# Patient Record
Sex: FEMALE | Race: WHITE | NOT HISPANIC OR LATINO | Employment: OTHER | URBAN - METROPOLITAN AREA
[De-identification: names, ages, dates, MRNs, and addresses within clinical notes are randomized per-mention and may not be internally consistent; named-entity substitution may affect disease eponyms.]

---

## 2017-01-09 ENCOUNTER — ASSISTED LIVING VISIT (OUTPATIENT)
Dept: GERIATRICS | Facility: CLINIC | Age: 82
End: 2017-01-09
Payer: COMMERCIAL

## 2017-01-09 DIAGNOSIS — F41.9 ANXIETY: ICD-10-CM

## 2017-01-09 DIAGNOSIS — I10 ESSENTIAL HYPERTENSION: ICD-10-CM

## 2017-01-09 DIAGNOSIS — I50.32 CHRONIC DIASTOLIC CONGESTIVE HEART FAILURE (H): Primary | ICD-10-CM

## 2017-01-09 PROCEDURE — 99207 ZZC CDG-CORRECTLY CODED, REVIEWED AND AGREE: CPT | Performed by: NURSE PRACTITIONER

## 2017-01-09 NOTE — PROGRESS NOTES
[place] [facility]  Chief Complaint   Patient presents with     Annual Comprehensive Exam Assisted Living       HPI:    Jade Caba is a 83 year old  (8/26/1933), who is being seen today for an annual comprehensive visit at [facility]. Today's concerns are:  Chronic diastolic congestive heart failure (H)  Stable. Cont. On lasix. Wt. Stable. Resp.status stable. No LE edema    Essential hypertension  Stable BPs/HRs. Cont. On norvasc, coreg, cozaar      Anxiety  Gen. Stable. Does reports feeling some worry right before bed, but no sleep dist.  Cont. On celexa, remeron,       ALLERGIES: No known allergies  PROBLEM LIST:  Patient Active Problem List   Diagnosis     Cardiovascular disease     Disorders of lipoid metabolism     Renal artery stenosis (H)     HTN (hypertension)     CAD (coronary artery disease)     Carotid artery stenosis     Seizures (H)     Mild major depression (H)     Hypercholesterolemia     CKD (chronic kidney disease) stage 3, GFR 30-59 ml/min     Hyperlipidemia LDL goal <100     Hypercholesteremia     Seizure disorder (H)     Osteoarthritis of knee     Chondrocalcinosis     Subarachnoid hemorrhage (H)     Fracture of C1 vertebra, closed (H)     Sacral fracture, closed (H)     Atrial flutter (H)     Alcohol abuse     Constipation     Anxiety state     ARF (acute renal failure) (H)     FTT (failure to thrive) in adult     Fall     Cognitive impairment     CHF exacerbation (H)     Health Care Home     Advance care planning     Chronic diastolic congestive heart failure (H)     Coronary artery disease, angina presence unspecified, unspecified vessel or lesion type, unspecified whether native or transplanted heart     Anxiety     Urgency incontinence     PAST MEDICAL HISTORY:  has a past medical history of Hypertension; Arthritis; Subarachnoid hemorrhage (H) (2014); C1 cervical fracture (H); Anxiety; Seizure disorder (H); Depression; Renal artery stenosis (H);  Hypercholesterolemia; CKD (chronic kidney disease); Carotid artery stenosis; Atrial flutter (H); H/O alcohol abuse; and CAD (coronary artery disease).  PAST SURGICAL HISTORY:  has past surgical history that includes carotid endarterectomy (Left); Stenting of renal artery; Coronary stents x 3; Cholecystectomy; appendectomy; joint replacement (Bilateral); Cataract surgery (Right); and Hysterectomy.  FAMILY HISTORY: family history includes C.A.D. in her brother, brother, mother, and sister; CANCER in her sister; HEART DISEASE in her son.  SOCIAL HISTORY:  reports that she has never smoked. She has never used smokeless tobacco. She reports that she does not drink alcohol or use illicit drugs.  IMMUNIZATIONS:  Most Recent Immunizations   Administered Date(s) Administered     Influenza (High Dose) 3 valent vaccine 10/03/2016     Influenza (IIV3) 10/22/2012     Pneumococcal (PCV 13) 05/26/2016     Pneumococcal 23 valent 08/14/2013     TDAP (BOOSTRIX AGES 10-64) 08/14/2013     Above immunizations pulled from [facility]. MIIC and facility records also reconciled. Outstanding information sent to  to update [facility] 1/10/17.  Future immunizations are not needed at this point as all recommended immunizations are up to date.   MEDICATIONS:  Current Outpatient Prescriptions   Medication Sig Dispense Refill     alendronate (FOSAMAX) 70 MG tablet Take 70 mg by mouth every 7 days On Wednesdays       furosemide (LASIX) 20 MG tablet Take 20 mg by mouth daily       cholecalciferol (VITAMIN D3) 5000 UNITS CAPS capsule Take 5,000 Units by mouth daily       acetaminophen (TYLENOL) 325 MG tablet Take 650 mg by mouth 2 times daily as needed for mild pain       Atorvastatin Calcium (LIPITOR PO) Take 20 mg by mouth daily       carvedilol (COREG) 12.5 MG tablet Take 1 tablet (12.5 mg) by mouth 2 times daily (with meals) 60 tablet 1     amLODIPine (NORVASC) 10 MG tablet Take 1 tablet (10 mg) by mouth daily 30 tablet 1      MIRTAZAPINE PO Take 30 mg by mouth At Bedtime       citalopram (CELEXA) 10 MG tablet Take 30 mg by mouth daily       losartan (COZAAR) 100 MG tablet Take 100 mg by mouth daily       multivitamin, therapeutic with minerals (THERA-VIT-M) TABS Take 1 tablet by mouth daily       meloxicam (MOBIC) 15 MG tablet Take 15 mg by mouth daily       polyethylene glycol (MIRALAX/GLYCOLAX) powder Take 17 g by mouth 2 times daily       calcium carbonate (OS-FRANCISCO) 500 MG TABS Take 2 tablets by mouth 2 times daily (with meals)       oxybutynin (DITROPAN) 5 MG tablet Take 5 mg by mouth 2 times daily       senna-docusate (SENNA S) 8.6-50 MG per tablet Take 1 tablet by mouth 2 times daily Please hold if having loose stools and contact nurse.       ASPIRIN EC PO Take 325 mg by mouth daily       metoclopramide (REGLAN) 5 MG tablet Take 5 mg by mouth 3 times daily (with meals)       bisacodyl (DULCOLAX) 10 MG suppository Place 10 mg rectally daily as needed       levETIRAcetam (KEPPRA) 500 MG tablet Take 500 mg by mouth 2 times daily       folic acid (FOLVITE) 1 MG tablet Take 1 mg by mouth daily       omeprazole (PRILOSEC) 40 MG capsule Take 1 capsule (40 mg) by mouth daily Take 30-60 minutes before a meal. 30 capsule 9     Medications reviewed:  Medications reconciled to facility chart and changes were made to reflect current medications as identified as above med list. Below are the changes that were made:   Medications stopped since last EPIC medication reconciliation:   There are no discontinued medications.    Medications started since last [facility] medication reconciliation:  No orders of the defined types were placed in this encounter.         Case Management:  I have reviewed the Assisted Living care plan, current immunizations and preventive care/cancer screening..Future cancer screening is not clinically indicated secondary to age/goals of care Patient's desire to return to the community is not present. Current Level of Care is  appropriate.    Advance Directive Discussion:    I reviewed the current advanced directives as reflected in EPIC and the facility chart. I contacted the first party dtr and left a message regarding the plan of Care. I reviewed the POLST, resigned, dated and sent to [place] [facility] 1/10/17.  I did review the advance directives with the resident.     Team Discussion:  I communicated with the appropriate disciplines involved with the Plan of Care:   Nursing    Patient Goal:  Patient's goal is pain control and comfort and tx of reversible conditions.    Depression screen done: PHQ-9 Given screen score and clinical assessment patient is stable without any signs of depression and no futher interventions warrented at this time.    Information reviewed:  Medications, vital signs, orders, and nursing notes.    ROS:  No chest pain, shortness of breath, fevers, chills, headache, nausea, vomiting, dysuria or bowel abnormalities.  Appetite is  normal.  No pain except occ aches.    Exam:  /57 mmHg  Pulse 62  Resp 18  Wt 172 lb (78.019 kg)  SpO2 95%    GENERAL APPEARANCE:  Alert, in no distress, cooperative  ENT:  Mouth and posterior oropharynx normal, moist mucous membranes, [place], no rhinitis  EYES:  EOM, conjunctivae, lids, pupils and irises normal, PERRL, no drainage  NECK:  No adenopathy,masses or thyromegaly, no carotid bruit, FROM  RESP:  respiratory effort and palpation of chest normal, lungs clear to auscultation , no respiratory distress  CV:  Palpation and auscultation of heart done , regular rate and rhythm, no murmur, rub, or gallop, no edema  ABDOMEN:  normal bowel sounds, soft, nontender, no hepatosplenomegaly or other masses, no guarding or rebound, no bruits  LYMPHATICS:  No adenopathy in neck , No adenopathy in axillae  M/S:   Gait and station normal  muscle strength 5/5 all 4 ext., normal tone  NEURO:   Cranial nerves 2-12 are normal tested and grossly at patient's baseline, speech clear, no  tremor  PSYCH:  insight and judgement impaired, memory impaired , affect and mood normal     Lab/Diagnostic data:    CBC RESULTS:   Lab Test 09/19/16   WBC  5.7   RBC  4.11   HGB  13.1   HCT  40.2   MCV  98   MCH  31.9   MCHC  32.6   RDW  12.7   PLT  165       Last Basic Metabolic Panel:  Lab Test 09/19/16   NA  143   POTASSIUM  4.7   CHLORIDE  104   FRANCISCO  9.2   CO2   --    BUN  21   CR  0.98   GLC  86       Liver Function Studies -   Lab Test 09/19/16   PROTTOTAL   --    ALBUMIN   --    BILITOTAL   --    ALKPHOS   --    AST  19   ALT  17           ASSESSMENT/PLAN  Chronic diastolic congestive heart failure (H)  Currently controlled  1. Cont. Lasix  2. Monitor for LE edema  3. Monitor for resp. Distress  4. Cont qd wt.s  5. Bmp next week    Essential hypertension  Currently controlled  1. Cont. Cozaar, norasc, coreg  2. Follow BPs/HRs  3. Cont as for a-fib  4. Cbc next week    Anxiety  Gen. Controlled. Some hs s/s  1. Cont. Celexa, remeron  2. Monitor for reports of increased anxiety  3. Monitor for insomnia        The health plan new enrollment has happened. I have reviewed the  MDS and facility care plan. The level of care is appropriate.     Electronically signed by:  ZAIN Marcos CNP

## 2017-01-10 VITALS
HEART RATE: 62 BPM | BODY MASS INDEX: 31.45 KG/M2 | WEIGHT: 172 LBS | DIASTOLIC BLOOD PRESSURE: 57 MMHG | SYSTOLIC BLOOD PRESSURE: 110 MMHG | RESPIRATION RATE: 18 BRPM | OXYGEN SATURATION: 95 %

## 2017-01-11 ASSESSMENT — PATIENT HEALTH QUESTIONNAIRE - PHQ9: SUM OF ALL RESPONSES TO PHQ QUESTIONS 1-9: 1

## 2017-01-17 ENCOUNTER — TRANSFERRED RECORDS (OUTPATIENT)
Dept: HEALTH INFORMATION MANAGEMENT | Facility: CLINIC | Age: 82
End: 2017-01-17

## 2017-01-17 LAB
ANION GAP SERPL CALCULATED.3IONS-SCNC: 6 MMOL/L (ref 5–18)
BUN SERPL-MCNC: 22 MG/DL (ref 8–28)
CALCIUM SERPL-MCNC: 8.8 MG/DL (ref 8.5–10.5)
CHLORIDE SERPLBLD-SCNC: 106 MMOL/L (ref 98–107)
CO2 SERPL-SCNC: 27 MMOL/L (ref 22–31)
CREAT SERPL-MCNC: 1.09 MG/DL (ref 0.5–1.1)
DIFFERENTIAL: ABNORMAL
ERYTHROCYTE [DISTWIDTH] IN BLOOD BY AUTOMATED COUNT: 11.9 % (ref 11–14.6)
GFR SERPL CREATININE-BSD FRML MDRD: 48 ML/MIN/1.73M2
GLUCOSE SERPL-MCNC: 156 MG/DL (ref 70–125)
HCT VFR BLD AUTO: 40.2 % (ref 35–47)
HEMOGLOBIN: 12.5 G/DL (ref 12–16)
MCH RBC QN AUTO: 30.8 PG (ref 27–34)
MCHC RBC AUTO-ENTMCNC: 31.1 G/DL (ref 32–36)
MCV RBC AUTO: 99 FL (ref 80–100)
PLATELET # BLD AUTO: 211 THOU/UL (ref 140–440)
PMV BLD: 11.8 FL (ref 8.5–12.5)
POTASSIUM SERPL-SCNC: 4.5 MMOL/L (ref 3.5–5)
RBC # BLD AUTO: 4.06 MILL/UL (ref 3.8–5.4)
SODIUM SERPL-SCNC: 139 MMOL/L (ref 136–144)
WBC # BLD AUTO: 7.5 THOU/UL (ref 4–11)

## 2017-02-24 ENCOUNTER — CARE COORDINATION (OUTPATIENT)
Dept: GERIATRIC MEDICINE | Facility: CLINIC | Age: 82
End: 2017-02-24

## 2017-02-24 NOTE — PROGRESS NOTES
Rec'd information that client changed insurance from [employer] to are [employer] eff 1/1/17.  Welcome letter had been mailed to client on 1/11/17, MMIS completed on 1/11/17  CM spoke with Silvana ZHAO at [facility] on 2/22/17 who is aware of the chagne. CM to complete new auth  CM will complete early visit/assessment with client, due to difficulty making telephone contact with her.  Holly Guajardo RN, BC  Supervisor [facility]   [phone] (Fax)

## 2017-03-09 ENCOUNTER — CARE COORDINATION (OUTPATIENT)
Dept: GERIATRIC MEDICINE | Facility: CLINIC | Age: 82
End: 2017-03-09

## 2017-03-09 NOTE — PROGRESS NOTES
Spoke with Jade, home visit changed to 3/23/17 @ 1 PM  Holly Guajardo RN, BC  Supervisor [facility]   [phone] (Fax)

## 2017-03-09 NOTE — PROGRESS NOTES
Rec'd vm from Betsey at [facility] requesting a return call re authorization.  Call placed to client to schedule annual HRA -client with change in health plan to [facility]. Home visit tentatively arranged for 3/16/17 @ 10 AM.  Spoke with Betsey, who inquired on an authorization. Explained that the Waiver Approval form has been completed, inquired if they billed [employer] and rec'd a denial. Betsey will f/u and contact . Explained that if a denial was rec'd,  [facility] will f/u with [facility].   Holly Guajardo RN, BC  Supervisor [facility]   [phone] (Fax)

## 2017-03-16 ENCOUNTER — ASSISTED LIVING VISIT (OUTPATIENT)
Dept: GERIATRICS | Facility: CLINIC | Age: 82
End: 2017-03-16
Payer: COMMERCIAL

## 2017-03-16 VITALS
OXYGEN SATURATION: 95 % | HEART RATE: 73 BPM | DIASTOLIC BLOOD PRESSURE: 64 MMHG | SYSTOLIC BLOOD PRESSURE: 118 MMHG | RESPIRATION RATE: 16 BRPM

## 2017-03-16 DIAGNOSIS — F41.9 ANXIETY: ICD-10-CM

## 2017-03-16 DIAGNOSIS — H57.89 IRRITATION OF LEFT EYE: Primary | ICD-10-CM

## 2017-03-16 DIAGNOSIS — I10 ESSENTIAL HYPERTENSION: ICD-10-CM

## 2017-03-16 NOTE — PROGRESS NOTES
[place] [facility]    Chief Complaint   Patient presents with     Eye Problem       HPI:    Jade Caba is a 83 year old  (8/26/1933), who is being seen today for an episodic care visit at [facility]. Today's concern is: L eye irritation, HTN, anxiety.  Reports occ eye pain/irritation OS for past few weeks. No changes in vision. No h/o mac. Deg. Last eye exam over 1 yr ago. Reports occ drainage. For HTN cont on norvasc, coreg, cozaar. BPs/HRs stable.  For anxiety cont on celexa, remeron. Staff report mood has been stable. No reports of increased anxiety.      ALLERGIES: No known allergies  Past Medical, Surgical, Family and Social History reviewed and updated in [facility].    Current Outpatient Prescriptions   Medication Sig Dispense Refill     alendronate (FOSAMAX) 70 MG tablet Take 70 mg by mouth every 7 days On Wednesdays       furosemide (LASIX) 20 MG tablet Take 20 mg by mouth daily       cholecalciferol (VITAMIN D3) 5000 UNITS CAPS capsule Take 5,000 Units by mouth daily       acetaminophen (TYLENOL) 325 MG tablet Take 650 mg by mouth 2 times daily as needed for mild pain       Atorvastatin Calcium (LIPITOR PO) Take 20 mg by mouth daily       carvedilol (COREG) 12.5 MG tablet Take 1 tablet (12.5 mg) by mouth 2 times daily (with meals) 60 tablet 1     amLODIPine (NORVASC) 10 MG tablet Take 1 tablet (10 mg) by mouth daily 30 tablet 1     MIRTAZAPINE PO Take 30 mg by mouth At Bedtime       citalopram (CELEXA) 10 MG tablet Take 30 mg by mouth daily       losartan (COZAAR) 100 MG tablet Take 100 mg by mouth daily       multivitamin, therapeutic with minerals (THERA-VIT-M) TABS Take 1 tablet by mouth daily       meloxicam (MOBIC) 15 MG tablet Take 15 mg by mouth daily       polyethylene glycol (MIRALAX/GLYCOLAX) powder Take 17 g by mouth 2 times daily       calcium carbonate (OS-FRANCISCO) 500 MG TABS Take 2 tablets by mouth 2 times daily (with meals)       oxybutynin (DITROPAN) 5 MG tablet  Take 5 mg by mouth 2 times daily       senna-docusate (SENNA S) 8.6-50 MG per tablet Take 1 tablet by mouth 2 times daily Please hold if having loose stools and contact nurse.       ASPIRIN EC PO Take 325 mg by mouth daily       metoclopramide (REGLAN) 5 MG tablet Take 5 mg by mouth 3 times daily (with meals)       bisacodyl (DULCOLAX) 10 MG suppository Place 10 mg rectally daily as needed       levETIRAcetam (KEPPRA) 500 MG tablet Take 500 mg by mouth 2 times daily       folic acid (FOLVITE) 1 MG tablet Take 1 mg by mouth daily       omeprazole (PRILOSEC) 40 MG capsule Take 1 capsule (40 mg) by mouth daily Take 30-60 minutes before a meal. 30 capsule 9     Medications reviewed:  Medications reconciled to facility chart and changes were made to reflect current medications as identified as above med list. Below are the changes that were made:   Medications stopped since last EPIC medication reconciliation:   There are no discontinued medications.    Medications started since last [facility] medication reconciliation:  No orders of the defined types were placed in this encounter.        REVIEW OF SYSTEMS:  No chest pain, shortness of breath, fevers, chills, headache, nausea, vomiting, dysuria or bowel abnormalities.  Appetite is  normal.  No pain except occ OS.    Physical Exam:  /64  Pulse 73  Resp 16  SpO2 95%  GENERAL APPEARANCE:  Alert, in no distress, cooperative  ENT:  Mouth and posterior oropharynx normal, moist mucous membranes, [place], no sinus tenderness or rhinitis  EYES:  EOM, conjunctivae, lids, pupils and irises normal, PERRL, no drainage  NECK:  No adenopathy,masses or thyromegaly, no carotid bruit, FROM  RESP:  respiratory effort and palpation of chest normal, lungs clear to auscultation , no respiratory distress  CV:  Palpation and auscultation of heart done , regular rate and rhythm, no murmur, rub, or gallop, no edema  ABDOMEN:  normal bowel sounds, soft, nontender, no hepatosplenomegaly or other  masses, no guarding or rebound, no bruits  LYMPHATICS:  No adenopathy in neck , No adenopathy in axillae  M/S:   Gait and station normal  muscle strength 5/5 all 4 ext, normal tone  NEURO:   Cranial nerves 2-12 are normal tested and grossly at patient's baseline, speech clear, no tremor  PSYCH:  insight and judgement impaired, memory impaired , affect and mood normal    Recent Labs:      17 CBC RESULTS:    WBC: 7.5  RBC: 4.06  HGB: 12.5  HCT: 40.2  MCV: 99  MCH: 30.8  MCHC: 31.1  RDW: 11.9  PLT: 211      CBC RESULTS:   Recent Labs   Lab Test 09/19/16  02/27/15   0841   WBC  5.7  6.0   RBC  4.11  4.13   HGB  13.1  12.8   HCT  40.2  40.3   MCV  98  98   MCH  31.9  31.0   MCHC  32.6  31.8   RDW  12.7  13.5   PLT  165  195     17 BASIC METABOLIC PANEL:    NA: 139  POTASSIUM: 4.5  CHLORIDE: 106  FRANCISCO: 8.8  CO2: 27  BUN: 22  CR: 1.09  G    Last Basic Metabolic Panel:  Recent Labs   Lab Test 09/19/16  02/27/15   0841  02/26/15   0635   NA  143  139  140   POTASSIUM  4.7  4.0  3.9   CHLORIDE  104  104  105   FRANCISCO  9.2  8.8  8.9   CO2   --   30  32   BUN  21  19  18   CR  0.98  0.81  0.78   GLC  86  100*  87       Liver Function Studies -   Recent Labs   Lab Test 16   1030  14   1422   PROTTOTAL   --   6.6*  7.8   ALBUMIN   --   3.2*  4.2   BILITOTAL   --   0.3  1.1   ALKPHOS   --   68  86   AST  19  20  27   ALT  17  17  24       Assessment/Plan:  Irritation of left eye  Past few weeks,no drainage no conjunctivitis   1. Start artificial tears tid  2. Monitor for further pain  3. Reassess over next week for increased pain  4. Resident to make eye exam for next week    Essential hypertension  Currently controlled  1. Cont. Norvasc, coreg, cozaar.  2. Follow BPs/HRs  3. Bmp in next 1-3 mos  4. Monitor for c/o dizziness    Anxiety  Gen. Controlled. No recent increased s/s  1. Cont. celexa  2. Cont. remeron  3. Monitor for increased anxiety, s/s depression          Electronically signed  by  ZAIN Marcos CNP

## 2017-03-23 ENCOUNTER — CARE COORDINATION (OUTPATIENT)
Dept: GERIATRIC MEDICINE | Facility: CLINIC | Age: 82
End: 2017-03-23

## 2017-03-23 NOTE — PROGRESS NOTES
Spoke with client to reschedule home visit/assessment 3/30/17 1-3.   Secure e-mail sent to Betsey ZHAO at [facility] requesting copies of AL POC and med list.  Holly Guajardo RN, BC  Supervisor [facility]   [phone] (Fax)

## 2017-03-31 ENCOUNTER — CARE COORDINATION (OUTPATIENT)
Dept: GERIATRIC MEDICINE | Facility: CLINIC | Age: 82
End: 2017-03-31

## 2017-03-31 DIAGNOSIS — Z71.89 ADVANCE CARE PLANNING: Chronic | ICD-10-CM

## 2017-03-31 DIAGNOSIS — Z76.89 HEALTH CARE HOME: ICD-10-CM

## 2017-03-31 NOTE — PROGRESS NOTES
Home visit/[place] Risk Assessment/EW screening completed on: 3/30/2017  Met with client in her apt at the [facility]. Client reports that she is doing well, expressed no concerns. Shared that her insurance changed to [employer], client states that her daughter manages all of insurance needs. Client did show CM her new [employer] card, explained that her previous health plan card is no longer active. Client reports that she enjoys living at the AL. Client is followed by the on site [facility] team. Client reports no falls this past year. Recent eye exam at [facility] she had to pay privately for. CM will f/u with [facility] to inquire if they are an in network provider. Client cont to report panic attacks near bedtime, stating that walking the halls helps her. Client does use a walker at night.   Client had no questions or concerns for CM.   Member resides: Assisted living [facility] apt with private bath, microwave, refrigerator, coffee pot and small sink. The apt is clean and free of all clutter   Member currently receiving the following services: AL services.   See EMR for a list of client's diagnoses and medications.   Medication management: Medications reviewed:Yes. Medication management: Care giver administers medication. Medication understanding:Patient has understanding of regimen and is adherent Yes and Caregiver has understanding of regimen and is able to complete med set up/administration Yes. Client reports meds are administered 4x/day and eye gtts are also administered.   Member Mood/behavior-PHQ9 score: 1. Client reports that is satisfied with her life, denies feeling depressed, reports that she does feel anxious at time. PCP is aware and has addressed mood in recent visit notes.    Plan of Care: Continue AL POC, per client she receives assistance with bathing 2x/wk, med admin, laundry and hmkg, 2 meals per day, daily weight and VS. Client states that she enjoys the social activities of Bingo,  special celebrations and social hours. Client states that she also likes to spend time in her room watching TV and playing word search.   CM attempted to meet with Annie ZHAO to review POC, sent e-mail previously. Annie is out of the office.   Follow-Up Plan: Member informed of future contact, plan to f/u with member with a 6 month telephone assessment.  Contact information shared with member and family, encouraged member to call with any questions or concerns prior to this.  See [facility] for further detailed information  Holly Guajardo RN, BC  Supervisor [facility]   [phone] (Fax)

## 2017-04-05 ENCOUNTER — CARE COORDINATION (OUTPATIENT)
Dept: GERIATRIC MEDICINE | Facility: CLINIC | Age: 82
End: 2017-04-05

## 2017-04-05 NOTE — PROGRESS NOTES
Secure e-mail to Betsey ZHAO at [facility] requesting copy of medication list and AL POC. Second request.   Holly Guajardo RN, BC  Supervisor [facility]   [phone] (Fax)

## 2017-04-10 ENCOUNTER — DOCUMENTATION ONLY (OUTPATIENT)
Dept: OTHER | Facility: CLINIC | Age: 82
End: 2017-04-10

## 2017-04-10 ENCOUNTER — CARE COORDINATION (OUTPATIENT)
Dept: GERIATRIC MEDICINE | Facility: CLINIC | Age: 82
End: 2017-04-10

## 2017-04-10 DIAGNOSIS — Z71.89 ADVANCE CARE PLANNING: Chronic | ICD-10-CM

## 2017-04-10 NOTE — PROGRESS NOTES
Call placed to Betsey ZHAO at [facility] to review LTCC and AL POC. Request med list and POC to be faxed to   CM. Betsey reports that client is not very involved, is mainly by herself. Betsey has no concerns. Client with no recent falls, indep in ADL's with the exception of bathing. CM provided information that client had shared with CM regarding services provided to client. Betsey agreed.   Reviewed previous 's AL tool.  Explained that client stated that she is not receiving daily housekeeping assistance, Betsey agreed and will f/u with staff to confirm hmkg and laundry. CM to remove 1 meal per day as client is making her own noon meal. CM to remove weekly assistance with dressing and grooming as client is indep.  Reviewed cognitive and behavioral plan, Betsey stated that they are not providing assistance or any intervention but will review with staff and f/u with CM. Explained that the tool will likely be a reduction from previous authorization, Betsey in agreement.  Holly Guajardo RN, BC  Supervisor [facility]   [phone] (Fax)

## 2017-04-13 NOTE — PROGRESS NOTES
CM has not rec'd client's medication list or AL POC. Call placed to Betsey ZHAO -out of office until 4/17/17  Left  with nursing office at [facility] requesting copy of med list and POC be faxed to   . Request a call back with questions or concerns.  Holly Guajardo RN, BC  Supervisor [facility]   [phone] (Fax)

## 2017-04-21 ENCOUNTER — CARE COORDINATION (OUTPATIENT)
Dept: GERIATRIC MEDICINE | Facility: CLINIC | Age: 82
End: 2017-04-21

## 2017-04-24 NOTE — PROGRESS NOTES
4/21/17 Rec'd secure e-mail from [facility] with client's med list and POC   Secure e-mail sent to [facility] to f/u on homemaking/laundry and behavior (previous CL tool had time for orientation, behavior management, dressing/grooming assistance)  Per [facility]   Housekeeping hours are the same. 60 minutes a week. No behaviors noted for her.  Holly Guajardo RN, BC  Supervisor [facility]   [phone] (Fax)

## 2017-04-26 ENCOUNTER — CARE COORDINATION (OUTPATIENT)
Dept: GERIATRIC MEDICINE | Facility: CLINIC | Age: 82
End: 2017-04-26

## 2017-04-26 DIAGNOSIS — Z76.89 HEALTH CARE HOME: ICD-10-CM

## 2017-04-26 NOTE — PROGRESS NOTES
Mailed copy of care plan to client.  As requested/needed:  emailed CPS to Carla for [facility], updated services in access as needed and submitted appropriate referrals/auths. Chart was returned to CM.     Mailed copy of CL tool to client and emailed copy to Carla HENNESSY for auth.    Scarlett Vyas  Case Management Specialist  [facility]  [phone]

## 2017-04-26 NOTE — PROGRESS NOTES
MMIS completed. CL tool completed, note reduction in services: previous tool had 60 min per day of housekeeping, [facility] 60 min per week. CM added daily weights, removed dressing and grooming as client is indep.    Secure e-mail sent to [facility] with CL tool attached, shared info above. Request a call back as needed.  UCP completed.  Holly Guajardo RN, BC  Supervisor [facility]   [phone] (Fax)

## 2017-04-26 NOTE — PROGRESS NOTES
Received a request to submit a DTR for the reduction of assisted living services. Documentation completed and faxed to the health plan.  aware.    Eedn Barbour RN  Utilization   [facility]  [phone]

## 2017-05-15 NOTE — PROGRESS NOTES
5/3/2017 Information rec'd from [facility] secure web site: Jade Caba ([birth year]) - [facility] - 05/12/17-08/31/18 - EWCL reduced to $62.02 daily  Holly Guajardo RN, BC  Supervisor [facility]   [phone] (Fax)

## 2017-08-17 ENCOUNTER — ASSISTED LIVING VISIT (OUTPATIENT)
Dept: GERIATRICS | Facility: CLINIC | Age: 82
End: 2017-08-17
Payer: COMMERCIAL

## 2017-08-17 VITALS
OXYGEN SATURATION: 95 % | HEART RATE: 73 BPM | RESPIRATION RATE: 16 BRPM | SYSTOLIC BLOOD PRESSURE: 118 MMHG | DIASTOLIC BLOOD PRESSURE: 64 MMHG

## 2017-08-17 DIAGNOSIS — K21.9 GASTROESOPHAGEAL REFLUX DISEASE WITHOUT ESOPHAGITIS: ICD-10-CM

## 2017-08-17 DIAGNOSIS — R41.89 COGNITIVE IMPAIRMENT: ICD-10-CM

## 2017-08-17 DIAGNOSIS — F41.1 ANXIETY STATE: ICD-10-CM

## 2017-08-17 DIAGNOSIS — G40.909 SEIZURE DISORDER (H): ICD-10-CM

## 2017-08-17 DIAGNOSIS — I50.32 CHRONIC DIASTOLIC CONGESTIVE HEART FAILURE (H): ICD-10-CM

## 2017-08-17 DIAGNOSIS — I25.10 CORONARY ARTERY DISEASE INVOLVING NATIVE CORONARY ARTERY OF NATIVE HEART WITHOUT ANGINA PECTORIS: ICD-10-CM

## 2017-08-17 DIAGNOSIS — N39.41 URGENCY INCONTINENCE: ICD-10-CM

## 2017-08-17 DIAGNOSIS — I10 ESSENTIAL HYPERTENSION: Primary | ICD-10-CM

## 2017-08-17 PROCEDURE — 99207 ZZC CDG-CORRECTLY CODED, REVIEWED AND AGREE: CPT | Performed by: INTERNAL MEDICINE

## 2017-08-21 ENCOUNTER — TRANSFERRED RECORDS (OUTPATIENT)
Dept: HEALTH INFORMATION MANAGEMENT | Facility: CLINIC | Age: 82
End: 2017-08-21

## 2017-08-21 LAB
ANION GAP SERPL CALCULATED.3IONS-SCNC: 10 MMOL/L (ref 5–18)
BUN SERPL-MCNC: 23 MG/DL (ref 8–28)
CALCIUM SERPL-MCNC: 9.3 MG/DL (ref 8.5–10.5)
CHLORIDE SERPLBLD-SCNC: 104 MMOL/L (ref 98–107)
CO2 SERPL-SCNC: 28 MMOL/L (ref 22–31)
CREAT SERPL-MCNC: 1.1 MG/DL (ref 0.6–1.1)
DIFFERENTIAL: NORMAL
ERYTHROCYTE [DISTWIDTH] IN BLOOD BY AUTOMATED COUNT: 11.7 % (ref 11–14.5)
GFR SERPL CREATININE-BSD FRML MDRD: 47 ML/MIN/1.73M2
GLUCOSE SERPL-MCNC: 120 MG/DL (ref 70–125)
HCT VFR BLD AUTO: 43.9 % (ref 35–47)
HEMOGLOBIN: 14.2 G/DL (ref 12–16)
MCH RBC QN AUTO: 31.3 PG (ref 27–34)
MCHC RBC AUTO-ENTMCNC: 32.3 G/DL (ref 32–36)
MCV RBC AUTO: 97 FL (ref 80–100)
PLATELET # BLD AUTO: 218 THOU/UL (ref 140–440)
POTASSIUM SERPL-SCNC: 4.2 MMOL/L (ref 3.5–5)
RBC # BLD AUTO: 4.53 MILL/UL (ref 3.8–5.4)
SODIUM SERPL-SCNC: 142 MMOL/L (ref 136–145)
WBC # BLD AUTO: 8.7 THOU/UL (ref 4–11)

## 2017-08-24 NOTE — PROGRESS NOTES
"Jade Caba is a 83 year old female seen August 17, 2017 at [facility] where she has resided for 3 years (admit 2014), seen to follow up seizure disorder, HTN and anxiety/depression.   Patient is seen in her apartment, which she keeps very neat.   States she is feeling well, denies any current pain.   Ambulatory without device except she uses her walker when she is up to the BR at night.     Reports she continues to have brief seizures (just seconds long) as often as once a week.   Doesn't feel bothered by these or describe a post-ictal phase.   Currently on Keppra.    She also states \"I still get anxiety quite a bit\"   Takes several medications but notes \"you could go up on those.\"   Anxiety does not seem to affect her functional status.     Has a h/o alcohol abuse.    She is also tx'd for HTN, hyperlipidemia and CAD.   She was admitted to AL in 2014 after hospitalization and TCU stay following a fall at home, sustaining SAH, C1 chip fracture, sacral fracture.     Slow recovery.    She was readmitted that year for weight loss and failure to thrive, CPT 4.3  Oxycodone d/c'd and she improved after that point.   No recent falls reported.       Past Medical History:   Diagnosis Date     Anxiety      Arthritis      Atrial flutter (H)      C1 cervical fracture (H)      CAD (coronary artery disease)      Carotid artery stenosis      CKD (chronic kidney disease)      Depression      H/O alcohol abuse      Hypercholesterolemia      Hypertension      Renal artery stenosis (H)      Seizure disorder (H)      Subarachnoid hemorrhage (H) 2014     Past Surgical History:   Procedure Laterality Date     APPENDECTOMY       CAROTID ENDARTERECTOMY Left      Cataract surgery Right     will have laser eye surgery     CHOLECYSTECTOMY       Coronary stents x 3       HYSTERECTOMY      at age of 40-not cancerus or precancerous      JOINT REPLACEMENT Bilateral      Stenting of renal artery       SH:  , has 10 children.      ROS:  " Reports occ reflux symptoms.   Weight is stable.   Also feels she needs oxybutynin for urge incontinence.     Occ constipation.      EXAM:  NAD, pleasant  /64  Pulse 73  Resp 16  SpO2 95%   Neck supple without adenopathy  Lungs clear bilaterally with fair air movement  Heart RRR s1s2, no murmur or gallop  Abd soft, NT, no distention, +BS  Ext without edema.   Ambulatory without device.    Neuro: no tremor or stiffness  Psych: affect okay, content anxios    No recent labs    IMP/PLAN:  (I10) Essential hypertension   Comment: good control, has h/o CKD  Plan: continue amlodipine, carvedilol, losartan  Check BMP     No reports of pain, and meloxicam may be causing higher bps and worsening renal function; will change that to prn only    (I25.10) Coronary artery disease involving native coronary artery of native heart without angina pectoris  Comment: h/o afib, has declined warfarin secondary to frequent falls.     Plan: continue beta blocker, statin, daily ASA for secondary prevention    (G40.909) Seizure disorder (H)  Comment: hard to tell how well controlled these are, but no witnessed events recently  Plan: continue Keppra    (F41.1) Anxiety state  Comment: patient already on max treatment  Plan: continue same doses of mirtazapine and citalopram; might consider dividing mirtazapine to 15 mg bid for better day time coverage if anxiety symptoms persist.       (R41.89) Cognitive impairment  Comment: by prior testing  Plan: appropriate for AL with med admin and services.       (I50.32) Chronic diastolic congestive heart failure (H)  Comment: stable volume status  Plan: continue current dose of furosemide, check BMP    (N39.41) Urgency incontinence  Comment: patient feels she needs oxybutynin  Plan: will continue for now.      (K21.9) Gastroesophageal reflux disease without esophagitis  Comment: also on metoclopramide for ?reflux sx  Plan: continue omeprazole, consider GDR metoclopramide.        Osteoporosis  Comment : h/o falls  Plan: continue calcium, vit D, alendronate     Kerry Machado MD

## 2017-08-25 ENCOUNTER — CARE COORDINATION (OUTPATIENT)
Dept: GERIATRIC MEDICINE | Facility: CLINIC | Age: 82
End: 2017-08-25

## 2017-08-25 NOTE — PROGRESS NOTES
6 month telephone assessment. EPIC notes reviewed. Secure e-mail sent to Betsey ZHAO at [facility] to obtain update.  Attempted to reach client, not able to leave voice message.   Holly Guajardo RN, BC  Supervisor [facility]   [phone] (Fax)

## 2017-10-03 ENCOUNTER — ASSISTED LIVING VISIT (OUTPATIENT)
Dept: GERIATRICS | Facility: CLINIC | Age: 82
End: 2017-10-03

## 2017-10-03 ENCOUNTER — ASSISTED LIVING VISIT (OUTPATIENT)
Dept: GERIATRICS | Facility: CLINIC | Age: 82
End: 2017-10-03
Payer: COMMERCIAL

## 2017-10-03 VITALS
OXYGEN SATURATION: 95 % | SYSTOLIC BLOOD PRESSURE: 131 MMHG | DIASTOLIC BLOOD PRESSURE: 65 MMHG | RESPIRATION RATE: 16 BRPM | HEART RATE: 83 BPM

## 2017-10-03 DIAGNOSIS — F41.9 ANXIETY: ICD-10-CM

## 2017-10-03 DIAGNOSIS — M15.0 PRIMARY OSTEOARTHRITIS INVOLVING MULTIPLE JOINTS: Primary | ICD-10-CM

## 2017-10-03 DIAGNOSIS — K21.9 GASTROESOPHAGEAL REFLUX DISEASE, ESOPHAGITIS PRESENCE NOT SPECIFIED: ICD-10-CM

## 2017-10-03 NOTE — PROGRESS NOTES
"Ortho Nursing home visit    Jade Caba is a 84 year old female who resides at [facility]    Patient is seen today for Left knee pain; has had injections, requesting same today for OA pain assc: with W/B;  Discussed prior to visit with NP\"      Past Medical History:   Diagnosis Date     Anxiety      Arthritis      Atrial flutter (H)      C1 cervical fracture (H)      CAD (coronary artery disease)      Carotid artery stenosis      CKD (chronic kidney disease)      Depression      H/O alcohol abuse      Hypercholesterolemia      Hypertension      Renal artery stenosis (H)      Seizure disorder (H)      Subarachnoid hemorrhage (H) 2014      Past Surgical History:   Procedure Laterality Date     APPENDECTOMY       CAROTID ENDARTERECTOMY Left      Cataract surgery Right     will have laser eye surgery     CHOLECYSTECTOMY       Coronary stents x 3       HYSTERECTOMY      at age of 40-not cancerus or precancerous      JOINT REPLACEMENT Bilateral      Stenting of renal artery          Allergies   Allergen Reactions     No Known Allergies       There were no vitals taken for this visit.     Exam: Seated; some noted pain with palpation to medial joint line, no crepitus, noted; no swelling, pain with valgus stress; no nockin    ASSESSMENT / PLAN:  After R&B discussed today, injection done left knee with 1 cc depo medrol, 4 cc 1% lidocaine in to knee joint; tolerated well, no complaints;    [id number]          [facility]  [phone]    "

## 2017-10-03 NOTE — PROGRESS NOTES
[facility]    Chief Complaint   Patient presents with     Knee Pain     Anxiety       HPI:    Jade Caba is a 84 year old  (8/26/1933), who is being seen today for an episodic care visit at [facility].  HPI information obtained from: facility chart records, facility staff, patient report, [facility] chart review and family/first contact dtr report.Today's concern is:  Primary osteoarthritis involving multiple joints  Ongoing gen. Pain neck, knees. Cont. On prn meloxicam, tylenol. Reports increased L knee pain. Reports L knee inj in past. Spoke with dtr who said L knee inj. Several years ago. Is agreeable to have [facility] inj L knee on site.    Anxiety  Ongoing. Cont. On celexa, remeron.    Gastroesophageal reflux disease, esophagitis presence not specified  Ongoing s/s. Cont. On reglan, prilosec, tums.  Does occ. Take ibuprofen for pain.  hgb stable.      ALLERGIES: No known allergies  Past Medical, Surgical, Family and Social History reviewed and updated in [facility].    Current Outpatient Prescriptions   Medication Sig Dispense Refill     Mirtazapine (REMERON PO) Take 15 mg by mouth 2 times daily       hypromellose (ARTIFICIAL TEARS) 0.5 % SOLN ophthalmic solution Place 3 drops into both eyes 3 times daily       alendronate (FOSAMAX) 70 MG tablet Take 70 mg by mouth every 7 days On Wednesdays       furosemide (LASIX) 20 MG tablet Take 20 mg by mouth daily       cholecalciferol (VITAMIN D3) 5000 UNITS CAPS capsule Take 5,000 Units by mouth daily       acetaminophen (TYLENOL) 325 MG tablet Take 650 mg by mouth 2 times daily as needed for mild pain       Atorvastatin Calcium (LIPITOR PO) Take 20 mg by mouth daily       carvedilol (COREG) 12.5 MG tablet Take 1 tablet (12.5 mg) by mouth 2 times daily (with meals) 60 tablet 1     amLODIPine (NORVASC) 10 MG tablet Take 1 tablet (10 mg) by mouth daily 30 tablet 1     citalopram (CELEXA) 10 MG tablet Take 30 mg by mouth daily        losartan (COZAAR) 100 MG tablet Take 100 mg by mouth daily       multivitamin, therapeutic with minerals (THERA-VIT-M) TABS Take 1 tablet by mouth daily       meloxicam (MOBIC) 15 MG tablet Take 15 mg by mouth daily       polyethylene glycol (MIRALAX/GLYCOLAX) powder Take 17 g by mouth 2 times daily       calcium carbonate (OS-FRANCISCO) 500 MG TABS Take 2 tablets by mouth 2 times daily (with meals)       oxybutynin (DITROPAN) 5 MG tablet Take 5 mg by mouth 2 times daily       senna-docusate (SENNA S) 8.6-50 MG per tablet Take 1 tablet by mouth 2 times daily Please hold if having loose stools and contact nurse.       ASPIRIN EC PO Take 325 mg by mouth daily       metoclopramide (REGLAN) 5 MG tablet Take 5 mg by mouth 3 times daily (with meals)       bisacodyl (DULCOLAX) 10 MG suppository Place 10 mg rectally daily as needed       levETIRAcetam (KEPPRA) 500 MG tablet Take 500 mg by mouth 2 times daily       folic acid (FOLVITE) 1 MG tablet Take 1 mg by mouth daily       omeprazole (PRILOSEC) 40 MG capsule Take 1 capsule (40 mg) by mouth daily Take 30-60 minutes before a meal. 30 capsule 9     Medications reviewed:  Medications reconciled to facility chart and changes were made to reflect current medications as identified as above med list. Below are the changes that were made:   Medications stopped since last EPIC medication reconciliation:   There are no discontinued medications.    Medications started since last [facility] medication reconciliation:  No orders of the defined types were placed in this encounter.        REVIEW OF SYSTEMS:  No chest pain, shortness of breath, fevers, chills, headache, nausea, vomiting, dysuria or bowel abnormalities.  Appetite is  normal.  No pain except gen. pain, knee pain L>R.    Physical Exam:  /65  Pulse 83  Resp 16  SpO2 95%  GENERAL APPEARANCE:  Alert, in no distress, cooperative  ENT:  Mouth and posterior oropharynx normal, moist mucous membranes, [place]  EYES:  EOM,  conjunctivae, lids, pupils and irises normal, PERRL, no drainage  NECK:  No adenopathy,masses or thyromegaly, no carotid bruit, FROM  RESP:  respiratory effort and palpation of chest normal, lungs clear to auscultation , no respiratory distress  CV:  Palpation and auscultation of heart done , regular rate and rhythm, no murmur, rub, or gallop, peripheral edema trace+ in LEs  ABDOMEN:  normal bowel sounds, soft, nontender, no hepatosplenomegaly or other masses, no guarding or rebound, no bruits  M/S:   Gait and station normal  muscle strength 5/5 all 4 ext., normal tone  NEURO:   Cranial nerves 2-12 are normal tested and grossly at patient's baseline, speech clear, no tremor  PSYCH:  insight and judgement impaired, memory impaired , affect and mood normal    Recent Labs:      CBC RESULTS:   Recent Labs   Lab Test 08/21/17 01/17/17   WBC  8.7  7.5   RBC  4.53  4.06   HGB  14.2  12.5   HCT  43.9  40.2   MCV  97  99   MCH  31.3  30.8   MCHC  32.3  31.1*   RDW  11.7  11.9   PLT  218  211       Last Basic Metabolic Panel:  Recent Labs   Lab Test 08/21/17 01/17/17   NA  142  139   POTASSIUM  4.2  4.5   CHLORIDE  104  106   FRANCISCO  9.3  8.8   CO2  28  27   BUN  23  22   CR  1.10  1.09   GLC  120  156*       Assessment/Plan:  Primary osteoarthritis involving multiple joints  Ongoing pain, increased L knee pain.  1. Cont. Prn meloxicam  2. Cont. Prn tylenol  3. J Steve to inj L knee-dtr agrees with plan  4. Monitor for changes in gait    Anxiety  Ongoing s/s per resident. Staff reports mood gen. Stable  1. Cont. celexa  2. Split remeron dose to 15 mg bid  3. Reassess over next few weeks    Gastroesophageal reflux disease, esophagitis presence not specified  Ongoing s/s.  1. Cont. reglan  2. Cont. prilosec  3. Instructed to take NSAIDS with food        Electronically signed by  ZAIN Marcos CNP

## 2017-10-10 ENCOUNTER — ASSISTED LIVING VISIT (OUTPATIENT)
Dept: GERIATRICS | Facility: CLINIC | Age: 82
End: 2017-10-10
Payer: COMMERCIAL

## 2017-10-10 DIAGNOSIS — I50.32 CHRONIC DIASTOLIC CONGESTIVE HEART FAILURE (H): ICD-10-CM

## 2017-10-10 DIAGNOSIS — F41.9 ANXIETY: ICD-10-CM

## 2017-10-10 DIAGNOSIS — M15.0 PRIMARY OSTEOARTHRITIS INVOLVING MULTIPLE JOINTS: Primary | ICD-10-CM

## 2017-10-10 NOTE — PROGRESS NOTES
[place] [facility]    Chief Complaint   Patient presents with     Knee Pain     Anxiety       HPI:    Jade Caba is a 84 year old  (8/26/1933), who is being seen today for an episodic care visit at [facility].  HPI information obtained from: facility chart records, facility staff, patient report and [facility] chart review.Today's concern is:  Knee pain, anxiety, CHF. Has chronic knee pain. Cont.on prn tylenol, prn meloxicam.  Has not recently use prn meloxicam. Had knee inj last week per [facility]. Reports inj. Not effective. Knee pain with minimal improvement. Reports pain does not effect amb.-is tolerable. Also has occ. Hip, low back pain. Reports anxiety sig.improved since remeron dose split to 15 mg bid. No reports of resp. Distress, increased LE edema. Activity level baseline. Cont. On lasix 20 mg qd for CHF.     ALLERGIES: No known allergies  Past Medical, Surgical, Family and Social History reviewed and updated in [facility].    Current Outpatient Prescriptions   Medication Sig Dispense Refill     Mirtazapine (REMERON PO) Take 15 mg by mouth 2 times daily       hypromellose (ARTIFICIAL TEARS) 0.5 % SOLN ophthalmic solution Place 3 drops into both eyes 3 times daily       alendronate (FOSAMAX) 70 MG tablet Take 70 mg by mouth every 7 days On Wednesdays       furosemide (LASIX) 20 MG tablet Take 20 mg by mouth daily       cholecalciferol (VITAMIN D3) 5000 UNITS CAPS capsule Take 5,000 Units by mouth daily       acetaminophen (TYLENOL) 325 MG tablet Take 650 mg by mouth 2 times daily as needed for mild pain       Atorvastatin Calcium (LIPITOR PO) Take 20 mg by mouth daily       carvedilol (COREG) 12.5 MG tablet Take 1 tablet (12.5 mg) by mouth 2 times daily (with meals) 60 tablet 1     amLODIPine (NORVASC) 10 MG tablet Take 1 tablet (10 mg) by mouth daily 30 tablet 1     citalopram (CELEXA) 10 MG tablet Take 30 mg by mouth daily       losartan (COZAAR) 100 MG tablet Take  100 mg by mouth daily       multivitamin, therapeutic with minerals (THERA-VIT-M) TABS Take 1 tablet by mouth daily       meloxicam (MOBIC) 15 MG tablet Take 15 mg by mouth daily       polyethylene glycol (MIRALAX/GLYCOLAX) powder Take 17 g by mouth 2 times daily       calcium carbonate (OS-FRANCISCO) 500 MG TABS Take 2 tablets by mouth 2 times daily (with meals)       oxybutynin (DITROPAN) 5 MG tablet Take 5 mg by mouth 2 times daily       senna-docusate (SENNA S) 8.6-50 MG per tablet Take 1 tablet by mouth 2 times daily Please hold if having loose stools and contact nurse.       ASPIRIN EC PO Take 325 mg by mouth daily       metoclopramide (REGLAN) 5 MG tablet Take 5 mg by mouth 3 times daily (with meals)       bisacodyl (DULCOLAX) 10 MG suppository Place 10 mg rectally daily as needed       levETIRAcetam (KEPPRA) 500 MG tablet Take 500 mg by mouth 2 times daily       folic acid (FOLVITE) 1 MG tablet Take 1 mg by mouth daily       omeprazole (PRILOSEC) 40 MG capsule Take 1 capsule (40 mg) by mouth daily Take 30-60 minutes before a meal. 30 capsule 9     Medications reviewed:  Medications reconciled to facility chart and changes were made to reflect current medications as identified as above med list. Below are the changes that were made:   Medications stopped since last EPIC medication reconciliation:   There are no discontinued medications.    Medications started since last [facility] medication reconciliation:  No orders of the defined types were placed in this encounter.        REVIEW OF SYSTEMS:  No chest pain, shortness of breath, fevers, chills, headache, nausea, vomiting, dysuria or bowel abnormalities.  Appetite is  normal.  No pain except occ knees,lopw back,hips.    Physical Exam:  /76  Pulse 64  Resp 18  SpO2 92%  GENERAL APPEARANCE:  Alert, in no distress, appears healthy, cooperative  ENT:  Mouth and posterior oropharynx normal, moist mucous membranes, [place]  EYES:  EOM, conjunctivae, lids, pupils and  irises normal, PERRL  NECK:  No adenopathy,masses or thyromegaly, no carotid bruit  RESP:  respiratory effort and palpation of chest normal, lungs clear to auscultation , no respiratory distress  CV:  Palpation and auscultation of heart done , regular rate and rhythm, no murmur, rub, or gallop, peripheral edema trace+ in LEs  ABDOMEN:  normal bowel sounds, soft, nontender, no hepatosplenomegaly or other masses, no guarding or rebound, no bruits  M/S:   Gait and station normal  muscle strength 5/5 all 4 ext.,normal tone  NEURO:   Cranial nerves 2-12 are normal tested and grossly at patient's baseline, speech clear, no tremor  PSYCH:  insight and judgement impaired, memory impaired , affect and mood normal    Recent Labs:      CBC RESULTS:   Recent Labs   Lab Test 08/21/17 01/17/17   WBC  8.7  7.5   RBC  4.53  4.06   HGB  14.2  12.5   HCT  43.9  40.2   MCV  97  99   MCH  31.3  30.8   MCHC  32.3  31.1*   RDW  11.7  11.9   PLT  218  211       Last Basic Metabolic Panel:  Recent Labs   Lab Test 08/21/17 01/17/17   NA  142  139   POTASSIUM  4.2  4.5   CHLORIDE  104  106   FRANCISCO  9.3  8.8   CO2  28  27   BUN  23  22   CR  1.10  1.09   GLC  120  156*       Assessment/Plan:  Primary osteoarthritis involving multiple joints  occ ongoing knee pain. No change in activity level. inj not effective  1. Cont. Prn tylenol,meloxicam  2. Monitor for further increased pain-may try topical pain med  3. Monitor for changes in gait, LE weakness    Anxiety  Improved  1.cont. Bid remeron  2.cont. celexa  3.monitor for cahnges in mood, behaviors  4.for further increased insomnia,may taper off celexa, try alt.med    Chronic diastolic congestive heart failure (H)  Currently controlled  1.cont. Lasix  2. Follow BPs, HRs  3.monitor fo increase LE edema  4.monitor for resp.distress  5. Bmp in next 1-3 mos      Electronically signed by  ZAIN Marcos CNP

## 2017-10-11 VITALS
SYSTOLIC BLOOD PRESSURE: 131 MMHG | DIASTOLIC BLOOD PRESSURE: 76 MMHG | HEART RATE: 64 BPM | RESPIRATION RATE: 18 BRPM | OXYGEN SATURATION: 92 %

## 2017-10-30 ENCOUNTER — CARE COORDINATION (OUTPATIENT)
Dept: GERIATRIC MEDICINE | Facility: CLINIC | Age: 82
End: 2017-10-30

## 2017-10-30 NOTE — PROGRESS NOTES
"[facility] Six-Month Telephone Assessment    6 month telephone assessment completed on 10/30/2017  EPIC notes reviewed, call placed to client. Client states that she is doing fine. Continues to participate in some of the AL activities, enjoys working on word find and playing Bingo.  Client acknowledged that she had a recent cortisone injection (knee), states that it was not helpful, \"I put up with it.\"     ER visits: No  Hospitalizations: No  TCU stays: No  Significant health status changes: see above. Reviewed Med list, client states that the AL staff assist with pain medications as needed  Falls/Injuries: No  ADL/IADL changes: No  Changes in services: No    Caregiver Assessment follow up:  N/a     Goals: See POC in chart for goal progress documentation.    Refer to care plan     Will see client in 6 months for an annual health risk assessment.   Encouraged client to call CM with any questions or concerns in the meantime.   Holly Guajardo RN, BC  Supervisor [facility]   [phone] (Fax)          "

## 2017-12-12 ENCOUNTER — ASSISTED LIVING VISIT (OUTPATIENT)
Dept: GERIATRICS | Facility: CLINIC | Age: 82
End: 2017-12-12
Payer: COMMERCIAL

## 2017-12-12 VITALS
HEART RATE: 61 BPM | SYSTOLIC BLOOD PRESSURE: 105 MMHG | DIASTOLIC BLOOD PRESSURE: 51 MMHG | OXYGEN SATURATION: 93 % | RESPIRATION RATE: 18 BRPM

## 2017-12-12 DIAGNOSIS — K21.9 GASTROESOPHAGEAL REFLUX DISEASE, ESOPHAGITIS PRESENCE NOT SPECIFIED: ICD-10-CM

## 2017-12-12 DIAGNOSIS — M17.0 PRIMARY OSTEOARTHRITIS OF BOTH KNEES: Primary | ICD-10-CM

## 2017-12-12 DIAGNOSIS — I50.32 CHRONIC DIASTOLIC CONGESTIVE HEART FAILURE (H): ICD-10-CM

## 2017-12-12 NOTE — PROGRESS NOTES
[facility]    Chief Complaint   Patient presents with     Knee Pain       HPI:    Jade Caba is a 84 year old  (8/26/1933), who is being seen today for an episodic care visit at [facility].  HPI information obtained from: facility chart records, facility staff, patient report, [facility] chart review and family/first contact dtr report.Today's concern is: knee pain, CHF, GERD.  Has OA with chronic knee pain.  Had inj of L knee 10/3/17 which was not effective. Reports pain worse in am L>R, typically improved during the day.  Has prn tylenol. Reports trying ibuprofen, however this increased s/s GERD. Cont. On prilosec. For CHF cont. On lasix. No increased LE edema, resp.status stable. No recent decrease in act. Level.       ALLERGIES: No known allergies  Past Medical, Surgical, Family and Social History reviewed and updated in [facility].    Current Outpatient Prescriptions   Medication Sig Dispense Refill     Acetaminophen (TYLENOL PO) Take 1,000 mg by mouth 2 times daily       Mirtazapine (REMERON PO) Take 15 mg by mouth 2 times daily       hypromellose (ARTIFICIAL TEARS) 0.5 % SOLN ophthalmic solution Place 3 drops into both eyes 3 times daily       alendronate (FOSAMAX) 70 MG tablet Take 70 mg by mouth every 7 days On Wednesdays       furosemide (LASIX) 20 MG tablet Take 20 mg by mouth daily       cholecalciferol (VITAMIN D3) 5000 UNITS CAPS capsule Take 5,000 Units by mouth daily       acetaminophen (TYLENOL) 325 MG tablet Take 650 mg by mouth 2 times daily as needed for mild pain       Atorvastatin Calcium (LIPITOR PO) Take 20 mg by mouth daily       carvedilol (COREG) 12.5 MG tablet Take 1 tablet (12.5 mg) by mouth 2 times daily (with meals) 60 tablet 1     amLODIPine (NORVASC) 10 MG tablet Take 1 tablet (10 mg) by mouth daily 30 tablet 1     citalopram (CELEXA) 10 MG tablet Take 30 mg by mouth daily       losartan (COZAAR) 100 MG tablet Take 100 mg by mouth daily        multivitamin, therapeutic with minerals (THERA-VIT-M) TABS Take 1 tablet by mouth daily       meloxicam (MOBIC) 15 MG tablet Take 15 mg by mouth daily       polyethylene glycol (MIRALAX/GLYCOLAX) powder Take 17 g by mouth 2 times daily       calcium carbonate (OS-FRANCISCO) 500 MG TABS Take 2 tablets by mouth 2 times daily (with meals)       oxybutynin (DITROPAN) 5 MG tablet Take 5 mg by mouth 2 times daily       senna-docusate (SENNA S) 8.6-50 MG per tablet Take 1 tablet by mouth 2 times daily Please hold if having loose stools and contact nurse.       ASPIRIN EC PO Take 325 mg by mouth daily       metoclopramide (REGLAN) 5 MG tablet Take 5 mg by mouth 3 times daily (with meals)       bisacodyl (DULCOLAX) 10 MG suppository Place 10 mg rectally daily as needed       levETIRAcetam (KEPPRA) 500 MG tablet Take 500 mg by mouth 2 times daily       folic acid (FOLVITE) 1 MG tablet Take 1 mg by mouth daily       omeprazole (PRILOSEC) 40 MG capsule Take 1 capsule (40 mg) by mouth daily Take 30-60 minutes before a meal. 30 capsule 9     Medications reviewed:  Medications reconciled to facility chart and changes were made to reflect current medications as identified as above med list. Below are the changes that were made:   Medications stopped since last EPIC medication reconciliation:   There are no discontinued medications.    Medications started since last [facility] medication reconciliation:  No orders of the defined types were placed in this encounter.        REVIEW OF SYSTEMS:  No chest pain, shortness of breath, fevers, chills, headache, nausea, vomiting, dysuria or bowel abnormalities.  Appetite is  normal.  No pain except knees, L>R.    Physical Exam:  /51  Pulse 61  Resp 18  SpO2 93%  GENERAL APPEARANCE:  Alert, in no distress, cooperative  ENT:  Mouth and posterior oropharynx normal, moist mucous membranes, [place]  EYES:  EOM, conjunctivae, lids, pupils and irises normal, PERRL  NECK:  No adenopathy,masses or  thyromegaly, no carotid bruit, FROM  RESP:  respiratory effort and palpation of chest normal, lungs clear to auscultation , no respiratory distress  CV:  Palpation and auscultation of heart done , regular rate and rhythm, no murmur, rub, or gallop, no edema  ABDOMEN:  normal bowel sounds, soft, nontender, no hepatosplenomegaly or other masses, no guarding or rebound  M/S:   Gait and station normal  sl. diff. standing from seated pos. muscle strength 5/5 all 4 ext.  NEURO:   Cranial nerves 2-12 are normal tested and grossly at patient's baseline, alert, speech clear  PSYCH:  insight and judgement impaired, memory impaired , affect and mood normal    Recent Labs:      CBC RESULTS:   Recent Labs   Lab Test 08/21/17 01/17/17   WBC  8.7  7.5   RBC  4.53  4.06   HGB  14.2  12.5   HCT  43.9  40.2   MCV  97  99   MCH  31.3  30.8   MCHC  32.3  31.1*   RDW  11.7  11.9   PLT  218  211       Last Basic Metabolic Panel:  Recent Labs   Lab Test 08/21/17 01/17/17   NA  142  139   POTASSIUM  4.2  4.5   CHLORIDE  104  106   FRANCISCO  9.3  8.8   CO2  28  27   BUN  23  22   CR  1.10  1.09   GLC  120  156*       Assessment/Plan:  Primary osteoarthritis of both knees  Ongoing knee pain L>R. Recent inj ineffective  1. Start bid tylenol, sched.  Cont. Prn tylenol  2. Cont. Prn meloxicam  3. Reassess in 2 weeks, then may consider repeat inj-dtr states she would like to try tylenol before sched. F/u knee inj  4. Monitor for changes in gait, LE weakness    Chronic diastolic congestive heart failure (H)  Currently controlled  1. Cont. Lasix  2. Follow wt.s  3. Monitor for increased le edema, resp. Distress  4. Bmp tomorrow    Gastroesophageal reflux disease, esophagitis presence not specified  Occ. S/s. Worse with NSAID use  1. Cont. prilosec  2. Avoid ibuprofen. Cont. meloxicam on prn basis  3. Monitor for further increased s/s        Electronically signed by  ZAIN Marcos CNP

## 2018-01-23 ENCOUNTER — ASSISTED LIVING VISIT (OUTPATIENT)
Dept: GERIATRICS | Facility: CLINIC | Age: 83
End: 2018-01-23
Payer: COMMERCIAL

## 2018-01-23 DIAGNOSIS — M17.0 PRIMARY OSTEOARTHRITIS OF BOTH KNEES: Primary | ICD-10-CM

## 2018-01-23 NOTE — PROGRESS NOTES
"Ortho Nursing home visit    Jade Caba is a 84 year old female who resides at [facility]    Patient is seen today for knee pain, hs of OA, and has had injections in the past, todays visit is to discuss injections;      Past Medical History:   Diagnosis Date     Anxiety      Arthritis      Atrial flutter (H)      C1 cervical fracture (H)      CAD (coronary artery disease)      Carotid artery stenosis      CKD (chronic kidney disease)      Depression      H/O alcohol abuse      Hypercholesterolemia      Hypertension      Renal artery stenosis (H)      Seizure disorder (H)      Subarachnoid hemorrhage (H) 2014      Past Surgical History:   Procedure Laterality Date     APPENDECTOMY       CAROTID ENDARTERECTOMY Left      Cataract surgery Right     will have laser eye surgery     CHOLECYSTECTOMY       Coronary stents x 3       HYSTERECTOMY      at age of 40-not cancerus or precancerous      JOINT REPLACEMENT Bilateral      Stenting of renal artery          Allergies   Allergen Reactions     No Known Allergies       There were no vitals taken for this visit.     Exam: Today, denies pain in Right knee, stated; \"I only want the left knee injected today, on exam, medial pain to the left knee joint, no effusion, no swelling, lig intact, some noted crepitus,       X-rays show : varus deformity, OA tricompartmental;    ASSESSMENT / PLAN:  (M17.0) Primary osteoarthritis of both knees  (primary encounter diagnosis)  Comment: After R&B discussed  Plan: Injection to left knee, after prep, with 1 cc depo medrol, 4 cc 1% lidocaine, into left knee, tolerated well, no complaints;    F/U prn 3-4 months    [id number]            [facility]  [phone]    "

## 2018-01-25 ENCOUNTER — ASSISTED LIVING VISIT (OUTPATIENT)
Dept: GERIATRICS | Facility: CLINIC | Age: 83
End: 2018-01-25
Payer: COMMERCIAL

## 2018-01-25 ENCOUNTER — RECORDS - HEALTHEAST (OUTPATIENT)
Dept: LAB | Facility: CLINIC | Age: 83
End: 2018-01-25

## 2018-01-25 VITALS
HEART RATE: 67 BPM | RESPIRATION RATE: 18 BRPM | DIASTOLIC BLOOD PRESSURE: 77 MMHG | BODY MASS INDEX: 32.56 KG/M2 | SYSTOLIC BLOOD PRESSURE: 135 MMHG | WEIGHT: 178 LBS | OXYGEN SATURATION: 94 %

## 2018-01-25 DIAGNOSIS — M17.0 PRIMARY OSTEOARTHRITIS OF BOTH KNEES: ICD-10-CM

## 2018-01-25 DIAGNOSIS — I10 ESSENTIAL HYPERTENSION: Primary | ICD-10-CM

## 2018-01-25 DIAGNOSIS — F32.0 MILD MAJOR DEPRESSION (H): ICD-10-CM

## 2018-01-25 NOTE — PROGRESS NOTES
[facility]  Chief Complaint   Patient presents with     Annual Comprehensive Exam Assisted Living       HPI:    Jade Caba is a 84 year old  (8/26/1933), who is being seen today for an annual comprehensive visit at [facility].  HPI information obtained from: facility chart records, facility staff, patient report and [facility] chart review.  Today's concerns are:  Essential hypertension  BPs stable. Cont. On norvasc, coreg    Mild major depression (H)  Mood stable cont. On celexa, remeron.  Has occ.increased anxiety.    Primary osteoarthritis of both knees  Improved. L knee inj last week-effective. Also taking tylenol, prn meloxicam        Depression screen done: PHQ-9 Given screen score and clinical assessment patient is stable on current plan of care/interventions of celexa, remeron and on going assessment will continue.    PHQ9 score: 0    Based on JNC-8 goals,  patients age of 84 year old, no presence of diabetes or CKD, and goals of care goal BP is  <140/90 mm Hg. patient is stable with current plan of care and routine assessment..      ALLERGIES: No known allergies  PROBLEM LIST:  Patient Active Problem List   Diagnosis     Renal artery stenosis (H)     HTN (hypertension)     CAD (coronary artery disease)     Carotid artery stenosis     Mild major depression (H)     Hypercholesterolemia     CKD (chronic kidney disease) stage 3, GFR 30-59 ml/min     Hyperlipidemia LDL goal <100     Hypercholesteremia     Seizure disorder (H)     Osteoarthritis of knee     Chondrocalcinosis     Subarachnoid hemorrhage (H)     Fracture of C1 vertebra, closed (H)     Sacral fracture, closed (H)     Atrial flutter (H)     Alcohol abuse     Constipation     Anxiety state     ARF (acute renal failure) (H)     FTT (failure to thrive) in adult     Fall     Cognitive impairment     CHF exacerbation (H)     Health Care Home     Advance care planning     Chronic diastolic congestive heart  failure (H)     Coronary artery disease, angina presence unspecified, unspecified vessel or lesion type, unspecified whether native or transplanted heart     Anxiety     Urgency incontinence     PAST MEDICAL HISTORY:  has a past medical history of Anxiety; Arthritis; Atrial flutter (H); C1 cervical fracture (H); CAD (coronary artery disease); Carotid artery stenosis; CKD (chronic kidney disease); Depression; H/O alcohol abuse; Hypercholesterolemia; Hypertension; Renal artery stenosis (H); Seizure disorder (H); and Subarachnoid hemorrhage (H) (2014).  PAST SURGICAL HISTORY:  has a past surgical history that includes carotid endarterectomy (Left); Stenting of renal artery; Coronary stents x 3; Cholecystectomy; appendectomy; joint replacement (Bilateral); Cataract surgery (Right); and Hysterectomy.  FAMILY HISTORY: family history includes C.A.D. in her brother, brother, mother, and sister; CANCER in her sister; HEART DISEASE in her son.  SOCIAL HISTORY:  reports that she has never smoked. She has never used smokeless tobacco. She reports that she does not drink alcohol or use illicit drugs.  IMMUNIZATIONS:  Most Recent Immunizations   Administered Date(s) Administered     Influenza (High Dose) 3 valent vaccine 10/03/2016     Influenza (IIV3) PF 10/22/2012     Pneumo Conj 13-V (2010&after) 05/26/2016     Pneumococcal 23 valent 08/14/2013     TDAP Vaccine (Boostrix) 08/14/2013     Above immunizations pulled from [facility]. [facility] and facility records also reconciled. Outstanding information sent to  to update [facility].  Future immunizations are not needed at this point as all recommended immunizations are up to date.   MEDICATIONS:  Current Outpatient Prescriptions   Medication Sig Dispense Refill     Acetaminophen (TYLENOL PO) Take 1,000 mg by mouth 2 times daily       Mirtazapine (REMERON PO) Take 15 mg by mouth 2 times daily       hypromellose (ARTIFICIAL TEARS) 0.5 % SOLN ophthalmic solution  Place 3 drops into both eyes 3 times daily       alendronate (FOSAMAX) 70 MG tablet Take 70 mg by mouth every 7 days On Wednesdays       furosemide (LASIX) 20 MG tablet Take 20 mg by mouth daily       cholecalciferol (VITAMIN D3) 5000 UNITS CAPS capsule Take 5,000 Units by mouth daily       acetaminophen (TYLENOL) 325 MG tablet Take 650 mg by mouth 2 times daily as needed for mild pain       Atorvastatin Calcium (LIPITOR PO) Take 20 mg by mouth daily       carvedilol (COREG) 12.5 MG tablet Take 1 tablet (12.5 mg) by mouth 2 times daily (with meals) 60 tablet 1     amLODIPine (NORVASC) 10 MG tablet Take 1 tablet (10 mg) by mouth daily 30 tablet 1     citalopram (CELEXA) 10 MG tablet Take 30 mg by mouth daily       losartan (COZAAR) 100 MG tablet Take 100 mg by mouth daily       multivitamin, therapeutic with minerals (THERA-VIT-M) TABS Take 1 tablet by mouth daily       meloxicam (MOBIC) 15 MG tablet Take 15 mg by mouth daily       polyethylene glycol (MIRALAX/GLYCOLAX) powder Take 17 g by mouth 2 times daily       calcium carbonate (OS-FARNCISCO) 500 MG TABS Take 2 tablets by mouth 2 times daily (with meals)       oxybutynin (DITROPAN) 5 MG tablet Take 5 mg by mouth 2 times daily       senna-docusate (SENNA S) 8.6-50 MG per tablet Take 1 tablet by mouth 2 times daily Please hold if having loose stools and contact nurse.       ASPIRIN EC PO Take 325 mg by mouth daily       metoclopramide (REGLAN) 5 MG tablet Take 5 mg by mouth 3 times daily (with meals)       bisacodyl (DULCOLAX) 10 MG suppository Place 10 mg rectally daily as needed       levETIRAcetam (KEPPRA) 500 MG tablet Take 500 mg by mouth 2 times daily       folic acid (FOLVITE) 1 MG tablet Take 1 mg by mouth daily       omeprazole (PRILOSEC) 40 MG capsule Take 1 capsule (40 mg) by mouth daily Take 30-60 minutes before a meal. 30 capsule 9     Medications reviewed:  Medications reconciled to facility chart and changes were made to reflect current medications as  identified as above med list. Below are the changes that were made:   Medications stopped since last EPIC medication reconciliation:   There are no discontinued medications.    Medications started since last [facility] medication reconciliation:  No orders of the defined types were placed in this encounter.        Case Management:  I have reviewed the Assisted Living care plan, current immunizations and preventive care/cancer screening..Future cancer screening is not clinically indicated secondary to age/goals of care Patient's desire to return to the community is not present. Current Level of Care is appropriate.    Advance Directive Discussion:    I reviewed the current advanced directives as reflected in EPIC, the POLST and the facility chart, and verified the congruency of orders 1/25/18. I contacted the first party dtr and left a message regarding the plan of Care.  I did review the advance directives with the resident.     Team Discussion:  I communicated with the appropriate disciplines involved with the Plan of Care:   Nursing      Patient Goal:  Patient's goal is pain control and comfort and tx of reversible illness.    Information reviewed:  Medications, vital signs, orders, and nursing notes.    ROS:  No chest pain, shortness of breath, fevers, chills, headache, nausea, vomiting, dysuria or bowel abnormalities.  Appetite is  normal.  No pain except occ knees.    Exam:  /77  Pulse 67  Resp 18  Wt 178 lb (80.7 kg)  SpO2 94%  BMI 32.56 kg/m2    GENERAL APPEARANCE:  Alert, in no distress, cooperative  ENT:  Mouth and posterior oropharynx normal, moist mucous membranes, normal hearing acuity  EYES:  EOM, conjunctivae, lids, pupils and irises normal, PERRL  NECK:  No adenopathy,masses or thyromegaly, no carotid bruit, FROM  RESP:  respiratory effort and palpation of chest normal, lungs clear to auscultation , no respiratory distress  CV:  Palpation and auscultation of heart done , regular rate and rhythm,  no murmur, rub, or gallop, no edema  ABDOMEN:  normal bowel sounds, soft, nontender, no hepatosplenomegaly or other masses, no guarding or rebound  M/S:   Gait and station normal  muscle strength 5/5 all 4 ext, normal tone  NEURO:   Cranial nerves 2-12 are normal tested and grossly at patient's baseline, speech clear,alert  PSYCH:  insight and judgement impaired, memory impaired , affect and mood normal     Lab/Diagnostic data:      CBC RESULTS:   Recent Labs   Lab Test 08/21/17 01/17/17   WBC  8.7  7.5   RBC  4.53  4.06   HGB  14.2  12.5   HCT  43.9  40.2   MCV  97  99   MCH  31.3  30.8   MCHC  32.3  31.1*   RDW  11.7  11.9   PLT  218  211       Last Basic Metabolic Panel:  Recent Labs   Lab Test 08/21/17 01/17/17   NA  142  139   POTASSIUM  4.2  4.5   CHLORIDE  104  106   FRANCISCO  9.3  8.8   CO2  28  27   BUN  23  22   CR  1.10  1.09   GLC  120  156*       ASSESSMENT/PLAN  Essential hypertension  Currently controlled  1. Cont. Norvasc, coreg  2. Follow BPs, HRs  3. Bmp tomorrow    Mild major depression (H)  Gen. Controlled. occ anxiety  1. Cont. Celexa, remeron  2. Monitor for change in mood, behavior  3. Monitor for further increased anxiety    Primary osteoarthritis of both knees  Pain improved. L knee inj earlier this week  1. Cont. Tylenol  2. Cont.prn meloxicam  3. Monitor for c/o increased pain, changes in gait  4. F/u inj with [facility] prn        Electronically signed by:  ZAIN Marcos CNP

## 2018-01-26 ENCOUNTER — TRANSFERRED RECORDS (OUTPATIENT)
Dept: HEALTH INFORMATION MANAGEMENT | Facility: CLINIC | Age: 83
End: 2018-01-26

## 2018-01-26 LAB
ANION GAP SERPL CALCULATED.3IONS-SCNC: 9 MMOL/L (ref 5–18)
ANION GAP SERPL CALCULATED.3IONS-SCNC: 9 MMOL/L (ref 5–18)
BUN SERPL-MCNC: 16 MG/DL (ref 8–28)
BUN SERPL-MCNC: 16 MG/DL (ref 8–28)
CALCIUM SERPL-MCNC: 9.3 MG/DL (ref 8.5–10.5)
CALCIUM SERPL-MCNC: 9.3 MG/DL (ref 8.5–10.5)
CHLORIDE BLD-SCNC: 102 MMOL/L (ref 98–107)
CHLORIDE SERPLBLD-SCNC: 102 MMOL/L (ref 98–107)
CO2 SERPL-SCNC: 29 MMOL/L (ref 22–31)
CO2 SERPL-SCNC: 29 MMOL/L (ref 22–31)
CREAT SERPL-MCNC: 0.9 MG/DL (ref 0.6–1.1)
CREAT SERPL-MCNC: 0.9 MG/DL (ref 0.6–1.1)
GFR SERPL CREATININE-BSD FRML MDRD: 60 ML/MIN/1.73M2
GFR SERPL CREATININE-BSD FRML MDRD: 60 ML/MIN/1.73M2
GLUCOSE BLD-MCNC: 135 MG/DL (ref 70–125)
GLUCOSE SERPL-MCNC: 135 MG/DL (ref 70–125)
POTASSIUM BLD-SCNC: 4.2 MMOL/L (ref 3.5–5)
POTASSIUM SERPL-SCNC: 4.2 MMOL/L (ref 3.5–5)
SODIUM SERPL-SCNC: 140 MMOL/L (ref 136–145)
SODIUM SERPL-SCNC: 140 MMOL/L (ref 136–145)

## 2018-01-26 ASSESSMENT — PATIENT HEALTH QUESTIONNAIRE - PHQ9: SUM OF ALL RESPONSES TO PHQ QUESTIONS 1-9: 0

## 2018-01-31 ENCOUNTER — HOSPITAL ENCOUNTER (INPATIENT)
Facility: CLINIC | Age: 83
LOS: 2 days | Discharge: HOME | DRG: 247 | End: 2018-02-02
Attending: EMERGENCY MEDICINE | Admitting: INTERNAL MEDICINE
Payer: COMMERCIAL

## 2018-01-31 ENCOUNTER — APPOINTMENT (OUTPATIENT)
Dept: GENERAL RADIOLOGY | Facility: CLINIC | Age: 83
DRG: 247 | End: 2018-01-31
Attending: EMERGENCY MEDICINE
Payer: COMMERCIAL

## 2018-01-31 DIAGNOSIS — I21.4 NSTEMI (NON-ST ELEVATED MYOCARDIAL INFARCTION) (H): ICD-10-CM

## 2018-01-31 DIAGNOSIS — I24.9 ACS (ACUTE CORONARY SYNDROME) (H): Primary | ICD-10-CM

## 2018-01-31 LAB
ALBUMIN SERPL-MCNC: 3.1 G/DL (ref 3.4–5)
ALP SERPL-CCNC: 55 U/L (ref 40–150)
ALT SERPL W P-5'-P-CCNC: 21 U/L (ref 0–50)
ANION GAP SERPL CALCULATED.3IONS-SCNC: 9 MMOL/L (ref 3–14)
AST SERPL W P-5'-P-CCNC: 34 U/L (ref 0–45)
BASOPHILS # BLD AUTO: 0.1 10E9/L (ref 0–0.2)
BASOPHILS NFR BLD AUTO: 0.8 %
BILIRUB SERPL-MCNC: 0.8 MG/DL (ref 0.2–1.3)
BUN SERPL-MCNC: 18 MG/DL (ref 7–30)
CALCIUM SERPL-MCNC: 8.4 MG/DL (ref 8.5–10.1)
CHLORIDE SERPL-SCNC: 103 MMOL/L (ref 94–109)
CO2 SERPL-SCNC: 27 MMOL/L (ref 20–32)
CREAT SERPL-MCNC: 0.9 MG/DL (ref 0.52–1.04)
DIFFERENTIAL METHOD BLD: NORMAL
EOSINOPHIL # BLD AUTO: 0.2 10E9/L (ref 0–0.7)
EOSINOPHIL NFR BLD AUTO: 1.6 %
ERYTHROCYTE [DISTWIDTH] IN BLOOD BY AUTOMATED COUNT: 12.3 % (ref 10–15)
ERYTHROCYTE [DISTWIDTH] IN BLOOD BY AUTOMATED COUNT: 12.5 % (ref 10–15)
GFR SERPL CREATININE-BSD FRML MDRD: 59 ML/MIN/1.7M2
GLUCOSE SERPL-MCNC: 136 MG/DL (ref 70–99)
HCT VFR BLD AUTO: 41.2 % (ref 35–47)
HCT VFR BLD AUTO: 43.1 % (ref 35–47)
HGB BLD-MCNC: 13.3 G/DL (ref 11.7–15.7)
HGB BLD-MCNC: 14 G/DL (ref 11.7–15.7)
IMM GRANULOCYTES # BLD: 0.1 10E9/L (ref 0–0.4)
IMM GRANULOCYTES NFR BLD: 0.7 %
INTERPRETATION ECG - MUSE: NORMAL
INTERPRETATION ECG - MUSE: NORMAL
LIPASE SERPL-CCNC: 98 U/L (ref 73–393)
LMWH PPP CHRO-ACNC: 0.26 IU/ML
LYMPHOCYTES # BLD AUTO: 1.7 10E9/L (ref 0.8–5.3)
LYMPHOCYTES NFR BLD AUTO: 16.5 %
MCH RBC QN AUTO: 30.9 PG (ref 26.5–33)
MCH RBC QN AUTO: 31 PG (ref 26.5–33)
MCHC RBC AUTO-ENTMCNC: 32.3 G/DL (ref 31.5–36.5)
MCHC RBC AUTO-ENTMCNC: 32.5 G/DL (ref 31.5–36.5)
MCV RBC AUTO: 95 FL (ref 78–100)
MCV RBC AUTO: 96 FL (ref 78–100)
MONOCYTES # BLD AUTO: 0.8 10E9/L (ref 0–1.3)
MONOCYTES NFR BLD AUTO: 7.2 %
NEUTROPHILS # BLD AUTO: 7.7 10E9/L (ref 1.6–8.3)
NEUTROPHILS NFR BLD AUTO: 73.2 %
NRBC # BLD AUTO: 0 10*3/UL
NRBC BLD AUTO-RTO: 0 /100
PLATELET # BLD AUTO: 200 10E9/L (ref 150–450)
PLATELET # BLD AUTO: 207 10E9/L (ref 150–450)
POTASSIUM SERPL-SCNC: 4 MMOL/L (ref 3.4–5.3)
PROT SERPL-MCNC: 6.9 G/DL (ref 6.8–8.8)
RBC # BLD AUTO: 4.29 10E12/L (ref 3.8–5.2)
RBC # BLD AUTO: 4.53 10E12/L (ref 3.8–5.2)
SODIUM SERPL-SCNC: 139 MMOL/L (ref 133–144)
TROPONIN I SERPL-MCNC: 2.66 UG/L (ref 0–0.04)
TROPONIN I SERPL-MCNC: 3.95 UG/L (ref 0–0.04)
TROPONIN I SERPL-MCNC: 4.12 UG/L (ref 0–0.04)
WBC # BLD AUTO: 10.2 10E9/L (ref 4–11)
WBC # BLD AUTO: 10.6 10E9/L (ref 4–11)

## 2018-01-31 PROCEDURE — 80053 COMPREHEN METABOLIC PANEL: CPT | Performed by: EMERGENCY MEDICINE

## 2018-01-31 PROCEDURE — 99223 1ST HOSP IP/OBS HIGH 75: CPT | Performed by: INTERNAL MEDICINE

## 2018-01-31 PROCEDURE — 25000128 H RX IP 250 OP 636

## 2018-01-31 PROCEDURE — 25000132 ZZH RX MED GY IP 250 OP 250 PS 637: Performed by: PHYSICIAN ASSISTANT

## 2018-01-31 PROCEDURE — 99223 1ST HOSP IP/OBS HIGH 75: CPT | Mod: AI | Performed by: INTERNAL MEDICINE

## 2018-01-31 PROCEDURE — 93005 ELECTROCARDIOGRAM TRACING: CPT | Mod: 76

## 2018-01-31 PROCEDURE — 12000007 ZZH R&B INTERMEDIATE

## 2018-01-31 PROCEDURE — 85520 HEPARIN ASSAY: CPT | Performed by: INTERNAL MEDICINE

## 2018-01-31 PROCEDURE — 84484 ASSAY OF TROPONIN QUANT: CPT | Performed by: EMERGENCY MEDICINE

## 2018-01-31 PROCEDURE — 96374 THER/PROPH/DIAG INJ IV PUSH: CPT

## 2018-01-31 PROCEDURE — 93005 ELECTROCARDIOGRAM TRACING: CPT

## 2018-01-31 PROCEDURE — 99285 EMERGENCY DEPT VISIT HI MDM: CPT | Mod: 25

## 2018-01-31 PROCEDURE — 36415 COLL VENOUS BLD VENIPUNCTURE: CPT | Performed by: INTERNAL MEDICINE

## 2018-01-31 PROCEDURE — 85027 COMPLETE CBC AUTOMATED: CPT | Performed by: PHYSICIAN ASSISTANT

## 2018-01-31 PROCEDURE — 71046 X-RAY EXAM CHEST 2 VIEWS: CPT

## 2018-01-31 PROCEDURE — 36415 COLL VENOUS BLD VENIPUNCTURE: CPT | Performed by: PHYSICIAN ASSISTANT

## 2018-01-31 PROCEDURE — 85025 COMPLETE CBC W/AUTO DIFF WBC: CPT | Performed by: EMERGENCY MEDICINE

## 2018-01-31 PROCEDURE — 84484 ASSAY OF TROPONIN QUANT: CPT | Performed by: PHYSICIAN ASSISTANT

## 2018-01-31 PROCEDURE — 83690 ASSAY OF LIPASE: CPT | Performed by: EMERGENCY MEDICINE

## 2018-01-31 RX ORDER — MIRTAZAPINE 15 MG/1
15 TABLET, FILM COATED ORAL 2 TIMES DAILY
Status: DISCONTINUED | OUTPATIENT
Start: 2018-01-31 | End: 2018-02-02 | Stop reason: HOSPADM

## 2018-01-31 RX ORDER — ACETAMINOPHEN 650 MG/1
650 SUPPOSITORY RECTAL EVERY 4 HOURS PRN
Status: DISCONTINUED | OUTPATIENT
Start: 2018-01-31 | End: 2018-02-02 | Stop reason: HOSPADM

## 2018-01-31 RX ORDER — AMOXICILLIN 250 MG
1 CAPSULE ORAL 2 TIMES DAILY PRN
Status: DISCONTINUED | OUTPATIENT
Start: 2018-01-31 | End: 2018-02-02 | Stop reason: HOSPADM

## 2018-01-31 RX ORDER — ASPIRIN 81 MG/1
324 TABLET, CHEWABLE ORAL ONCE
Status: COMPLETED | OUTPATIENT
Start: 2018-01-31 | End: 2018-01-31

## 2018-01-31 RX ORDER — AMOXICILLIN 250 MG
2 CAPSULE ORAL 2 TIMES DAILY PRN
Status: DISCONTINUED | OUTPATIENT
Start: 2018-01-31 | End: 2018-02-02 | Stop reason: HOSPADM

## 2018-01-31 RX ORDER — LOSARTAN POTASSIUM 100 MG/1
100 TABLET ORAL DAILY
Status: DISCONTINUED | OUTPATIENT
Start: 2018-02-01 | End: 2018-02-02 | Stop reason: HOSPADM

## 2018-01-31 RX ORDER — ALUMINA, MAGNESIA, AND SIMETHICONE 2400; 2400; 240 MG/30ML; MG/30ML; MG/30ML
30 SUSPENSION ORAL EVERY 4 HOURS PRN
Status: DISCONTINUED | OUTPATIENT
Start: 2018-01-31 | End: 2018-02-02 | Stop reason: HOSPADM

## 2018-01-31 RX ORDER — MORPHINE SULFATE 2 MG/ML
2-4 INJECTION, SOLUTION INTRAMUSCULAR; INTRAVENOUS
Status: DISCONTINUED | OUTPATIENT
Start: 2018-01-31 | End: 2018-02-02 | Stop reason: HOSPADM

## 2018-01-31 RX ORDER — ONDANSETRON 2 MG/ML
4 INJECTION INTRAMUSCULAR; INTRAVENOUS EVERY 6 HOURS PRN
Status: DISCONTINUED | OUTPATIENT
Start: 2018-01-31 | End: 2018-02-02 | Stop reason: HOSPADM

## 2018-01-31 RX ORDER — LEVETIRACETAM 500 MG/1
500 TABLET ORAL 2 TIMES DAILY
Status: DISCONTINUED | OUTPATIENT
Start: 2018-01-31 | End: 2018-02-02 | Stop reason: HOSPADM

## 2018-01-31 RX ORDER — PROCHLORPERAZINE MALEATE 5 MG
5 TABLET ORAL EVERY 6 HOURS PRN
Status: DISCONTINUED | OUTPATIENT
Start: 2018-01-31 | End: 2018-02-02 | Stop reason: HOSPADM

## 2018-01-31 RX ORDER — AMLODIPINE BESYLATE 10 MG/1
10 TABLET ORAL DAILY
Status: DISCONTINUED | OUTPATIENT
Start: 2018-02-01 | End: 2018-02-02 | Stop reason: HOSPADM

## 2018-01-31 RX ORDER — POLYETHYLENE GLYCOL 3350 17 G/17G
17 POWDER, FOR SOLUTION ORAL DAILY PRN
Status: DISCONTINUED | OUTPATIENT
Start: 2018-01-31 | End: 2018-02-02 | Stop reason: HOSPADM

## 2018-01-31 RX ORDER — PROCHLORPERAZINE 25 MG
12.5 SUPPOSITORY, RECTAL RECTAL EVERY 12 HOURS PRN
Status: DISCONTINUED | OUTPATIENT
Start: 2018-01-31 | End: 2018-02-02 | Stop reason: HOSPADM

## 2018-01-31 RX ORDER — ACETAMINOPHEN 325 MG/1
650 TABLET ORAL EVERY 4 HOURS PRN
Status: DISCONTINUED | OUTPATIENT
Start: 2018-01-31 | End: 2018-02-01

## 2018-01-31 RX ORDER — OXYBUTYNIN CHLORIDE 5 MG/1
5 TABLET ORAL 2 TIMES DAILY
Status: DISCONTINUED | OUTPATIENT
Start: 2018-01-31 | End: 2018-02-02 | Stop reason: HOSPADM

## 2018-01-31 RX ORDER — CARVEDILOL 12.5 MG/1
12.5 TABLET ORAL 2 TIMES DAILY WITH MEALS
Status: DISCONTINUED | OUTPATIENT
Start: 2018-01-31 | End: 2018-02-02 | Stop reason: HOSPADM

## 2018-01-31 RX ORDER — NALOXONE HYDROCHLORIDE 0.4 MG/ML
.1-.4 INJECTION, SOLUTION INTRAMUSCULAR; INTRAVENOUS; SUBCUTANEOUS
Status: DISCONTINUED | OUTPATIENT
Start: 2018-01-31 | End: 2018-02-01

## 2018-01-31 RX ORDER — ONDANSETRON 4 MG/1
4 TABLET, ORALLY DISINTEGRATING ORAL EVERY 6 HOURS PRN
Status: DISCONTINUED | OUTPATIENT
Start: 2018-01-31 | End: 2018-02-02 | Stop reason: HOSPADM

## 2018-01-31 RX ORDER — POLYETHYLENE GLYCOL 3350 17 G/17G
17 POWDER, FOR SOLUTION ORAL 2 TIMES DAILY
Status: DISCONTINUED | OUTPATIENT
Start: 2018-01-31 | End: 2018-02-02 | Stop reason: HOSPADM

## 2018-01-31 RX ORDER — LIDOCAINE 40 MG/G
CREAM TOPICAL
Status: DISCONTINUED | OUTPATIENT
Start: 2018-01-31 | End: 2018-02-02 | Stop reason: HOSPADM

## 2018-01-31 RX ORDER — FUROSEMIDE 20 MG
20 TABLET ORAL DAILY
Status: DISCONTINUED | OUTPATIENT
Start: 2018-02-01 | End: 2018-02-02 | Stop reason: HOSPADM

## 2018-01-31 RX ORDER — ASPIRIN 325 MG
325 TABLET ORAL DAILY
Status: DISCONTINUED | OUTPATIENT
Start: 2018-02-01 | End: 2018-01-31

## 2018-01-31 RX ORDER — ATORVASTATIN CALCIUM 20 MG/1
20 TABLET, FILM COATED ORAL AT BEDTIME
Status: DISCONTINUED | OUTPATIENT
Start: 2018-01-31 | End: 2018-02-01

## 2018-01-31 RX ADMIN — Medication 3700 UNITS: at 12:43

## 2018-01-31 RX ADMIN — CARVEDILOL 12.5 MG: 12.5 TABLET, FILM COATED ORAL at 17:55

## 2018-01-31 RX ADMIN — LEVETIRACETAM 500 MG: 500 TABLET, FILM COATED ORAL at 20:04

## 2018-01-31 RX ADMIN — OXYBUTYNIN CHLORIDE 5 MG: 5 TABLET ORAL at 20:04

## 2018-01-31 RX ADMIN — ATORVASTATIN CALCIUM 20 MG: 20 TABLET, FILM COATED ORAL at 20:04

## 2018-01-31 RX ADMIN — ASPIRIN 81 MG 324 MG: 81 TABLET ORAL at 15:36

## 2018-01-31 RX ADMIN — MIRTAZAPINE 15 MG: 15 TABLET, FILM COATED ORAL at 20:04

## 2018-01-31 RX ADMIN — DEXTRAN 70, AND HYPROMELLOSE 2910 3 DROP: 1; 3 SOLUTION/ DROPS OPHTHALMIC at 18:53

## 2018-01-31 RX ADMIN — HEPARIN SODIUM 750 UNITS/HR: 10000 INJECTION, SOLUTION INTRAVENOUS at 12:42

## 2018-01-31 ASSESSMENT — ENCOUNTER SYMPTOMS
NAUSEA: 0
ABDOMINAL PAIN: 0
RHINORRHEA: 0
COUGH: 0
FEVER: 0
SHORTNESS OF BREATH: 0

## 2018-01-31 ASSESSMENT — VISUAL ACUITY
OU: NORMAL ACUITY
OU: NORMAL ACUITY

## 2018-01-31 ASSESSMENT — ACTIVITIES OF DAILY LIVING (ADL)
ADLS_ACUITY_SCORE: 14
ADLS_ACUITY_SCORE: 14

## 2018-01-31 NOTE — PHARMACY-ADMISSION MEDICATION HISTORY
Admission medication history interview status for this patient is complete. See [facility] admission navigator for allergy information, prior to admission medications and immunization status.     Medication history interview source(s):  Nursing Home MAR  Medication history resources- [facility]  Primary pharmacy:[id number] [phone]    Changes made to PTA medication list:  Added: none  Deleted: none  Changed: meloxicam to PRN    Actions taken by pharmacist (provider contacted, etc):None     Additional medication history information:None    Medication reconciliation/reorder completed by provider prior to medication history? No    Do you take OTC medications (eg tylenol, ibuprofen, fish oil, eye/ear drops, etc)? Y    For patients on insulin therapy: N      Prior to Admission medications    Medication Sig Last Dose Taking? Auth Provider   Acetaminophen (TYLENOL PO) Take 1,000 mg by mouth 2 times daily 1/31/2018 at am Yes Reported, Patient   Mirtazapine (REMERON PO) Take 15 mg by mouth 2 times daily 1/31/2018 at am Yes Reported, Patient   hypromellose (ARTIFICIAL TEARS) 0.5 % SOLN ophthalmic solution Place 3 drops into both eyes 3 times daily 1/31/2018 at am Yes Reported, Patient   alendronate (FOSAMAX) 70 MG tablet Take 70 mg by mouth every 7 days On Wednesdays 1/31/2018 at am Yes Reported, Patient   furosemide (LASIX) 20 MG tablet Take 20 mg by mouth daily 1/31/2018 at am Yes Reported, Patient   cholecalciferol (VITAMIN D3) 5000 UNITS CAPS capsule Take 5,000 Units by mouth daily 1/31/2018 at am Yes Reported, Patient   Atorvastatin Calcium (LIPITOR PO) Take 20 mg by mouth daily 1/30/2018 at HS Yes Reported, Patient   carvedilol (COREG) 12.5 MG tablet Take 1 tablet (12.5 mg) by mouth 2 times daily (with meals) 1/31/2018 at am Yes Ken Chin, DO   amLODIPine (NORVASC) 10 MG tablet Take 1 tablet (10 mg) by mouth daily 1/31/2018 at am Yes Ken Chin, DO   citalopram (CELEXA) 10 MG  tablet Take 30 mg by mouth daily 1/31/2018 at am Yes Unknown, Entered By History   losartan (COZAAR) 100 MG tablet Take 100 mg by mouth daily 1/31/2018 at am Yes Unknown, Entered By History   multivitamin, therapeutic with minerals (THERA-VIT-M) TABS Take 1 tablet by mouth daily 1/31/2018 at am Yes Unknown, Entered By History   polyethylene glycol (MIRALAX/GLYCOLAX) powder Take 17 g by mouth 2 times daily 1/31/2018 at am Yes Unknown, Entered By History   calcium carbonate (OS-FRANCISCO) 500 MG TABS Take 2 tablets by mouth 2 times daily (with meals) 1/31/2018 at am Yes Unknown, Entered By History   oxybutynin (DITROPAN) 5 MG tablet Take 5 mg by mouth 2 times daily 1/31/2018 at am Yes Unknown, Entered By History   senna-docusate (SENNA S) 8.6-50 MG per tablet Take 1 tablet by mouth 2 times daily Please hold if having loose stools and contact nurse. 1/31/2018 at am Yes Unknown, Entered By History   ASPIRIN EC PO Take 325 mg by mouth daily 1/31/2018 at am Yes Unknown, Entered By History   metoclopramide (REGLAN) 5 MG tablet Take 5 mg by mouth 3 times daily (with meals) 1/31/2018 at am Yes Luz Ayers APRN CNP   levETIRAcetam (KEPPRA) 500 MG tablet Take 500 mg by mouth 2 times daily 1/31/2018 at am Yes Reported, Patient   folic acid (FOLVITE) 1 MG tablet Take 1 mg by mouth daily 1/31/2018 at am Yes Reported, Patient   omeprazole (PRILOSEC) 40 MG capsule Take 1 capsule (40 mg) by mouth daily Take 30-60 minutes before a meal. 1/31/2018 at am Yes Suzi Bailey MD   acetaminophen (TYLENOL) 325 MG tablet Take 650 mg by mouth 2 times daily as needed for mild pain More than a month at Unknown time  Reported, Patient   meloxicam (MOBIC) 15 MG tablet Take 15 mg by mouth daily as needed  More than a month at Unknown time  Unknown, Entered By History   bisacodyl (DULCOLAX) 10 MG suppository Place 10 mg rectally daily as needed More than a month at Unknown time  Luz Ayers APRN CNP

## 2018-01-31 NOTE — H&P
[facility]    Hospitalist History and Physical    Name: Jade Caba    MRN: [id number]  YOB: 1933    Age: 84 year old  Date of Admission:  1/31/2018  Date of Service (when I saw the patient): 01/31/18    Assessment & Plan   Jade Caba is a 84 year old female with a PMH significant for coronary artery disease, remote history of subarachnoid hemorrhage as a result of mechanical fall, long-standing severe hypertension, hyperlipidemia, peripheral vascular disease, seizure disorder, GERD who presents today with chest pain. Workup in the ED reveals evidence of NSTEMI with unremarkable EKG (NSR, no ischemic findings, no changes from previous EKG), positive troponin at 2.658, and negative chest x-ray.  The patient was started on an IV heparin drip in the emergency department and admitted for further management and cardiology consultation.    1. NSTEMI in setting of known CAD w/ associated HTN and HLD: Patient has history of 2 previous myocardial infarction, status post stent placement ×7.  Underwent stent implantation in the circumflex, dominant right coronary, and LAD at [facility].  Coronary CT angiogram 5/2012 at [facility] showed all coronary stents were patent with no new lesions.    -Continue heparin drip started in the emergency department (note #2)  -Continue PTA aspirin 325 mg daily  -Continue PTA losartan 100 mg daily  -Continue PTA carvedilol 12.5 mg twice daily  -Continue PTA atorvastatin 20 mg daily  -Telemetry monitoring  -Serial troponin trend  -TTE  -Cardiology consultation  -N.p.o. at midnight for possible cardiac catheterization depending on cardiology input    2. History of subarachnoid hemorrhage: Patient sustained a fall 3/2014.  At that time she developed a subarachnoid hemorrhage, had a C1 chip fracture and sacral fracture.  The patient recovered from that well, had an unremarkable follow-up CT of the head, and was placed in assisted  living afterward. She currently resides at [facility].   -Given that her subarachnoid hemorrhage was in the context of the fall and not spontaneous in nature with good following recovery, will proceed with IV heparin drip pulse monitoring.  -Serial neuro checks    3. History of severe and long-standing hypertension: History of bilateral renal artery stenting as well.Blood pressure is currently stable and systolic range of 130s-150s.  She was hospitalized several years ago for acute pulmonary edema and noted to have elevated pulmonary pressures on echocardiogram that [facility] [place]; she improved with diuretic therapy and cardiology was consulted at that time and felt that she likely had diastolic dysfunction causing elevated pulmonary pressures rather than cor pulmonale.  It was recommended that she remain on diuretic therapy long-term to affect adequate blood pressure control and prevent future recurrent edema.  Last echo in our records showed a preserved ejection fraction of 65-70% with grade 2 diastolic dysfunction noted, no wall motion abnormalities.  -Continue PTA Lasix 20 mg daily, losartan 100 mg daily, carvedilol 12.5 mg twice daily, and amlodipine 5 mg daily with parameters.    4. History of carotid artery disease: Has a history of TIA.  Status post right carotid endarterectomy secondary to 100% occlusion.  Also found to have left carotid artery stenosis on carotid MRI and ultrasound up to 80% and underwent prophylactic left carotid endarterectomy 10/2006.      5. History of seizure disorder: No recent seizure activity. Continue levetiracetam 500 mg twice daily.    6. History of anxiety and depression: Continue prior to admission mirtazapine 15 mg twice daily and citalopram 30 mg daily.    7. GERD: Continue omeprazole 40 mg daily.    DVT Prophylaxis: On heparin  Code Status: DNR / DNI  Disposition: Anticipate > 2 evening hospital stay    Primary Care Physician   [facility]    Chief Complaint    Chest pain    History is obtained from the patient. Daughter is at bedside.    History of Present Illness   Jade Caba is a 84 year old female with a PMH significant for coronary artery disease, remote history of subarachnoid hemorrhage as a result of mechanical fall, long-standing severe hypertension, hyperlipidemia, peripheral vascular disease, seizure disorder, GERD who presents today with chest pain.     Patient reports that at approximately 7:30 AM today while she was sitting up and not exerting herself in any way she experienced acute onset of left-central anterior chest discomfort described as a light irritation or pressure over her chest.  She rated it a 4 out of 10.  It lasted for about 2 hours and resolved after she took ibuprofen at home.  She did not experience any associated shortness of breath, diaphoresis, nausea, vomiting, lightheadedness with this.  She denies palpitations, orthopnea, paroxysmal nocturnal dyspnea, or new lower extremity edema.  She reports she had a previous episode that was essentially exactly the same 5 days ago but did not do anything about it.  She is compliant with all of her medications as she resides at [facility] where her medications are dispensed for her.  She is a former half a pack day smoker of 15 years, but quit in 2004.  She does have a significant history of coronary artery disease with previous stenting to the circumflex, dominant right coronary, and LAD at [facility], with a total of 7 stents placed.    In the emergency department, vital signs stable with temperature 98.6, pulse 65, blood pressure 141/68, respiratory rate 16, SPO2 93% on room air.  The patient's pain was completely resolved by the time she arrived in the emergency department.  She received 1 sublingual nitroglycerin in route and had taken a full aspirin that day.  EKG showed normal sinus rhythm with no ischemic findings.  Troponin was positive at 2.658.  CMP and CBC  with differential were within normal limits.  Chest x-ray demonstrated some left lower lung atelectasis but otherwise negative for acute pathology.  The ER provider started the patient on an IV heparin drip.  The ER provider also contacted cardiology to make them aware of the patient being admitted.    Past Medical History    Past Medical History:   Diagnosis Date     Anxiety      Arthritis      Atrial flutter (H)      C1 cervical fracture (H)      CAD (coronary artery disease)      Carotid artery stenosis      CKD (chronic kidney disease)      Depression      H/O alcohol abuse      Hypercholesterolemia      Hypertension      Renal artery stenosis (H)      Seizure disorder (H)      Subarachnoid hemorrhage (H) 2014     Past Surgical History   Past Surgical History:   Procedure Laterality Date     APPENDECTOMY       CAROTID ENDARTERECTOMY Left      Cataract surgery Right     will have laser eye surgery     CHOLECYSTECTOMY       Coronary stents x 3       HYSTERECTOMY      at age of 40-not cancerus or precancerous      JOINT REPLACEMENT Bilateral      Stenting of renal artery       Prior to Admission Medications   Prior to Admission Medications   Prescriptions Last Dose Informant Patient Reported? Taking?   ASPIRIN EC PO 1/31/2018 at am  Yes Yes   Sig: Take 325 mg by mouth daily   Acetaminophen (TYLENOL PO) 1/31/2018 at am  Yes Yes   Sig: Take 1,000 mg by mouth 2 times daily   Atorvastatin Calcium (LIPITOR PO) 1/30/2018 at HS  Yes Yes   Sig: Take 20 mg by mouth daily   Mirtazapine (REMERON PO) 1/31/2018 at am  Yes Yes   Sig: Take 15 mg by mouth 2 times daily   acetaminophen (TYLENOL) 325 MG tablet More than a month at Unknown time  Yes No   Sig: Take 650 mg by mouth 2 times daily as needed for mild pain   alendronate (FOSAMAX) 70 MG tablet 1/31/2018 at am  Yes Yes   Sig: Take 70 mg by mouth every 7 days On Wednesdays   amLODIPine (NORVASC) 10 MG tablet 1/31/2018 at am  No Yes   Sig: Take 1 tablet (10 mg) by mouth daily    bisacodyl (DULCOLAX) 10 MG suppository More than a month at Unknown time  Yes No   Sig: Place 10 mg rectally daily as needed   calcium carbonate (OS-FRANCISCO) 500 MG TABS 1/31/2018 at am  Yes Yes   Sig: Take 2 tablets by mouth 2 times daily (with meals)   carvedilol (COREG) 12.5 MG tablet 1/31/2018 at am  No Yes   Sig: Take 1 tablet (12.5 mg) by mouth 2 times daily (with meals)   cholecalciferol (VITAMIN D3) 5000 UNITS CAPS capsule 1/31/2018 at am  Yes Yes   Sig: Take 5,000 Units by mouth daily   citalopram (CELEXA) 10 MG tablet 1/31/2018 at am  Yes Yes   Sig: Take 30 mg by mouth daily   folic acid (FOLVITE) 1 MG tablet 1/31/2018 at am  Yes Yes   Sig: Take 1 mg by mouth daily   furosemide (LASIX) 20 MG tablet 1/31/2018 at am  Yes Yes   Sig: Take 20 mg by mouth daily   hypromellose (ARTIFICIAL TEARS) 0.5 % SOLN ophthalmic solution 1/31/2018 at am  Yes Yes   Sig: Place 3 drops into both eyes 3 times daily   levETIRAcetam (KEPPRA) 500 MG tablet 1/31/2018 at am  Yes Yes   Sig: Take 500 mg by mouth 2 times daily   losartan (COZAAR) 100 MG tablet 1/31/2018 at am  Yes Yes   Sig: Take 100 mg by mouth daily   meloxicam (MOBIC) 15 MG tablet More than a month at Unknown time halfway Yes No   Sig: Take 15 mg by mouth daily as needed    metoclopramide (REGLAN) 5 MG tablet 1/31/2018 at am  Yes Yes   Sig: Take 5 mg by mouth 3 times daily (with meals)   multivitamin, therapeutic with minerals (THERA-VIT-M) TABS 1/31/2018 at am  Yes Yes   Sig: Take 1 tablet by mouth daily   omeprazole (PRILOSEC) 40 MG capsule 1/31/2018 at am  No Yes   Sig: Take 1 capsule (40 mg) by mouth daily Take 30-60 minutes before a meal.   oxybutynin (DITROPAN) 5 MG tablet 1/31/2018 at am  Yes Yes   Sig: Take 5 mg by mouth 2 times daily   polyethylene glycol (MIRALAX/GLYCOLAX) powder 1/31/2018 at am  Yes Yes   Sig: Take 17 g by mouth 2 times daily   senna-docusate (SENNA S) 8.6-50 MG per tablet 1/31/2018 at am  Yes Yes   Sig: Take 1 tablet by mouth 2 times  daily Please hold if having loose stools and contact nurse.      Facility-Administered Medications: None     Allergies   Allergies   Allergen Reactions     No Known Allergies        Social History   Social History   Substance Use Topics     Smoking status: Never Smoker     Smokeless tobacco: Never Used     Alcohol use No     Social History     Social History Narrative       Family History   Family history reviewed with patient and is noncontributory.    Review of Systems   A Comprehensive greater than 10 system review of systems was carried out.  Pertinent positives and negatives are noted above.  Otherwise negative for contributory information.    Physical Exam   Temp: 97.9  F (36.6  C) Temp src: Oral BP: 147/70 (right arm) Pulse: 65 Heart Rate: 71 Resp: 20 SpO2: (!) 89 % O2 Device: None (Room air)    Vital Signs with Ranges  Temp:  [97.9  F (36.6  C)-98.6  F (37  C)] 97.9  F (36.6  C)  Pulse:  [65] 65  Heart Rate:  [60-71] 71  Resp:  [16-20] 20  BP: (133-150)/(61-70) 147/70  SpO2:  [89 %-96 %] 89 %  180 lbs 1.6 oz    GEN:  Alert, oriented x 3, appears comfortable, no overt distress  HEENT:  Normocephalic/atraumatic, no scleral icterus, no nasal discharge, mouth moist.  CV:  Regular rate and rhythm, no murmur or JVD.  S1 + S2 noted, no S3 or S4.  LUNGS:  Clear to auscultation bilaterally without rales/rhonchi/wheezing/retractions.  Symmetric chest rise on inhalation noted.  ABD:  Active bowel sounds, soft, non-tender/non-distended.  No rebound/guarding/rigidity.  EXT:  No edema.  No cyanosis.  No acute joint synovitis noted.  SKIN:  Dry to touch, no exanthems noted in the visualized areas.  NEURO:  Symmetric muscle strength, sensation to touch grossly intact.  Coordination symmetric on general exam.  No new focal deficits appreciated.    Data   Data reviewed today:  I personally reviewed: EKG showed normal sinus rhythm with no ischemic findings.  Troponin was positive at 2.658.  CMP and CBC with differential were  within normal limits.  Chest x-ray demonstrated some left lower lung atelectasis but otherwise negative for acute pathology.      Results for orders placed or performed during the hospital encounter of 01/31/18 (from the past 48 hour(s))   EKG 12 lead   Result Value Ref Range    Interpretation ECG Click View Image link to view waveform and result    CBC with platelets differential   Result Value Ref Range    WBC 10.6 4.0 - 11.0 10e9/L    RBC Count 4.29 3.8 - 5.2 10e12/L    Hemoglobin 13.3 11.7 - 15.7 g/dL    Hematocrit 41.2 35.0 - 47.0 %    MCV 96 78 - 100 fl    MCH 31.0 26.5 - 33.0 pg    MCHC 32.3 31.5 - 36.5 g/dL    RDW 12.5 10.0 - 15.0 %    Platelet Count 200 150 - 450 10e9/L    Diff Method Automated Method     % Neutrophils 73.2 %    % Lymphocytes 16.5 %    % Monocytes 7.2 %    % Eosinophils 1.6 %    % Basophils 0.8 %    % Immature Granulocytes 0.7 %    Nucleated RBCs 0 0 /100    Absolute Neutrophil 7.7 1.6 - 8.3 10e9/L    Absolute Lymphocytes 1.7 0.8 - 5.3 10e9/L    Absolute Monocytes 0.8 0.0 - 1.3 10e9/L    Absolute Eosinophils 0.2 0.0 - 0.7 10e9/L    Absolute Basophils 0.1 0.0 - 0.2 10e9/L    Abs Immature Granulocytes 0.1 0 - 0.4 10e9/L    Absolute Nucleated RBC 0.0    Comprehensive metabolic panel   Result Value Ref Range    Sodium 139 133 - 144 mmol/L    Potassium 4.0 3.4 - 5.3 mmol/L    Chloride 103 94 - 109 mmol/L    Carbon Dioxide 27 20 - 32 mmol/L    Anion Gap 9 3 - 14 mmol/L    Glucose 136 (H) 70 - 99 mg/dL    Urea Nitrogen 18 7 - 30 mg/dL    Creatinine 0.90 0.52 - 1.04 mg/dL    GFR Estimate 59 (L) >60 mL/min/1.7m2    GFR Estimate If Black 72 >60 mL/min/1.7m2    Calcium 8.4 (L) 8.5 - 10.1 mg/dL    Bilirubin Total 0.8 0.2 - 1.3 mg/dL    Albumin 3.1 (L) 3.4 - 5.0 g/dL    Protein Total 6.9 6.8 - 8.8 g/dL    Alkaline Phosphatase 55 40 - 150 U/L    ALT 21 0 - 50 U/L    AST 34 0 - 45 U/L   Lipase   Result Value Ref Range    Lipase 98 73 - 393 U/L   Troponin I   Result Value Ref Range    Troponin I ES 2.658  (HH) 0.000 - 0.045 ug/L   XR Chest 2 Views    Narrative    XR CHEST 2 VW 1/31/2018 11:25 AM    COMPARISON: 2/25/2015    HISTORY: Chest pain.      Impression    IMPRESSION: Mild left basilar atelectasis. Lungs otherwise clear. No  pleural effusion or pneumothorax.    JEFERSON CLARK MD   EKG 12-lead, tracing only   Result Value Ref Range    Interpretation ECG Click View Image link to view waveform and result        Peg SHAW I discussed the patient with Dr. Thomas who is in agreement with the above plan.

## 2018-01-31 NOTE — ED NOTES
[facility]  ED Nurse Handoff Report    Jade Caba is a 84 year old female   ED Chief complaint: Chest Pain  . ED Diagnosis:   Final diagnoses:   NSTEMI (non-ST elevated myocardial infarction) (H)     Allergies:   Allergies   Allergen Reactions     No Known Allergies        Code Status: DNR / DNI  Activity level - Baseline/Home:  Independent. Activity Level - Current:   Stand with Assist. Lift room needed: No. Bariatric: No   Needed: No   Isolation: No. Infection: Not Applicable.     Vital Signs:   Vitals:    01/31/18 1130 01/31/18 1145 01/31/18 1200 01/31/18 1215   BP: 137/66  136/65    Pulse:       Resp:       Temp:       SpO2: 91% 94% 95% 90%       Cardiac Rhythm:  ,      Pain level:    Patient confused: Yes. Patient Falls Risk: No.   Elimination Status: Has voided   Patient Report - Initial Complaint: chest pain. Focused Assessment: Patient had episode of left sided chest pain this morning around 0700 while eating breakfast. Patient reports a similar episode yesterday which subsided in about an hour with rest. Patient was given ASA and nitro x1 per EMS, was pain free upon arrival and has not had return of symptoms.    Tests Performed: Labs, chest xray. Abnormal Results: trop 2.6.   Treatments provided: meds per MAR  Family Comments: daughter at bedside.   OBS brochure/video discussed/provided to patient:  No  ED Medications:   Medications   heparin infusion 25,000 units in 0.45% NaCl 250 mL (750 Units/hr Intravenous New Bag 1/31/18 1242)   heparin Loading Dose bolus dose from infusion pump 3,700 Units (3,700 Units Intravenous Given 1/31/18 1243)     Drips infusing:  No  For the majority of the shift, the patient's behavior Green. Interventions performed were none needed.     Severe Sepsis OR Septic Shock Diagnosis Present: No      ED Nurse Name/Phone Number: Zehra DAVIES,   1:07 PM    RECEIVING UNIT ED HANDOFF REVIEW    Above ED Nurse Handoff Report was reviewed: Yes  Reviewed by:  Yvonne Gould on January 31, 2018 at 1:38 PM

## 2018-01-31 NOTE — ED PROVIDER NOTES
History     Chief Complaint:  Chest pain    HPI   Jade Caba is a 84 year old female who presents with chest pain. The patient reports that she was resting, eating breakfast this morning when she began having some central chest pain. This pain was described as a dull pain and it was noted that it did migrate to the left side of her chest.The patient took ibuprofen at home and called EMS. She was given 325 mg aspirin and one dose of nitro en route. Here in the emergency department the patient denies currently having any pain. She does note that she had some similar pain four days ago that lasted for several hours but did resolve eventually with ibuprofen and time. She believes that she has had some increased ankle swelling in the past week. The patient reports that she does have a history of MI and cardiac stent placement. She has not had any stress tests in the past few years. Of note the patient did receive Tamiflu earlier this month prophylactically. She denies having experienced any nausea, abdominal pain, cough, shortness of breath, congestion, fatigue, or any other cold symptoms recently.    Cardiac/PE/DVT Risk Factors:  History of hypertension - Yes  History of hyperlipidemia - Yes  History of diabetes - No  History of smoking - No  Personal history of PE/DVT - No  Family history of PE/DVT - No  Family history of heart complications - Yes  Recent travel - No  Recent surgery - No  Other immobilizations - No  Cancer - No     Allergies:  No Known Drug Allergies     Medications:    Remeron  Alendronate  Lasix  Atorvastatin  Amlodipine   Carvedilol  Citalopram  Losartan  Senna-docusate  Keprra  Omeprazole    Past Medical History:    Anxiety  Arthritis  Atrial flutter  C1 cervical fracture  CAD  Carotid artery stenosis  CKD  Depression  Alcohol abuse  Hypercholesterolemia  Hypertension  Renal artery stenosis  Seizure disorder  Subarachnoid hemorrhage     Past Surgical History:    Appendectomy  Carotid  "endarterectomy  Cataract surgery  Coronary stents x 3  Hysterectomy  Joint replacement  Stenting of renal artery    Family History:    CAD  Heart disease  Cancer    Social History:  The patient was accompanied to the ED by her daughter.  Smoking Status: No  Smokeless Tobacco: No  Alcohol Use: No   Marital Status:   [5]    Review of Systems   Constitutional: Negative for fever.   HENT: Negative for congestion and rhinorrhea.    Respiratory: Negative for cough and shortness of breath.    Cardiovascular: Positive for chest pain and leg swelling.   Gastrointestinal: Negative for abdominal pain and nausea.   All other systems reviewed and are negative.    Physical Exam   Vitals:  Patient Vitals for the past 24 hrs:   BP Temp Temp src Pulse Heart Rate Resp SpO2 Height Weight   01/31/18 1424 155/71 - - - - - 94 % - -   01/31/18 1423 147/70 97.9  F (36.6  C) Oral - 71 20 (!) 89 % 1.575 m (5' 2\") 81.7 kg (180 lb 1.6 oz)   01/31/18 1334 - - - - 64 - 91 % - -   01/31/18 1330 150/69 - - - 66 - - - -   01/31/18 1316 - - - - - - 91 % - -   01/31/18 1315 - - - - 62 - 90 % - -   01/31/18 1300 149/69 - - - 64 - - - -   01/31/18 1254 - - - - 62 - 92 % - -   01/31/18 1249 - - - - - - 93 % - -   01/31/18 1245 - - - - 65 - - - -   01/31/18 1230 135/65 - - - 60 - 93 % - -   01/31/18 1228 - - - - 63 - 92 % - -   01/31/18 1215 - - - - 65 - 90 % - -   01/31/18 1200 136/65 - - - 60 - 95 % - -   01/31/18 1158 - - - - 62 - 93 % - -   01/31/18 1145 - - - - 63 - 94 % - -   01/31/18 1144 - - - - 60 - 96 % - -   01/31/18 1130 137/66 - - - 62 - 91 % - -   01/31/18 1100 133/61 - - - 60 - 91 % - -   01/31/18 1045 - - - - 61 - 91 % - -   01/31/18 1030 - - - - 65 - 92 % - -   01/31/18 1015 146/68 - - - 64 - 93 % - -   01/31/18 1007 141/68 98.6  F (37  C) - 65 - 16 93 % - -      Physical Exam  Constitutional:  Well developed, Well nourished, Comfort bale appearing   HENT:  Bilateral external ears normal, Mucous membranes moist, Nose normal. Neck- " Normal range of motion, Supple  Respiratory:  Normal breath sounds, No respiratory distress, No wheezing,  Cardiovascular:  Normal heart rate, Normal rhythm, No murmurs,    GI:  Bowel sounds normal, Soft, No tenderness,   Musculoskeletal:  Intact distal pulses, No edema, grossly unremarkable range of motion. No calf tenderness  Integument:  Warm, Dry   Neurologic:  Alert, attentive and appropriately oriented  Psychiatric:  Mood and affect normal.      Emergency Department Course     ECG:  ECG taken at 1021, ECG read at 1024  Normal sinus rhythm. Normal ECG  Rate 64 bpm. GA interval 162. QRS duration 98. QT/QTc 414/427. P-R-T axes 33, 81, 14.     ECG taken at 1212, ECG read at 1213  Normal sinus rhythm. Normal ECG  Rate 64 bpm. GA interval 172. QRS duration 90. QT/QTc 422/435. P-R-T axes 43, 82, 11.     Imaging:  Radiology findings were communicated with the patient who voiced understanding of the findings.  XR chest 2 views:  IMPRESSION: Mild left basilar atelectasis. Lungs otherwise clear. No  pleural effusion or pneumothorax.  Reading per radiology.     Laboratory:  Laboratory findings were communicated with the patient who voiced understanding of the findings.  CBC: AWNL (WBC 10.6, HGB 13.3, )  CBC: AWNL (WBC 10.2, HGB 14.0, )   CMP: Glucose: 136(H), GFR: 59(L), Calcium: 8.4(L0, Albumin: 3.1(L), o/w WNL (Creatinine 0.90)  Lipase: 98  Troponin (Collected 1050): 2.658(HH)     Interventions:  1242 Heparin 750 units/ hr IV infusion  1243 Heparin loading dose 3700 units IV    Emergency Department Course:  Nursing notes and vitals reviewed.  I performed an exam of the patient as documented above.     1205 I rechecked with the patient and discussed the risks and benefits of Heparin administration    1215 I spoke to Dr. Sorensen of Cardiology regarding the patient.  Agrees with plan of admission with heparin drip.    1224 I spoke with Peg SAUL, for Dr. Thomas regarding the patient    I discussed the  treatment plan with the patient. They expressed understanding of this plan and consented to admission. Dr. Thomas will admit the patient to a monitored bed for further evaluation and treatment.     I personally reviewed the laboratory results with the Patient and answered all related questions prior to admission.  Impression & Plan      Medical Decision Making:  Jade Caba is a 84 year old female who presents with chest pain.  Their history and risk factor analysis are significant as noted above.  The workup in the Emergency Department (see above for cardiac enzymes and EKG)  is consistent with a NSTEMI.    After discussing with patient the risks and benefits of heparinization and given lack of contraindication to this therapy, heparin loading dose and drip were administered. Aspirin had been given en route.      There is no clinical, laboratory, or radiographic evidence of pulmonary embolism, AAA, aortic dissection, pneumonia or pneumothorax.     The patient agrees to plan and will discuss with cardiologist further plan including catheterization.     Plan:  Admit to telemetry under the care of Dr. Thomas    Diagnosis:    ICD-10-CM    1. NSTEMI (non-ST elevated myocardial infarction) (H) I21.4 CBC with platelets      Disposition:   Admitted     CMS Diagnoses: None     Scribe Disclosure:  Jeffrey EDMOND, am serving as a scribe at 10:33 AM on 1/31/2018 to document services personally performed by Yaa Malhotra MD, based on my observations and the provider's statements to me.   [facility] EMERGENCY DEPARTMENT       Yaa Malhotra MD  01/31/18 [id number]

## 2018-01-31 NOTE — CONSULTS
[facility]    Cardiology Consultation     Date of Admission:  1/31/2018    Assessment & Plan     The patient is a pleasant 84-year-old female with a history of multivessel coronary artery disease status post multivessel PCI at [facility] the records of which I don't have available for my review who is admitted with a non-ST segment elevation MI.  I have recommended a cardiac catheterization and informed consent was obtained after explaining the risks and benefits of the cardiac catheterization with the patient along with alternatives.  The patient is in agreement with proceeding to cardiac catheterization.  In the interim the patient has been started on IV unfractionated heparin, and should remain on her beta blocker and statin unchanged.  Further recommendations will be based on the results of her cardiac catheterization.    Dean Sorensen MD    Primary Care Physician   [facility]    Reason for Consult   Reason for consult: I was asked by internal medicine to evaluate this patient for elevated troponin.    History of Present Illness   Jade Caba is a 84 year old female who presents to the emergency room with chest pain.  The patient has a history of coronary artery disease and previously was followed at [facility] or she has undergone previous multivessel PCI.  Her most recent cardiac assessment was a cardiac CT and gram in 2016 showing patent stents in all three coronary vessels.  She has not seen a cardiologist in years.  She states that the chest pain that she had today was similar to her prior anginal symptoms and lasted for approximate 15-20 minutes.  Should a similar episode last Saturday lasting for three hours but did not come in to the emergency room.  Her admission ECG did not show evidence for ST segment elevation MI.  Her initial troponins elevated at 2.658 consistent with a non-ST segment elevation MI.  She does carry a prior history  of a subarachnoid hemorrhage following a mechanical fall.  This was not related to a spontaneous bleed and she has been on aspirin since without any further bleeding difficulties.    Patient Active Problem List   Diagnosis     Renal artery stenosis (H)     HTN (hypertension)     CAD (coronary artery disease)     Carotid artery stenosis     Mild major depression (H)     Hypercholesterolemia     CKD (chronic kidney disease) stage 3, GFR 30-59 ml/min     Hyperlipidemia LDL goal <100     Hypercholesteremia     Seizure disorder (H)     Osteoarthritis of knee     Chondrocalcinosis     Subarachnoid hemorrhage (H)     Fracture of C1 vertebra, closed (H)     Sacral fracture, closed (H)     Atrial flutter (H)     Alcohol abuse     Constipation     Anxiety state     ARF (acute renal failure) (H)     FTT (failure to thrive) in adult     Fall     Cognitive impairment     CHF exacerbation (H)     Health Care Home     Advance care planning     Chronic diastolic congestive heart failure (H)     Coronary artery disease, angina presence unspecified, unspecified vessel or lesion type, unspecified whether native or transplanted heart     Anxiety     Urgency incontinence     ACS (acute coronary syndrome) (H)       Past Medical History   I have reviewed this patient's medical history and updated it with pertinent information if needed.   Past Medical History:   Diagnosis Date     Anxiety      Arthritis      Atrial flutter (H)      C1 cervical fracture (H)      CAD (coronary artery disease)      Carotid artery stenosis      CKD (chronic kidney disease)      Depression      H/O alcohol abuse      Hypercholesterolemia      Hypertension      Renal artery stenosis (H)      Seizure disorder (H)      Subarachnoid hemorrhage (H) 2014       Past Surgical History   I have reviewed this patient's surgical history and updated it with pertinent information if needed.  Past Surgical History:   Procedure Laterality Date     APPENDECTOMY       CAROTID  ENDARTERECTOMY Left      Cataract surgery Right     will have laser eye surgery     CHOLECYSTECTOMY       Coronary stents x 3       HYSTERECTOMY      at age of 40-not cancerus or precancerous      JOINT REPLACEMENT Bilateral      Stenting of renal artery         Prior to Admission Medications   Prior to Admission Medications   Prescriptions Last Dose Informant Patient Reported? Taking?   ASPIRIN EC PO 1/31/2018 at am  Yes Yes   Sig: Take 325 mg by mouth daily   Acetaminophen (TYLENOL PO) 1/31/2018 at am  Yes Yes   Sig: Take 1,000 mg by mouth 2 times daily   Atorvastatin Calcium (LIPITOR PO) 1/30/2018 at HS  Yes Yes   Sig: Take 20 mg by mouth daily   Mirtazapine (REMERON PO) 1/31/2018 at am  Yes Yes   Sig: Take 15 mg by mouth 2 times daily   acetaminophen (TYLENOL) 325 MG tablet More than a month at Unknown time  Yes No   Sig: Take 650 mg by mouth 2 times daily as needed for mild pain   alendronate (FOSAMAX) 70 MG tablet 1/31/2018 at am  Yes Yes   Sig: Take 70 mg by mouth every 7 days On Wednesdays   amLODIPine (NORVASC) 10 MG tablet 1/31/2018 at am  No Yes   Sig: Take 1 tablet (10 mg) by mouth daily   bisacodyl (DULCOLAX) 10 MG suppository More than a month at Unknown time  Yes No   Sig: Place 10 mg rectally daily as needed   calcium carbonate (OS-FRANCISCO) 500 MG TABS 1/31/2018 at am  Yes Yes   Sig: Take 2 tablets by mouth 2 times daily (with meals)   carvedilol (COREG) 12.5 MG tablet 1/31/2018 at am  No Yes   Sig: Take 1 tablet (12.5 mg) by mouth 2 times daily (with meals)   cholecalciferol (VITAMIN D3) 5000 UNITS CAPS capsule 1/31/2018 at am  Yes Yes   Sig: Take 5,000 Units by mouth daily   citalopram (CELEXA) 10 MG tablet 1/31/2018 at am  Yes Yes   Sig: Take 30 mg by mouth daily   folic acid (FOLVITE) 1 MG tablet 1/31/2018 at am  Yes Yes   Sig: Take 1 mg by mouth daily   furosemide (LASIX) 20 MG tablet 1/31/2018 at am  Yes Yes   Sig: Take 20 mg by mouth daily   hypromellose (ARTIFICIAL TEARS) 0.5 % SOLN ophthalmic  solution 1/31/2018 at am  Yes Yes   Sig: Place 3 drops into both eyes 3 times daily   levETIRAcetam (KEPPRA) 500 MG tablet 1/31/2018 at am  Yes Yes   Sig: Take 500 mg by mouth 2 times daily   losartan (COZAAR) 100 MG tablet 1/31/2018 at am  Yes Yes   Sig: Take 100 mg by mouth daily   meloxicam (MOBIC) 15 MG tablet More than a month at Unknown time long term Yes No   Sig: Take 15 mg by mouth daily as needed    metoclopramide (REGLAN) 5 MG tablet 1/31/2018 at am  Yes Yes   Sig: Take 5 mg by mouth 3 times daily (with meals)   multivitamin, therapeutic with minerals (THERA-VIT-M) TABS 1/31/2018 at am  Yes Yes   Sig: Take 1 tablet by mouth daily   omeprazole (PRILOSEC) 40 MG capsule 1/31/2018 at am  No Yes   Sig: Take 1 capsule (40 mg) by mouth daily Take 30-60 minutes before a meal.   oxybutynin (DITROPAN) 5 MG tablet 1/31/2018 at am  Yes Yes   Sig: Take 5 mg by mouth 2 times daily   polyethylene glycol (MIRALAX/GLYCOLAX) powder 1/31/2018 at am  Yes Yes   Sig: Take 17 g by mouth 2 times daily   senna-docusate (SENNA S) 8.6-50 MG per tablet 1/31/2018 at am  Yes Yes   Sig: Take 1 tablet by mouth 2 times daily Please hold if having loose stools and contact nurse.      Facility-Administered Medications: None     Current Facility-Administered Medications   Medication Dose Route Frequency     sodium chloride (PF)  3 mL Intracatheter Q8H     [START ON 2/1/2018] aspirin  325 mg Oral Daily     Current Facility-Administered Medications   Medication Last Rate     HEParin 750 Units/hr (01/31/18 1242)     - MEDICATION INSTRUCTIONS -       - MEDICATION INSTRUCTIONS -       Continuing ACE inhibitor/ARB/ARNI from home medication list OR ACE inhibitor/ARB order already placed during this visit       - MEDICATION INSTRUCTIONS -       - MEDICATION INSTRUCTIONS -       Allergies   Allergies   Allergen Reactions     No Known Allergies        Social History    reports that she has never smoked. She has never used smokeless tobacco. She  "reports that she does not drink alcohol or use illicit drugs.    Family History   Family History   Problem Relation Age of Onset     C.A.D. Mother      C.A.D. Brother      C.A.ETELVINA. Sister      MICHAEL. Brother      CANCER Sister      Breast cancer     HEART DISEASE Son        Review of Systems   The comprehensive 10 point Review of Systems is negative other than noted in the HPI or here.     Physical Exam   Vital Signs with Ranges  Temp:  [97.9  F (36.6  C)-98.6  F (37  C)] 97.9  F (36.6  C)  Pulse:  [65] 65  Heart Rate:  [60-71] 71  Resp:  [16-20] 20  BP: (133-155)/(61-71) 155/71  SpO2:  [89 %-96 %] 94 %  Wt Readings from Last 4 Encounters:   01/31/18 81.7 kg (180 lb 1.6 oz)   01/25/18 80.7 kg (178 lb)   01/09/17 78 kg (172 lb)   10/27/16 77.8 kg (171 lb 9.6 oz)            Vitals: /71  Pulse 65  Temp 97.9  F (36.6  C) (Oral)  Resp 20  Ht 1.575 m (5' 2\")  Wt 81.7 kg (180 lb 1.6 oz)  SpO2 94%  BMI 32.94 kg/m2    No acute distress  No JVD  No cervical lymphadenopathy  Heart is regular without murmur  Lungs are clear  Abdomen is obese but nontender  Extremities no significant edema  She is alert and oriented ×3  Her affect is normal  Musculoskeletal exam did not reveal any gross abnormalities of her major joints  Dermatologic exam did not really rashes or ecchymoses on exposed areas of skin    Recent Labs  Lab 01/31/18  1050   TROPI 2.658*         Recent Labs  Lab 01/31/18  1451 01/31/18  1050 01/26/18   WBC 10.2 10.6  --    HGB 14.0 13.3  --    MCV 95 96  --     200  --    NA  --  139 140   POTASSIUM  --  4.0 4.2   CHLORIDE  --  103 102   CO2  --  27 29   BUN  --  18 16   CR  --  0.90 0.90   GFRESTIMATED  --  59* 60*   GFRESTBLACK  --  72 >60   ANIONGAP  --  9 9   FRANCISCO  --  8.4* 9.3   GLC  --  136* 135*   ALBUMIN  --  3.1*  --    PROTTOTAL  --  6.9  --    BILITOTAL  --  0.8  --    ALKPHOS  --  55  --    ALT  --  21  --    AST  --  34  --    LIPASE  --  98  --    TROPI  --  2.658*  --      Recent Labs "   Lab Test 09/19/16 08/14/13   1017  07/03/13   0807   CHOL  204*  228*  226*   HDL  40*  62  71   LDL  137*  145*  138*   TRIG  134  102  89   CHOLHDLRATIO   --   3.7  3.2       Recent Labs  Lab 01/31/18  1451 01/31/18  1050   WBC 10.2 10.6   HGB 14.0 13.3   HCT 43.1 41.2   MCV 95 96    200     No results for input(s): PH, PHV, PO2, PO2V, SAT, PCO2, PCO2V, HCO3, HCO3V in the last 168 hours.  No results for input(s): NTBNPI, NTBNP in the last 168 hours.  No results for input(s): DD in the last 168 hours.  No results for input(s): SED, CRP in the last 168 hours.    Recent Labs  Lab 01/31/18  1451 01/31/18  1050    200     No results for input(s): TSH in the last 168 hours.  No results for input(s): COLOR, APPEARANCE, URINEGLC, URINEBILI, URINEKETONE, SG, UBLD, URINEPH, PROTEIN, UROBILINOGEN, NITRITE, LEUKEST, RBCU, WBCU in the last 168 hours.    Imaging:  Recent Results (from the past 48 hour(s))   XR Chest 2 Views    Narrative    XR CHEST 2 VW 1/31/2018 11:25 AM    COMPARISON: 2/25/2015    HISTORY: Chest pain.      Impression    IMPRESSION: Mild left basilar atelectasis. Lungs otherwise clear. No  pleural effusion or pneumothorax.    JEFERSON CLARK MD       Echo:  No results found for this or any previous visit (from the past 4320 hour(s)).

## 2018-01-31 NOTE — ED NOTES
Bed: ED18  Expected date: 1/31/18  Expected time: 9:52 AM  Means of arrival: Ambulance  Comments:  [id number]  83yo c/p

## 2018-01-31 NOTE — IP AVS SNAPSHOT
[place] [id number] [facility]    [address]    [facility] [address]    Phone:  [phone]    Fax:  [phone]                                       After Visit Summary   1/31/2018    Jade Caba    MRN: [id number]           After Visit Summary Signature Page     I have received my discharge instructions, and my questions have been answered. I have discussed any challenges I see with this plan with the nurse or doctor.    ..........................................................................................................................................  Patient/Patient Representative Signature      ..........................................................................................................................................  Patient Representative Print Name and Relationship to Patient    ..................................................               ................................................  Date                                            Time    ..........................................................................................................................................  Reviewed by Signature/Title    ...................................................              ..............................................  Date                                                            Time

## 2018-01-31 NOTE — PLAN OF CARE
Problem: Pain, Acute (Adult)  Goal: Acceptable Pain Control/Comfort Level  Patient will demonstrate the desired outcomes by discharge/transition of care.  Outcome: Improving  Pt alert and oriented, but forgetful at times, up with SBA one staff tolerated well.  Bed alarm on for safety to prevent falls.  Pt receiving heparin drip at 750 units per hour via pump.  No signs of bleeding observed.  Denies chest pains.  Telemetry SR. Troponin 2.658.  Blood pressure slightly elevated.  Pt to have an angiogram tomorrow per Cardiology order.  Lung sounds clear, but diminished bilaterally, infrequent nonproductive cough present.  Chest xray showed left bibasilar atelectasis.   Trace bilateral lower extremity edema present  Tolerating cardiac diet po.  Pt to discharge to [facility] when condition stable.

## 2018-01-31 NOTE — ED NOTES
Arrives with left sided chest pain resolved after 325 mg ASA and I nitro per EMS pt reports pain started 0700 while eating breakfast, HX CHF, Afib, MI. A/ox 3. ABC's intact. No pain on arrival.

## 2018-01-31 NOTE — IP AVS SNAPSHOT
MRN:[id number]                      After Visit Summary   1/31/2018    Jade Caba    MRN: [id number]           Thank you!     Thank you for choosing [facility] for your care. Our goal is always to provide you with excellent care. Hearing back from our patients is one way we can continue to improve our services. Please take a few minutes to complete the written survey that you may receive in the mail after you visit. If you would like to speak to someone directly about your visit please contact Patient Relations at [phone]. Thank you!          Patient Information     Date Of Birth          8/26/1933        Designated Caregiver       Most Recent Value    Caregiver    Will someone help with your care after discharge? yes    Name of designated caregiver Washington Villa    Phone number of caregiver [phone]    Caregiver address [place], [place]      About your hospital stay     You were admitted on:  January 31, 2018 You last received care in the:  [place] [address] [facility]    You were discharged on:  February 2, 2018        Reason for your hospital stay       Heart attack                  Who to Call     For medical emergencies, please call 911.  For non-urgent questions about your medical care, please call your primary care provider or clinic, None          Attending Provider     Provider Specialty    Yaa Malhotra MD Emergency Medicine    Martha, Purnima Cartwright MD Internal Medicine       Primary Care Provider Fax #    [facility] [phone]      After Care Instructions     Diet       Follow this diet upon discharge: Orders Placed This Encounter      Room Service      Low Saturated Fat Na <2400 mg                  Follow-up Appointments     Follow-up and recommended labs and tests        Follow up with primary care provider, [facility], within 7 days for hospital follow- up.    Follow up with cardiology clinic in 1-2 weeks          "         Your next 10 appointments already scheduled     Feb 15, 2018  3:30 PM CST   Return Visit with Dionna Parnell PA-C   [facility] ([facility])    [facility] [id number]  [facility] [address] [phone]              Additional Services     CARDIAC REHAB REFERRAL       Patient may choose their preference of the site for Cardiac Rehab.                  Pending Results     No orders found from 2018 to 2018.            Statement of Approval     Ordered          18 9533  I have reviewed and agree with all the recommendations and orders detailed in this document.  EFFECTIVE NOW     Approved and electronically signed by:  Purnima Thomas MD             Admission Information     Date & Time Department Dept. Phone    2018 [place] [address] [facility] [phone]      Your Vitals Were     Blood Pressure Pulse Temperature Respirations Height Weight    130/49 (BP Location: Right arm) 71 98.4  F (36.9  C) (Oral) 18 1.575 m (5' 2\") 79.4 kg (175 lb 1.6 oz)    Pulse Oximetry BMI (Body Mass Index)                95% 32.03 kg/m2          MyChart Information     [employer] lets you send messages to your doctor, view your test results, renew your prescriptions, schedule appointments and more. To sign up, go to [email or web address] . Click on \"Log in\" on the left side of the screen, which will take you to the Welcome page. Then click on \"Sign up Now\" on the right side of the page.     You will be asked to enter the access code listed below, as well as some personal information. Please follow the directions to create your username and password.     Your access code is: [email or web address]  Expires: 5/3/2018  5:12 PM     Your access code will  in 90 days. If you need help or a new code, please call your [facility] or [phone].        Care EveryWhere ID     This is your Care EveryWhere ID. This could be used by other " organizations to access your [facility] medical records  [id number]        Equal Access to Services     ANN MARIE ORONA : Hadii teresa Moses, gretel douglas, ankita mathias. So [facility] [phone].    ATENCIÓN: Si habla español, tiene a rasmussen disposición servicios gratuitos de asistencia lingüística. Llame al [phone].    We comply with applicable federal civil rights laws and [place] laws. We do not discriminate on the basis of race, color, national origin, age, disability, sex, sexual orientation, or gender identity.               Review of your medicines      START taking        Dose / Directions    clopidogrel 75 MG tablet   Commonly known as:  PLAVIX   Used for:  ACS (acute coronary syndrome) (H)        Dose:  75 mg   Start taking on:  2/3/2018   Take 1 tablet (75 mg) by mouth daily   Quantity:  30 tablet   Refills:  11         CONTINUE these medicines which may have CHANGED, or have new prescriptions. If we are uncertain of the size of tablets/capsules you have at home, strength may be listed as something that might have changed.        Dose / Directions    atorvastatin 40 MG tablet   Commonly known as:  LIPITOR   This may have changed:    - medication strength  - how much to take   Used for:  ACS (acute coronary syndrome) (H)        Dose:  40 mg   Take 1 tablet (40 mg) by mouth daily   Quantity:  30 tablet   Refills:  0         CONTINUE these medicines which have NOT CHANGED        Dose / Directions    * acetaminophen 325 MG tablet   Commonly known as:  TYLENOL        Dose:  650 mg   Take 650 mg by mouth 2 times daily as needed for mild pain   Refills:  0       * TYLENOL PO        Dose:  1000 mg   Take 1,000 mg by mouth 2 times daily   Refills:  0       alendronate 70 MG tablet   Commonly known as:  FOSAMAX        Dose:  70 mg   Take 70 mg by mouth every 7 days On Wednesdays   Refills:  0       amLODIPine 10 MG tablet   Commonly known as:   NORVASC   Used for:  HTN (hypertension)        Dose:  10 mg   Take 1 tablet (10 mg) by mouth daily   Quantity:  30 tablet   Refills:  1       ASPIRIN EC PO        Dose:  325 mg   Take 325 mg by mouth daily   Refills:  0       bisacodyl 10 MG Suppository   Commonly known as:  DULCOLAX        Dose:  10 mg   Place 10 mg rectally daily as needed   Refills:  0       calcium carbonate 500 MG Tabs   Commonly known as:  OS-FRANCISCO        Dose:  2 tablet   Take 2 tablets by mouth 2 times daily (with meals)   Refills:  0       carvedilol 12.5 MG tablet   Commonly known as:  COREG   Used for:  CHF (congestive heart failure) (H)        Dose:  12.5 mg   Take 1 tablet (12.5 mg) by mouth 2 times daily (with meals)   Quantity:  60 tablet   Refills:  1       celeXA 10 MG tablet   Generic drug:  citalopram        Dose:  30 mg   Take 30 mg by mouth daily   Refills:  0       cholecalciferol 5000 UNITS Caps capsule   Commonly known as:  vitamin D3        Dose:  5000 Units   Take 5,000 Units by mouth daily   Refills:  0       folic acid 1 MG tablet   Commonly known as:  FOLVITE        Dose:  1 mg   Take 1 mg by mouth daily   Refills:  0       furosemide 20 MG tablet   Commonly known as:  LASIX        Dose:  20 mg   Take 20 mg by mouth daily   Refills:  0       hypromellose 0.5 % Soln ophthalmic solution   Commonly known as:  ARTIFICIAL TEARS        Dose:  3 drop   Place 3 drops into both eyes 3 times daily   Refills:  0       KEPPRA 500 MG tablet   Generic drug:  levETIRAcetam        Dose:  500 mg   Take 500 mg by mouth 2 times daily   Refills:  0       losartan 100 MG tablet   Commonly known as:  COZAAR        Dose:  100 mg   Take 100 mg by mouth daily   Refills:  0       meloxicam 15 MG tablet   Commonly known as:  MOBIC        Dose:  15 mg   Take 15 mg by mouth daily as needed   Refills:  0       metoclopramide 5 MG tablet   Commonly known as:  REGLAN        Dose:  5 mg   Take 5 mg by mouth 3 times daily (with meals)   Refills:  0        multivitamin, therapeutic with minerals Tabs tablet        Dose:  1 tablet   Take 1 tablet by mouth daily   Refills:  0       omeprazole 40 MG capsule   Commonly known as:  priLOSEC   Used for:  Abdominal pain, other specified site        Dose:  40 mg   Take 1 capsule (40 mg) by mouth daily Take 30-60 minutes before a meal.   Quantity:  30 capsule   Refills:  9       oxybutynin 5 MG tablet   Commonly known as:  DITROPAN        Dose:  5 mg   Take 5 mg by mouth 2 times daily   Refills:  0       polyethylene glycol powder   Commonly known as:  MIRALAX/GLYCOLAX        Dose:  17 g   Take 17 g by mouth 2 times daily   Refills:  0       REMERON PO        Dose:  15 mg   Take 15 mg by mouth 2 times daily   Refills:  0       SENNA S 8.6-50 MG per tablet   Generic drug:  senna-docusate        Dose:  1 tablet   Take 1 tablet by mouth 2 times daily Please hold if having loose stools and contact nurse.   Refills:  0       * Notice:  This list has 2 medication(s) that are the same as other medications prescribed for you. Read the directions carefully, and ask your doctor or other care provider to review them with you.         Where to get your medicines      These medications were sent to [facility] [address] [address] [address]     Phone:  [phone]     atorvastatin 40 MG tablet    clopidogrel 75 MG tablet                Protect others around you: Learn how to safely use, store and throw away your medicines at www.disposemymeds.org.             Medication List: This is a list of all your medications and when to take them. Check marks below indicate your daily home schedule. Keep this list as a reference.      Medications           Morning Afternoon Evening Bedtime As Needed    * acetaminophen 325 MG tablet   Commonly known as:  TYLENOL   Take 650 mg by mouth 2 times daily as needed for mild pain   Last time this was given:  650 mg on 2/1/2018  5:33 PM                                    * TYLENOL PO   Take 1,000 mg by mouth 2 times daily   Last time this was given:  650 mg on 2/1/2018  5:33 PM                                   alendronate 70 MG tablet   Commonly known as:  FOSAMAX   Take 70 mg by mouth every 7 days On Wednesdays            On Wednesdays                         amLODIPine 10 MG tablet   Commonly known as:  NORVASC   Take 1 tablet (10 mg) by mouth daily   Last time this was given:  10 mg on 2/2/2018  8:17 AM                                   ASPIRIN EC PO   Take 325 mg by mouth daily   Last time this was given:  81 mg on 2/2/2018  8:17 AM                                   atorvastatin 40 MG tablet   Commonly known as:  LIPITOR   Take 1 tablet (40 mg) by mouth daily   Last time this was given:  40 mg on 2/1/2018 10:03 PM                                   bisacodyl 10 MG Suppository   Commonly known as:  DULCOLAX   Place 10 mg rectally daily as needed                                   calcium carbonate 500 MG Tabs   Commonly known as:  OS-FRANCISCO   Take 2 tablets by mouth 2 times daily (with meals)                                      carvedilol 12.5 MG tablet   Commonly known as:  COREG   Take 1 tablet (12.5 mg) by mouth 2 times daily (with meals)   Last time this was given:  12.5 mg on 2/2/2018  5:43 PM                                      celeXA 10 MG tablet   Take 30 mg by mouth daily   Last time this was given:  30 mg on 2/2/2018  8:16 AM   Generic drug:  citalopram                                   cholecalciferol 5000 UNITS Caps capsule   Commonly known as:  vitamin D3   Take 5,000 Units by mouth daily                                clopidogrel 75 MG tablet   Commonly known as:  PLAVIX   Take 1 tablet (75 mg) by mouth daily   Start taking on:  2/3/2018   Last time this was given:  75 mg on 2/2/2018  8:17 AM                                   folic acid 1 MG tablet   Commonly known as:  FOLVITE   Take 1 mg by mouth daily                                    furosemide 20 MG tablet   Commonly known as:  LASIX   Take 20 mg by mouth daily   Last time this was given:  20 mg on 2/2/2018  8:16 AM                                   hypromellose 0.5 % Soln ophthalmic solution   Commonly known as:  ARTIFICIAL TEARS   Place 3 drops into both eyes 3 times daily                                         KEPPRA 500 MG tablet   Take 500 mg by mouth 2 times daily   Last time this was given:  500 mg on 2/2/2018  8:17 AM   Generic drug:  levETIRAcetam                                      losartan 100 MG tablet   Commonly known as:  COZAAR   Take 100 mg by mouth daily   Last time this was given:  100 mg on 2/2/2018  8:17 AM                                   meloxicam 15 MG tablet   Commonly known as:  MOBIC   Take 15 mg by mouth daily as needed                                   metoclopramide 5 MG tablet   Commonly known as:  REGLAN   Take 5 mg by mouth 3 times daily (with meals)                                         multivitamin, therapeutic with minerals Tabs tablet   Take 1 tablet by mouth daily                                   omeprazole 40 MG capsule   Commonly known as:  priLOSEC   Take 1 capsule (40 mg) by mouth daily Take 30-60 minutes before a meal.   Last time this was given:  40 mg on 2/2/2018  8:16 AM                                   oxybutynin 5 MG tablet   Commonly known as:  DITROPAN   Take 5 mg by mouth 2 times daily   Last time this was given:  5 mg on 2/2/2018  8:17 AM                                   polyethylene glycol powder   Commonly known as:  MIRALAX/GLYCOLAX   Take 17 g by mouth 2 times daily                                   REMERON PO   Take 15 mg by mouth 2 times daily   Last time this was given:  15 mg on 2/2/2018  8:17 AM                                      SENNA S 8.6-50 MG per tablet   Take 1 tablet by mouth 2 times daily Please hold if having loose stools and contact nurse.   Generic drug:  senna-docusate                                   *  Notice:  This list has 2 medication(s) that are the same as other medications prescribed for you. Read the directions carefully, and ask your doctor or other care provider to review them with you.              More Information        Clopidogrel Bisulfate Oral tablet  What is this medicine?  CLOPIDOGREL (kloh PID oh grel) helps to prevent blood clots. This medicine is used to prevent heart attack, stroke, or other vascular events in people who are at high risk.  This medicine may be used for other purposes; ask your health care provider or pharmacist if you have questions.  What should I tell my health care provider before I take this medicine?  They need to know if you have any of the following conditions:    bleeding disorder    bleeding in the brain    planned surgery    stomach or intestinal ulcers    stroke or transient ischemic attack    an unusual or allergic reaction to clopidogrel, other medicines, foods, dyes, or preservatives    pregnant or trying to get pregnant    breast-feeding  How should I use this medicine?  Take this medicine by mouth with a drink of water. Follow the directions on the prescription label. You may take this medicine with or without food. Take your medicine at regular intervals. Do not take your medicine more often than directed.  Talk to your pediatrician regarding the use of this medicine in children. Special care may be needed.  Overdosage: If you think you have taken too much of this medicine contact a poison control center or emergency room at once.  NOTE: This medicine is only for you. Do not share this medicine with others.  What if I miss a dose?  If you miss a dose, take it as soon as you can. If it is almost time for your next dose, take only that dose. Do not take double or extra doses.  What may interact with this medicine?    aspirin    blood thinners like cilostazol, enoxaparin, ticlopidine, and warfarin    certain medicines for depression like citalopram, fluoxetine,  and fluvoxamine    certain medicines for fungal infections like ketoconazole, fluconazole, and voriconazole    certain medicines for HIV infection like delavirdine, efavirenz, and etravirine    certain medicines for seizures like felbamate, oxcarbazepine, and phenytoin    chloramphenicol    fluvastatin    isoniazid, INH    medicines for inflammation like ibuprofen and naproxen    modafinil    nicardipine    over-the counter supplements like echinacea, feverfew, fish oil, garlic, joanie, ginkgo, green tea, horse chestnut    quinine    stomach acid blockers like cimetidine, omeprazole, and esomeprazole    tamoxifen    tolbutamide    topiramate    torsemide  This list may not describe all possible interactions. Give your health care provider a list of all the medicines, herbs, non-prescription drugs, or dietary supplements you use. Also tell them if you smoke, drink alcohol, or use illegal drugs. Some items may interact with your medicine.  What should I watch for while using this medicine?  Visit your doctor or health care professional for regular check ups. Do not stop taking your medicine unless your doctor tells you to.  Notify your doctor or health care professional and seek emergency treatment if you develop breathing problems; changes in vision; chest pain; severe, sudden headache; pain, swelling, warmth in the leg; trouble speaking; sudden numbness or weakness of the face, arm or leg. These can be signs that your condition has gotten worse.  If you are going to have surgery or dental work, tell your doctor or health care professional that you are taking this medicine.  Certain genetic factors may reduce the effect of this medicine. Your doctor may use genetic tests to determine treatment.  What side effects may I notice from receiving this medicine?  Side effects that you should report to your doctor or health care professional as soon as possible:    allergic reactions like skin rash, itching or hives,  swelling of the face, lips, or tongue    breathing problems    changes in vision    fever    signs and symptoms of bleeding such as bloody or black, tarry stools; red or dark-brown urine; spitting up blood or brown material that looks like coffee grounds; red spots on the skin; unusual bruising or bleeding from the eye, gums, or nose    sudden weakness    unusual bleeding or bruising  Side effects that usually do not require medical attention (report to your doctor or health care professional if they continue or are bothersome):    constipation or diarrhea    headache    pain in back or joints    stomach upset  This list may not describe all possible side effects. Call your doctor for medical advice about side effects. You may report side effects to FDA at 1-376-GRL-7046.  Where should I keep my medicine?  Keep out of the reach of children.  Store at room temperature of 59 to 86 degrees F (15 to 30 degrees C). Throw away any unused medicine after the expiration date.  NOTE:This sheet is a summary. It may not cover all possible information. If you have questions about this medicine, talk to your doctor, pharmacist, or health care provider. Copyright  2016 Gold Standard                Discharge Instructions for Cardiac Catheterization  Cardiac catheterization is a procedure to look for blocked areas in the blood vessels that send blood to the heart. A thin, flexible tube (catheter) is put in a blood vessel in your groin or arm. The healthcare provider injects contrast fluid into your blood, which then flows to your heart. X-rays pictures are taken of your heart. Your provider will review the results with you. Be sure to ask any questions you have before you leave. This sheet will help you take care of yourself at home.  Home care    Only do light and easy activities for the next 2 to 3 days. Ask for help with chores and errands while you recover. Have someone drive you to your appointments.    Don't lift anything  heavy for a while. Your healthcare team will tell you when it's safe to lift again.    Ask your healthcare team when you can expect to return to work. Unless your job involves lifting, you may be able to return to your normal activities within a couple of days.    Take your medicines as directed. Don't skip doses.    Drink 6 to 8 glasses of water a day. This is to help flush the contrast dye out of your body. Call your healthcare team if your urine has any change in color.    Take your temperature each day for 7 days. If you feel cold and clammy or start sweating, take your temperature right away and call your healthcare team.    Check your incisions every day for signs of infection. These include redness, swelling, and drainage. It is normal to have a small bruise or bump where the catheter was inserted. A bruise that is getting larger is not normal and should be reported to your healthcare team. If you see blood forming in the incision, call your healthcare team. Go to the emergency department if you have uncontrolled bleeding from the artery site. This is especially true if you take medicines that make it difficult for your blood to clot. Examples are aspirin, clopidogrel, and warfarin.    Eat a healthy diet. Make sure it is low in fat, salt, and cholesterol. Ask your healthcare team for diet information.    Stop smoking. Enroll in a stop-smoking program or ask your healthcare team for help. Stop-smoking programs can be life saving.    Exercise as your healthcare team tells you to. Your healthcare team may recommend you start a cardiac rehabilitation program. Cardiac rehab is an exercise program in which trained healthcare staff watch your progress and stress on your heart while you exercise. Ask your team how to enroll.    Don't swim or take baths until your healthcare team says it s OK. You can shower the day after the procedure. Keep the site clean and dry. This keeps the incision from getting wet and infected  until the skin and artery can heal.  Follow-up care    Make a follow-up appointment as advised by our staff. It's common to have a follow-up appointment 2 to 4 weeks after an angioplasty or coronary stent procedure.    Make a yearly appointment, too. This is to make sure you are still doing well and not having any new symptoms.    Don't wait for a follow-up appointment if your medicines aren't working or you are having heart-related symptoms.  When to seek medical care  Call your healthcare provider right away if you have any of the following:    Chest pain    Constant or increasing pain or numbness in your leg    Fever of 100.4 F (38.0 C) or higher, or as directed by your healthcare provider    Symptoms of infection. These include redness, swelling, drainage, or warmth at the incision site.    Shortness of breath    A leg that feels cold or appears blue    Bleeding, bruising, or a lot of swelling where the catheter was inserted    Blood in your urine    Black or tarry stools    Any unusual bleeding   Date Last Reviewed: 10/1/2016    3744-5201 [employer]. [address] [place] [address], [place] [address]. All rights reserved. This information is not intended as a substitute for professional medical care. Always follow your healthcare professional's instructions.

## 2018-02-01 ENCOUNTER — CARE COORDINATION (OUTPATIENT)
Dept: GERIATRIC MEDICINE | Facility: CLINIC | Age: 83
End: 2018-02-01

## 2018-02-01 ENCOUNTER — APPOINTMENT (OUTPATIENT)
Dept: CARDIOLOGY | Facility: CLINIC | Age: 83
DRG: 247 | End: 2018-02-01
Attending: INTERNAL MEDICINE
Payer: COMMERCIAL

## 2018-02-01 ENCOUNTER — APPOINTMENT (OUTPATIENT)
Dept: CARDIOLOGY | Facility: CLINIC | Age: 83
DRG: 247 | End: 2018-02-01
Attending: PHYSICIAN ASSISTANT
Payer: COMMERCIAL

## 2018-02-01 DIAGNOSIS — Z76.89 HEALTH CARE HOME: ICD-10-CM

## 2018-02-01 LAB
KCT BLD-ACNC: 254 SEC (ref 105–167)
KCT BLD-ACNC: 278 SEC (ref 105–167)
KCT BLD-ACNC: 399 SEC (ref 105–167)
LMWH PPP CHRO-ACNC: 0.33 IU/ML

## 2018-02-01 PROCEDURE — 25000125 ZZHC RX 250: Performed by: INTERNAL MEDICINE

## 2018-02-01 PROCEDURE — 25000132 ZZH RX MED GY IP 250 OP 250 PS 637: Performed by: PHYSICIAN ASSISTANT

## 2018-02-01 PROCEDURE — 93005 ELECTROCARDIOGRAM TRACING: CPT

## 2018-02-01 PROCEDURE — C1725 CATH, TRANSLUMIN NON-LASER: HCPCS

## 2018-02-01 PROCEDURE — B2111ZZ FLUOROSCOPY OF MULTIPLE CORONARY ARTERIES USING LOW OSMOLAR CONTRAST: ICD-10-PCS | Performed by: INTERNAL MEDICINE

## 2018-02-01 PROCEDURE — 25500064 ZZH RX 255 OP 636: Performed by: INTERNAL MEDICINE

## 2018-02-01 PROCEDURE — 99153 MOD SED SAME PHYS/QHP EA: CPT

## 2018-02-01 PROCEDURE — C1769 GUIDE WIRE: HCPCS

## 2018-02-01 PROCEDURE — 4A023N7 MEASUREMENT OF CARDIAC SAMPLING AND PRESSURE, LEFT HEART, PERCUTANEOUS APPROACH: ICD-10-PCS | Performed by: INTERNAL MEDICINE

## 2018-02-01 PROCEDURE — 25000128 H RX IP 250 OP 636: Performed by: INTERNAL MEDICINE

## 2018-02-01 PROCEDURE — 99152 MOD SED SAME PHYS/QHP 5/>YRS: CPT

## 2018-02-01 PROCEDURE — 27210946 ZZH KIT HC TOTES DISP CR8

## 2018-02-01 PROCEDURE — 93010 ELECTROCARDIOGRAM REPORT: CPT | Mod: 77 | Performed by: INTERNAL MEDICINE

## 2018-02-01 PROCEDURE — 25000132 ZZH RX MED GY IP 250 OP 250 PS 637

## 2018-02-01 PROCEDURE — 40000275 ZZH STATISTIC RCP TIME EA 10 MIN

## 2018-02-01 PROCEDURE — 12000007 ZZH R&B INTERMEDIATE

## 2018-02-01 PROCEDURE — 85520 HEPARIN ASSAY: CPT | Performed by: PHYSICIAN ASSISTANT

## 2018-02-01 PROCEDURE — 25000132 ZZH RX MED GY IP 250 OP 250 PS 637: Performed by: INTERNAL MEDICINE

## 2018-02-01 PROCEDURE — 93306 TTE W/DOPPLER COMPLETE: CPT

## 2018-02-01 PROCEDURE — C1894 INTRO/SHEATH, NON-LASER: HCPCS

## 2018-02-01 PROCEDURE — 99233 SBSQ HOSP IP/OBS HIGH 50: CPT | Performed by: INTERNAL MEDICINE

## 2018-02-01 PROCEDURE — 93306 TTE W/DOPPLER COMPLETE: CPT | Mod: 26 | Performed by: INTERNAL MEDICINE

## 2018-02-01 PROCEDURE — 85347 COAGULATION TIME ACTIVATED: CPT

## 2018-02-01 PROCEDURE — 36415 COLL VENOUS BLD VENIPUNCTURE: CPT | Performed by: PHYSICIAN ASSISTANT

## 2018-02-01 PROCEDURE — C1874 STENT, COATED/COV W/DEL SYS: HCPCS

## 2018-02-01 PROCEDURE — 25000128 H RX IP 250 OP 636

## 2018-02-01 PROCEDURE — C9600 PERC DRUG-EL COR STENT SING: HCPCS

## 2018-02-01 PROCEDURE — 93458 L HRT ARTERY/VENTRICLE ANGIO: CPT | Mod: 26 | Performed by: INTERNAL MEDICINE

## 2018-02-01 PROCEDURE — C1887 CATHETER, GUIDING: HCPCS

## 2018-02-01 PROCEDURE — 92928 PRQ TCAT PLMT NTRAC ST 1 LES: CPT | Mod: RC | Performed by: INTERNAL MEDICINE

## 2018-02-01 PROCEDURE — 27210856 ZZH ACCESS HEART CATH CR2

## 2018-02-01 PROCEDURE — 99233 SBSQ HOSP IP/OBS HIGH 50: CPT | Mod: 25 | Performed by: INTERNAL MEDICINE

## 2018-02-01 PROCEDURE — 27210759 ZZH DEVICE INFLATION CR6

## 2018-02-01 PROCEDURE — 27210742 ZZH CATH CR1

## 2018-02-01 PROCEDURE — 93458 L HRT ARTERY/VENTRICLE ANGIO: CPT

## 2018-02-01 PROCEDURE — 99152 MOD SED SAME PHYS/QHP 5/>YRS: CPT | Performed by: INTERNAL MEDICINE

## 2018-02-01 PROCEDURE — 27210827 ZZH KIT ACIST INJECTOR CR6

## 2018-02-01 PROCEDURE — 027136Z DILATION OF CORONARY ARTERY, TWO ARTERIES WITH THREE DRUG-ELUTING INTRALUMINAL DEVICES, PERCUTANEOUS APPROACH: ICD-10-PCS | Performed by: INTERNAL MEDICINE

## 2018-02-01 RX ORDER — CLOPIDOGREL BISULFATE 75 MG/1
75 TABLET ORAL DAILY
Status: DISCONTINUED | OUTPATIENT
Start: 2018-02-02 | End: 2018-02-02 | Stop reason: HOSPADM

## 2018-02-01 RX ORDER — ASPIRIN 81 MG/1
81-324 TABLET, CHEWABLE ORAL
Status: DISCONTINUED | OUTPATIENT
Start: 2018-02-01 | End: 2018-02-01 | Stop reason: HOSPADM

## 2018-02-01 RX ORDER — NIFEDIPINE 10 MG/1
10 CAPSULE ORAL
Status: COMPLETED | OUTPATIENT
Start: 2018-02-01 | End: 2018-02-01

## 2018-02-01 RX ORDER — FENTANYL CITRATE 50 UG/ML
INJECTION, SOLUTION INTRAMUSCULAR; INTRAVENOUS
Status: COMPLETED
Start: 2018-02-01 | End: 2018-02-01

## 2018-02-01 RX ORDER — POTASSIUM CHLORIDE 7.45 MG/ML
10 INJECTION INTRAVENOUS
Status: DISCONTINUED | OUTPATIENT
Start: 2018-02-01 | End: 2018-02-01 | Stop reason: HOSPADM

## 2018-02-01 RX ORDER — SODIUM NITROPRUSSIDE 25 MG/ML
100-200 INJECTION INTRAVENOUS
Status: DISCONTINUED | OUTPATIENT
Start: 2018-02-01 | End: 2018-02-01 | Stop reason: HOSPADM

## 2018-02-01 RX ORDER — PRASUGREL 10 MG/1
10-60 TABLET, FILM COATED ORAL
Status: DISCONTINUED | OUTPATIENT
Start: 2018-02-01 | End: 2018-02-01 | Stop reason: HOSPADM

## 2018-02-01 RX ORDER — METHYLPREDNISOLONE SODIUM SUCCINATE 125 MG/2ML
125 INJECTION, POWDER, LYOPHILIZED, FOR SOLUTION INTRAMUSCULAR; INTRAVENOUS
Status: DISCONTINUED | OUTPATIENT
Start: 2018-02-01 | End: 2018-02-01 | Stop reason: HOSPADM

## 2018-02-01 RX ORDER — FENTANYL CITRATE 50 UG/ML
25-50 INJECTION, SOLUTION INTRAMUSCULAR; INTRAVENOUS
Status: ACTIVE | OUTPATIENT
Start: 2018-02-01 | End: 2018-02-02

## 2018-02-01 RX ORDER — NALOXONE HYDROCHLORIDE 0.4 MG/ML
.1-.4 INJECTION, SOLUTION INTRAMUSCULAR; INTRAVENOUS; SUBCUTANEOUS
Status: DISCONTINUED | OUTPATIENT
Start: 2018-02-01 | End: 2018-02-02 | Stop reason: HOSPADM

## 2018-02-01 RX ORDER — ATROPINE SULFATE 0.1 MG/ML
INJECTION INTRAVENOUS
Status: DISCONTINUED
Start: 2018-02-01 | End: 2018-02-01 | Stop reason: WASHOUT

## 2018-02-01 RX ORDER — CLOPIDOGREL 300 MG/1
300-600 TABLET, FILM COATED ORAL
Status: COMPLETED | OUTPATIENT
Start: 2018-02-01 | End: 2018-02-01

## 2018-02-01 RX ORDER — DIPHENHYDRAMINE HYDROCHLORIDE 50 MG/ML
25-50 INJECTION INTRAMUSCULAR; INTRAVENOUS
Status: DISCONTINUED | OUTPATIENT
Start: 2018-02-01 | End: 2018-02-01 | Stop reason: HOSPADM

## 2018-02-01 RX ORDER — CLOPIDOGREL 300 MG/1
TABLET, FILM COATED ORAL
Status: DISCONTINUED
Start: 2018-02-01 | End: 2018-02-01 | Stop reason: HOSPADM

## 2018-02-01 RX ORDER — CLOPIDOGREL BISULFATE 75 MG/1
75 TABLET ORAL
Status: DISCONTINUED | OUTPATIENT
Start: 2018-02-01 | End: 2018-02-01 | Stop reason: HOSPADM

## 2018-02-01 RX ORDER — NITROGLYCERIN 5 MG/ML
100-200 VIAL (ML) INTRAVENOUS
Status: DISCONTINUED | OUTPATIENT
Start: 2018-02-01 | End: 2018-02-01 | Stop reason: HOSPADM

## 2018-02-01 RX ORDER — FENTANYL CITRATE 50 UG/ML
25-50 INJECTION, SOLUTION INTRAMUSCULAR; INTRAVENOUS
Status: DISCONTINUED | OUTPATIENT
Start: 2018-02-01 | End: 2018-02-01 | Stop reason: HOSPADM

## 2018-02-01 RX ORDER — POTASSIUM CHLORIDE 1500 MG/1
20 TABLET, EXTENDED RELEASE ORAL
Status: DISCONTINUED | OUTPATIENT
Start: 2018-02-01 | End: 2018-02-01 | Stop reason: HOSPADM

## 2018-02-01 RX ORDER — ASPIRIN 325 MG
325 TABLET ORAL
Status: DISCONTINUED | OUTPATIENT
Start: 2018-02-01 | End: 2018-02-01 | Stop reason: HOSPADM

## 2018-02-01 RX ORDER — ATORVASTATIN CALCIUM 40 MG/1
40 TABLET, FILM COATED ORAL AT BEDTIME
Status: DISCONTINUED | OUTPATIENT
Start: 2018-02-01 | End: 2018-02-02 | Stop reason: HOSPADM

## 2018-02-01 RX ORDER — ADENOSINE 3 MG/ML
12-12000 INJECTION, SOLUTION INTRAVENOUS
Status: DISCONTINUED | OUTPATIENT
Start: 2018-02-01 | End: 2018-02-01 | Stop reason: HOSPADM

## 2018-02-01 RX ORDER — LORAZEPAM 2 MG/ML
.5-2 INJECTION INTRAMUSCULAR EVERY 4 HOURS PRN
Status: DISCONTINUED | OUTPATIENT
Start: 2018-02-01 | End: 2018-02-01 | Stop reason: HOSPADM

## 2018-02-01 RX ORDER — NITROGLYCERIN 5 MG/ML
100-500 VIAL (ML) INTRAVENOUS
Status: COMPLETED | OUTPATIENT
Start: 2018-02-01 | End: 2018-02-01

## 2018-02-01 RX ORDER — HYDROCODONE BITARTRATE AND ACETAMINOPHEN 5; 325 MG/1; MG/1
1-2 TABLET ORAL EVERY 4 HOURS PRN
Status: DISCONTINUED | OUTPATIENT
Start: 2018-02-01 | End: 2018-02-02 | Stop reason: HOSPADM

## 2018-02-01 RX ORDER — NALOXONE HYDROCHLORIDE 0.4 MG/ML
0.4 INJECTION, SOLUTION INTRAMUSCULAR; INTRAVENOUS; SUBCUTANEOUS EVERY 5 MIN PRN
Status: DISCONTINUED | OUTPATIENT
Start: 2018-02-01 | End: 2018-02-01 | Stop reason: HOSPADM

## 2018-02-01 RX ORDER — FLUMAZENIL 0.1 MG/ML
0.2 INJECTION, SOLUTION INTRAVENOUS
Status: ACTIVE | OUTPATIENT
Start: 2018-02-01 | End: 2018-02-02

## 2018-02-01 RX ORDER — ONDANSETRON 2 MG/ML
4 INJECTION INTRAMUSCULAR; INTRAVENOUS EVERY 4 HOURS PRN
Status: DISCONTINUED | OUTPATIENT
Start: 2018-02-01 | End: 2018-02-01 | Stop reason: HOSPADM

## 2018-02-01 RX ORDER — LIDOCAINE HYDROCHLORIDE 10 MG/ML
30 INJECTION, SOLUTION EPIDURAL; INFILTRATION; INTRACAUDAL; PERINEURAL
Status: DISCONTINUED | OUTPATIENT
Start: 2018-02-01 | End: 2018-02-01 | Stop reason: HOSPADM

## 2018-02-01 RX ORDER — LIDOCAINE HYDROCHLORIDE 10 MG/ML
1-10 INJECTION, SOLUTION EPIDURAL; INFILTRATION; INTRACAUDAL; PERINEURAL
Status: COMPLETED | OUTPATIENT
Start: 2018-02-01 | End: 2018-02-01

## 2018-02-01 RX ORDER — NITROGLYCERIN 5 MG/ML
VIAL (ML) INTRAVENOUS
Status: DISCONTINUED
Start: 2018-02-01 | End: 2018-02-01 | Stop reason: HOSPADM

## 2018-02-01 RX ORDER — MORPHINE SULFATE 2 MG/ML
1-2 INJECTION, SOLUTION INTRAMUSCULAR; INTRAVENOUS EVERY 5 MIN PRN
Status: DISCONTINUED | OUTPATIENT
Start: 2018-02-01 | End: 2018-02-01 | Stop reason: HOSPADM

## 2018-02-01 RX ORDER — BUPIVACAINE HYDROCHLORIDE 2.5 MG/ML
1-10 INJECTION, SOLUTION EPIDURAL; INFILTRATION; INTRACAUDAL
Status: DISCONTINUED | OUTPATIENT
Start: 2018-02-01 | End: 2018-02-01 | Stop reason: HOSPADM

## 2018-02-01 RX ORDER — FUROSEMIDE 10 MG/ML
20-100 INJECTION INTRAMUSCULAR; INTRAVENOUS
Status: DISCONTINUED | OUTPATIENT
Start: 2018-02-01 | End: 2018-02-01 | Stop reason: HOSPADM

## 2018-02-01 RX ORDER — ATROPINE SULFATE 0.1 MG/ML
.5-1 INJECTION INTRAVENOUS
Status: DISCONTINUED | OUTPATIENT
Start: 2018-02-01 | End: 2018-02-01 | Stop reason: HOSPADM

## 2018-02-01 RX ORDER — NITROGLYCERIN 0.4 MG/1
0.4 TABLET SUBLINGUAL EVERY 5 MIN PRN
Status: DISCONTINUED | OUTPATIENT
Start: 2018-02-01 | End: 2018-02-02 | Stop reason: HOSPADM

## 2018-02-01 RX ORDER — ASPIRIN 81 MG/1
81 TABLET ORAL DAILY
Status: DISCONTINUED | OUTPATIENT
Start: 2018-02-02 | End: 2018-02-02 | Stop reason: HOSPADM

## 2018-02-01 RX ORDER — POTASSIUM CHLORIDE 29.8 MG/ML
20 INJECTION INTRAVENOUS
Status: DISCONTINUED | OUTPATIENT
Start: 2018-02-01 | End: 2018-02-01 | Stop reason: HOSPADM

## 2018-02-01 RX ORDER — FLUMAZENIL 0.1 MG/ML
0.2 INJECTION, SOLUTION INTRAVENOUS
Status: DISCONTINUED | OUTPATIENT
Start: 2018-02-01 | End: 2018-02-01 | Stop reason: HOSPADM

## 2018-02-01 RX ORDER — NALOXONE HYDROCHLORIDE 0.4 MG/ML
.2-.4 INJECTION, SOLUTION INTRAMUSCULAR; INTRAVENOUS; SUBCUTANEOUS
Status: ACTIVE | OUTPATIENT
Start: 2018-02-01 | End: 2018-02-02

## 2018-02-01 RX ORDER — NIFEDIPINE 10 MG/1
CAPSULE ORAL
Status: COMPLETED
Start: 2018-02-01 | End: 2018-02-01

## 2018-02-01 RX ORDER — DEXTROSE MONOHYDRATE 25 G/50ML
12.5-5 INJECTION, SOLUTION INTRAVENOUS EVERY 30 MIN PRN
Status: DISCONTINUED | OUTPATIENT
Start: 2018-02-01 | End: 2018-02-01 | Stop reason: HOSPADM

## 2018-02-01 RX ORDER — DOBUTAMINE HYDROCHLORIDE 200 MG/100ML
2-20 INJECTION INTRAVENOUS CONTINUOUS PRN
Status: DISCONTINUED | OUTPATIENT
Start: 2018-02-01 | End: 2018-02-01 | Stop reason: HOSPADM

## 2018-02-01 RX ORDER — LORAZEPAM 0.5 MG/1
0.5 TABLET ORAL
Status: DISCONTINUED | OUTPATIENT
Start: 2018-02-01 | End: 2018-02-01 | Stop reason: HOSPADM

## 2018-02-01 RX ORDER — LORAZEPAM 2 MG/ML
0.5 INJECTION INTRAMUSCULAR
Status: DISCONTINUED | OUTPATIENT
Start: 2018-02-01 | End: 2018-02-01 | Stop reason: HOSPADM

## 2018-02-01 RX ORDER — LIDOCAINE 40 MG/G
CREAM TOPICAL
Status: DISCONTINUED | OUTPATIENT
Start: 2018-02-01 | End: 2018-02-01

## 2018-02-01 RX ORDER — NICARDIPINE HYDROCHLORIDE 2.5 MG/ML
100 INJECTION INTRAVENOUS
Status: DISCONTINUED | OUTPATIENT
Start: 2018-02-01 | End: 2018-02-01 | Stop reason: HOSPADM

## 2018-02-01 RX ORDER — ENALAPRILAT 1.25 MG/ML
1.25-2.5 INJECTION INTRAVENOUS
Status: DISCONTINUED | OUTPATIENT
Start: 2018-02-01 | End: 2018-02-01 | Stop reason: HOSPADM

## 2018-02-01 RX ORDER — HYDRALAZINE HYDROCHLORIDE 20 MG/ML
10-20 INJECTION INTRAMUSCULAR; INTRAVENOUS
Status: DISCONTINUED | OUTPATIENT
Start: 2018-02-01 | End: 2018-02-01 | Stop reason: HOSPADM

## 2018-02-01 RX ORDER — IOPAMIDOL 755 MG/ML
100 INJECTION, SOLUTION INTRAVASCULAR ONCE
Status: COMPLETED | OUTPATIENT
Start: 2018-02-01 | End: 2018-02-01

## 2018-02-01 RX ORDER — PROTAMINE SULFATE 10 MG/ML
25-100 INJECTION, SOLUTION INTRAVENOUS EVERY 5 MIN PRN
Status: DISCONTINUED | OUTPATIENT
Start: 2018-02-01 | End: 2018-02-01 | Stop reason: HOSPADM

## 2018-02-01 RX ORDER — PROTAMINE SULFATE 10 MG/ML
1-5 INJECTION, SOLUTION INTRAVENOUS
Status: DISCONTINUED | OUTPATIENT
Start: 2018-02-01 | End: 2018-02-01 | Stop reason: HOSPADM

## 2018-02-01 RX ORDER — VERAPAMIL HYDROCHLORIDE 2.5 MG/ML
INJECTION, SOLUTION INTRAVENOUS
Status: DISCONTINUED
Start: 2018-02-01 | End: 2018-02-01 | Stop reason: HOSPADM

## 2018-02-01 RX ORDER — LIDOCAINE 40 MG/G
CREAM TOPICAL
Status: DISCONTINUED | OUTPATIENT
Start: 2018-02-01 | End: 2018-02-02

## 2018-02-01 RX ORDER — NITROGLYCERIN 0.4 MG/1
0.4 TABLET SUBLINGUAL EVERY 5 MIN PRN
Status: DISCONTINUED | OUTPATIENT
Start: 2018-02-01 | End: 2018-02-01 | Stop reason: HOSPADM

## 2018-02-01 RX ORDER — ACETAMINOPHEN 325 MG/1
325-650 TABLET ORAL EVERY 4 HOURS PRN
Status: DISCONTINUED | OUTPATIENT
Start: 2018-02-01 | End: 2018-02-02 | Stop reason: HOSPADM

## 2018-02-01 RX ORDER — HEPARIN SODIUM 1000 [USP'U]/ML
INJECTION, SOLUTION INTRAVENOUS; SUBCUTANEOUS
Status: DISCONTINUED
Start: 2018-02-01 | End: 2018-02-01 | Stop reason: HOSPADM

## 2018-02-01 RX ORDER — DOPAMINE HYDROCHLORIDE 160 MG/100ML
2-20 INJECTION, SOLUTION INTRAVENOUS CONTINUOUS PRN
Status: DISCONTINUED | OUTPATIENT
Start: 2018-02-01 | End: 2018-02-01 | Stop reason: HOSPADM

## 2018-02-01 RX ORDER — NITROGLYCERIN 20 MG/100ML
.07-2 INJECTION INTRAVENOUS CONTINUOUS PRN
Status: DISCONTINUED | OUTPATIENT
Start: 2018-02-01 | End: 2018-02-01 | Stop reason: HOSPADM

## 2018-02-01 RX ORDER — ATROPINE SULFATE 0.1 MG/ML
0.5 INJECTION INTRAVENOUS EVERY 5 MIN PRN
Status: ACTIVE | OUTPATIENT
Start: 2018-02-01 | End: 2018-02-02

## 2018-02-01 RX ORDER — METOPROLOL TARTRATE 1 MG/ML
5 INJECTION, SOLUTION INTRAVENOUS EVERY 5 MIN PRN
Status: DISCONTINUED | OUTPATIENT
Start: 2018-02-01 | End: 2018-02-01 | Stop reason: HOSPADM

## 2018-02-01 RX ORDER — HEPARIN SODIUM 1000 [USP'U]/ML
1000-10000 INJECTION, SOLUTION INTRAVENOUS; SUBCUTANEOUS EVERY 5 MIN PRN
Status: DISCONTINUED | OUTPATIENT
Start: 2018-02-01 | End: 2018-02-01 | Stop reason: HOSPADM

## 2018-02-01 RX ORDER — EPINEPHRINE 1 MG/ML
0.3 INJECTION, SOLUTION, CONCENTRATE INTRAVENOUS
Status: DISCONTINUED | OUTPATIENT
Start: 2018-02-01 | End: 2018-02-01 | Stop reason: HOSPADM

## 2018-02-01 RX ORDER — VERAPAMIL HYDROCHLORIDE 2.5 MG/ML
1-2.5 INJECTION, SOLUTION INTRAVENOUS
Status: COMPLETED | OUTPATIENT
Start: 2018-02-01 | End: 2018-02-01

## 2018-02-01 RX ORDER — SODIUM CHLORIDE 9 MG/ML
INJECTION, SOLUTION INTRAVENOUS CONTINUOUS
Status: DISCONTINUED | OUTPATIENT
Start: 2018-02-01 | End: 2018-02-01 | Stop reason: HOSPADM

## 2018-02-01 RX ORDER — PHENYLEPHRINE HCL IN 0.9% NACL 1 MG/10 ML
20-100 SYRINGE (ML) INTRAVENOUS
Status: DISCONTINUED | OUTPATIENT
Start: 2018-02-01 | End: 2018-02-01 | Stop reason: HOSPADM

## 2018-02-01 RX ORDER — PROMETHAZINE HYDROCHLORIDE 25 MG/ML
6.25-25 INJECTION, SOLUTION INTRAMUSCULAR; INTRAVENOUS EVERY 4 HOURS PRN
Status: DISCONTINUED | OUTPATIENT
Start: 2018-02-01 | End: 2018-02-01 | Stop reason: HOSPADM

## 2018-02-01 RX ORDER — LIDOCAINE HYDROCHLORIDE 10 MG/ML
INJECTION, SOLUTION INFILTRATION; PERINEURAL
Status: DISCONTINUED
Start: 2018-02-01 | End: 2018-02-01 | Stop reason: HOSPADM

## 2018-02-01 RX ORDER — SODIUM CHLORIDE 9 MG/ML
INJECTION, SOLUTION INTRAVENOUS CONTINUOUS
Status: ACTIVE | OUTPATIENT
Start: 2018-02-01 | End: 2018-02-01

## 2018-02-01 RX ADMIN — SODIUM CHLORIDE: 9 INJECTION, SOLUTION INTRAVENOUS at 08:41

## 2018-02-01 RX ADMIN — CLOPIDOGREL BISULFATE 600 MG: 300 TABLET, FILM COATED ORAL at 14:18

## 2018-02-01 RX ADMIN — ASPIRIN 325 MG: 325 TABLET, DELAYED RELEASE ORAL at 08:22

## 2018-02-01 RX ADMIN — DEXTRAN 70, AND HYPROMELLOSE 2910 3 DROP: 1; 3 SOLUTION/ DROPS OPHTHALMIC at 08:24

## 2018-02-01 RX ADMIN — FENTANYL CITRATE 25 MCG: 50 INJECTION INTRAMUSCULAR; INTRAVENOUS at 14:45

## 2018-02-01 RX ADMIN — CARVEDILOL 12.5 MG: 12.5 TABLET, FILM COATED ORAL at 08:22

## 2018-02-01 RX ADMIN — ATORVASTATIN CALCIUM 40 MG: 40 TABLET, FILM COATED ORAL at 22:03

## 2018-02-01 RX ADMIN — OXYBUTYNIN CHLORIDE 5 MG: 5 TABLET ORAL at 21:11

## 2018-02-01 RX ADMIN — AMLODIPINE BESYLATE 10 MG: 10 TABLET ORAL at 08:22

## 2018-02-01 RX ADMIN — OXYBUTYNIN CHLORIDE 5 MG: 5 TABLET ORAL at 08:22

## 2018-02-01 RX ADMIN — SULFUR HEXAFLUORIDE 2 ML: KIT at 09:13

## 2018-02-01 RX ADMIN — MIRTAZAPINE 15 MG: 15 TABLET, FILM COATED ORAL at 08:22

## 2018-02-01 RX ADMIN — NITROGLYCERIN 500 MCG: 5 INJECTION, SOLUTION INTRAVENOUS at 15:27

## 2018-02-01 RX ADMIN — NIFEDIPINE 10 MG: 10 CAPSULE ORAL at 14:29

## 2018-02-01 RX ADMIN — LOSARTAN POTASSIUM 100 MG: 100 TABLET, FILM COATED ORAL at 08:22

## 2018-02-01 RX ADMIN — MIRTAZAPINE 15 MG: 15 TABLET, FILM COATED ORAL at 21:10

## 2018-02-01 RX ADMIN — MIDAZOLAM 0.5 MG: 1 INJECTION INTRAMUSCULAR; INTRAVENOUS at 14:05

## 2018-02-01 RX ADMIN — DEXTRAN 70, AND HYPROMELLOSE 2910 3 DROP: 1; 3 SOLUTION/ DROPS OPHTHALMIC at 16:13

## 2018-02-01 RX ADMIN — MIDAZOLAM 0.5 MG: 1 INJECTION INTRAMUSCULAR; INTRAVENOUS at 14:30

## 2018-02-01 RX ADMIN — HEPARIN SODIUM 5000 UNITS: 1000 INJECTION, SOLUTION INTRAVENOUS; SUBCUTANEOUS at 14:05

## 2018-02-01 RX ADMIN — FENTANYL CITRATE 25 MCG: 50 INJECTION INTRAMUSCULAR; INTRAVENOUS at 14:03

## 2018-02-01 RX ADMIN — LIDOCAINE HYDROCHLORIDE 20 MG: 10 INJECTION, SOLUTION INFILTRATION; PERINEURAL at 14:01

## 2018-02-01 RX ADMIN — HEPARIN SODIUM 2000 UNITS: 1000 INJECTION, SOLUTION INTRAVENOUS; SUBCUTANEOUS at 14:24

## 2018-02-01 RX ADMIN — FUROSEMIDE 20 MG: 20 TABLET ORAL at 08:22

## 2018-02-01 RX ADMIN — SODIUM CHLORIDE: 9 INJECTION, SOLUTION INTRAVENOUS at 16:36

## 2018-02-01 RX ADMIN — CARVEDILOL 12.5 MG: 12.5 TABLET, FILM COATED ORAL at 18:20

## 2018-02-01 RX ADMIN — NITROGLYCERIN 200 MCG: 5 INJECTION, SOLUTION INTRAVENOUS at 14:06

## 2018-02-01 RX ADMIN — LEVETIRACETAM 500 MG: 500 TABLET, FILM COATED ORAL at 08:22

## 2018-02-01 RX ADMIN — OMEPRAZOLE 40 MG: 20 CAPSULE, DELAYED RELEASE ORAL at 08:22

## 2018-02-01 RX ADMIN — IOPAMIDOL 190 ML: 755 INJECTION, SOLUTION INTRAVASCULAR at 14:00

## 2018-02-01 RX ADMIN — CITALOPRAM HYDROBROMIDE 30 MG: 20 TABLET ORAL at 08:22

## 2018-02-01 RX ADMIN — DEXTRAN 70, AND HYPROMELLOSE 2910 3 DROP: 1; 3 SOLUTION/ DROPS OPHTHALMIC at 22:03

## 2018-02-01 RX ADMIN — VERAPAMIL HYDROCHLORIDE 2.5 MG: 2.5 INJECTION, SOLUTION INTRAVENOUS at 14:05

## 2018-02-01 RX ADMIN — POLYETHYLENE GLYCOL 3350 17 G: 17 POWDER, FOR SOLUTION ORAL at 21:11

## 2018-02-01 RX ADMIN — MIDAZOLAM 0.5 MG: 1 INJECTION INTRAMUSCULAR; INTRAVENOUS at 14:02

## 2018-02-01 RX ADMIN — LEVETIRACETAM 500 MG: 500 TABLET, FILM COATED ORAL at 21:10

## 2018-02-01 RX ADMIN — ACETAMINOPHEN 650 MG: 325 TABLET, FILM COATED ORAL at 17:33

## 2018-02-01 ASSESSMENT — ACTIVITIES OF DAILY LIVING (ADL)
ADLS_ACUITY_SCORE: 14

## 2018-02-01 ASSESSMENT — VISUAL ACUITY: OU: NORMAL ACUITY

## 2018-02-01 NOTE — PLAN OF CARE
Problem: Patient Care Overview  Goal: Plan of Care/Patient Progress Review  Outcome: Improving  Problem: Pain, Acute (Adult)  Goal: Acceptable Pain Control/Comfort Level  Patient will demonstrate the desired outcomes by discharge/transition of care.   Outcome: Improving  Pt slightly forgetful, bed alarm on.  Pt remains on bedrest post angiogram with left radial approach.  Left wrist has hematoma, bruised, TR band on, arm board placed. Areas of hematoma and bruising marked.    Pt reminded to not flex or use left wrist.  No further bleeding at this time to warrant second armband.  Left arm elevated on pillow.  + CMS present, see flowsheet.  VSS.  Denies chest pains or dizziness.  Telemetry SR.  To start releasing TR band at 1730.  Neuros WNL.  Heparin drip d/c per Md orders.  Pt to discharge home tomorrow if condition stable.       Pt states she has chest pains 5/10 on pain scale just on chest.  B/p 147/63.  Heart rate 67.  Oxygen saturation 98% on 4 L per nasal canula.  HOB raised.  12 lead EKG obtained. Pt states pain decreased to 2/10 on 4L oxygen per nasal cannula.  Tylenol given per Pt's request.  Hospitalist MD web paged about Pt's chest pain and interventions.  Pt states chest pains are now relieved at 1745.      Cardiology service called at 1750  to report Pt's chest pains, spoke with Brice from answering service, Dr. Sorensen to return call.

## 2018-02-01 NOTE — PROGRESS NOTES
Pt developed hematoma at left radial access site.  Pressure held over the hematoma by Dr. Hope and t-arm band repositioned and tightened by rad tech.  Area of swelling and bruising above and below the band was outlined with a marker.  Pt transferred back to third floor by this RN.  At time of transfer of care to RN on 3rd floor, pt's left radial site was stable, bruising and swelling was not any bigger.  Bedside report was given to third floor RN, extra T-arm band was taken upstairs with the patient should the bruising get bigger and more firm.

## 2018-02-01 NOTE — PROGRESS NOTES
[facility]    Hospitalist Progress Note    Date of Service (when I saw the patient): 02/01/2018    Assessment & Plan     84 year old female with a PMH significant for coronary artery disease, remote history of traumatic subarachnoid hemorrhage, long-standing severe hypertension, hyperlipidemia, peripheral vascular disease, seizure disorder, GERD who presented to hospital with chest pain. Had a positive troponin at 2.658, and admitted to hospital for NSTEMI.     1. Acute NSTEMI in setting of known CAD w/ associated HTN and HLD: Patient has history of previous MI and s/p multiple stent placement at [facility].  Coronary CT angiogram 5/2012 at [facility] showed all coronary stents were patent with no new lesions.    -Continue heparin drip, asa, PTA losartan 100 mg daily, carvedilol 12.5 mg twice daily, atorvastatin   -Telemetry monitoring  -TTE, shows a new inferior WMA   -Cardiology consultation, plan for coronary angiogram today, staying chest pain free     Update: Notified about pt having transient episode of chest pain after return from cath and PCI, that resolved with tylenol. I have requested the nursing staff to call the cardiology provider and let them know about this.     2. History of subarachnoid hemorrhage: Patient sustained a fall 3/2014.  At that time she developed a subarachnoid hemorrhage, had a C1 chip fracture and sacral fracture.  The patient recovered from that well, had an unremarkable follow-up CT of the head, and was placed in assisted living afterward. She currently resides at [facility].   -Given that her subarachnoid hemorrhage was in the context of the fall with recovery, continue anti-coagulation as needed with IV heparin drip with neuro monitoring.     3. History of severe and long-standing hypertension: History of bilateral renal artery stenting as well.Blood pressure is currently stable.   -Continue PTA Lasix 20 mg daily, losartan  100 mg daily, carvedilol 12.5 mg twice daily, and amlodipine 5 mg daily with parameters.     4. History of carotid artery disease: Has a history of TIA.  Status post right carotid endarterectomy secondary to 100% occlusion.  Also found to have left carotid artery stenosis on carotid MRI and ultrasound up to 80% and underwent prophylactic left carotid endarterectomy 10/2006.       5. History of seizure disorder: No recent seizure activity. Continue levetiracetam 500 mg twice daily.     6. History of anxiety and depression: Continue prior to admission mirtazapine 15 mg twice daily and citalopram 30 mg daily.     7. GERD: Continue omeprazole 40 mg daily.     DVT Prophylaxis: On heparin    Code Status: DNR/DNI    Disposition: Expected discharge in 1-2 days pending cor angio.    [facility]    Interval History   Chart reviewed and patient seen. Case discussed with nursing staff.     Patient is doing well this morning, Denies any chest pain or shortness of breath. No nausea, vomiting or diarrhea. Waiting for angiogram, no occurrence of chest pain since admission, no weakness, numbness, visual changes, No other complaints voiced.     -Data reviewed today: I reviewed all new labs and imaging results over the last 24 hours. I personally reviewed .    Physical Exam   Temp: 98.2  F (36.8  C) Temp src: Oral BP: 141/65   Heart Rate: 69 Resp: 16 SpO2: 93 % O2 Device: Nasal cannula Oxygen Delivery: 1 LPM  Vitals:    01/31/18 1423 02/01/18 0607   Weight: 81.7 kg (180 lb 1.6 oz) 79.9 kg (176 lb 1.6 oz)     Vital Signs with Ranges  Temp:  [96.8  F (36  C)-98.2  F (36.8  C)] 98.2  F (36.8  C)  Heart Rate:  [60-72] 69  Resp:  [16-20] 16  BP: (135-157)/(54-71) 141/65  SpO2:  [89 %-96 %] 93 %  I/O last 3 completed shifts:  In: 200 [P.O.:200]  Out: -       Constitutional: Awake, alert, cooperative, no apparent distress   Respiratory: Clear to auscultation bilaterally, no crackles or wheezing noted   Cardiovascular: Regular rate and  rhythm, normal S1 and S2, no loud murmur   Abdomen: Bowel sounds present, soft, non-distended, non-tender, no rebound or guarding is noted   Skin: No exanthems noted on exposed areas, no cyanosis, dry to touch   Neuro: Alert and oriented x3, no focal weakness or numbness   Extremities: No pitting edema, normal range of motion, skin is warm to touch with signs of adequate peripheral perfusion    Psychiatric: Calm, not agitated, no active hallucinations             Medications     NaCl 150 mL/hr at 02/01/18 0841     HEParin 750 Units/hr (01/31/18 1242)     - MEDICATION INSTRUCTIONS -       - MEDICATION INSTRUCTIONS -       Continuing ACE inhibitor/ARB/ARNI from home medication list OR ACE inhibitor/ARB order already placed during this visit       - MEDICATION INSTRUCTIONS -       - MEDICATION INSTRUCTIONS -         aspirin EC  325 mg Oral Daily     heparin (porcine)         verapamil         nitroGLYcerin         atorvastatin (LIPITOR) tablet 40 mg  40 mg Oral At Bedtime     sodium chloride (PF)  3 mL Intracatheter Q8H     amLODIPine  10 mg Oral Daily     carvedilol  12.5 mg Oral BID w/meals     citalopram  30 mg Oral Daily     furosemide  20 mg Oral Daily     hypromellose-dextran  3 drop Both Eyes TID     levETIRAcetam  500 mg Oral BID     losartan  100 mg Oral Daily     mirtazapine (REMERON) tablet 15 mg  15 mg Oral BID     omeprazole  40 mg Oral Daily     oxybutynin  5 mg Oral BID     polyethylene glycol  17 g Oral BID       Data   All new lab data and imaging results from today have been reviewed       Recent Labs  Lab 01/31/18  2154 01/31/18  1731 01/31/18  1451 01/31/18  1050 01/26/18   WBC  --   --  10.2 10.6  --    HGB  --   --  14.0 13.3  --    MCV  --   --  95 96  --    PLT  --   --  207 200  --    NA  --   --   --  139 140   POTASSIUM  --   --   --  4.0 4.2   CHLORIDE  --   --   --  103 102   CO2  --   --   --  27 29   BUN  --   --   --  18 16   CR  --   --   --  0.90 0.90   ANIONGAP  --   --   --  9 9    FRANCISCO  --   --   --  8.4* 9.3   GLC  --   --   --  136* 135*   ALBUMIN  --   --   --  3.1*  --    PROTTOTAL  --   --   --  6.9  --    BILITOTAL  --   --   --  0.8  --    ALKPHOS  --   --   --  55  --    ALT  --   --   --  21  --    AST  --   --   --  34  --    LIPASE  --   --   --  98  --    TROPI 4.124* 3.950*  --  2.658*  --        Recent Results (from the past 24 hour(s))   XR Chest 2 Views    Narrative    XR CHEST 2 VW 1/31/2018 11:25 AM    COMPARISON: 2/25/2015    HISTORY: Chest pain.      Impression    IMPRESSION: Mild left basilar atelectasis. Lungs otherwise clear. No  pleural effusion or pneumothorax.    JEFERSON CLARK MD

## 2018-02-01 NOTE — PLAN OF CARE
Problem: Pain, Acute (Adult)  Goal: Acceptable Pain Control/Comfort Level  Patient will demonstrate the desired outcomes by discharge/transition of care.   Outcome: Improving  Pt slightly forgetful, bed alarm on.  Pt remains on bedrest post angiogram with left radial approach.  Left wrist has hematoma, bruised, TR band on, arm board placed. Areas of hematoma and bruising marked.    Pt reminded to not flex or use left wrist.  No further bleeding at this time to warrant second armband.  Left arm elevated on pillow.  + CMS present, see flowsheet.  VSS.  Denies chest pains or dizziness.  Telemetry SR.  To start releasing TR band at 1730.  Neuros WNL.  Heparin drip d/c per Md orders.  Pt to discharge home tomorrow if condition stable.

## 2018-02-01 NOTE — PROGRESS NOTES
Rec'd [facility] notification of hospital admission, [facility] 1/31/18  EPIC notes reviewed.   Spoke with Betsey ZHAO at [facility], explained CM will f/u   Betsey reports that the facility will provide a new PERS unit to client (billable in the RS tool). Betsey requests  [facility] to p/u their unit.  CM unsure if client has a [facility] unit through  in the past, CM to f/u.    left with Georgia ZHAO at [facility] to introduce myself and share client's community support plan. Request a return call as needed with d/c planning.  Holly Guajardo RN, BC  Supervisor [facility]   [phone] (Fax)

## 2018-02-01 NOTE — PROGRESS NOTES
[facility]  Cardiology Progress Note    Outpatient cardiologist: Previously saw [facility], but now Dr. Sorensen going forward     Date of Service (when I saw the patient): 02/01/2018    Summary: Jade Caba is a 84 year old female with history of coronary artery disease (PTCA Cx 1997; OLIVIA LAD 2/2004; 10/4/11: OLIVIA to OLIVIA pCx, OLIVIA midCx, OLIVIA dRCA - did have dissection of Cx during procedure s/p successful PCI, moderate LAD lesions; 10/21/11: recurrent sxs s/p OLIVIA x 2 mid LAD), h/o subarachnoid hemorrhage following a mechanical fall.  She has been on aspirin without bleeding concerns. Also HTN, hyperlipidemia, h/o CVA, s/p GALILEO endarterectomy in 2003 and LICA endarterectomy in 2007, PFO, MIKEL, GERD.  She previously was followed at [facility] where she had the aforementioned multivessel PCI.  Her most recent cardiac assessment was a cardiac CT angiogram in 2016 showing patent stents in all three coronary vessels. She has not seen a cardiologist in years. She moved from [place] to [place] so did not see cardiology in [place] any longer.   She was was admitted on 1/31/2018 with chest pain, similar to her prior anginal symptoms and lasted for approximate 15-20 minutes.  Should a similar episode last Saturday lasting for three hours but did not come in to the emergency room.  Her admission ECG did not show evidence for ST segment elevation MI.  Her initial troponins elevated at 2.658 consistent with a non-ST segment elevation MI.  Started on heparin.        Interval History   Chest pain free.   Tele: SR       Assessment & Plan   H/o CAD  NSTEMI  - H/o multivessel PCI, see summary  - ECG: NSR, no concerning ST changes  - trop 2.658, 4.124  - heparin, ASA, BB, ARB, statin  - Cardiac cath today   * R and B per Dr. Sorensen yesterday   * Consent signed   - Echo  - Further recs pending results     HTN  - Home meds: Lasix 20mg, Coreg 12.5 mg BID, amlodipine 10mg, losartan 100mg  - Mildly  elevated  - Follow    Hyperlipidemia  - Continue statin    H/o GALILEO and LICA  - Continue ASA, statin      Rolo Hu PA-C      Patient Active Problem List   Diagnosis     Renal artery stenosis (H)     HTN (hypertension)     CAD (coronary artery disease)     Carotid artery stenosis     Mild major depression (H)     Hypercholesterolemia     CKD (chronic kidney disease) stage 3, GFR 30-59 ml/min     Hyperlipidemia LDL goal <100     Hypercholesteremia     Seizure disorder (H)     Osteoarthritis of knee     Chondrocalcinosis     Subarachnoid hemorrhage (H)     Fracture of C1 vertebra, closed (H)     Sacral fracture, closed (H)     Atrial flutter (H)     Alcohol abuse     Constipation     Anxiety state     ARF (acute renal failure) (H)     FTT (failure to thrive) in adult     Fall     Cognitive impairment     CHF exacerbation (H)     Health Care Home     Advance care planning     Chronic diastolic congestive heart failure (H)     Coronary artery disease, angina presence unspecified, unspecified vessel or lesion type, unspecified whether native or transplanted heart     Anxiety     Urgency incontinence     ACS (acute coronary syndrome) (H)       Physical Exam   Temp: 96.8  F (36  C) Temp src: Oral BP: 157/54 Pulse: 65 Heart Rate: 67 Resp: 16 SpO2: 92 % O2 Device: Nasal cannula Oxygen Delivery: 1 LPM  Vitals:    01/31/18 1423 02/01/18 0607   Weight: 81.7 kg (180 lb 1.6 oz) 79.9 kg (176 lb 1.6 oz)     Vital Signs with Ranges  Temp:  [96.8  F (36  C)-98.6  F (37  C)] 96.8  F (36  C)  Pulse:  [65] 65  Heart Rate:  [60-72] 67  Resp:  [16-20] 16  BP: (133-157)/(54-71) 157/54  SpO2:  [89 %-96 %] 92 %  I/O last 3 completed shifts:  In: 200 [P.O.:200]  Out: -     Constitutional: NAD.   Respiratory: CTAB.   Cardiovascular: RRR, s1s2, no sig murmur  GI: soft, BS+  Skin: warm, no rashes  Musculoskeletal: Moving all extremities  Neurologic: Alert, oriented x 3  Neuropsychiatric: Normal affect       Data   Echo  2/26/15:  Interpretation Summary  The study was technically adequate.  Left ventricular systolic function is normal. The visual ejection fraction is   estimated at 65-70%. Grade II left ventricular diastolic dysfunction is   noted. Right ventricular systolic pressure is elevated, consistent with mild   to moderate pulmonary hypertension.        Recent Labs  Lab 01/31/18  2154 01/31/18  1731 01/31/18  1451 01/31/18  1050 01/26/18   WBC  --   --  10.2 10.6  --    HGB  --   --  14.0 13.3  --    MCV  --   --  95 96  --    PLT  --   --  207 200  --    NA  --   --   --  139 140   POTASSIUM  --   --   --  4.0 4.2   CHLORIDE  --   --   --  103 102   CO2  --   --   --  27 29   BUN  --   --   --  18 16   CR  --   --   --  0.90 0.90   ANIONGAP  --   --   --  9 9   FRANCISCO  --   --   --  8.4* 9.3   GLC  --   --   --  136* 135*   ALBUMIN  --   --   --  3.1*  --    PROTTOTAL  --   --   --  6.9  --    BILITOTAL  --   --   --  0.8  --    ALKPHOS  --   --   --  55  --    ALT  --   --   --  21  --    AST  --   --   --  34  --    TROPI 4.124* 3.950*  --  2.658*  --      Recent Results (from the past 24 hour(s))   XR Chest 2 Views    Narrative    XR CHEST 2 VW 1/31/2018 11:25 AM    COMPARISON: 2/25/2015    HISTORY: Chest pain.      Impression    IMPRESSION: Mild left basilar atelectasis. Lungs otherwise clear. No  pleural effusion or pneumothorax.    JEFERSON CLARK MD       Medications     HEParin 750 Units/hr (01/31/18 1242)     - MEDICATION INSTRUCTIONS -       - MEDICATION INSTRUCTIONS -       Continuing ACE inhibitor/ARB/ARNI from home medication list OR ACE inhibitor/ARB order already placed during this visit       - MEDICATION INSTRUCTIONS -       - MEDICATION INSTRUCTIONS -         sodium chloride (PF)  3 mL Intracatheter Q8H     amLODIPine  10 mg Oral Daily     aspirin EC EC tablet 325 mg  325 mg Oral Daily     atorvastatin (LIPITOR) tablet 20 mg  20 mg Oral At Bedtime     carvedilol  12.5 mg Oral BID w/meals     citalopram  30 mg  Oral Daily     furosemide  20 mg Oral Daily     hypromellose-dextran  3 drop Both Eyes TID     levETIRAcetam  500 mg Oral BID     losartan  100 mg Oral Daily     mirtazapine (REMERON) tablet 15 mg  15 mg Oral BID     omeprazole  40 mg Oral Daily     oxybutynin  5 mg Oral BID     polyethylene glycol  17 g Oral BID

## 2018-02-01 NOTE — PLAN OF CARE
Problem: Patient Care Overview  Goal: Plan of Care/Patient Progress Review  Cardiac Rehab:  eval order received and chart reviewed.  Pt admitted with NSTEMI and scheduled for angio today.  Plan to reschedule eval for tomorrow with full report and recommendations to follow.

## 2018-02-01 NOTE — PROCEDURES
Culprit appears to be a 90% thrombotic mid RCA stenosis. Successful PTCA stent with 2.5x28 Synergy OLIVIA leaving a 0% residual with KHARI 3 flow  50% ostial stenosis with Guide trauma successfully stented with 3.0x 12 Synergy OLIVIA leaving a 0% residual with KHARI 3 flow.  75% ostial LAD with dampening successful treated  With a 3.0 8 mm Synergy OLIVIA leaving a 0% residual with KHARI 3 flow.50 mid to distal LAD  30% OM1    No V gram  LVEDP 25 mmHg      Rec ASA 81 daily indefinitely  Plavix 75 mgs daily for one year  LDL <70.  Beta blockers and Ace I as tolerated.

## 2018-02-01 NOTE — PLAN OF CARE
Problem: Pain, Acute (Adult)  Goal: Identify Related Risk Factors and Signs and Symptoms  Related risk factors and signs and symptoms are identified upon initiation of Human Response Clinical Practice Guideline (CPG).   Outcome: No Change  VSS, tele SR/SB.  Pt up SBA in room.  Denies CP this shift.  Neuros intact.  Hep gtt continues at 750 units/hr.  Continue POC.

## 2018-02-02 ENCOUNTER — APPOINTMENT (OUTPATIENT)
Dept: OCCUPATIONAL THERAPY | Facility: CLINIC | Age: 83
DRG: 247 | End: 2018-02-02
Attending: PHYSICIAN ASSISTANT
Payer: COMMERCIAL

## 2018-02-02 ENCOUNTER — APPOINTMENT (OUTPATIENT)
Dept: GENERAL RADIOLOGY | Facility: CLINIC | Age: 83
DRG: 247 | End: 2018-02-02
Attending: INTERNAL MEDICINE
Payer: COMMERCIAL

## 2018-02-02 VITALS
OXYGEN SATURATION: 95 % | DIASTOLIC BLOOD PRESSURE: 49 MMHG | RESPIRATION RATE: 18 BRPM | HEIGHT: 62 IN | WEIGHT: 175.1 LBS | HEART RATE: 71 BPM | TEMPERATURE: 98.4 F | BODY MASS INDEX: 32.22 KG/M2 | SYSTOLIC BLOOD PRESSURE: 130 MMHG

## 2018-02-02 LAB
ANION GAP SERPL CALCULATED.3IONS-SCNC: 7 MMOL/L (ref 3–14)
BUN SERPL-MCNC: 15 MG/DL (ref 7–30)
CALCIUM SERPL-MCNC: 8.5 MG/DL (ref 8.5–10.1)
CHLORIDE SERPL-SCNC: 105 MMOL/L (ref 94–109)
CO2 SERPL-SCNC: 26 MMOL/L (ref 20–32)
CREAT SERPL-MCNC: 0.92 MG/DL (ref 0.52–1.04)
ERYTHROCYTE [DISTWIDTH] IN BLOOD BY AUTOMATED COUNT: 12.3 % (ref 10–15)
GFR SERPL CREATININE-BSD FRML MDRD: 58 ML/MIN/1.7M2
GLUCOSE SERPL-MCNC: 93 MG/DL (ref 70–99)
HCT VFR BLD AUTO: 38.5 % (ref 35–47)
HGB BLD-MCNC: 12.6 G/DL (ref 11.7–15.7)
INTERPRETATION ECG - MUSE: NORMAL
MCH RBC QN AUTO: 31.6 PG (ref 26.5–33)
MCHC RBC AUTO-ENTMCNC: 32.7 G/DL (ref 31.5–36.5)
MCV RBC AUTO: 97 FL (ref 78–100)
PLATELET # BLD AUTO: 173 10E9/L (ref 150–450)
POTASSIUM SERPL-SCNC: 4.1 MMOL/L (ref 3.4–5.3)
RBC # BLD AUTO: 3.99 10E12/L (ref 3.8–5.2)
SODIUM SERPL-SCNC: 138 MMOL/L (ref 133–144)
WBC # BLD AUTO: 8.8 10E9/L (ref 4–11)

## 2018-02-02 PROCEDURE — 71046 X-RAY EXAM CHEST 2 VIEWS: CPT

## 2018-02-02 PROCEDURE — 99239 HOSP IP/OBS DSCHRG MGMT >30: CPT | Performed by: INTERNAL MEDICINE

## 2018-02-02 PROCEDURE — 85027 COMPLETE CBC AUTOMATED: CPT | Performed by: INTERNAL MEDICINE

## 2018-02-02 PROCEDURE — 25000132 ZZH RX MED GY IP 250 OP 250 PS 637: Performed by: INTERNAL MEDICINE

## 2018-02-02 PROCEDURE — 97110 THERAPEUTIC EXERCISES: CPT | Mod: GO | Performed by: REHABILITATION PRACTITIONER

## 2018-02-02 PROCEDURE — 97165 OT EVAL LOW COMPLEX 30 MIN: CPT | Mod: GO | Performed by: REHABILITATION PRACTITIONER

## 2018-02-02 PROCEDURE — 99232 SBSQ HOSP IP/OBS MODERATE 35: CPT | Performed by: INTERNAL MEDICINE

## 2018-02-02 PROCEDURE — 25000132 ZZH RX MED GY IP 250 OP 250 PS 637: Performed by: PHYSICIAN ASSISTANT

## 2018-02-02 PROCEDURE — 40000133 ZZH STATISTIC OT WARD VISIT: Performed by: REHABILITATION PRACTITIONER

## 2018-02-02 PROCEDURE — 36415 COLL VENOUS BLD VENIPUNCTURE: CPT | Performed by: INTERNAL MEDICINE

## 2018-02-02 PROCEDURE — 80048 BASIC METABOLIC PNL TOTAL CA: CPT | Performed by: INTERNAL MEDICINE

## 2018-02-02 RX ORDER — ATORVASTATIN CALCIUM 40 MG/1
40 TABLET, FILM COATED ORAL DAILY
Qty: 30 TABLET | Refills: 0 | Status: SHIPPED | OUTPATIENT
Start: 2018-02-02 | End: 2018-04-27 | Stop reason: ALTCHOICE

## 2018-02-02 RX ORDER — CLOPIDOGREL BISULFATE 75 MG/1
75 TABLET ORAL DAILY
Qty: 30 TABLET | Refills: 11 | Status: SHIPPED | OUTPATIENT
Start: 2018-02-03 | End: 2019-02-12

## 2018-02-02 RX ADMIN — DEXTRAN 70, AND HYPROMELLOSE 2910 3 DROP: 1; 3 SOLUTION/ DROPS OPHTHALMIC at 08:20

## 2018-02-02 RX ADMIN — CARVEDILOL 12.5 MG: 12.5 TABLET, FILM COATED ORAL at 08:17

## 2018-02-02 RX ADMIN — CARVEDILOL 12.5 MG: 12.5 TABLET, FILM COATED ORAL at 17:43

## 2018-02-02 RX ADMIN — DEXTRAN 70, AND HYPROMELLOSE 2910 3 DROP: 1; 3 SOLUTION/ DROPS OPHTHALMIC at 15:44

## 2018-02-02 RX ADMIN — ASPIRIN 81 MG: 81 TABLET, COATED ORAL at 08:17

## 2018-02-02 RX ADMIN — OXYBUTYNIN CHLORIDE 5 MG: 5 TABLET ORAL at 08:17

## 2018-02-02 RX ADMIN — LEVETIRACETAM 500 MG: 500 TABLET, FILM COATED ORAL at 08:17

## 2018-02-02 RX ADMIN — AMLODIPINE BESYLATE 10 MG: 10 TABLET ORAL at 08:17

## 2018-02-02 RX ADMIN — FUROSEMIDE 20 MG: 20 TABLET ORAL at 08:16

## 2018-02-02 RX ADMIN — MIRTAZAPINE 15 MG: 15 TABLET, FILM COATED ORAL at 08:17

## 2018-02-02 RX ADMIN — CLOPIDOGREL 75 MG: 75 TABLET, FILM COATED ORAL at 08:17

## 2018-02-02 RX ADMIN — OMEPRAZOLE 40 MG: 20 CAPSULE, DELAYED RELEASE ORAL at 08:16

## 2018-02-02 RX ADMIN — LOSARTAN POTASSIUM 100 MG: 100 TABLET, FILM COATED ORAL at 08:17

## 2018-02-02 RX ADMIN — CITALOPRAM HYDROBROMIDE 30 MG: 20 TABLET ORAL at 08:16

## 2018-02-02 ASSESSMENT — ACTIVITIES OF DAILY LIVING (ADL)
ADLS_ACUITY_SCORE: 12
ADLS_ACUITY_SCORE: 14
ADLS_ACUITY_SCORE: 14
ADLS_ACUITY_SCORE: 13
ADLS_ACUITY_SCORE: 14

## 2018-02-02 ASSESSMENT — VISUAL ACUITY: OU: NORMAL ACUITY

## 2018-02-02 NOTE — PROGRESS NOTES
02/02/18 1007   Quick Adds   Type of Visit Initial Occupational Therapy Evaluation   Living Environment   Lives With facility resident   Living Arrangements assisted living   Home Accessibility no concerns   Self-Care   Usual Activity Tolerance fair   Current Activity Tolerance fair   Regular Exercise yes   Activity/Exercise Type walking   Exercise Amount/Frequency daily  (amb in halls daily for 5-10 min at a time with rest breaks)   Equipment Currently Used at Home cane, straight   Activity/Exercise/Self-Care Comment Patient has A with bathing, laundry, cleaning, meals and medication. patient reports she walks nightly for 5-10 minutes a time, rests and then resumes walking, Patient completes this several time a night.   Functional Level Prior   Ambulation 0-->independent  (at times uses  a SEC)   Transferring 0-->independent   Toileting 0-->independent   Bathing 2-->assistive person   Dressing 0-->independent   Eating 0-->independent   Communication 0-->understands/communicates without difficulty   Swallowing 0-->swallows foods/liquids without difficulty   Cognition 0 - no cognition issues reported   Number of times patient has fallen within last six months 1   Which of the above functional risks had a recent onset or change? none   General Information   Onset of Illness/Injury or Date of Surgery - Date 01/31/18   Referring Physician Dr. Hope   Patient/Family Goals Statement to return home, will not participate in outpatient CR   Additional Occupational Profile Info/Pertinent History of Current Problem Patient is s/p NSTEMI and 3 stent placements. Patient has h/o MI and stent placements   Precautions/Limitations fall precautions   Heart Disease Risk Factors High blood pressure;Lack of physical activity;Overweight;Family history;Medical history;Gender;Age;Dislipidemia   General Observations patient was in bed and agreeable to OT session   Cognitive Status Examination   Orientation orientation to person, place  and time   Level of Consciousness alert   Able to Follow Commands WNL/WFL   Visual Perception   Visual Perception No deficits were identified   Sensory Examination   Sensory Quick Adds No deficits were identified   Integumentary/Edema   Integumentary/Edema no deficits were identifed   Posture   Posture not impaired   Range of Motion (ROM)   ROM Quick Adds No deficits were identified   Strength   Manual Muscle Testing Quick Adds No deficits were identified   Hand Strength   Hand Strength Comments intact   Muscle Tone Assessment   Muscle Tone Quick Adds No deficits were identified   Coordination   Upper Extremity Coordination No deficits were identified   Mobility   Bed Mobility Comments I with bed mobility   Transfer Skills   Transfer Comments CGA to SBA for functional mobility   Transfer Skill: Sit to Stand   Level of [place]: Sit/Stand stand-by assist   Physical Assist/Nonphysical Assist: Sit/Stand supervision   Toilet Transfer   Toilet Transfer Comments SBA to CGA to complete toilet transfer   Balance   Balance Comments general unsteadiness observed, patient used hallway rails for support, patient reports this is baseline   Eating/Self Feeding   Level of [place]: Eating independent   Instrumental Activities of Daily Living (IADL)   IADL Comments completed by staff at [facility]   Activities of Daily Living Analysis   Impairments Contributing to Impaired Activities of Daily Living balance impaired;post surgical precautions   General Therapy Interventions   Planned Therapy Interventions progressive activity/exercise;home program guidelines   Clinical Impression   Criteria for Skilled Therapeutic Interventions Met yes, treatment indicated   OT Diagnosis decreased ADL's   Influenced by the following impairments balance impaired;post surgical precautions   Assessment of Occupational Performance 1-3 Performance Deficits   Identified Performance Deficits decreased functional activity and strength    Clinical Decision  "Making (Complexity) Low complexity   Therapy Frequency daily   Predicted Duration of Therapy Intervention (days/wks) 1 time session   Anticipated Discharge Disposition Home   Risks and Benefits of Treatment have been explained. Yes   Patient, Family & other staff in agreement with plan of care Yes   [facility] TM \"6 Clicks\"   2016, Trustees of [facility], under license to [employer].  All rights reserved.   6 Clicks Short Forms Daily Activity Inpatient Short Form   [facility]  \"6 Clicks\" Daily Activity Inpatient Short Form   1. Putting on and taking off regular lower body clothing? 4 - None   2. Bathing (including washing, rinsing, drying)? 3 - A Little   3. Toileting, which includes using toilet, bedpan or urinal? 3 - A Little   4. Putting on and taking off regular upper body clothing? 4 - None   5. Taking care of personal grooming such as brushing teeth? 4 - None   6. Eating meals? 4 - None   Daily Activity Raw Score (Score out of 24.Lower scores equate to lower levels of function) 22   Total Evaluation Time   Total Evaluation Time (Minutes) 10     "

## 2018-02-02 NOTE — PLAN OF CARE
Problem: Patient Care Overview  Goal: Plan of Care/Patient Progress Review  Outcome: Improving  Pt slightly forgetful, bed alarm on.  Pt remains on bedrest post angiogram with left radial approach.  Left wrist has hematoma, bruised, TR band removed at 2230. Areas of hematoma and bruising marked, hematoma outside borders at 1900, no change to area at 2300.    Pt reminded to not flex or use left wrist.  Left arm elevated on pillow.  + CMS present, see flowsheet.  VSS.  Denies chest pains or dizziness.  Telemetry SR. Neuros WNL. Heparin drip d/c days.  Pt to discharge home tomorrow if condition stable.

## 2018-02-02 NOTE — PLAN OF CARE
Problem: Patient Care Overview  Goal: Plan of Care/Patient Progress Review  Outcome: Improving  RN - VSS. Pt maintaining adequate O2 saturation on room air - only seldomly dropping >92%. Chest xray completed. Pt receiving orders to discharge. Left arm hematoma unchanged. Will be starting on new medication plavix. Discharge instructions/follow up reviewed with pt and daughter. Also reinforcing activity restrictions with left arm. Pt and daughter verbalizing understanding. Escorted via wheelchair to hospital lobby - daughter transporting pt to home.

## 2018-02-02 NOTE — PLAN OF CARE
Problem: Patient Care Overview  Goal: Plan of Care/Patient Progress Review  OT/CR- eval and treatment completed. Patient resides in [facility] and typically ambulates in halls for 10 minutes before needing to rest at baseline. Patient reports she is not interested in outpatient cardiac rehab despite encouragement and education on benefits. Family present throughout session and tried to encourage patient, however plan to respect her wishes.    Discharge Planner OT   Patient plan for discharge: home to [facility]  Current status:  Completed cardiac education in cardiac risk factors, signs and symptoms of exercise intolerance, advancement of activity following a MI and stent placement, benefits to aerobic exercise and outpatient cardiac rehab, Omni Exertion and shortness of breath scale as well as progression of exercise program at home. Patient asked appropriate questions and verbalized understanding afterwards.  Ambulated in paz for 4 minutes before patient needed to rest due to knee, which is typical. VSS throughout session; at rest, /54, HR 64, after exericse /52, HR 74. Patient was on 2 L of O2 and at rest was in high 90's. O2 removed for walk and was 88% after exercise. O2 was replaced and RN notified. Goals met, will dc CR.  Barriers to return to prior living situation: none  Recommendations for discharge: home with continued progress towards increased mobility  Rationale for recommendations: patient has met goals, joo dc from rehab       Entered by: Marley Taylor 02/02/2018 11:36 AM

## 2018-02-02 NOTE — PROGRESS NOTES
Patient has been assessed for Home Oxygen needs.  Oxygen readings:   *   RA - at rest  Pulse oximetry SPO2 94 %  *   RA - during activity/with exercise SPO2 89 - 91 %

## 2018-02-02 NOTE — PROGRESS NOTES
Cardiology Progress Note  February 2, 2018    Jade Caba  [id number]    IMPRESSION/PLAN:    Patient is a pleasant 84-year-old female with a history of multivessel PCI admitted with a non-ST segment elevation MI for which she underwent successful PCI with a drug-eluting stent to a thrombotic mid RCA stenosis, ostial RCA, and 75% ostial LAD.  She's been feeling well post PCI with no recurrent chest pain and would like to be discharged home.  At this time she is stable from a cardiovascular standpoint and I will plan to arrange outpatient follow-up with my physician assistant in one week.  I will plan to sign off at this time.    Dean Sorensen MD    Interval history    The patient feels well with no recurrent chest pain  No intake or output data in the 24 hours ending 02/02/18 1334    Wt Readings from Last 4 Encounters:   02/02/18 79.4 kg (175 lb 1.6 oz)   01/25/18 80.7 kg (178 lb)   01/09/17 78 kg (172 lb)   10/27/16 77.8 kg (171 lb 9.6 oz)       Past Medical History:   Diagnosis Date     Anxiety      Arthritis      Atrial flutter (H)      C1 cervical fracture (H)      CAD (coronary artery disease)      Carotid artery stenosis      CKD (chronic kidney disease)      Depression      H/O alcohol abuse      Hypercholesterolemia      Hypertension      Renal artery stenosis (H)      Seizure disorder (H)      Subarachnoid hemorrhage (H) 2014       Current Facility-Administered Medications   Medication Dose Route Frequency     atorvastatin (LIPITOR) tablet 40 mg  40 mg Oral At Bedtime     aspirin EC  81 mg Oral Daily     clopidogrel  75 mg Oral Daily     sodium chloride (PF)  3 mL Intracatheter Q8H     amLODIPine  10 mg Oral Daily     carvedilol  12.5 mg Oral BID w/meals     citalopram  30 mg Oral Daily     furosemide  20 mg Oral Daily     hypromellose-dextran  3 drop Both Eyes TID     levETIRAcetam  500 mg Oral BID     losartan  100 mg Oral Daily     mirtazapine (REMERON) tablet 15 mg  15 mg Oral BID     omeprazole  40 mg  "Oral Daily     oxybutynin  5 mg Oral BID     polyethylene glycol  17 g Oral BID         PE:  /53 (BP Location: Right arm)  Pulse 65  Temp 97.9  F (36.6  C) (Oral)  Resp 18  Ht 1.575 m (5' 2\")  Wt 79.4 kg (175 lb 1.6 oz)  SpO2 92%  BMI 32.03 kg/m2  No acute distress  No JVD  No cervical lymphadenopathy  Heart is regular without murmur  Lungs are clear  Abdomen is obese but nontender  Extremities no significant edema  She is alert and oriented ×3  Her affect is normal  Musculoskeletal exam did not reveal any gross abnormalities of her major joints  Dermatologic exam did not really rashes or ecchymoses on exposed areas of skin    Last Basic Metabolic Panel:  Lab Results   Component Value Date     02/02/2018      Lab Results   Component Value Date    POTASSIUM 4.1 02/02/2018     Lab Results   Component Value Date    CHLORIDE 105 02/02/2018     Lab Results   Component Value Date    FRANCISCO 8.5 02/02/2018     Lab Results   Component Value Date    CO2 26 02/02/2018     Lab Results   Component Value Date    BUN 15 02/02/2018     Lab Results   Component Value Date    CR 0.92 02/02/2018     Lab Results   Component Value Date    GLC 93 02/02/2018       Lab Results   Component Value Date    TROPI 4.124 (HH) 01/31/2018    TROPI 3.950 (HH) 01/31/2018    TROPI 2.658 (HH) 01/31/2018    TROPI  02/25/2015     <0.015  The 99th percentile for upper reference range is 0.045 ug/L.  Troponin values in   the range of 0.045 - 0.120 ug/L may be associated with risks of adverse   clinical events.   Effective 7/30/2014, the reference range for this assay has changed to reflect   new instrumentation/methodology.         Lab Results   Component Value Date    WBC 8.8 02/02/2018     Lab Results   Component Value Date    RBC 3.99 02/02/2018     Lab Results   Component Value Date    HGB 12.6 02/02/2018     Lab Results   Component Value Date    HCT 38.5 02/02/2018     No components found for: MCT  Lab Results   Component Value Date    " MCV 97 02/02/2018     Lab Results   Component Value Date    MCH 31.6 02/02/2018     Lab Results   Component Value Date    MCHC 32.7 02/02/2018     Lab Results   Component Value Date    RDW 12.3 02/02/2018     Lab Results   Component Value Date     02/02/2018

## 2018-02-02 NOTE — PLAN OF CARE
Problem: Pain, Acute (Adult)  Goal: Acceptable Pain Control/Comfort Level  Patient will demonstrate the desired outcomes by discharge/transition of care.   Outcome: Improving  VSS, tele SR.  Pt denies pain this shift.  Up SBA.  TR band removed at 0030.  Hematoma present and marked, staying within second border.  Pt sleeping well this shift with arm board in place.  Calling appropriately.  Continue POC.

## 2018-02-02 NOTE — CONSULTS
"CLINICAL NUTRITION SERVICES  -  ASSESSMENT NOTE      Malnutrition: Does not meet criteria at this time.        REASON FOR ASSESSMENT  Jade Caba is a 84 year old female seen by Registered Dietitian for Provider Order - Nutrition Education - heart healthy education.      NUTRITION HISTORY  - Information obtained from patient and chart.  - Patient with a h/o CAD, PVD.    - Last received nutrition education on low sodium diet in 2015 from this writer - confirmed by patient.  - Resides at [facility]  - Receives breakfast from this facility.     - Breakfast: Always sausage paired with pancakes or scrambled eggs topped with cheese, and toast (wheat with strawberry jelly).  - Lunch: A banana.  - Dinner: Enjoys lean meats or breadless sandwich (describes rolling up chicken/turkey deli meat with cheese) .  Sometimes a vegetable.    - Does not add salt to foods.    - Drinks 1% milk.  - With good knowledge base of foods high in saturated fat and reports she often reads food labels.  Does verbalize that having sausage each AM is extremely important to her.  - Denies any recent changes to meal/snack frequency or changes to portion sizes.  - NKFA.        CURRENT NUTRITION ORDERS  Diet Order:     Low fat  Room service with assist    Current Intake/Tolerance:  Good appetite/intake since admission.        PHYSICAL FINDINGS  Observed  Some e/o mild muscle loss, likely associated with aging  Obtained from Chart/Interdisciplinary Team  No nutritionally pertinent     ANTHROPOMETRICS  Height: 5' 2\"  Weight: 79.5 kg (175#)  Body mass index is 32.03 kg/(m^2).  Weight Status:  Obesity Grade I BMI 30-34.9  IBW: 50 kg (110#)  % IBW: 159%  Weight History:  Wt Readings from Last 10 Encounters:   02/02/18 79.4 kg (175 lb 1.6 oz)   01/25/18 80.7 kg (178 lb)   01/09/17 78 kg (172 lb)   10/27/16 77.8 kg (171 lb 9.6 oz)   01/19/16 75.8 kg (167 lb)   02/27/15 56.3 kg (124 lb 1.6 oz)   12/30/14 50.6 kg (111 lb 9.6 oz)   12/27/14 45.4 kg (100 " "lb)   06/05/14 66 kg (145 lb 9.6 oz)   05/08/14 67 kg (147 lb 12.8 oz)   - Wt stable since 10/2016.      LABS  Labs reviewed    MEDICATIONS  Medications reviewed:  - Lasix    Dosing Weight 57.4 kg - adjusted weight     ASSESSED NUTRITION NEEDS PER APPROVED PRACTICE GUIDELINES:  Estimated Energy Needs: 1079-6458 kcals (20-25 Kcal/Kg)  Justification: obese  Estimated Protein Needs: 57-69 grams protein (1-1.2 g pro/Kg)  Justification: maintenance  Estimated Fluid Needs: 5113-3340 mL (1 mL/Kcal)  Justification: maintenance and per provider pending fluid status    MALNUTRITION:  % Weight Loss:  None noted  % Intake:  No decreased intake noted  Subcutaneous Fat Loss:  None observed  Muscle Loss:  Clavicle bone region mild depletion and Dorsal hand region mild depletion  Fluid Retention:  None noted    Malnutrition Diagnosis: Patient does not meet two of the above criteria necessary for diagnosing malnutrition    NUTRITION DIAGNOSIS:  No nutrition diagnosis at this time.      NUTRITION INTERVENTIONS  Recommendations / Nutrition Prescription  Change to cardiac diet.  Continue at d/c.       Implementation  Nutrition education: Provided education on heart healthy diet education (reviewed per patient request despite good knowledge base identified):    Assessed learning needs, learning preferences, and willingness to learn    Nutrition Education (Content):  a) Provided handout \"heart healthy nutrition education:  b) Discussed importance of fiber, reviewed common sources.  c) Discussed saturated, trans, and unsaturated fats, common sources.  d) Discussed common foods high in salt/importance of limiting added salt.    Nutrition Education (Application):  a) Discussed eating habits and recommended alternative food choices    Patient verbalizes understanding of diet by stating need to \"get more veggies\".    Anticipate fair compliance    Diet Education - refer to Education Flowsheet    Collaboration and Referral of Nutrition care: " Discussed POC with team during rounds.      MONITORING AND EVALUATION:  Progress towards goals will be monitored and evaluated per protocol and Practice Guidelines        Dionna Harmon RD, LD  Clinical Dietitian  3rd floor/ICU: [phone]  All other floors: [phone]  Weekend/holiday: [phone]

## 2018-02-02 NOTE — PLAN OF CARE
Problem: Patient Care Overview  Goal: Plan of Care/Patient Progress Review  Alert, oriented. Hematoma on left angio site has not moved past drawn boarders, CMS intact. Denies CP and SOB. O2 weaned off, Sats above 90% @ rest, 88% on ambulation. IS instructions given. Tele: SR, HR in 60's. Tolerating cardiac diet.

## 2018-02-04 NOTE — DISCHARGE SUMMARY
Admit Date:     02/01/2018   Discharge Date:           PRIMARY CARE PHYSICIAN:  [facility].      DISCHARGE DISPOSITION:  Home.      DISCHARGE CONDITION:  Stable.      PHYSICAL EXAMINATION:   GENERAL:  She is awake and alert.   LUNGS:  She has clear lung sounds bilaterally, no wheezing.  Good air entry.   HEART:  Cardiac exam  reveals regular rate and rhythm, normal S1, S2.   EXTREMITIES:  There is some bruising at the left wrist.  The patient states it is unchanged from before.  She has good peripheral pulses, able to move her fingers, warm to touch, good perfusion.      DISCHARGE DIAGNOSES:   1. Acute non-ST elevation myocardial infarction in a patient with known coronary artery disease.  Status post PCI with multiple stent placements.   2. History of longstanding hypertension.   3. Carotid artery disease and history of transient ischemic attack, status post carotid endarterectomy.   4. History of seizure disorder.   5. Anxiety and depression.   6. History of traumatic subarachnoid hemorrhage.   7. Post-procedural mild hypoxemia.      CONSULTATIONS:  Cardiology, cardiac rehab.      IMAGING:  Chest x-ray.      PENDING LAB TESTS:  None.      HISTORY OF PRESENT ILLNESS:  Please refer the H and P for full details.  This is an 84-year-old female, who comes to the hospital with concerns for chest pain.  The patient has history of coronary artery disease and has had multiple stents placed in the past.      HOSPITAL COURSE:  The patient was admitted to the hospital.  She had serial troponins done which were elevated.  She was started on heparin drip, treated with aspirin, losartan, and beta blockers and statin.  She underwent PCI and found to have a culprit 90% thrombotic mid RCA stenosis.  She also required stents to her ostial stenosis and ostial LAD.  She was started on Plavix after stent placement which she will continue for 1 year.      Overall, the patient is doing quite well at this time.  She had a  transient episode of chest pain after the procedure that actually resolved with repositioning of herself and she attributed to being flat in the bed.  She has remained chest pain-free otherwise.  She is doing quite well today.  Her dose of atorvastatin was increased to 40 mg daily and started on Plavix.  She has been cleared from a cardiology perspective for discharge.      Of note, the patient was noted to have mild hypoxemia after the procedure requiring a little bit of oxygen.  The patient states that she has no history of smoking.  She feels that her breathing is at baseline.  She has been able to ambulate in the hallways and work with cardiac rehab.  Her sats are mostly in the 90s on room air, occasionally dip down to 89 to 90% with activity.  The patient states that she feels quite well.  I talked to her about the use of home oxygen but she stated this is her baseline and she is not really much interested in that.  She has done some incentive spirometry throughout the day, which actually helped in improving her saturations.  Her chest x-ray was done which was unchanged from before.  She has no fevers, cough or any signs of with pneumonia.  Reasonable at this time for discharge home.  I advised them to follow up with the primary provider within a week for evaluation of this as an outpatient.      DISCHARGE DIET:  Regular cardiac diet.        FOLLOWUP:      1.  With the primary care provider in 1 week for hospital followup.   2. Follow up with Cardiology Clinic in the next 1-2 weeks for post-NSTEMI visit.        Total time spent in face-to-face contact with the patient and coordinating with her more than 40 minutes.      ALLERGIES:  No known drug allergies.         KATHI MELTON MD             D: 2018   T: 2018   MT: TISH      Name:     LAURO SALTER   MRN:      [id number]        Account:        [id number]   :      1933           Admit Date:     2018                                   Discharge Date:       Document: [id number]

## 2018-02-04 NOTE — DISCHARGE SUMMARY
Admit Date:     01/31/2018   Discharge Date:     02/02/2018      PRIMARY CARE PHYSICIAN:  [facility].      DISCHARGE DISPOSITION:  Home.      DISCHARGE CONDITION:  Stable.      PHYSICAL EXAMINATION:   GENERAL:  She is awake and alert.   LUNGS:  She has clear lung sounds bilaterally, no wheezing.  Good air entry.   HEART:  Cardiac exam  reveals regular rate and rhythm, normal S1, S2.   EXTREMITIES:  There is some bruising at the left wrist.  The patient states it is unchanged from before.  She has good peripheral pulses, able to move her fingers, warm to touch, good perfusion.      DISCHARGE DIAGNOSES:   1. Acute non-ST elevation myocardial infarction in a patient with known coronary artery disease.  Status post PCI with multiple stent placements.   2. History of longstanding hypertension.   3. Carotid artery disease and history of transient ischemic attack, status post carotid endarterectomy.   4. History of seizure disorder.   5. Anxiety and depression.   6. History of traumatic subarachnoid hemorrhage.   7. Post-procedural mild hypoxemia.      CONSULTATIONS:  Cardiology, cardiac rehab.      IMAGING:  Chest x-ray.      PENDING LAB TESTS:  None.      HISTORY OF PRESENT ILLNESS:  Please refer the H and P for full details.  This is an 84-year-old female who comes to the hospital with concerns for chest pain.  The patient has history of coronary artery disease and has had multiple stents placed in the past.      HOSPITAL COURSE:  The patient was admitted to the hospital.  She had serial troponins done which were elevated.  She was started on heparin drip, treated with aspirin, losartan, and beta blockers and statin.  She underwent PCI and found to have a culprit 90% thrombotic mid RCA stenosis.  She also required stents to her ostial stenosis and ostial LAD.  She was started on Plavix after stent placement which she will continue for 1 year.      Overall, the patient is doing quite well at this time.   She had a transient episode of chest pain after the procedure that actually resolved with repositioning of herself and she attributed to being flat in the bed.  She has remained chest pain-free otherwise.  She is doing quite well today.  Her dose of atorvastatin was increased to 40 mg daily and started on Plavix.  She has been cleared from a cardiology perspective for discharge.      Of note, the patient was noted to have mild hypoxemia after the procedure requiring a little bit of oxygen.  The patient states that she has no history of smoking.  She feels that her breathing is at baseline.  She has been able to ambulate in the hallways and work with cardiac rehab.  Her sats are mostly in the 90s on room air, occasionally dip down to 89 to 90% with activity.  The patient states that she feels quite well.  I talked to her about the use of home oxygen but she stated this is her baseline and she is not really much interested in that.  She has done some incentive spirometry throughout the day, which actually helped in improving her saturations.  Her chest x-ray was done which was unchanged from before.  She has no fevers, cough or any signs of with pneumonia. I suspect this is related to atelectasis. Reasonable at this time for discharge home.  I advised them to follow up with the primary provider within a week for evaluation.     DISCHARGE DIET:  Regular cardiac diet.        FOLLOWUP:      1.  With the primary care provider in 1 week for hospital followup.   2. Follow up with Cardiology Clinic in the next 1-2 weeks for post-NSTEMI visit.        Total time spent in face-to-face contact with the patient and coordinating with her more than 40 minutes.      ALLERGIES:  No known drug allergies.      Revised account 02/04/2018 aiden     Discharge Medication List as of 2/2/2018  6:54 PM      START taking these medications    Details   clopidogrel (PLAVIX) 75 MG tablet Take 1 tablet (75 mg) by mouth daily, Disp-30 tablet, R-11,  E-Prescribe         CONTINUE these medications which have CHANGED    Details   atorvastatin (LIPITOR) 40 MG tablet Take 1 tablet (40 mg) by mouth daily, Disp-30 tablet, R-0, E-Prescribe         CONTINUE these medications which have NOT CHANGED    Details   !! Acetaminophen (TYLENOL PO) Take 1,000 mg by mouth 2 times daily, Historical      Mirtazapine (REMERON PO) Take 15 mg by mouth 2 times daily, Historical      hypromellose (ARTIFICIAL TEARS) 0.5 % SOLN ophthalmic solution Place 3 drops into both eyes 3 times daily, Historical      alendronate (FOSAMAX) 70 MG tablet Take 70 mg by mouth every 7 days On Wednesdays, Historical      furosemide (LASIX) 20 MG tablet Take 20 mg by mouth daily, Historical      cholecalciferol (VITAMIN D3) 5000 UNITS CAPS capsule Take 5,000 Units by mouth daily, Historical      !! acetaminophen (TYLENOL) 325 MG tablet Take 650 mg by mouth 2 times daily as needed for mild pain, Historical      carvedilol (COREG) 12.5 MG tablet Take 1 tablet (12.5 mg) by mouth 2 times daily (with meals), Disp-60 tablet, R-1, E-Prescribe      amLODIPine (NORVASC) 10 MG tablet Take 1 tablet (10 mg) by mouth daily, Disp-30 tablet, R-1, E-Prescribe      citalopram (CELEXA) 10 MG tablet Take 30 mg by mouth daily, Historical      losartan (COZAAR) 100 MG tablet Take 100 mg by mouth daily, Historical      multivitamin, therapeutic with minerals (THERA-VIT-M) TABS Take 1 tablet by mouth daily, Historical      meloxicam (MOBIC) 15 MG tablet Take 15 mg by mouth daily as needed , Historical      polyethylene glycol (MIRALAX/GLYCOLAX) powder Take 17 g by mouth 2 times daily, Historical      calcium carbonate (OS-FRANCISCO) 500 MG TABS Take 2 tablets by mouth 2 times daily (with meals), Historical      oxybutynin (DITROPAN) 5 MG tablet Take 5 mg by mouth 2 times daily, Historical      senna-docusate (SENNA S) 8.6-50 MG per tablet Take 1 tablet by mouth 2 times daily Please hold if having loose stools and contact nurse.,  Historical      ASPIRIN EC PO Take 325 mg by mouth daily, Historical      metoclopramide (REGLAN) 5 MG tablet Take 5 mg by mouth 3 times daily (with meals), Historical      bisacodyl (DULCOLAX) 10 MG suppository Place 10 mg rectally daily as needed, Historical      levETIRAcetam (KEPPRA) 500 MG tablet Take 500 mg by mouth 2 times daily, Historical      folic acid (FOLVITE) 1 MG tablet Take 1 mg by mouth daily, Historical      omeprazole (PRILOSEC) 40 MG capsule Take 1 capsule (40 mg) by mouth daily Take 30-60 minutes before a meal., Disp-30 capsule, R-9, E-Prescribe       !! - Potential duplicate medications found. Please discuss with provider.        Results for orders placed or performed during the hospital encounter of 18   XR Chest 2 Views    Narrative    XR CHEST 2 VW 2018 11:25 AM    COMPARISON: 2015    HISTORY: Chest pain.      Impression    IMPRESSION: Mild left basilar atelectasis. Lungs otherwise clear. No  pleural effusion or pneumothorax.    JEFERSON CLARK MD   XR Chest 2 Views    Narrative    XR CHEST 2 VW 2018 4:05 PM    COMPARISON: 2018    HISTORY: Hypoxia      Impression    IMPRESSION: Mild left basilar atelectasis/scarring again seen and  unchanged. No new focal airspace disease. No pleural effusion or  pneumothorax. Cardiac silhouette within normal limits.    JEFERSON CLARK MD        Allergies   Allergen Reactions     No Known Allergies              KATHI MELTON MD             D: 2018   T: 2018   MT: TISH      Name:     LAURO SALTER   MRN:      [id number]        Account:        [id number]   :      1933           Admit Date:     2018                                  Discharge Date: 2018      Document: [id number]

## 2018-02-05 ENCOUNTER — CARE COORDINATION (OUTPATIENT)
Dept: GERIATRIC MEDICINE | Facility: CLINIC | Age: 83
End: 2018-02-05

## 2018-02-05 ENCOUNTER — CARE COORDINATION (OUTPATIENT)
Dept: CARDIOLOGY | Facility: CLINIC | Age: 83
End: 2018-02-05

## 2018-02-05 NOTE — PROGRESS NOTES
Patient was evaluated by cardiology while inpatient for NSTEMI/Multivessel PCI. Called patient daughter initially to discuss any post hospital d/c questions the patient may have, review medication changes, and confirm f/u appts. Daughter provided a direct number to call patient. RN reviewed f/u apt with daughter. RN then called patient. Patient denied any questions regarding new medications or changes to some of her current medications that she was taking prior to admission. Patient denied any SOB, chest pain, or light headedness. Denied redness swelling or bleeding at heart cath site (wrist). States it's getting better. RN confirmed with patient that she has an apt scheduled on 2/15/18 with [facility] Dionna Parnell. Patient advised to call clinic with any cardiac related questions or concerns prior to her apt. Patient did not want RN's phone number. Patient stated she is just fine.

## 2018-02-05 NOTE — PROGRESS NOTES
Client d/c from [facility] 2/2/18. D/C summary reviewed.   Noted f/u cardiology appt scheduled for 2/15/18. Medication changes, client receives medication management and med administration from AL staff. Client is followed by on site [facility] team.  Secure e-mail sent to [facility] to f/u on d/c.  CM to contact client.  Holly Guajardo RN, BC  Supervisor [facility]   [phone] (Fax)

## 2018-02-05 NOTE — PROGRESS NOTES
"Call placed to client to f/u on hospital d/c. Client states that she is well, \"I''m fine,\" states that she has no questions. Shared info on upcoming f/u cardiology appt, client states that her daughter will be taking her. Explained that she was started on new medication, acknowledged that the nurses at the AL give her all of her medications. Inquired if there has been any changes in self cares, client states that she is able to care for herself. Client had no questions or concerns to review with CM.  Client is followed by the onsite  Geriatric services team.  Holly Guajardo RN, BC  Supervisor [facility]   [phone] (Fax)    "

## 2018-02-06 ENCOUNTER — ASSISTED LIVING VISIT (OUTPATIENT)
Dept: GERIATRICS | Facility: CLINIC | Age: 83
End: 2018-02-06
Payer: COMMERCIAL

## 2018-02-06 VITALS
HEART RATE: 66 BPM | OXYGEN SATURATION: 93 % | RESPIRATION RATE: 18 BRPM | DIASTOLIC BLOOD PRESSURE: 64 MMHG | SYSTOLIC BLOOD PRESSURE: 111 MMHG

## 2018-02-06 DIAGNOSIS — F41.9 ANXIETY: ICD-10-CM

## 2018-02-06 DIAGNOSIS — I21.4 NSTEMI (NON-ST ELEVATED MYOCARDIAL INFARCTION) (H): Primary | ICD-10-CM

## 2018-02-06 DIAGNOSIS — I50.32 CHRONIC DIASTOLIC CONGESTIVE HEART FAILURE (H): ICD-10-CM

## 2018-02-06 NOTE — PROGRESS NOTES
[place] [facility]  PRIMARY CARE PROVIDER AND CLINIC:  [facility] [address] / [facility] [address]  Chief Complaint   Patient presents with     Hospital F/U       HPI:    Jade Caba is a 84 year old  (8/26/1933),admitted to the [facility] from [facility].  Hospital stay 1/31/18 through 2/2/18.  Admitted to this facility for  rehab, medical management and nursing care.  HPI information obtained from: facility chart records, facility staff, patient report and [facility] chart review.  Current issues are: NSTEMI, CHF, anxiety. 1/31/18 had c.p. Seen at [facility] ED. Admitted with acute NSTEMI. Has h/o stents placed. Serial troponins elevated.  Underwent PCI-90% thrombus RCA. Stents placed. Started on plavix.   C.p. Resloved. Noted to have mild hypoxia with activity-89%.  Has CHF. Cont. On lasix. O 2 sats stable since return, no further c.p. No reports of increased anxiety. Cont. On celexa, remeron.          CODE STATUS/ADVANCE DIRECTIVES DISCUSSION:   DNR / DNI  Patient's living condition: lives in an assisted living facility    ALLERGIES:No known allergies  PAST MEDICAL HISTORY:  has a past medical history of Anxiety; Arthritis; Atrial flutter (H); C1 cervical fracture (H); CAD (coronary artery disease); Carotid artery stenosis; CKD (chronic kidney disease); Depression; H/O alcohol abuse; Hypercholesterolemia; Hypertension; Renal artery stenosis (H); Seizure disorder (H); and Subarachnoid hemorrhage (H) (2014).  PAST SURGICAL HISTORY:  has a past surgical history that includes carotid endarterectomy (Left); Stenting of renal artery; Coronary stents x 3; Cholecystectomy; appendectomy; joint replacement (Bilateral); Cataract surgery (Right); and Hysterectomy.  FAMILY HISTORY: family history includes C.A.D. in her brother, brother, mother, and sister; CANCER in her sister; HEART DISEASE in her son.  SOCIAL HISTORY:  reports that she has  never smoked. She has never used smokeless tobacco. She reports that she does not drink alcohol or use illicit drugs.    Post Discharge Medication Reconciliation Status: discharge medications reconciled, continue medications without change.  Current Outpatient Prescriptions   Medication Sig Dispense Refill     clopidogrel (PLAVIX) 75 MG tablet Take 1 tablet (75 mg) by mouth daily 30 tablet 11     atorvastatin (LIPITOR) 40 MG tablet Take 1 tablet (40 mg) by mouth daily 30 tablet 0     Acetaminophen (TYLENOL PO) Take 1,000 mg by mouth 2 times daily       Mirtazapine (REMERON PO) Take 15 mg by mouth 2 times daily       hypromellose (ARTIFICIAL TEARS) 0.5 % SOLN ophthalmic solution Place 3 drops into both eyes 3 times daily       alendronate (FOSAMAX) 70 MG tablet Take 70 mg by mouth every 7 days On Wednesdays       furosemide (LASIX) 20 MG tablet Take 20 mg by mouth daily       cholecalciferol (VITAMIN D3) 5000 UNITS CAPS capsule Take 5,000 Units by mouth daily       acetaminophen (TYLENOL) 325 MG tablet Take 650 mg by mouth 2 times daily as needed for mild pain       carvedilol (COREG) 12.5 MG tablet Take 1 tablet (12.5 mg) by mouth 2 times daily (with meals) 60 tablet 1     amLODIPine (NORVASC) 10 MG tablet Take 1 tablet (10 mg) by mouth daily 30 tablet 1     citalopram (CELEXA) 10 MG tablet Take 30 mg by mouth daily       losartan (COZAAR) 100 MG tablet Take 100 mg by mouth daily       multivitamin, therapeutic with minerals (THERA-VIT-M) TABS Take 1 tablet by mouth daily       meloxicam (MOBIC) 15 MG tablet Take 15 mg by mouth daily as needed        polyethylene glycol (MIRALAX/GLYCOLAX) powder Take 17 g by mouth 2 times daily       calcium carbonate (OS-FRANCISCO) 500 MG TABS Take 2 tablets by mouth 2 times daily (with meals)       oxybutynin (DITROPAN) 5 MG tablet Take 5 mg by mouth 2 times daily       senna-docusate (SENNA S) 8.6-50 MG per tablet Take 1 tablet by mouth 2 times daily Please hold if having loose stools  and contact nurse.       ASPIRIN EC PO Take 325 mg by mouth daily       metoclopramide (REGLAN) 5 MG tablet Take 5 mg by mouth 3 times daily (with meals)       bisacodyl (DULCOLAX) 10 MG suppository Place 10 mg rectally daily as needed       levETIRAcetam (KEPPRA) 500 MG tablet Take 500 mg by mouth 2 times daily       folic acid (FOLVITE) 1 MG tablet Take 1 mg by mouth daily       omeprazole (PRILOSEC) 40 MG capsule Take 1 capsule (40 mg) by mouth daily Take 30-60 minutes before a meal. 30 capsule 9       ROS:  No chest pain, shortness of breath, fevers, chills, headache, nausea, vomiting, dysuria or bowel abnormalities.  Appetite is  normal.  No pain except L wrist-bruised from IVs.    Exam:  /64  Pulse 66  Resp 18  SpO2 93%  GENERAL APPEARANCE:  Alert, in no distress, cooperative  ENT:  Mouth and posterior oropharynx normal, moist mucous membranes, [place]  EYES:  EOM, conjunctivae, lids, pupils and irises normal, PERRL  NECK:  No adenopathy,masses or thyromegaly, FROM  RESP:  respiratory effort and palpation of chest normal, lungs clear to auscultation , no respiratory distress  CV:  Palpation and auscultation of heart done , no edema, rate-normal, grade 2/6 murmur  ABDOMEN:  normal bowel sounds, soft, nontender, no hepatosplenomegaly or other masses, no guarding or rebound  M/S:   Gait and station normal  muscle strength 5/5 all 4 ext., normal tone  NEURO:   Cranial nerves 2-12 are normal tested and grossly at patient's baseline, alert, speech clear  PSYCH:  insight and judgement impaired, memory impaired , affect and mood normal    Lab/Diagnostic data:     CBC RESULTS:   Recent Labs   Lab Test  02/02/18   0814  01/31/18   1451   WBC  8.8  10.2   RBC  3.99  4.53   HGB  12.6  14.0   HCT  38.5  43.1   MCV  97  95   MCH  31.6  30.9   MCHC  32.7  32.5   RDW  12.3  12.3   PLT  173  207       Last Basic Metabolic Panel:  Recent Labs   Lab Test  02/02/18   0814  01/31/18   1050   NA  138  139   POTASSIUM  4.1  4.0    CHLORIDE  105  103   FRANCISCO  8.5  8.4*   CO2  26  27   BUN  15  18   CR  0.92  0.90   GLC  93  136*       Liver Function Studies -   Recent Labs   Lab Test  01/31/18   1050 09/19/16 12/27/14   1030   PROTTOTAL  6.9   --   6.6*   ALBUMIN  3.1*   --   3.2*   BILITOTAL  0.8   --   0.3   ALKPHOS  55   --   68   AST  34  19  20   ALT  21  17  17       ASSESSMENT/PLAN:  NSTEMI (non-ST elevated myocardial infarction) (H)  S/p PCI with stents placed. No further c.p.  1. Cont. plavix  2. Cont. Asa, lipitor  3. Monitor for c.p.  4. F/u appt with cardiology 2/15/18    Chronic diastolic congestive heart failure (H)  Gen. Controlled. O2 sats stable. 1/31/18 CXR neg for acute changes  1. Cont. Lasix  2. Cont. Qd wt.s  3. Monitor for reports of sob, follow O2 sats  4. Bmp in next 1-3 mos    Anxiety  Gen. Controlled  1. Cont. Celexa, remeron  2. Monitor for changes in mood, behavior  3. Reassess over next couple weeks       Electronically signed by:  ZAIN Marcos CNP

## 2018-02-13 NOTE — PROGRESS NOTES
E-mail rec'd from Betsey ZHAO at [facility] re transition from hospital to AL \  She appears to be doing well since back. No significant updates.   Holly Guajardo RN, BC  Supervisor [facility]   [phone] (Fax)

## 2018-02-15 ENCOUNTER — OFFICE VISIT (OUTPATIENT)
Dept: CARDIOLOGY | Facility: CLINIC | Age: 83
End: 2018-02-15
Payer: COMMERCIAL

## 2018-02-15 VITALS
HEART RATE: 68 BPM | BODY MASS INDEX: 34.11 KG/M2 | HEIGHT: 61 IN | WEIGHT: 180.7 LBS | DIASTOLIC BLOOD PRESSURE: 58 MMHG | SYSTOLIC BLOOD PRESSURE: 122 MMHG

## 2018-02-15 DIAGNOSIS — I25.10 CORONARY ARTERY DISEASE INVOLVING NATIVE CORONARY ARTERY OF NATIVE HEART WITHOUT ANGINA PECTORIS: Primary | ICD-10-CM

## 2018-02-15 PROCEDURE — 99214 OFFICE O/P EST MOD 30 MIN: CPT | Performed by: PHYSICIAN ASSISTANT

## 2018-02-15 NOTE — PATIENT INSTRUCTIONS
Thank you for your [facility] visit today. Your provider has recommended the following:    Medication Changes:  Decrease aspirin to 81mg daily  No other changes  Recommendations:  Fasting lab work for your cholesterol prior to your follow up  Follow-up:  See Dionna PATTERSON for cardiology follow up in 2 months.    Reminder:  Please bring in all current medications, over the counter supplements and vitamin bottles to your next appointment.            [facility]

## 2018-02-15 NOTE — PROGRESS NOTES
Please see separate dictation for HPI, PHYSICAL EXAM AND IMPRESSION/PLAN.    CURRENT MEDICATIONS:  Current Outpatient Prescriptions   Medication Sig Dispense Refill     clopidogrel (PLAVIX) 75 MG tablet Take 1 tablet (75 mg) by mouth daily 30 tablet 11     atorvastatin (LIPITOR) 40 MG tablet Take 1 tablet (40 mg) by mouth daily 30 tablet 0     Acetaminophen (TYLENOL PO) Take 1,000 mg by mouth 2 times daily       Mirtazapine (REMERON PO) Take 15 mg by mouth 2 times daily       hypromellose (ARTIFICIAL TEARS) 0.5 % SOLN ophthalmic solution Place 3 drops into both eyes 3 times daily       alendronate (FOSAMAX) 70 MG tablet Take 70 mg by mouth every 7 days On Wednesdays       furosemide (LASIX) 20 MG tablet Take 20 mg by mouth daily       cholecalciferol (VITAMIN D3) 5000 UNITS CAPS capsule Take 5,000 Units by mouth daily       acetaminophen (TYLENOL) 325 MG tablet Take 650 mg by mouth 2 times daily as needed for mild pain       carvedilol (COREG) 12.5 MG tablet Take 1 tablet (12.5 mg) by mouth 2 times daily (with meals) 60 tablet 1     amLODIPine (NORVASC) 10 MG tablet Take 1 tablet (10 mg) by mouth daily 30 tablet 1     citalopram (CELEXA) 10 MG tablet Take 30 mg by mouth daily       losartan (COZAAR) 100 MG tablet Take 100 mg by mouth daily       multivitamin, therapeutic with minerals (THERA-VIT-M) TABS Take 1 tablet by mouth daily       meloxicam (MOBIC) 15 MG tablet Take 15 mg by mouth daily as needed        polyethylene glycol (MIRALAX/GLYCOLAX) powder Take 17 g by mouth 2 times daily       calcium carbonate (OS-FRANCISCO) 500 MG TABS Take 2 tablets by mouth 2 times daily (with meals)       oxybutynin (DITROPAN) 5 MG tablet Take 5 mg by mouth 2 times daily       senna-docusate (SENNA S) 8.6-50 MG per tablet Take 1 tablet by mouth 2 times daily Please hold if having loose stools and contact nurse.       ASPIRIN EC PO Take 325 mg by mouth daily       metoclopramide (REGLAN) 5 MG tablet Take 5 mg by mouth 3 times daily  (with meals)       bisacodyl (DULCOLAX) 10 MG suppository Place 10 mg rectally daily as needed       levETIRAcetam (KEPPRA) 500 MG tablet Take 500 mg by mouth 2 times daily       folic acid (FOLVITE) 1 MG tablet Take 1 mg by mouth daily       omeprazole (PRILOSEC) 40 MG capsule Take 1 capsule (40 mg) by mouth daily Take 30-60 minutes before a meal. 30 capsule 9       ALLERGIES:     Allergies   Allergen Reactions     No Known Allergies        PAST MEDICAL HISTORY:  Past Medical History:   Diagnosis Date     Anxiety      Arthritis      Atrial flutter (H)      C1 cervical fracture (H)      CAD (coronary artery disease)      Carotid artery stenosis      CKD (chronic kidney disease)      Depression      H/O alcohol abuse      Hypercholesterolemia      Hypertension      Renal artery stenosis (H)      Seizure disorder (H)      Subarachnoid hemorrhage (H) 2014       PAST SURGICAL HISTORY:  Past Surgical History:   Procedure Laterality Date     APPENDECTOMY       CAROTID ENDARTERECTOMY Left      Cataract surgery Right     will have laser eye surgery     CHOLECYSTECTOMY       Coronary stents x 3       HYSTERECTOMY      at age of 40-not cancerus or precancerous      JOINT REPLACEMENT Bilateral      Stenting of renal artery         SOCIAL HISTORY:  Social History     Social History     Marital status:      Spouse name: N/A     Number of children: N/A     Years of education: N/A     Social History Main Topics     Smoking status: Never Smoker     Smokeless tobacco: Never Used     Alcohol use No     Drug use: No     Sexual activity: No     Other Topics Concern     Parent/Sibling W/ Cabg, Mi Or Angioplasty Before 65f 55m? Yes     brother Mi at 23     Social History Narrative       FAMILY HISTORY:  Family History   Problem Relation Age of Onset     C.A.D. Mother      C.A.ETELVINA. Brother      C.A.ALISA Sister      C.ASANIYA. Brother      CANCER Sister      Breast cancer     HEART DISEASE Son        Review of Systems:  Skin:  Negative        Eyes:  Positive for glasses    ENT:  Negative      Respiratory:  Negative       Cardiovascular:  Negative      Gastroenterology: Negative      Genitourinary:  Negative      Musculoskeletal:  Positive for arthritis    Neurologic:  Negative      Psychiatric:  Positive for anxiety treated  Heme/Lymph/Imm:  Negative      Endocrine:  Negative         Reviewed. Remainder of the note dictated.    Dionna Parnell PA-C

## 2018-02-15 NOTE — PROGRESS NOTES
Service Date: 02/15/2018      HISTORY OF PRESENT ILLNESS:  Jade is a pleasant 84-year-old female who presents to Cardiology office today for followup after recent hospitalization at [facility] due to a NSTEMI.        The patient has a fairly extensive cardiovascular history with coronary artery disease for which she has had multivessel PCI in the past.  Initially she had angioplasty in 1997 to the circumflex.  She had a drug-eluting stent placed to the LAD in 2004, drug-eluting stents placed to the circumflex and RCA in 2011, drug-eluting stents placed to the circumflex and RCA in 10/2011 with subsequent recurrent symptoms and drug-eluting stent placement to the mid LAD later that month.  She has a history of CVA with right carotid endarterectomy in 2003 and a left carotid endarterectomy in 2007, a history of renal artery stenosis previously noted PFO, hypertension, hyperlipidemia and a prior subarachnoid hemorrhage following a mechanical fall.        In late January/early February she developed sudden onset chest discomfort.  Initially she had 3-hour episode but did not seek care.  The following day she had recurrent chest discomfort and sought care at that time.  She was found to have elevated troponins consistent with a non-STEMI.  She underwent repeat cardiac catheterization.  She was found to have a thrombotic lesion in the mid RCA.  She had a drug-eluting stent placed to this lesion.  Unfortunately, there was a guidewire trauma in the proximal RCA and she did undergo stent placement to that area as well.  She was also found to have significant in-stent restenosis in the LAD and had a drug-eluting stent placed there as well.  No LV gram was performed.  Her LVEDP was elevated at 25 mmHg.  Her atorvastatin was increased during her stay.  She was started on clopidogrel following her stent placement.      She is currently back at her assisted living facility.  She states she is overall doing well.  She has not  had any recurrent chest discomfort.  She is not participating in Cardiac Rehab and she is not interested, but she states that she does a lot of walking in the halls of her assisted living facility and denies any cardiovascular limitations with this.  She is tolerating her current medication regimen without difficulty.  She denies any complaints related to her left radial arterial access site.      CURRENT CARDIAC MEDICATIONS:   1.  Clopidogrel 75 mg daily.   2.  Atorvastatin 40 mg daily.   3.  Lasix 20 mg daily.   4.  Carvedilol 12.5 mg b.i.d.   5.  Amlodipine 10 mg daily.   6.  Losartan 100 mg daily.   7.  Aspirin 325 mg daily.      The remainder of her medications, allergies and review of systems were reviewed and as are documented separately.      PHYSICAL EXAMINATION:   GENERAL:  The patient is a pleasant 84-year-old female who appears her stated age.  She is in no apparent distress.   VITAL SIGNS:  Her blood pressure is 122/58, pulse 68, weight is 180 pounds.   LUNGS:  Clear to auscultation bilaterally.   CARDIAC:  Reveals a regular rate and rhythm, no murmurs appreciated.   ABDOMEN:  Soft, nontender, nondistended.   EXTREMITIES:  Lower extremities show no evidence of edema.   NEUROLOGIC:  Alert and oriented.      ASSESSMENT AND PLAN:  The patient is a pleasant 84-year-old female with coronary artery disease and multivessel PCI as detailed above.  Most recently, she had a non-STEMI in early February at which time she underwent repeat coronary angiography with the culprit lesion being a thrombotic lesion in the mid RCA.  She had drug-eluting stent placement to this area.  Again, there was some guidewire vessel trauma in the proximal RCA and she had stenting to this lesion as well.  She also was noted to have significant in-stent restenosis in the ostial LAD for which she underwent drug-eluting stent placements.  She is doing well and not had any recurrent anginal symptoms since discharge from the hospital.         I reiterated the use of clopidogrel uninterrupted for at least 1 year.  I did suggest that she can reduce her aspirin from 325 mg down to 81 mg daily to reduce the risk of GI bleeding, especially with concurrent clopidogrel use.  She recently had her atorvastatin increased and I did suggest that we repeat fasting lipid profile in about 6-8 weeks.  She will continue the remainder of her current cardiac medication regimen unchanged.  I did discuss Cardiac Rehab with her, but she declines at this point.      Again, she appears stable from a cardiac standpoint at this time.  I will plan to see her back in about 2 months with a repeat fasting lipid profile prior to that office visit.  Of course, I encouraged her to contact us sooner with questions or concerns.         ANTONIO RUCKER PA-C             D: 02/15/2018   T: 02/15/2018   MT: MARILU      Name:     LAURO SALTER   MRN:      [id number]        Account:      [id number]   :      1933           Service Date: 02/15/2018      Document: [id number]

## 2018-02-15 NOTE — LETTER
2/15/2018    [facility]  [address]  [facility] [address]    RE: Jade Caba       Dear Colleague,    I had the pleasure of seeing Jade Caba in the [facility].    Please see separate dictation for HPI, PHYSICAL EXAM AND IMPRESSION/PLAN.    CURRENT MEDICATIONS:  Current Outpatient Prescriptions   Medication Sig Dispense Refill     clopidogrel (PLAVIX) 75 MG tablet Take 1 tablet (75 mg) by mouth daily 30 tablet 11     atorvastatin (LIPITOR) 40 MG tablet Take 1 tablet (40 mg) by mouth daily 30 tablet 0     Acetaminophen (TYLENOL PO) Take 1,000 mg by mouth 2 times daily       Mirtazapine (REMERON PO) Take 15 mg by mouth 2 times daily       hypromellose (ARTIFICIAL TEARS) 0.5 % SOLN ophthalmic solution Place 3 drops into both eyes 3 times daily       alendronate (FOSAMAX) 70 MG tablet Take 70 mg by mouth every 7 days On Wednesdays       furosemide (LASIX) 20 MG tablet Take 20 mg by mouth daily       cholecalciferol (VITAMIN D3) 5000 UNITS CAPS capsule Take 5,000 Units by mouth daily       acetaminophen (TYLENOL) 325 MG tablet Take 650 mg by mouth 2 times daily as needed for mild pain       carvedilol (COREG) 12.5 MG tablet Take 1 tablet (12.5 mg) by mouth 2 times daily (with meals) 60 tablet 1     amLODIPine (NORVASC) 10 MG tablet Take 1 tablet (10 mg) by mouth daily 30 tablet 1     citalopram (CELEXA) 10 MG tablet Take 30 mg by mouth daily       losartan (COZAAR) 100 MG tablet Take 100 mg by mouth daily       multivitamin, therapeutic with minerals (THERA-VIT-M) TABS Take 1 tablet by mouth daily       meloxicam (MOBIC) 15 MG tablet Take 15 mg by mouth daily as needed        polyethylene glycol (MIRALAX/GLYCOLAX) powder Take 17 g by mouth 2 times daily       calcium carbonate (OS-FRANCISCO) 500 MG TABS Take 2 tablets by mouth 2 times daily (with meals)       oxybutynin (DITROPAN) 5 MG tablet Take 5 mg by mouth 2 times daily       senna-docusate (SENNA S) 8.6-50 MG per  tablet Take 1 tablet by mouth 2 times daily Please hold if having loose stools and contact nurse.       ASPIRIN EC PO Take 325 mg by mouth daily       metoclopramide (REGLAN) 5 MG tablet Take 5 mg by mouth 3 times daily (with meals)       bisacodyl (DULCOLAX) 10 MG suppository Place 10 mg rectally daily as needed       levETIRAcetam (KEPPRA) 500 MG tablet Take 500 mg by mouth 2 times daily       folic acid (FOLVITE) 1 MG tablet Take 1 mg by mouth daily       omeprazole (PRILOSEC) 40 MG capsule Take 1 capsule (40 mg) by mouth daily Take 30-60 minutes before a meal. 30 capsule 9       ALLERGIES:     Allergies   Allergen Reactions     No Known Allergies        PAST MEDICAL HISTORY:  Past Medical History:   Diagnosis Date     Anxiety      Arthritis      Atrial flutter (H)      C1 cervical fracture (H)      CAD (coronary artery disease)      Carotid artery stenosis      CKD (chronic kidney disease)      Depression      H/O alcohol abuse      Hypercholesterolemia      Hypertension      Renal artery stenosis (H)      Seizure disorder (H)      Subarachnoid hemorrhage (H) 2014       PAST SURGICAL HISTORY:  Past Surgical History:   Procedure Laterality Date     APPENDECTOMY       CAROTID ENDARTERECTOMY Left      Cataract surgery Right     will have laser eye surgery     CHOLECYSTECTOMY       Coronary stents x 3       HYSTERECTOMY      at age of 40-not cancerus or precancerous      JOINT REPLACEMENT Bilateral      Stenting of renal artery         SOCIAL HISTORY:  Social History     Social History     Marital status:      Spouse name: N/A     Number of children: N/A     Years of education: N/A     Social History Main Topics     Smoking status: Never Smoker     Smokeless tobacco: Never Used     Alcohol use No     Drug use: No     Sexual activity: No     Other Topics Concern     Parent/Sibling W/ Cabg, Mi Or Angioplasty Before 65f 55m? Yes     brother Mi at 23     Social History Narrative       FAMILY HISTORY:  Family  History   Problem Relation Age of Onset     C.A.D. Mother      C.A.D. Brother      C.A.D. Sister      C.A.D. Brother      CANCER Sister      Breast cancer     HEART DISEASE Son        Review of Systems:  Skin:  Negative       Eyes:  Positive for glasses    ENT:  Negative      Respiratory:  Negative       Cardiovascular:  Negative      Gastroenterology: Negative      Genitourinary:  Negative      Musculoskeletal:  Positive for arthritis    Neurologic:  Negative      Psychiatric:  Positive for anxiety treated  Heme/Lymph/Imm:  Negative      Endocrine:  Negative         Reviewed. Remainder of the note dictated.    Dionna Parnell PA-C        Service Date: 02/15/2018      HISTORY OF PRESENT ILLNESS:  Jade is a pleasant 84-year-old female who presents to Cardiology office today for followup after recent hospitalization at [facility] due to a NSTEMI.        The patient has a fairly extensive cardiovascular history with coronary artery disease for which she has had multivessel PCI in the past.  Initially she had angioplasty in 1997 to the circumflex.  She had a drug-eluting stent placed to the LAD in 2004, drug-eluting stents placed to the circumflex and RCA in 2011, drug-eluting stents placed to the circumflex and RCA in 10/2011 with subsequent recurrent symptoms and drug-eluting stent placement to the mid LAD later that month.  She has a history of CVA with right carotid endarterectomy in 2003 and a left carotid endarterectomy in 2007, a history of renal artery stenosis previously noted PFO, hypertension, hyperlipidemia and a prior subarachnoid hemorrhage following a mechanical fall.        In late January/early February she developed sudden onset chest discomfort.  Initially she had 3-hour episode but did not seek care.  The following day she had recurrent chest discomfort and sought care at that time.  She was found to have elevated troponins consistent with a non-STEMI.  She underwent repeat cardiac  catheterization.  She was found to have a thrombotic lesion in the mid RCA.  She had a drug-eluting stent placed to this lesion.  Unfortunately, there was a guidewire trauma in the proximal RCA and she did undergo stent placement to that area as well.  She was also found to have significant in-stent restenosis in the LAD and had a drug-eluting stent placed there as well.  No LV gram was performed.  Her LVEDP was elevated at 25 mmHg.  Her atorvastatin was increased during her stay.  She was started on clopidogrel following her stent placement.      She is currently back at her assisted living facility.  She states she is overall doing well.  She has not had any recurrent chest discomfort.  She is not participating in Cardiac Rehab and she is not interested, but she states that she does a lot of walking in the halls of her assisted living facility and denies any cardiovascular limitations with this.  She is tolerating her current medication regimen without difficulty.  She denies any complaints related to her left radial arterial access site.      CURRENT CARDIAC MEDICATIONS:   1.  Clopidogrel 75 mg daily.   2.  Atorvastatin 40 mg daily.   3.  Lasix 20 mg daily.   4.  Carvedilol 12.5 mg b.i.d.   5.  Amlodipine 10 mg daily.   6.  Losartan 100 mg daily.   7.  Aspirin 325 mg daily.      The remainder of her medications, allergies and review of systems were reviewed and as are documented separately.      PHYSICAL EXAMINATION:   GENERAL:  The patient is a pleasant 84-year-old female who appears her stated age.  She is in no apparent distress.   VITAL SIGNS:  Her blood pressure is 122/58, pulse 68, weight is 180 pounds.   LUNGS:  Clear to auscultation bilaterally.   CARDIAC:  Reveals a regular rate and rhythm, no murmurs appreciated.   ABDOMEN:  Soft, nontender, nondistended.   EXTREMITIES:  Lower extremities show no evidence of edema.   NEUROLOGIC:  Alert and oriented.      ASSESSMENT AND PLAN:  The patient is a pleasant  84-year-old female with coronary artery disease and multivessel PCI as detailed above.  Most recently, she had a non-STEMI in early February at which time she underwent repeat coronary angiography with the culprit lesion being a thrombotic lesion in the mid RCA.  She had drug-eluting stent placement to this area.  Again, there was some guidewire vessel trauma in the proximal RCA and she had stenting to this lesion as well.  She also was noted to have significant in-stent restenosis in the ostial LAD for which she underwent drug-eluting stent placements.  She is doing well and not had any recurrent anginal symptoms since discharge from the hospital.        I reiterated the use of clopidogrel uninterrupted for at least 1 year.  I did suggest that she can reduce her aspirin from 325 mg down to 81 mg daily to reduce the risk of GI bleeding, especially with concurrent clopidogrel use.  She recently had her atorvastatin increased and I did suggest that we repeat fasting lipid profile in about 6-8 weeks.  She will continue the remainder of her current cardiac medication regimen unchanged.  I did discuss Cardiac Rehab with her, but she declines at this point.      Again, she appears stable from a cardiac standpoint at this time.  I will plan to see her back in about 2 months with a repeat fasting lipid profile prior to that office visit.  Of course, I encouraged her to contact us sooner with questions or concerns.         DIONNA RUCKER PA-C             D: 02/15/2018   T: 02/15/2018   MT: MARILU      Name:     LAURO SALTER   MRN:      [id number]        Account:      [id number]   :      1933           Service Date: 02/15/2018      Document: [id number]        Thank you for allowing me to participate in the care of your patient.      Sincerely,     Dionna Rucker PA-C     [facility]    cc:   No referring provider defined for this encounter.

## 2018-02-15 NOTE — MR AVS SNAPSHOT
After Visit Summary   2/15/2018    Jade Caba    MRN: [id number]           Patient Information     Date Of Birth          8/26/1933        Visit Information        Provider Department      2/15/2018 3:30 PM Dionna Parnell PA-C [facility]        Today's Diagnoses     Coronary artery disease involving native coronary artery of native heart without angina pectoris    -  1      Care Instructions    Thank you for your [facility] today. Your provider has recommended the following:    Medication Changes:  Decrease aspirin to 81mg daily  No other changes  Recommendations:  Fasting lab work for your cholesterol prior to your follow up  Follow-up:  See Dionna PATTERSON for cardiology follow up in 2 months.    Reminder:  Please bring in all current medications, over the counter supplements and vitamin bottles to your next appointment.            [facility]              Follow-ups after your visit        Additional Services     Follow-Up with Cardiac Advanced Practice Provider                 Future tests that were ordered for you today     Open Future Orders        Priority Expected Expires Ordered    Lipid Profile Routine 4/15/2018 2/15/2019 2/15/2018    ALT Routine 3/17/2018 2/15/2019 2/15/2018    Follow-Up with Cardiac Advanced Practice Provider Routine 4/15/2018 2/15/2019 2/15/2018            Who to contact     If you have questions or need follow up information about today's clinic visit or your schedule please contact [facility] directly at [phone].  Normal or non-critical lab and imaging results will be communicated to you by MyChart, letter or phone within 4 business days after the clinic has received the results. If you do not hear from us within 7 days, please contact the clinic through MyChart or phone. If you have a critical or abnormal lab result, we will notify you by phone as  "soon as possible.  Submit refill requests through [employer] or call your pharmacy and they will forward the refill request to us. Please allow 3 business days for your refill to be completed.          Additional Information About Your Visit        Screamin Daily DealsharContactual Information     [employer] lets you send messages to your doctor, view your test results, renew your prescriptions, schedule appointments and more. To sign up, go to [email or web address] . Click on \"Log in\" on the left side of the screen, which will take you to the Welcome page. Then click on \"Sign up Now\" on the right side of the page.     You will be asked to enter the access code listed below, as well as some personal information. Please follow the directions to create your username and password.     Your access code is: [email or web address]  Expires: 5/3/2018  5:12 PM     Your access code will  in 90 days. If you need help or a new code, please call your [place] clinic or [phone].        Care EveryWhere ID     This is your Care EveryWhere ID. This could be used by other organizations to access your [place] medical records  [id number]        Your Vitals Were     Pulse Height BMI (Body Mass Index)             68 1.549 m (5' 1\") 34.14 kg/m2          Blood Pressure from Last 3 Encounters:   02/15/18 122/58   18 111/64   18 130/49    Weight from Last 3 Encounters:   02/15/18 82 kg (180 lb 11.2 oz)   18 79.4 kg (175 lb 1.6 oz)   18 80.7 kg (178 lb)               Primary Care Provider Fax #    [facility] [phone] [address]  [facility] [address]        Equal Access to Services     ANN MARIE ORONA AH: Jessica Moses, gretel douglas, irasema english, ankita coughlin. So [facility] [phone].    ATENCIÓN: Si habla español, tiene a rasmussen disposición servicios gratuitos de asistencia lingüística. Llame al [phone].    We comply with applicable federal civil rights " laws and [place] laws. We do not discriminate on the basis of race, color, national origin, age, disability, sex, sexual orientation, or gender identity.            Thank you!     Thank you for choosing [facility]  [facility]. Our goal is always to provide you with excellent care. Hearing back from our patients is one way we can continue to improve our services. Please take a few minutes to complete the written survey that you may receive in the mail after your visit with us. Thank you!             Your Updated Medication List - Protect others around you: Learn how to safely use, store and throw away your medicines at www.disposemymeds.org.          This list is accurate as of 2/15/18  3:58 PM.  Always use your most recent med list.                   Brand Name Dispense Instructions for use Diagnosis    * acetaminophen 325 MG tablet    TYLENOL     Take 650 mg by mouth 2 times daily as needed for mild pain        * TYLENOL PO      Take 1,000 mg by mouth 2 times daily        alendronate 70 MG tablet    FOSAMAX     Take 70 mg by mouth every 7 days On Wednesdays        amLODIPine 10 MG tablet    NORVASC    30 tablet    Take 1 tablet (10 mg) by mouth daily    HTN (hypertension)       ASPIRIN EC PO      Take 81 mg by mouth daily        atorvastatin 40 MG tablet    LIPITOR    30 tablet    Take 1 tablet (40 mg) by mouth daily    ACS (acute coronary syndrome) (H)       bisacodyl 10 MG Suppository    DULCOLAX     Place 10 mg rectally daily as needed        calcium carbonate 500 MG Tabs    OS-FRACNISCO     Take 2 tablets by mouth 2 times daily (with meals)        carvedilol 12.5 MG tablet    COREG    60 tablet    Take 1 tablet (12.5 mg) by mouth 2 times daily (with meals)    CHF (congestive heart failure) (H)       celeXA 10 MG tablet   Generic drug:  citalopram      Take 30 mg by mouth daily        cholecalciferol 5000 UNITS Caps capsule    vitamin D3     Take 5,000 Units by mouth daily         clopidogrel 75 MG tablet    PLAVIX    30 tablet    Take 1 tablet (75 mg) by mouth daily    ACS (acute coronary syndrome) (H)       folic acid 1 MG tablet    FOLVITE     Take 1 mg by mouth daily        furosemide 20 MG tablet    LASIX     Take 20 mg by mouth daily        hypromellose 0.5 % Soln ophthalmic solution    ARTIFICIAL TEARS     Place 3 drops into both eyes 3 times daily        KEPPRA 500 MG tablet   Generic drug:  levETIRAcetam      Take 500 mg by mouth 2 times daily        losartan 100 MG tablet    COZAAR     Take 100 mg by mouth daily        meloxicam 15 MG tablet    MOBIC     Take 15 mg by mouth daily as needed        metoclopramide 5 MG tablet    REGLAN     Take 5 mg by mouth 3 times daily (with meals)        multivitamin, therapeutic with minerals Tabs tablet      Take 1 tablet by mouth daily        omeprazole 40 MG capsule    priLOSEC    30 capsule    Take 1 capsule (40 mg) by mouth daily Take 30-60 minutes before a meal.    Abdominal pain, other specified site       oxybutynin 5 MG tablet    DITROPAN     Take 5 mg by mouth 2 times daily        polyethylene glycol powder    MIRALAX/GLYCOLAX     Take 17 g by mouth 2 times daily        REMERON PO      Take 15 mg by mouth 2 times daily        SENNA S 8.6-50 MG per tablet   Generic drug:  senna-docusate      Take 1 tablet by mouth 2 times daily Please hold if having loose stools and contact nurse.        * Notice:  This list has 2 medication(s) that are the same as other medications prescribed for you. Read the directions carefully, and ask your doctor or other care provider to review them with you.

## 2018-02-15 NOTE — LETTER
2/15/2018      [facility]  [address]  [facility] [address]      RE: Jade Caba       Dear Colleague,    I had the pleasure of seeing Jade Caba in the [facility].    Service Date: 02/15/2018      HISTORY OF PRESENT ILLNESS:  Jade is a pleasant 84-year-old female who presents to Cardiology office today for followup after recent hospitalization at [facility] due to a NSTEMI.        The patient has a fairly extensive cardiovascular history with coronary artery disease for which she has had multivessel PCI in the past.  Initially she had angioplasty in 1997 to the circumflex.  She had a drug-eluting stent placed to the LAD in 2004, drug-eluting stents placed to the circumflex and RCA in 2011, drug-eluting stents placed to the circumflex and RCA in 10/2011 with subsequent recurrent symptoms and drug-eluting stent placement to the mid LAD later that month.  She has a history of CVA with right carotid endarterectomy in 2003 and a left carotid endarterectomy in 2007, a history of renal artery stenosis previously noted PFO, hypertension, hyperlipidemia and a prior subarachnoid hemorrhage following a mechanical fall.        In late January/early February she developed sudden onset chest discomfort.  Initially she had 3-hour episode but did not seek care.  The following day she had recurrent chest discomfort and sought care at that time.  She was found to have elevated troponins consistent with a non-STEMI.  She underwent repeat cardiac catheterization.  She was found to have a thrombotic lesion in the mid RCA.  She had a drug-eluting stent placed to this lesion.  Unfortunately, there was a guidewire trauma in the proximal RCA and she did undergo stent placement to that area as well.  She was also found to have significant in-stent restenosis in the LAD and had a drug-eluting stent placed there as well.  No LV gram was performed.  Her LVEDP was elevated at 25  mmHg.  Her atorvastatin was increased during her stay.  She was started on clopidogrel following her stent placement.      She is currently back at her assisted living facility.  She states she is overall doing well.  She has not had any recurrent chest discomfort.  She is not participating in Cardiac Rehab and she is not interested, but she states that she does a lot of walking in the halls of her assisted living facility and denies any cardiovascular limitations with this.  She is tolerating her current medication regimen without difficulty.  She denies any complaints related to her left radial arterial access site.      CURRENT CARDIAC MEDICATIONS:   1.  Clopidogrel 75 mg daily.   2.  Atorvastatin 40 mg daily.   3.  Lasix 20 mg daily.   4.  Carvedilol 12.5 mg b.i.d.   5.  Amlodipine 10 mg daily.   6.  Losartan 100 mg daily.   7.  Aspirin 325 mg daily.      The remainder of her medications, allergies and review of systems were reviewed and as are documented separately.      PHYSICAL EXAMINATION:   GENERAL:  The patient is a pleasant 84-year-old female who appears her stated age.  She is in no apparent distress.   VITAL SIGNS:  Her blood pressure is 122/58, pulse 68, weight is 180 pounds.   LUNGS:  Clear to auscultation bilaterally.   CARDIAC:  Reveals a regular rate and rhythm, no murmurs appreciated.   ABDOMEN:  Soft, nontender, nondistended.   EXTREMITIES:  Lower extremities show no evidence of edema.   NEUROLOGIC:  Alert and oriented.      ASSESSMENT AND PLAN:  The patient is a pleasant 84-year-old female with coronary artery disease and multivessel PCI as detailed above.  Most recently, she had a non-STEMI in early February at which time she underwent repeat coronary angiography with the culprit lesion being a thrombotic lesion in the mid RCA.  She had drug-eluting stent placement to this area.  Again, there was some guidewire vessel trauma in the proximal RCA and she had stenting to this lesion as well.  She  also was noted to have significant in-stent restenosis in the ostial LAD for which she underwent drug-eluting stent placements.  She is doing well and not had any recurrent anginal symptoms since discharge from the hospital.        I reiterated the use of clopidogrel uninterrupted for at least 1 year.  I did suggest that she can reduce her aspirin from 325 mg down to 81 mg daily to reduce the risk of GI bleeding, especially with concurrent clopidogrel use.  She recently had her atorvastatin increased and I did suggest that we repeat fasting lipid profile in about 6-8 weeks.  She will continue the remainder of her current cardiac medication regimen unchanged.  I did discuss Cardiac Rehab with her, but she declines at this point.      Again, she appears stable from a cardiac standpoint at this time.  I will plan to see her back in about 2 months with a repeat fasting lipid profile prior to that office visit.  Of course, I encouraged her to contact us sooner with questions or concerns.         ANTONIO RUCKER PA-C             D: 02/15/2018   T: 02/15/2018   MT:       Name:     LAURO SALTER   MRN:      [id number]        Account:      [id number]   :      1933           Service Date: 02/15/2018      Document: [id number]           Outpatient Encounter Prescriptions as of 2/15/2018   Medication Sig Dispense Refill     clopidogrel (PLAVIX) 75 MG tablet Take 1 tablet (75 mg) by mouth daily 30 tablet 11     atorvastatin (LIPITOR) 40 MG tablet Take 1 tablet (40 mg) by mouth daily 30 tablet 0     Acetaminophen (TYLENOL PO) Take 1,000 mg by mouth 2 times daily       Mirtazapine (REMERON PO) Take 15 mg by mouth 2 times daily       hypromellose (ARTIFICIAL TEARS) 0.5 % SOLN ophthalmic solution Place 3 drops into both eyes 3 times daily       alendronate (FOSAMAX) 70 MG tablet Take 70 mg by mouth every 7 days On        furosemide (LASIX) 20 MG tablet Take 20 mg by mouth daily       cholecalciferol  (VITAMIN D3) 5000 UNITS CAPS capsule Take 5,000 Units by mouth daily       acetaminophen (TYLENOL) 325 MG tablet Take 650 mg by mouth 2 times daily as needed for mild pain       carvedilol (COREG) 12.5 MG tablet Take 1 tablet (12.5 mg) by mouth 2 times daily (with meals) 60 tablet 1     amLODIPine (NORVASC) 10 MG tablet Take 1 tablet (10 mg) by mouth daily 30 tablet 1     citalopram (CELEXA) 10 MG tablet Take 30 mg by mouth daily       losartan (COZAAR) 100 MG tablet Take 100 mg by mouth daily       multivitamin, therapeutic with minerals (THERA-VIT-M) TABS Take 1 tablet by mouth daily       meloxicam (MOBIC) 15 MG tablet Take 15 mg by mouth daily as needed        polyethylene glycol (MIRALAX/GLYCOLAX) powder Take 17 g by mouth 2 times daily       calcium carbonate (OS-FRANCISCO) 500 MG TABS Take 2 tablets by mouth 2 times daily (with meals)       oxybutynin (DITROPAN) 5 MG tablet Take 5 mg by mouth 2 times daily       senna-docusate (SENNA S) 8.6-50 MG per tablet Take 1 tablet by mouth 2 times daily Please hold if having loose stools and contact nurse.       ASPIRIN EC PO Take 81 mg by mouth daily       metoclopramide (REGLAN) 5 MG tablet Take 5 mg by mouth 3 times daily (with meals)       bisacodyl (DULCOLAX) 10 MG suppository Place 10 mg rectally daily as needed       levETIRAcetam (KEPPRA) 500 MG tablet Take 500 mg by mouth 2 times daily       folic acid (FOLVITE) 1 MG tablet Take 1 mg by mouth daily       omeprazole (PRILOSEC) 40 MG capsule Take 1 capsule (40 mg) by mouth daily Take 30-60 minutes before a meal. 30 capsule 9     No facility-administered encounter medications on file as of 2/15/2018.        Again, thank you for allowing me to participate in the care of your patient.      Sincerely,    SD GutierrezC     [facility]

## 2018-03-01 ENCOUNTER — CARE COORDINATION (OUTPATIENT)
Dept: GERIATRIC MEDICINE | Facility: CLINIC | Age: 83
End: 2018-03-01

## 2018-03-01 NOTE — PROGRESS NOTES
Call placed to client to set up annual HRA, home visit scheduled for 3/15/18 @  2 PM  Secure e-mail sent to Betsey ZHAO at [facility] to inform of the above, request a copy of AL care plan and med list be faxed to [place].  Holly Guajardo RN, BC  Supervisor [facility]   [phone] (Fax)

## 2018-03-06 NOTE — PROGRESS NOTES
Call placed to client to explain that CM will need to change date of home visit. Visit rescheduled to 3/14/18 @ 2 PM.  Holly Guajardo RN, BC  Supervisor [facility]   [phone] (Fax)

## 2018-03-12 ENCOUNTER — CARE COORDINATION (OUTPATIENT)
Dept: GERIATRIC MEDICINE | Facility: CLINIC | Age: 83
End: 2018-03-12

## 2018-03-12 ENCOUNTER — APPOINTMENT (OUTPATIENT)
Dept: GENERAL RADIOLOGY | Facility: CLINIC | Age: 83
DRG: 177 | End: 2018-03-12
Attending: EMERGENCY MEDICINE
Payer: COMMERCIAL

## 2018-03-12 ENCOUNTER — HOSPITAL ENCOUNTER (INPATIENT)
Facility: CLINIC | Age: 83
LOS: 2 days | Discharge: HOME | DRG: 177 | End: 2018-03-14
Attending: EMERGENCY MEDICINE | Admitting: INTERNAL MEDICINE
Payer: COMMERCIAL

## 2018-03-12 DIAGNOSIS — J18.9 PNEUMONIA OF LEFT LUNG DUE TO INFECTIOUS ORGANISM, UNSPECIFIED PART OF LUNG: ICD-10-CM

## 2018-03-12 LAB
ALBUMIN SERPL-MCNC: 3.4 G/DL (ref 3.4–5)
ALBUMIN UR-MCNC: 30 MG/DL
ALP SERPL-CCNC: 54 U/L (ref 40–150)
ALT SERPL W P-5'-P-CCNC: 17 U/L (ref 0–50)
ANION GAP SERPL CALCULATED.3IONS-SCNC: 8 MMOL/L (ref 3–14)
APPEARANCE UR: ABNORMAL
AST SERPL W P-5'-P-CCNC: 14 U/L (ref 0–45)
BACTERIA #/AREA URNS HPF: ABNORMAL /HPF
BASE EXCESS BLDV CALC-SCNC: 3.5 MMOL/L
BASOPHILS # BLD AUTO: 0.1 10E9/L (ref 0–0.2)
BASOPHILS NFR BLD AUTO: 0.3 %
BILIRUB SERPL-MCNC: 0.7 MG/DL (ref 0.2–1.3)
BILIRUB UR QL STRIP: NEGATIVE
BUN SERPL-MCNC: 19 MG/DL (ref 7–30)
CALCIUM SERPL-MCNC: 8.8 MG/DL (ref 8.5–10.1)
CHLORIDE SERPL-SCNC: 106 MMOL/L (ref 94–109)
CO2 SERPL-SCNC: 26 MMOL/L (ref 20–32)
COLOR UR AUTO: YELLOW
CREAT SERPL-MCNC: 0.86 MG/DL (ref 0.52–1.04)
DIFFERENTIAL METHOD BLD: ABNORMAL
EOSINOPHIL # BLD AUTO: 0.1 10E9/L (ref 0–0.7)
EOSINOPHIL NFR BLD AUTO: 0.3 %
ERYTHROCYTE [DISTWIDTH] IN BLOOD BY AUTOMATED COUNT: 12.5 % (ref 10–15)
FLUAV+FLUBV AG SPEC QL: NEGATIVE
FLUAV+FLUBV AG SPEC QL: NEGATIVE
GFR SERPL CREATININE-BSD FRML MDRD: 62 ML/MIN/1.7M2
GLUCOSE SERPL-MCNC: 126 MG/DL (ref 70–99)
GLUCOSE UR STRIP-MCNC: NEGATIVE MG/DL
HCO3 BLDV-SCNC: 28 MMOL/L (ref 21–28)
HCT VFR BLD AUTO: 40.7 % (ref 35–47)
HGB BLD-MCNC: 13.6 G/DL (ref 11.7–15.7)
HGB UR QL STRIP: ABNORMAL
IMM GRANULOCYTES # BLD: 0.1 10E9/L (ref 0–0.4)
IMM GRANULOCYTES NFR BLD: 0.5 %
INTERPRETATION ECG - MUSE: NORMAL
KETONES UR STRIP-MCNC: NEGATIVE MG/DL
LACTATE BLD-SCNC: 0.7 MMOL/L (ref 0.7–2)
LACTATE BLD-SCNC: 1.1 MMOL/L (ref 0.7–2)
LACTATE BLD-SCNC: 2.2 MMOL/L (ref 0.7–2)
LEUKOCYTE ESTERASE UR QL STRIP: ABNORMAL
LYMPHOCYTES # BLD AUTO: 1 10E9/L (ref 0.8–5.3)
LYMPHOCYTES NFR BLD AUTO: 5.9 %
MCH RBC QN AUTO: 31.8 PG (ref 26.5–33)
MCHC RBC AUTO-ENTMCNC: 33.4 G/DL (ref 31.5–36.5)
MCV RBC AUTO: 95 FL (ref 78–100)
MONOCYTES # BLD AUTO: 1 10E9/L (ref 0–1.3)
MONOCYTES NFR BLD AUTO: 5.8 %
MUCOUS THREADS #/AREA URNS LPF: PRESENT /LPF
NEUTROPHILS # BLD AUTO: 15.4 10E9/L (ref 1.6–8.3)
NEUTROPHILS NFR BLD AUTO: 87.2 %
NITRATE UR QL: POSITIVE
NRBC # BLD AUTO: 0 10*3/UL
NRBC BLD AUTO-RTO: 0 /100
O2/TOTAL GAS SETTING VFR VENT: 4 %
PCO2 BLDV: 40 MM HG (ref 40–50)
PH BLDV: 7.45 PH (ref 7.32–7.43)
PH UR STRIP: 5 PH (ref 5–7)
PLATELET # BLD AUTO: 227 10E9/L (ref 150–450)
PO2 BLDV: 60 MM HG (ref 25–47)
POTASSIUM SERPL-SCNC: 4.2 MMOL/L (ref 3.4–5.3)
PROT SERPL-MCNC: 7.2 G/DL (ref 6.8–8.8)
RBC # BLD AUTO: 4.28 10E12/L (ref 3.8–5.2)
RBC #/AREA URNS AUTO: 15 /HPF (ref 0–2)
SODIUM SERPL-SCNC: 140 MMOL/L (ref 133–144)
SOURCE: ABNORMAL
SP GR UR STRIP: 1.02 (ref 1–1.03)
SPECIMEN SOURCE: NORMAL
SQUAMOUS #/AREA URNS AUTO: <1 /HPF (ref 0–1)
TROPONIN I SERPL-MCNC: <0.015 UG/L (ref 0–0.04)
UROBILINOGEN UR STRIP-MCNC: 0 MG/DL (ref 0–2)
WBC # BLD AUTO: 17.6 10E9/L (ref 4–11)
WBC #/AREA URNS AUTO: 18 /HPF (ref 0–5)

## 2018-03-12 PROCEDURE — 82803 BLOOD GASES ANY COMBINATION: CPT | Performed by: EMERGENCY MEDICINE

## 2018-03-12 PROCEDURE — 87186 SC STD MICRODIL/AGAR DIL: CPT | Performed by: EMERGENCY MEDICINE

## 2018-03-12 PROCEDURE — 36415 COLL VENOUS BLD VENIPUNCTURE: CPT | Performed by: INTERNAL MEDICINE

## 2018-03-12 PROCEDURE — 83605 ASSAY OF LACTIC ACID: CPT | Performed by: EMERGENCY MEDICINE

## 2018-03-12 PROCEDURE — 84484 ASSAY OF TROPONIN QUANT: CPT | Performed by: EMERGENCY MEDICINE

## 2018-03-12 PROCEDURE — 85025 COMPLETE CBC W/AUTO DIFF WBC: CPT | Performed by: EMERGENCY MEDICINE

## 2018-03-12 PROCEDURE — 81001 URINALYSIS AUTO W/SCOPE: CPT | Performed by: EMERGENCY MEDICINE

## 2018-03-12 PROCEDURE — 87088 URINE BACTERIA CULTURE: CPT | Performed by: EMERGENCY MEDICINE

## 2018-03-12 PROCEDURE — 71046 X-RAY EXAM CHEST 2 VIEWS: CPT

## 2018-03-12 PROCEDURE — 25000132 ZZH RX MED GY IP 250 OP 250 PS 637: Performed by: EMERGENCY MEDICINE

## 2018-03-12 PROCEDURE — 99285 EMERGENCY DEPT VISIT HI MDM: CPT | Mod: 25

## 2018-03-12 PROCEDURE — 99207 ZZC CDG-MDM COMPONENT: MEETS LOW - DOWN CODED: CPT | Performed by: INTERNAL MEDICINE

## 2018-03-12 PROCEDURE — 83605 ASSAY OF LACTIC ACID: CPT | Performed by: INTERNAL MEDICINE

## 2018-03-12 PROCEDURE — 87804 INFLUENZA ASSAY W/OPTIC: CPT | Performed by: EMERGENCY MEDICINE

## 2018-03-12 PROCEDURE — 36415 COLL VENOUS BLD VENIPUNCTURE: CPT | Performed by: EMERGENCY MEDICINE

## 2018-03-12 PROCEDURE — 87040 BLOOD CULTURE FOR BACTERIA: CPT | Performed by: EMERGENCY MEDICINE

## 2018-03-12 PROCEDURE — 25000128 H RX IP 250 OP 636: Performed by: INTERNAL MEDICINE

## 2018-03-12 PROCEDURE — 25000128 H RX IP 250 OP 636: Performed by: EMERGENCY MEDICINE

## 2018-03-12 PROCEDURE — 93005 ELECTROCARDIOGRAM TRACING: CPT

## 2018-03-12 PROCEDURE — 25000131 ZZH RX MED GY IP 250 OP 636 PS 637: Performed by: INTERNAL MEDICINE

## 2018-03-12 PROCEDURE — 80053 COMPREHEN METABOLIC PANEL: CPT | Performed by: EMERGENCY MEDICINE

## 2018-03-12 PROCEDURE — 25000132 ZZH RX MED GY IP 250 OP 250 PS 637: Performed by: INTERNAL MEDICINE

## 2018-03-12 PROCEDURE — 87086 URINE CULTURE/COLONY COUNT: CPT | Performed by: EMERGENCY MEDICINE

## 2018-03-12 PROCEDURE — 96361 HYDRATE IV INFUSION ADD-ON: CPT

## 2018-03-12 PROCEDURE — 36415 COLL VENOUS BLD VENIPUNCTURE: CPT

## 2018-03-12 PROCEDURE — 96365 THER/PROPH/DIAG IV INF INIT: CPT

## 2018-03-12 PROCEDURE — 12000007 ZZH R&B INTERMEDIATE

## 2018-03-12 PROCEDURE — 99222 1ST HOSP IP/OBS MODERATE 55: CPT | Mod: AI | Performed by: INTERNAL MEDICINE

## 2018-03-12 RX ORDER — ONDANSETRON 4 MG/1
4 TABLET, ORALLY DISINTEGRATING ORAL EVERY 6 HOURS PRN
Status: DISCONTINUED | OUTPATIENT
Start: 2018-03-12 | End: 2018-03-14 | Stop reason: HOSPADM

## 2018-03-12 RX ORDER — ACETAMINOPHEN 325 MG/1
650 TABLET ORAL 2 TIMES DAILY PRN
COMMUNITY
End: 2020-01-28

## 2018-03-12 RX ORDER — METOCLOPRAMIDE 5 MG/1
5 TABLET ORAL
Status: DISCONTINUED | OUTPATIENT
Start: 2018-03-12 | End: 2018-03-14 | Stop reason: HOSPADM

## 2018-03-12 RX ORDER — LOSARTAN POTASSIUM 100 MG/1
100 TABLET ORAL DAILY
Status: DISCONTINUED | OUTPATIENT
Start: 2018-03-12 | End: 2018-03-14 | Stop reason: HOSPADM

## 2018-03-12 RX ORDER — NALOXONE HYDROCHLORIDE 0.4 MG/ML
.1-.4 INJECTION, SOLUTION INTRAMUSCULAR; INTRAVENOUS; SUBCUTANEOUS
Status: DISCONTINUED | OUTPATIENT
Start: 2018-03-12 | End: 2018-03-14 | Stop reason: HOSPADM

## 2018-03-12 RX ORDER — CLOPIDOGREL BISULFATE 75 MG/1
75 TABLET ORAL ONCE
Status: COMPLETED | OUTPATIENT
Start: 2018-03-12 | End: 2018-03-12

## 2018-03-12 RX ORDER — ACETAMINOPHEN 325 MG/1
975 TABLET ORAL EVERY 6 HOURS PRN
Status: DISCONTINUED | OUTPATIENT
Start: 2018-03-12 | End: 2018-03-14 | Stop reason: HOSPADM

## 2018-03-12 RX ORDER — POLYETHYLENE GLYCOL 3350 17 G/17G
17 POWDER, FOR SOLUTION ORAL 2 TIMES DAILY
Status: DISCONTINUED | OUTPATIENT
Start: 2018-03-12 | End: 2018-03-14 | Stop reason: HOSPADM

## 2018-03-12 RX ORDER — ONDANSETRON 2 MG/ML
4 INJECTION INTRAMUSCULAR; INTRAVENOUS EVERY 6 HOURS PRN
Status: DISCONTINUED | OUTPATIENT
Start: 2018-03-12 | End: 2018-03-14 | Stop reason: HOSPADM

## 2018-03-12 RX ORDER — ASPIRIN 81 MG/1
81 TABLET ORAL DAILY
Status: DISCONTINUED | OUTPATIENT
Start: 2018-03-12 | End: 2018-03-14 | Stop reason: HOSPADM

## 2018-03-12 RX ORDER — LEVETIRACETAM 500 MG/1
500 TABLET ORAL 2 TIMES DAILY
Status: DISCONTINUED | OUTPATIENT
Start: 2018-03-12 | End: 2018-03-14 | Stop reason: HOSPADM

## 2018-03-12 RX ORDER — CLOPIDOGREL BISULFATE 75 MG/1
75 TABLET ORAL DAILY
Status: DISCONTINUED | OUTPATIENT
Start: 2018-03-13 | End: 2018-03-14 | Stop reason: HOSPADM

## 2018-03-12 RX ORDER — OXYBUTYNIN CHLORIDE 5 MG/1
5 TABLET ORAL 2 TIMES DAILY
Status: DISCONTINUED | OUTPATIENT
Start: 2018-03-12 | End: 2018-03-14 | Stop reason: HOSPADM

## 2018-03-12 RX ORDER — AMLODIPINE BESYLATE 10 MG/1
10 TABLET ORAL DAILY
Status: DISCONTINUED | OUTPATIENT
Start: 2018-03-12 | End: 2018-03-14 | Stop reason: HOSPADM

## 2018-03-12 RX ORDER — ACETAMINOPHEN 500 MG
1000 TABLET ORAL ONCE
Status: COMPLETED | OUTPATIENT
Start: 2018-03-12 | End: 2018-03-12

## 2018-03-12 RX ORDER — CLOPIDOGREL BISULFATE 75 MG/1
75 TABLET ORAL DAILY
Status: DISCONTINUED | OUTPATIENT
Start: 2018-03-12 | End: 2018-03-12

## 2018-03-12 RX ORDER — CALCIUM CARBONATE 500(1250)
2500 TABLET ORAL 2 TIMES DAILY WITH MEALS
Status: DISCONTINUED | OUTPATIENT
Start: 2018-03-12 | End: 2018-03-14 | Stop reason: HOSPADM

## 2018-03-12 RX ORDER — MULTIPLE VITAMINS W/ MINERALS TAB 9MG-400MCG
1 TAB ORAL DAILY
Status: DISCONTINUED | OUTPATIENT
Start: 2018-03-12 | End: 2018-03-14 | Stop reason: HOSPADM

## 2018-03-12 RX ORDER — AMOXICILLIN 250 MG
1 CAPSULE ORAL 2 TIMES DAILY
Status: DISCONTINUED | OUTPATIENT
Start: 2018-03-12 | End: 2018-03-14 | Stop reason: HOSPADM

## 2018-03-12 RX ORDER — CARVEDILOL 12.5 MG/1
12.5 TABLET ORAL 2 TIMES DAILY WITH MEALS
Status: DISCONTINUED | OUTPATIENT
Start: 2018-03-12 | End: 2018-03-14 | Stop reason: HOSPADM

## 2018-03-12 RX ORDER — ACETAMINOPHEN 500 MG
1000 TABLET ORAL 2 TIMES DAILY
Status: DISCONTINUED | OUTPATIENT
Start: 2018-03-12 | End: 2018-03-14 | Stop reason: HOSPADM

## 2018-03-12 RX ORDER — SODIUM CHLORIDE 9 MG/ML
1000 INJECTION, SOLUTION INTRAVENOUS CONTINUOUS
Status: DISCONTINUED | OUTPATIENT
Start: 2018-03-12 | End: 2018-03-12

## 2018-03-12 RX ORDER — FUROSEMIDE 20 MG
20 TABLET ORAL DAILY
Status: DISCONTINUED | OUTPATIENT
Start: 2018-03-12 | End: 2018-03-14 | Stop reason: HOSPADM

## 2018-03-12 RX ORDER — ATORVASTATIN CALCIUM 40 MG/1
40 TABLET, FILM COATED ORAL DAILY
Status: DISCONTINUED | OUTPATIENT
Start: 2018-03-12 | End: 2018-03-14 | Stop reason: HOSPADM

## 2018-03-12 RX ORDER — BISACODYL 10 MG
10 SUPPOSITORY, RECTAL RECTAL DAILY PRN
Status: DISCONTINUED | OUTPATIENT
Start: 2018-03-12 | End: 2018-03-14 | Stop reason: HOSPADM

## 2018-03-12 RX ORDER — MIRTAZAPINE 15 MG/1
15 TABLET, FILM COATED ORAL 2 TIMES DAILY
Status: DISCONTINUED | OUTPATIENT
Start: 2018-03-12 | End: 2018-03-14 | Stop reason: HOSPADM

## 2018-03-12 RX ORDER — SODIUM CHLORIDE 9 MG/ML
INJECTION, SOLUTION INTRAVENOUS CONTINUOUS
Status: DISCONTINUED | OUTPATIENT
Start: 2018-03-12 | End: 2018-03-13

## 2018-03-12 RX ORDER — CEFTRIAXONE SODIUM 1 G/50ML
1 INJECTION, SOLUTION INTRAVENOUS EVERY 24 HOURS
Status: DISCONTINUED | OUTPATIENT
Start: 2018-03-12 | End: 2018-03-14 | Stop reason: HOSPADM

## 2018-03-12 RX ADMIN — SODIUM CHLORIDE: 9 INJECTION, SOLUTION INTRAVENOUS at 15:52

## 2018-03-12 RX ADMIN — ASPIRIN 81 MG: 81 TABLET, COATED ORAL at 15:53

## 2018-03-12 RX ADMIN — ATORVASTATIN CALCIUM 40 MG: 40 TABLET, FILM COATED ORAL at 15:52

## 2018-03-12 RX ADMIN — CLOPIDOGREL 75 MG: 75 TABLET, FILM COATED ORAL at 12:36

## 2018-03-12 RX ADMIN — SENNOSIDES AND DOCUSATE SODIUM 1 TABLET: 8.6; 5 TABLET ORAL at 20:57

## 2018-03-12 RX ADMIN — METOCLOPRAMIDE HYDROCHLORIDE 5 MG: 5 TABLET ORAL at 17:38

## 2018-03-12 RX ADMIN — MIRTAZAPINE 15 MG: 15 TABLET, FILM COATED ORAL at 20:57

## 2018-03-12 RX ADMIN — CARVEDILOL 12.5 MG: 12.5 TABLET, FILM COATED ORAL at 17:38

## 2018-03-12 RX ADMIN — CITALOPRAM HYDROBROMIDE 30 MG: 20 TABLET ORAL at 15:52

## 2018-03-12 RX ADMIN — SODIUM CHLORIDE 1000 ML: 9 INJECTION, SOLUTION INTRAVENOUS at 09:17

## 2018-03-12 RX ADMIN — TAZOBACTAM SODIUM AND PIPERACILLIN SODIUM 4.5 G: 500; 4 INJECTION, SOLUTION INTRAVENOUS at 10:31

## 2018-03-12 RX ADMIN — CALCIUM 2500 MG: 500 TABLET ORAL at 17:38

## 2018-03-12 RX ADMIN — LOSARTAN POTASSIUM 100 MG: 100 TABLET, FILM COATED ORAL at 15:53

## 2018-03-12 RX ADMIN — MULTIPLE VITAMINS W/ MINERALS TAB 1 TABLET: TAB at 15:52

## 2018-03-12 RX ADMIN — OMEPRAZOLE 40 MG: 20 CAPSULE, DELAYED RELEASE ORAL at 15:52

## 2018-03-12 RX ADMIN — OXYBUTYNIN CHLORIDE 5 MG: 5 TABLET ORAL at 20:57

## 2018-03-12 RX ADMIN — LEVETIRACETAM 500 MG: 500 TABLET ORAL at 20:57

## 2018-03-12 RX ADMIN — SODIUM CHLORIDE 1460 ML: 9 INJECTION, SOLUTION INTRAVENOUS at 10:31

## 2018-03-12 RX ADMIN — FUROSEMIDE 20 MG: 20 TABLET ORAL at 15:53

## 2018-03-12 RX ADMIN — CEFTRIAXONE SODIUM 1 G: 1 INJECTION, SOLUTION INTRAVENOUS at 16:08

## 2018-03-12 RX ADMIN — AZITHROMYCIN MONOHYDRATE 500 MG: 500 INJECTION, POWDER, LYOPHILIZED, FOR SOLUTION INTRAVENOUS at 16:49

## 2018-03-12 RX ADMIN — ACETAMINOPHEN 1000 MG: 500 TABLET, FILM COATED ORAL at 20:57

## 2018-03-12 RX ADMIN — ACETAMINOPHEN 1000 MG: 500 TABLET, FILM COATED ORAL at 09:57

## 2018-03-12 RX ADMIN — AMLODIPINE BESYLATE 10 MG: 10 TABLET ORAL at 15:53

## 2018-03-12 ASSESSMENT — ENCOUNTER SYMPTOMS
SHORTNESS OF BREATH: 1
VOMITING: 0
DIFFICULTY URINATING: 0
HEMATURIA: 0
WHEEZING: 1
DIARRHEA: 0
FEVER: 1
COUGH: 1
APPETITE CHANGE: 0
FREQUENCY: 0
RHINORRHEA: 0
CONSTIPATION: 0
DYSURIA: 0

## 2018-03-12 ASSESSMENT — ACTIVITIES OF DAILY LIVING (ADL)
ADLS_ACUITY_SCORE: 12
ADLS_ACUITY_SCORE: 12

## 2018-03-12 NOTE — H&P
"[facility] History and Physical    Jade Caba MRN# [id number]   Age: 84 year old YOB: 1933     Date of Admission:  3/12/2018    Home clinic: Magdi Pinto MD          Assessment and Plan:   Assessment:   Jade Caba is an 84-year-old nursing home patient who came to attention today due to feeling ill.  She reports onset of cough with significant shortness of breath last night and although she is a fever she denies sweats and rigors.      On evaluation in the emergency department patient's found to have the left lower lung field pneumonia.  She is found to be hypoxic with an elevated white cell count and a mildly elevated lactic acid level.  Overall the patient is nontoxic-appearing reports that despite fluid resuscitation she does not feel significantly more short of breath.    Prior medical history is significant for recent non-STEMI as well as peripheral vascular disease and atrial flutter.  She does endorse a history of heart failure symptoms for which she is on a low-salt diet.  She also indicates that she is a quite active woman and ambulates without assistive devices typically.    Dx:  1.  Community-acquired pneumonia with left-sided infiltrate.  Patient has mild acute hypoxic respiratory failure associated with this.  2.  History of coronary artery disease status post stenting again last month.  Otherwise the angiogram was very reassuring.  3.  Peripheral vascular disease.  Patient has a history of renal arterial stenosis that was stented as well as carotid disease for which she has undergone bilateral endarterectomies.  4.  Seizure disorder.      Plan:   1.  Admit to inpatient.  2.  Ceftriaxone and azithromycin.  I note the patient had been started on Zosyn in the emergency department for \"healthcare related pneumonia\".  3.  Antihypertensives as well as heart failure and usual home medications are resumed.             Chief Complaint:   Cough, shortness of breath, fever.     History " is obtained from the patient, EMR and daughter, who is present at the bedside.      Ms. Caba reports that she had a very difficult night.  Up until bedtime last night the patient was able to get around comfortably.  She apparently coughed all night long.  She does not typically sleep with her head elevated and did not feel that these symptoms were compatible with heart failure.  This morning at [facility] she had her temperature measured at 101.9 with an initial oxygen saturation of 98% in room air.  For those reasons she was sent to the hospital for further evaluation.    When I ask Ms. Caba to better describe her discomfort she is unable to.  She denies significant abnormalities related to myalgias but seems to be focused on shortness of breath and her dry cough.  She does indicate she has not been able to bring up any sputum.  She also though denies dramatic weakness, nausea, vomiting.  She has had some loose bowel movements recently but these number less than 2 in any given 24 hour period.    The patient's daughter who is present at the bedside also gives some clarifying history:  this includes that the patient is independent with activity.  She does endorse also DNR/DNI though she wants other restorative cares.          Past Medical History:     Past Medical History:   Diagnosis Date     Anxiety      Arthritis      Atrial flutter (H)      C1 cervical fracture (H)      CAD (coronary artery disease), s/p angio with RCA stenting in 2/2018. Pt describes hx CHF episodes.       Carotid artery stenosis      CKD (chronic kidney disease)      Depression      H/O alcohol abuse      Hypercholesterolemia      Hypertension      Renal artery stenosis (H)      Seizure disorder (H)      Subarachnoid hemorrhage (H) 2014             Past Surgical History:      Past Surgical History:   Procedure Laterality Date     APPENDECTOMY       CAROTID ENDARTERECTOMY Left      Cataract surgery Right     will have laser eye surgery      CHOLECYSTECTOMY       Coronary stents x 3       HYSTERECTOMY      at age of 40-not cancerus or precancerous      JOINT REPLACEMENT Bilateral      Stenting of renal artery               Social History:     Social History   Substance Use Topics     Smoking status: Never Smoker     Smokeless tobacco: Never Used     Alcohol use No      The patient's   more than 20 years ago.         Family History:     Family History   Problem Relation Age of Onset     C.A.D. Mother      C.A.D. Brother      C.A.D. Sister      C.A.D. Brother      CANCER Sister      Breast cancer     HEART DISEASE Son    reviewed and considered noncontributory to this hospitalization.         Immunizations:     Immunization History   Administered Date(s) Administered     Influenza (High Dose) 3 valent vaccine 10/03/2013, 10/03/2016, 10/02/2017     Influenza (IIV3) PF 10/22/2012     Pneumo Conj 13-V (2010&after) 2016     Pneumococcal 23 valent 2013     TDAP Vaccine (Boostrix) 2013             Allergies:     Allergies   Allergen Reactions     No Known Allergies              Medications:   Clopidogrel 75 mg p.o. daily  Atorvastatin 40 mg p.o. daily  Mirtazapine 15 mg p.o. twice daily  Alendronate 70 mg weekly on Wednesday  Furosemide 20 mg p.o. daily next vitamin D supplement  Carvedilol 12.5 mg p.o. twice daily  Amlodipine 10 mg p.o. daily  Citalopram 30 mg p.o. Daily  Losartan 100 mg p.o. daily  Meloxicam 15 mg p.o. daily as needed next aspirin 81 mg p.o. daily next metoclopramide 5 mg p.o. 3 times daily with meals next Omeprazole 40 mg p.o. daily   Levetiracetam 500 mg p.o. twice daily         Review of Systems:   A comprehensive 10-system was performed and found to be negative except as described in this note.          Physical Exam:   Vitals were reviewed  Temp: 98.5  F (36.9  C) Temp src: Oral BP: 129/76 Pulse: 78 Heart Rate: 87 Resp: 20 SpO2: 96 % O2 Device: Nasal cannula Oxygen Delivery: 4 LPM  Constitutional: Awake,  alert, cooperative, no apparent distress, and appears stated age.  Eyes: Lids and lashes normal, pupils equal, round and reactive to light with post-cataract surgical change. Extra ocular muscles intact, sclera clear, conjunctiva normal.  ENT: Normocephalic, without obvious abnormality, atraumatic, oral pharynx with moist mucus membranes, tonsils without erythema or exudates, gums normal and good dentition.  Neck: Supple, symmetrical, trachea midline, no adenopathy, thyroid symmetric, not enlarged and no tenderness, skin normal.  Hematologic / Lymphatic: No cervical lymphadenopathy and no supraclavicular lymphadenopathy.  Back: Symmetric, no curvature, spinous processes are non-tender on palpation, paraspinous muscles are non-tender on palpation, no costal vertebral tenderness.  Lungs: No increased work of breathing, good air exchange, clear to auscultation bilaterally, no crackles or wheezing.  Cardiovascular: Regular rate and rhythm, normal S1 and S2, no S3 or S4, and no murmur noted.  Abdomen: No scars, normal bowel sounds, soft, non-distended, non-tender, no masses palpated, no hepatosplenomegaly.  Musculoskeletal: No redness, warmth, or swelling of the joints.  Full range of motion noted.  Motor strength is 5 out of 5 all extremities bilaterally.  Tone is normal.  Neurologic: Awake, alert, oriented to name, place and time.  Cranial nerves II-XII are grossly intact.  Motor is 5 out of 5 bilaterally.    Neuropsychiatric: Normal affect, mood, orientation, memory and insight.  Skin: No rashes, erythema, pallor, petechia or purpura.          Data:     Results for orders placed or performed during the hospital encounter of 03/12/18 (from the past 24 hour(s))   CBC with platelets differential   Result Value Ref Range    WBC 17.6 (H) 4.0 - 11.0 10e9/L    RBC Count 4.28 3.8 - 5.2 10e12/L    Hemoglobin 13.6 11.7 - 15.7 g/dL    Hematocrit 40.7 35.0 - 47.0 %    MCV 95 78 - 100 fl    MCH 31.8 26.5 - 33.0 pg    MCHC 33.4  31.5 - 36.5 g/dL    RDW 12.5 10.0 - 15.0 %    Platelet Count 227 150 - 450 10e9/L    Diff Method Automated Method     % Neutrophils 87.2 %    % Lymphocytes 5.9 %    % Monocytes 5.8 %    % Eosinophils 0.3 %    % Basophils 0.3 %    % Immature Granulocytes 0.5 %    Nucleated RBCs 0 0 /100    Absolute Neutrophil 15.4 (H) 1.6 - 8.3 10e9/L    Absolute Lymphocytes 1.0 0.8 - 5.3 10e9/L    Absolute Monocytes 1.0 0.0 - 1.3 10e9/L    Absolute Eosinophils 0.1 0.0 - 0.7 10e9/L    Absolute Basophils 0.1 0.0 - 0.2 10e9/L    Abs Immature Granulocytes 0.1 0 - 0.4 10e9/L    Absolute Nucleated RBC 0.0    Comprehensive metabolic panel   Result Value Ref Range    Sodium 140 133 - 144 mmol/L    Potassium 4.2 3.4 - 5.3 mmol/L    Chloride 106 94 - 109 mmol/L    Carbon Dioxide 26 20 - 32 mmol/L    Anion Gap 8 3 - 14 mmol/L    Glucose 126 (H) 70 - 99 mg/dL    Urea Nitrogen 19 7 - 30 mg/dL    Creatinine 0.86 0.52 - 1.04 mg/dL    GFR Estimate 62 >60 mL/min/1.7m2    GFR Estimate If Black 76 >60 mL/min/1.7m2    Calcium 8.8 8.5 - 10.1 mg/dL    Bilirubin Total 0.7 0.2 - 1.3 mg/dL    Albumin 3.4 3.4 - 5.0 g/dL    Protein Total 7.2 6.8 - 8.8 g/dL    Alkaline Phosphatase 54 40 - 150 U/L    ALT 17 0 - 50 U/L    AST 14 0 - 45 U/L   Lactic acid whole blood   Result Value Ref Range    Lactic Acid 2.2 (H) 0.7 - 2.0 mmol/L   Blood culture   Result Value Ref Range    Specimen Description Blood Right Hand     Special Requests Aerobic and anaerobic bottles received     Culture Micro PENDING    Blood gas venous   Result Value Ref Range    Ph Venous 7.45 (H) 7.32 - 7.43 pH    PCO2 Venous 40 40 - 50 mm Hg    PO2 Venous 60 (H) 25 - 47 mm Hg    Bicarbonate Venous 28 21 - 28 mmol/L    Base Excess Venous 3.5 mmol/L    FIO2 4    Troponin I   Result Value Ref Range    Troponin I ES <0.015 0.000 - 0.045 ug/L   EKG 12 lead   Result Value Ref Range    Interpretation ECG Click View Image link to view waveform and result    Influenza A/B antigen   Result Value Ref Range     Influenza A/B Agn Specimen Nasal     Influenza A Negative NEG^Negative    Influenza B Negative NEG^Negative   XR Chest 2 Views    Narrative    CHEST TWO VIEWS March 12, 2018 9:36 AM    HISTORY: Fever.    COMPARISON:  2/2/2018.      Impression    IMPRESSION: New patchy opacities in the mid and lower left lung,  worrisome for pneumonia. No other interval change. Mild to moderate  vertebral body height loss with what appears to be T9, unchanged.   Blood culture   Result Value Ref Range    Specimen Description Blood Left Arm     Culture Micro PENDING    Lactic acid whole blood   Result Value Ref Range    Lactic Acid 1.1 0.7 - 2.0 mmol/L   UA with Microscopic   Result Value Ref Range    Color Urine Yellow     Appearance Urine Slightly Cloudy     Glucose Urine Negative NEG^Negative mg/dL    Bilirubin Urine Negative NEG^Negative    Ketones Urine Negative NEG^Negative mg/dL    Specific Gravity Urine 1.018 1.003 - 1.035    Blood Urine Large (A) NEG^Negative    pH Urine 5.0 5.0 - 7.0 pH    Protein Albumin Urine 30 (A) NEG^Negative mg/dL    Urobilinogen mg/dL 0.0 0.0 - 2.0 mg/dL    Nitrite Urine Positive (A) NEG^Negative    Leukocyte Esterase Urine Small (A) NEG^Negative    Source Midstream Urine     WBC Urine 18 (H) 0 - 5 /HPF    RBC Urine 15 (H) 0 - 2 /HPF    Bacteria Urine Many (A) NEG^Negative /HPF    Squamous Epithelial /HPF Urine <1 0 - 1 /HPF    Mucous Urine Present (A) NEG^Negative /LPF      EKG results:   Performed on admission        Sinus tachycardic rhythm, normal axis, no acute ischemic ST segment or T wave changes      All imaging studies reviewed by me.      Attestation:  I have reviewed today's vital signs, notes, medications, labs and imaging.  Total time: 35 minutes     Abhishek Gay MD

## 2018-03-12 NOTE — ED NOTES
Pt arrives with complaints of SOB starting yesterday and a fever this morning. CNAs checked pts temp this morning and reported 101.9 per EMS. Per EMS 89% O2 on RA. ABCs adequate, A/O x4.

## 2018-03-12 NOTE — ED PROVIDER NOTES
History     Chief Complaint:  Shortness of Breath    HPI   Jade Caba is an 84 year old female with a history of hypertension, seizure disorder, CKD, CHF, CAD, subarachnoid hemorrhage and atrial flutter on Plavix who presents to the emergency department today via EMS for evaluation of shortness of breath. The patient reports yesterday she developed shortness of breath with associated dry cough and wheezing. This morning the nurse took the patient temperature which was elevated to 101.9 prompting the call to EMS and presenting to the ED for further evaluation. EMS report initial O2 sat of 89% on room air and improved to 90-94% on 4L nasal cannula. Patient given four baby aspirin en route. The patient is not normally on oxygen. Upon presentation, she is not short of breath anymore. The patient denies rhinorrhea, appetite change, chest pain, and changes in bowel or bladder habits.     Allergies:  No Known Drug Allergies     Medications:    clopidogrel (PLAVIX) 75 MG tablet  atorvastatin (LIPITOR) 40 MG tablet  Mirtazapine (REMERON PO)  alendronate (FOSAMAX) 70 MG tablet  furosemide (LASIX) 20 MG tablet  cholecalciferol (VITAMIN D3) 5000 UNITS CAPS capsule  carvedilol (COREG) 12.5 MG tablet  amLODIPine (NORVASC) 10 MG tablet  citalopram (CELEXA) 10 MG tablet  losartan (COZAAR) 100 MG tablet  meloxicam (MOBIC) 15 MG tablet  polyethylene glycol (MIRALAX/GLYCOLAX) powder  calcium carbonate (OS-FRANCISCO) 500 MG TABS  oxybutynin (DITROPAN) 5 MG tablet  senna-docusate (SENNA S) 8.6-50 MG per tablet  ASPIRIN EC PO  metoclopramide (REGLAN) 5 MG tablet  bisacodyl (DULCOLAX) 10 MG suppository  levETIRAcetam (KEPPRA) 500 MG tablet  folic acid (FOLVITE) 1 MG tablet  omeprazole (PRILOSEC) 40 MG capsule    Past Medical History:  ACS  Chronic diastolic congestive heart failure  CAD  Anxiety  CHF  Atrial flutter  Depression  Hypertension  CKD  Hyperlipidemia  Seizure disorder  Subarachnoid hemorrhage  Renal artery  stenosis  Osteoarthritis of knee  Carotid aortic stenosis    Past Surgical History:    Appendectomy  Carotid endarterectomy  Cataract surgery  Cholecystectomy  Coronary stents x3  Hysterectomy  Joint replacement  Stenting of renal artery    Family History:    CAD  Breast cancer  Heart Disease    Social History:  The patient was accompanied to the ED by her daughter.  Smoking Status: Never  Smokeless Tobacco: Never  Alcohol Use: No  Marital Status:        Review of Systems   Constitutional: Positive for fever. Negative for appetite change.   HENT: Negative for rhinorrhea.    Respiratory: Positive for cough, shortness of breath and wheezing.    Cardiovascular: Negative for chest pain.   Gastrointestinal: Negative for constipation, diarrhea and vomiting.   Genitourinary: Negative for decreased urine volume, difficulty urinating, dysuria, frequency, hematuria and urgency.   All other systems reviewed and are negative.    Physical Exam   First Vitals:  BP: 159/73  Pulse: 114  Heart Rate: 114  Temp: 101.6  F (38.7  C)  Resp: 20  SpO2: 94 %    Physical Exam  Constitutional: The patient is oriented to person, place, and time. Alert and cooperative.  HENT:   Right Ear: External ear normal.   Left Ear: External ear normal.   Nose: Nose normal.   Mouth/Throat: Uvula is midline, oropharynx is clear and moist and mucous membranes are normal. No posterior oropharyngeal edema or erythema.   Eyes: Conjunctivae, EOM and lids are normal. Pupils are equal, round, and reactive to light.   Neck: Trachea normal. Normal range of motion. Neck supple.   Cardiovascular: tachycardia, regular rhythm, normal heart sounds, and intact distal pulses.    Pulmonary/Chest: Effort normal. Rales in the L lung base. No wheezes.   Abdominal: Soft. No tenderness. No rebound and no guarding.   Musculoskeletal: Normal range of motion.  No extremity tenderness or edema.  Neurological: Alert and Oriented. Strength 5/5 in upper and lower extremities  bilaterally. Sensation intact to light touch throughout.  Skin: Skin is dry. No rash noted.      Emergency Department Course     ECG:  Indication: shortness of breath   Completed at 0855.  Read at 0911.   Sinus tachycardia  T wave abnormality, consider inferior ischemia  Abnormal ECG   No significant change compared to EKG dated 2/1/18  Rate 113 bpm. NC interval 146. QRS duration 76. QT/QTc 322/441. P-R-T axes 55 88 14.    Imaging:  Radiology findings were communicated with the patient and family who voiced understanding of the findings.  XR Chest 2 Views   IMPRESSION: New patchy opacities in the mid and lower left lung,  worrisome for pneumonia. No other interval change. Mild to moderate  vertebral body height loss with what appears to be T9, unchanged.  Report per radiology     Laboratory:  Laboratory findings were communicated with the patient and family who voiced understanding of the findings.  CBC: WBC 17.6 (H) o/w WNL. (HGB 13.6, )   CMP: Glucose 126 (H) o/w WNL (Creatinine 0.86)  Blood gas venous: pH Venous 7.45 (H), PCO2 Venous 40, PO2 Venous 60 (H), Bicarbonate 28, Base Deficit 3.5, FIO2 4   Influenza A/B Antigen: Negative   Lactic Acid (Collected 0850): 2.2 (H)   Lactic Acid (Collected 1050): 1.1   UA: pending    Blood cultures: Pending  Urine Culture Aerobic Bacterial: Pending    Interventions:  0917 NS Bolus 1,000mL IV  0957 Tylenol 1,000mg PO  1031 Zosyn 4.5g IV  1031 NS Bolus 1,460mL IV     Emergency Department Course:  Nursing notes and vitals reviewed.  The patient was sent for a XR Chest 2 Views while in the emergency department, results above.   IV was inserted and blood was drawn for laboratory testing, results above.  The patient provided a urine sample here in the emergency department. This was sent for laboratory testing, findings above.  0855: I performed an exam of the patient as documented above.   1024: Patient rechecked and updated.   1037: I spoke with Dr. Gay of the  hospitalist service regarding patient's presentation, findings, and plan of care.  Findings and plan explained to the Patient and daughter who consents to admission. Discussed the patient with Dr. Gay, who will admit the patient to a cardiac telemetry bed for further monitoring, evaluation, and treatment.  I personally reviewed the laboratory and imaging results with the Patient and daughter and answered all related questions prior to admission.    Impression & Plan      CMS Diagnoses:   The patient has signs of Severe Sepsis as evidenced by:    1. 2 SIRS criteria, AND  2. Suspected infection, AND   3. Organ dysfunction: Lactic Acid >2    Time severe sepsis diagnosis confirmed = 0922 as this was the time when Lactate resulted, and the level was >2      3 Hour Severe Sepsis Bundle Completion:  1. Initial Lactic Acid Result:   Recent Labs   Lab Test  03/12/18   0850   LACT  2.2*     2. Blood Cultures before Antibiotics: Yes  3. Broad Spectrum Antibiotics Administered: Yes     Anti-infectives (Future)    Start     Dose/Rate Route Frequency Ordered Stop    03/12/18 1017  piperacillin-tazobactam (ZOSYN) intermittent infusion 4.5 g      4.5 g  200 mL/hr over 30 Minutes Intravenous ONCE 03/12/18 1016          4. 2460 ml of IV fluids.    6 Hour Severe Sepsis Bundle Completion:  1. Repeat Lactic Acid Level: 1.1  2. MAP>65 after initial IVF bolus, will continue to monitor fluid status and vital signs  I attest to having performed a repeat sepsis exam and assessment of perfusion and the results demonstrate no change.    Medical Decision Making:  Jade Caba is an 84 year old female with a history of subarachnoid hemorrhage, hyperlipidemia, hypertension, and coronary artery disease who presents to the ED for evaluation of shortness of breath and fever. Upon presentation to the ED, the patient is nontoxic appearing. She is febrile, tachycardic, mildly hypertensive, and with an  O2 sat of 88% on room air. On exam, she is well  appearing. She is alert, oriented, and neuro exam is nonfocal. Cardiac exam is unremarkable. On auscultation of her lungs, she does have crackles at the left base. Lungs are otherwise clear. Abdomen is soft and nontender throughout. The rest of her exam is as mentioned above. Given that the patient is febrile, a septic workup was performed. Labs were obtained and are as mentioned above. Notably, she does have a leukocytosis and lactate is mildly elevated at 2.2. Chest x-ray was obtained and does demonstrate patchy opacities in the mid and lower left lung, concerning for pneumonia. These results were discussed with the patient and she notes understanding. She was initiated on zosyn. Given the patient's history and presentation, I do feel her symptoms are most consistent with sepsis secondary to pneumonia. She will be admitted to the medicine service for further evaluation and management. She was discussed with the hospitalist and he agreed to accept this patient on to his service. Repeat lactate is improved to within normal limits. She was stable/improved at the time of admission.     Diagnosis:    ICD-10-CM    1. Pneumonia of left lung due to infectious organism, unspecified part of lung J18.9 Blood culture     Disposition:  Admitted to cardiac telemetry    Scribe Disclosure:  Liss EDMOND am serving as a scribe at 8:54 AM on 3/12/2018 to document services personally performed by Uzma Lau MD based on my observations and the provider's statements to me.     3/12/2018   [facility] EMERGENCY DEPARTMENT       Uzma Lau MD  03/12/18 [id number]

## 2018-03-12 NOTE — IP AVS SNAPSHOT
MRN:[id number]                      After Visit Summary   3/12/2018    Jade Caba    MRN: [id number]           Thank you!     Thank you for choosing [facility] for your care. Our goal is always to provide you with excellent care. Hearing back from our patients is one way we can continue to improve our services. Please take a few minutes to complete the written survey that you may receive in the mail after you visit. If you would like to speak to someone directly about your visit please contact Patient Relations at [phone]. Thank you!          Patient Information     Date Of Birth          8/26/1933        Designated Caregiver       Most Recent Value    Caregiver    Will someone help with your care after discharge? yes    Name of designated caregiver JUDY    Phone number of caregiver long term    Caregiver address long term      About your hospital stay     You were admitted on:  March 12, 2018 You last received care in the:  [place] [address] [facility]    You were discharged on:  March 14, 2018        Reason for your hospital stay       Suspected pneumonia. Though this seems likely to have been an over-call, and more likely that you had a mild aspiration event.                  Who to Call     For medical emergencies, please call 911.  For non-urgent questions about your medical care, please call your primary care provider or clinic, None          Attending Provider     Provider Specialty    Uzma Lau MD Emergency Medicine    Abhishek Gay MD Internal Medicine       Primary Care Provider Fax #    [facility] [phone]      After Care Instructions     Activity       Your activity upon discharge: activity as tolerated            Diet       Follow this diet upon discharge:      Combination Diet Regular Diet Adult; 2 gm NA Diet                  Follow-up Appointments     Follow-up and recommended labs and tests        With PMD as needed.                 "  Pending Results     Date and Time Order Name Status Description    3/12/2018 0922 Blood culture Preliminary     3/12/2018 0903 Blood culture Preliminary     3/12/2018 0903 Urine Culture Aerobic Bacterial Preliminary             Statement of Approval     Ordered          18 1111  I have reviewed and agree with all the recommendations and orders detailed in this document.  EFFECTIVE NOW     Approved and electronically signed by:  Abhishek Gay MD             Admission Information     Date & Time Provider Department Dept. Phone    3/12/2018 Abhishek Gay MD [place] [address] [facility] [phone]      Your Vitals Were     Blood Pressure Pulse Temperature Respirations Height Weight    171/62 (BP Location: Right arm) 72 96.7  F (35.9  C) (Oral) 18 1.575 m (5' 2\") 82.5 kg (181 lb 12.8 oz)    Pulse Oximetry BMI (Body Mass Index)                96% 33.25 kg/m2          [employer] Information     [email or web address] lets you send messages to your doctor, view your test results, renew your prescriptions, schedule appointments and more. To sign up, go to [email or web address] . Click on \"Log in\" on the left side of the screen, which will take you to the Welcome page. Then click on \"Sign up Now\" on the right side of the page.     You will be asked to enter the access code listed below, as well as some personal information. Please follow the directions to create your username and password.     Your access code is: [email or web address]  Expires: 2018 11:19 AM     Your access code will  in 90 days. If you need help or a new code, please call your [place] clinic or [phone].        Care EveryWhere ID     This is your Care EveryWhere ID. This could be used by other organizations to access your [place] medical records  [id number]        Equal Access to Services     ANN MARIE ORONA : Jessica Moses, gretel douglas, qaybta kaalankita coe. So [facility] " [phone].    ATENCIÓN: Si mamta mcintosh, tiene a rasmussen disposición servicios gratuitos de asistencia lingüística. Hebert rose [phone].    We comply with applicable federal civil rights laws and [place] laws. We do not discriminate on the basis of race, color, national origin, age, disability, sex, sexual orientation, or gender identity.               Review of your medicines      CONTINUE these medicines which have NOT CHANGED        Dose / Directions    alendronate 70 MG tablet   Commonly known as:  FOSAMAX   Notes to Patient:  This medication was not given today in the hospital        Dose:  70 mg   Take 70 mg by mouth every 7 days On Wednesdays   Refills:  0       amLODIPine 10 MG tablet   Commonly known as:  NORVASC   Used for:  HTN (hypertension)        Dose:  10 mg   Take 1 tablet (10 mg) by mouth daily   Quantity:  30 tablet   Refills:  1       ASPIRIN EC PO        Dose:  81 mg   Take 81 mg by mouth daily   Refills:  0       atorvastatin 40 MG tablet   Commonly known as:  LIPITOR   Used for:  ACS (acute coronary syndrome) (H)        Dose:  40 mg   Take 1 tablet (40 mg) by mouth daily   Quantity:  30 tablet   Refills:  0       bisacodyl 10 MG Suppository   Commonly known as:  DULCOLAX        Dose:  10 mg   Place 10 mg rectally daily as needed   Refills:  0       calcium carbonate 500 MG Tabs   Commonly known as:  OS-FRANCISCO        Dose:  2 tablet   Take 2 tablets by mouth 2 times daily (with meals)   Refills:  0       carvedilol 12.5 MG tablet   Commonly known as:  COREG   Used for:  CHF (congestive heart failure) (H)        Dose:  12.5 mg   Take 1 tablet (12.5 mg) by mouth 2 times daily (with meals)   Quantity:  60 tablet   Refills:  1       celeXA 10 MG tablet   Generic drug:  citalopram        Dose:  30 mg   Take 30 mg by mouth daily   Refills:  0       cholecalciferol 5000 UNITS Caps capsule   Commonly known as:  vitamin D3        Dose:  5000 Units   Take 5,000 Units by mouth daily   Refills:  0        clopidogrel 75 MG tablet   Commonly known as:  PLAVIX   Used for:  ACS (acute coronary syndrome) (H)        Dose:  75 mg   Take 1 tablet (75 mg) by mouth daily   Quantity:  30 tablet   Refills:  11       folic acid 1 MG tablet   Commonly known as:  FOLVITE        Dose:  1 mg   Take 1 mg by mouth daily   Refills:  0       furosemide 20 MG tablet   Commonly known as:  LASIX        Dose:  20 mg   Take 20 mg by mouth daily   Refills:  0       hypromellose 0.5 % Soln ophthalmic solution   Commonly known as:  ARTIFICIAL TEARS        Dose:  3 drop   Place 3 drops into both eyes 3 times daily   Refills:  0       KEPPRA 500 MG tablet   Generic drug:  levETIRAcetam        Dose:  500 mg   Take 500 mg by mouth 2 times daily   Refills:  0       losartan 100 MG tablet   Commonly known as:  COZAAR        Dose:  100 mg   Take 100 mg by mouth daily   Refills:  0       meloxicam 15 MG tablet   Commonly known as:  MOBIC        Dose:  15 mg   Take 15 mg by mouth daily as needed   Refills:  0       metoclopramide 5 MG tablet   Commonly known as:  REGLAN        Dose:  5 mg   Take 5 mg by mouth 3 times daily (with meals)   Refills:  0       multivitamin, therapeutic with minerals Tabs tablet        Dose:  1 tablet   Take 1 tablet by mouth daily   Refills:  0       omeprazole 40 MG capsule   Commonly known as:  priLOSEC   Used for:  Abdominal pain, other specified site        Dose:  40 mg   Take 1 capsule (40 mg) by mouth daily Take 30-60 minutes before a meal.   Quantity:  30 capsule   Refills:  9       oxybutynin 5 MG tablet   Commonly known as:  DITROPAN        Dose:  5 mg   Take 5 mg by mouth 2 times daily   Refills:  0       polyethylene glycol powder   Commonly known as:  MIRALAX/GLYCOLAX        Dose:  17 g   Take 17 g by mouth 2 times daily   Refills:  0       REMERON PO        Dose:  15 mg   Take 15 mg by mouth 2 times daily   Refills:  0       SENNA S 8.6-50 MG per tablet   Generic drug:  senna-docusate        Dose:  1 tablet    Take 1 tablet by mouth 2 times daily Please hold if having loose stools and contact nurse.   Refills:  0       * TYLENOL PO        Dose:  1000 mg   Take 1,000 mg by mouth 2 times daily   Refills:  0       * TYLENOL 325 MG tablet   Generic drug:  acetaminophen        Dose:  650 mg   Take 650 mg by mouth 2 times daily as needed for mild pain   Refills:  0       * Notice:  This list has 2 medication(s) that are the same as other medications prescribed for you. Read the directions carefully, and ask your doctor or other care provider to review them with you.             Protect others around you: Learn how to safely use, store and throw away your medicines at www.disposemymeds.org.             Medication List: This is a list of all your medications and when to take them. Check marks below indicate your daily home schedule. Keep this list as a reference.      Medications           Morning Afternoon Evening Bedtime As Needed    alendronate 70 MG tablet   Commonly known as:  FOSAMAX   Take 70 mg by mouth every 7 days On Wednesdays   Last time this was given:  Unknown   Notes to Patient:  This medication was not given today in the hospital            Wednesdays                       amLODIPine 10 MG tablet   Commonly known as:  NORVASC   Take 1 tablet (10 mg) by mouth daily   Last time this was given:  10 mg on 3/14/2018  9:53 AM                                   ASPIRIN EC PO   Take 81 mg by mouth daily   Last time this was given:  81 mg on 3/14/2018  9:53 AM                                   atorvastatin 40 MG tablet   Commonly known as:  LIPITOR   Take 1 tablet (40 mg) by mouth daily   Last time this was given:  40 mg on 3/14/2018  9:53 AM                                   bisacodyl 10 MG Suppository   Commonly known as:  DULCOLAX   Place 10 mg rectally daily as needed                                   calcium carbonate 500 MG Tabs   Commonly known as:  OS-FRANCISCO   Take 2 tablets by mouth 2 times daily (with meals)                                       carvedilol 12.5 MG tablet   Commonly known as:  COREG   Take 1 tablet (12.5 mg) by mouth 2 times daily (with meals)   Last time this was given:  12.5 mg on 3/14/2018  9:52 AM                                      celeXA 10 MG tablet   Take 30 mg by mouth daily   Last time this was given:  30 mg on 3/14/2018  9:53 AM   Generic drug:  citalopram                                   cholecalciferol 5000 UNITS Caps capsule   Commonly known as:  vitamin D3   Take 5,000 Units by mouth daily                                   clopidogrel 75 MG tablet   Commonly known as:  PLAVIX   Take 1 tablet (75 mg) by mouth daily   Last time this was given:  75 mg on 3/14/2018  9:54 AM                                   folic acid 1 MG tablet   Commonly known as:  FOLVITE   Take 1 mg by mouth daily                                   furosemide 20 MG tablet   Commonly known as:  LASIX   Take 20 mg by mouth daily   Last time this was given:  20 mg on 3/14/2018  9:52 AM                                   hypromellose 0.5 % Soln ophthalmic solution   Commonly known as:  ARTIFICIAL TEARS   Place 3 drops into both eyes 3 times daily                                         KEPPRA 500 MG tablet   Take 500 mg by mouth 2 times daily   Last time this was given:  500 mg on 3/14/2018  9:53 AM   Generic drug:  levETIRAcetam                                      losartan 100 MG tablet   Commonly known as:  COZAAR   Take 100 mg by mouth daily   Last time this was given:  100 mg on 3/14/2018  9:52 AM                                   meloxicam 15 MG tablet   Commonly known as:  MOBIC   Take 15 mg by mouth daily as needed                                   metoclopramide 5 MG tablet   Commonly known as:  REGLAN   Take 5 mg by mouth 3 times daily (with meals)   Last time this was given:  5 mg on 3/14/2018  9:53 AM                                         multivitamin, therapeutic with minerals Tabs tablet   Take 1 tablet by mouth daily    Last time this was given:  1 tablet on 3/14/2018  9:53 AM                                   omeprazole 40 MG capsule   Commonly known as:  priLOSEC   Take 1 capsule (40 mg) by mouth daily Take 30-60 minutes before a meal.   Last time this was given:  40 mg on 3/14/2018  9:51 AM                                   oxybutynin 5 MG tablet   Commonly known as:  DITROPAN   Take 5 mg by mouth 2 times daily   Last time this was given:  5 mg on 3/14/2018  9:54 AM                                      polyethylene glycol powder   Commonly known as:  MIRALAX/GLYCOLAX   Take 17 g by mouth 2 times daily                                      REMERON PO   Take 15 mg by mouth 2 times daily   Last time this was given:  15 mg on 3/14/2018  9:53 AM                                      SENNA S 8.6-50 MG per tablet   Take 1 tablet by mouth 2 times daily Please hold if having loose stools and contact nurse.   Last time this was given:  1 tablet on 3/13/2018  7:54 PM   Generic drug:  senna-docusate                                      * TYLENOL PO   Take 1,000 mg by mouth 2 times daily   Last time this was given:  1,000 mg on 3/14/2018  9:51 AM                                      * TYLENOL 325 MG tablet   Take 650 mg by mouth 2 times daily as needed for mild pain   Last time this was given:  1,000 mg on 3/14/2018  9:51 AM   Generic drug:  acetaminophen                                   * Notice:  This list has 2 medication(s) that are the same as other medications prescribed for you. Read the directions carefully, and ask your doctor or other care provider to review them with you.              More Information        Pneumonia (Adult)  Pneumonia is an infection deep within the lungs. It is in the small air sacs (alveoli). Pneumonia may be caused by a virus or bacteria. Pneumonia caused by bacteria is usually treated with an antibiotic. Severe cases may need to be treated in the hospital. Milder cases can be treated at home. Symptoms  usually start to get better during the first 2 days of treatment.    Home care  Follow these guidelines when caring for yourself at home:    Rest at home for the first 2 to 3 days, or until you feel stronger. Don t let yourself get overly tired when you go back to your activities.    Stay away from cigarette smoke - yours or other people s.    You may use acetaminophen or ibuprofen to control fever or pain, unless another medicine was prescribed. If you have chronic liver or kidney disease, talk with your healthcare provider before using these medicines. Also talk with your provider if you ve had a stomach ulcer or gastrointestinal bleeding. Don t give aspirin to anyone younger than 18 years of age who is ill with a fever. It may cause severe liver damage.    Your appetite may be poor, so a light diet is fine.    Drink 6 to 8 glasses of fluids every day to make sure you are getting enough fluids. Beverages can include water, sport drinks, sodas without caffeine, juices, tea, or soup. Fluids will help loosen secretions in the lung. This will make it easier for you to cough up the phlegm (sputum). If you also have heart or kidney disease, check with your healthcare provider before you drink extra fluids.    Take antibiotic medicine prescribed until it is all gone, even if you are feeling better after a few days.  Follow-up care  Follow up with your healthcare provider in the next 2 to 3 days, or as advised. This is to be sure the medicine is helping you get better.  If you are 65 or older, you should get a pneumococcal vaccine and a yearly flu (influenza) shot. You should also get these vaccines if you have chronic lung disease like asthma, emphysema, or COPD. Recently, a second type of pneumonia vaccine has become available for everyone over 65 years old. This is in addition to the previous vaccine. Ask your provider about this.  When to seek medical advice  Call your healthcare provider right away if any of these  occur:    You don t get better within the first 48 hours of treatment    Shortness of breath gets worse    Rapid breathing (more than 25 breaths per minute)    Coughing up blood    Chest pain gets worse with breathing    Fever of 100.4 F (38 C) or higher that doesn t get better with fever medicine    Weakness, dizziness, or fainting that gets worse    Thirst or dry mouth that gets worse    Sinus pain, headache, or a stiff neck    Chest pain not caused by coughing  Date Last Reviewed: 1/1/2017 2000-2017 [employer]. [address] [place], [place] [address]. All rights reserved. This information is not intended as a substitute for professional medical care. Always follow your healthcare professional's instructions.

## 2018-03-12 NOTE — IP AVS SNAPSHOT
` ` Patient Information     Patient Name Sex Jade Arroyo ([id number]) Female 1933       Room Bed    0300 0300-01      Patient Demographics     Address Phone    [facility]  [address] [facility] [id number] [phone] (Home)  NONE (Work)  [phone] (Mobile) *Preferred*      Patient Ethnicity & Race     Ethnic Group Patient Race    American White      Emergency Contact(s)     Name Relation Home Work Mobile    Jennifer Saeed Daughter [phone]    Marleen Brownlee Daughter [phone] none [phone]      Documents on File        Status Date Received Description       Documents for the Patient    Privacy Notice - [place] Received 13     Insurance Card Received 13 BCBS    External Medication Information Consent       Patient ID Received 13     Consent for Services - Hospital/Clinic Received () 13     Consent for EHR Access Received 13     [facility] Specified Other       Consent to Communicate Received 13     Insurance Card Received 13 Medicare Part A and B    External Medication Information Consent Accepted 13     Immunization Record   [facility] Immunization Record    HIM YULIANA Authorization  14     HIM YULIANA Authorization  14     Consent for Services - Geriatrics Received 14     HIM YULIANA Authorization - File Only Accepted 14 Faxed to [facility]    Consent for Services - Hospital/Clinic Received () 14     Business/Insurance/Care Coordination/Health Form - Patient   [facility], 2nd recall letter, 2014    Insurance Card Received 14 BLUE PLUS [facility]/SNBC    Consent for Services - Hospital/Clinic Received () 16     Consent for Services/Privacy Notice - Hospital/Clinic Received 18     Business/Insurance/Care Coordination/Health Form - Patient   [facility] Patient Enrollment Form    External Medication Information Consent        HIM YULIANA Authorization - File Only   [facility]    Consent for Services - Geriatrics Received 16     Consent to Communicate       Business/Insurance/Care Coordination/Health Form - Patient   [facility] Patient Health History Form    Advance Directives and Living Will Received 16 POLST 09/15/2016     Advance Directives and Living Will Received 17 POLST 2017    Consent to Communicate  17 AUTHORIZATION TO DISCUSS PROTECTED HEALTH INFORMATION    Care Everywhere Prospective Auth Received 18     Insurance Card Received 02/15/18 [facility]     Consent for Services - Geriatrics  (Deleted)         Documents for the Encounter    CMS IM for Patient Signature Received 18     Discharge Attachment   ADULT, PNEUMONIA (ENGLISH)      Admission Information     Attending Provider Admitting Provider Admission Type Admission Date/Time    Abhishek Gay MD [place], Karl R, MD Emergency 18  0850    Discharge Date Hospital Service Auth/Cert Status Service Area     General Medicine [facility]    Unit Room/Bed Admission Status        3 MEDICAL SURGICAL 300/300 Admission (Confirmed)       Admission     Complaint    Pneumonia      Hospital Account     Name Acct ID Class Status Primary Coverage    Jade Caba [id number] Inpatient Open [facility]/[employer] [facility]            Guarantor Account (for Hospital Account #[id number])     Name Relation to Pt Service Area Active? Acct Type    Jade Caba  FCS Yes Personal/Family    Address Phone          [facility]  [address] [place] [address] [phone](H)              Coverage Information (for Hospital Account #[id number])     F/O Payor/Plan Precert #    [facility]-[employer] [facility]/[employer]     Subscriber Subscriber #    Jade Caba [id number]    Address Phone    [address]  [place] [address] [phone]

## 2018-03-12 NOTE — PLAN OF CARE
Problem: Patient Care Overview  Goal: Plan of Care/Patient Progress Review  Outcome: No Change  Received pt on floor at 1455. Denies pain. Comfortable in bed with daughter by her side. Admission profile completed.

## 2018-03-12 NOTE — PHARMACY-ADMISSION MEDICATION HISTORY
Admission medication history interview status for this patient is complete. See EPIC admission navigator for allergy information, prior to admission medications and immunization status.     Medication history interview source(s):Patient  Medication history resources (including written lists, pill bottles, clinic record):epic list and Med list from [facility]  Primary pharmacy:     Changes made to PTA medication list:  Added: -  Deleted: -  Changed: -    Actions taken by pharmacist (provider contacted, etc):None     Additional medication history information:None    Medication reconciliation/reorder completed by provider prior to medication history? No    Do you take OTC medications (eg tylenol, ibuprofen, fish oil, eye/ear drops, etc)? Y(Y/N)    For patients on insulin therapy: N (Y/N)      Prior to Admission medications    Medication Sig Last Dose Taking? Auth Provider   acetaminophen (TYLENOL) 325 MG tablet Take 650 mg by mouth 2 times daily as needed for mild pain  Yes Unknown, Entered By History   clopidogrel (PLAVIX) 75 MG tablet Take 1 tablet (75 mg) by mouth daily 3/11/2018 at Unknown time Yes Purnima Thomas MD   atorvastatin (LIPITOR) 40 MG tablet Take 1 tablet (40 mg) by mouth daily 3/11/2018 at Unknown time Yes Purnima Thomas MD   Acetaminophen (TYLENOL PO) Take 1,000 mg by mouth 2 times daily 3/11/2018 at Unknown time Yes Reported, Patient   Mirtazapine (REMERON PO) Take 15 mg by mouth 2 times daily 3/11/2018 at Unknown time Yes Reported, Patient   hypromellose (ARTIFICIAL TEARS) 0.5 % SOLN ophthalmic solution Place 3 drops into both eyes 3 times daily 3/11/2018 at Unknown time Yes Reported, Patient   alendronate (FOSAMAX) 70 MG tablet Take 70 mg by mouth every 7 days On Wednesdays 3/7/2018 at Unknown time Yes Reported, Patient   furosemide (LASIX) 20 MG tablet Take 20 mg by mouth daily 3/11/2018 at Unknown time Yes Reported, Patient   cholecalciferol (VITAMIN D3) 5000 UNITS CAPS capsule  Take 5,000 Units by mouth daily 3/11/2018 at Unknown time Yes Reported, Patient   carvedilol (COREG) 12.5 MG tablet Take 1 tablet (12.5 mg) by mouth 2 times daily (with meals) 3/11/2018 at Unknown time Yes Ken Chin,    amLODIPine (NORVASC) 10 MG tablet Take 1 tablet (10 mg) by mouth daily 3/11/2018 at Unknown time Yes Ken Chin,    citalopram (CELEXA) 10 MG tablet Take 30 mg by mouth daily 3/11/2018 at Unknown time Yes Unknown, Entered By History   losartan (COZAAR) 100 MG tablet Take 100 mg by mouth daily 3/11/2018 at Unknown time Yes Unknown, Entered By History   multivitamin, therapeutic with minerals (THERA-VIT-M) TABS Take 1 tablet by mouth daily 3/11/2018 at Unknown time Yes Unknown, Entered By History   meloxicam (MOBIC) 15 MG tablet Take 15 mg by mouth daily as needed   Yes Unknown, Entered By History   polyethylene glycol (MIRALAX/GLYCOLAX) powder Take 17 g by mouth 2 times daily 3/11/2018 at Unknown time Yes Unknown, Entered By History   calcium carbonate (OS-FRANCISCO) 500 MG TABS Take 2 tablets by mouth 2 times daily (with meals) 3/11/2018 at Unknown time Yes Unknown, Entered By History   oxybutynin (DITROPAN) 5 MG tablet Take 5 mg by mouth 2 times daily 3/11/2018 at Unknown time Yes Unknown, Entered By History   senna-docusate (SENNA S) 8.6-50 MG per tablet Take 1 tablet by mouth 2 times daily Please hold if having loose stools and contact nurse. 3/11/2018 at Unknown time Yes Unknown, Entered By History   ASPIRIN EC PO Take 81 mg by mouth daily 3/11/2018 at Unknown time Yes Unknown, Entered By History   metoclopramide (REGLAN) 5 MG tablet Take 5 mg by mouth 3 times daily (with meals) 3/11/2018 at Unknown time Yes Luz Ayers APRN CNP   bisacodyl (DULCOLAX) 10 MG suppository Place 10 mg rectally daily as needed  Yes Luz Ayers APRN CNP   levETIRAcetam (KEPPRA) 500 MG tablet Take 500 mg by mouth 2 times daily 3/11/2018 at Unknown time Yes Reported, Patient   folic acid  (FOLVITE) 1 MG tablet Take 1 mg by mouth daily 3/11/2018 at Unknown time Yes Reported, Patient   omeprazole (PRILOSEC) 40 MG capsule Take 1 capsule (40 mg) by mouth daily Take 30-60 minutes before a meal. 3/11/2018 at Unknown time Yes Suzi Bailey MD

## 2018-03-12 NOTE — LETTER
Transition Communication Hand-off for Care Transitions to Next Level of Care Provider    Name: Jade Caba  : 1933  MRN #: [id number]  Primary Care Provider: [facility]  Primary Care MD Name: Jaden ALDANA  [facility] [id number] [facility] [address]  Primary Care Clinic Name: [facility]  Reason for Hospitalization:  Pneumonia of left lung due to infectious organism, unspecified part of lung [J18.9]  Admit Date/Time: 3/12/2018  8:50 AM  Discharge Date: ***  Payor Source: Payor: [facility] / [facility] / Product Type: HMO /     Key Recommendations:  Discussed importance of completing all the antibiotic prescribed at discharge. Also, importance of informing family/facility staff if not feeling well. Reinforce importance of following a low sodium diet.     Perla Rubin    AVS/Discharge Summary is the source of truth; this is a helpful guide for improved communication of patient story

## 2018-03-12 NOTE — ED NOTES
Bed: ED11  Expected date: 3/12/18  Expected time: 8:32 AM  Means of arrival: Ambulance  Comments:  pinky 89- 84y, F, fever, dyspnea, 88% RA spO2

## 2018-03-12 NOTE — PROGRESS NOTES
CM: PT lives at [facility]. Has support for bathing, med set up, homemaking and laundry. Pt was to have home meeting this week Wednesday. Holly Ingram will see pt after dc. [phone]. PT is followed by the [facility] at her [facility]

## 2018-03-12 NOTE — IP AVS SNAPSHOT
Jade Caba #[id number] (CSN: [id number])  (84 year old F)  (Adm: 18)     [id number]               [place] [address] MEDICAL SURGICAL: [phone]            Patient Demographics     Patient Name Sex          Age SSN Address Phone    Jade Caba Female 1933 (84 year old) [id number] [facility]  [address] [facility] [address] [phone] (Home)  NONE (Work)  [phone] (Mobile) *Preferred*      Emergency Contact(s)     Name Relation Home Work Mobile    Jennifer Saeed Daughter [phone]    Marleen Brownlee Daughter [phone] none [phone]      Admission Information     Attending Provider Admitting Provider Admission Type Admission Date/Time    Abhishek Gay MD Parens, Karl R, MD Emergency 18  0850    Discharge Date Hospital Service Auth/Cert Status Service Area     General Medicine Incomplete [facility]    Unit Room/Bed Admission Status       [facility] MEDICAL SURGICAL  Admission (Confirmed)       Admission     Complaint    Pneumonia      Hospital Account     Name Acct ID Class Status Primary Coverage    Jade Caba [id number] Inpatient Open [facility]            Guarantor Account (for Hospital Account #[id number])     Name Relation to Pt Service Area Active? Acct Type    Jade Caba  FCS Yes Personal/Family    Address Phone          [facility]  [address] [place] [address] [phone](H)              Coverage Information (for Hospital Account #[id number])     F/O Payor/Plan Precert #    [facility] Utility Funding     Subscriber Subscriber #    Jade Caba [id number]    Address Phone    [address]  [place] [id number] [phone]                                                INTERAGENCY TRANSFER FORM - PHYSICIAN ORDERS   3/12/2018                       [place] [address] MEDICAL SURGICAL: [phone]            Attending  "Provider: Abhishek Gay MD     Allergies:  No Known Allergies    Infection:  None   Service:  GENERAL MEDI    Ht:  1.575 m (5' 2\")   Wt:  82.5 kg (181 lb 12.8 oz)   Admission Wt:  82.5 kg (181 lb 12.8 oz)    BMI:  33.25 kg/m 2   BSA:  1.9 m 2            ED Clinical Impression     Diagnosis Description Comment Added By Time Added    Pneumonia of left lung due to infectious organism, unspecified part of lung [J18.9] Pneumonia of left lung due to infectious organism, unspecified part of lung [J18.9]  Uzma Lau MD 3/12/2018 10:29 AM      Hospital Problems as of 3/14/2018              Priority Class Noted POA    HTN (hypertension) Medium  5/28/2013 Yes    Pneumonia Medium  3/12/2018 Yes    * (Principal)Aspiration pneumonitis (H) Medium  3/14/2018 Unknown    Klebsiella cystitis Medium  3/14/2018 Unknown    Antibiotic-associated diarrhea Medium  3/14/2018 Unknown      Non-Hospital Problems as of 3/14/2018              Priority Class Noted    Renal artery stenosis (H) Medium  5/28/2013    CAD (coronary artery disease) Medium  5/28/2013    Carotid artery stenosis Medium  5/28/2013    Mild major depression (H) Medium  5/28/2013    Hypercholesterolemia Medium  5/28/2013    CKD (chronic kidney disease) stage 3, GFR 30-59 ml/min Medium  7/3/2013    Hyperlipidemia LDL goal <100 Medium  7/10/2013    Hypercholesteremia Medium  8/14/2013    Seizure disorder (H) Medium  8/14/2013    Anxiety state Medium  12/9/2013    Osteoarthritis of knee Medium  12/19/2013    Chondrocalcinosis Medium  12/30/2013    Subarachnoid hemorrhage (H) Medium  3/9/2014    Fracture of C1 vertebra, closed (H) Medium  3/9/2014    Sacral fracture, closed (H) Medium  3/18/2014    Atrial flutter (H) Medium  3/18/2014    Alcohol abuse Medium  3/18/2014    Constipation Medium  3/24/2014    ARF (acute renal failure) (H) Medium  12/27/2014    FTT (failure to thrive) in adult Medium  12/30/2014    Fall Medium  12/30/2014    Cognitive impairment Medium  " 1/8/2015    CHF exacerbation (H) Medium  2/25/2015    Health Care Home Medium  9/2/2016    Advance care planning Medium  10/11/2016    Chronic diastolic congestive heart failure (H) Medium  10/28/2016    Coronary artery disease, angina presence unspecified, unspecified vessel or lesion type, unspecified whether native or transplanted heart Medium  10/28/2016    Anxiety Medium  10/28/2016    Urgency incontinence Medium  10/28/2016    ACS (acute coronary syndrome) (H) Medium  1/31/2018      Code Status History     Date Active Date Inactive Code Status Order ID Comments User Context    3/14/2018 11:11 AM  DNR/DNI [id number]  Abhishek Gay MD Outpatient    3/12/2018  2:59 PM 3/14/2018 11:11 AM DNR/DNI [id number]  Abhishek Gay MD Inpatient    1/31/2018  2:27 PM 2/2/2018  8:06 PM DNR/DNI [id number]  Peg Emerson PA-C Inpatient    2/27/2015  8:49 AM 1/31/2018  2:27 PM DNR/DNI [id number]  Ken Chin DO Outpatient    2/25/2015  2:53 PM 2/27/2015  8:49 AM DNR/DNI [id number]  Dea Bey PA-C Inpatient    1/8/2015  2:59 PM 2/25/2015  2:53 PM DNR/DNI [id number]  Mili Garcia NP Outpatient    12/27/2014  4:23 PM 12/29/2014  5:27 PM DNR/DNI [id number]  Donte Ramirez MD Inpatient    3/18/2014 12:46 PM 12/27/2014  4:23 PM Full Code [id number]  Luz Ayers NP Outpatient      Current Code Status     Date Active Code Status Order ID Comments User Context       Prior      Summary of Visit     Reason for your hospital stay       Suspected pneumonia. Though this seems likely to have been an over-call, and more likely that you had a mild aspiration event.                Medication Review      CONTINUE these medications which have NOT CHANGED        Dose / Directions Comments    alendronate 70 MG tablet   Commonly known as:  FOSAMAX   Notes to Patient:  This medication was not given today in the hospital        Dose:  70 mg   Take 70 mg by mouth every 7 days On Wednesdays   Refills:  0         amLODIPine 10 MG tablet   Commonly known as:  NORVASC   Used for:  HTN (hypertension)        Dose:  10 mg   Take 1 tablet (10 mg) by mouth daily   Quantity:  30 tablet   Refills:  1        ASPIRIN EC PO        Dose:  81 mg   Take 81 mg by mouth daily   Refills:  0        atorvastatin 40 MG tablet   Commonly known as:  LIPITOR   Used for:  ACS (acute coronary syndrome) (H)        Dose:  40 mg   Take 1 tablet (40 mg) by mouth daily   Quantity:  30 tablet   Refills:  0        bisacodyl 10 MG Suppository   Commonly known as:  DULCOLAX        Dose:  10 mg   Place 10 mg rectally daily as needed   Refills:  0        calcium carbonate 500 MG Tabs   Commonly known as:  OS-FRANCISCO        Dose:  2 tablet   Take 2 tablets by mouth 2 times daily (with meals)   Refills:  0        carvedilol 12.5 MG tablet   Commonly known as:  COREG   Used for:  CHF (congestive heart failure) (H)        Dose:  12.5 mg   Take 1 tablet (12.5 mg) by mouth 2 times daily (with meals)   Quantity:  60 tablet   Refills:  1        celeXA 10 MG tablet   Generic drug:  citalopram        Dose:  30 mg   Take 30 mg by mouth daily   Refills:  0        cholecalciferol 5000 UNITS Caps capsule   Commonly known as:  vitamin D3        Dose:  5000 Units   Take 5,000 Units by mouth daily   Refills:  0        clopidogrel 75 MG tablet   Commonly known as:  PLAVIX   Used for:  ACS (acute coronary syndrome) (H)        Dose:  75 mg   Take 1 tablet (75 mg) by mouth daily   Quantity:  30 tablet   Refills:  11        folic acid 1 MG tablet   Commonly known as:  FOLVITE        Dose:  1 mg   Take 1 mg by mouth daily   Refills:  0        furosemide 20 MG tablet   Commonly known as:  LASIX        Dose:  20 mg   Take 20 mg by mouth daily   Refills:  0        hypromellose 0.5 % Soln ophthalmic solution   Commonly known as:  ARTIFICIAL TEARS        Dose:  3 drop   Place 3 drops into both eyes 3 times daily   Refills:  0        KEPPRA 500 MG tablet   Generic drug:  levETIRAcetam         Dose:  500 mg   Take 500 mg by mouth 2 times daily   Refills:  0        losartan 100 MG tablet   Commonly known as:  COZAAR        Dose:  100 mg   Take 100 mg by mouth daily   Refills:  0        meloxicam 15 MG tablet   Commonly known as:  MOBIC        Dose:  15 mg   Take 15 mg by mouth daily as needed   Refills:  0        metoclopramide 5 MG tablet   Commonly known as:  REGLAN        Dose:  5 mg   Take 5 mg by mouth 3 times daily (with meals)   Refills:  0        multivitamin, therapeutic with minerals Tabs tablet        Dose:  1 tablet   Take 1 tablet by mouth daily   Refills:  0        omeprazole 40 MG capsule   Commonly known as:  priLOSEC   Used for:  Abdominal pain, other specified site        Dose:  40 mg   Take 1 capsule (40 mg) by mouth daily Take 30-60 minutes before a meal.   Quantity:  30 capsule   Refills:  9        oxybutynin 5 MG tablet   Commonly known as:  DITROPAN        Dose:  5 mg   Take 5 mg by mouth 2 times daily   Refills:  0        polyethylene glycol powder   Commonly known as:  MIRALAX/GLYCOLAX        Dose:  17 g   Take 17 g by mouth 2 times daily   Refills:  0        REMERON PO        Dose:  15 mg   Take 15 mg by mouth 2 times daily   Refills:  0        SENNA S 8.6-50 MG per tablet   Generic drug:  senna-docusate        Dose:  1 tablet   Take 1 tablet by mouth 2 times daily Please hold if having loose stools and contact nurse.   Refills:  0        * TYLENOL PO        Dose:  1000 mg   Take 1,000 mg by mouth 2 times daily   Refills:  0        * TYLENOL 325 MG tablet   Generic drug:  acetaminophen        Dose:  650 mg   Take 650 mg by mouth 2 times daily as needed for mild pain   Refills:  0        * Notice:  This list has 2 medication(s) that are the same as other medications prescribed for you. Read the directions carefully, and ask your doctor or other care provider to review them with you.            After Care     Activity       Your activity upon discharge: activity as tolerated        "Diet       Follow this diet upon discharge:      Combination Diet Regular Diet Adult; 2 gm NA Diet             Follow-Up Appointment Instructions     Follow-up and recommended labs and tests        With PMD as needed.             Statement of Approval     Ordered          03/14/18 1111  I have reviewed and agree with all the recommendations and orders detailed in this document.  EFFECTIVE NOW     Approved and electronically signed by:  Abhishek Gay MD                                                 INTERAGENCY TRANSFER FORM - NURSING   3/12/2018                       [place] [address] MEDICAL SURGICAL: [phone]            Attending Provider: Abhishek Gay MD     Allergies:  No Known Allergies    Infection:  None   Service:  GENERAL MEDI    Ht:  1.575 m (5' 2\")   Wt:  82.5 kg (181 lb 12.8 oz)   Admission Wt:  82.5 kg (181 lb 12.8 oz)    BMI:  33.25 kg/m 2   BSA:  1.9 m 2            Advance Directives        Scanned docmt in ACP Activity?           Yes, scanned ACP docmt        Immunizations     Name Date      Influenza (High Dose) 3 valent vaccine 10/02/17     Influenza (High Dose) 3 valent vaccine 10/03/16     Influenza (High Dose) 3 valent vaccine 10/03/13     Influenza (IIV3) PF 10/22/12     Pneumo Conj 13-V (2010&after) 05/26/16     Pneumococcal 23 valent 08/14/13     TDAP Vaccine (Boostrix) 08/14/13       ASSESSMENT     Discharge Profile Flowsheet     EXPECTED DISCHARGE     FINAL RESOURCES      Expected Discharge Date  03/16/18 (3+ d > single/from [facility]) 03/14/18 0820   Resources List  Transitional Care 12/28/14 1128    DISCHARGE NEEDS ASSESSMENT     Other Resources  Other (see comment) (PNA Action Plan) 03/14/18 1148    Patient/family verbalizes understanding of discharge plan recommendations?  Yes 03/14/18 1148   PAS Number  -- ([id number]) 12/28/14 1128    Medical Team notified of plan?  yes 03/14/18 1148   Existing Resources/Services  None 12/28/14 1128    Readmission Within The Last 30 Days  " "no previous admission in last 30 days 03/14/18 1148   SKIN      Equipment Currently Used at Home  walker, rolling 03/14/18 1148   Inspection of bony prominences  Full 03/14/18 1136    Transportation Available  car;family or friend will provide 03/14/18 1148   Inspection under devices  Full 03/14/18 1136    # of Referrals Placed by CTS  External Care Coordination;Scheduled Follow-up appointments 03/14/18 1148   Skin WDL  ex 03/14/18 1136    Does Patient Need a Referral for Clinic CC  Yes 03/14/18 1148   Skin Color/Characteristics  bruised (ecchymotic) 03/14/18 1136    Coordination Referral Criteria  Admission DX PN 03/14/18 1148   Skin Temperature  warm 03/14/18 1136    Equipment Used at Home  grab bar;raised toilet;shower chair 02/26/15 2251   Skin Moisture  dry 03/14/18 1136    GASTROINTESTINAL (ADULT,PEDIATRIC,OB)     Skin Elasticity  slow return to original state 03/13/18 2041    GI WDL  WDL 03/14/18 1136   Skin Integrity  bruise(s) 03/14/18 1136    Last Bowel Movement  03/14/18 03/14/18 1136   SAFETY      Passing flatus  yes 03/14/18 1136   Safety WDL  WDL 03/14/18 1136    COMMUNICATION ASSESSMENT     All Alarms  none present 03/14/18 1136    Patient's communication style  spoken language (English or Bilingual) 03/12/18 0850                      Assessment WDL (Within Defined Limits) Definitions           Safety WDL     Effective: 09/28/15    Row Information: <b>WDL Definition:</b> Bed in low position, wheels locked; call light in reach; upper side rails up x 2; ID band on<br> <font color=\"gray\"><i>Item=AS safety wdl>>List=AS safety wdl>>Version=F14</i></font>      Skin WDL     Effective: 09/28/15    Row Information: <b>WDL Definition:</b> Warm; dry; intact; elastic; without discoloration; pressure points without redness<br> <font color=\"gray\"><i>Item=AS skin wdl>>List=AS skin wdl>>Version=F14</i></font>      Vitals     Vital Signs Flowsheet     VITAL SIGNS     HEIGHT AND WEIGHT      Temp  96.7  F (35.9  C) " "03/14/18 0741   Height  1.575 m (5' 2\") 03/12/18 1503    Temp src  Oral 03/14/18 0741   Weight  82.5 kg (181 lb 12.8 oz) 03/12/18 1503    Resp  18 03/14/18 0741   BSA (Calculated - sq m)  1.9 03/12/18 1503    Pulse  72 03/13/18 1148   BMI (Calculated)  33.32 03/12/18 1503    Heart Rate  75 03/14/18 0741   MATTI COMA SCALE      Pulse/Heart Rate Source  Monitor 03/14/18 0741   Best Eye Response  4-->(E4) spontaneous 03/14/18 0030    BP  171/62 03/14/18 0741   Best Motor Response  6-->(M6) obeys commands 03/14/18 0030    BP Location  Right arm 03/14/18 0741   Best Verbal Response  5-->(V5) oriented 03/14/18 0030    OXYGEN THERAPY     Alpha Coma Scale Score  15 03/14/18 0030    SpO2  96 % 03/14/18 1002   POSITIONING      O2 Device  None (Room air) 03/14/18 1002   Body Position  independently positioning 03/14/18 1136    Oxygen Delivery  2 LPM 03/14/18 0741   Head of Bed (HOB)  HOB at 20 degrees 03/14/18 0030    PAIN/COMFORT     Chair  Upright in chair 03/14/18 1136    Patient Currently in Pain  denies 03/13/18 2244   DAILY CARE      Preferred Pain Scale  number (Numeric Rating Pain Scale) 03/13/18 1005   Activity Management  activity adjusted per tolerance 03/14/18 1136    Patient's Stated Pain Goal  No pain 03/13/18 1005   Activity Assistance Provided  independent 03/14/18 1137    0-10 Pain Scale  0 03/13/18 1005   Ambulation Distance (Feet)  180 03/13/18 1816            Patient Lines/Drains/Airways Status    Active LINES/DRAINS/AIRWAYS     None            Patient Lines/Drains/Airways Status    Active PICC/CVC     None            Intake/Output Detail Report     Date Intake     Output Net    Shift P.O. I.V. IV Piggyback Total Urine Total       Noc 03/12/18 2300 - 03/13/18 0659 -- -- -- -- 600 600 -600    Day 03/13/18 0700 - 03/13/18 1459 300 -- -- 300 100 100 200    [place] 03/13/18 1500 - 03/13/18 2259 -- -- -- -- 750 750 -750    Noc 03/13/18 2300 - 03/14/18 0659 -- -- -- -- -- -- 0    Day 03/14/18 0700 - 03/14/18 1459 " -- -- -- -- 300 300 -300      Last Void/BM       Most Recent Value    Urine Occurrence 1 at 03/14/2018 0700    Stool Occurrence 1 [per pt] at 03/14/2018 0700      Case Management/Discharge Planning     Case Management/Discharge Planning Flowsheet     REFERRAL INFORMATION     COPING/STRESS      Did the Initial Social Work Assessment result in a Social Work Case?  Yes 03/14/18 1147   Major Change/Loss/Stressor  none 03/12/18 1509    Admission Type  inpatient 03/14/18 1147   EXPECTED DISCHARGE      Arrived From  other (see comments) (assisted living faciity) 03/14/18 1147   Expected Discharge Date  03/16/18 (3+ d > single/from [facility]) 03/14/18 0820    Referral Source  high risk screening 03/14/18 1147   DISCHARGE PLANNING      New Steerage to  Clinics?  No 03/14/18 1147   Patient/family verbalizes understanding of discharge plan recommendations?  Yes 03/14/18 1148    # of Referrals Placed by CTS  External Care Coordination;Scheduled Follow-up appointments 03/14/18 1148   Medical Team notified of plan?  yes 03/14/18 1148    Reason For Consult  care coordination/care conference;discharge planning 03/14/18 1147   Readmission Within The Last 30 Days  no previous admission in last 30 days 03/14/18 1148    Record Reviewed  clinical discipline documentation;history and physical;medical record;patient profile;plan of care 03/14/18 1147   Transportation Available  car;family or friend will provide 03/14/18 1148    CTS Assigned to Case  [place] RN/CTS 03/14/18 1147   Does Patient Need a Referral for Clinic CC  Yes 03/14/18 1148    Primary Care Clinic Name  [facility] 03/14/18 1147   Coordination Referral Criteria  Admission DX PN 03/14/18 1148    Primary Care MD Name  Jaden ALDANA 03/14/18 1147   Equipment Used at Home  grab bar;raised toilet;shower chair 02/26/15 2251    LIVING ENVIRONMENT     FINAL RESOURCES      Lives With  alone 03/14/18 1147   Equipment Currently Used at Home  walker, rolling 03/14/18 1148    Living Arrangements   assisted living 03/14/18 1147   Resources List  Transitional Care 12/28/14 1128    Primary Care Provided By  other (see comments) (facility staff) 03/14/18 1147   Other Resources  Other (see comment) (PNA Action Plan) 03/14/18 1148    Able to Return to Prior Living Arrangements  yes 03/14/18 1147   PAS Number  -- ([id number]) 12/28/14 1128    HOME SAFETY     Existing Resources/Services  None 12/28/14 1128    Patient Feels Safe Living in Home?  yes 03/14/18 1148   ABUSE RISK SCREEN      ASSESSMENT OF FAMILY/SOCIAL SUPPORT     QUESTION TO PATIENT:  Has a member of your family or a partner(now or in the past) intimidated, hurt, manipulated, or controlled you in any way?  no 03/12/18 0852    Marital Status   03/14/18 1148   QUESTION TO PATIENT: Do you feel safe going back to the place where you are living?  yes 03/12/18 0852    Who is your support system?  Children;Facility resident(s)/Staff 03/14/18 1148   OBSERVATION: Is there reason to believe there has been maltreatment of a vulnerable adult (ie. Physical/Sexual/Emotional abuse, self neglect, lack of adequate food, shelter, medical care, or financial exploitation)?  no 03/12/18 1509    Description of Support System  Supportive;Involved 03/14/18 1148   OTHER      EMPLOYMENT     Are you depressed or being treated for depression?  No 03/12/18 1509    Do you work full or part-time?  no 03/14/18 1148                       [place] [address] MEDICAL SURGICAL: [phone]            Medication Administration Report for Jade Caba as of 03/14/18 1308   Legend:    Given Hold Not Given Due Canceled Entry Other Actions    Time Time (Time) Time  Time-Action       Inactive    Active    Linked        Medications 03/08/18 03/09/18 03/10/18 03/11/18 03/12/18 03/13/18 03/14/18    acetaminophen (TYLENOL) tablet 1,000 mg  Dose: 1,000 mg  Freq: 2 TIMES DAILY Route: PO  Start: 03/12/18 2100   Admin Instructions: Maximum acetaminophen dose from all sources = 75 mg/kg/day not  to exceed 4 gram         2057 (1,000 mg)-Given        0811 (1,000 mg)-Given       1953 (1,000 mg)-Given               0951 (1,000 mg)-Given       [ ] 2100           acetaminophen (TYLENOL) tablet 975 mg  Dose: 975 mg  Freq: EVERY 6 HOURS PRN Route: PO  PRN Reasons: mild pain,fever,headaches  Start: 03/12/18 1459   Admin Instructions: Alternate ibuprofen (if ordered) with acetaminophen.  Maximum acetaminophen dose from all sources = 75 mg/kg/day not to exceed 4 grams/day.               amLODIPine (NORVASC) tablet 10 mg  Dose: 10 mg  Freq: DAILY Route: PO  Start: 03/12/18 1500        1553 (10 mg)-Given        0809 (10 mg)-Given        0953 (10 mg)-Given           aspirin EC EC tablet 81 mg  Dose: 81 mg  Freq: DAILY Route: PO  Start: 03/12/18 1500        1553 (81 mg)-Given        0811 (81 mg)-Given        0953 (81 mg)-Given           atorvastatin (LIPITOR) tablet 40 mg  Dose: 40 mg  Freq: DAILY Route: PO  Start: 03/12/18 1500        1552 (40 mg)-Given        0810 (40 mg)-Given        0953 (40 mg)-Given           azithromycin (ZITHROMAX) 500 mg in sodium chloride 0.9 % 250 mL intermittent infusion  Dose: 500 mg  Freq: EVERY 24 HOURS Route: IV  Indications of Use: COMMUNITY ACQUIRED PNEUMONIA  Start: 03/12/18 1500        1649 (500 mg)-New Bag        1656 (500 mg)-New Bag        [ ] 1700           bisacodyl (DULCOLAX) Suppository 10 mg  Dose: 10 mg  Freq: DAILY PRN Route: RE  PRN Reason: constipation  Start: 03/12/18 1459              calcium carbonate (OS-FRANCISCO 500 mg [place]. [place]) tablet 2,500 mg  Dose: 2,500 mg  Freq: 2 TIMES DAILY WITH MEALS Route: PO  Start: 03/12/18 1800   Admin Instructions: OS-FRANCISCO 500 = 1250 mg calcium carbonate         1738 (2,500 mg)-Given        0810 (2,500 mg)-Given       1721 (2,500 mg)-Given        0954 (2,500 mg)-Given       [ ] 1800           carvedilol (COREG) tablet 12.5 mg  Dose: 12.5 mg  Freq: 2 TIMES DAILY WITH MEALS Route: PO  Start: 03/12/18 1800   Admin Instructions: HOLD for SBP < 110  or HR < 60.         1738 (12.5 mg)-Given        0810 (12.5 mg)-Given       1721 (12.5 mg)-Given        0952 (12.5 mg)-Given       [ ] 1800           cefTRIAXone in d5w (ROCEPHIN) intermittent infusion 1 g  Dose: 1 g  Freq: EVERY 24 HOURS Route: IV  Indications of Use: COMMUNITY ACQUIRED PNEUMONIA  Last Dose: 1 g (03/13/18 1455)  Start: 03/12/18 1500        1608 (1 g)-New Bag        1455 (1 g)-New Bag        [ ] 1500           citalopram (celeXA) tablet 30 mg  Dose: 30 mg  Freq: DAILY Route: PO  Start: 03/12/18 1500        1552 (30 mg)-Given        0812 (30 mg)-Given        0953 (30 mg)-Given           clopidogrel (PLAVIX) tablet 75 mg  Dose: 75 mg  Freq: DAILY Route: PO  Start: 03/13/18 0800         0811 (75 mg)-Given        0954 (75 mg)-Given           furosemide (LASIX) tablet 20 mg  Dose: 20 mg  Freq: DAILY Route: PO  Start: 03/12/18 1500        1553 (20 mg)-Given        0812 (20 mg)-Given        0952 (20 mg)-Given           levETIRAcetam (KEPPRA) tablet 500 mg  Dose: 500 mg  Freq: 2 TIMES DAILY Route: PO  Start: 03/12/18 2100        2057 (500 mg)-Given        0810 (500 mg)-Given       1953 (500 mg)-Given               0953 (500 mg)-Given       [ ] 2100           losartan (COZAAR) tablet 100 mg  Dose: 100 mg  Freq: DAILY Route: PO  Start: 03/12/18 1500        1553 (100 mg)-Given        0810 (100 mg)-Given        0952 (100 mg)-Given           melatonin tablet 1 mg  Dose: 1 mg  Freq: AT BEDTIME PRN Route: PO  PRN Reason: sleep  Start: 03/12/18 1459   Admin Instructions: Do not give unless at least 6 hours of uninterrupted sleep is expected.          2246 (1 mg)-Given            metoclopramide (REGLAN) tablet 5 mg  Dose: 5 mg  Freq: 3 TIMES DAILY WITH MEALS Route: PO  Start: 03/12/18 1800   Admin Instructions: Recommend to take before meals. Avoid use if patient has full bowel obstruction or perforation.         1738 (5 mg)-Given        0811 (5 mg)-Given       1146 (5 mg)-Given       1721 (5 mg)-Given        0953 (5  mg)-Given       [ ] 1200       [ ] 1800           mirtazapine (REMERON) tablet 15 mg  Dose: 15 mg  Freq: 2 TIMES DAILY Route: PO  Start: 03/12/18 2100        2057 (15 mg)-Given        0812 (15 mg)-Given       1953 (15 mg)-Given               0953 (15 mg)-Given       [ ] 2100           multivitamin, therapeutic with minerals (THERA-VIT-M) tablet 1 tablet  Dose: 1 tablet  Freq: DAILY Route: PO  Start: 03/12/18 1500        1552 (1 tablet)-Given        0811 (1 tablet)-Given        0953 (1 tablet)-Given           naloxone (NARCAN) injection 0.1-0.4 mg  Dose: 0.1-0.4 mg  Freq: EVERY 2 MIN PRN Route: IV  PRN Reason: opioid reversal  Start: 03/12/18 1459   Admin Instructions: For respiratory rate LESS than or EQUAL to 8.  Partial reversal dose:  0.1 mg titrated q 2 minutes for Analgesia Side Effects Monitoring Sedation Level of 3 (frequently drowsy, arousable, drifts to sleep during conversation).Full reversal dose:  0.4 mg bolus for Analgesia Side Effects Monitoring Sedation Level of 4 (somnolent, minimal or no response to stimulation).  For ordered doses up to 2mg give IVP. Give each 0.4mg over 15 seconds in emergency situations. For non-emergent situations further dilute in 9mL of NS to facilitate titration of response.               omeprazole (priLOSEC) CR capsule 40 mg  Dose: 40 mg  Freq: DAILY Route: PO  Start: 03/12/18 1500        1552 (40 mg)-Given        0811 (40 mg)-Given        0951 (40 mg)-Given           ondansetron (ZOFRAN-ODT) ODT tab 4 mg  Dose: 4 mg  Freq: EVERY 6 HOURS PRN Route: PO  PRN Reasons: nausea,vomiting  Start: 03/12/18 1459   Admin Instructions: This is Step 1 of nausea and vomiting management.  If nausea not resolved in 15 minutes, go to Step 2 prochlorperazine (COMPAZINE). Do not push through foil backing. Peel back foil and gently remove. Place on tongue immediately. Administration with liquid unnecessary  With dry hands, peel back foil backing and gently remove tablet; do not push oral  disintegrating tablet through foil backing; administer immediately on tongue and oral disintegrating tablet dissolves in seconds; then swallow with saliva; liquid not required.              Or  ondansetron (ZOFRAN) injection 4 mg  Dose: 4 mg  Freq: EVERY 6 HOURS PRN Route: IV  PRN Reasons: nausea,vomiting  Start: 03/12/18 1459   Admin Instructions: This is Step 1 of nausea and vomiting management.  If nausea not resolved in 15 minutes, go to Step 2 prochlorperazine (COMPAZINE).  Irritant. For ordered doses up to 4 mg, give IV Push undiluted over 2-5 minutes.               oxybutynin (DITROPAN) tablet 5 mg  Dose: 5 mg  Freq: 2 TIMES DAILY Route: PO  Start: 03/12/18 2100 2057 (5 mg)-Given        0811 (5 mg)-Given       1954 (5 mg)-Given               0954 (5 mg)-Given       [ ] 2100           polyethylene glycol (MIRALAX/GLYCOLAX) Packet 17 g  Dose: 17 g  Freq: 2 TIMES DAILY Route: PO  Start: 03/12/18 2100   Admin Instructions: 1 Packet = 17 grams. Mixed prescribed dose in 8 ounces of water.<br><br>Hold for loose stools  1 Packet = 17 grams. Mixed prescribed dose in 8 ounces of water. Follow with 8 oz. of water.         (2100)-Not Given        (0818)-Not Given       1952 (17 g)-Given               (0950)-Not Given [C]       [ ] 2100           senna-docusate (SENOKOT-S;PERICOLACE) 8.6-50 MG per tablet 1 tablet  Dose: 1 tablet  Freq: 2 TIMES DAILY Route: PO  Start: 03/12/18 2100   Admin Instructions: Hold for loose stools         2057 (1 tablet)-Given        0811 (1 tablet)-Given       1954 (1 tablet)-Given               (0953)-Not Given [C]       [ ] 2100          Completed Medications  Medications 03/08/18 03/09/18 03/10/18 03/11/18 03/12/18 03/13/18 03/14/18         Dose: 1,460 mL  Freq: ONCE Route: IV  Last Dose: Stopped (03/12/18 1238)  Start: 03/12/18 0925   End: 03/12/18 1238        1031 (1,460 mL)-New Bag       1052 (1,460 mL)-Rate/Dose Change [C]       1238-Stopped [C]               Dose: 1,000 mL  Freq:  ONCE Route: IV  Last Dose: Stopped (03/12/18 1031)  Start: 03/12/18 0903   End: 03/12/18 1031        0917 (1,000 mL)-New Bag       1031-Stopped               Dose: 1,000 mg  Freq: ONCE Route: PO  Start: 03/12/18 0924   End: 03/12/18 0957   Admin Instructions: Maximum acetaminophen dose from all sources = 75 mg/kg/day not to exceed 4 gram         0957 (1,000 mg)-Given               Dose: 75 mg  Freq: ONCE Route: PO  Start: 03/12/18 1207   End: 03/12/18 1236        1236 (75 mg)-Given               Dose: 4.5 g  Freq: ONCE Route: IV  Indications of Use: SEPSIS  Indications Comment: Hospital Acquired pneumonia  Last Dose: Stopped (03/12/18 1107)  Start: 03/12/18 1017   End: 03/12/18 1107   Admin Instructions: FIRST DOSE STAT         1031 (4.5 g)-New Bag       1107-Stopped            Discontinued Medications  Medications 03/08/18 03/09/18 03/10/18 03/11/18 03/12/18 03/13/18 03/14/18         Dose: 75 mg  Freq: DAILY Route: PO  Start: 03/12/18 1500   End: 03/12/18 1503               1503-Med Discontinued           Rate: 10 mL/hr   Freq: CONTINUOUS Route: IV  Start: 03/12/18 1500   End: 03/13/18 1442        1552 ( )-New Bag        1442-Med Discontinued          Rate: 125 mL/hr Dose: 1000 mL  Freq: CONTINUOUS Route: IV  Last Dose: Stopped (03/12/18 1500)  Start: 03/12/18 0904   End: 03/12/18 1459   Admin Instructions: Administer after the bolus.         1459-Med Discontinued  1500-Stopped                      INTERAGENCY TRANSFER FORM - NOTES (H&P, Discharge Summary, Consults, Procedures, Therapies)   3/12/2018                       [place] [address] MEDICAL SURGICAL: [phone]               History & Physicals      H&P by Abhishek Gay MD at 3/12/2018 11:21 AM     Author:  Abhishek Gay MD Service:  Hospitalist Author Type:  Physician    Filed:  3/12/2018  2:28 PM Date of Service:  3/12/2018 11:21 AM Creation Time:  3/12/2018 11:20 AM    Status:  Signed :  Abhishek Gay MD (Physician)         [facility]  "History and Physical    Jade Caba MRN# [id number]   Age: 84 year old YOB: 1933     Date of Admission:  3/12/2018    Home clinic:[KP1.1] Magdi Pinto MD[KP1.2]          Assessment and Plan:   Assessment:[KP1.1]   Jade Caba[KP1.3] is an 84-year-old nursing home patient who came to attention today due to feeling ill.  She reports onset of cough with significant shortness of breath last night and although she is a fever she denies sweats and rigors.      On evaluation in the emergency department patient's found to have the left lower lung field pneumonia.  She is found to be hypoxic with an elevated white cell count and a mildly elevated lactic acid level.  Overall the patient is nontoxic-appearing reports that despite fluid resuscitation she does not feel significantly more short of breath.    Prior medical history is significant for recent non-STEMI as well as peripheral vascular disease and atrial flutter.  She does endorse a history of heart failure symptoms for which she is on a low-salt diet.  She also indicates that she is a quite active woman and ambulates without assistive devices typically.[KP1.2]    Dx:  1.[KP1.1]  Community-acquired pneumonia with left-sided infiltrate.  Patient has mild acute hypoxic respiratory failure associated with this.[KP1.2]  2.[KP1.1]  History of coronary artery disease status post stenting again last month.  Otherwise the angiogram was very reassuring.  3.  Peripheral vascular disease.  Patient has a history of renal arterial stenosis that was stented as well as carotid disease for which she has undergone bilateral endarterectomies.  4.  Seizure disorder.[KP1.2]      Plan:[KP1.1]   1.  Admit to inpatient.  2.  Ceftriaxone and azithromycin.  I note the patient had been started on Zosyn in the emergency department for \"healthcare related pneumonia\".  3.  Antihypertensives as well as heart failure and usual home medications are resumed.[KP1.2]             Chief " Complaint:   Cough, shortness of breath, fever.     History is obtained from the patient, EMR and daughter, who is present at the bedside.[KP1.1]      Ms. Caba reports that she had a very difficult night.  Up until bedtime last night the patient was able to get around comfortably.  She apparently coughed all night long.  She does not typically sleep with her head elevated and did not feel that these symptoms were compatible with heart failure.  This morning at [facility] she had her temperature measured at 101.9 with an initial oxygen saturation of 98% in room air.  For those reasons she was sent to the hospital for further evaluation.    When I ask Ms. Caba to better describe her discomfort she is unable to.  She denies significant abnormalities related to myalgias but seems to be focused on shortness of breath and her dry cough.  She does indicate she has not been able to bring up any sputum.  She also though denies dramatic weakness, nausea, vomiting.  She has had some loose bowel movements recently but these number less than 2 in any given 24 hour period.    The patient's daughter who is present at the bedside also gives some clarifying history:  this includes that the patient is independent with activity.  She does endorse also DNR/DNI though she wants other restorative cares.[KP1.2]          Past Medical History:[KP1.1]     Past Medical History:   Diagnosis Date     Anxiety      Arthritis      Atrial flutter (H)      C1 cervical fracture (H)      CAD (coronary artery disease)[KP1.4], s/p angio with RCA stenting in 2/2018. Pt describes hx CHF episodes.[KP1.2]       Carotid artery stenosis      CKD (chronic kidney disease)      Depression      H/O alcohol abuse      Hypercholesterolemia      Hypertension      Renal artery stenosis (H)      Seizure disorder (H)      Subarachnoid hemorrhage (H) 2014[KP1.4]             Past Surgical History:[KP1.1]      Past Surgical History:   Procedure Laterality Date      APPENDECTOMY       CAROTID ENDARTERECTOMY Left      Cataract surgery Right     will have laser eye surgery     CHOLECYSTECTOMY       Coronary stents x 3       HYSTERECTOMY      at age of 40-not cancerus or precancerous      JOINT REPLACEMENT Bilateral      Stenting of renal artery[KP1.4]               Social History:[KP1.1]     Social History   Substance Use Topics     Smoking status: Never Smoker     Smokeless tobacco: Never Used     Alcohol use No[KP1.4]      The patient's   more than 20 years ago.[KP1.2]         Family History:[KP1.1]     Family History   Problem Relation Age of Onset     C.A.D. Mother      C.A.D. Brother      C.A.D. Sister      C.A.D. Brother      CANCER Sister      Breast cancer     HEART DISEASE Son[KP1.4]    reviewed and considered noncontributory to this hospitalization.[KP1.2]         Immunizations:[KP1.1]     Immunization History   Administered Date(s) Administered     Influenza (High Dose) 3 valent vaccine 10/03/2013, 10/03/2016, 10/02/2017     Influenza (IIV3) PF 10/22/2012     Pneumo Conj 13-V (2010&after) 2016     Pneumococcal 23 valent 2013     TDAP Vaccine (Boostrix) 2013[KP1.5]             Allergies:[KP1.1]     Allergies   Allergen Reactions     No Known Allergies[KP1.5]              Medications:[KP1.1]   Clopidogrel 75 mg p.o. daily  Atorvastatin 40 mg p.o. daily  Mirtazapine 15 mg p.o. twice daily  Alendronate 70 mg weekly on Wednesday  Furosemide 20 mg p.o. daily next vitamin D supplement  Carvedilol 12.5 mg p.o. twice daily  Amlodipine 10 mg p.o. daily  Citalopram 30 mg p.o. Daily  Losartan 100 mg p.o. daily  Meloxicam 15 mg p.o. daily as needed next aspirin 81 mg p.o. daily next metoclopramide 5 mg p.o. 3 times daily with meals next Omeprazole 40 mg p.o. daily   Levetiracetam 500 mg p.o. twice daily[KP1.2]         Review of Systems:[KP1.1]   A comprehensive 10-system was performed and found to be negative except as described in this note.           Physical Exam:   Vitals were reviewed[KP1.2]  Temp: 98.5  F (36.9  C) Temp src: Oral BP: 129/76 Pulse: 78 Heart Rate: 87 Resp: 20 SpO2: 96 % O2 Device: Nasal cannula Oxygen Delivery: 4 LPM[KP1.5]  Constitutional: Awake, alert, cooperative, no apparent distress, and appears stated age.  Eyes: Lids and lashes normal, pupils equal, round and reactive to light with post-cataract surgical change. Extra ocular muscles intact, sclera clear, conjunctiva normal.  ENT: Normocephalic, without obvious abnormality, atraumatic, oral pharynx with moist mucus membranes, tonsils without erythema or exudates, gums normal and good dentition.  Neck: Supple, symmetrical, trachea midline, no adenopathy, thyroid symmetric, not enlarged and no tenderness, skin normal.  Hematologic / Lymphatic: No cervical lymphadenopathy and no supraclavicular lymphadenopathy.  Back: Symmetric, no curvature, spinous processes are non-tender on palpation, paraspinous muscles are non-tender on palpation, no costal vertebral tenderness.  Lungs: No increased work of breathing, good air exchange, clear to auscultation bilaterally, no crackles or wheezing.  Cardiovascular: Regular rate and rhythm, normal S1 and S2, no S3 or S4, and no murmur noted.  Abdomen: No scars, normal bowel sounds, soft, non-distended, non-tender, no masses palpated, no hepatosplenomegaly.  Musculoskeletal: No redness, warmth, or swelling of the joints.  Full range of motion noted.  Motor strength is 5 out of 5 all extremities bilaterally.  Tone is normal.  Neurologic: Awake, alert, oriented to name, place and time.  Cranial nerves II-XII are grossly intact.  Motor is 5 out of 5 bilaterally.    Neuropsychiatric: Normal affect, mood, orientation, memory and insight.  Skin: No rashes, erythema, pallor, petechia or purpura.          Data[KP1.2]:[KP1.1]     Results for orders placed or performed during the hospital encounter of 03/12/18 (from the past 24 hour(s))   CBC with platelets  differential   Result Value Ref Range    WBC 17.6 (H) 4.0 - 11.0 10e9/L    RBC Count 4.28 3.8 - 5.2 10e12/L    Hemoglobin 13.6 11.7 - 15.7 g/dL    Hematocrit 40.7 35.0 - 47.0 %    MCV 95 78 - 100 fl    MCH 31.8 26.5 - 33.0 pg    MCHC 33.4 31.5 - 36.5 g/dL    RDW 12.5 10.0 - 15.0 %    Platelet Count 227 150 - 450 10e9/L    Diff Method Automated Method     % Neutrophils 87.2 %    % Lymphocytes 5.9 %    % Monocytes 5.8 %    % Eosinophils 0.3 %    % Basophils 0.3 %    % Immature Granulocytes 0.5 %    Nucleated RBCs 0 0 /100    Absolute Neutrophil 15.4 (H) 1.6 - 8.3 10e9/L    Absolute Lymphocytes 1.0 0.8 - 5.3 10e9/L    Absolute Monocytes 1.0 0.0 - 1.3 10e9/L    Absolute Eosinophils 0.1 0.0 - 0.7 10e9/L    Absolute Basophils 0.1 0.0 - 0.2 10e9/L    Abs Immature Granulocytes 0.1 0 - 0.4 10e9/L    Absolute Nucleated RBC 0.0    Comprehensive metabolic panel   Result Value Ref Range    Sodium 140 133 - 144 mmol/L    Potassium 4.2 3.4 - 5.3 mmol/L    Chloride 106 94 - 109 mmol/L    Carbon Dioxide 26 20 - 32 mmol/L    Anion Gap 8 3 - 14 mmol/L    Glucose 126 (H) 70 - 99 mg/dL    Urea Nitrogen 19 7 - 30 mg/dL    Creatinine 0.86 0.52 - 1.04 mg/dL    GFR Estimate 62 >60 mL/min/1.7m2    GFR Estimate If Black 76 >60 mL/min/1.7m2    Calcium 8.8 8.5 - 10.1 mg/dL    Bilirubin Total 0.7 0.2 - 1.3 mg/dL    Albumin 3.4 3.4 - 5.0 g/dL    Protein Total 7.2 6.8 - 8.8 g/dL    Alkaline Phosphatase 54 40 - 150 U/L    ALT 17 0 - 50 U/L    AST 14 0 - 45 U/L   Lactic acid whole blood   Result Value Ref Range    Lactic Acid 2.2 (H) 0.7 - 2.0 mmol/L   Blood culture   Result Value Ref Range    Specimen Description Blood Right Hand     Special Requests Aerobic and anaerobic bottles received     Culture Micro PENDING    Blood gas venous   Result Value Ref Range    Ph Venous 7.45 (H) 7.32 - 7.43 pH    PCO2 Venous 40 40 - 50 mm Hg    PO2 Venous 60 (H) 25 - 47 mm Hg    Bicarbonate Venous 28 21 - 28 mmol/L    Base Excess Venous 3.5 mmol/L    FIO2 4     Troponin I   Result Value Ref Range    Troponin I ES <0.015 0.000 - 0.045 ug/L   EKG 12 lead   Result Value Ref Range    Interpretation ECG Click View Image link to view waveform and result    Influenza A/B antigen   Result Value Ref Range    Influenza A/B Agn Specimen Nasal     Influenza A Negative NEG^Negative    Influenza B Negative NEG^Negative   XR Chest 2 Views    Narrative    CHEST TWO VIEWS March 12, 2018 9:36 AM    HISTORY: Fever.    COMPARISON:  2/2/2018.      Impression    IMPRESSION: New patchy opacities in the mid and lower left lung,  worrisome for pneumonia. No other interval change. Mild to moderate  vertebral body height loss with what appears to be T9, unchanged.   Blood culture   Result Value Ref Range    Specimen Description Blood Left Arm     Culture Micro PENDING    Lactic acid whole blood   Result Value Ref Range    Lactic Acid 1.1 0.7 - 2.0 mmol/L   UA with Microscopic   Result Value Ref Range    Color Urine Yellow     Appearance Urine Slightly Cloudy     Glucose Urine Negative NEG^Negative mg/dL    Bilirubin Urine Negative NEG^Negative    Ketones Urine Negative NEG^Negative mg/dL    Specific Gravity Urine 1.018 1.003 - 1.035    Blood Urine Large (A) NEG^Negative    pH Urine 5.0 5.0 - 7.0 pH    Protein Albumin Urine 30 (A) NEG^Negative mg/dL    Urobilinogen mg/dL 0.0 0.0 - 2.0 mg/dL    Nitrite Urine Positive (A) NEG^Negative    Leukocyte Esterase Urine Small (A) NEG^Negative    Source Midstream Urine     WBC Urine 18 (H) 0 - 5 /HPF    RBC Urine 15 (H) 0 - 2 /HPF    Bacteria Urine Many (A) NEG^Negative /HPF    Squamous Epithelial /HPF Urine <1 0 - 1 /HPF    Mucous Urine Present (A) NEG^Negative /LPF[KP1.6]      EKG results:   Performed on admission        Sinus tachycardic rhythm, normal axis, no acute ischemic ST segment or T wave changes      All imaging studies reviewed by me.[KP1.2]      Attestation:[KP1.1]  I have reviewed today's vital signs, notes, medications, labs and  imaging.  Total time: 35 minutes[KP1.2]     Abhishek Gay MD[KP1.1]     Revision History        User Key Date/Time User Provider Type Action    > KP1.3 3/12/2018  2:28 PM Abhishek Gay MD Physician Sign     KP1.6 3/12/2018  2:03 PM Abhishek Gay MD Physician      KP1.5 3/12/2018  2:02 PM Abhishek Gay MD Physician      KP1.4 3/12/2018  2:01 PM Abhishek Gay MD Physician      KP1.2 3/12/2018  1:56 PM Abhishek Gay MD Physician      KP1.1 3/12/2018 11:20 AM Abhishek Gay MD Physician                   Discharge Summaries     No notes of this type exist for this encounter.         Consult Notes      Consults by Perla Rubin CM at 3/14/2018 11:52 AM     Author:  Perla Rubin CM Service:  Care Coordinator Author Type:      Filed:  3/14/2018 11:54 AM Date of Service:  3/14/2018 11:52 AM Creation Time:  3/14/2018 11:49 AM    Status:  Addendum :  Perla Rubin CM ()     Consult Orders:    1. Care Coordinator IP Consult [[id number]] ordered by Abhishek Gay MD at 03/13/18 1341                Care Transition Initial Assessment - RN    Reason For Consult: care coordination/care conference, discharge planning   Met with: Patient.  DATA   Principal Problem:    Aspiration pneumonitis (H)  Active Problems:    HTN (hypertension)    Pneumonia    Klebsiella cystitis    Antibiotic-associated diarrhea       Cognitive Status: awake, alert and oriented.  Primary Care Clinic [facility]: [facility]  Primary Care MD Name: Jaden ALDANA  Contact information and PCP information verified: Yes  Lives With: alone  Living Arrangements: assisted living     Description of Support System: Supportive, Involved   Who is your support system?: Children, Facility resident(s)/Staff       Insurance concerns: No Insurance issues identified  ASSESSMENT  Patient currently receives the following services:  Laundry services, 1 meal/day, shower assistance 2 X/week, medication administration.         Identified  issues/concerns regarding health management: Discussed importance of completing all the antibiotic prescribed at discharge. Also, importance of informing family/facility staff if not feeling well.[BS1.1] Reinforce importance of following a low sodium diet.[BS1.2]     PLAN  Financial costs for the patient were not discussed.  Patient given options and choices for discharge.  Patient/family is agreeable to the plan?  Yes  Patient anticipates discharging back to [facility].     Other Resources: Other (see comment) (PNA Action Plan)  Patient anticipates needs for home equipment: No  Discharge Planner   Discharge Plans in progress: Yes  Barriers to discharge plan: None  Plan/Disposition: Home   Appointments: Patient is followed by MD from [facility] at facility. LM with Nursing Office to make sure patient is on MDs schedule within 7-10 days.     CM will continue to follow patient until discharge for any additional needs.     Eden Rubin, RN, BSN, CTS  [facility]  [phone][BS1.1]           Revision History        User Key Date/Time User Provider Type Action    > BS1.2 3/14/2018 11:54 AM Perla Rubin CM  Addend     BS1.1 3/14/2018 11:52 AM Perla Rubin CM  Sign                     Progress Notes - Physician (Notes for yesterday and today)      Progress Notes by Abhishek Gay MD at 3/13/2018  7:35 AM     Author:  Abhishek aGy MD Service:  Hospitalist Author Type:  Physician    Filed:  3/13/2018  6:09 PM Date of Service:  3/13/2018  7:35 AM Creation Time:  3/13/2018  7:35 AM    Status:  Addendum :  Abhishek Gay MD (Physician)         [facility]  Hospitalist Progress Note  Abhishek Gay MD 03/13/2018    Reason for Stay (Diagnosis): left sided pneumonia left lobar pneumonia         Assessment and Plan:      Summary of Stay: Jade Caba is a 84 year old female admitted on 3/12/2018 with left lobe pneumonia.  She had had a prodrome of just 12-24 hours  "with fever, shortness of breath, cough and generalized malaise.    Problem List:   1. Community-acquired pneumonia with left-sided infiltrate noted.  Mild acute hypoxic respiratory failure ongoing; no known underlying lung disease.[KP1.1]  Question whether this entire event was simply an aspiration event as it seems to have resolved so briskly.[KP1.2]    2. History of coronary artery disease status post stenting again last month.  Otherwise the angiogram was very reassuring.  3. Peripheral vascular disease.  Patient has a history of renal arterial stenosis that was stented as well as carotid disease for which she has undergone bilateral endarterectomies.  4. Seizure disorder    Plan:  1.  Cont ceftriaxone and azithromycin.   2.  Cont usual heart medications including amlodipine 10 mg daily, carvedilol 12.5 mg twice daily, aspirin and Plavix, losartan 100 mg daily, furosemide 20 mg daily.    DVT Prophylaxis: Pneumatic Compression Devices  Code Status: DNR / DNI  Discharge Dispo: return home  Estimated Disch Date / # of Days until Disch: likely 3+ days.        Interval History (Subjective):      No unexpected events recorded overnight.[KP1.1]     At this time Ms. Caba indicates she is feeling entirely better. She denies fevers and feels that her cough is fully resolved.  She states that she has been up and ambulating without significant problems.    I called and spoke with her daughter by telephone.[KP1.3]                  Physical Exam:      Last Vital Signs:  BP (!) 122/39 (BP Location: Right arm)  Pulse 78  Temp 97.4  F (36.3  C) (Oral)  Resp 20  Ht 1.575 m (5' 2\")  Wt 82.5 kg (181 lb 12.8 oz)  SpO2 94%  BMI 33.25 kg/m2[KP1.1]    I/O last 3 completed shifts:  In: -   Out: 1200 [Urine:1200][KP1.4]    Constitutional: Awake, alert, cooperative, no apparent distress   Respiratory: Clear to auscultation bilaterally, no crackles or wheezing   Cardiovascular: Regular rate and rhythm, normal S1 and S2, and no " murmur noted   Abdomen: Normal bowel sounds, soft, non-distended, non-tender   Skin: No rashes, no cyanosis, dry to touch   Neuro: Alert and oriented x3, no weakness, numbness, memory loss   Extremities: No edema, normal range of motion   Other(s):        All other systems: Negative          Medications:      All current medications were reviewed with changes reflected in problem list.         Data:      All new lab and imaging data was reviewed.   Labs[KP1.1]/Imaging:[KP1.3]  Results for orders placed or performed during the hospital encounter of 03/12/18 (from the past 24 hour(s))   Lactic acid level STAT   Result Value Ref Range    Lactic Acid 0.7 0.7 - 2.0 mmol/L   Basic metabolic panel   Result Value Ref Range    Sodium 140 133 - 144 mmol/L    Potassium 4.4 3.4 - 5.3 mmol/L    Chloride 108 94 - 109 mmol/L    Carbon Dioxide 26 20 - 32 mmol/L    Anion Gap 6 3 - 14 mmol/L    Glucose 97 70 - 99 mg/dL    Urea Nitrogen 15 7 - 30 mg/dL    Creatinine 0.76 0.52 - 1.04 mg/dL    GFR Estimate 72 >60 mL/min/1.7m2    GFR Estimate If Black 87 >60 mL/min/1.7m2    Calcium 8.3 (L) 8.5 - 10.1 mg/dL[KP1.2]            Revision History        User Key Date/Time User Provider Type Action    > [N/A] 3/13/2018  6:09 PM Abhishek Gay MD Physician Addend     KP1.2 3/13/2018  6:08 PM Abhishek Gay MD Physician Sign     KP1.3 3/13/2018  6:06 PM Abhishek Gay MD Physician      KP1.4 3/13/2018  7:42 AM Abhishek Gay MD Physician      KP1.1 3/13/2018  7:35 AM Abhishek Gay MD Physician                   Procedure Notes     No notes of this type exist for this encounter.      Progress Notes - Therapies (Notes from 03/11/18 through 03/14/18)     No notes of this type exist for this encounter.                                          INTERAGENCY TRANSFER FORM - LAB / IMAGING / EKG / EMG RESULTS   3/12/2018                       [place] [address] [facility]: [phone]            Unresulted Labs     None         Lab Results - 3  Days      Procalcitonin [[id number]]  Resulted: 03/14/18 1145, Result status: Final result    Ordering provider: Abhishek Gay MD  03/14/18 0001 Resulting lab: [facility]    Specimen Information    Type Source Collected On   Blood  03/14/18 0712          Components       Value Reference Range Flag Lab   Procalcitonin 0.17 ng/ml  [facility]   Comment:         0.05-0.24 ng/ml Low risk of systemic bacterial infection. Local bacterial   infection possible.  Recommendation: Assess other clinical features of   infection. Discourage antibiotics unless strong clinical suspicion for serious   infection.              Urine Culture Aerobic Bacterial [[id number]] (Abnormal)  Resulted: 03/14/18 0928, Result status: Preliminary result    Ordering provider: Uzma Lau MD  03/12/18 0903 Resulting lab: [facility]    Specimen Information    Type Source Collected On   Midstream Urine  03/12/18 1158          Components       Value Reference Range Flag Lab   Specimen Description Midstream Urine      Special Requests Specimen received in preservative   75   Culture Micro --  A 225   Result:         10,000 to 50,000 colonies/mL  Klebsiella pneumoniae  Susceptibility testing in progress     Culture Micro --  A 225   Result:         10,000 to 50,000 colonies/mL  Strain 2  Klebsiella pneumoniae  Susceptibility testing in progress     Culture Micro --   225   Result:         <10,000 colonies/mL  urogenital ritesh  Susceptibility testing not routinely done              Basic metabolic panel [[id number]]  Resulted: 03/14/18 0753, Result status: Final result    Ordering provider: Abhishek Gay MD  03/14/18 0001 Resulting lab: [facility]    Specimen Information    Type Source Collected On   Blood  03/14/18 0712          Components       Value Reference Range Flag Lab   Sodium 140 133 - 144 mmol/L  FrRdHs   Potassium 4.1 3.4 - 5.3 mmol/L  FrRdHs   Chloride 104 94 - 109 mmol/L   FrRdHs   Carbon Dioxide 32 20 - 32 mmol/L  FrRdHs   Anion Gap 4 3 - 14 mmol/L  FrRdHs   Glucose 97 70 - 99 mg/dL  FrRdHs   Urea Nitrogen 12 7 - 30 mg/dL  FrRdHs   Creatinine 0.82 0.52 - 1.04 mg/dL  FrRdHs   GFR Estimate 67 >60 mL/min/1.7m2  FrRdHs   Comment:  Non  GFR Calc   GFR Estimate If Black 80 >60 mL/min/1.7m2  FrRdHs   Comment:  African American GFR Calc   Calcium 8.9 8.5 - 10.1 mg/dL  FrRdHs            Blood culture [[id number]]  Resulted: 03/14/18 0704, Result status: Preliminary result    Ordering provider: Uzma Lau MD  03/12/18 0922 Resulting lab: [facility]    Specimen Information    Type Source Collected On   Blood  03/12/18 1007   Comment:  Left Arm          Components       Value Reference Range Flag Lab   Specimen Description Blood Left Arm      Culture Micro No growth after 2 days   225            Blood culture [[id number]]  Resulted: 03/14/18 0704, Result status: Preliminary result    Ordering provider: Uzma Lau MD  03/12/18 0903 Resulting lab: [facility]    Specimen Information    Type Source Collected On   Blood Arm, Right 03/12/18 0850   Comment:  Right Hand          Components       Value Reference Range Flag Lab   Specimen Description Blood Right Hand      Special Requests Aerobic and anaerobic bottles received   FrRdHs   Culture Micro No growth after 2 days   225            Basic metabolic panel [[id number]] (Abnormal)  Resulted: 03/13/18 0716, Result status: Final result    Ordering provider: Abhishek Gay MD  03/13/18 0001 Resulting lab: [facility]    Specimen Information    Type Source Collected On   Blood  03/13/18 0645          Components       Value Reference Range Flag Lab   Sodium 140 133 - 144 mmol/L  FrRdHs   Potassium 4.4 3.4 - 5.3 mmol/L  FrRdHs   Chloride 108 94 - 109 mmol/L  FrRdHs   Carbon Dioxide 26 20 - 32 mmol/L  FrRdHs   Anion Gap 6 3 - 14 mmol/L  FrRdHs   Glucose 97  70 - 99 mg/dL  FrRdHs   Urea Nitrogen 15 7 - 30 mg/dL  FrRdHs   Creatinine 0.76 0.52 - 1.04 mg/dL  FrRdHs   GFR Estimate 72 >60 mL/min/1.7m2  FrRdHs   Comment:  Non  GFR Calc   GFR Estimate If Black 87 >60 mL/min/1.7m2  FrRdHs   Comment:  African American GFR Calc   Calcium 8.3 8.5 - 10.1 mg/dL L FrRdHs            Lactic acid level STAT [[id number]]  Resulted: 03/12/18 2051, Result status: Final result    Ordering provider: Abhishek Gay MD  03/12/18 2022 Resulting lab: [facility]    Specimen Information    Type Source Collected On   Blood  03/12/18 2043          Components       Value Reference Range Flag Lab   Lactic Acid 0.7 0.7 - 2.0 mmol/L  FrRdHs            UA with Microscopic [[id number]] (Abnormal)  Resulted: 03/12/18 1320, Result status: Final result    Ordering provider: Uzma Lau MD  03/12/18 0903 Resulting lab: [facility]    Specimen Information    Type Source Collected On   Midstream Urine Urine clean catch 03/12/18 1158          Components       Value Reference Range Flag Lab   Color Urine Yellow   FrRdHs   Appearance Urine Slightly Cloudy   FrRdHs   Glucose Urine Negative NEG^Negative mg/dL  FrRdHs   Bilirubin Urine Negative NEG^Negative  FrRdHs   Ketones Urine Negative NEG^Negative mg/dL  FrRdHs   Specific Gravity Urine 1.018 1.003 - 1.035  FrRdHs   Blood Urine Large NEG^Negative A FrRdHs   pH Urine 5.0 5.0 - 7.0 pH  FrRdHs   Protein Albumin Urine 30 NEG^Negative mg/dL A FrRdHs   Urobilinogen mg/dL 0.0 0.0 - 2.0 mg/dL  FrRdHs   Nitrite Urine Positive NEG^Negative A FrRdHs   Leukocyte Esterase Urine Small NEG^Negative A FrRdHs   Source Midstream Urine   FrRdHs   WBC Urine 18 0 - 5 /HPF H FrRdHs   RBC Urine 15 0 - 2 /HPF H FrRdHs   Bacteria Urine Many NEG^Negative /HPF A FrRdHs   Squamous Epithelial /HPF Urine <1 0 - 1 /HPF  FrRdHs   Mucous Urine Present NEG^Negative /LPF A FrRdHs            Troponin I [[id number]]  Resulted: 03/12/18 1125, Result  status: Final result    Ordering provider: Uzma Lau MD  03/12/18 0850 Resulting lab: [facility]    Specimen Information    Type Source Collected On     03/12/18 0850          Components       Value Reference Range Flag Lab   Troponin I ES <0.015 0.000 - 0.045 ug/L  [facility]   Comment:         The 99th percentile for upper reference range is 0.045 ug/L.  Troponin values   in the range of 0.045 - 0.120 ug/L may be associated with risks of adverse   clinical events.              Lactic acid whole blood [[id number]]  Resulted: 03/12/18 1104, Result status: Final result    Ordering provider: Uzma Lau MD  03/12/18 1039 Resulting lab: [facility]    Specimen Information    Type Source Collected On   Blood  03/12/18 1050          Components       Value Reference Range Flag Lab   Lactic Acid 1.1 0.7 - 2.0 mmol/L  [facility]            Influenza A/B antigen [[id number]]  Resulted: 03/12/18 0957, Result status: Final result    Ordering provider: Uzma Lau MD  03/12/18 0903 Resulting lab: [facility]    Specimen Information    Type Source Collected On   Nasal Nasopharyngeal 03/12/18 0920          Components       Value Reference Range Flag Lab   Influenza A/B Agn Specimen Nasal   FrRdHs   Influenza A Negative NEG^Negative  FrRdHs   Influenza B Negative NEG^Negative  FrRd   Comment:         Test results must be correlated with clinical data. If necessary, results   should be confirmed by a molecular assay or viral culture.              Comprehensive metabolic panel [[id number]] (Abnormal)  Resulted: 03/12/18 0951, Result status: Final result    Ordering provider: Uzma Lau MD  03/12/18 0903 Resulting lab: [facility]    Specimen Information    Type Source Collected On   Blood  03/12/18 0850          Components       Value Reference Range Flag Lab   Sodium 140 133 - 144 mmol/L  FrRdHs   Potassium 4.2 3.4 - 5.3 mmol/L  FrRdHs   Chloride 106 94 - 109  mmol/L  FrRdHs   Carbon Dioxide 26 20 - 32 mmol/L  FrRdHs   Anion Gap 8 3 - 14 mmol/L  FrRdHs   Glucose 126 70 - 99 mg/dL H FrRdHs   Urea Nitrogen 19 7 - 30 mg/dL  FrRdHs   Creatinine 0.86 0.52 - 1.04 mg/dL  FrRdHs   GFR Estimate 62 >60 mL/min/1.7m2  FrRdHs   Comment:  Non  GFR Calc   GFR Estimate If Black 76 >60 mL/min/1.7m2  FrRdHs   Comment:  African American GFR Calc   Calcium 8.8 8.5 - 10.1 mg/dL  FrRdHs   Bilirubin Total 0.7 0.2 - 1.3 mg/dL  FrRdHs   Albumin 3.4 3.4 - 5.0 g/dL  FrRdHs   Protein Total 7.2 6.8 - 8.8 g/dL  FrRdHs   Alkaline Phosphatase 54 40 - 150 U/L  FrRdHs   ALT 17 0 - 50 U/L  FrRdHs   AST 14 0 - 45 U/L  FrRdHs            CBC with platelets differential [[id number]] (Abnormal)  Resulted: 03/12/18 0926, Result status: Final result    Ordering provider: Uzma Lau MD  03/12/18 0903 Resulting lab: [facility]    Specimen Information    Type Source Collected On   Blood  03/12/18 0850          Components       Value Reference Range Flag Lab   WBC 17.6 4.0 - 11.0 10e9/L H FrRdHs   RBC Count 4.28 3.8 - 5.2 10e12/L  FrRdHs   Hemoglobin 13.6 11.7 - 15.7 g/dL  FrRdHs   Hematocrit 40.7 35.0 - 47.0 %  FrRdHs   MCV 95 78 - 100 fl  FrRdHs   MCH 31.8 26.5 - 33.0 pg  FrRdHs   MCHC 33.4 31.5 - 36.5 g/dL  FrRdHs   RDW 12.5 10.0 - 15.0 %  FrRdHs   Platelet Count 227 150 - 450 10e9/L  FrRdHs   Diff Method Automated Method   FrRdHs   % Neutrophils 87.2 %  FrRdHs   % Lymphocytes 5.9 %  FrRdHs   % Monocytes 5.8 %  FrRdHs   % Eosinophils 0.3 %  FrRdHs   % Basophils 0.3 %  FrRdHs   % Immature Granulocytes 0.5 %  FrRdHs   Nucleated RBCs 0 0 /100  FrRdHs   Absolute Neutrophil 15.4 1.6 - 8.3 10e9/L H FrRdHs   Absolute Lymphocytes 1.0 0.8 - 5.3 10e9/L  FrRdHs   Absolute Monocytes 1.0 0.0 - 1.3 10e9/L  FrRdHs   Absolute Eosinophils 0.1 0.0 - 0.7 10e9/L  FrRdHs   Absolute Basophils 0.1 0.0 - 0.2 10e9/L  FrRdHs   Abs Immature Granulocytes 0.1 0 - 0.4 10e9/L  FrRdHs   Absolute Nucleated RBC  0.0   FrRdHs            Blood gas venous [[id number]] (Abnormal)  Resulted: 03/12/18 0924, Result status: Final result    Ordering provider: Uzma Lau MD  03/12/18 0903 Resulting lab: [facility]    Specimen Information    Type Source Collected On   Blood  03/12/18 0850          Components       Value Reference Range Flag Lab   Ph Venous 7.45 7.32 - 7.43 pH H FrRdHs   PCO2 Venous 40 40 - 50 mm Hg  FrRdHs   PO2 Venous 60 25 - 47 mm Hg H FrRdHs   Bicarbonate Venous 28 21 - 28 mmol/L  FrRdHs   Base Excess Venous 3.5 mmol/L  FrRdHs   Comment:  Abnormal Result, Ref range: -7.7 to 1.9   FIO2 4   FrRdHs   Comment:  Liters/minute  NC              Lactic acid whole blood [[id number]] (Abnormal)  Resulted: 03/12/18 0922, Result status: Final result    Ordering provider: Uzma Lau MD  03/12/18 0903 Resulting lab: [facility]    Specimen Information    Type Source Collected On   Blood  03/12/18 0850          Components       Value Reference Range Flag Lab   Lactic Acid 2.2 0.7 - 2.0 mmol/L H FrRdHs            Testing Performed By     Lab - Abbreviation Name Director Address Valid Date Range    12 - [facility] Unknown [address] [facility] [address] 05/08/15 1057 - Present    14 - [facility] Unknown [address] [place] [address] 05/08/15 1057 - Present    75 - Unknown [facility] Unknown [address] [facility] [address] 01/15/15 1019 - Present    225 - Unknown [facility] Unknown [address] [facility] [address] 12/19/14 0954 - Present               Imaging Results - 3 Days      XR Chest 2 Views [[id number]]  Resulted: 03/12/18 1548, Result status: Final result    Ordering provider: Uzma Lau MD  03/12/18 0903 Resulted by: Jade Ross MD    Performed: 03/12/18 0926 - 03/12/18 0936 Resulting lab: RADIOLOGY RESULTS    Narrative:       CHEST  TWO VIEWS March 12, 2018 9:36 AM    HISTORY: Fever.    COMPARISON:  2/2/2018.      Impression:       IMPRESSION: New patchy opacities in the mid and lower left lung,  worrisome for pneumonia. No other interval change. Mild to moderate  vertebral body height loss with what appears to be T9, unchanged.    LAURO RIOS MD      Testing Performed By     Lab - Abbreviation Name Director Address Valid Date Range    104 - [facility] RADIOLOGY RESULTS Unknown Unknown 02/16/05 1553 - Present            Encounter-Level Documents:     There are no encounter-level documents.      Order-Level Documents:     There are no order-level documents.

## 2018-03-12 NOTE — PROGRESS NOTES
Rec'd notification that client was admitted to [facility], dx Pneumonia. EPIC notes reviewed.  CM spoke early with Betsey ZHAO at [facility] regarding transition.  Call placed to Idalmis ZHAO at [facility] to introduce myself and share community POC. CM to follow.  Holly Guajardo RN, BC  Supervisor [facility]   [phone] (Fax)

## 2018-03-12 NOTE — ED NOTES
[facility]  ED Nurse Handoff Report    Jade Caba is a 84 year old female   ED Chief complaint: Shortness of Breath  . ED Diagnosis:   Final diagnoses:   Pneumonia of left lung due to infectious organism, unspecified part of lung     Allergies:   Allergies   Allergen Reactions     No Known Allergies        Code Status: DNR / DNI  Activity level - Baseline/Home:  Stand with Assist. Activity Level - Current:   Stand with Assist. Lift room needed: No. Bariatric: No   Needed: No   Isolation: No. Infection: Not Applicable.     Vital Signs:   Vitals:    03/12/18 1230 03/12/18 1236 03/12/18 1237 03/12/18 1241   BP: 124/58      Pulse:       Resp:       Temp:    98.5  F (36.9  C)   TempSrc:    Oral   SpO2: 93% 95% 95%        Cardiac Rhythm:  ,      Pain level:    Patient confused: No. Patient Falls Risk: Yes.   Elimination Status: Has voided, commode at bedside. Pt transfers well with steady gait and balance with assist of one.    Patient Report - Initial Complaint: Shortness of breath. Focused Assessment:    09:00 Respiratory Respiratory - Respiratory WDL:  WDL except; all Rhythm/Pattern, Respiratory: shortness of breath reported; dyspnea on exertion; pattern regular (Pt c/o onset SOB yesterday with accompanied wheezing. Pt actually denying feelings of SOB at this time, RR20) Expansion/Accessory Muscles/Retractions: no use of accessory muscles Lung Fields: ARNALDO; RUL; RML; LLL; RLL ARNALDO Breath Sounds: clear LLL Breath Sounds: diminished RUL Breath Sounds: clear RML Breath Sounds: clear RLL Breath Sounds: diminished  Respiratory Assistance - Treatment/Device/Implant:  (Pt not on O2 at home, but placed on 4L NC by EMS with sats increasing >94%, pt maintained at 4L in ED)       Tests Performed: EKG, CXray, Labs, UA. Abnormal Results:   Labs Ordered and Resulted from Time of ED Arrival Up to the Time of Departure from the ED   CBC WITH PLATELETS DIFFERENTIAL - Abnormal; Notable for the following:         Result Value    WBC 17.6 (*)     Absolute Neutrophil 15.4 (*)     All other components within normal limits   COMPREHENSIVE METABOLIC PANEL - Abnormal; Notable for the following:     Glucose 126 (*)     All other components within normal limits   LACTIC ACID WHOLE BLOOD - Abnormal; Notable for the following:     Lactic Acid 2.2 (*)     All other components within normal limits   BLOOD GAS VENOUS - Abnormal; Notable for the following:     Ph Venous 7.45 (*)     PO2 Venous 60 (*)     All other components within normal limits   ROUTINE UA WITH MICROSCOPIC   LACTIC ACID WHOLE BLOOD   TROPONIN I   PULSE OXIMETRY NURSING   CARDIAC CONTINUOUS MONITORING   MEASURE URINE OUTPUT   PATIENT CARE ORDER   URINE CULTURE AEROBIC BACTERIAL   BLOOD CULTURE   INFLUENZA A/B ANTIGEN   BLOOD CULTURE     XR Chest 2 Views   Preliminary Result   IMPRESSION: New patchy opacities in the mid and lower left lung,   worrisome for pneumonia. No other interval change. Mild to moderate   vertebral body height loss with what appears to be T9, unchanged.      .   Treatments provided: IV fluids, ASA en route via EMS, Plavix, monitoring   Family Comments: Family not at bedside  OBS brochure/video discussed/provided to patient:  N/A  ED Medications:   Medications   0.9% sodium chloride BOLUS (0 mLs Intravenous Stopped 3/12/18 1031)     Followed by   sodium chloride 0.9% infusion (not administered)   clopidogrel (PLAVIX) tablet 75 mg (not administered)   acetaminophen (TYLENOL) tablet 1,000 mg (1,000 mg Oral Given 3/12/18 0957)   0.9% sodium chloride BOLUS (0 mLs Intravenous Stopped 3/12/18 1238)   piperacillin-tazobactam (ZOSYN) intermittent infusion 4.5 g (0 g Intravenous Stopped 3/12/18 1107)   clopidogrel (PLAVIX) tablet 75 mg (75 mg Oral Given 3/12/18 1236)   PLAVIX WAS GIVEN IN ED TODAY, AS WELL  ASPIRIN IN AMBULANCE BEFORE ARRIVAL.   PT TSFRS WELL WITH ASSIST OF ONE.    Drips infusing:  No  For the majority of the shift, the patient's  behavior Green. Interventions performed were NA.     Severe Sepsis OR Septic Shock Diagnosis Present: No, but meets criteria for sepsis      ED Nurse Name/Phone Number: Mu WOODSPricilla Bernabe,   12:42 PM    RECEIVING UNIT ED HANDOFF REVIEW    Above ED Nurse Handoff Report was reviewed: Yes  Reviewed by: Radha Alfred on March 12, 2018 at 2:27 PM

## 2018-03-12 NOTE — IP AVS SNAPSHOT
[place] [address] [facility]    [address]    [facility] [address]    Phone:  [phone]    Fax:  [phone]                                       After Visit Summary   3/12/2018    Jade Caba    MRN: [id number]           After Visit Summary Signature Page     I have received my discharge instructions, and my questions have been answered. I have discussed any challenges I see with this plan with the nurse or doctor.    ..........................................................................................................................................  Patient/Patient Representative Signature      ..........................................................................................................................................  Patient Representative Print Name and Relationship to Patient    ..................................................               ................................................  Date                                            Time    ..........................................................................................................................................  Reviewed by Signature/Title    ...................................................              ..............................................  Date                                                            Time

## 2018-03-13 LAB
ANION GAP SERPL CALCULATED.3IONS-SCNC: 6 MMOL/L (ref 3–14)
BUN SERPL-MCNC: 15 MG/DL (ref 7–30)
CALCIUM SERPL-MCNC: 8.3 MG/DL (ref 8.5–10.1)
CHLORIDE SERPL-SCNC: 108 MMOL/L (ref 94–109)
CO2 SERPL-SCNC: 26 MMOL/L (ref 20–32)
CREAT SERPL-MCNC: 0.76 MG/DL (ref 0.52–1.04)
GFR SERPL CREATININE-BSD FRML MDRD: 72 ML/MIN/1.7M2
GLUCOSE SERPL-MCNC: 97 MG/DL (ref 70–99)
POTASSIUM SERPL-SCNC: 4.4 MMOL/L (ref 3.4–5.3)
SODIUM SERPL-SCNC: 140 MMOL/L (ref 133–144)

## 2018-03-13 PROCEDURE — 36416 COLLJ CAPILLARY BLOOD SPEC: CPT | Performed by: INTERNAL MEDICINE

## 2018-03-13 PROCEDURE — 12000007 ZZH R&B INTERMEDIATE

## 2018-03-13 PROCEDURE — 99232 SBSQ HOSP IP/OBS MODERATE 35: CPT | Performed by: INTERNAL MEDICINE

## 2018-03-13 PROCEDURE — 80048 BASIC METABOLIC PNL TOTAL CA: CPT | Performed by: INTERNAL MEDICINE

## 2018-03-13 PROCEDURE — 25000132 ZZH RX MED GY IP 250 OP 250 PS 637: Performed by: INTERNAL MEDICINE

## 2018-03-13 PROCEDURE — 25000131 ZZH RX MED GY IP 250 OP 636 PS 637: Performed by: INTERNAL MEDICINE

## 2018-03-13 PROCEDURE — 25000128 H RX IP 250 OP 636: Performed by: INTERNAL MEDICINE

## 2018-03-13 RX ADMIN — POLYETHYLENE GLYCOL 3350 17 G: 17 POWDER, FOR SOLUTION ORAL at 19:52

## 2018-03-13 RX ADMIN — ASPIRIN 81 MG: 81 TABLET, COATED ORAL at 08:11

## 2018-03-13 RX ADMIN — CEFTRIAXONE SODIUM 1 G: 1 INJECTION, SOLUTION INTRAVENOUS at 14:55

## 2018-03-13 RX ADMIN — METOCLOPRAMIDE HYDROCHLORIDE 5 MG: 5 TABLET ORAL at 17:21

## 2018-03-13 RX ADMIN — OXYBUTYNIN CHLORIDE 5 MG: 5 TABLET ORAL at 08:11

## 2018-03-13 RX ADMIN — AZITHROMYCIN MONOHYDRATE 500 MG: 500 INJECTION, POWDER, LYOPHILIZED, FOR SOLUTION INTRAVENOUS at 16:56

## 2018-03-13 RX ADMIN — OMEPRAZOLE 40 MG: 20 CAPSULE, DELAYED RELEASE ORAL at 08:11

## 2018-03-13 RX ADMIN — CALCIUM 2500 MG: 500 TABLET ORAL at 17:21

## 2018-03-13 RX ADMIN — CITALOPRAM HYDROBROMIDE 30 MG: 20 TABLET ORAL at 08:12

## 2018-03-13 RX ADMIN — Medication 1 MG: at 22:46

## 2018-03-13 RX ADMIN — CALCIUM 2500 MG: 500 TABLET ORAL at 08:10

## 2018-03-13 RX ADMIN — METOCLOPRAMIDE HYDROCHLORIDE 5 MG: 5 TABLET ORAL at 08:11

## 2018-03-13 RX ADMIN — ACETAMINOPHEN 1000 MG: 500 TABLET, FILM COATED ORAL at 19:53

## 2018-03-13 RX ADMIN — LOSARTAN POTASSIUM 100 MG: 100 TABLET, FILM COATED ORAL at 08:10

## 2018-03-13 RX ADMIN — MIRTAZAPINE 15 MG: 15 TABLET, FILM COATED ORAL at 19:53

## 2018-03-13 RX ADMIN — LEVETIRACETAM 500 MG: 500 TABLET ORAL at 08:10

## 2018-03-13 RX ADMIN — LEVETIRACETAM 500 MG: 500 TABLET ORAL at 19:53

## 2018-03-13 RX ADMIN — ACETAMINOPHEN 1000 MG: 500 TABLET, FILM COATED ORAL at 08:11

## 2018-03-13 RX ADMIN — OXYBUTYNIN CHLORIDE 5 MG: 5 TABLET ORAL at 19:54

## 2018-03-13 RX ADMIN — METOCLOPRAMIDE HYDROCHLORIDE 5 MG: 5 TABLET ORAL at 11:46

## 2018-03-13 RX ADMIN — MULTIPLE VITAMINS W/ MINERALS TAB 1 TABLET: TAB at 08:11

## 2018-03-13 RX ADMIN — FUROSEMIDE 20 MG: 20 TABLET ORAL at 08:12

## 2018-03-13 RX ADMIN — AMLODIPINE BESYLATE 10 MG: 10 TABLET ORAL at 08:09

## 2018-03-13 RX ADMIN — SENNOSIDES AND DOCUSATE SODIUM 1 TABLET: 8.6; 5 TABLET ORAL at 19:54

## 2018-03-13 RX ADMIN — ATORVASTATIN CALCIUM 40 MG: 40 TABLET, FILM COATED ORAL at 08:10

## 2018-03-13 RX ADMIN — CARVEDILOL 12.5 MG: 12.5 TABLET, FILM COATED ORAL at 17:21

## 2018-03-13 RX ADMIN — SENNOSIDES AND DOCUSATE SODIUM 1 TABLET: 8.6; 5 TABLET ORAL at 08:11

## 2018-03-13 RX ADMIN — MIRTAZAPINE 15 MG: 15 TABLET, FILM COATED ORAL at 08:12

## 2018-03-13 RX ADMIN — CARVEDILOL 12.5 MG: 12.5 TABLET, FILM COATED ORAL at 08:10

## 2018-03-13 RX ADMIN — CLOPIDOGREL 75 MG: 75 TABLET, FILM COATED ORAL at 08:11

## 2018-03-13 ASSESSMENT — ACTIVITIES OF DAILY LIVING (ADL)
ADLS_ACUITY_SCORE: 12

## 2018-03-13 NOTE — PLAN OF CARE
"Problem: Patient Care Overview  Goal: Plan of Care/Patient Progress Review  Outcome: Improving  Orientation: Alert and oriented x4. Awake, alert, cooperative, no apparent distress.    VSS: BP (!) 122/39 (BP Location: Right arm)  Pulse 78  Temp 97.4  F (36.3  C) (Oral)  Resp 20  Ht 1.575 m (5' 2\")  Wt 82.5 kg (181 lb 12.8 oz)  SpO2 94%  BMI 33.25 kg/m2.   IVF: NS TKO  LS: clear and equal bilaterally. Clear to auscultation bilaterally but diminished throughout.  No wheezing  GI: Passing gas. No BM. Denies N/V.  Normal bowel sounds, soft, non-distended, non-tender  : Adequate urine output. Clear yellow.   Skin: bruising noted, Skin otherwise intact. No rashes, no cyanosis, dry to touch  Activity: SBA to assist of 1. Pt slept comfortably throughout shift.   Pain: no c/o pain. No PRN interventions utilized. Pt sleeping.   DC Plan: Continue with current cares.         "

## 2018-03-13 NOTE — PROGRESS NOTES
"[facility]  Hospitalist Progress Note  Abhishek Gay MD 03/13/2018    Reason for Stay (Diagnosis): left sided pneumonia left lobar pneumonia         Assessment and Plan:      Summary of Stay: Jade Caba is a 84 year old female admitted on 3/12/2018 with left lobe pneumonia.  She had had a prodrome of just 12-24 hours with fever, shortness of breath, cough and generalized malaise.    Problem List:   1. Community-acquired pneumonia with left-sided infiltrate noted.  Mild acute hypoxic respiratory failure ongoing; no known underlying lung disease.  Question whether this entire event was simply an aspiration event as it seems to have resolved so briskly.    2. History of coronary artery disease status post stenting again last month.  Otherwise the angiogram was very reassuring.  3. Peripheral vascular disease.  Patient has a history of renal arterial stenosis that was stented as well as carotid disease for which she has undergone bilateral endarterectomies.  4. Seizure disorder    Plan:  1.  Cont ceftriaxone and azithromycin.   2.  Cont usual heart medications including amlodipine 10 mg daily, carvedilol 12.5 mg twice daily, aspirin and Plavix, losartan 100 mg daily, furosemide 20 mg daily.    DVT Prophylaxis: Pneumatic Compression Devices  Code Status: DNR / DNI  Discharge Dispo: return home  Estimated Disch Date / # of Days until Disch: likely 3+ days.        Interval History (Subjective):      No unexpected events recorded overnight.     At this time Ms. Caba indicates she is feeling entirely better. She denies fevers and feels that her cough is fully resolved.  She states that she has been up and ambulating without significant problems.    I called and spoke with her daughter by telephone.                  Physical Exam:      Last Vital Signs:  BP (!) 122/39 (BP Location: Right arm)  Pulse 78  Temp 97.4  F (36.3  C) (Oral)  Resp 20  Ht 1.575 m (5' 2\")  Wt 82.5 kg (181 lb 12.8 oz)  SpO2 94%  " BMI 33.25 kg/m2    I/O last 3 completed shifts:  In: -   Out: 1200 [Urine:1200]    Constitutional: Awake, alert, cooperative, no apparent distress   Respiratory: Clear to auscultation bilaterally, no crackles or wheezing   Cardiovascular: Regular rate and rhythm, normal S1 and S2, and no murmur noted   Abdomen: Normal bowel sounds, soft, non-distended, non-tender   Skin: No rashes, no cyanosis, dry to touch   Neuro: Alert and oriented x3, no weakness, numbness, memory loss   Extremities: No edema, normal range of motion   Other(s):        All other systems: Negative          Medications:      All current medications were reviewed with changes reflected in problem list.         Data:      All new lab and imaging data was reviewed.   Labs/Imaging:  Results for orders placed or performed during the hospital encounter of 03/12/18 (from the past 24 hour(s))   Lactic acid level STAT   Result Value Ref Range    Lactic Acid 0.7 0.7 - 2.0 mmol/L   Basic metabolic panel   Result Value Ref Range    Sodium 140 133 - 144 mmol/L    Potassium 4.4 3.4 - 5.3 mmol/L    Chloride 108 94 - 109 mmol/L    Carbon Dioxide 26 20 - 32 mmol/L    Anion Gap 6 3 - 14 mmol/L    Glucose 97 70 - 99 mg/dL    Urea Nitrogen 15 7 - 30 mg/dL    Creatinine 0.76 0.52 - 1.04 mg/dL    GFR Estimate 72 >60 mL/min/1.7m2    GFR Estimate If Black 87 >60 mL/min/1.7m2    Calcium 8.3 (L) 8.5 - 10.1 mg/dL

## 2018-03-13 NOTE — PLAN OF CARE
Problem: Patient Care Overview  Goal: Plan of Care/Patient Progress Review  Outcome: No Change  VSS. NC 2L weaned down from 3L - O2 94-95%. Denies pain. Up assist x 1. Diet 2g Na. Care Consult ordered. Droplet isolation. Md would like pt to ambulate 4x today. Continue POC. Possible D/C tomorrow.

## 2018-03-14 VITALS
TEMPERATURE: 96.7 F | HEART RATE: 72 BPM | DIASTOLIC BLOOD PRESSURE: 62 MMHG | WEIGHT: 181.8 LBS | HEIGHT: 62 IN | SYSTOLIC BLOOD PRESSURE: 171 MMHG | BODY MASS INDEX: 33.45 KG/M2 | OXYGEN SATURATION: 96 % | RESPIRATION RATE: 18 BRPM

## 2018-03-14 PROBLEM — J69.0 ASPIRATION PNEUMONITIS (H): Status: ACTIVE | Noted: 2018-03-14

## 2018-03-14 PROBLEM — B96.1 KLEBSIELLA CYSTITIS: Status: ACTIVE | Noted: 2018-03-14

## 2018-03-14 PROBLEM — T36.95XA ANTIBIOTIC-ASSOCIATED DIARRHEA: Status: ACTIVE | Noted: 2018-03-14

## 2018-03-14 PROBLEM — K52.1 ANTIBIOTIC-ASSOCIATED DIARRHEA: Status: ACTIVE | Noted: 2018-03-14

## 2018-03-14 PROBLEM — N30.90 KLEBSIELLA CYSTITIS: Status: ACTIVE | Noted: 2018-03-14

## 2018-03-14 LAB
ANION GAP SERPL CALCULATED.3IONS-SCNC: 4 MMOL/L (ref 3–14)
BACTERIA SPEC CULT: ABNORMAL
BUN SERPL-MCNC: 12 MG/DL (ref 7–30)
CALCIUM SERPL-MCNC: 8.9 MG/DL (ref 8.5–10.1)
CHLORIDE SERPL-SCNC: 104 MMOL/L (ref 94–109)
CO2 SERPL-SCNC: 32 MMOL/L (ref 20–32)
CREAT SERPL-MCNC: 0.82 MG/DL (ref 0.52–1.04)
GFR SERPL CREATININE-BSD FRML MDRD: 67 ML/MIN/1.7M2
GLUCOSE SERPL-MCNC: 97 MG/DL (ref 70–99)
Lab: ABNORMAL
POTASSIUM SERPL-SCNC: 4.1 MMOL/L (ref 3.4–5.3)
PROCALCITONIN SERPL-MCNC: 0.17 NG/ML
SODIUM SERPL-SCNC: 140 MMOL/L (ref 133–144)
SPECIMEN SOURCE: ABNORMAL

## 2018-03-14 PROCEDURE — 80048 BASIC METABOLIC PNL TOTAL CA: CPT | Performed by: INTERNAL MEDICINE

## 2018-03-14 PROCEDURE — 99238 HOSP IP/OBS DSCHRG MGMT 30/<: CPT | Performed by: INTERNAL MEDICINE

## 2018-03-14 PROCEDURE — 36415 COLL VENOUS BLD VENIPUNCTURE: CPT | Performed by: INTERNAL MEDICINE

## 2018-03-14 PROCEDURE — 25000131 ZZH RX MED GY IP 250 OP 636 PS 637: Performed by: INTERNAL MEDICINE

## 2018-03-14 PROCEDURE — 25000132 ZZH RX MED GY IP 250 OP 250 PS 637: Performed by: INTERNAL MEDICINE

## 2018-03-14 PROCEDURE — 84145 PROCALCITONIN (PCT): CPT | Performed by: INTERNAL MEDICINE

## 2018-03-14 RX ADMIN — ASPIRIN 81 MG: 81 TABLET, COATED ORAL at 09:53

## 2018-03-14 RX ADMIN — CARVEDILOL 12.5 MG: 12.5 TABLET, FILM COATED ORAL at 09:52

## 2018-03-14 RX ADMIN — FUROSEMIDE 20 MG: 20 TABLET ORAL at 09:52

## 2018-03-14 RX ADMIN — LOSARTAN POTASSIUM 100 MG: 100 TABLET, FILM COATED ORAL at 09:52

## 2018-03-14 RX ADMIN — CALCIUM 2500 MG: 500 TABLET ORAL at 09:54

## 2018-03-14 RX ADMIN — CITALOPRAM HYDROBROMIDE 30 MG: 20 TABLET ORAL at 09:53

## 2018-03-14 RX ADMIN — LEVETIRACETAM 500 MG: 500 TABLET ORAL at 09:53

## 2018-03-14 RX ADMIN — AMLODIPINE BESYLATE 10 MG: 10 TABLET ORAL at 09:53

## 2018-03-14 RX ADMIN — OXYBUTYNIN CHLORIDE 5 MG: 5 TABLET ORAL at 09:54

## 2018-03-14 RX ADMIN — CLOPIDOGREL 75 MG: 75 TABLET, FILM COATED ORAL at 09:54

## 2018-03-14 RX ADMIN — ACETAMINOPHEN 1000 MG: 500 TABLET, FILM COATED ORAL at 09:51

## 2018-03-14 RX ADMIN — METOCLOPRAMIDE HYDROCHLORIDE 5 MG: 5 TABLET ORAL at 09:53

## 2018-03-14 RX ADMIN — OMEPRAZOLE 40 MG: 20 CAPSULE, DELAYED RELEASE ORAL at 09:51

## 2018-03-14 RX ADMIN — ATORVASTATIN CALCIUM 40 MG: 40 TABLET, FILM COATED ORAL at 09:53

## 2018-03-14 RX ADMIN — MULTIPLE VITAMINS W/ MINERALS TAB 1 TABLET: TAB at 09:53

## 2018-03-14 RX ADMIN — MIRTAZAPINE 15 MG: 15 TABLET, FILM COATED ORAL at 09:53

## 2018-03-14 ASSESSMENT — ACTIVITIES OF DAILY LIVING (ADL)
ADLS_ACUITY_SCORE: 12

## 2018-03-14 NOTE — PLAN OF CARE
Problem: Patient Care Overview  Goal: Plan of Care/Patient Progress Review  Outcome: Improving  A&Ox4, VSS, up with SBA, ambulated at hallway, denies pain, continues on Rocephin IV dx PNA,LS clear, will continue with POC.

## 2018-03-14 NOTE — DISCHARGE SUMMARY
[facility] Discharge Summary    Jade Caba MRN# [id number]   Age: 84 year old YOB: 1933     Date of Admission:  3/12/2018  Date of Discharge::  3/14/2018  Admitting Physician:  Abhishek Gay MD  Discharge Physician:  Abhishek Gay MD     Home clinic: [facility]          Admission Diagnoses:   Pneumonia of left lung due to infectious organism, unspecified part of lung [J18.9]          Discharge Diagnosis:   Principal Problem:    Aspiration pneumonitis (H)  Active Problems:    HTN (hypertension)    Pneumonia            Procedures:   Chest x-ray that did show evidence of Left lung patchy infiltrate          Medications Prior to Admission:     Prescriptions Prior to Admission   Medication Sig Dispense Refill Last Dose     acetaminophen (TYLENOL) 325 MG tablet Take 650 mg by mouth 2 times daily as needed for mild pain        clopidogrel (PLAVIX) 75 MG tablet Take 1 tablet (75 mg) by mouth daily 30 tablet 11 3/11/2018 at Unknown time     atorvastatin (LIPITOR) 40 MG tablet Take 1 tablet (40 mg) by mouth daily 30 tablet 0 3/11/2018 at Unknown time     Acetaminophen (TYLENOL PO) Take 1,000 mg by mouth 2 times daily   3/11/2018 at Unknown time     Mirtazapine (REMERON PO) Take 15 mg by mouth 2 times daily   3/11/2018 at Unknown time     hypromellose (ARTIFICIAL TEARS) 0.5 % SOLN ophthalmic solution Place 3 drops into both eyes 3 times daily   3/11/2018 at Unknown time     alendronate (FOSAMAX) 70 MG tablet Take 70 mg by mouth every 7 days On Wednesdays   3/7/2018 at Unknown time     furosemide (LASIX) 20 MG tablet Take 20 mg by mouth daily   3/11/2018 at Unknown time     cholecalciferol (VITAMIN D3) 5000 UNITS CAPS capsule Take 5,000 Units by mouth daily   3/11/2018 at Unknown time     carvedilol (COREG) 12.5 MG tablet Take 1 tablet (12.5 mg) by mouth 2 times daily (with meals) 60 tablet 1 3/11/2018 at Unknown time     amLODIPine (NORVASC) 10 MG tablet Take 1 tablet (10 mg) by  mouth daily 30 tablet 1 3/11/2018 at Unknown time     citalopram (CELEXA) 10 MG tablet Take 30 mg by mouth daily   3/11/2018 at Unknown time     losartan (COZAAR) 100 MG tablet Take 100 mg by mouth daily   3/11/2018 at Unknown time     multivitamin, therapeutic with minerals (THERA-VIT-M) TABS Take 1 tablet by mouth daily   3/11/2018 at Unknown time     meloxicam (MOBIC) 15 MG tablet Take 15 mg by mouth daily as needed    Taking     polyethylene glycol (MIRALAX/GLYCOLAX) powder Take 17 g by mouth 2 times daily   3/11/2018 at Unknown time     calcium carbonate (OS-FRANCISCO) 500 MG TABS Take 2 tablets by mouth 2 times daily (with meals)   3/11/2018 at Unknown time     oxybutynin (DITROPAN) 5 MG tablet Take 5 mg by mouth 2 times daily   3/11/2018 at Unknown time     senna-docusate (SENNA S) 8.6-50 MG per tablet Take 1 tablet by mouth 2 times daily Please hold if having loose stools and contact nurse.   3/11/2018 at Unknown time     ASPIRIN EC PO Take 81 mg by mouth daily   3/11/2018 at Unknown time     metoclopramide (REGLAN) 5 MG tablet Take 5 mg by mouth 3 times daily (with meals)   3/11/2018 at Unknown time     bisacodyl (DULCOLAX) 10 MG suppository Place 10 mg rectally daily as needed   Taking     levETIRAcetam (KEPPRA) 500 MG tablet Take 500 mg by mouth 2 times daily   3/11/2018 at Unknown time     folic acid (FOLVITE) 1 MG tablet Take 1 mg by mouth daily   3/11/2018 at Unknown time     omeprazole (PRILOSEC) 40 MG capsule Take 1 capsule (40 mg) by mouth daily Take 30-60 minutes before a meal. 30 capsule 9 3/11/2018 at Unknown time             Discharge Medications:     Discharge Medication List as of 3/14/2018  1:13 PM      CONTINUE these medications which have NOT CHANGED    Details   !! acetaminophen (TYLENOL) 325 MG tablet Take 650 mg by mouth 2 times daily as needed for mild pain, Historical      clopidogrel (PLAVIX) 75 MG tablet Take 1 tablet (75 mg) by mouth daily, Disp-30 tablet, R-11, E-Prescribe       atorvastatin (LIPITOR) 40 MG tablet Take 1 tablet (40 mg) by mouth daily, Disp-30 tablet, R-0, E-Prescribe      !! Acetaminophen (TYLENOL PO) Take 1,000 mg by mouth 2 times daily, Historical      Mirtazapine (REMERON PO) Take 15 mg by mouth 2 times daily, Historical      hypromellose (ARTIFICIAL TEARS) 0.5 % SOLN ophthalmic solution Place 3 drops into both eyes 3 times daily, Historical      alendronate (FOSAMAX) 70 MG tablet Take 70 mg by mouth every 7 days On Wednesdays, Historical      furosemide (LASIX) 20 MG tablet Take 20 mg by mouth daily, Historical      cholecalciferol (VITAMIN D3) 5000 UNITS CAPS capsule Take 5,000 Units by mouth daily, Historical      carvedilol (COREG) 12.5 MG tablet Take 1 tablet (12.5 mg) by mouth 2 times daily (with meals), Disp-60 tablet, R-1, E-Prescribe      amLODIPine (NORVASC) 10 MG tablet Take 1 tablet (10 mg) by mouth daily, Disp-30 tablet, R-1, E-Prescribe      citalopram (CELEXA) 10 MG tablet Take 30 mg by mouth daily, Historical      losartan (COZAAR) 100 MG tablet Take 100 mg by mouth daily, Historical      multivitamin, therapeutic with minerals (THERA-VIT-M) TABS Take 1 tablet by mouth daily, Historical      meloxicam (MOBIC) 15 MG tablet Take 15 mg by mouth daily as needed , Historical      polyethylene glycol (MIRALAX/GLYCOLAX) powder Take 17 g by mouth 2 times daily, Historical      calcium carbonate (OS-FRANCISCO) 500 MG TABS Take 2 tablets by mouth 2 times daily (with meals), Historical      oxybutynin (DITROPAN) 5 MG tablet Take 5 mg by mouth 2 times daily, Historical      senna-docusate (SENNA S) 8.6-50 MG per tablet Take 1 tablet by mouth 2 times daily Please hold if having loose stools and contact nurse., Historical      ASPIRIN EC PO Take 81 mg by mouth daily, Historical      metoclopramide (REGLAN) 5 MG tablet Take 5 mg by mouth 3 times daily (with meals), Historical      bisacodyl (DULCOLAX) 10 MG suppository Place 10 mg rectally daily as needed, Historical     "  levETIRAcetam (KEPPRA) 500 MG tablet Take 500 mg by mouth 2 times daily, Historical      folic acid (FOLVITE) 1 MG tablet Take 1 mg by mouth daily, Historical      omeprazole (PRILOSEC) 40 MG capsule Take 1 capsule (40 mg) by mouth daily Take 30-60 minutes before a meal., Disp-30 capsule, R-9, E-Prescribe       !! - Potential duplicate medications found. Please discuss with provider.                Consultations:   No consultations were requested during this admission           Hospital Course:   Jade Caba is a very pleasant 84-year-old  woman who came to attention on 3/12/18 with rather abrupt onset of fever shortness of breath cough and generalized malaise.  In the emergency department she was identified immediately is having left-sided rhonchi with cough and evidence of at least mild respiratory discomfort.  Chest x-ray confirmed the presence of patchy left lung infiltrates and the patient was admitted with presumptive diagnosis of community-acquired pneumonia.    Ms. Caba was admitted to a medical bed in very rapidly felt dramatically better.  By the morning after admission she already was asking to return home.  She did not require supplemental oxygen though she still had a mild cough.  At that point I have considered possibility that she had an aspiration event rather than a true bacterial pneumonia.  Aspiration pneumonitis would be more likely to resolve in this manner then would a true bacterial process.    As Ms. Caba continued to remain comfortable and normal in all respects other than the mild cough I felt it would was appropriate to discontinue further antibiotics on the date of discharge.  Notably she had no measured fevers during hospitalization.    /62 (BP Location: Right arm)  Pulse 72  Temp 96.7  F (35.9  C) (Oral)  Resp 18  Ht 1.575 m (5' 2\")  Wt 82.5 kg (181 lb 12.8 oz)  SpO2 96%  BMI 33.25 kg/m2  At the time of discharge Ms. Caba is alert coherent and appropriate.  I " spoke with her and her daughter at some length and discussed with them my recommendation to not pursue further antibiotic treatment.  They were in agreement.  HEENT: Nonfocal  Neck: No appreciable lymphadenopathy or thyromegaly.    Chest: Normal work of breathing in room air.  Perhaps mild rales on the left.  No wheeze.  Heart: Regular rate and rhythm without rubs murmurs or gallops.  Abdomen: soft, nontender, nondistended.  Extremities: No appreciable edema.  Distal perfusion is intact throughout.          Discharge Instructions and Follow-Up:   Discharge diet: Regular   Discharge activity: Activity as tolerated   Discharge follow-up: Follow up with primary care provider if needed for recurrent cough or fever.           Discharge Disposition:   Discharged to home      Attestation:  I have reviewed today's vital signs, notes, medications, labs and imaging.  Total time: 25 minutes    Abhishek Gay MD

## 2018-03-14 NOTE — PROGRESS NOTES
Patient discharged to home with family. IV out, site C/D/I. Belongings and discharge packet with patient at time of D/C.

## 2018-03-14 NOTE — CONSULTS
Care Transition Initial Assessment - RN    Reason For Consult: care coordination/care conference, discharge planning   Met with: Patient.  DATA   Principal Problem:    Aspiration pneumonitis (H)  Active Problems:    HTN (hypertension)    Pneumonia    Klebsiella cystitis    Antibiotic-associated diarrhea       Cognitive Status: awake, alert and oriented.  Primary Care Clinic Name: [facility]  Primary Care MD Name: Jaden NP  Contact information and PCP information verified: Yes  Lives With: alone  Living Arrangements: assisted living     Description of Support System: Supportive, Involved   Who is your support system?: Children, Facility resident(s)/Staff       Insurance concerns: No Insurance issues identified  ASSESSMENT  Patient currently receives the following services:  Laundry services, 1 meal/day, shower assistance 2 X/week, medication administration.         Identified issues/concerns regarding health management: Discussed importance of completing all the antibiotic prescribed at discharge. Also, importance of informing family/facility staff if not feeling well. Reinforce importance of following a low sodium diet.     PLAN  Financial costs for the patient were not discussed.  Patient given options and choices for discharge.  Patient/family is agreeable to the plan?  Yes  Patient anticipates discharging back to [facility].     Other Resources: Other (see comment) (PNA Action Plan)  Patient anticipates needs for home equipment: No  Discharge Planner   Discharge Plans in progress: Yes  Barriers to discharge plan: None  Plan/Disposition: Home   Appointments: Patient is followed by MD from [facility] at facility. LM with Nursing Office to make sure patient is on MDs schedule within 7-10 days.     CM will continue to follow patient until discharge for any additional needs.     Eden Rubin, RN, BSN, CTS  [facility]  [phone]

## 2018-03-14 NOTE — PLAN OF CARE
Problem: Patient Care Overview  Goal: Plan of Care/Patient Progress Review  Outcome: Improving  Vitals: VSS, afebrile  Oxygen: 2L NC  LOC: AAOx4  Pain: denies pain  Ambulation: SBA  Nontele  Cardiac:WNL, denies chest pain  Lungs: LS-diminished/clear, infrequent nonproductive cough, denies SOB  GI: WNL  : WNL  Skin: intact, scattered bruises   Plan: continue abx, wean O2

## 2018-03-15 ENCOUNTER — CARE COORDINATION (OUTPATIENT)
Dept: GERIATRIC MEDICINE | Facility: CLINIC | Age: 83
End: 2018-03-15

## 2018-03-15 ENCOUNTER — ASSISTED LIVING VISIT (OUTPATIENT)
Dept: GERIATRICS | Facility: CLINIC | Age: 83
End: 2018-03-15
Payer: COMMERCIAL

## 2018-03-15 DIAGNOSIS — I50.32 CHRONIC DIASTOLIC CONGESTIVE HEART FAILURE (H): ICD-10-CM

## 2018-03-15 DIAGNOSIS — M17.0 OSTEOARTHRITIS OF BOTH KNEES, UNSPECIFIED OSTEOARTHRITIS TYPE: ICD-10-CM

## 2018-03-15 DIAGNOSIS — J69.0 ASPIRATION PNEUMONITIS (H): Primary | ICD-10-CM

## 2018-03-15 NOTE — PROGRESS NOTES
[facility]  PRIMARY CARE PROVIDER AND CLINIC:  [facility] [address] / [facility] [address]  Chief Complaint   Patient presents with     Hospital F/U       HPI:    Jade Caba is a 84 year old  (8/26/1933),admitted to the [facility] from [facility].  Hospital stay 3/12/18 through 3/14/18.  Admitted to this facility for  rehab, medical management and nursing care.  HPI information obtained from: facility chart records, facility staff, patient report and [facility] chart review.  Current issues are:      Aspiration pneumonitis (H)  3/12/18 had sob, fever. Sent to ED, admitted with LLL pneumonia, asp. Poneumonia. tx with ceftriaxone, azithromycin. Improved. Returned to AL 3/14/18. Has been afebrile. Ongoing cough, productive    Chronic diastolic congestive heart failure (H)  Stable cont. On lasix. No increased LE edema    Osteoarthritis of both knees, unspecified osteoarthritis type  Stable. Reports pain improved since L knee inj. Gait steady. Also taking tylenol.      CODE STATUS/ADVANCE DIRECTIVES DISCUSSION:   DNR / DNI  Patient's living condition: lives in an assisted living facility    ALLERGIES:No known allergies  PAST MEDICAL HISTORY:  has a past medical history of Anxiety; Arthritis; Atrial flutter (H); C1 cervical fracture (H); CAD (coronary artery disease); Carotid artery stenosis; CKD (chronic kidney disease); Depression; H/O alcohol abuse; Hypercholesterolemia; Hypertension; Renal artery stenosis (H); Seizure disorder (H); and Subarachnoid hemorrhage (H) (2014).  PAST SURGICAL HISTORY:  has a past surgical history that includes carotid endarterectomy (Left); Stenting of renal artery; Coronary stents x 3; Cholecystectomy; appendectomy; joint replacement (Bilateral); Cataract surgery (Right); and Hysterectomy.  FAMILY HISTORY: family history includes C.A.D. in her brother, brother, mother, and sister; CANCER in her sister; HEART  DISEASE in her son.  SOCIAL HISTORY:  reports that she has never smoked. She has never used smokeless tobacco. She reports that she does not drink alcohol or use illicit drugs.    Post Discharge Medication Reconciliation Status: discharge medications reconciled, continue medications without change.  Current Outpatient Prescriptions   Medication Sig Dispense Refill     acetaminophen (TYLENOL) 325 MG tablet Take 650 mg by mouth 2 times daily as needed for mild pain       clopidogrel (PLAVIX) 75 MG tablet Take 1 tablet (75 mg) by mouth daily 30 tablet 11     atorvastatin (LIPITOR) 40 MG tablet Take 1 tablet (40 mg) by mouth daily 30 tablet 0     Acetaminophen (TYLENOL PO) Take 1,000 mg by mouth 2 times daily       Mirtazapine (REMERON PO) Take 15 mg by mouth 2 times daily       hypromellose (ARTIFICIAL TEARS) 0.5 % SOLN ophthalmic solution Place 3 drops into both eyes 3 times daily       alendronate (FOSAMAX) 70 MG tablet Take 70 mg by mouth every 7 days On Wednesdays       furosemide (LASIX) 20 MG tablet Take 20 mg by mouth daily       cholecalciferol (VITAMIN D3) 5000 UNITS CAPS capsule Take 5,000 Units by mouth daily       carvedilol (COREG) 12.5 MG tablet Take 1 tablet (12.5 mg) by mouth 2 times daily (with meals) 60 tablet 1     amLODIPine (NORVASC) 10 MG tablet Take 1 tablet (10 mg) by mouth daily 30 tablet 1     citalopram (CELEXA) 10 MG tablet Take 30 mg by mouth daily       losartan (COZAAR) 100 MG tablet Take 100 mg by mouth daily       multivitamin, therapeutic with minerals (THERA-VIT-M) TABS Take 1 tablet by mouth daily       meloxicam (MOBIC) 15 MG tablet Take 15 mg by mouth daily as needed        polyethylene glycol (MIRALAX/GLYCOLAX) powder Take 17 g by mouth 2 times daily       calcium carbonate (OS-FRANCISCO) 500 MG TABS Take 2 tablets by mouth 2 times daily (with meals)       oxybutynin (DITROPAN) 5 MG tablet Take 5 mg by mouth 2 times daily       senna-docusate (SENNA S) 8.6-50 MG per tablet Take 1 tablet  by mouth 2 times daily Please hold if having loose stools and contact nurse.       ASPIRIN EC PO Take 81 mg by mouth daily       metoclopramide (REGLAN) 5 MG tablet Take 5 mg by mouth 3 times daily (with meals)       bisacodyl (DULCOLAX) 10 MG suppository Place 10 mg rectally daily as needed       levETIRAcetam (KEPPRA) 500 MG tablet Take 500 mg by mouth 2 times daily       folic acid (FOLVITE) 1 MG tablet Take 1 mg by mouth daily       omeprazole (PRILOSEC) 40 MG capsule Take 1 capsule (40 mg) by mouth daily Take 30-60 minutes before a meal. 30 capsule 9       ROS:  No chest pain, shortness of breath, fevers, chills, headache, nausea, vomiting, dysuria or bowel abnormalities.  Appetite is  normal.  No pain except occ knees.    Exam:  /68  Pulse 67  Resp 18  SpO2 94%  GENERAL APPEARANCE:  Alert, in no distress, cooperative  ENT:  Mouth and posterior oropharynx normal, moist mucous membranes, [place], no rhinitis  EYES:  EOM, conjunctivae, lids, pupils and irises normal, PERRL  NECK:  No adenopathy,masses or thyromegaly, no carotid bruit, FROM  RESP:  respiratory effort and palpation of chest normal, lungs clear to auscultation , no respiratory distress  CV:  Palpation and auscultation of heart done , no edema, rate-normal, grade 2/6 murmur  ABDOMEN:  normal bowel sounds, soft, nontender, no hepatosplenomegaly or other masses, no guarding or rebound  M/S:   Gait and station normal  muscle strength 5/5 all 4 ext., normal tone  NEURO:   Cranial nerves 2-12 are normal tested and grossly at patient's baseline, alert, speech clear  PSYCH:  memory impaired , affect and mood normal    Lab/Diagnostic data:     CBC RESULTS:   Recent Labs   Lab Test  03/12/18   0850  02/02/18   0814   WBC  17.6*  8.8   RBC  4.28  3.99   HGB  13.6  12.6   HCT  40.7  38.5   MCV  95  97   MCH  31.8  31.6   MCHC  33.4  32.7   RDW  12.5  12.3   PLT  227  173       Last Basic Metabolic Panel:  Recent Labs   Lab Test  03/14/18   0712  03/13/18    0645   NA  140  140   POTASSIUM  4.1  4.4   CHLORIDE  104  108   FRANCISCO  8.9  8.3*   CO2  32  26   BUN  12  15   CR  0.82  0.76   GLC  97  97       Liver Function Studies -   Recent Labs   Lab Test  03/12/18   0850  01/31/18   1050   PROTTOTAL  7.2  6.9   ALBUMIN  3.4  3.1*   BILITOTAL  0.7  0.8   ALKPHOS  54  55   AST  14  34   ALT  17  21       ASSESSMENT/PLAN:  Aspiration pneumonitis (H)  Resp. Status stable. Ongoing cuogh, afebrile. Loose stools since hosp likely r/t anbtx  1. Start mucinex  2. Monitor for fever, decreased O2 sats  3. Decrease miralax dose. Hold miralax and senna x 3 days, then reassess  4. For ongoing watery stools check for c-diff    Chronic diastolic congestive heart failure (H)  Currently controlled  1. Cont. Lasix  2. Cont.qd wt.s  3. Monitor for increased LE edema  4. Bmp in next 1-3 mos    Osteoarthritis of both knees, unspecified osteoarthritis type  Currently controlled  1. Cont. Tylenol  2. Anticipate repeat knee inj in next 1-2 mos  3. Monitor for change in gait, falls         Electronically signed by:  ZAIN Marcos CNP

## 2018-03-15 NOTE — PROGRESS NOTES
Client d/c from [facility] 3/14/18. EPIC notes reviewed.   CM to f/u with client, reschedule annual HRA.  Secure e-mail sent to Betsey ZHAO at [facility] requesting copy of client's POC/med list. CM to review with Betsey if there are any changes to be made to current POC.  SHEYLA log initiated.  Client to be seen by [facility] on site medical team.  Holly Guajardo RN, BC  Supervisor [facility]   [phone] (Fax)

## 2018-03-15 NOTE — PROGRESS NOTES
Call placed to client who states that she is feeling fine, has no concerns and feels like she is at baseline. Client states that she was seen by KATYA Stanley this morning.   Nursing staff administers all of her medications.  ISREAL scheduled annual HRA with client for 3/19/18 @ 1:00 PM  Holly Guajardo RN, BC  Supervisor [facility]   [phone] (Fax)

## 2018-03-16 VITALS
RESPIRATION RATE: 18 BRPM | OXYGEN SATURATION: 94 % | HEART RATE: 67 BPM | SYSTOLIC BLOOD PRESSURE: 129 MMHG | DIASTOLIC BLOOD PRESSURE: 68 MMHG

## 2018-03-18 LAB
BACTERIA SPEC CULT: NO GROWTH
BACTERIA SPEC CULT: NO GROWTH
Lab: NORMAL
SPECIMEN SOURCE: NORMAL
SPECIMEN SOURCE: NORMAL

## 2018-03-19 ENCOUNTER — CARE COORDINATION (OUTPATIENT)
Dept: GERIATRIC MEDICINE | Facility: CLINIC | Age: 83
End: 2018-03-19

## 2018-03-19 DIAGNOSIS — Z76.89 HEALTH CARE HOME: ICD-10-CM

## 2018-03-19 NOTE — PROGRESS NOTES
[facility] Home Visit Assessment     Home visit for Health Risk Assessment with Jade Caba completed on March 19, 2018  Member resides in Assisted living  [facility] and lives alone  Present at assessment: member and this care coordinator    Current Care Plan  Member currently receiving the following services: 24 hr Customized Living: homemaking, 1 meal per day, laundry assist, PERS, med management, shower assist     Medication Review  Medication reconciliation completed in [facility]:No,  [facility] on site team  Medication set-up & administration: RN sets up  Daily.-medications adminstered 4x/day  Assisting Living staff administers medications.  Medication understanding concerns (by member, family or CC): No  Medication adherence concerns (by member, family or CC): No    Mental/Behavioral Health   Depression Screening: See PHQ assessment flowsheet.   Mental Health Diagnosis: Yes: Anxiety,  Mild Major Depression How managed? Medication, client states that when she feels anxious at night, she walks the hallways, which is helpful      Falls in last 12 months: Yes. Was an injury sustained?  No    ADL/IADL Dependencies: Ambulation-cane, Cleaning, Laundry, Shopping, Meal prep, Medication Management, Money Management and Transportation     [facility] sponsored benefits: Shared information re: Silver Sneakers/gym memberships, ASA, Calcium +D.    PCA Assessment completed at visit: Not applicable     Elderly Waiver Eligibility: Yes-will continue on EW    Care Plan & Recommendations: Continue with current services.     See LTCC for detailed assessment information.    Follow-Up Plan: Member informed of future contact, plan to f/u with member with a 6 month telephone assessment.  Contact information shared with member and family, encouraged member to call with any questions or concerns at any time.  [facility] care continuum providers: Please refer to Health Care Home on the [facility]  Problem List to view this patient's [facility] Care Plan Summary.  3/26/18 MMIS completed, Rate Cell B, Case Mix B  Care Plan completed,  RS tool completed, copy to be mailed to provider.   Holly Guajardo RN, BC  Supervisor [facility]   [phone] (Fax)

## 2018-03-20 ASSESSMENT — PATIENT HEALTH QUESTIONNAIRE - PHQ9: SUM OF ALL RESPONSES TO PHQ QUESTIONS 1-9: 3

## 2018-03-27 ENCOUNTER — PATIENT OUTREACH (OUTPATIENT)
Dept: GERIATRIC MEDICINE | Facility: CLINIC | Age: 83
End: 2018-03-27

## 2018-03-27 NOTE — LETTER
March 27, 2018    LAURO SALTER  C[address]O [address]OVA[address] VERDU[address]  [address] [facility] [address]    Dear Lauro,     At [facility], we re dedicated to improving the health and well-being of our members.  Enclosed you will find the Comprehensive Care Plan that was developed with you on 03/19/2018. Please review the Care Plan carefully.    As a reminder, some of the things we discussed at your visit include:    Ways to improve or maintain your physical health such as walking 20 minutes a day.     Ways to reduce the risk of falls.     Health care needs you may have.     Don t forget to contact your care coordinator if you:    Have been hospitalized or plan to be hospitalized.     Have experienced a fall.      Have experienced a change in physical health, which may include bladder control and pain issues.      Are experiencing emotional problems.     If you do not agree with your Care Plan, have questions about it, or have experienced a change in your needs, please contact me, your care coordinator, at [phone]. If you are hearing impaired, please call the [place] [employer] at 553 or [phone] ([id number] relay service).    Sincerely,      Holly Guajardo RN  [facility]     [facility] is a health plan that contracts with both Medicare and the [place] Medical Assistance (Medicaid) program to provide benefits of both programs to enrollees. Enrollment in [facility] depends on contract renewal.    [employer]+ [id number] IA ([id number])                                                  (12/16)

## 2018-03-27 NOTE — PROGRESS NOTES
[facility] Care Coordination Contact  Received after visit chart from care coordinator.  Completed following tasks: Mailed copy of care plan to client, Updated services in access, Submitted referrals/auths for AL and faxed YULIANA to [facility]  Chart was returned to CC.     Mailed copy of CL tool to member, faxed copy to AL facility, uploaded into [employer] and submitted authorization to health plan.    .Mary Mitchell  CMS Supervisor  [facility]  [phone]

## 2018-03-28 ENCOUNTER — PATIENT OUTREACH (OUTPATIENT)
Dept: GERIATRIC MEDICINE | Facility: CLINIC | Age: 83
End: 2018-03-28

## 2018-03-28 NOTE — PROGRESS NOTES
Rec'd secure e-mail from [facility] re RS tool recently submitted    A previously submitted rate was found for this Client and should match the Prior Period Rate entered in this RS Tool -- it does not.   The rate expected by DHS is $2115.47 and the rate in current submission is $1887.69.   This either means the Prior Period Rate used in this RS Tool is in error, the PMI is not correct, or there is a missing RS Tool for this Client.    Review of chart -likely missing RS tool from last year's assessment. PMI matches, rate matches.  Will submit 4/2017 RS tool to [place] [facility]  Holly Guajardo RN, BC  Supervisor [facility]   [phone] (Fax)

## 2018-03-28 NOTE — PROGRESS NOTES
Communication History     User: Yvonne Zayas Date/time: 3/28/2018  1:20 PM    Comment: Per CC uploaded Al tool from 2017 into [facility].    Context:  Outcome:     Phone number:  Phone Type:     Comm. type: Web Call type: Outgoing    Contact: [facility] Relation to patient: Other      Yvonne Zayas  Case Management Specialist  [facility]  [phone]

## 2018-04-19 ENCOUNTER — ASSISTED LIVING VISIT (OUTPATIENT)
Dept: GERIATRICS | Facility: CLINIC | Age: 83
End: 2018-04-19
Payer: COMMERCIAL

## 2018-04-19 VITALS
WEIGHT: 181 LBS | DIASTOLIC BLOOD PRESSURE: 68 MMHG | BODY MASS INDEX: 33.11 KG/M2 | OXYGEN SATURATION: 94 % | RESPIRATION RATE: 18 BRPM | SYSTOLIC BLOOD PRESSURE: 129 MMHG | HEART RATE: 67 BPM

## 2018-04-19 DIAGNOSIS — F41.1 ANXIETY STATE: ICD-10-CM

## 2018-04-19 DIAGNOSIS — G40.909 SEIZURE DISORDER (H): ICD-10-CM

## 2018-04-19 DIAGNOSIS — I21.4 NSTEMI (NON-ST ELEVATED MYOCARDIAL INFARCTION) (H): Primary | ICD-10-CM

## 2018-04-19 DIAGNOSIS — M17.12 PRIMARY OSTEOARTHRITIS OF LEFT KNEE: ICD-10-CM

## 2018-04-19 DIAGNOSIS — I25.10 CORONARY ARTERY DISEASE INVOLVING NATIVE CORONARY ARTERY OF NATIVE HEART WITHOUT ANGINA PECTORIS: ICD-10-CM

## 2018-04-19 DIAGNOSIS — I10 ESSENTIAL HYPERTENSION: ICD-10-CM

## 2018-04-19 NOTE — PROGRESS NOTES
Jade Caba is a 84 year old female seen April 19, 2018  at [facility] where she has resided for 3 years (admit 2014), seen to follow up seizure disorder, HTN and anxiety/depression.   Patient is seen in her apartment, which she keeps very neat.       Patient was hospitalized in January with NSTEMI and was found to have a thrombotic lesion in the mid RCA for which she had OLIVIA placed.   There was guidewire trauma in the proximal RCA and she had a OLIVIA placed there as well, as well as one to LAD.       She was rehospitalized briefly in March for aspiration pneumonitis, from which she quickly recovered.     Today patient states she feels well, denies CP, dyspnea or any CV symptoms.     She is also tx'd for HTN, seizure disorder and anxiety.   She was admitted to [facility] in 2014 after hospitalization and TCU stay following a fall at home, sustaining SAH, C1 chip fracture, sacral fracture.     Slow recovery.    She was readmitted that year for weight loss and failure to thrive, CPT 4.3  No recent falls reported.     Patient c/o left knee pain, has had cortisone injections with good benefit, asking to have that repeated.      Past Medical History:   Diagnosis Date     Anxiety      Arthritis      Atrial flutter (H)      C1 cervical fracture (H)      CAD (coronary artery disease)      Carotid artery stenosis      CKD (chronic kidney disease)      Depression      H/O alcohol abuse      Hypercholesterolemia      Hypertension      Renal artery stenosis (H)      Seizure disorder (H)      Subarachnoid hemorrhage (H) 2014     Past Surgical History:   Procedure Laterality Date     APPENDECTOMY       CAROTID ENDARTERECTOMY Left      Cataract surgery Right     will have laser eye surgery     CHOLECYSTECTOMY       Coronary stents x 3       HYSTERECTOMY      at age of 40-not cancerus or precancerous      JOINT REPLACEMENT Bilateral      Stenting of renal artery       SH:  , has 10 children.      ROS:  Reports occ reflux symptoms.    Weight is stable.   Feels she needs oxybutynin for urge incontinence.     Occ constipation.      EXAM:  NAD, pleasant  /68  Pulse 67  Resp 18  Wt 181 lb (82.1 kg)  SpO2 94%  BMI 33.11 kg/m2   Neck supple without adenopathy  Lungs clear bilaterally with fair air movement  Heart RRR s1s2, no murmur or gallop  Abd soft, NT, no distention, +BS  Ext without edema.   Ambulatory without device.    Neuro: no tremor or stiffness  Psych: affect okay, content anxious    Last Basic Metabolic Panel:  Lab Results   Component Value Date     03/14/2018      Lab Results   Component Value Date    POTASSIUM 4.1 03/14/2018     Lab Results   Component Value Date    CHLORIDE 104 03/14/2018     Lab Results   Component Value Date    FRANCISCO 8.9 03/14/2018     Lab Results   Component Value Date    CO2 32 03/14/2018     Lab Results   Component Value Date    BUN 12 03/14/2018     Lab Results   Component Value Date    CR 0.82 03/14/2018     Lab Results   Component Value Date    GLC 97 03/14/2018     Lab Results   Component Value Date    WBC 17.6 03/12/2018      HGB 13.6 03/12/2018      MCV 95 03/12/2018       03/12/2018       IMP/PLAN:  (I21.4) NSTEMI (non-ST elevated myocardial infarction) (H)  (primary encounter diagnosis)  (I25.10) Coronary artery disease involving native coronary artery of native heart without angina pectoris  Comment: s/p OLIVIA x3  Plan: ASA+clopidogrel for one year, follow up with Cardiology.   Continue beta blocker, statin    ((M17.12) Primary osteoarthritis of left knee  Comment: recurrent   Plan: will ask JNoel to see to consider repeat cortisone injection.        (I10) Essential hypertension   Comment: good control, GFR 67  Plan: continue amlodipine, carvedilol, losartan    (G40.909) Seizure disorder (H)  Comment: hard to tell how well controlled these are, but no witnessed events recently  Plan: continue Keppra    (F41.1) Anxiety state  Comment: patient already on max treatment  Plan: continue  same doses of mirtazapine and citalopram       (R41.89) Cognitive impairment  Comment: by prior testing  Plan: appropriate for [facility] with med admin and services.       (I50.32) Chronic diastolic congestive heart failure (H)  Comment: stable volume status  Plan: continue current dose of furosemide, check BMP    (N39.41) Urgency incontinence  Comment: patient feels she needs oxybutynin  Plan: will continue for now.      (K21.9) Gastroesophageal reflux disease without esophagitis  Comment: also on metoclopramide for ?reflux sx  Plan: continue omeprazole  Trial GDR metoclopramide to bid.       Osteoporosis  Comment : h/o falls  Plan: continue calcium, vit D, alendronate     Kerry Machado MD

## 2018-04-23 ENCOUNTER — OFFICE VISIT (OUTPATIENT)
Dept: CARDIOLOGY | Facility: CLINIC | Age: 83
End: 2018-04-23
Payer: COMMERCIAL

## 2018-04-23 VITALS
DIASTOLIC BLOOD PRESSURE: 60 MMHG | HEIGHT: 61 IN | HEART RATE: 72 BPM | WEIGHT: 181.9 LBS | SYSTOLIC BLOOD PRESSURE: 108 MMHG | BODY MASS INDEX: 34.34 KG/M2

## 2018-04-23 DIAGNOSIS — I25.10 CORONARY ARTERY DISEASE INVOLVING NATIVE CORONARY ARTERY OF NATIVE HEART WITHOUT ANGINA PECTORIS: ICD-10-CM

## 2018-04-23 DIAGNOSIS — I25.10 CAD IN NATIVE ARTERY: Primary | ICD-10-CM

## 2018-04-23 LAB
ALT SERPL W P-5'-P-CCNC: 16 U/L (ref 0–50)
CHOLEST SERPL-MCNC: 189 MG/DL
HDLC SERPL-MCNC: 52 MG/DL
LDLC SERPL CALC-MCNC: 116 MG/DL
NONHDLC SERPL-MCNC: 137 MG/DL
TRIGL SERPL-MCNC: 105 MG/DL

## 2018-04-23 PROCEDURE — 80061 LIPID PANEL: CPT | Performed by: PHYSICIAN ASSISTANT

## 2018-04-23 PROCEDURE — 99214 OFFICE O/P EST MOD 30 MIN: CPT | Performed by: PHYSICIAN ASSISTANT

## 2018-04-23 PROCEDURE — 84460 ALANINE AMINO (ALT) (SGPT): CPT | Performed by: PHYSICIAN ASSISTANT

## 2018-04-23 PROCEDURE — 36415 COLL VENOUS BLD VENIPUNCTURE: CPT | Performed by: PHYSICIAN ASSISTANT

## 2018-04-23 NOTE — PROGRESS NOTES
Please see separate dictation for HPI, PHYSICAL EXAM AND IMPRESSION/PLAN.    CURRENT MEDICATIONS:  Current Outpatient Prescriptions   Medication Sig Dispense Refill     Acetaminophen (TYLENOL PO) Take 1,000 mg by mouth 2 times daily       acetaminophen (TYLENOL) 325 MG tablet Take 650 mg by mouth 2 times daily as needed for mild pain       alendronate (FOSAMAX) 70 MG tablet Take 70 mg by mouth every 7 days On Wednesdays       amLODIPine (NORVASC) 10 MG tablet Take 1 tablet (10 mg) by mouth daily 30 tablet 1     ASPIRIN EC PO Take 81 mg by mouth daily       atorvastatin (LIPITOR) 40 MG tablet Take 1 tablet (40 mg) by mouth daily 30 tablet 0     bisacodyl (DULCOLAX) 10 MG suppository Place 10 mg rectally daily as needed       calcium carbonate (OS-FRANCISCO) 500 MG TABS Take 2 tablets by mouth 2 times daily (with meals)       carvedilol (COREG) 12.5 MG tablet Take 1 tablet (12.5 mg) by mouth 2 times daily (with meals) 60 tablet 1     cholecalciferol (VITAMIN D3) 5000 UNITS CAPS capsule Take 5,000 Units by mouth daily       clopidogrel (PLAVIX) 75 MG tablet Take 1 tablet (75 mg) by mouth daily 30 tablet 11     folic acid (FOLVITE) 1 MG tablet Take 1 mg by mouth daily       hypromellose (ARTIFICIAL TEARS) 0.5 % SOLN ophthalmic solution Place 3 drops into both eyes 3 times daily       levETIRAcetam (KEPPRA) 500 MG tablet Take 500 mg by mouth 2 times daily       losartan (COZAAR) 100 MG tablet Take 100 mg by mouth daily       metoclopramide (REGLAN) 5 MG tablet Take 5 mg by mouth 2 times daily And daily PRN at noon       multivitamin, therapeutic with minerals (THERA-VIT-M) TABS Take 1 tablet by mouth daily       omeprazole (PRILOSEC) 40 MG capsule Take 1 capsule (40 mg) by mouth daily Take 30-60 minutes before a meal. 30 capsule 9     polyethylene glycol (MIRALAX/GLYCOLAX) powder Take 17 g by mouth At Bedtime        senna-docusate (SENNA S) 8.6-50 MG per tablet Take 1 tablet by mouth 2 times daily Please hold if having loose  stools and contact nurse.       citalopram (CELEXA) 10 MG tablet Take 30 mg by mouth daily       furosemide (LASIX) 20 MG tablet Take 20 mg by mouth daily       Mirtazapine (REMERON PO) Take 15 mg by mouth 2 times daily       oxybutynin (DITROPAN) 5 MG tablet Take 5 mg by mouth 2 times daily         ALLERGIES:     Allergies   Allergen Reactions     No Known Allergies        PAST MEDICAL HISTORY:  Past Medical History:   Diagnosis Date     Anxiety      Arthritis      Atrial flutter (H)      C1 cervical fracture (H)      CAD (coronary artery disease)      Carotid artery stenosis      CKD (chronic kidney disease)      Depression      H/O alcohol abuse      Hypercholesterolemia      Hypertension      Renal artery stenosis (H)      Seizure disorder (H)      Subarachnoid hemorrhage (H) 2014       PAST SURGICAL HISTORY:  Past Surgical History:   Procedure Laterality Date     APPENDECTOMY       CAROTID ENDARTERECTOMY Left      Cataract surgery Right     will have laser eye surgery     CHOLECYSTECTOMY       Coronary stents x 3       HYSTERECTOMY      at age of 40-not cancerus or precancerous      JOINT REPLACEMENT Bilateral      Stenting of renal artery         SOCIAL HISTORY:  Social History     Social History     Marital status:      Spouse name: N/A     Number of children: N/A     Years of education: N/A     Social History Main Topics     Smoking status: Never Smoker     Smokeless tobacco: Never Used     Alcohol use No     Drug use: No     Sexual activity: No     Other Topics Concern     Parent/Sibling W/ Cabg, Mi Or Angioplasty Before 65f 55m? Yes     brother Mi at 23     Social History Narrative       FAMILY HISTORY:  Family History   Problem Relation Age of Onset     C.A.D. Mother      C.A.D. Brother      C.A.ALISA Sister      CPricillaASANIYA. Brother      CANCER Sister      Breast cancer     HEART DISEASE Son        Review of Systems:  Skin:  Negative       Eyes:  Positive for glasses    ENT:  Positive for hearing loss of  the right ear  Respiratory:  Negative       Cardiovascular:    Positive for;edema of the ankles occ  Gastroenterology: Negative      Genitourinary:  Negative      Musculoskeletal:  Positive for arthritis    Neurologic:  Negative      Psychiatric:  Negative      Heme/Lymph/Imm:  Negative      Endocrine:  Negative         Reviewed. Remainder of the note dictated.    Dionna Parnell PA-C

## 2018-04-23 NOTE — MR AVS SNAPSHOT
"              After Visit Summary   4/23/2018    Jade Caab    MRN: [id number]           Patient Information     Date Of Birth          8/26/1933        Visit Information        Provider Department      4/23/2018 8:30 AM Dionna Parnell PA-C [facility]        Care Instructions    Thank you for your  Heart Care visit today. Your provider has recommended the following:  Medication Changes:  No changes at this time. We will call your nurses if we need to make any changes once the lab work is back.   Recommendations:  Nothing new at this time  Follow-up:  See Dr Sorensen for cardiology follow up in Jan/Feb 2019.    Reminder:  Please bring in your current medication list or your medication, over the counter supplements and vitamin bottles as we will review these at each office visit.                  Follow-ups after your visit        Who to contact     If you have questions or need follow up information about today's clinic visit or your schedule please contact [facility] directly at [phone].  Normal or non-critical lab and imaging results will be communicated to you by [employer], letter or phone within 4 business days after the clinic has received the results. If you do not hear from us within 7 days, please contact the clinic through [employer] or phone. If you have a critical or abnormal lab result, we will notify you by phone as soon as possible.  Submit refill requests through [employer] or call your pharmacy and they will forward the refill request to us. Please allow 3 business days for your refill to be completed.          Additional Information About Your Visit        [employer] Information     [employer]s lets you send messages to your doctor, view your test results, renew your prescriptions, schedule appointments and more. To sign up, go to [email or web address] . Click on \"Log in\" on the left side of the screen, which " "will take you to the Welcome page. Then click on \"Sign up Now\" on the right side of the page.     You will be asked to enter the access code listed below, as well as some personal information. Please follow the directions to create your username and password.     Your access code is: [email or web address]  Expires: 2018 11:19 AM     Your access code will  in 90 days. If you need help or a new code, please call your [facility] clinic or [phone].        Care EveryWhere ID     This is your Care EveryWhere ID. This could be used by other organizations to access your [facility] medical records  [id number]        Your Vitals Were     Pulse Height BMI (Body Mass Index)             72 1.549 m (5' 1\") 34.37 kg/m2          Blood Pressure from Last 3 Encounters:   18 108/60   18 129/68   03/15/18 129/68    Weight from Last 3 Encounters:   18 82.5 kg (181 lb 14.4 oz)   18 82.1 kg (181 lb)   18 82.5 kg (181 lb 12.8 oz)              Today, you had the following     No orders found for display       Primary Care Provider Fax #    [facility] [phone] [id number] [facility] [id number]        Equal Access to Services     [facility]: Hadii aad ku hadasho Sojenniferali, waaxda luqadaha, qaybta kaalmada adeegyada, aknita siddiqi . So [facility] [phone].    ATENCIÓN: Si habla español, tiene a rasmussen disposición servicios gratuitos de asistencia lingüística. Llame al [phone].    We comply with applicable federal civil rights laws and [place] laws. We do not discriminate on the basis of race, color, national origin, age, disability, sex, sexual orientation, or gender identity.            Thank you!     Thank you for choosing [facility]  for your care. Our goal is always to provide you with excellent care. Hearing back from our patients is one way we can continue to improve our services. Please take a few " minutes to complete the written survey that you may receive in the mail after your visit with us. Thank you!             Your Updated Medication List - Protect others around you: Learn how to safely use, store and throw away your medicines at www.disposemymeds.org.          This list is accurate as of 4/23/18  8:51 AM.  Always use your most recent med list.                   Brand Name Dispense Instructions for use Diagnosis    alendronate 70 MG tablet    FOSAMAX     Take 70 mg by mouth every 7 days On Wednesdays        amLODIPine 10 MG tablet    NORVASC    30 tablet    Take 1 tablet (10 mg) by mouth daily    HTN (hypertension)       ASPIRIN EC PO      Take 81 mg by mouth daily        atorvastatin 40 MG tablet    LIPITOR    30 tablet    Take 1 tablet (40 mg) by mouth daily    ACS (acute coronary syndrome) (H)       bisacodyl 10 MG Suppository    DULCOLAX     Place 10 mg rectally daily as needed        calcium carbonate 500 MG Tabs    OS-FRANCISCO     Take 2 tablets by mouth 2 times daily (with meals)        carvedilol 12.5 MG tablet    COREG    60 tablet    Take 1 tablet (12.5 mg) by mouth 2 times daily (with meals)    CHF (congestive heart failure) (H)       celeXA 10 MG tablet   Generic drug:  citalopram      Take 30 mg by mouth daily        cholecalciferol 5000 units Caps capsule    vitamin D3     Take 5,000 Units by mouth daily        clopidogrel 75 MG tablet    PLAVIX    30 tablet    Take 1 tablet (75 mg) by mouth daily    ACS (acute coronary syndrome) (H)       folic acid 1 MG tablet    FOLVITE     Take 1 mg by mouth daily        furosemide 20 MG tablet    LASIX     Take 20 mg by mouth daily        hypromellose 0.5 % Soln ophthalmic solution    ARTIFICIAL TEARS     Place 3 drops into both eyes 3 times daily        KEPPRA 500 MG tablet   Generic drug:  levETIRAcetam      Take 500 mg by mouth 2 times daily        losartan 100 MG tablet    COZAAR     Take 100 mg by mouth daily        metoclopramide 5 MG tablet     REGLAN     Take 5 mg by mouth 2 times daily And daily PRN at noon        multivitamin, therapeutic with minerals Tabs tablet      Take 1 tablet by mouth daily        omeprazole 40 MG capsule    priLOSEC    30 capsule    Take 1 capsule (40 mg) by mouth daily Take 30-60 minutes before a meal.    Abdominal pain, other specified site       oxybutynin 5 MG tablet    DITROPAN     Take 5 mg by mouth 2 times daily        polyethylene glycol powder    MIRALAX/GLYCOLAX     Take 17 g by mouth At Bedtime        REMERON PO      Take 15 mg by mouth 2 times daily        SENNA S 8.6-50 MG per tablet   Generic drug:  senna-docusate      Take 1 tablet by mouth 2 times daily Please hold if having loose stools and contact nurse.        * TYLENOL PO      Take 1,000 mg by mouth 2 times daily        * TYLENOL 325 MG tablet   Generic drug:  acetaminophen      Take 650 mg by mouth 2 times daily as needed for mild pain        * Notice:  This list has 2 medication(s) that are the same as other medications prescribed for you. Read the directions carefully, and ask your doctor or other care provider to review them with you.

## 2018-04-23 NOTE — LETTER
4/23/2018    [facility]  [address]  [facility] [place] [address]    RE: Jade Caba       Dear Colleague,    I had the pleasure of seeing Jade Caba in the [facility].    Please see separate dictation for HPI, PHYSICAL EXAM AND IMPRESSION/PLAN.    CURRENT MEDICATIONS:  Current Outpatient Prescriptions   Medication Sig Dispense Refill     Acetaminophen (TYLENOL PO) Take 1,000 mg by mouth 2 times daily       acetaminophen (TYLENOL) 325 MG tablet Take 650 mg by mouth 2 times daily as needed for mild pain       alendronate (FOSAMAX) 70 MG tablet Take 70 mg by mouth every 7 days On Wednesdays       amLODIPine (NORVASC) 10 MG tablet Take 1 tablet (10 mg) by mouth daily 30 tablet 1     ASPIRIN EC PO Take 81 mg by mouth daily       atorvastatin (LIPITOR) 40 MG tablet Take 1 tablet (40 mg) by mouth daily 30 tablet 0     bisacodyl (DULCOLAX) 10 MG suppository Place 10 mg rectally daily as needed       calcium carbonate (OS-FRANCISCO) 500 MG TABS Take 2 tablets by mouth 2 times daily (with meals)       carvedilol (COREG) 12.5 MG tablet Take 1 tablet (12.5 mg) by mouth 2 times daily (with meals) 60 tablet 1     cholecalciferol (VITAMIN D3) 5000 UNITS CAPS capsule Take 5,000 Units by mouth daily       clopidogrel (PLAVIX) 75 MG tablet Take 1 tablet (75 mg) by mouth daily 30 tablet 11     folic acid (FOLVITE) 1 MG tablet Take 1 mg by mouth daily       hypromellose (ARTIFICIAL TEARS) 0.5 % SOLN ophthalmic solution Place 3 drops into both eyes 3 times daily       levETIRAcetam (KEPPRA) 500 MG tablet Take 500 mg by mouth 2 times daily       losartan (COZAAR) 100 MG tablet Take 100 mg by mouth daily       metoclopramide (REGLAN) 5 MG tablet Take 5 mg by mouth 2 times daily And daily PRN at noon       multivitamin, therapeutic with minerals (THERA-VIT-M) TABS Take 1 tablet by mouth daily       omeprazole (PRILOSEC) 40 MG capsule Take 1 capsule (40 mg) by mouth daily Take 30-60 minutes  before a meal. 30 capsule 9     polyethylene glycol (MIRALAX/GLYCOLAX) powder Take 17 g by mouth At Bedtime        senna-docusate (SENNA S) 8.6-50 MG per tablet Take 1 tablet by mouth 2 times daily Please hold if having loose stools and contact nurse.       citalopram (CELEXA) 10 MG tablet Take 30 mg by mouth daily       furosemide (LASIX) 20 MG tablet Take 20 mg by mouth daily       Mirtazapine (REMERON PO) Take 15 mg by mouth 2 times daily       oxybutynin (DITROPAN) 5 MG tablet Take 5 mg by mouth 2 times daily         ALLERGIES:     Allergies   Allergen Reactions     No Known Allergies        PAST MEDICAL HISTORY:  Past Medical History:   Diagnosis Date     Anxiety      Arthritis      Atrial flutter (H)      C1 cervical fracture (H)      CAD (coronary artery disease)      Carotid artery stenosis      CKD (chronic kidney disease)      Depression      H/O alcohol abuse      Hypercholesterolemia      Hypertension      Renal artery stenosis (H)      Seizure disorder (H)      Subarachnoid hemorrhage (H) 2014       PAST SURGICAL HISTORY:  Past Surgical History:   Procedure Laterality Date     APPENDECTOMY       CAROTID ENDARTERECTOMY Left      Cataract surgery Right     will have laser eye surgery     CHOLECYSTECTOMY       Coronary stents x 3       HYSTERECTOMY      at age of 40-not cancerus or precancerous      JOINT REPLACEMENT Bilateral      Stenting of renal artery         SOCIAL HISTORY:  Social History     Social History     Marital status:      Spouse name: N/A     Number of children: N/A     Years of education: N/A     Social History Main Topics     Smoking status: Never Smoker     Smokeless tobacco: Never Used     Alcohol use No     Drug use: No     Sexual activity: No     Other Topics Concern     Parent/Sibling W/ Cabg, Mi Or Angioplasty Before 65f 55m? Yes     brother Mi at 23     Social History Narrative       FAMILY HISTORY:  Family History   Problem Relation Age of Onset     C.A.D. Mother       MICHAEL. Brother      C.ASANIYA. Sister      MICHAEL. Brother      CANCER Sister      Breast cancer     HEART DISEASE Son        Review of Systems:  Skin:  Negative       Eyes:  Positive for glasses    ENT:  Positive for hearing loss of the right ear  Respiratory:  Negative       Cardiovascular:    Positive for;edema of the ankles occ  Gastroenterology: Negative      Genitourinary:  Negative      Musculoskeletal:  Positive for arthritis    Neurologic:  Negative      Psychiatric:  Negative      Heme/Lymph/Imm:  Negative      Endocrine:  Negative         Reviewed. Remainder of the note dictated.    Dionna Parnell PA-C        Service Date: 04/23/2018      HISTORY OF PRESENT ILLNESS:  Jade is a pleasant, 84-year-old female who presents to the Cardiology office today for a 2 month followup.      She has a fairly extensive cardiovascular history with coronary artery disease, for which she has undergone multivessel PCI in the past.  Initially, she had angioplasty to the circumflex in 1997.  She subsequently had drug-eluting stent placement to the LAD in 2004, drug-eluting stent placement to the circumflex and RCA in 10/2011 with subsequent recurrent symptoms and drug-eluting stent placement to the mid LAD later that month.  Most recently, she had a non-STEMI in 02/2018, at which time she was found to have a thrombotic lesion in the mid RCA.  She had a drug-eluting stent placed to that lesion.  Unfortunately, there was guidewire trauma in the proximal RCA, and she had stent placement to that area as well.  She also was found to have significant in-stent restenosis in the LAD and had a repeat drug-eluting stent placement there.  She also has a history of CVA with a prior right carotid endarterectomy in 2003 and a left carotid endarterectomy in 2007.  She has known renal artery stenosis.  She has previously been noted to have a PFO.  She has hypertension, hyperlipidemia and a prior subarachnoid hemorrhage following a  mechanical fall.  She currently resides in an assisted-living facility.      She returns today for routine followup.  She states she has been feeling well.  She has not had any recurrent chest discomfort.  Since I last saw her, she did have a 2 day hospital stay for pneumonia potentially related to aspiration.  Those symptoms have resolved.  Again, she denies any chest discomfort, dyspnea on exertion, orthopnea, PND, lightheadedness, presyncope or syncope.  The nurses at her assisted-living facility take care of her medications for her.  She has no questions or concerns at this time.      CURRENT CARDIAC MEDICATIONS:  As listed in the Epic medical record.  (The patient did not bring an updated list with her today.)    1.  Amlodipine 10 mg daily.   2.  Aspirin 81 mg daily.   3.  Atorvastatin 40 mg daily.   4.  Carvedilol 12.5 mg b.i.d.   5.  Plavix 75 mg daily.   6.  Losartan 100 mg daily.   7.  Lasix 20 mg daily.      The remainder of her medications and review of systems were reviewed and as are documented separately.      ALLERGIES:  Reviewed and as are documented separately.      PHYSICAL EXAMINATION:   GENERAL:  The patient is a pleasant, 84-year-old female who appears her stated age.  She is in no apparent distress.   VITAL SIGNS:  Her blood pressure is 108/60.  Pulse is 72.  Weight is 181 pounds, which is stable.   LUNGS:  Her breathing is nonlabored, and her lungs are clear to auscultation bilaterally.   CARDIAC:  Regular rate and rhythm.  No murmur is appreciated.  Trace lower extremity edema.   NEUROLOGIC:  Alert and oriented.      LABORATORY:  She had a repeat lipid profile prior to today's office visit, but the results are pending at the time of this dictation.      ASSESSMENT AND PLAN:  Jade is a pleasant, 84-year-old female with an extensive cardiovascular history as detailed above.  Again, she most recently experienced a non-STEMI in early February, at which time she underwent repeat coronary  angiography noting a thrombotic lesion in the mid RCA.  She had drug-eluting stent placement to this area.  Again, there was some guidewire trauma.  She had a proximal RCA stent placed and also had an ostial LAD stent placed due to significant in-stent restenosis.  She is on appropriate medical therapy and is not having any recurrent anginal symptoms.  Again, she needs to be on dual antiplatelet therapy uninterrupted for at least 1 year.  Her atorvastatin was increased at the time of her myocardial infarction, and we are awaiting the results of her lipid profile from today.  For the time being, I recommended continuing her current cardiac medication regimen unchanged.  We will notify her assisted-living facility if any changes need to be made based on her lab results.      Again, she appears to be stable from a cardiac standpoint.  We will plan to see her back in  at the 1 year katty of her myocardial infarction unless she has recurrent symptoms or any questions prior to that office visit.      Thank you for allowing us to participate in the care of this pleasant patient.         ANTONIO RUCKER PA-C      ADDENDUM: Labs reviewed. LDL elevated despite increase in atorvastatin. Rec: stop atorvastatin, start rosuvastatin 40mg daily. My RN will relay this recommendation. Antonio Rucker PA-C            D: 2018   T: 2018   MT: maykel      Name:     LAURO SALTER   MRN:      [id number]        Account:      [id number]   :      1933           Service Date: 2018      Document: [id number]        Thank you for allowing me to participate in the care of your patient.      Sincerely,     Antonio Rucker PA-C     [facility]    cc:   No referring provider defined for this encounter.

## 2018-04-23 NOTE — LETTER
4/23/2018      [facility]  [address]  [facility] [address]      RE: Jade Caba       Dear Colleague,    I had the pleasure of seeing Jade Caba in the [facility].    Service Date: 04/23/2018      HISTORY OF PRESENT ILLNESS:  Jade is a pleasant, 84-year-old female who presents to the Cardiology office today for a 2 month followup.      She has a fairly extensive cardiovascular history with coronary artery disease, for which she has undergone multivessel PCI in the past.  Initially, she had angioplasty to the circumflex in 1997.  She subsequently had drug-eluting stent placement to the LAD in 2004, drug-eluting stent placement to the circumflex and RCA in 10/2011 with subsequent recurrent symptoms and drug-eluting stent placement to the mid LAD later that month.  Most recently, she had a non-STEMI in 02/2018, at which time she was found to have a thrombotic lesion in the mid RCA.  She had a drug-eluting stent placed to that lesion.  Unfortunately, there was guidewire trauma in the proximal RCA, and she had stent placement to that area as well.  She also was found to have significant in-stent restenosis in the LAD and had a repeat drug-eluting stent placement there.  She also has a history of CVA with a prior right carotid endarterectomy in 2003 and a left carotid endarterectomy in 2007.  She has known renal artery stenosis.  She has previously been noted to have a PFO.  She has hypertension, hyperlipidemia and a prior subarachnoid hemorrhage following a mechanical fall.  She currently resides in an assisted-living facility.      She returns today for routine followup.  She states she has been feeling well.  She has not had any recurrent chest discomfort.  Since I last saw her, she did have a 2 day hospital stay for pneumonia potentially related to aspiration.  Those symptoms have resolved.  Again, she denies any chest discomfort, dyspnea on exertion,  orthopnea, PND, lightheadedness, presyncope or syncope.  The nurses at her assisted-living facility take care of her medications for her.  She has no questions or concerns at this time.      CURRENT CARDIAC MEDICATIONS:  As listed in the Epic medical record.  (The patient did not bring an updated list with her today.)    1.  Amlodipine 10 mg daily.   2.  Aspirin 81 mg daily.   3.  Atorvastatin 40 mg daily.   4.  Carvedilol 12.5 mg b.i.d.   5.  Plavix 75 mg daily.   6.  Losartan 100 mg daily.   7.  Lasix 20 mg daily.      The remainder of her medications and review of systems were reviewed and as are documented separately.      ALLERGIES:  Reviewed and as are documented separately.      PHYSICAL EXAMINATION:   GENERAL:  The patient is a pleasant, 84-year-old female who appears her stated age.  She is in no apparent distress.   VITAL SIGNS:  Her blood pressure is 108/60.  Pulse is 72.  Weight is 181 pounds, which is stable.   LUNGS:  Her breathing is nonlabored, and her lungs are clear to auscultation bilaterally.   CARDIAC:  Regular rate and rhythm.  No murmur is appreciated.  Trace lower extremity edema.   NEUROLOGIC:  Alert and oriented.      LABORATORY:  She had a repeat lipid profile prior to today's office visit, but the results are pending at the time of this dictation.      ASSESSMENT AND PLAN:  Jade is a pleasant, 84-year-old female with an extensive cardiovascular history as detailed above.  Again, she most recently experienced a non-STEMI in early February, at which time she underwent repeat coronary angiography noting a thrombotic lesion in the mid RCA.  She had drug-eluting stent placement to this area.  Again, there was some guidewire trauma.  She had a proximal RCA stent placed and also had an ostial LAD stent placed due to significant in-stent restenosis.  She is on appropriate medical therapy and is not having any recurrent anginal symptoms.  Again, she needs to be on dual antiplatelet therapy  uninterrupted for at least 1 year.  Her atorvastatin was increased at the time of her myocardial infarction, and we are awaiting the results of her lipid profile from today.  For the time being, I recommended continuing her current cardiac medication regimen unchanged.  We will notify her assisted-living facility if any changes need to be made based on her lab results.      Again, she appears to be stable from a cardiac standpoint.  We will plan to see her back in  at the 1 year katty of her myocardial infarction unless she has recurrent symptoms or any questions prior to that office visit.      Thank you for allowing us to participate in the care of this pleasant patient.         ANTONIO RUCKER PA-C      ADDENDUM: Labs reviewed. LDL elevated despite increase in atorvastatin. Rec: stop atorvastatin, start rosuvastatin 40mg daily. My RN will relay this recommendation. Antonio Rucker PA-C            D: 2018   T: 2018   MT: maykel      Name:     LAURO SALTER   MRN:      [id number]        Account:      [id number]   :      1933           Service Date: 2018      Document: [id number]           Outpatient Encounter Prescriptions as of 2018   Medication Sig Dispense Refill     Acetaminophen (TYLENOL PO) Take 1,000 mg by mouth 2 times daily       acetaminophen (TYLENOL) 325 MG tablet Take 650 mg by mouth 2 times daily as needed for mild pain       alendronate (FOSAMAX) 70 MG tablet Take 70 mg by mouth every 7 days On        amLODIPine (NORVASC) 10 MG tablet Take 1 tablet (10 mg) by mouth daily 30 tablet 1     ASPIRIN EC PO Take 81 mg by mouth daily       bisacodyl (DULCOLAX) 10 MG suppository Place 10 mg rectally daily as needed       calcium carbonate (OS-FRANCISCO) 500 MG TABS Take 2 tablets by mouth 2 times daily (with meals)       carvedilol (COREG) 12.5 MG tablet Take 1 tablet (12.5 mg) by mouth 2 times daily (with meals) 60 tablet 1     cholecalciferol (VITAMIN  D3) 5000 UNITS CAPS capsule Take 5,000 Units by mouth daily       citalopram (CELEXA) 10 MG tablet Take 30 mg by mouth daily       clopidogrel (PLAVIX) 75 MG tablet Take 1 tablet (75 mg) by mouth daily 30 tablet 11     folic acid (FOLVITE) 1 MG tablet Take 1 mg by mouth daily       furosemide (LASIX) 20 MG tablet Take 20 mg by mouth daily       hypromellose (ARTIFICIAL TEARS) 0.5 % SOLN ophthalmic solution Place 3 drops into both eyes 3 times daily       levETIRAcetam (KEPPRA) 500 MG tablet Take 500 mg by mouth 2 times daily       losartan (COZAAR) 100 MG tablet Take 100 mg by mouth daily       metoclopramide (REGLAN) 5 MG tablet Take 5 mg by mouth 2 times daily And daily PRN at noon       Mirtazapine (REMERON PO) Take 15 mg by mouth 2 times daily       Multiple Vitamins-Minerals (CERTAVITE SENIOR/ANTIOXIDANT PO) Take by mouth daily       omeprazole (PRILOSEC) 40 MG capsule Take 1 capsule (40 mg) by mouth daily Take 30-60 minutes before a meal. 30 capsule 9     oxybutynin (DITROPAN) 5 MG tablet Take 5 mg by mouth 2 times daily       polyethylene glycol (MIRALAX/GLYCOLAX) powder Take 17 g by mouth At Bedtime        senna-docusate (SENNA S) 8.6-50 MG per tablet Take 1 tablet by mouth 2 times daily Please hold if having loose stools and contact nurse.       [DISCONTINUED] atorvastatin (LIPITOR) 40 MG tablet Take 1 tablet (40 mg) by mouth daily 30 tablet 0     multivitamin, therapeutic with minerals (THERA-VIT-M) TABS Take 1 tablet by mouth daily       No facility-administered encounter medications on file as of 4/23/2018.        Again, thank you for allowing me to participate in the care of your patient.      Sincerely,    Dionna Parnell PA-C     [facility]

## 2018-04-23 NOTE — PROGRESS NOTES
Service Date: 04/23/2018      HISTORY OF PRESENT ILLNESS:  Jade is a pleasant, 84-year-old female who presents to the Cardiology office today for a 2 month followup.      She has a fairly extensive cardiovascular history with coronary artery disease, for which she has undergone multivessel PCI in the past.  Initially, she had angioplasty to the circumflex in 1997.  She subsequently had drug-eluting stent placement to the LAD in 2004, drug-eluting stent placement to the circumflex and RCA in 10/2011 with subsequent recurrent symptoms and drug-eluting stent placement to the mid LAD later that month.  Most recently, she had a non-STEMI in 02/2018, at which time she was found to have a thrombotic lesion in the mid RCA.  She had a drug-eluting stent placed to that lesion.  Unfortunately, there was guidewire trauma in the proximal RCA, and she had stent placement to that area as well.  She also was found to have significant in-stent restenosis in the LAD and had a repeat drug-eluting stent placement there.  She also has a history of CVA with a prior right carotid endarterectomy in 2003 and a left carotid endarterectomy in 2007.  She has known renal artery stenosis.  She has previously been noted to have a PFO.  She has hypertension, hyperlipidemia and a prior subarachnoid hemorrhage following a mechanical fall.  She currently resides in an assisted-living facility.      She returns today for routine followup.  She states she has been feeling well.  She has not had any recurrent chest discomfort.  Since I last saw her, she did have a 2 day hospital stay for pneumonia potentially related to aspiration.  Those symptoms have resolved.  Again, she denies any chest discomfort, dyspnea on exertion, orthopnea, PND, lightheadedness, presyncope or syncope.  The nurses at her assisted-living facility take care of her medications for her.  She has no questions or concerns at this time.      CURRENT CARDIAC MEDICATIONS:  As listed in  the [facility] medical record.  (The patient did not bring an updated list with her today.)    1.  Amlodipine 10 mg daily.   2.  Aspirin 81 mg daily.   3.  Atorvastatin 40 mg daily.   4.  Carvedilol 12.5 mg b.i.d.   5.  Plavix 75 mg daily.   6.  Losartan 100 mg daily.   7.  Lasix 20 mg daily.      The remainder of her medications and review of systems were reviewed and as are documented separately.      ALLERGIES:  Reviewed and as are documented separately.      PHYSICAL EXAMINATION:   GENERAL:  The patient is a pleasant, 84-year-old female who appears her stated age.  She is in no apparent distress.   VITAL SIGNS:  Her blood pressure is 108/60.  Pulse is 72.  Weight is 181 pounds, which is stable.   LUNGS:  Her breathing is nonlabored, and her lungs are clear to auscultation bilaterally.   CARDIAC:  Regular rate and rhythm.  No murmur is appreciated.  Trace lower extremity edema.   NEUROLOGIC:  Alert and oriented.      LABORATORY:  She had a repeat lipid profile prior to today's office visit, but the results are pending at the time of this dictation.      ASSESSMENT AND PLAN:  Jade is a pleasant, 84-year-old female with an extensive cardiovascular history as detailed above.  Again, she most recently experienced a non-STEMI in early February, at which time she underwent repeat coronary angiography noting a thrombotic lesion in the mid RCA.  She had drug-eluting stent placement to this area.  Again, there was some guidewire trauma.  She had a proximal RCA stent placed and also had an ostial LAD stent placed due to significant in-stent restenosis.  She is on appropriate medical therapy and is not having any recurrent anginal symptoms.  Again, she needs to be on dual antiplatelet therapy uninterrupted for at least 1 year.  Her atorvastatin was increased at the time of her myocardial infarction, and we are awaiting the results of her lipid profile from today.  For the time being, I recommended continuing her current cardiac  medication regimen unchanged.  We will notify her assisted-living facility if any changes need to be made based on her lab results.      Again, she appears to be stable from a cardiac standpoint.  We will plan to see her back in  at the 1 year katty of her myocardial infarction unless she has recurrent symptoms or any questions prior to that office visit.      Thank you for allowing us to participate in the care of this pleasant patient.         ANTONIO RUCKER PA-C      ADDENDUM: Labs reviewed. LDL elevated despite increase in atorvastatin. Rec: stop atorvastatin, start rosuvastatin 40mg daily. My RN will relay this recommendation. Antonio Rucker PA-C            D: 2018   T: 2018   MT: maykel      Name:     LAURO SALTER   MRN:      [id number]        Account:      [id number]   :      1933           Service Date: 2018      Document: [id number]

## 2018-04-23 NOTE — PATIENT INSTRUCTIONS
Thank you for your [facility] visit today. Your provider has recommended the following:  Medication Changes:  No changes at this time. We will call your nurses if we need to make any changes once the lab work is back.   Recommendations:  Nothing new at this time  Follow-up:  See Dr Sorensen for cardiology follow up in Jan/Feb 2019.    Reminder:  Please bring in your current medication list or your medication, over the counter supplements and vitamin bottles as we will review these at each office visit.

## 2018-04-26 ENCOUNTER — TELEPHONE (OUTPATIENT)
Dept: CARDIOLOGY | Facility: CLINIC | Age: 83
End: 2018-04-26

## 2018-04-26 DIAGNOSIS — E78.5 HYPERLIPIDEMIA LDL GOAL <100: ICD-10-CM

## 2018-04-26 DIAGNOSIS — I25.10 CORONARY ARTERY DISEASE INVOLVING NATIVE CORONARY ARTERY OF NATIVE HEART WITHOUT ANGINA PECTORIS: Primary | ICD-10-CM

## 2018-04-26 NOTE — TELEPHONE ENCOUNTER
Called pt to review lab work results and recommendations per Dionna Parnell PA-C.   Pt is to stop Atorvastatin and start Rosuvastatin 40 mg QD. Repeat labs and OV in 3 months.   Calling [facility] to contact nurse to ask how we can send prescription. Roz nurse manager [phone]. General phone # [phone].  ABBIE Wynne

## 2018-04-27 RX ORDER — ROSUVASTATIN CALCIUM 40 MG/1
40 TABLET, COATED ORAL DAILY
Qty: 90 TABLET | Refills: 0 | Status: SHIPPED | OUTPATIENT
Start: 2018-04-27 | End: 2019-02-12 | Stop reason: DRUGHIGH

## 2018-04-30 ENCOUNTER — ASSISTED LIVING VISIT (OUTPATIENT)
Dept: GERIATRICS | Facility: CLINIC | Age: 83
End: 2018-04-30
Payer: COMMERCIAL

## 2018-04-30 DIAGNOSIS — M17.12 PRIMARY OSTEOARTHRITIS OF LEFT KNEE: Primary | ICD-10-CM

## 2018-04-30 NOTE — PROGRESS NOTES
[facility]tho Nursing home visit    Jade Caba is a 84 year old female who resides at  [facility] 1st floor.  Patient is seen today for request for cortisone inejction. Left knee, has had injections in the past, that have been helpful in reduction of pain 2nd to OA:  Patients daughter, called prior to today's visit to confirm request , and discuss R&B of injection.      Past Medical History:   Diagnosis Date     Anxiety      Arthritis      Atrial flutter (H)      C1 cervical fracture (H)      CAD (coronary artery disease)      Carotid artery stenosis      CKD (chronic kidney disease)      Depression      H/O alcohol abuse      Hypercholesterolemia      Hypertension      Renal artery stenosis (H)      Seizure disorder (H)      Subarachnoid hemorrhage (H) 2014      Past Surgical History:   Procedure Laterality Date     APPENDECTOMY       CAROTID ENDARTERECTOMY Left      Cataract surgery Right     will have laser eye surgery     CHOLECYSTECTOMY       Coronary stents x 3       HYSTERECTOMY      at age of 40-not cancerus or precancerous      JOINT REPLACEMENT Bilateral      Stenting of renal artery          Allergies   Allergen Reactions     No Known Allergies       There were no vitals taken for this visit.     Exam: Seated today in apt; allows PROM -10/90 Left knee, lig intact, no noted effusion, no noted swelling, pain on exam is medial joint line,. Some crepitus noted on AROM:      ASSESSMENT / PLAN: after R&B discussed including bleeding, as patient is on chronic anti-coag; plavix/ asa/ patient wishes to proceed with cortisone injection today left knee;    Procedure; Left knee prepped, injected with 1cc depo medrol, 4 cc 1% lidocaine into medial knee joint , with knee flexed to 90/ tolerated well, no evidence of bleeding today,  Discussed future injection could be done in AUG 2018, if needed;    [id number]        [facility]-  [phone]

## 2018-04-30 NOTE — MR AVS SNAPSHOT
"              After Visit Summary   2018    Jade Caba    MRN: [id number]           Patient Information     Date Of Birth          1933        Visit Information        Provider Department      2018 12:32 PM PEGGY Wilson PA-C [facility]        Today's Diagnoses     Primary osteoarthritis of left knee    -  1       Follow-ups after your visit        Who to contact     If you have questions or need follow up information about today's clinic visit or your schedule please contact [facility] directly at [phone].  Normal or non-critical lab and imaging results will be communicated to you by Spreadshirthart, letter or phone within 4 business days after the clinic has received the results. If you do not hear from us within 7 days, please contact the clinic through Spreadshirthart or phone. If you have a critical or abnormal lab result, we will notify you by phone as soon as possible.  Submit refill requests through WigWag or call your pharmacy and they will forward the refill request to us. Please allow 3 business days for your refill to be completed.          Additional Information About Your Visit        MyChart Information     WigWag lets you send messages to your doctor, view your test results, renew your prescriptions, schedule appointments and more. To sign up, go to [email or web address] . Click on \"Log in\" on the left side of the screen, which will take you to the Welcome page. Then click on \"Sign up Now\" on the right side of the page.     You will be asked to enter the access code listed below, as well as some personal information. Please follow the directions to create your username and password.     Your access code is: [email or web address]  Expires: 2018 11:19 AM     Your access code will  in 90 days. If you need help or a new code, please call your [facility] clinic or [phone].        Care EveryWhere ID     This is your Care EveryWhere ID. This could be used " by other organizations to access your [place] medical records  [id number]         Blood Pressure from Last 3 Encounters:   04/23/18 108/60   04/19/18 129/68   03/15/18 129/68    Weight from Last 3 Encounters:   04/23/18 181 lb 14.4 oz (82.5 kg)   04/19/18 181 lb (82.1 kg)   03/12/18 181 lb 12.8 oz (82.5 kg)              Today, you had the following     No orders found for display       Primary Care Provider Fax #    [facility] [phone] [address]  [facility] [address]        Equal Access to Services     [facility]ANK : Hadii aad ku hadasho Soraven, waaxda lureynaadaha, qaybta kaalmada adedarrylyada, ankita nash adedarryl siddiqi . So [facility] [phone].    ATENCIÓN: Si habla español, tiene a rasmussen disposición servicios gratuitos de asistencia lingüística. Llame al [phone].    We comply with applicable federal civil rights laws and [place] laws. We do not discriminate on the basis of race, color, national origin, age, disability, sex, sexual orientation, or gender identity.            Thank you!     Thank you for choosing [facility]  for your care. Our goal is always to provide you with excellent care. Hearing back from our patients is one way we can continue to improve our services. Please take a few minutes to complete the written survey that you may receive in the mail after your visit with us. Thank you!             Your Updated Medication List - Protect others around you: Learn how to safely use, store and throw away your medicines at www.disposemymeds.org.          This list is accurate as of 4/30/18 12:38 PM.  Always use your most recent med list.                   Brand Name Dispense Instructions for use Diagnosis    alendronate 70 MG tablet    FOSAMAX     Take 70 mg by mouth every 7 days On Wednesdays        amLODIPine 10 MG tablet    NORVASC    30 tablet    Take 1 tablet (10 mg) by mouth daily    HTN (hypertension)       ASPIRIN EC PO      Take 81  mg by mouth daily        bisacodyl 10 MG Suppository    DULCOLAX     Place 10 mg rectally daily as needed        calcium carbonate 500 MG Tabs    OS-FRANCISCO     Take 2 tablets by mouth 2 times daily (with meals)        carvedilol 12.5 MG tablet    COREG    60 tablet    Take 1 tablet (12.5 mg) by mouth 2 times daily (with meals)    CHF (congestive heart failure) (H)       celeXA 10 MG tablet   Generic drug:  citalopram      Take 30 mg by mouth daily        cholecalciferol 5000 units Caps capsule    vitamin D3     Take 5,000 Units by mouth daily        clopidogrel 75 MG tablet    PLAVIX    30 tablet    Take 1 tablet (75 mg) by mouth daily    ACS (acute coronary syndrome) (H)       folic acid 1 MG tablet    FOLVITE     Take 1 mg by mouth daily        furosemide 20 MG tablet    LASIX     Take 20 mg by mouth daily        hypromellose 0.5 % Soln ophthalmic solution    ARTIFICIAL TEARS     Place 3 drops into both eyes 3 times daily        KEPPRA 500 MG tablet   Generic drug:  levETIRAcetam      Take 500 mg by mouth 2 times daily        losartan 100 MG tablet    COZAAR     Take 100 mg by mouth daily        metoclopramide 5 MG tablet    REGLAN     Take 5 mg by mouth 2 times daily And daily PRN at noon        * multivitamin, therapeutic with minerals Tabs tablet      Take 1 tablet by mouth daily        * CERTAVITE SENIOR/ANTIOXIDANT PO      Take by mouth daily        omeprazole 40 MG capsule    priLOSEC    30 capsule    Take 1 capsule (40 mg) by mouth daily Take 30-60 minutes before a meal.    Abdominal pain, other specified site       oxybutynin 5 MG tablet    DITROPAN     Take 5 mg by mouth 2 times daily        polyethylene glycol powder    MIRALAX/GLYCOLAX     Take 17 g by mouth At Bedtime        REMERON PO      Take 15 mg by mouth 2 times daily        rosuvastatin 40 MG tablet    CRESTOR    90 tablet    Take 1 tablet (40 mg) by mouth daily    Coronary artery disease involving native coronary artery of native heart without  angina pectoris, Hyperlipidemia LDL goal <100       SENNA S 8.6-50 MG per tablet   Generic drug:  senna-docusate      Take 1 tablet by mouth 2 times daily Please hold if having loose stools and contact nurse.        * TYLENOL PO      Take 1,000 mg by mouth 2 times daily        * TYLENOL 325 MG tablet   Generic drug:  acetaminophen      Take 650 mg by mouth 2 times daily as needed for mild pain        * Notice:  This list has 4 medication(s) that are the same as other medications prescribed for you. Read the directions carefully, and ask your doctor or other care provider to review them with you.

## 2018-06-05 ENCOUNTER — ASSISTED LIVING VISIT (OUTPATIENT)
Dept: GERIATRICS | Facility: CLINIC | Age: 83
End: 2018-06-05
Payer: COMMERCIAL

## 2018-06-05 VITALS
OXYGEN SATURATION: 94 % | RESPIRATION RATE: 18 BRPM | SYSTOLIC BLOOD PRESSURE: 135 MMHG | DIASTOLIC BLOOD PRESSURE: 79 MMHG | HEART RATE: 69 BPM

## 2018-06-05 DIAGNOSIS — I50.32 CHRONIC DIASTOLIC CONGESTIVE HEART FAILURE (H): ICD-10-CM

## 2018-06-05 DIAGNOSIS — R52 PAIN: Primary | ICD-10-CM

## 2018-06-05 DIAGNOSIS — I10 ESSENTIAL HYPERTENSION: ICD-10-CM

## 2018-06-05 NOTE — LETTER
6/5/2018        RE: Jade Caba  [facility]  [address] [facility] [address]        [facility]    Chief Complaint   Patient presents with     Pain       [facility] Medical Record Number:  [id number]    HPI:    Jade Caba is a 84 year old  (8/26/1933), who is being seen today for an episodic care visit at [facility].  HPI information obtained from: facility chart records, facility staff, patient report and [facility] chart review.Today's concern is: pain, CHF, HTN.  S/p wrist trauma 6/3/18, was helping peer of floor, had R wrist pinned against wall, sustained bruise to area. Reports no pain of area. Does reports occ. Knee pain L>R. No recent changes in gait. S/p knee inj.  For CHF cont.on lasix. Wt. Stable. No resp. Distress, no edema.  For HTN taking norvasc, coreg, cozaar. BPs, HRs stable.       ALLERGIES: No known allergies  Past Medical, Surgical, Family and Social [facility]    Current Outpatient Prescriptions   Medication Sig Dispense Refill     Acetaminophen (TYLENOL PO) Take 1,000 mg by mouth 2 times daily       acetaminophen (TYLENOL) 325 MG tablet Take 650 mg by mouth 2 times daily as needed for mild pain       alendronate (FOSAMAX) 70 MG tablet Take 70 mg by mouth every 7 days On Wednesdays       amLODIPine (NORVASC) 10 MG tablet Take 1 tablet (10 mg) by mouth daily 30 tablet 1     ASPIRIN EC PO Take 81 mg by mouth daily       bisacodyl (DULCOLAX) 10 MG suppository Place 10 mg rectally daily as needed       calcium carbonate (OS-FRANCISCO) 500 MG TABS Take 2 tablets by mouth 2 times daily (with meals)       carvedilol (COREG) 12.5 MG tablet Take 1 tablet (12.5 mg) by mouth 2 times daily (with meals) 60 tablet 1     cholecalciferol (VITAMIN D3) 5000 UNITS CAPS capsule Take 5,000 Units by mouth daily       citalopram (CELEXA) 10 MG tablet Take 30 mg by mouth daily       clopidogrel (PLAVIX) 75 MG tablet Take 1 tablet (75 mg) by mouth  daily 30 tablet 11     folic acid (FOLVITE) 1 MG tablet Take 1 mg by mouth daily       furosemide (LASIX) 20 MG tablet Take 20 mg by mouth daily       hypromellose (ARTIFICIAL TEARS) 0.5 % SOLN ophthalmic solution Place 3 drops into both eyes 3 times daily       levETIRAcetam (KEPPRA) 500 MG tablet Take 500 mg by mouth 2 times daily       losartan (COZAAR) 100 MG tablet Take 100 mg by mouth daily       metoclopramide (REGLAN) 5 MG tablet Take 5 mg by mouth 2 times daily And daily PRN at noon       Mirtazapine (REMERON PO) Take 15 mg by mouth 2 times daily       Multiple Vitamins-Minerals (CERTAVITE SENIOR/ANTIOXIDANT PO) Take by mouth daily       omeprazole (PRILOSEC) 40 MG capsule Take 1 capsule (40 mg) by mouth daily Take 30-60 minutes before a meal. 30 capsule 9     oxybutynin (DITROPAN) 5 MG tablet Take 5 mg by mouth 2 times daily       polyethylene glycol (MIRALAX/GLYCOLAX) powder Take 17 g by mouth At Bedtime        rosuvastatin (CRESTOR) 40 MG tablet Take 1 tablet (40 mg) by mouth daily 90 tablet 0     senna-docusate (SENNA S) 8.6-50 MG per tablet Take 1 tablet by mouth 2 times daily Please hold if having loose stools and contact nurse.       Medications reviewed:  Medications reconciled to facility chart and changes were made to reflect current medications as identified as above med list. Below are the changes that were made:   Medications stopped since last EPIC medication reconciliation:   Medications Discontinued During This Encounter   Medication Reason     multivitamin, therapeutic with minerals (THERA-VIT-M) TABS Medication Reconciliation Clean Up       Medications started since last [facility] medication reconciliation:  No orders of the defined types were placed in this encounter.        REVIEW OF SYSTEMS:  No chest pain, shortness of breath, fevers, chills, headache, nausea, vomiting, dysuria or bowel abnormalities.  Appetite is  normal.  No pain except occ knees.    Physical Exam:  /79  Pulse 69   Resp 18  SpO2 94%  GENERAL APPEARANCE:  Alert, in no distress, cooperative  ENT:  Mouth and posterior oropharynx normal, moist mucous membranes, [place]  EYES:  EOM, conjunctivae, lids, pupils and irises normal, PERRL  NECK:  No adenopathy,masses or thyromegaly  RESP:  respiratory effort and palpation of chest normal, lungs clear to auscultation , no respiratory distress  CV:  Palpation and auscultation of heart done , regular rate and rhythm, no murmur, rub, or gallop, no edema  ABDOMEN:  normal bowel sounds, soft, nontender, no hepatosplenomegaly or other masses, no guarding or rebound  M/S:   Gait and station normal  muscle strength 5/5 all 4 ext, normal tone, no pain with palp. or ROM R wrist  SKIN:  resolving large bruise R wrist. skin intact  NEURO:   Cranial nerves 2-12 are normal tested and grossly at patient's baseline, alert, speech clear  PSYCH:  insight and judgement impaired, memory impaired , affect and mood normal    Recent Labs:     CBC RESULTS:   Recent Labs   Lab Test  03/12/18   0850  02/02/18   0814   WBC  17.6*  8.8   RBC  4.28  3.99   HGB  13.6  12.6   HCT  40.7  38.5   MCV  95  97   MCH  31.8  31.6   MCHC  33.4  32.7   RDW  12.5  12.3   PLT  227  173       Last Basic Metabolic Panel:  Recent Labs   Lab Test  03/14/18   0712  03/13/18   0645   NA  140  140   POTASSIUM  4.1  4.4   CHLORIDE  104  108   FRANCISCO  8.9  8.3*   CO2  32  26   BUN  12  15   CR  0.82  0.76   GLC  97  97       Liver Function Studies -   Recent Labs   Lab Test  04/23/18   0825  03/12/18   0850  01/31/18   1050   PROTTOTAL   --   7.2  6.9   ALBUMIN   --   3.4  3.1*   BILITOTAL   --   0.7  0.8   ALKPHOS   --   54  55   AST   --   14  34   ALT  16  17  21       Assessment/Plan:  Pain  Ongoing knees, no pain R wrist s/p trauma  1. Cont. Tylenol  2. Monitor for further increased knee pain  3. Monitor for changes in gait, falls  4. For ongoing knee pain, sched. F/u knee inj. Per [facility]  5. Monitor for wrist pain    Chronic  diastolic congestive heart failure (H)  Currently controlled  1. Cont. Lasix  2. Cont. Qd wt.s  3. Monitor for LE edema, resp. Distress  4. Bmp in next 1-2 mos    Essential hypertension  Currently controlled  1. Cont. Current BP meds  2. Follow BPs, HRs  3. Monitor for reports of dizziness  5. Cbc in next 1-2 mos      Electronically signed by  ZAIN Marcos CNP                      Sincerely,        ZAIN Marcos CNP

## 2018-06-05 NOTE — PROGRESS NOTES
[facility]    Chief Complaint   Patient presents with     Pain       [facility] Medical Record Number:  [id number]    HPI:    Jade Caba is a 84 year old  (8/26/1933), who is being seen today for an episodic care visit at [facility].  HPI information obtained from: facility chart records, facility staff, patient report and [facility] chart review.Today's concern is: pain, CHF, HTN.  S/p wrist trauma 6/3/18, was helping peer of floor, had R wrist pinned against wall, sustained bruise to area. Reports no pain of area. Does reports occ. Knee pain L>R. No recent changes in gait. S/p knee inj.  For CHF cont.on lasix. Wt. Stable. No resp. Distress, no edema.  For HTN taking norvasc, coreg, cozaar. BPs, HRs stable.       ALLERGIES: No known allergies  Past Medical, Surgical, Family and Social History reviewed and updated in [facility].    Current Outpatient Prescriptions   Medication Sig Dispense Refill     Acetaminophen (TYLENOL PO) Take 1,000 mg by mouth 2 times daily       acetaminophen (TYLENOL) 325 MG tablet Take 650 mg by mouth 2 times daily as needed for mild pain       alendronate (FOSAMAX) 70 MG tablet Take 70 mg by mouth every 7 days On Wednesdays       amLODIPine (NORVASC) 10 MG tablet Take 1 tablet (10 mg) by mouth daily 30 tablet 1     ASPIRIN EC PO Take 81 mg by mouth daily       bisacodyl (DULCOLAX) 10 MG suppository Place 10 mg rectally daily as needed       calcium carbonate (OS-FRANCISCO) 500 MG TABS Take 2 tablets by mouth 2 times daily (with meals)       carvedilol (COREG) 12.5 MG tablet Take 1 tablet (12.5 mg) by mouth 2 times daily (with meals) 60 tablet 1     cholecalciferol (VITAMIN D3) 5000 UNITS CAPS capsule Take 5,000 Units by mouth daily       citalopram (CELEXA) 10 MG tablet Take 30 mg by mouth daily       clopidogrel (PLAVIX) 75 MG tablet Take 1 tablet (75 mg) by mouth daily 30 tablet 11     folic acid (FOLVITE) 1 MG tablet Take 1 mg by mouth daily       furosemide (LASIX)  20 MG tablet Take 20 mg by mouth daily       hypromellose (ARTIFICIAL TEARS) 0.5 % SOLN ophthalmic solution Place 3 drops into both eyes 3 times daily       levETIRAcetam (KEPPRA) 500 MG tablet Take 500 mg by mouth 2 times daily       losartan (COZAAR) 100 MG tablet Take 100 mg by mouth daily       metoclopramide (REGLAN) 5 MG tablet Take 5 mg by mouth 2 times daily And daily PRN at noon       Mirtazapine (REMERON PO) Take 15 mg by mouth 2 times daily       Multiple Vitamins-Minerals (CERTAVITE SENIOR/ANTIOXIDANT PO) Take by mouth daily       omeprazole (PRILOSEC) 40 MG capsule Take 1 capsule (40 mg) by mouth daily Take 30-60 minutes before a meal. 30 capsule 9     oxybutynin (DITROPAN) 5 MG tablet Take 5 mg by mouth 2 times daily       polyethylene glycol (MIRALAX/GLYCOLAX) powder Take 17 g by mouth At Bedtime        rosuvastatin (CRESTOR) 40 MG tablet Take 1 tablet (40 mg) by mouth daily 90 tablet 0     senna-docusate (SENNA S) 8.6-50 MG per tablet Take 1 tablet by mouth 2 times daily Please hold if having loose stools and contact nurse.       Medications reviewed:  Medications reconciled to facility chart and changes were made to reflect current medications as identified as above med list. Below are the changes that were made:   Medications stopped since last EPIC medication reconciliation:   Medications Discontinued During This Encounter   Medication Reason     multivitamin, therapeutic with minerals (THERA-VIT-M) TABS Medication Reconciliation Clean Up       Medications started since last [facility] medication reconciliation:  No orders of the defined types were placed in this encounter.        REVIEW OF SYSTEMS:  No chest pain, shortness of breath, fevers, chills, headache, nausea, vomiting, dysuria or bowel abnormalities.  Appetite is  normal.  No pain except occ knees.    Physical Exam:  /79  Pulse 69  Resp 18  SpO2 94%  GENERAL APPEARANCE:  Alert, in no distress, cooperative  ENT:  Mouth and posterior  oropharynx normal, moist mucous membranes, [place]  EYES:  EOM, conjunctivae, lids, pupils and irises normal, PERRL  NECK:  No adenopathy,masses or thyromegaly  RESP:  respiratory effort and palpation of chest normal, lungs clear to auscultation , no respiratory distress  CV:  Palpation and auscultation of heart done , regular rate and rhythm, no murmur, rub, or gallop, no edema  ABDOMEN:  normal bowel sounds, soft, nontender, no hepatosplenomegaly or other masses, no guarding or rebound  M/S:   Gait and station normal  muscle strength 5/5 all 4 ext, normal tone, no pain with palp. or ROM R wrist  SKIN:  resolving large bruise R wrist. skin intact  NEURO:   Cranial nerves 2-12 are normal tested and grossly at patient's baseline, alert, speech clear  PSYCH:  insight and judgement impaired, memory impaired , affect and mood normal    Recent Labs:     CBC RESULTS:   Recent Labs   Lab Test  03/12/18   0850  02/02/18   0814   WBC  17.6*  8.8   RBC  4.28  3.99   HGB  13.6  12.6   HCT  40.7  38.5   MCV  95  97   MCH  31.8  31.6   MCHC  33.4  32.7   RDW  12.5  12.3   PLT  227  173       Last Basic Metabolic Panel:  Recent Labs   Lab Test  03/14/18   0712  03/13/18   0645   NA  140  140   POTASSIUM  4.1  4.4   CHLORIDE  104  108   FRANCISCO  8.9  8.3*   CO2  32  26   BUN  12  15   CR  0.82  0.76   GLC  97  97       Liver Function Studies -   Recent Labs   Lab Test  04/23/18   0825  03/12/18   0850  01/31/18   1050   PROTTOTAL   --   7.2  6.9   ALBUMIN   --   3.4  3.1*   BILITOTAL   --   0.7  0.8   ALKPHOS   --   54  55   AST   --   14  34   ALT  16  17  21       Assessment/Plan:  Pain  Ongoing knees, no pain R wrist s/p trauma  1. Cont. Tylenol  2. Monitor for further increased knee pain  3. Monitor for changes in gait, falls  4. For ongoing knee pain, sched. F/u knee inj. Per [facility]  5. Monitor for wrist pain    Chronic diastolic congestive heart failure (H)  Currently controlled  1. Cont. Lasix  2. Cont. Qd wt.s  3. Monitor  for LE edema, resp. Distress  4. Bmp in next 1-2 mos    Essential hypertension  Currently controlled  1. Cont. Current BP meds  2. Follow BPs, HRs  3. Monitor for reports of dizziness  5. Cbc in next 1-2 mos      Electronically signed by  ZAIN Marcos CNP

## 2018-07-18 ENCOUNTER — OFFICE VISIT (OUTPATIENT)
Dept: CARDIOLOGY | Facility: CLINIC | Age: 83
End: 2018-07-18
Attending: PHYSICIAN ASSISTANT
Payer: COMMERCIAL

## 2018-07-18 VITALS
BODY MASS INDEX: 34.63 KG/M2 | SYSTOLIC BLOOD PRESSURE: 122 MMHG | HEART RATE: 64 BPM | DIASTOLIC BLOOD PRESSURE: 60 MMHG | WEIGHT: 183.41 LBS | HEIGHT: 61 IN

## 2018-07-18 DIAGNOSIS — E78.5 HYPERLIPIDEMIA LDL GOAL <100: ICD-10-CM

## 2018-07-18 DIAGNOSIS — I25.10 CORONARY ARTERY DISEASE INVOLVING NATIVE CORONARY ARTERY OF NATIVE HEART WITHOUT ANGINA PECTORIS: ICD-10-CM

## 2018-07-18 LAB
ALT SERPL W P-5'-P-CCNC: 15 U/L (ref 0–50)
CHOLEST SERPL-MCNC: 178 MG/DL
HDLC SERPL-MCNC: 53 MG/DL
LDLC SERPL CALC-MCNC: 106 MG/DL
NONHDLC SERPL-MCNC: 125 MG/DL
TRIGL SERPL-MCNC: 97 MG/DL

## 2018-07-18 PROCEDURE — 80061 LIPID PANEL: CPT | Performed by: PHYSICIAN ASSISTANT

## 2018-07-18 PROCEDURE — 99214 OFFICE O/P EST MOD 30 MIN: CPT | Performed by: NURSE PRACTITIONER

## 2018-07-18 PROCEDURE — 36415 COLL VENOUS BLD VENIPUNCTURE: CPT | Performed by: PHYSICIAN ASSISTANT

## 2018-07-18 PROCEDURE — 84460 ALANINE AMINO (ALT) (SGPT): CPT | Performed by: PHYSICIAN ASSISTANT

## 2018-07-18 NOTE — MR AVS SNAPSHOT
"              After Visit Summary   7/18/2018    Jade Caba    MRN: [id number]           Patient Information     Date Of Birth          8/26/1933        Visit Information        Provider Department      7/18/2018 10:50 AM Michelle Gonzales APRN CNP [facility]        Today's Diagnoses     Coronary artery disease involving native coronary artery of native heart without angina pectoris        Hyperlipidemia LDL goal <100           Follow-ups after your visit        Additional Services     Follow-Up with Cardiologist                 Future tests that were ordered for you today     Open Future Orders        Priority Expected Expires Ordered    Follow-Up with Cardiologist Routine 1/14/2019 7/18/2019 7/18/2018            Who to contact     If you have questions or need follow up information about today's clinic visit or your schedule please contact [facility] directly at [phone].  Normal or non-critical lab and imaging results will be communicated to you by MyChart, letter or phone within 4 business days after the clinic has received the results. If you do not hear from us within 7 days, please contact the clinic through MyChart or phone. If you have a critical or abnormal lab result, we will notify you by phone as soon as possible.  Submit refill requests through [employer] or call your pharmacy and they will forward the refill request to us. Please allow 3 business days for your refill to be completed.          Additional Information About Your Visit        Care EveryWhere ID     This is your Care EveryWhere ID. This could be used by other organizations to access your [place] medical records  [id number]        Your Vitals Were     Pulse Height Breastfeeding? BMI (Body Mass Index)          64 1.549 m (5' 1\") No 34.66 kg/m2         Blood Pressure from Last 3 Encounters:   07/18/18 122/60   06/05/18 135/79   04/23/18 " 108/60    Weight from Last 3 Encounters:   07/18/18 83.2 kg (183 lb 6.6 oz)   04/23/18 82.5 kg (181 lb 14.4 oz)   04/19/18 82.1 kg (181 lb)              We Performed the Following     Follow-Up with Cardiac Advanced Practice Provider        Primary Care Provider Fax #    [facility] [phone] [address]  [facility] [address]        Equal Access to Services     ANN MARIE ORONA : Hadii aad ku hadasho Soomaali, waaxda luqadaha, qaybta kaalmada adeegyada, waxay idiin hayaan adeeg khfiordalizash ladamari . So [facility] [phone].    ATENCIÓN: Si habla español, tiene a rasmussen disposición servicios gratuitos de asistencia lingüística. Llame al [phone].    We comply with applicable federal civil rights laws and [place] laws. We do not discriminate on the basis of race, color, national origin, age, disability, sex, sexual orientation, or gender identity.            Thank you!     Thank you for choosing [facility]  for your care. Our goal is always to provide you with excellent care. Hearing back from our patients is one way we can continue to improve our services. Please take a few minutes to complete the written survey that you may receive in the mail after your visit with us. Thank you!             Your Updated Medication List - Protect others around you: Learn how to safely use, store and throw away your medicines at www.disposemymeds.org.          This list is accurate as of 7/18/18 11:23 AM.  Always use your most recent med list.                   Brand Name Dispense Instructions for use Diagnosis    alendronate 70 MG tablet    FOSAMAX     Take 70 mg by mouth every 7 days On Wednesdays        amLODIPine 10 MG tablet    NORVASC    30 tablet    Take 1 tablet (10 mg) by mouth daily    HTN (hypertension)       ASPIRIN EC PO      Take 81 mg by mouth daily        bisacodyl 10 MG Suppository    DULCOLAX     Place 10 mg rectally daily as needed        calcium  carbonate 500 MG Tabs    OS-FRANCISCO     Take 2 tablets by mouth 2 times daily (with meals)        carvedilol 12.5 MG tablet    COREG    60 tablet    Take 1 tablet (12.5 mg) by mouth 2 times daily (with meals)    CHF (congestive heart failure) (H)       celeXA 10 MG tablet   Generic drug:  citalopram      Take 30 mg by mouth daily        CERTAVITE SENIOR/ANTIOXIDANT PO      Take by mouth daily        cholecalciferol 5000 units Caps capsule    vitamin D3     Take 5,000 Units by mouth daily        clopidogrel 75 MG tablet    PLAVIX    30 tablet    Take 1 tablet (75 mg) by mouth daily    ACS (acute coronary syndrome) (H)       folic acid 1 MG tablet    FOLVITE     Take 1 mg by mouth daily        furosemide 20 MG tablet    LASIX     Take 20 mg by mouth daily        hypromellose 0.5 % Soln ophthalmic solution    ARTIFICIAL TEARS     Place 3 drops into both eyes 3 times daily        KEPPRA 500 MG tablet   Generic drug:  levETIRAcetam      Take 500 mg by mouth 2 times daily        losartan 100 MG tablet    COZAAR     Take 100 mg by mouth daily        metoclopramide 5 MG tablet    REGLAN     Take 5 mg by mouth 2 times daily And daily PRN at noon        omeprazole 40 MG capsule    priLOSEC    30 capsule    Take 1 capsule (40 mg) by mouth daily Take 30-60 minutes before a meal.    Abdominal pain, other specified site       oxybutynin 5 MG tablet    DITROPAN     Take 5 mg by mouth 2 times daily        polyethylene glycol powder    MIRALAX/GLYCOLAX     Take 17 g by mouth At Bedtime        REMERON PO      Take 15 mg by mouth 2 times daily        rosuvastatin 40 MG tablet    CRESTOR    90 tablet    Take 1 tablet (40 mg) by mouth daily    Coronary artery disease involving native coronary artery of native heart without angina pectoris, Hyperlipidemia LDL goal <100       SENNA S 8.6-50 MG per tablet   Generic drug:  senna-docusate      Take 1 tablet by mouth 2 times daily Please hold if having loose stools and contact nurse.        *  TYLENOL PO      Take 1,000 mg by mouth 2 times daily        * TYLENOL 325 MG tablet   Generic drug:  acetaminophen      Take 650 mg by mouth 2 times daily as needed for mild pain        * Notice:  This list has 2 medication(s) that are the same as other medications prescribed for you. Read the directions carefully, and ask your doctor or other care provider to review them with you.

## 2018-07-18 NOTE — LETTER
7/18/2018    [facility]  [address]  [facility] [address]    RE: Jade Caba       Dear Colleague,    I had the pleasure of seeing Jade Caba in the [facility].    HPI:  Jade Caba is a 84 year old female who is a patient of Dr. Emerson and presents today after Dionna Parnell changed her atorvastatin 40 mg to simvastatin 40 mg daily with a repeat lipid profile.   past medical history includes HTN, CAD, CVA with prior right carotid endarterectomy in 2003 and left carotid enterectomy in 2007, renal stenosis, PFO, HTN, hyperlipidemia, prior subarachnoid hemorrhage following a mechanical fall.  She had an angioplasty to her circumflex in 1997, subsequently had a OLIVIA to LAD 2004, OLIVIA to circumflex and RCA in 10/2011.  Unfortunately, she had recurrent symptoms and OLIVIA placement to the mid LAD 10/2011.  In 2/2018, she was had a an NSTEMI and found to have a thrombotic lesion in her mid RCA s/p OLIVIA and unfortunately there was guidewire trauma in the proximal RCA and she had a stent placed there as well.  She was also found to have significant in-stent restenosis in her LAD and had a repeat OLIVIA placement.      Today Jade Caba presents feeling good.  She is continuing to live in assisted living and eats breakfast daily at a café.  every morning she has an egg, sausage, and toast or pancake and sausag her diet may explain why she did not decrease her LDL significantly.  She walks daily around her assisted living. Denies chest pain or pressure, headaches, dizziness, syncope, angina, dyspnea at rest or with exertion, dry cough, palpitations, orthopnea, PND, abdominal pain, abdominal edema, pedal edema, or claudication.      ASSESSMENT AND PLAN    (I25.10) Coronary artery disease s/p multiple PCI  Aspirin 81 mg daily  Coreg 12.5 mg twice daily  Crestor 40 mg daily.  Did not see a significant decrease in LDL.  Recommended dietary changes to consume less sausage  and processed meats.  Will  Have LDL drawn in 6 months at follow up with Dr. Emerson.      HTN  Losartan 100 mg   Amlodipine 10 mg daily   Coreg 12.5 mg twice daily  Lasix 20 mg daily     Patient expresses understanding and agreement with the plan.     I appreciate the chance to help with Jade Caba Please let me know if you have any questions or concerns.    Michelle Gonzales, APRN, CNP    This note was completed in part using Dragon voice recognition software. Although reviewed after completion, some word and grammatical errors may occur.    Orders Placed This Encounter   Procedures     Follow-Up with Cardiologist     No orders of the defined types were placed in this encounter.    There are no discontinued medications.      Encounter Diagnoses   Name Primary?     Coronary artery disease involving native coronary artery of native heart without angina pectoris      Hyperlipidemia LDL goal <100        CURRENT MEDICATIONS:  Current Outpatient Prescriptions   Medication Sig Dispense Refill     Acetaminophen (TYLENOL PO) Take 1,000 mg by mouth 2 times daily       acetaminophen (TYLENOL) 325 MG tablet Take 650 mg by mouth 2 times daily as needed for mild pain       alendronate (FOSAMAX) 70 MG tablet Take 70 mg by mouth every 7 days On Wednesdays       amLODIPine (NORVASC) 10 MG tablet Take 1 tablet (10 mg) by mouth daily 30 tablet 1     ASPIRIN EC PO Take 81 mg by mouth daily       bisacodyl (DULCOLAX) 10 MG suppository Place 10 mg rectally daily as needed       calcium carbonate (OS-FRANCISCO) 500 MG TABS Take 2 tablets by mouth 2 times daily (with meals)       carvedilol (COREG) 12.5 MG tablet Take 1 tablet (12.5 mg) by mouth 2 times daily (with meals) 60 tablet 1     cholecalciferol (VITAMIN D3) 5000 UNITS CAPS capsule Take 5,000 Units by mouth daily       citalopram (CELEXA) 10 MG tablet Take 30 mg by mouth daily       clopidogrel (PLAVIX) 75 MG tablet Take 1 tablet (75 mg) by mouth daily 30 tablet 11     folic acid  (FOLVITE) 1 MG tablet Take 1 mg by mouth daily       furosemide (LASIX) 20 MG tablet Take 20 mg by mouth daily       hypromellose (ARTIFICIAL TEARS) 0.5 % SOLN ophthalmic solution Place 3 drops into both eyes 3 times daily       levETIRAcetam (KEPPRA) 500 MG tablet Take 500 mg by mouth 2 times daily       losartan (COZAAR) 100 MG tablet Take 100 mg by mouth daily       metoclopramide (REGLAN) 5 MG tablet Take 5 mg by mouth 2 times daily And daily PRN at noon       Mirtazapine (REMERON PO) Take 15 mg by mouth 2 times daily       Multiple Vitamins-Minerals (CERTAVITE SENIOR/ANTIOXIDANT PO) Take by mouth daily       omeprazole (PRILOSEC) 40 MG capsule Take 1 capsule (40 mg) by mouth daily Take 30-60 minutes before a meal. 30 capsule 9     oxybutynin (DITROPAN) 5 MG tablet Take 5 mg by mouth 2 times daily       polyethylene glycol (MIRALAX/GLYCOLAX) powder Take 17 g by mouth At Bedtime        senna-docusate (SENNA S) 8.6-50 MG per tablet Take 1 tablet by mouth 2 times daily Please hold if having loose stools and contact nurse.       rosuvastatin (CRESTOR) 40 MG tablet Take 1 tablet (40 mg) by mouth daily 90 tablet 0       ALLERGIES     Allergies   Allergen Reactions     No Known Allergies        PAST MEDICAL HISTORY:  Past Medical History:   Diagnosis Date     Anxiety      Arthritis      Atrial flutter (H)      C1 cervical fracture (H)      CAD (coronary artery disease)      Carotid artery stenosis      CKD (chronic kidney disease)      Depression      H/O alcohol abuse      Hypercholesterolemia      Hypertension      Renal artery stenosis (H)      Seizure disorder (H)      Subarachnoid hemorrhage (H) 2014       PAST SURGICAL HISTORY:  Past Surgical History:   Procedure Laterality Date     APPENDECTOMY       CAROTID ENDARTERECTOMY Left      Cataract surgery Right     will have laser eye surgery     CHOLECYSTECTOMY       Coronary stents x 3       HYSTERECTOMY      at age of 40-not cancerus or precancerous      JOINT  "REPLACEMENT Bilateral      Stenting of renal artery         FAMILY HISTORY:  Family History   Problem Relation Age of Onset     C.A.ETELVINA. Mother      MODESTO.A.ETELVINA. Brother      MODESTO.ASANIYA. Sister      MICHAEL. Brother      Cancer Sister      Breast cancer     HEART DISEASE Son        SOCIAL HISTORY:  Social History     Social History     Marital status:      Spouse name: N/A     Number of children: N/A     Years of education: N/A     Social History Main Topics     Smoking status: Never Smoker     Smokeless tobacco: Never Used     Alcohol use No     Drug use: No     Sexual activity: No     Other Topics Concern     Parent/Sibling W/ Cabg, Mi Or Angioplasty Before 65f 55m? Yes     brother Mi at 23     Social History Narrative       Review of Systems:  Skin:  Negative     Eyes:  Positive for glasses  ENT:  Positive for    Respiratory:  Negative    Cardiovascular:  Negative    Gastroenterology: Negative    Genitourinary:  Positive for incontinence  Musculoskeletal:  Negative    Neurologic:  Negative    Psychiatric:  Positive for sleep disturbances  Heme/Lymph/Imm:  Negative    Endocrine:  Negative      Physical Exam:  Vitals: /60 (BP Location: Right arm, Patient Position: Sitting, Cuff Size: Adult Regular)  Pulse 64  Ht 1.549 m (5' 1\")  Wt 83.2 kg (183 lb 6.6 oz)  Breastfeeding? No  BMI 34.66 kg/m2    Constitutional:    overweight elderly    Skin:  warm and dry to the touch        Head:  normocephalic        Eyes:  pupils equal and round        ENT:  no pallor or cyanosis        Neck:  JVP normal        Chest:  clear to auscultation        Cardiac: regular rhythm                  Abdomen:  abdomen soft obese      Vascular: pulses full and equal     right radial artery;2+       right dorsalis pedis artery;1+     left radial artery;2+       left dorsalis pedis artery;1+          Extremities and Back:           Neurological:  no gross motor deficits          Recent Lab Results:  LIPID RESULTS:  Lab Results   Component " Value Date    CHOL 178 07/18/2018    HDL 53 07/18/2018     (H) 07/18/2018    TRIG 97 07/18/2018    CHOLHDLRATIO 3.7 08/14/2013       LIVER ENZYME RESULTS:  Lab Results   Component Value Date    AST 14 03/12/2018    ALT 15 07/18/2018       CBC RESULTS:  Lab Results   Component Value Date    WBC 17.6 (H) 03/12/2018    RBC 4.28 03/12/2018    HGB 13.6 03/12/2018    HCT 40.7 03/12/2018    MCV 95 03/12/2018    MCH 31.8 03/12/2018    MCHC 33.4 03/12/2018    RDW 12.5 03/12/2018     03/12/2018       BMP RESULTS:  Lab Results   Component Value Date     03/14/2018    POTASSIUM 4.1 03/14/2018    CHLORIDE 104 03/14/2018    CO2 32 03/14/2018    ANIONGAP 4 03/14/2018    GLC 97 03/14/2018    BUN 12 03/14/2018    CR 0.82 03/14/2018    GFRESTIMATED 67 03/14/2018    GFRESTBLACK 80 03/14/2018    FRANCISCO 8.9 03/14/2018        A1C RESULTS:  No results found for: A1C    INR RESULTS:  Lab Results   Component Value Date    INR 1.01 02/27/2008    INR 0.99 10/10/2007           CC  Dionna Parnell PA-C  [address] [place] [address]                  Thank you for allowing me to participate in the care of your patient.      Sincerely,     ZAIN [facility]    cc:   Dionna Parnell PA-C  [address] [place] [address]

## 2018-07-18 NOTE — PROGRESS NOTES
HPI:  Jade Caba is a 84 year old female who is a patient of Dr. Emerson and presents today after Dionna Parnell changed her atorvastatin 40 mg to simvastatin 40 mg daily with a repeat lipid profile.   past medical history includes HTN, CAD, CVA with prior right carotid endarterectomy in 2003 and left carotid enterectomy in 2007, renal stenosis, PFO, HTN, hyperlipidemia, prior subarachnoid hemorrhage following a mechanical fall.  She had an angioplasty to her circumflex in 1997, subsequently had a OLIVIA to LAD 2004, OLIVIA to circumflex and RCA in 10/2011.  Unfortunately, she had recurrent symptoms and OLIVIA placement to the mid LAD 10/2011.  In 2/2018, she was had a an NSTEMI and found to have a thrombotic lesion in her mid RCA s/p OLIVIA and unfortunately there was guidewire trauma in the proximal RCA and she had a stent placed there as well.  She was also found to have significant in-stent restenosis in her LAD and had a repeat OLIVIA placement.      Today Jade Caba presents feeling good.  She is continuing to live in assisted living and eats breakfast daily at a café.  every morning she has an egg, sausage, and toast or pancake and sausag her diet may explain why she did not decrease her LDL significantly.  She walks daily around her assisted living. Denies chest pain or pressure, headaches, dizziness, syncope, angina, dyspnea at rest or with exertion, dry cough, palpitations, orthopnea, PND, abdominal pain, abdominal edema, pedal edema, or claudication.      ASSESSMENT AND PLAN    (I25.10) Coronary artery disease s/p multiple PCI  Aspirin 81 mg daily  Coreg 12.5 mg twice daily  Crestor 40 mg daily.  Did not see a significant decrease in LDL.  Recommended dietary changes to consume less sausage and processed meats.  Will  Have LDL drawn in 6 months at follow up with Dr. Emerson.      HTN  Losartan 100 mg   Amlodipine 10 mg daily   Coreg 12.5 mg twice daily  Lasix 20 mg daily     Patient expresses understanding and  agreement with the plan.     I appreciate the chance to help with Jade Caba Please let me know if you have any questions or concerns.    ZAIN Munoz, CNP    This note was completed in part using Dragon voice recognition software. Although reviewed after completion, some word and grammatical errors may occur.    Orders Placed This Encounter   Procedures     Follow-Up with Cardiologist     No orders of the defined types were placed in this encounter.    There are no discontinued medications.      Encounter Diagnoses   Name Primary?     Coronary artery disease involving native coronary artery of native heart without angina pectoris      Hyperlipidemia LDL goal <100        CURRENT MEDICATIONS:  Current Outpatient Prescriptions   Medication Sig Dispense Refill     Acetaminophen (TYLENOL PO) Take 1,000 mg by mouth 2 times daily       acetaminophen (TYLENOL) 325 MG tablet Take 650 mg by mouth 2 times daily as needed for mild pain       alendronate (FOSAMAX) 70 MG tablet Take 70 mg by mouth every 7 days On Wednesdays       amLODIPine (NORVASC) 10 MG tablet Take 1 tablet (10 mg) by mouth daily 30 tablet 1     ASPIRIN EC PO Take 81 mg by mouth daily       bisacodyl (DULCOLAX) 10 MG suppository Place 10 mg rectally daily as needed       calcium carbonate (OS-FRANCISCO) 500 MG TABS Take 2 tablets by mouth 2 times daily (with meals)       carvedilol (COREG) 12.5 MG tablet Take 1 tablet (12.5 mg) by mouth 2 times daily (with meals) 60 tablet 1     cholecalciferol (VITAMIN D3) 5000 UNITS CAPS capsule Take 5,000 Units by mouth daily       citalopram (CELEXA) 10 MG tablet Take 30 mg by mouth daily       clopidogrel (PLAVIX) 75 MG tablet Take 1 tablet (75 mg) by mouth daily 30 tablet 11     folic acid (FOLVITE) 1 MG tablet Take 1 mg by mouth daily       furosemide (LASIX) 20 MG tablet Take 20 mg by mouth daily       hypromellose (ARTIFICIAL TEARS) 0.5 % SOLN ophthalmic solution Place 3 drops into both eyes 3 times daily        levETIRAcetam (KEPPRA) 500 MG tablet Take 500 mg by mouth 2 times daily       losartan (COZAAR) 100 MG tablet Take 100 mg by mouth daily       metoclopramide (REGLAN) 5 MG tablet Take 5 mg by mouth 2 times daily And daily PRN at noon       Mirtazapine (REMERON PO) Take 15 mg by mouth 2 times daily       Multiple Vitamins-Minerals (CERTAVITE SENIOR/ANTIOXIDANT PO) Take by mouth daily       omeprazole (PRILOSEC) 40 MG capsule Take 1 capsule (40 mg) by mouth daily Take 30-60 minutes before a meal. 30 capsule 9     oxybutynin (DITROPAN) 5 MG tablet Take 5 mg by mouth 2 times daily       polyethylene glycol (MIRALAX/GLYCOLAX) powder Take 17 g by mouth At Bedtime        senna-docusate (SENNA S) 8.6-50 MG per tablet Take 1 tablet by mouth 2 times daily Please hold if having loose stools and contact nurse.       rosuvastatin (CRESTOR) 40 MG tablet Take 1 tablet (40 mg) by mouth daily 90 tablet 0       ALLERGIES     Allergies   Allergen Reactions     No Known Allergies        PAST MEDICAL HISTORY:  Past Medical History:   Diagnosis Date     Anxiety      Arthritis      Atrial flutter (H)      C1 cervical fracture (H)      CAD (coronary artery disease)      Carotid artery stenosis      CKD (chronic kidney disease)      Depression      H/O alcohol abuse      Hypercholesterolemia      Hypertension      Renal artery stenosis (H)      Seizure disorder (H)      Subarachnoid hemorrhage (H) 2014       PAST SURGICAL HISTORY:  Past Surgical History:   Procedure Laterality Date     APPENDECTOMY       CAROTID ENDARTERECTOMY Left      Cataract surgery Right     will have laser eye surgery     CHOLECYSTECTOMY       Coronary stents x 3       HYSTERECTOMY      at age of 40-not cancerus or precancerous      JOINT REPLACEMENT Bilateral      Stenting of renal artery         FAMILY HISTORY:  Family History   Problem Relation Age of Onset     C.A.ETELVINA. Mother      C.A.ETELVINA. Brother      C.ASANIYA. Sister      MICHAEL. Brother      Cancer Sister      Breast  "cancer     HEART DISEASE Son        SOCIAL HISTORY:  Social History     Social History     Marital status:      Spouse name: N/A     Number of children: N/A     Years of education: N/A     Social History Main Topics     Smoking status: Never Smoker     Smokeless tobacco: Never Used     Alcohol use No     Drug use: No     Sexual activity: No     Other Topics Concern     Parent/Sibling W/ Cabg, Mi Or Angioplasty Before 65f 55m? Yes     brother Mi at 23     Social History Narrative       Review of Systems:  Skin:  Negative     Eyes:  Positive for glasses  ENT:  Positive for    Respiratory:  Negative    Cardiovascular:  Negative    Gastroenterology: Negative    Genitourinary:  Positive for incontinence  Musculoskeletal:  Negative    Neurologic:  Negative    Psychiatric:  Positive for sleep disturbances  Heme/Lymph/Imm:  Negative    Endocrine:  Negative      Physical Exam:  Vitals: /60 (BP Location: Right arm, Patient Position: Sitting, Cuff Size: Adult Regular)  Pulse 64  Ht 1.549 m (5' 1\")  Wt 83.2 kg (183 lb 6.6 oz)  Breastfeeding? No  BMI 34.66 kg/m2    Constitutional:    overweight elderly    Skin:  warm and dry to the touch        Head:  normocephalic        Eyes:  pupils equal and round        ENT:  no pallor or cyanosis        Neck:  JVP normal        Chest:  clear to auscultation        Cardiac: regular rhythm                  Abdomen:  abdomen soft obese      Vascular: pulses full and equal     right radial artery;2+       right dorsalis pedis artery;1+     left radial artery;2+       left dorsalis pedis artery;1+          Extremities and Back:           Neurological:  no gross motor deficits          Recent Lab Results:  LIPID RESULTS:  Lab Results   Component Value Date    CHOL 178 07/18/2018    HDL 53 07/18/2018     (H) 07/18/2018    TRIG 97 07/18/2018    CHOLHDLRATIO 3.7 08/14/2013       LIVER ENZYME RESULTS:  Lab Results   Component Value Date    AST 14 03/12/2018    ALT 15 " 07/18/2018       CBC RESULTS:  Lab Results   Component Value Date    WBC 17.6 (H) 03/12/2018    RBC 4.28 03/12/2018    HGB 13.6 03/12/2018    HCT 40.7 03/12/2018    MCV 95 03/12/2018    MCH 31.8 03/12/2018    MCHC 33.4 03/12/2018    RDW 12.5 03/12/2018     03/12/2018       BMP RESULTS:  Lab Results   Component Value Date     03/14/2018    POTASSIUM 4.1 03/14/2018    CHLORIDE 104 03/14/2018    CO2 32 03/14/2018    ANIONGAP 4 03/14/2018    GLC 97 03/14/2018    BUN 12 03/14/2018    CR 0.82 03/14/2018    GFRESTIMATED 67 03/14/2018    GFRESTBLACK 80 03/14/2018    FRANCISCO 8.9 03/14/2018        A1C RESULTS:  No results found for: A1C    INR RESULTS:  Lab Results   Component Value Date    INR 1.01 02/27/2008    INR 0.99 10/10/2007           CC  Dionna Parnell PA-C  [address] [place] [address]

## 2018-07-18 NOTE — LETTER
7/18/2018    [facility]  [address]  [facility] [address]    RE: Jade Caba       Dear Colleague,    I had the pleasure of seeing Jade Caba in the [facility].    HPI:  Jade Caba is a 84 year old female who is a patient of Dr. Emerson and presents today after Dionna Parnell changed her atorvastatin 40 mg to simvastatin 40 mg daily with a repeat lipid profile.   past medical history includes HTN, CAD, CVA with prior right carotid endarterectomy in 2003 and left carotid enterectomy in 2007, renal stenosis, PFO, HTN, hyperlipidemia, prior subarachnoid hemorrhage following a mechanical fall.  She had an angioplasty to her circumflex in 1997, subsequently had a OLIVIA to LAD 2004, OLIVIA to circumflex and RCA in 10/2011.  Unfortunately, she had recurrent symptoms and OLIVIA placement to the mid LAD 10/2011.  In 2/2018, she was had a an NSTEMI and found to have a thrombotic lesion in her mid RCA s/p OLIVIA and unfortunately there was guidewire trauma in the proximal RCA and she had a stent placed there as well.  She was also found to have significant in-stent restenosis in her LAD and had a repeat OLIVIA placement.      Today Jade Caba presents feeling good.  She is continuing to live in assisted living and eats breakfast daily at a café.  every morning she has an egg, sausage, and toast or pancake and sausag her diet may explain why she did not decrease her LDL significantly.  She walks daily around her assisted living. Denies chest pain or pressure, headaches, dizziness, syncope, angina, dyspnea at rest or with exertion, dry cough, palpitations, orthopnea, PND, abdominal pain, abdominal edema, pedal edema, or claudication.      ASSESSMENT AND PLAN    (I25.10) Coronary artery disease s/p multiple PCI  Aspirin 81 mg daily  Coreg 12.5 mg twice daily  Crestor 40 mg daily.  Did not see a significant decrease in LDL.  Recommended dietary changes to consume less sausage  and processed meats.  Will  Have LDL drawn in 6 months at follow up with Dr. Emerson.      HTN  Losartan 100 mg   Amlodipine 10 mg daily   Coreg 12.5 mg twice daily  Lasix 20 mg daily     Patient expresses understanding and agreement with the plan.     I appreciate the chance to help with Jade Caba Please let me know if you have any questions or concerns.    Michelle Gonzales, APRN, CNP    This note was completed in part using Dragon voice recognition software. Although reviewed after completion, some word and grammatical errors may occur.    Orders Placed This Encounter   Procedures     Follow-Up with Cardiologist     No orders of the defined types were placed in this encounter.    There are no discontinued medications.      Encounter Diagnoses   Name Primary?     Coronary artery disease involving native coronary artery of native heart without angina pectoris      Hyperlipidemia LDL goal <100        CURRENT MEDICATIONS:  Current Outpatient Prescriptions   Medication Sig Dispense Refill     Acetaminophen (TYLENOL PO) Take 1,000 mg by mouth 2 times daily       acetaminophen (TYLENOL) 325 MG tablet Take 650 mg by mouth 2 times daily as needed for mild pain       alendronate (FOSAMAX) 70 MG tablet Take 70 mg by mouth every 7 days On Wednesdays       amLODIPine (NORVASC) 10 MG tablet Take 1 tablet (10 mg) by mouth daily 30 tablet 1     ASPIRIN EC PO Take 81 mg by mouth daily       bisacodyl (DULCOLAX) 10 MG suppository Place 10 mg rectally daily as needed       calcium carbonate (OS-FRANCISCO) 500 MG TABS Take 2 tablets by mouth 2 times daily (with meals)       carvedilol (COREG) 12.5 MG tablet Take 1 tablet (12.5 mg) by mouth 2 times daily (with meals) 60 tablet 1     cholecalciferol (VITAMIN D3) 5000 UNITS CAPS capsule Take 5,000 Units by mouth daily       citalopram (CELEXA) 10 MG tablet Take 30 mg by mouth daily       clopidogrel (PLAVIX) 75 MG tablet Take 1 tablet (75 mg) by mouth daily 30 tablet 11     folic acid  (FOLVITE) 1 MG tablet Take 1 mg by mouth daily       furosemide (LASIX) 20 MG tablet Take 20 mg by mouth daily       hypromellose (ARTIFICIAL TEARS) 0.5 % SOLN ophthalmic solution Place 3 drops into both eyes 3 times daily       levETIRAcetam (KEPPRA) 500 MG tablet Take 500 mg by mouth 2 times daily       losartan (COZAAR) 100 MG tablet Take 100 mg by mouth daily       metoclopramide (REGLAN) 5 MG tablet Take 5 mg by mouth 2 times daily And daily PRN at noon       Mirtazapine (REMERON PO) Take 15 mg by mouth 2 times daily       Multiple Vitamins-Minerals (CERTAVITE SENIOR/ANTIOXIDANT PO) Take by mouth daily       omeprazole (PRILOSEC) 40 MG capsule Take 1 capsule (40 mg) by mouth daily Take 30-60 minutes before a meal. 30 capsule 9     oxybutynin (DITROPAN) 5 MG tablet Take 5 mg by mouth 2 times daily       polyethylene glycol (MIRALAX/GLYCOLAX) powder Take 17 g by mouth At Bedtime        senna-docusate (SENNA S) 8.6-50 MG per tablet Take 1 tablet by mouth 2 times daily Please hold if having loose stools and contact nurse.       rosuvastatin (CRESTOR) 40 MG tablet Take 1 tablet (40 mg) by mouth daily 90 tablet 0       ALLERGIES     Allergies   Allergen Reactions     No Known Allergies        PAST MEDICAL HISTORY:  Past Medical History:   Diagnosis Date     Anxiety      Arthritis      Atrial flutter (H)      C1 cervical fracture (H)      CAD (coronary artery disease)      Carotid artery stenosis      CKD (chronic kidney disease)      Depression      H/O alcohol abuse      Hypercholesterolemia      Hypertension      Renal artery stenosis (H)      Seizure disorder (H)      Subarachnoid hemorrhage (H) 2014       PAST SURGICAL HISTORY:  Past Surgical History:   Procedure Laterality Date     APPENDECTOMY       CAROTID ENDARTERECTOMY Left      Cataract surgery Right     will have laser eye surgery     CHOLECYSTECTOMY       Coronary stents x 3       HYSTERECTOMY      at age of 40-not cancerus or precancerous      JOINT  "REPLACEMENT Bilateral      Stenting of renal artery         FAMILY HISTORY:  Family History   Problem Relation Age of Onset     C.A.ETELVINA. Mother      MODESTO.A.ETELVINA. Brother      MODESTO.ASANIYA. Sister      MICHAEL. Brother      Cancer Sister      Breast cancer     HEART DISEASE Son        SOCIAL HISTORY:  Social History     Social History     Marital status:      Spouse name: N/A     Number of children: N/A     Years of education: N/A     Social History Main Topics     Smoking status: Never Smoker     Smokeless tobacco: Never Used     Alcohol use No     Drug use: No     Sexual activity: No     Other Topics Concern     Parent/Sibling W/ Cabg, Mi Or Angioplasty Before 65f 55m? Yes     brother Mi at 23     Social History Narrative       Review of Systems:  Skin:  Negative     Eyes:  Positive for glasses  ENT:  Positive for    Respiratory:  Negative    Cardiovascular:  Negative    Gastroenterology: Negative    Genitourinary:  Positive for incontinence  Musculoskeletal:  Negative    Neurologic:  Negative    Psychiatric:  Positive for sleep disturbances  Heme/Lymph/Imm:  Negative    Endocrine:  Negative      Physical Exam:  Vitals: /60 (BP Location: Right arm, Patient Position: Sitting, Cuff Size: Adult Regular)  Pulse 64  Ht 1.549 m (5' 1\")  Wt 83.2 kg (183 lb 6.6 oz)  Breastfeeding? No  BMI 34.66 kg/m2    Constitutional:    overweight elderly    Skin:  warm and dry to the touch        Head:  normocephalic        Eyes:  pupils equal and round        ENT:  no pallor or cyanosis        Neck:  JVP normal        Chest:  clear to auscultation        Cardiac: regular rhythm                  Abdomen:  abdomen soft obese      Vascular: pulses full and equal     right radial artery;2+       right dorsalis pedis artery;1+     left radial artery;2+       left dorsalis pedis artery;1+          Extremities and Back:           Neurological:  no gross motor deficits          Recent Lab Results:  LIPID RESULTS:  Lab Results   Component " Value Date    CHOL 178 07/18/2018    HDL 53 07/18/2018     (H) 07/18/2018    TRIG 97 07/18/2018    CHOLHDLRATIO 3.7 08/14/2013       LIVER ENZYME RESULTS:  Lab Results   Component Value Date    AST 14 03/12/2018    ALT 15 07/18/2018       CBC RESULTS:  Lab Results   Component Value Date    WBC 17.6 (H) 03/12/2018    RBC 4.28 03/12/2018    HGB 13.6 03/12/2018    HCT 40.7 03/12/2018    MCV 95 03/12/2018    MCH 31.8 03/12/2018    MCHC 33.4 03/12/2018    RDW 12.5 03/12/2018     03/12/2018       BMP RESULTS:  Lab Results   Component Value Date     03/14/2018    POTASSIUM 4.1 03/14/2018    CHLORIDE 104 03/14/2018    CO2 32 03/14/2018    ANIONGAP 4 03/14/2018    GLC 97 03/14/2018    BUN 12 03/14/2018    CR 0.82 03/14/2018    GFRESTIMATED 67 03/14/2018    GFRESTBLACK 80 03/14/2018    FRANCISCO 8.9 03/14/2018        A1C RESULTS:  No results found for: A1C    INR RESULTS:  Lab Results   Component Value Date    INR 1.01 02/27/2008    INR 0.99 10/10/2007         Thank you for allowing me to participate in the care of your patient.    Sincerely,     ZAIN Lynn [facility]

## 2018-07-26 ENCOUNTER — ASSISTED LIVING VISIT (OUTPATIENT)
Dept: GERIATRICS | Facility: CLINIC | Age: 83
End: 2018-07-26
Payer: COMMERCIAL

## 2018-07-26 VITALS
OXYGEN SATURATION: 95 % | RESPIRATION RATE: 18 BRPM | HEART RATE: 72 BPM | BODY MASS INDEX: 34.01 KG/M2 | SYSTOLIC BLOOD PRESSURE: 136 MMHG | DIASTOLIC BLOOD PRESSURE: 76 MMHG | WEIGHT: 180 LBS | TEMPERATURE: 97.7 F

## 2018-07-26 DIAGNOSIS — M17.0 OSTEOARTHRITIS OF BOTH KNEES, UNSPECIFIED OSTEOARTHRITIS TYPE: Primary | ICD-10-CM

## 2018-07-26 DIAGNOSIS — I50.32 CHRONIC DIASTOLIC CONGESTIVE HEART FAILURE (H): ICD-10-CM

## 2018-07-26 DIAGNOSIS — R05.9 COUGH: ICD-10-CM

## 2018-07-26 NOTE — LETTER
7/26/2018        RE: Jade Saeed  [address] [facility] [address]        [place] [facility]    Chief Complaint   Patient presents with     Knee Pain       [place] Medical Record Number:  [id number]    HPI:    Jade Caba is a 84 year old  (8/26/1933), who is being seen today for an episodic care visit at [facility].  HPI information obtained from: facility chart records, facility staff, patient report and [facility] chart review.Today's concern is: knee pain, CHF, leukocytosis. Has chronic knee pain L>R. Had knee inj 4/18, requesting another. Reports recent slowed gait with knee pain whic is worse with wb.  Cont. On tylenol for pain. For CHF cont on lasix. Reports wt. Stable at 180 lbs. No edema or resp. Distress. Cardiology visit 7/18 18-no new orders. S/p hosp.stay with pneumonitis 3/18. Reports occ cough, n.p.  Has been afebrile.     ALLERGIES: No known allergies  Past Medical, Surgical, Family and Social History reviewed and updated in [facility].    Current Outpatient Prescriptions   Medication Sig Dispense Refill     Acetaminophen (TYLENOL PO) Take 1,000 mg by mouth 2 times daily       acetaminophen (TYLENOL) 325 MG tablet Take 650 mg by mouth 2 times daily as needed for mild pain       alendronate (FOSAMAX) 70 MG tablet Take 70 mg by mouth every 7 days On Wednesdays       amLODIPine (NORVASC) 10 MG tablet Take 1 tablet (10 mg) by mouth daily 30 tablet 1     ASPIRIN EC PO Take 81 mg by mouth daily       bisacodyl (DULCOLAX) 10 MG suppository Place 10 mg rectally daily as needed       calcium carbonate (OS-FRANCISCO) 500 MG TABS Take 2 tablets by mouth 2 times daily (with meals)       carvedilol (COREG) 12.5 MG tablet Take 1 tablet (12.5 mg) by mouth 2 times daily (with meals) 60 tablet 1     cholecalciferol (VITAMIN D3) 5000 UNITS CAPS capsule Take 5,000 Units by mouth daily       citalopram (CELEXA) 10 MG tablet Take 30 mg by mouth daily       clopidogrel (PLAVIX) 75 MG  tablet Take 1 tablet (75 mg) by mouth daily 30 tablet 11     folic acid (FOLVITE) 1 MG tablet Take 1 mg by mouth daily       furosemide (LASIX) 20 MG tablet Take 20 mg by mouth daily       hypromellose (ARTIFICIAL TEARS) 0.5 % SOLN ophthalmic solution Place 3 drops into both eyes 3 times daily       levETIRAcetam (KEPPRA) 500 MG tablet Take 500 mg by mouth 2 times daily       losartan (COZAAR) 100 MG tablet Take 100 mg by mouth daily       metoclopramide (REGLAN) 5 MG tablet Take 5 mg by mouth 2 times daily And daily PRN at noon       Mirtazapine (REMERON PO) Take 15 mg by mouth 2 times daily       Multiple Vitamins-Minerals (CERTAVITE SENIOR/ANTIOXIDANT PO) Take by mouth daily       omeprazole (PRILOSEC) 40 MG capsule Take 1 capsule (40 mg) by mouth daily Take 30-60 minutes before a meal. 30 capsule 9     oxybutynin (DITROPAN) 5 MG tablet Take 5 mg by mouth 2 times daily       polyethylene glycol (MIRALAX/GLYCOLAX) powder Take 17 g by mouth At Bedtime        rosuvastatin (CRESTOR) 40 MG tablet Take 1 tablet (40 mg) by mouth daily 90 tablet 0     senna-docusate (SENNA S) 8.6-50 MG per tablet Take 1 tablet by mouth 2 times daily Please hold if having loose stools and contact nurse.       Medications reviewed:  Medications reconciled to facility chart and changes were made to reflect current medications as identified as above med list. Below are the changes that were made:   Medications stopped since last EPIC medication reconciliation:   There are no discontinued medications.    Medications started since last [facility] medication reconciliation:  No orders of the defined types were placed in this encounter.        REVIEW OF SYSTEMS:  No chest pain, shortness of breath, fevers, chills, headache, nausea, vomiting, dysuria or bowel abnormalities.  Appetite is  normal.  No pain except knees.    Physical Exam:  /76  Pulse 72  Temp 97.7  F (36.5  C)  Resp 18  Wt 180 lb (81.6 kg)  SpO2 95%  BMI 34.01 kg/m2  GENERAL  APPEARANCE:  Alert, in no distress, cooperative  ENT:  Mouth and posterior oropharynx normal, moist mucous membranes, [place]  EYES:  EOM, conjunctivae, lids, pupils and irises normal, PERRL  NECK:  No adenopathy,masses or thyromegaly  RESP:  respiratory effort and palpation of chest normal, lungs clear to auscultation , no respiratory distress  CV:  Palpation and auscultation of heart done , regular rate and rhythm, no murmur, rub, or gallop, no edema  ABDOMEN:  normal bowel sounds, soft, nontender, no hepatosplenomegaly or other masses, no guarding or rebound  M/S:   Gait and station normal  muscle strength 5/5 all 4 ext., normal tone  NEURO:   Cranial nerves 2-12 are normal tested and grossly at patient's baseline, alert, no tremor  PSYCH:  memory impaired , affect and mood normal    Recent Labs:     CBC RESULTS:   Recent Labs   Lab Test  03/12/18   0850  02/02/18   0814   WBC  17.6*  8.8   RBC  4.28  3.99   HGB  13.6  12.6   HCT  40.7  38.5   MCV  95  97   MCH  31.8  31.6   MCHC  33.4  32.7   RDW  12.5  12.3   PLT  227  173       Last Basic Metabolic Panel:  Recent Labs   Lab Test  03/14/18   0712  03/13/18   0645   NA  140  140   POTASSIUM  4.1  4.4   CHLORIDE  104  108   FRANCISCO  8.9  8.3*   CO2  32  26   BUN  12  15   CR  0.82  0.76   GLC  97  97       Liver Function Studies -   Recent Labs   Lab Test  07/18/18   0955  04/23/18   0825  03/12/18   0850  01/31/18   1050   PROTTOTAL   --    --   7.2  6.9   ALBUMIN   --    --   3.4  3.1*   BILITOTAL   --    --   0.7  0.8   ALKPHOS   --    --   54  55   AST   --    --   14  34   ALT  15  16  17  21       Assessment/Plan:  Osteoarthritis of both knees, unspecified osteoarthritis type  Ongoing pain, slowed gait  1. Contact [facility] for f/u L knee inj  2.cont. Tylenol  3. Reassess over next couple weeks for effectiveness  4.monitor for changes in gait    Chronic diastolic congestive heart failure (H)  Gen. Controlled  1. Cont. Lasix  2. Cont. Every day wt.s  3.  Monitor for sig. Wt. Gain, edema  4. Bmp tomorrow    cough  Occ, non-prod. Afebrile S/p hosp.stay 3/18 with pneumonitis  1. Follow O2 sats  2. Monitor for fever  3. For prod. Cough, fever,check CXR  4. Cbc tomorrow      Electronically signed by  ZAIN Marcos CNP                      Sincerely,        ZAIN Marcos CNP

## 2018-07-26 NOTE — PROGRESS NOTES
[place] [facility]    Chief Complaint   Patient presents with     Knee Pain       [place] Medical Record Number:  [id number]    HPI:    Jade Caba is a 84 year old  (8/26/1933), who is being seen today for an episodic care visit at [facility]  HPI information obtained from: facility chart records, facility staff, patient report and [facility] chart review.Today's concern is: knee pain, CHF, leukocytosis. Has chronic knee pain L>R. Had knee inj 4/18, requesting another. Reports recent slowed gait with knee pain whic is worse with wb.  Cont. On tylenol for pain. For CHF cont on lasix. Reports wt. Stable at 180 lbs. No edema or resp. Distress. Cardiology visit 7/18 18-no new orders. S/p hosp.stay with pneumonitis 3/18. Reports occ cough, n.p.  Has been afebrile.     ALLERGIES: No known allergies  Past Medical, Surgical, Family and Social History reviewed and updated in [facility].    Current Outpatient Prescriptions   Medication Sig Dispense Refill     Acetaminophen (TYLENOL PO) Take 1,000 mg by mouth 2 times daily       acetaminophen (TYLENOL) 325 MG tablet Take 650 mg by mouth 2 times daily as needed for mild pain       alendronate (FOSAMAX) 70 MG tablet Take 70 mg by mouth every 7 days On Wednesdays       amLODIPine (NORVASC) 10 MG tablet Take 1 tablet (10 mg) by mouth daily 30 tablet 1     ASPIRIN EC PO Take 81 mg by mouth daily       bisacodyl (DULCOLAX) 10 MG suppository Place 10 mg rectally daily as needed       calcium carbonate (OS-FRANCISCO) 500 MG TABS Take 2 tablets by mouth 2 times daily (with meals)       carvedilol (COREG) 12.5 MG tablet Take 1 tablet (12.5 mg) by mouth 2 times daily (with meals) 60 tablet 1     cholecalciferol (VITAMIN D3) 5000 UNITS CAPS capsule Take 5,000 Units by mouth daily       citalopram (CELEXA) 10 MG tablet Take 30 mg by mouth daily       clopidogrel (PLAVIX) 75 MG tablet Take 1 tablet (75 mg) by mouth daily 30 tablet 11     folic acid (FOLVITE) 1 MG tablet Take  1 mg by mouth daily       furosemide (LASIX) 20 MG tablet Take 20 mg by mouth daily       hypromellose (ARTIFICIAL TEARS) 0.5 % SOLN ophthalmic solution Place 3 drops into both eyes 3 times daily       levETIRAcetam (KEPPRA) 500 MG tablet Take 500 mg by mouth 2 times daily       losartan (COZAAR) 100 MG tablet Take 100 mg by mouth daily       metoclopramide (REGLAN) 5 MG tablet Take 5 mg by mouth 2 times daily And daily PRN at noon       Mirtazapine (REMERON PO) Take 15 mg by mouth 2 times daily       Multiple Vitamins-Minerals (CERTAVITE SENIOR/ANTIOXIDANT PO) Take by mouth daily       omeprazole (PRILOSEC) 40 MG capsule Take 1 capsule (40 mg) by mouth daily Take 30-60 minutes before a meal. 30 capsule 9     oxybutynin (DITROPAN) 5 MG tablet Take 5 mg by mouth 2 times daily       polyethylene glycol (MIRALAX/GLYCOLAX) powder Take 17 g by mouth At Bedtime        rosuvastatin (CRESTOR) 40 MG tablet Take 1 tablet (40 mg) by mouth daily 90 tablet 0     senna-docusate (SENNA S) 8.6-50 MG per tablet Take 1 tablet by mouth 2 times daily Please hold if having loose stools and contact nurse.       Medications reviewed:  Medications reconciled to facility chart and changes were made to reflect current medications as identified as above med list. Below are the changes that were made:   Medications stopped since last EPIC medication reconciliation:   There are no discontinued medications.    Medications started since last [facility] medication reconciliation:  No orders of the defined types were placed in this encounter.        REVIEW OF SYSTEMS:  No chest pain, shortness of breath, fevers, chills, headache, nausea, vomiting, dysuria or bowel abnormalities.  Appetite is  normal.  No pain except knees.    Physical Exam:  /76  Pulse 72  Temp 97.7  F (36.5  C)  Resp 18  Wt 180 lb (81.6 kg)  SpO2 95%  BMI 34.01 kg/m2  GENERAL APPEARANCE:  Alert, in no distress, cooperative  ENT:  Mouth and posterior oropharynx normal, moist  mucous membranes, [place]  EYES:  EOM, conjunctivae, lids, pupils and irises normal, PERRL  NECK:  No adenopathy,masses or thyromegaly  RESP:  respiratory effort and palpation of chest normal, lungs clear to auscultation , no respiratory distress  CV:  Palpation and auscultation of heart done , regular rate and rhythm, no murmur, rub, or gallop, no edema  ABDOMEN:  normal bowel sounds, soft, nontender, no hepatosplenomegaly or other masses, no guarding or rebound  M/S:   Gait and station normal  muscle strength 5/5 all 4 ext., normal tone  NEURO:   Cranial nerves 2-12 are normal tested and grossly at patient's baseline, alert, no tremor  PSYCH:  memory impaired , affect and mood normal    Recent Labs:     CBC RESULTS:   Recent Labs   Lab Test  03/12/18   0850  02/02/18   0814   WBC  17.6*  8.8   RBC  4.28  3.99   HGB  13.6  12.6   HCT  40.7  38.5   MCV  95  97   MCH  31.8  31.6   MCHC  33.4  32.7   RDW  12.5  12.3   PLT  227  173       Last Basic Metabolic Panel:  Recent Labs   Lab Test  03/14/18   0712  03/13/18   0645   NA  140  140   POTASSIUM  4.1  4.4   CHLORIDE  104  108   FRANCISCO  8.9  8.3*   CO2  32  26   BUN  12  15   CR  0.82  0.76   GLC  97  97       Liver Function Studies -   Recent Labs   Lab Test  07/18/18   0955  04/23/18   0825  03/12/18   0850  01/31/18   1050   PROTTOTAL   --    --   7.2  6.9   ALBUMIN   --    --   3.4  3.1*   BILITOTAL   --    --   0.7  0.8   ALKPHOS   --    --   54  55   AST   --    --   14  34   ALT  15  16  17  21       Assessment/Plan:  Osteoarthritis of both knees, unspecified osteoarthritis type  Ongoing pain, slowed gait  1. Contact [facility] for f/u L knee inj  2.cont. Tylenol  3. Reassess over next couple weeks for effectiveness  4.monitor for changes in gait    Chronic diastolic congestive heart failure (H)  Gen. Controlled  1. Cont. Lasix  2. Cont. Every day wt.s  3. Monitor for sig. Wt. Gain, edema  4. Bmp tomorrow    cough  Occ, non-prod. Afebrile S/p hosp.stay 3/18  with pneumonitis  1. Follow O2 sats  2. Monitor for fever  3. For prod. Cough, fever,check CXR  4. Cbc tomorrow      Electronically signed by  ZAIN Marcos CNP

## 2018-07-27 ENCOUNTER — RECORDS - HEALTHEAST (OUTPATIENT)
Dept: LAB | Facility: CLINIC | Age: 83
End: 2018-07-27

## 2018-07-27 ENCOUNTER — TRANSFERRED RECORDS (OUTPATIENT)
Dept: HEALTH INFORMATION MANAGEMENT | Facility: CLINIC | Age: 83
End: 2018-07-27

## 2018-07-27 LAB
ANION GAP SERPL CALCULATED.3IONS-SCNC: 9 MMOL/L (ref 5–18)
ANION GAP SERPL CALCULATED.3IONS-SCNC: 9 MMOL/L (ref 5–18)
BASOPHILS # BLD AUTO: 0.1 THOU/UL (ref 0–0.2)
BASOPHILS NFR BLD AUTO: 1 % (ref 0–2)
BUN SERPL-MCNC: 19 MG/DL (ref 8–28)
BUN SERPL-MCNC: 19 MG/DL (ref 8–28)
CALCIUM SERPL-MCNC: 9.7 MG/DL (ref 8.5–10.5)
CALCIUM SERPL-MCNC: 9.7 MG/DL (ref 8.5–10.5)
CHLORIDE BLD-SCNC: 103 MMOL/L (ref 98–107)
CHLORIDE SERPLBLD-SCNC: 103 MMOL/L (ref 98–107)
CO2 SERPL-SCNC: 28 MMOL/L (ref 22–31)
CO2 SERPL-SCNC: 28 MMOL/L (ref 22–31)
CREAT SERPL-MCNC: 0.98 MG/DL (ref 0.6–1.1)
CREAT SERPL-MCNC: 0.98 MG/DL (ref 0.6–1.1)
DIFFERENTIAL: NORMAL
EOSINOPHIL # BLD AUTO: 0.2 THOU/UL (ref 0–0.4)
EOSINOPHIL NFR BLD AUTO: 2 % (ref 0–6)
ERYTHROCYTE [DISTWIDTH] IN BLOOD BY AUTOMATED COUNT: 12.1 % (ref 11–14.5)
ERYTHROCYTE [DISTWIDTH] IN BLOOD BY AUTOMATED COUNT: 12.1 % (ref 11–14.5)
GFR SERPL CREATININE-BSD FRML MDRD: 54 ML/MIN/1.73M2
GFR SERPL CREATININE-BSD FRML MDRD: 54 ML/MIN/1.73M2
GLUCOSE BLD-MCNC: 102 MG/DL (ref 70–125)
GLUCOSE SERPL-MCNC: 102 MG/DL (ref 70–125)
HCT VFR BLD AUTO: 42.7 % (ref 35–47)
HCT VFR BLD AUTO: 42.7 % (ref 35–47)
HEMOGLOBIN: 14.1 G/DL (ref 12–16)
HGB BLD-MCNC: 14.1 G/DL (ref 12–16)
LYMPHOCYTES # BLD AUTO: 2.1 THOU/UL (ref 0.8–4.4)
LYMPHOCYTES NFR BLD AUTO: 25 % (ref 20–40)
MCH RBC QN AUTO: 31.6 PG (ref 27–34)
MCH RBC QN AUTO: 31.6 PG (ref 27–34)
MCHC RBC AUTO-ENTMCNC: 33 G/DL (ref 32–36)
MCHC RBC AUTO-ENTMCNC: 33 G/DL (ref 32–36)
MCV RBC AUTO: 96 FL (ref 80–100)
MCV RBC AUTO: 96 FL (ref 80–100)
MONOCYTES # BLD AUTO: 0.7 THOU/UL (ref 0–0.9)
MONOCYTES NFR BLD AUTO: 8 % (ref 2–10)
NEUTROPHILS # BLD AUTO: 5.4 THOU/UL (ref 2–7.7)
NEUTROPHILS NFR BLD AUTO: 64 % (ref 50–70)
PLATELET # BLD AUTO: 198 THOU/UL (ref 140–440)
PLATELET # BLD AUTO: 198 THOU/UL (ref 140–440)
PMV BLD AUTO: 11.1 FL (ref 8.5–12.5)
POTASSIUM BLD-SCNC: 4.6 MMOL/L (ref 3.5–5)
POTASSIUM SERPL-SCNC: 4.6 MMOL/L (ref 3.5–5)
RBC # BLD AUTO: 4.46 MILL/UL (ref 3.8–5.4)
RBC # BLD AUTO: 4.46 MILL/UL (ref 3.8–5.4)
SODIUM SERPL-SCNC: 140 MMOL/L (ref 136–145)
SODIUM SERPL-SCNC: 140 MMOL/L (ref 136–145)
WBC # BLD AUTO: 8.5 THOU/UL (ref 4–11)
WBC: 8.5 THOU/UL (ref 4–11)

## 2018-07-30 ENCOUNTER — ASSISTED LIVING VISIT (OUTPATIENT)
Dept: GERIATRICS | Facility: CLINIC | Age: 83
End: 2018-07-30
Payer: COMMERCIAL

## 2018-07-30 DIAGNOSIS — M17.12 PRIMARY OSTEOARTHRITIS OF LEFT KNEE: Primary | ICD-10-CM

## 2018-07-30 NOTE — PROGRESS NOTES
Ortho Nursing home visit    Jade Caba is a 84 year old female who resides at [facility]  Patient is seen today for left knee injection, 2nd to OA, last injection 3 months ago.      Past Medical History:   Diagnosis Date     Anxiety      Arthritis      Atrial flutter (H)      C1 cervical fracture (H)      CAD (coronary artery disease)      Carotid artery stenosis      CKD (chronic kidney disease)      Depression      H/O alcohol abuse      Hypercholesterolemia      Hypertension      Renal artery stenosis (H)      Seizure disorder (H)      Subarachnoid hemorrhage (H) 2014      Past Surgical History:   Procedure Laterality Date     APPENDECTOMY       CAROTID ENDARTERECTOMY Left      Cataract surgery Right     will have laser eye surgery     CHOLECYSTECTOMY       Coronary stents x 3       HYSTERECTOMY      at age of 40-not cancerus or precancerous      JOINT REPLACEMENT Bilateral      Stenting of renal artery          Allergies   Allergen Reactions     No Known Allergies       There were no vitals taken for this visit.     Exam: Swelling noted medial joint line, tender medial joint, noted crepitus, no locking, pain with W/B using walker.      X-rays show Advanced DJD 2nd to OA Left knee.    ASSESSMENT / PLAN: After R&B discussed, verbal consent obtained.  Injection using 1 ml depo medrol, 4 ml 1% lidocaine into medial joint space, left knee  Tolerated well, no complaints,    Plan, F/U 4 months    [address]          [facility]  [phone]

## 2018-09-13 ENCOUNTER — PATIENT OUTREACH (OUTPATIENT)
Dept: GERIATRIC MEDICINE | Facility: CLINIC | Age: 83
End: 2018-09-13

## 2018-10-04 ENCOUNTER — ASSISTED LIVING VISIT (OUTPATIENT)
Dept: GERIATRICS | Facility: CLINIC | Age: 83
End: 2018-10-04
Payer: COMMERCIAL

## 2018-10-04 VITALS
RESPIRATION RATE: 18 BRPM | OXYGEN SATURATION: 94 % | HEART RATE: 66 BPM | SYSTOLIC BLOOD PRESSURE: 144 MMHG | DIASTOLIC BLOOD PRESSURE: 75 MMHG

## 2018-10-04 DIAGNOSIS — I10 ESSENTIAL HYPERTENSION: ICD-10-CM

## 2018-10-04 DIAGNOSIS — F32.A DEPRESSION, UNSPECIFIED DEPRESSION TYPE: ICD-10-CM

## 2018-10-04 DIAGNOSIS — M17.0 OSTEOARTHRITIS OF BOTH KNEES, UNSPECIFIED OSTEOARTHRITIS TYPE: Primary | ICD-10-CM

## 2018-10-04 NOTE — LETTER
10/4/2018        RE: Jade Saeed  [address] [facility] [address]        [facility]    Chief Complaint   Patient presents with     Knee Pain       [facility] Medical Record Number:  [id number]    HPI:    Jade Caba is a 85 year old  (8/26/1933), who is being seen today for an episodic care visit at [facility].  HPI information obtained from: facility chart records, facility staff, patient report and [facility] chart review.Today's concern is:  Knee pain, HTN, depression. Has OA of knees bilat knee pain L>R. 7/18 had L knee inj per [facility]. Reports as effective.  Reports increased knee pain over past couple weeks. Gait stable. Also taking tylenol. For HTN cont. On norvasc, carvedilol, losartan. BPs gen stable. For depression cont. On remeron, celexa. Mood, stable. Po intake, wt. Stable.     ALLERGIES: No known allergies  Past Medical, Surgical, Family and Social History reviewed and updated in [facility].    Current Outpatient Prescriptions   Medication Sig Dispense Refill     Acetaminophen (TYLENOL PO) Take 1,000 mg by mouth 2 times daily       acetaminophen (TYLENOL) 325 MG tablet Take 650 mg by mouth 2 times daily as needed for mild pain       alendronate (FOSAMAX) 70 MG tablet Take 70 mg by mouth every 7 days On Wednesdays       amLODIPine (NORVASC) 10 MG tablet Take 1 tablet (10 mg) by mouth daily 30 tablet 1     ASPIRIN EC PO Take 81 mg by mouth daily       bisacodyl (DULCOLAX) 10 MG suppository Place 10 mg rectally daily as needed       calcium carbonate (OS-FRANCISCO) 500 MG TABS Take 2 tablets by mouth 2 times daily (with meals)       carvedilol (COREG) 12.5 MG tablet Take 1 tablet (12.5 mg) by mouth 2 times daily (with meals) 60 tablet 1     cholecalciferol (VITAMIN D3) 5000 UNITS CAPS capsule Take 5,000 Units by mouth daily       citalopram (CELEXA) 10 MG tablet Take 30 mg by mouth daily       clopidogrel (PLAVIX) 75 MG tablet Take 1  tablet (75 mg) by mouth daily 30 tablet 11     folic acid (FOLVITE) 1 MG tablet Take 1 mg by mouth daily       furosemide (LASIX) 20 MG tablet Take 20 mg by mouth daily       hypromellose (ARTIFICIAL TEARS) 0.5 % SOLN ophthalmic solution Place 3 drops into both eyes 3 times daily       levETIRAcetam (KEPPRA) 500 MG tablet Take 500 mg by mouth 2 times daily       losartan (COZAAR) 100 MG tablet Take 100 mg by mouth daily       metoclopramide (REGLAN) 5 MG tablet Take 5 mg by mouth 2 times daily And daily PRN at noon       Mirtazapine (REMERON PO) Take 15 mg by mouth 2 times daily       Multiple Vitamins-Minerals (CERTAVITE SENIOR/ANTIOXIDANT PO) Take by mouth daily       omeprazole (PRILOSEC) 40 MG capsule Take 1 capsule (40 mg) by mouth daily Take 30-60 minutes before a meal. 30 capsule 9     oxybutynin (DITROPAN) 5 MG tablet Take 5 mg by mouth 2 times daily       polyethylene glycol (MIRALAX/GLYCOLAX) powder Take 17 g by mouth At Bedtime        rosuvastatin (CRESTOR) 40 MG tablet Take 1 tablet (40 mg) by mouth daily 90 tablet 0     senna-docusate (SENNA S) 8.6-50 MG per tablet Take 1 tablet by mouth 2 times daily Please hold if having loose stools and contact nurse.       Medications reviewed:  Medications reconciled to facility chart and changes were made to reflect current medications as identified as above med list. Below are the changes that were made:   Medications stopped since last EPIC medication reconciliation:   There are no discontinued medications.    Medications started since last [facility] medication reconciliation:  No orders of the defined types were placed in this encounter.        REVIEW OF SYSTEMS:  No chest pain, shortness of breath, fevers, chills, headache, nausea, vomiting, dysuria or bowel abnormalities.  Appetite is  normal.  No pain except L knee at times.    Physical Exam:  /75  Pulse 66  Resp 18  SpO2 94%  GENERAL APPEARANCE:  Alert, in no distress, cooperative  ENT:  Mouth and  posterior oropharynx normal, moist mucous membranes, [place]  EYES:  EOM, conjunctivae, lids, pupils and irises normal, PERRL  NECK:  No adenopathy,masses or thyromegaly, FROM  RESP:  respiratory effort and palpation of chest normal, lungs clear to auscultation , no respiratory distress  CV:  Palpation and auscultation of heart done , regular rate and rhythm, no murmur, rub, or gallop, peripheral edema trace+ in LEs  ABDOMEN:  normal bowel sounds, soft, nontender, no hepatosplenomegaly or other masses, no guarding or rebound  M/S:   Gait and station normal  muscle strength 5/5 all 4 ext., normal tone  NEURO:   Cranial nerves 2-12 are normal tested and grossly at patient's baseline, alert, speech clear  PSYCH:  insight and judgement impaired, memory impaired , affect and mood normal    Recent Labs:   CBC RESULTS:   Recent Labs   Lab Test 07/27/18 03/12/18   0850   WBC  8.5  17.6*   RBC  4.46  4.28   HGB  14.1  13.6   HCT  42.7  40.7   MCV  96  95   MCH  31.6  31.8   MCHC  33.0  33.4   RDW  12.1  12.5   PLT  198  227       Last Basic Metabolic Panel:  Recent Labs   Lab Test 07/27/18 03/14/18   0712   NA  140  140   POTASSIUM  4.6  4.1   CHLORIDE  103  104   FRANCISCO  9.7  8.9   CO2  28  32   BUN  19  12   CR  0.98  0.82   GLC  102  97       Assessment/Plan:  Osteoarthritis of both knees, unspecified osteoarthritis type  Some increased L knee pain  1. Cont. Tylenol  2. Monitor for changes in gait  3. Sched. F/u knee inj for later this month  4. Reassess over next few weeks    Essential hypertension  Currently controlled  1. Cont. Norvasc, carvedilol, losartan  2. Follow BPs, HRs  3. Cbc, bmp, FLP, AST, ALT next week    Depression, unspecified depression type  Mood gen. Stable  1. Cont. remeron  2. Cont. celexa  3. Monitor for changes in mood, behavior        Electronically signed by  ZAIN Marcos CNP                      Sincerely,        ZAIN Marcos CNP

## 2018-10-04 NOTE — PROGRESS NOTES
[facility]    Chief Complaint   Patient presents with     Knee Pain       [place] Medical Record Number:  [id number]    HPI:    Jade Caba is a 85 year old  (8/26/1933), who is being seen today for an episodic care visit at [facility].  HPI information obtained from: facility chart records, facility staff, patient report and [facility] chart review.Today's concern is:  Knee pain, HTN, depression. Has OA of knees bilat knee pain L>R. 7/18 had L knee inj per [facility]. Reports as effective.  Reports increased knee pain over past couple weeks. Gait stable. Also taking tylenol. For HTN cont. On norvasc, carvedilol, losartan. BPs gen stable. For depression cont. On remeron, celexa. Mood, stable. Po intake, wt. Stable.     ALLERGIES: No known allergies  Past Medical, Surgical, Family and Social History reviewed and updated in [facility].    Current Outpatient Prescriptions   Medication Sig Dispense Refill     Acetaminophen (TYLENOL PO) Take 1,000 mg by mouth 2 times daily       acetaminophen (TYLENOL) 325 MG tablet Take 650 mg by mouth 2 times daily as needed for mild pain       alendronate (FOSAMAX) 70 MG tablet Take 70 mg by mouth every 7 days On Wednesdays       amLODIPine (NORVASC) 10 MG tablet Take 1 tablet (10 mg) by mouth daily 30 tablet 1     ASPIRIN EC PO Take 81 mg by mouth daily       bisacodyl (DULCOLAX) 10 MG suppository Place 10 mg rectally daily as needed       calcium carbonate (OS-FRANCISCO) 500 MG TABS Take 2 tablets by mouth 2 times daily (with meals)       carvedilol (COREG) 12.5 MG tablet Take 1 tablet (12.5 mg) by mouth 2 times daily (with meals) 60 tablet 1     cholecalciferol (VITAMIN D3) 5000 UNITS CAPS capsule Take 5,000 Units by mouth daily       citalopram (CELEXA) 10 MG tablet Take 30 mg by mouth daily       clopidogrel (PLAVIX) 75 MG tablet Take 1 tablet (75 mg) by mouth daily 30 tablet 11     folic acid (FOLVITE) 1 MG tablet Take 1 mg by mouth  daily       furosemide (LASIX) 20 MG tablet Take 20 mg by mouth daily       hypromellose (ARTIFICIAL TEARS) 0.5 % SOLN ophthalmic solution Place 3 drops into both eyes 3 times daily       levETIRAcetam (KEPPRA) 500 MG tablet Take 500 mg by mouth 2 times daily       losartan (COZAAR) 100 MG tablet Take 100 mg by mouth daily       metoclopramide (REGLAN) 5 MG tablet Take 5 mg by mouth 2 times daily And daily PRN at noon       Mirtazapine (REMERON PO) Take 15 mg by mouth 2 times daily       Multiple Vitamins-Minerals (CERTAVITE SENIOR/ANTIOXIDANT PO) Take by mouth daily       omeprazole (PRILOSEC) 40 MG capsule Take 1 capsule (40 mg) by mouth daily Take 30-60 minutes before a meal. 30 capsule 9     oxybutynin (DITROPAN) 5 MG tablet Take 5 mg by mouth 2 times daily       polyethylene glycol (MIRALAX/GLYCOLAX) powder Take 17 g by mouth At Bedtime        rosuvastatin (CRESTOR) 40 MG tablet Take 1 tablet (40 mg) by mouth daily 90 tablet 0     senna-docusate (SENNA S) 8.6-50 MG per tablet Take 1 tablet by mouth 2 times daily Please hold if having loose stools and contact nurse.       Medications reviewed:  Medications reconciled to facility chart and changes were made to reflect current medications as identified as above med list. Below are the changes that were made:   Medications stopped since last EPIC medication reconciliation:   There are no discontinued medications.    Medications started since last [facility] medication reconciliation:  No orders of the defined types were placed in this encounter.        REVIEW OF SYSTEMS:  No chest pain, shortness of breath, fevers, chills, headache, nausea, vomiting, dysuria or bowel abnormalities.  Appetite is  normal.  No pain except L knee at times.    Physical Exam:  /75  Pulse 66  Resp 18  SpO2 94%  GENERAL APPEARANCE:  Alert, in no distress, cooperative  ENT:  Mouth and posterior oropharynx normal, moist mucous membranes, [place]  EYES:  EOM, conjunctivae, lids, pupils and  irises normal, PERRL  NECK:  No adenopathy,masses or thyromegaly, FROM  RESP:  respiratory effort and palpation of chest normal, lungs clear to auscultation , no respiratory distress  CV:  Palpation and auscultation of heart done , regular rate and rhythm, no murmur, rub, or gallop, peripheral edema trace+ in LEs  ABDOMEN:  normal bowel sounds, soft, nontender, no hepatosplenomegaly or other masses, no guarding or rebound  M/S:   Gait and station normal  muscle strength 5/5 all 4 ext., normal tone  NEURO:   Cranial nerves 2-12 are normal tested and grossly at patient's baseline, alert, speech clear  PSYCH:  insight and judgement impaired, memory impaired , affect and mood normal    Recent Labs:   CBC RESULTS:   Recent Labs   Lab Test 07/27/18 03/12/18   0850   WBC  8.5  17.6*   RBC  4.46  4.28   HGB  14.1  13.6   HCT  42.7  40.7   MCV  96  95   MCH  31.6  31.8   MCHC  33.0  33.4   RDW  12.1  12.5   PLT  198  227       Last Basic Metabolic Panel:  Recent Labs   Lab Test 07/27/18 03/14/18   0712   NA  140  140   POTASSIUM  4.6  4.1   CHLORIDE  103  104   FRANCISCO  9.7  8.9   CO2  28  32   BUN  19  12   CR  0.98  0.82   GLC  102  97       Assessment/Plan:  Osteoarthritis of both knees, unspecified osteoarthritis type  Some increased L knee pain  1. Cont. Tylenol  2. Monitor for changes in gait  3. Sched. F/u knee inj for later this month  4. Reassess over next few weeks    Essential hypertension  Currently controlled  1. Cont. Norvasc, carvedilol, losartan  2. Follow BPs, HRs  3. Cbc, bmp, FLP, AST, ALT next week    Depression, unspecified depression type  Mood gen. Stable  1. Cont. remeron  2. Cont. celexa  3. Monitor for changes in mood, behavior        Electronically signed by  ZAIN Marcos CNP

## 2018-10-09 ENCOUNTER — RECORDS - HEALTHEAST (OUTPATIENT)
Dept: LAB | Facility: CLINIC | Age: 83
End: 2018-10-09

## 2018-10-09 ENCOUNTER — TRANSFERRED RECORDS (OUTPATIENT)
Dept: HEALTH INFORMATION MANAGEMENT | Facility: CLINIC | Age: 83
End: 2018-10-09

## 2018-10-09 LAB
ALT SERPL W P-5'-P-CCNC: 11 U/L (ref 0–45)
ALT SERPL-CCNC: 11 U/L (ref 0–45)
ANION GAP SERPL CALCULATED.3IONS-SCNC: 8 MMOL/L (ref 5–18)
ANION GAP SERPL CALCULATED.3IONS-SCNC: 8 MMOL/L (ref 5–18)
AST SERPL W P-5'-P-CCNC: 23 U/L (ref 0–40)
AST SERPL-CCNC: 23 U/L (ref 0–40)
BUN SERPL-MCNC: 14 MG/DL (ref 8–28)
BUN SERPL-MCNC: 14 MG/DL (ref 8–28)
CALCIUM SERPL-MCNC: 9.3 MG/DL (ref 8.5–10.5)
CALCIUM SERPL-MCNC: 9.3 MG/DL (ref 8.5–10.5)
CHLORIDE BLD-SCNC: 105 MMOL/L (ref 98–107)
CHLORIDE SERPLBLD-SCNC: 105 MMOL/L (ref 98–107)
CHOLEST SERPL-MCNC: 194 MG/DL
CHOLEST SERPL-MCNC: 194 MG/DL
CO2 SERPL-SCNC: 28 MMOL/L (ref 22–31)
CO2 SERPL-SCNC: 28 MMOL/L (ref 22–31)
CREAT SERPL-MCNC: 0.96 MG/DL (ref 0.6–1.1)
CREAT SERPL-MCNC: 0.96 MG/DL (ref 0.6–1.1)
FASTING STATUS PATIENT QL REPORTED: ABNORMAL
GFR SERPL CREATININE-BSD FRML MDRD: 55 ML/MIN/1.73M2
GFR SERPL CREATININE-BSD FRML MDRD: 55 ML/MIN/1.73M2
GLUCOSE BLD-MCNC: 110 MG/DL (ref 70–125)
GLUCOSE SERPL-MCNC: 110 MG/DL (ref 70–125)
HDLC SERPL-MCNC: 42 MG/DL
HDLC SERPL-MCNC: 42 MG/DL
HEMOGLOBIN: 13.4 G/DL (ref 12–16)
HGB BLD-MCNC: 13.4 G/DL (ref 12–16)
LDLC SERPL CALC-MCNC: 120 MG/DL
LDLC SERPL CALC-MCNC: 120 MG/DL
POTASSIUM BLD-SCNC: 4 MMOL/L (ref 3.5–5)
POTASSIUM SERPL-SCNC: 4 MMOL/L (ref 3.5–5)
SODIUM SERPL-SCNC: 141 MMOL/L (ref 136–145)
SODIUM SERPL-SCNC: 141 MMOL/L (ref 136–145)
TRIGL SERPL-MCNC: 158 MG/DL
TRIGL SERPL-MCNC: 158 MG/DL

## 2018-11-14 ENCOUNTER — ASSISTED LIVING VISIT (OUTPATIENT)
Dept: GERIATRICS | Facility: CLINIC | Age: 83
End: 2018-11-14
Payer: COMMERCIAL

## 2018-11-14 DIAGNOSIS — M17.0 PRIMARY OSTEOARTHRITIS OF BOTH KNEES: Primary | ICD-10-CM

## 2018-11-14 NOTE — PROGRESS NOTES
Ortho Nursing home visit    Jade Caba is a 85 year old female who resides at [facility]    Patient is seen today for Left knee pain, requesting injection of steroid, last injection 3 months ago.      Past Medical History:   Diagnosis Date     Anxiety      Arthritis      Atrial flutter (H)      C1 cervical fracture (H)      CAD (coronary artery disease)      Carotid artery stenosis      CKD (chronic kidney disease)      Depression      H/O alcohol abuse      Hypercholesterolemia      Hypertension      Renal artery stenosis (H)      Seizure disorder (H)      Subarachnoid hemorrhage (H) 2014      Past Surgical History:   Procedure Laterality Date     APPENDECTOMY       CAROTID ENDARTERECTOMY Left      Cataract surgery Right     will have laser eye surgery     CHOLECYSTECTOMY       Coronary stents x 3       HYSTERECTOMY      at age of 40-not cancerus or precancerous      JOINT REPLACEMENT Bilateral      Stenting of renal artery          Allergies   Allergen Reactions     No Known Allergies       There were no vitals taken for this visit.     Exam: Seated, Allows, PROM to left knee WNL, lig intact, no noted effusion, or swelling, some noted crepitus, pain with W/B medial joint;      X-rays show Advanced OA Left knee    ASSESSMENT / PLAN: After R&B discussed, verbal consent obtained. Left knee prepped, injected, with 1 ml depo medrol, 4 ml 1% lidocaine, into medial joint, space, tolerated well, no complaints, plan F/U 3-4 months prn pain.    [id number]          [facility]  [phone]

## 2018-12-18 ENCOUNTER — ASSISTED LIVING VISIT (OUTPATIENT)
Dept: GERIATRICS | Facility: CLINIC | Age: 83
End: 2018-12-18
Payer: COMMERCIAL

## 2018-12-18 VITALS
DIASTOLIC BLOOD PRESSURE: 72 MMHG | HEART RATE: 89 BPM | OXYGEN SATURATION: 92 % | TEMPERATURE: 102.5 F | RESPIRATION RATE: 22 BRPM | SYSTOLIC BLOOD PRESSURE: 160 MMHG

## 2018-12-18 DIAGNOSIS — M62.81 GENERALIZED MUSCLE WEAKNESS: ICD-10-CM

## 2018-12-18 DIAGNOSIS — R11.2 NON-INTRACTABLE VOMITING WITH NAUSEA, UNSPECIFIED VOMITING TYPE: ICD-10-CM

## 2018-12-18 DIAGNOSIS — R50.9 FEVER, UNSPECIFIED FEVER CAUSE: Primary | ICD-10-CM

## 2018-12-18 NOTE — PROGRESS NOTES
[facility]    Chief Complaint   Patient presents with     Fever       [place] Medical Record Number:  [id number]  Place of Service where encounter took place:  [facility] [[id number]]    HPI:    Jade Caba is a 85 year old  (8/26/1933), who is being seen today for an episodic care visit.  HPI information obtained from: facility chart records, facility staff, patient report, [facility] chart review and family/first contact dtr report.Today's concern is: fever, n/v, weakness.  This am found to have fever, not feeling good, gen. Weakness, shaky at times.  Has occ cough. O2 sats stable. No apparent resp. Distress. After taking am meds, had emesis.  Reports nausea currently resolved. Taking sched reglan, also has prn reglan.  Recent L knee inj for OA. Reports effective. Gait steady, reports some gen. Weakness from baseline this am.       ALLERGIES: No known allergies  Past Medical, Surgical, Family and Social History reviewed and updated in [facility].    Current Outpatient Medications   Medication Sig Dispense Refill     Acetaminophen (TYLENOL PO) Take 1,000 mg by mouth 2 times daily       acetaminophen (TYLENOL) 325 MG tablet Take 650 mg by mouth 2 times daily as needed for mild pain       amLODIPine (NORVASC) 10 MG tablet Take 1 tablet (10 mg) by mouth daily 30 tablet 1     ASPIRIN EC PO Take 81 mg by mouth daily       bisacodyl (DULCOLAX) 10 MG suppository Place 10 mg rectally daily as needed       calcium carbonate (OS-FRANCISCO) 500 MG TABS Take 2 tablets by mouth 2 times daily (with meals)       carvedilol (COREG) 12.5 MG tablet Take 1 tablet (12.5 mg) by mouth 2 times daily (with meals) 60 tablet 1     cholecalciferol (VITAMIN D3) 5000 UNITS CAPS capsule Take 5,000 Units by mouth daily       citalopram (CELEXA) 10 MG tablet Take 30 mg by mouth daily       clopidogrel (PLAVIX) 75 MG tablet Take 1 tablet (75 mg) by mouth daily 30 tablet 11     folic acid (FOLVITE) 1 MG  tablet Take 1 mg by mouth daily       furosemide (LASIX) 20 MG tablet Take 20 mg by mouth daily       hypromellose (ARTIFICIAL TEARS) 0.5 % SOLN ophthalmic solution Place 3 drops into both eyes 3 times daily       levETIRAcetam (KEPPRA) 500 MG tablet Take 500 mg by mouth 2 times daily       losartan (COZAAR) 100 MG tablet Take 100 mg by mouth daily       metoclopramide (REGLAN) 5 MG tablet Take 5 mg by mouth 2 times daily And daily PRN at noon       Mirtazapine (REMERON PO) Take 15 mg by mouth 2 times daily       Multiple Vitamins-Minerals (CERTAVITE SENIOR/ANTIOXIDANT PO) Take by mouth daily       omeprazole (PRILOSEC) 40 MG capsule Take 1 capsule (40 mg) by mouth daily Take 30-60 minutes before a meal. 30 capsule 9     oxybutynin (DITROPAN) 5 MG tablet Take 5 mg by mouth 2 times daily       polyethylene glycol (MIRALAX/GLYCOLAX) powder Take 17 g by mouth nightly as needed        rosuvastatin (CRESTOR) 40 MG tablet Take 1 tablet (40 mg) by mouth daily 90 tablet 0     senna-docusate (SENNA S) 8.6-50 MG per tablet Take 1 tablet by mouth 2 times daily Please hold if having loose stools and contact nurse.       alendronate (FOSAMAX) 70 MG tablet Take 70 mg by mouth every 7 days On Wednesdays       Medications reviewed:  Medications reconciled to facility chart and changes were made to reflect current medications as identified as above med list. Below are the changes that were made:   Medications stopped since last EPIC medication reconciliation:   There are no discontinued medications.    Medications started since last [facility] medication reconciliation:  No orders of the defined types were placed in this encounter.        REVIEW OF SYSTEMS:  CONSTITUTIONAL:  fevers and forgetfulness, EYES:  glasses or contacts, ENT:  [place], CV:  neg, RESPIRATORY: cough, :  neg, GI:  neg, NEURO:  h/o seizures, PSYCH: neg and MUSCULOSKELETAL: some gen weakness.    Physical Exam:  /72   Pulse 89   Temp 102.5  F (39.2  C)   Resp 22    SpO2 92%   GENERAL APPEARANCE:  Alert, cooperative, tremulous at times, UEs  ENT:  Mouth and posterior oropharynx normal, moist mucous membranes, [place], no rhinitis  EYES:  EOM, conjunctivae, lids, pupils and irises normal, PERRL  NECK:  No adenopathy,masses or thyromegaly, FROM  RESP:  respiratory effort and palpation of chest normal, lungs clear to auscultation , no respiratory distress, diminished breath sounds bibasilar  CV:  Palpation and auscultation of heart done , no edema, irregular rhythm sl. irreg, rate-normal  ABDOMEN:  normal bowel sounds, soft, nontender, no hepatosplenomegaly or other masses, no guarding or rebound  M/S:   Gait and station normal  muscle strength 5/5 all 4 ext., normal tone  NEURO:   Cranial nerves 2-12 are normal tested and grossly at patient's baseline, alert, speech clear  PSYCH:  insight and judgement impaired, memory impaired , affect and mood normal    Recent Labs:     CBC RESULTS:   Recent Labs   Lab Test 10/09/18 07/27/18 03/12/18  0850   WBC  --  8.5 17.6*   RBC  --  4.46 4.28   HGB 13.4 14.1 13.6   HCT  --  42.7 40.7   MCV  --  96 95   MCH  --  31.6 31.8   MCHC  --  33.0 33.4   RDW  --  12.1 12.5   PLT  --  198 227       Last Basic Metabolic Panel:  Recent Labs   Lab Test 10/09/18 07/27/18    140   POTASSIUM 4.0 4.6   CHLORIDE 105 103   FRANCISCO 9.3 9.7   CO2 28 28   BUN 14 19   CR 0.96 0.98    102       Liver Function Studies -   Recent Labs   Lab Test 10/09/18 07/18/18  0955  03/12/18  0850 01/31/18  1050   PROTTOTAL  --   --   --  7.2 6.9   ALBUMIN  --   --   --  3.4 3.1*   BILITOTAL  --   --   --  0.7 0.8   ALKPHOS  --   --   --  54 55   AST 23  --   --  14 34   ALT 11 15   < > 17 21    < > = values in this interval not displayed.       Assessment/Plan:  Fever, unspecified fever cause  Possible URI, cough present  1. Give tylenol this am  2. CXR  3. Follow O2 sats  4. Rapid influenza A, B swab    Non-intractable vomiting with nausea, unspecified vomiting  type  Currently stable  1. Cont. Sched, prn reglan  2. Liquids this am, advance diet as emre  3. Encourage fluids  4. Monitor for diarrhea    Generalized muscle weakness  cindi r/t#1  1. Monitor for changes in gait  2. Monitor for ongoing shakiness  3. Uses walker for above s/s        Electronically signed by  ZAIN Marcos CNP

## 2018-12-20 ENCOUNTER — ASSISTED LIVING VISIT (OUTPATIENT)
Dept: GERIATRICS | Facility: CLINIC | Age: 83
End: 2018-12-20
Payer: COMMERCIAL

## 2018-12-20 VITALS
SYSTOLIC BLOOD PRESSURE: 129 MMHG | OXYGEN SATURATION: 95 % | DIASTOLIC BLOOD PRESSURE: 72 MMHG | HEART RATE: 77 BPM | RESPIRATION RATE: 18 BRPM

## 2018-12-20 DIAGNOSIS — I10 ESSENTIAL HYPERTENSION: ICD-10-CM

## 2018-12-20 DIAGNOSIS — J98.8 RESPIRATORY TRACT INFECTION: Primary | ICD-10-CM

## 2018-12-20 DIAGNOSIS — R11.0 NAUSEA: ICD-10-CM

## 2018-12-20 NOTE — LETTER
12/20/2018        RE: Jade Saeed  [address] [facility] [address]        [facility]    Chief Complaint   Patient presents with     Cough       [facility] Medical Record Number:  [id number]  Place of Service where encounter took place:  [facility] [[id number]]    HPI:    Jade Caba is a 85 year old  (8/26/1933), who is being seen today for an episodic care visit.  HPI information obtained from: facility chart records, facility staff, patient report and [facility] chart review.Today's concern is: respiratory tract infection, HTN, nausea. 12/18/18 have fever, cough, shakiness. CXR done showed L mid lung infiltrate. Started on zpak.  That same am had nausea with emesis. Cont. On reglan. Able to advance diet throughout day without further nausea, emesis.  Had somewhat elevated SBP.  Cont. On norvasc, coreg, cozaar. BP more baseline today. Po intake stable.       ALLERGIES: No known allergies  Past Medical, Surgical, Family and Social History reviewed and updated in [facility].    Current Outpatient Medications   Medication Sig Dispense Refill     Acetaminophen (TYLENOL PO) Take 1,000 mg by mouth 2 times daily       acetaminophen (TYLENOL) 325 MG tablet Take 650 mg by mouth 2 times daily as needed for mild pain       alendronate (FOSAMAX) 70 MG tablet Take 70 mg by mouth every 7 days On Wednesdays       amLODIPine (NORVASC) 10 MG tablet Take 1 tablet (10 mg) by mouth daily 30 tablet 1     ASPIRIN EC PO Take 81 mg by mouth daily       bisacodyl (DULCOLAX) 10 MG suppository Place 10 mg rectally daily as needed       calcium carbonate (OS-FRANCISCO) 500 MG TABS Take 2 tablets by mouth 2 times daily (with meals)       carvedilol (COREG) 12.5 MG tablet Take 1 tablet (12.5 mg) by mouth 2 times daily (with meals) 60 tablet 1     cholecalciferol (VITAMIN D3) 5000 UNITS CAPS capsule Take 5,000 Units by mouth daily       citalopram (CELEXA) 10 MG tablet Take 30 mg by  mouth daily       clopidogrel (PLAVIX) 75 MG tablet Take 1 tablet (75 mg) by mouth daily 30 tablet 11     folic acid (FOLVITE) 1 MG tablet Take 1 mg by mouth daily       furosemide (LASIX) 20 MG tablet Take 20 mg by mouth daily       hypromellose (ARTIFICIAL TEARS) 0.5 % SOLN ophthalmic solution Place 3 drops into both eyes 3 times daily       levETIRAcetam (KEPPRA) 500 MG tablet Take 500 mg by mouth 2 times daily       losartan (COZAAR) 100 MG tablet Take 100 mg by mouth daily       metoclopramide (REGLAN) 5 MG tablet Take 5 mg by mouth 2 times daily And daily PRN at noon       Mirtazapine (REMERON PO) Take 15 mg by mouth 2 times daily       Multiple Vitamins-Minerals (CERTAVITE SENIOR/ANTIOXIDANT PO) Take by mouth daily       omeprazole (PRILOSEC) 40 MG capsule Take 1 capsule (40 mg) by mouth daily Take 30-60 minutes before a meal. 30 capsule 9     oxybutynin (DITROPAN) 5 MG tablet Take 5 mg by mouth 2 times daily       polyethylene glycol (MIRALAX/GLYCOLAX) powder Take 17 g by mouth nightly as needed        rosuvastatin (CRESTOR) 40 MG tablet Take 1 tablet (40 mg) by mouth daily 90 tablet 0     senna-docusate (SENNA S) 8.6-50 MG per tablet Take 1 tablet by mouth 2 times daily Please hold if having loose stools and contact nurse.       Medications reviewed:  Medications reconciled to facility chart and changes were made to reflect current medications as identified as above med list. Below are the changes that were made:   Medications stopped since last EPIC medication reconciliation:   There are no discontinued medications.    Medications started since last [facility] medication reconciliation:  No orders of the defined types were placed in this encounter.        REVIEW OF SYSTEMS:  No chest pain, shortness of breath, fevers, chills, headache, nausea, vomiting, dysuria or bowel abnormalities.  Appetite is  normal.  No pain except occ knees.    Physical Exam:  /72   Pulse 77   Resp 18   SpO2 95%   GENERAL  APPEARANCE:  Alert, in no distress, cooperative  ENT:  Mouth and posterior oropharynx normal, moist mucous membranes, [place], no rhinitis  EYES:  EOM, conjunctivae, lids, pupils and irises normal, PERRL  NECK:  No adenopathy,masses or thyromegaly, no carotid bruit  RESP:  respiratory effort and palpation of chest normal, lungs clear to auscultation , no respiratory distress  CV:  Palpation and auscultation of heart done , regular rate and rhythm, no murmur, rub, or gallop, no edema  ABDOMEN:  normal bowel sounds, soft, nontender, no hepatosplenomegaly or other masses, no guarding or rebound  M/S:   Gait and station normal  muscle strength 5/5 all 4 ext. normal tone  NEURO:   Cranial nerves 2-12 are normal tested and grossly at patient's baseline, alert, speech clear, no tremor  PSYCH:  insight and judgement impaired, memory impaired , affect and mood normal    Recent Labs:     CBC RESULTS:   Recent Labs   Lab Test 10/09/18 07/27/18 03/12/18  0850   WBC  --  8.5 17.6*   RBC  --  4.46 4.28   HGB 13.4 14.1 13.6   HCT  --  42.7 40.7   MCV  --  96 95   MCH  --  31.6 31.8   MCHC  --  33.0 33.4   RDW  --  12.1 12.5   PLT  --  198 227       Last Basic Metabolic Panel:  Recent Labs   Lab Test 10/09/18 07/27/18    140   POTASSIUM 4.0 4.6   CHLORIDE 105 103   FRANCISCO 9.3 9.7   CO2 28 28   BUN 14 19   CR 0.96 0.98    102       Liver Function Studies -   Recent Labs   Lab Test 10/09/18 07/18/18  0955  03/12/18  0850 01/31/18  1050   PROTTOTAL  --   --   --  7.2 6.9   ALBUMIN  --   --   --  3.4 3.1*   BILITOTAL  --   --   --  0.7 0.8   ALKPHOS  --   --   --  54 55   AST 23  --   --  14 34   ALT 11 15   < > 17 21    < > = values in this interval not displayed.       Assessment/Plan:  Respiratory tract infection  Improved, afebrile, O2 sats stable  1. Complete zpak  2. Monitor for fever  3. For ongoing, worsening cough, start mucinex  4. Monitor for BELCHER, decreased act. level    Essential hypertension  Currently stable, recent  elevated BP, likely r/t#1  1. Cont. Norvasc, coreg, cozaar  2. Follow BPs, HRs  3. Cardiology appt next month  4. Bmp in next 1-3 mos    Nausea  Resolved  1. Cont. Sched. Prn reglan  2. Cont. Diet as emre  3. Monitor for further s/s        Electronically signed by  ZAIN Marcos CNP                      Sincerely,        ZAIN Marcos CNP

## 2018-12-20 NOTE — PROGRESS NOTES
[facility]    Chief Complaint   Patient presents with     Cough       [facility] Record Number:  [id number]  Place of Service where encounter took place:  [facility] [[id number]]    HPI:    Jade Caba is a 85 year old  (8/26/1933), who is being seen today for an episodic care visit.  HPI information obtained from: facility chart records, facility staff, patient report and [facility] chart review.Today's concern is: respiratory tract infection, HTN, nausea. 12/18/18 have fever, cough, shakiness. CXR done showed L mid lung infiltrate. Started on zpak.  That same am had nausea with emesis. Cont. On reglan. Able to advance diet throughout day without further nausea, emesis.  Had somewhat elevated SBP.  Cont. On norvasc, coreg, cozaar. BP more baseline today. Po intake stable.       ALLERGIES: No known allergies  Past Medical, Surgical, Family and Social History reviewed and updated in [facility].    Current Outpatient Medications   Medication Sig Dispense Refill     Acetaminophen (TYLENOL PO) Take 1,000 mg by mouth 2 times daily       acetaminophen (TYLENOL) 325 MG tablet Take 650 mg by mouth 2 times daily as needed for mild pain       alendronate (FOSAMAX) 70 MG tablet Take 70 mg by mouth every 7 days On Wednesdays       amLODIPine (NORVASC) 10 MG tablet Take 1 tablet (10 mg) by mouth daily 30 tablet 1     ASPIRIN EC PO Take 81 mg by mouth daily       bisacodyl (DULCOLAX) 10 MG suppository Place 10 mg rectally daily as needed       calcium carbonate (OS-FRANCISCO) 500 MG TABS Take 2 tablets by mouth 2 times daily (with meals)       carvedilol (COREG) 12.5 MG tablet Take 1 tablet (12.5 mg) by mouth 2 times daily (with meals) 60 tablet 1     cholecalciferol (VITAMIN D3) 5000 UNITS CAPS capsule Take 5,000 Units by mouth daily       citalopram (CELEXA) 10 MG tablet Take 30 mg by mouth daily       clopidogrel (PLAVIX) 75 MG tablet Take 1 tablet (75 mg) by mouth daily 30  tablet 11     folic acid (FOLVITE) 1 MG tablet Take 1 mg by mouth daily       furosemide (LASIX) 20 MG tablet Take 20 mg by mouth daily       hypromellose (ARTIFICIAL TEARS) 0.5 % SOLN ophthalmic solution Place 3 drops into both eyes 3 times daily       levETIRAcetam (KEPPRA) 500 MG tablet Take 500 mg by mouth 2 times daily       losartan (COZAAR) 100 MG tablet Take 100 mg by mouth daily       metoclopramide (REGLAN) 5 MG tablet Take 5 mg by mouth 2 times daily And daily PRN at noon       Mirtazapine (REMERON PO) Take 15 mg by mouth 2 times daily       Multiple Vitamins-Minerals (CERTAVITE SENIOR/ANTIOXIDANT PO) Take by mouth daily       omeprazole (PRILOSEC) 40 MG capsule Take 1 capsule (40 mg) by mouth daily Take 30-60 minutes before a meal. 30 capsule 9     oxybutynin (DITROPAN) 5 MG tablet Take 5 mg by mouth 2 times daily       polyethylene glycol (MIRALAX/GLYCOLAX) powder Take 17 g by mouth nightly as needed        rosuvastatin (CRESTOR) 40 MG tablet Take 1 tablet (40 mg) by mouth daily 90 tablet 0     senna-docusate (SENNA S) 8.6-50 MG per tablet Take 1 tablet by mouth 2 times daily Please hold if having loose stools and contact nurse.       Medications reviewed:  Medications reconciled to facility chart and changes were made to reflect current medications as identified as above med list. Below are the changes that were made:   Medications stopped since last EPIC medication reconciliation:   There are no discontinued medications.    Medications started since last [facility] medication reconciliation:  No orders of the defined types were placed in this encounter.        REVIEW OF SYSTEMS:  No chest pain, shortness of breath, fevers, chills, headache, nausea, vomiting, dysuria or bowel abnormalities.  Appetite is  normal.  No pain except occ knees.    Physical Exam:  /72   Pulse 77   Resp 18   SpO2 95%   GENERAL APPEARANCE:  Alert, in no distress, cooperative  ENT:  Mouth and posterior oropharynx normal,  moist mucous membranes, [place], no rhinitis  EYES:  EOM, conjunctivae, lids, pupils and irises normal, PERRL  NECK:  No adenopathy,masses or thyromegaly, no carotid bruit  RESP:  respiratory effort and palpation of chest normal, lungs clear to auscultation , no respiratory distress  CV:  Palpation and auscultation of heart done , regular rate and rhythm, no murmur, rub, or gallop, no edema  ABDOMEN:  normal bowel sounds, soft, nontender, no hepatosplenomegaly or other masses, no guarding or rebound  M/S:   Gait and station normal  muscle strength 5/5 all 4 ext. normal tone  NEURO:   Cranial nerves 2-12 are normal tested and grossly at patient's baseline, alert, speech clear, no tremor  PSYCH:  insight and judgement impaired, memory impaired , affect and mood normal    Recent Labs:     CBC RESULTS:   Recent Labs   Lab Test 10/09/18 07/27/18 03/12/18  0850   WBC  --  8.5 17.6*   RBC  --  4.46 4.28   HGB 13.4 14.1 13.6   HCT  --  42.7 40.7   MCV  --  96 95   MCH  --  31.6 31.8   MCHC  --  33.0 33.4   RDW  --  12.1 12.5   PLT  --  198 227       Last Basic Metabolic Panel:  Recent Labs   Lab Test 10/09/18 07/27/18    140   POTASSIUM 4.0 4.6   CHLORIDE 105 103   FRANCISCO 9.3 9.7   CO2 28 28   BUN 14 19   CR 0.96 0.98    102       Liver Function Studies -   Recent Labs   Lab Test 10/09/18 07/18/18  0955  03/12/18  0850 01/31/18  1050   PROTTOTAL  --   --   --  7.2 6.9   ALBUMIN  --   --   --  3.4 3.1*   BILITOTAL  --   --   --  0.7 0.8   ALKPHOS  --   --   --  54 55   AST 23  --   --  14 34   ALT 11 15   < > 17 21    < > = values in this interval not displayed.       Assessment/Plan:  Respiratory tract infection  Improved, afebrile, O2 sats stable  1. Complete zpak  2. Monitor for fever  3. For ongoing, worsening cough, start mucinex  4. Monitor for BELCHER, decreased act. level    Essential hypertension  Currently stable, recent elevated BP, likely r/t#1  1. Cont. Norvasc, coreg, cozaar  2. Follow BPs, HRs  3. Cardiology  appt next month  4. Bmp in next 1-3 mos    Nausea  Resolved  1. Cont. Sched. Prn reglan  2. Cont. Diet as emre  3. Monitor for further s/s        Electronically signed by  ZAIN Marcos CNP

## 2019-01-17 ENCOUNTER — ASSISTED LIVING VISIT (OUTPATIENT)
Dept: GERIATRICS | Facility: CLINIC | Age: 84
End: 2019-01-17
Payer: COMMERCIAL

## 2019-01-17 VITALS
RESPIRATION RATE: 18 BRPM | TEMPERATURE: 99.9 F | DIASTOLIC BLOOD PRESSURE: 66 MMHG | HEART RATE: 81 BPM | OXYGEN SATURATION: 92 % | SYSTOLIC BLOOD PRESSURE: 126 MMHG

## 2019-01-17 DIAGNOSIS — I10 ESSENTIAL HYPERTENSION: ICD-10-CM

## 2019-01-17 DIAGNOSIS — J98.8 RESPIRATORY TRACT INFECTION: Primary | ICD-10-CM

## 2019-01-17 DIAGNOSIS — M17.0 OSTEOARTHRITIS OF BOTH KNEES, UNSPECIFIED OSTEOARTHRITIS TYPE: ICD-10-CM

## 2019-01-17 NOTE — PROGRESS NOTES
[place]    Chief Complaint   Patient presents with     Fever       [facility] Medical Record Number:  [id number]  Place of Service where encounter took place:  [facility] [[id number]]    HPI:    Jade Caba is a 85 year old  (8/26/1933), who is being seen today for an episodic care visit.  HPI information obtained from: facility chart records, facility staff, patient report and [facility] chart review.Today's concern is: respiratory tract infection, knee pain, HTN. Has had low grade temp since 1/14/19. Ongoing cough. Started on mucinex which has helped. CXR repeated 1/16/19 showed L mid lung infiltrate-similar to CXR done last month. O2 sats stable. Amb. Per usual. Reports L knee pain s/p s/p cortisone inj. Also taking tylenol. For HTN cont. On.norvasc, coreg, lasix. BP stable. Today. Has recent h/o sl elevated BPs. No c/o dizziness.       ALLERGIES: No known allergies  Past Medical, Surgical, Family and Social History reviewed and updated in [facility].    Current Outpatient Medications   Medication Sig Dispense Refill     Acetaminophen (TYLENOL PO) Take 1,000 mg by mouth 2 times daily       acetaminophen (TYLENOL) 325 MG tablet Take 650 mg by mouth 2 times daily as needed for mild pain       alendronate (FOSAMAX) 70 MG tablet Take 70 mg by mouth every 7 days On Wednesdays       amLODIPine (NORVASC) 10 MG tablet Take 1 tablet (10 mg) by mouth daily 30 tablet 1     ASPIRIN EC PO Take 81 mg by mouth daily       bisacodyl (DULCOLAX) 10 MG suppository Place 10 mg rectally daily as needed       calcium carbonate (OS-FRANCISCO) 500 MG TABS Take 2 tablets by mouth 2 times daily (with meals)       carvedilol (COREG) 12.5 MG tablet Take 1 tablet (12.5 mg) by mouth 2 times daily (with meals) 60 tablet 1     cholecalciferol (VITAMIN D3) 5000 UNITS CAPS capsule Take 5,000 Units by mouth daily       citalopram (CELEXA) 10 MG tablet Take 30 mg by mouth daily       clopidogrel (PLAVIX)  75 MG tablet Take 1 tablet (75 mg) by mouth daily 30 tablet 11     folic acid (FOLVITE) 1 MG tablet Take 1 mg by mouth daily       furosemide (LASIX) 20 MG tablet Take 20 mg by mouth daily       hypromellose (ARTIFICIAL TEARS) 0.5 % SOLN ophthalmic solution Place 3 drops into both eyes 3 times daily       levETIRAcetam (KEPPRA) 500 MG tablet Take 500 mg by mouth 2 times daily       losartan (COZAAR) 100 MG tablet Take 100 mg by mouth daily       metoclopramide (REGLAN) 5 MG tablet Take 5 mg by mouth 2 times daily And daily PRN at noon       Mirtazapine (REMERON PO) Take 15 mg by mouth 2 times daily       Multiple Vitamins-Minerals (CERTAVITE SENIOR/ANTIOXIDANT PO) Take by mouth daily       omeprazole (PRILOSEC) 40 MG capsule Take 1 capsule (40 mg) by mouth daily Take 30-60 minutes before a meal. 30 capsule 9     oxybutynin (DITROPAN) 5 MG tablet Take 5 mg by mouth 2 times daily       polyethylene glycol (MIRALAX/GLYCOLAX) powder Take 17 g by mouth nightly as needed        rosuvastatin (CRESTOR) 40 MG tablet Take 1 tablet (40 mg) by mouth daily 90 tablet 0     senna-docusate (SENNA S) 8.6-50 MG per tablet Take 1 tablet by mouth 2 times daily Please hold if having loose stools and contact nurse.       Medications reviewed:  Medications reconciled to facility chart and changes were made to reflect current medications as identified as above med list. Below are the changes that were made:   Medications stopped since last EPIC medication reconciliation:   There are no discontinued medications.    Medications started since last [facility] medication reconciliation:  No orders of the defined types were placed in this encounter.        REVIEW OF SYSTEMS:  CONSTITUTIONAL:  fevers and forgetfulness, EYES:  glasses or contacts, ENT:  [place], CV:  neg, RESPIRATORY: cough, :  neg, GI:  neg, NEURO:  neg, PSYCH: neg and MUSCULOSKELETAL: joint pain    Physical Exam:  /66   Pulse 81   Temp 99.9  F (37.7  C)   Resp 18   SpO2 92%    GENERAL APPEARANCE:  Alert, in no distress, cooperative  ENT:  Mouth and posterior oropharynx normal, moist mucous membranes, [place], no rhinitis  EYES:  EOM, conjunctivae, lids, pupils and irises normal, PERRL  NECK:  No adenopathy,masses or thyromegaly, FROM  RESP:  respiratory effort and palpation of chest normal, lungs clear to auscultation , no respiratory distress  CV:  Palpation and auscultation of heart done , regular rate and rhythm, no murmur, rub, or gallop, no edema  ABDOMEN:  normal bowel sounds, soft, nontender, no hepatosplenomegaly or other masses, no guarding or rebound  M/S:   Gait and station normal  muscle strength 5/5 all 4 ext, normal tone  NEURO:   alert, speech clear. CNs gross intact  PSYCH:  insight and judgement impaired, memory impaired , affect and mood normal    Recent Labs:     CBC RESULTS:   Recent Labs   Lab Test 10/09/18 07/27/18 03/12/18  0850   WBC  --  8.5 17.6*   RBC  --  4.46 4.28   HGB 13.4 14.1 13.6   HCT  --  42.7 40.7   MCV  --  96 95   MCH  --  31.6 31.8   MCHC  --  33.0 33.4   RDW  --  12.1 12.5   PLT  --  198 227       Last Basic Metabolic Panel:  Recent Labs   Lab Test 10/09/18 07/27/18    140   POTASSIUM 4.0 4.6   CHLORIDE 105 103   FRANCISCO 9.3 9.7   CO2 28 28   BUN 14 19   CR 0.96 0.98    102       Liver Function Studies -   Recent Labs   Lab Test 10/09/18 07/18/18  0955  03/12/18  0850 01/31/18  1050   PROTTOTAL  --   --   --  7.2 6.9   ALBUMIN  --   --   --  3.4 3.1*   BILITOTAL  --   --   --  0.7 0.8   ALKPHOS  --   --   --  54 55   AST 23  --   --  14 34   ALT 11 15   < > 17 21    < > = values in this interval not displayed.       Assessment/Plan:  Respiratory tract infection  Fever-low grade, past 3 days. Ongoing prod. Cough. CXR shows ongoing infiltrate  1. Repeat zpak  2. Extend mucinex 5 days  3. Cont. To follow temps, O2 sats  4. Reassess over next week    Osteoarthritis of both knees, unspecified osteoarthritis type  Pain gen. Stable  1. Cont.  Tylenol  2. Will sched. F/u knee inj next month with [facility]  3. Monitor for changes in gait, falls    Essential hypertension  Currently stable  1. Cont. Coreg, norvasc, lasix, cozaar  2. Follow BPs, HRs  3. Cardiology appt later this month  4. Bmp in next 1-3 mos        Electronically signed by  ZAIN Marcos CNP

## 2019-01-17 NOTE — LETTER
1/17/2019        RE: Jade Saeed  [address] [facility] [address]        [place] [facility]    Chief Complaint   Patient presents with     Fever       [place] Medical Record Number:  [id number]  Place of Service where encounter took place:  [facility] [[id number]]    HPI:    Jade Caba is a 85 year old  (8/26/1933), who is being seen today for an episodic care visit.  HPI information obtained from: facility chart records, facility staff, patient report and [facility] chart review.Today's concern is: respiratory tract infection, knee pain, HTN. Has had low grade temp since 1/14/19. Ongoing cough. Started on mucinex which has helped. CXR repeated 1/16/19 showed L mid lung infiltrate-similar to CXR done last month. O2 sats stable. Amb. Per usual. Reports L knee pain s/p s/p cortisone inj. Also taking tylenol. For HTN cont. On.norvasc, coreg, lasix. BP stable. Today. Has recent h/o sl elevated BPs. No c/o dizziness.       ALLERGIES: No known allergies  Past Medical, Surgical, Family and Social History reviewed and updated in [facility].    Current Outpatient Medications   Medication Sig Dispense Refill     Acetaminophen (TYLENOL PO) Take 1,000 mg by mouth 2 times daily       acetaminophen (TYLENOL) 325 MG tablet Take 650 mg by mouth 2 times daily as needed for mild pain       alendronate (FOSAMAX) 70 MG tablet Take 70 mg by mouth every 7 days On Wednesdays       amLODIPine (NORVASC) 10 MG tablet Take 1 tablet (10 mg) by mouth daily 30 tablet 1     ASPIRIN EC PO Take 81 mg by mouth daily       bisacodyl (DULCOLAX) 10 MG suppository Place 10 mg rectally daily as needed       calcium carbonate (OS-FRANCISCO) 500 MG TABS Take 2 tablets by mouth 2 times daily (with meals)       carvedilol (COREG) 12.5 MG tablet Take 1 tablet (12.5 mg) by mouth 2 times daily (with meals) 60 tablet 1     cholecalciferol (VITAMIN D3) 5000 UNITS CAPS capsule Take 5,000 Units by mouth  daily       citalopram (CELEXA) 10 MG tablet Take 30 mg by mouth daily       clopidogrel (PLAVIX) 75 MG tablet Take 1 tablet (75 mg) by mouth daily 30 tablet 11     folic acid (FOLVITE) 1 MG tablet Take 1 mg by mouth daily       furosemide (LASIX) 20 MG tablet Take 20 mg by mouth daily       hypromellose (ARTIFICIAL TEARS) 0.5 % SOLN ophthalmic solution Place 3 drops into both eyes 3 times daily       levETIRAcetam (KEPPRA) 500 MG tablet Take 500 mg by mouth 2 times daily       losartan (COZAAR) 100 MG tablet Take 100 mg by mouth daily       metoclopramide (REGLAN) 5 MG tablet Take 5 mg by mouth 2 times daily And daily PRN at noon       Mirtazapine (REMERON PO) Take 15 mg by mouth 2 times daily       Multiple Vitamins-Minerals (CERTAVITE SENIOR/ANTIOXIDANT PO) Take by mouth daily       omeprazole (PRILOSEC) 40 MG capsule Take 1 capsule (40 mg) by mouth daily Take 30-60 minutes before a meal. 30 capsule 9     oxybutynin (DITROPAN) 5 MG tablet Take 5 mg by mouth 2 times daily       polyethylene glycol (MIRALAX/GLYCOLAX) powder Take 17 g by mouth nightly as needed        rosuvastatin (CRESTOR) 40 MG tablet Take 1 tablet (40 mg) by mouth daily 90 tablet 0     senna-docusate (SENNA S) 8.6-50 MG per tablet Take 1 tablet by mouth 2 times daily Please hold if having loose stools and contact nurse.       Medications reviewed:  Medications reconciled to facility chart and changes were made to reflect current medications as identified as above med list. Below are the changes that were made:   Medications stopped since last EPIC medication reconciliation:   There are no discontinued medications.    Medications started since last [facility] medication reconciliation:  No orders of the defined types were placed in this encounter.        REVIEW OF SYSTEMS:  CONSTITUTIONAL:  fevers and forgetfulness, EYES:  glasses or contacts, ENT:  [place], CV:  neg, RESPIRATORY: cough, :  neg, GI:  neg, NEURO:  neg, PSYCH: neg and MUSCULOSKELETAL: joint  pain    Physical Exam:  /66   Pulse 81   Temp 99.9  F (37.7  C)   Resp 18   SpO2 92%   GENERAL APPEARANCE:  Alert, in no distress, cooperative  ENT:  Mouth and posterior oropharynx normal, moist mucous membranes, Habematolel, no rhinitis  EYES:  EOM, conjunctivae, lids, pupils and irises normal, PERRL  NECK:  No adenopathy,masses or thyromegaly, FROM  RESP:  respiratory effort and palpation of chest normal, lungs clear to auscultation , no respiratory distress  CV:  Palpation and auscultation of heart done , regular rate and rhythm, no murmur, rub, or gallop, no edema  ABDOMEN:  normal bowel sounds, soft, nontender, no hepatosplenomegaly or other masses, no guarding or rebound  M/S:   Gait and station normal  muscle strength 5/5 all 4 ext, normal tone  NEURO:   alert, speech clear. CNs gross intact  PSYCH:  insight and judgement impaired, memory impaired , affect and mood normal    Recent Labs:     CBC RESULTS:   Recent Labs   Lab Test 10/09/18 07/27/18 03/12/18  0850   WBC  --  8.5 17.6*   RBC  --  4.46 4.28   HGB 13.4 14.1 13.6   HCT  --  42.7 40.7   MCV  --  96 95   MCH  --  31.6 31.8   MCHC  --  33.0 33.4   RDW  --  12.1 12.5   PLT  --  198 227       Last Basic Metabolic Panel:  Recent Labs   Lab Test 10/09/18 07/27/18    140   POTASSIUM 4.0 4.6   CHLORIDE 105 103   FRANCISCO 9.3 9.7   CO2 28 28   BUN 14 19   CR 0.96 0.98    102       Liver Function Studies -   Recent Labs   Lab Test 10/09/18 07/18/18  0955  03/12/18  0850 01/31/18  1050   PROTTOTAL  --   --   --  7.2 6.9   ALBUMIN  --   --   --  3.4 3.1*   BILITOTAL  --   --   --  0.7 0.8   ALKPHOS  --   --   --  54 55   AST 23  --   --  14 34   ALT 11 15   < > 17 21    < > = values in this interval not displayed.       Assessment/Plan:  Respiratory tract infection  Fever-low grade, past 3 days. Ongoing prod. Cough. CXR shows ongoing infiltrate  1. Repeat zpak  2. Extend mucinex 5 days  3. Cont. To follow temps, O2 sats  4. Reassess over next  week    Osteoarthritis of both knees, unspecified osteoarthritis type  Pain gen. Stable  1. Cont. Tylenol  2. Will sched. F/u knee inj next month with [facility]  3. Monitor for changes in gait, falls    Essential hypertension  Currently stable  1. Cont. Coreg, norvasc, lasix, cozaar  2. Follow BPs, HRs  3. Cardiology appt later this month  4. Bmp in next 1-3 mos        Electronically signed by  ZAIN Marcos CNP                      Sincerely,        ZAIN Marcos CNP

## 2019-02-07 ENCOUNTER — PATIENT OUTREACH (OUTPATIENT)
Dept: GERIATRIC MEDICINE | Facility: CLINIC | Age: 84
End: 2019-02-07

## 2019-02-07 NOTE — PROGRESS NOTES
[facility] Care Coordination Contact    Call placed to Jade, scheduled annual assessment 2/28/19 @ 1 PM  Holly Guajardo RN, BC  Manager [facility] Care Coordinator   [phone]  (Fax)

## 2019-02-12 ENCOUNTER — OFFICE VISIT (OUTPATIENT)
Dept: CARDIOLOGY | Facility: CLINIC | Age: 84
End: 2019-02-12
Payer: COMMERCIAL

## 2019-02-12 VITALS
DIASTOLIC BLOOD PRESSURE: 58 MMHG | OXYGEN SATURATION: 93 % | HEIGHT: 61 IN | SYSTOLIC BLOOD PRESSURE: 100 MMHG | BODY MASS INDEX: 33.99 KG/M2 | WEIGHT: 180 LBS | HEART RATE: 60 BPM

## 2019-02-12 DIAGNOSIS — I25.10 CAD IN NATIVE ARTERY: ICD-10-CM

## 2019-02-12 DIAGNOSIS — E78.5 HYPERLIPIDEMIA, UNSPECIFIED HYPERLIPIDEMIA TYPE: Primary | ICD-10-CM

## 2019-02-12 LAB
ALT SERPL W P-5'-P-CCNC: 18 U/L (ref 0–50)
CHOLEST SERPL-MCNC: 162 MG/DL
HDLC SERPL-MCNC: 39 MG/DL
LDLC SERPL CALC-MCNC: 94 MG/DL
NONHDLC SERPL-MCNC: 123 MG/DL
TRIGL SERPL-MCNC: 143 MG/DL

## 2019-02-12 PROCEDURE — 84460 ALANINE AMINO (ALT) (SGPT): CPT | Performed by: PHYSICIAN ASSISTANT

## 2019-02-12 PROCEDURE — 36415 COLL VENOUS BLD VENIPUNCTURE: CPT | Performed by: PHYSICIAN ASSISTANT

## 2019-02-12 PROCEDURE — 99214 OFFICE O/P EST MOD 30 MIN: CPT | Performed by: INTERNAL MEDICINE

## 2019-02-12 PROCEDURE — 80061 LIPID PANEL: CPT | Performed by: PHYSICIAN ASSISTANT

## 2019-02-12 RX ORDER — MELOXICAM 15 MG/1
15 TABLET ORAL DAILY
COMMUNITY
End: 2019-03-20

## 2019-02-12 RX ORDER — ATORVASTATIN CALCIUM 80 MG/1
80 TABLET, FILM COATED ORAL DAILY
COMMUNITY
End: 2019-03-20

## 2019-02-12 RX ORDER — CALCIUM CARBONATE 500(1250)
2 TABLET ORAL EVERY EVENING
COMMUNITY
End: 2021-09-29

## 2019-02-12 ASSESSMENT — MIFFLIN-ST. JEOR: SCORE: 1198.85

## 2019-02-12 NOTE — PROGRESS NOTES
Service Date: 02/12/2019      CARDIOLOGY NOTE      HISTORY OF PRESENT ILLNESS:  Ms. Caba is a pleasant 85-year-old female with a history of coronary artery disease with initial angioplasty of the circumflex in 1997, subsequent drug-eluting stent implantation to the LAD in 2004, drug-eluting stent implantation to the circumflex and RCA in 2011, recurrent PCI with a drug-eluting stent to the LAD towards the end of 2011 and more recently a non-STEMI in 02/2018 at which time she underwent PCI to a thrombotic lesion of the RCA.  She was also found to have significant in-stent restenosis to the LAD and underwent repeat PCI with a drug-eluting stent to the LAD.  She also has a history of CVA and a right carotid endarterectomy in 2003, left carotid endarterectomy in 2007 and known renal artery stenosis.  The patient returns in annual followup from her PCI.      The patient feels well from a cardiovascular standpoint.  She denies any chest pain, chest pressure, shortness of breath at rest and she does have stable dyspnea with maximal exertion.  This has not changed for years.      Dionna Parnell has been working with her regarding her lipids.  Her atorvastatin has been taken to 80 mg daily and her most recent LDL from today comes back at 94 which is a significant improvement over October when it was 120.      The patient's blood pressure is low in the office today at 100/58.  She states that she takes her blood pressure at home and typically runs in the 120s.  She denies any lightheadedness or presyncope.      The patient does have a history of a mechanical fall resulting in a subarachnoid hemorrhage.  She has not had any falls since we have seen her last.      IMPRESSION AND PLAN:     1.  Coronary artery disease.  Ms. Caba has extensive coronary artery disease.  On her own, her clopidogrel was recently stopped.  She is out 1 year from her PCI and therefore I believe that it is reasonable for her to stay on aspirin as  her lone antiplatelet.  I have cut the dose from 325 mg daily to 81 mg daily given her advanced age and prior subarachnoid hemorrhage.  She will remain on atorvastatin 80 mg daily unchanged for secondary prevention.  While the patient would benefit from being started on Zetia to lower her LDL to less than 70, given the patient's extensive polypharmacy and age, I would recommend treating with atorvastatin 80 mg daily alone.   2.  Essential hypertension.  The patient's blood pressure is slightly low at today's visit.  She is not symptomatic regarding this and typically has blood pressures in the 120s at home.  We will continue her current antihypertensive regimen unchanged.  As she is on losartan and diuretic, I have asked her to have a BMP in 6 months and follow up with Dionna Parnell thereafter.   3.  Dyslipidemia.  The patient will remain on atorvastatin 80 mg daily unchanged for secondary prevention.  Again, she would benefit from being placed on Zetia but given her age and polypharmacy, I am reluctant to add an additional medicine at this time.   4.  History of peripheral vascular disease, as detailed above.  Both her carotid stenosis and renal artery stenosis appear stable.      Overall, Ms. Caba is clinically stable.  She will return to see us in 6 months with a BMP to be performed at that time.         LEWIS JOHNSON MD             D: 2019   T: 2019   MT: STEWART      Name:     LAURO CABA   MRN:      [id number]        Account:      [id number]   :      1933           Service Date: 2019      Document: [id number]

## 2019-02-12 NOTE — LETTER
2/12/2019      [facility]  [address]  [facility] [address]      RE: Jade Caba       Dear Colleague,    I had the pleasure of seeing Jade Caba in the [facility].    Service Date: 02/12/2019      CARDIOLOGY NOTE      HISTORY OF PRESENT ILLNESS:  Ms. Caba is a pleasant 85-year-old female with a history of coronary artery disease with initial angioplasty of the circumflex in 1997, subsequent drug-eluting stent implantation to the LAD in 2004, drug-eluting stent implantation to the circumflex and RCA in 2011, recurrent PCI with a drug-eluting stent to the LAD towards the end of 2011 and more recently a non-STEMI in 02/2018 at which time she underwent PCI to a thrombotic lesion of the RCA.  She was also found to have significant in-stent restenosis to the LAD and underwent repeat PCI with a drug-eluting stent to the LAD.  She also has a history of CVA and a right carotid endarterectomy in 2003, left carotid endarterectomy in 2007 and known renal artery stenosis.  The patient returns in annual followup from her PCI.      The patient feels well from a cardiovascular standpoint.  She denies any chest pain, chest pressure, shortness of breath at rest and she does have stable dyspnea with maximal exertion.  This has not changed for years.      Dionna Parnell has been working with her regarding her lipids.  Her atorvastatin has been taken to 80 mg daily and her most recent LDL from today comes back at 94 which is a significant improvement over October when it was 120.      The patient's blood pressure is low in the office today at 100/58.  She states that she takes her blood pressure at home and typically runs in the 120s.  She denies any lightheadedness or presyncope.      The patient does have a history of a mechanical fall resulting in a subarachnoid hemorrhage.  She has not had any falls since we have seen her last.      IMPRESSION AND PLAN:     1.   Coronary artery disease.  Ms. Caba has extensive coronary artery disease.  On her own, her clopidogrel was recently stopped.  She is out 1 year from her PCI and therefore I believe that it is reasonable for her to stay on aspirin as her lone antiplatelet.  I have cut the dose from 325 mg daily to 81 mg daily given her advanced age and prior subarachnoid hemorrhage.  She will remain on atorvastatin 80 mg daily unchanged for secondary prevention.  While the patient would benefit from being started on Zetia to lower her LDL to less than 70, given the patient's extensive polypharmacy and age, I would recommend treating with atorvastatin 80 mg daily alone.   2.  Essential hypertension.  The patient's blood pressure is slightly low at today's visit.  She is not symptomatic regarding this and typically has blood pressures in the 120s at home.  We will continue her current antihypertensive regimen unchanged.  As she is on losartan and diuretic, I have asked her to have a BMP in 6 months and follow up with Dionna Parnell thereafter.   3.  Dyslipidemia.  The patient will remain on atorvastatin 80 mg daily unchanged for secondary prevention.  Again, she would benefit from being placed on Zetia but given her age and polypharmacy, I am reluctant to add an additional medicine at this time.   4.  History of peripheral vascular disease, as detailed above.  Both her carotid stenosis and renal artery stenosis appear stable.      Overall, Ms. Caba is clinically stable.  She will return to see us in 6 months with a BMP to be performed at that time.         LEWIS JOHNSON MD             D: 2019   T: 2019   MT: STEWART      Name:     LAURO CABA   MRN:      [id number]        Account:      [id number]   :      1933           Service Date: 2019      Document: [id number]         Outpatient Encounter Medications as of 2019   Medication Sig Dispense Refill     amLODIPine (NORVASC) 10 MG tablet Take 1 tablet  (10 mg) by mouth daily 30 tablet 1     aspirin (ASA) 81 MG EC tablet Take 1 tablet (81 mg) by mouth daily 90 tablet 3     atorvastatin (LIPITOR) 80 MG tablet Take 80 mg by mouth daily       calcium carbonate 500 mg, elemental, (OSCAL;OYSTER SHELL CALCIUM) 500 MG tablet Take 100 tablets by mouth 2 times daily       carvedilol (COREG) 12.5 MG tablet Take 1 tablet (12.5 mg) by mouth 2 times daily (with meals) 60 tablet 1     cholecalciferol (VITAMIN D3) 5000 UNITS CAPS capsule Take 5,000 Units by mouth daily       citalopram (CELEXA) 10 MG tablet Take 30 mg by mouth daily       docusate sodium (COLACE) 50 MG capsule Take 50 mg by mouth 2 times daily       folic acid (FOLVITE) 1 MG tablet Take 1 mg by mouth daily       furosemide (LASIX) 20 MG tablet Take 20 mg by mouth daily       levETIRAcetam (KEPPRA) 500 MG tablet Take 500 mg by mouth 2 times daily       losartan (COZAAR) 100 MG tablet Take 100 mg by mouth daily       meloxicam (MOBIC) 15 MG tablet Take 15 mg by mouth daily       metoclopramide (REGLAN) 5 MG tablet Take 5 mg by mouth 2 times daily And daily PRN at noon       Mirtazapine (REMERON PO) Take 15 mg by mouth 2 times daily       Multiple Vitamins-Minerals (CERTAVITE SENIOR/ANTIOXIDANT PO) Take by mouth daily       omeprazole (PRILOSEC) 40 MG capsule Take 1 capsule (40 mg) by mouth daily Take 30-60 minutes before a meal. 30 capsule 9     oxybutynin (DITROPAN) 5 MG tablet Take 5 mg by mouth 2 times daily       Acetaminophen (TYLENOL PO) Take 1,000 mg by mouth 2 times daily       acetaminophen (TYLENOL) 325 MG tablet Take 650 mg by mouth 2 times daily as needed for mild pain       alendronate (FOSAMAX) 70 MG tablet Take 70 mg by mouth every 7 days On Wednesdays       bisacodyl (DULCOLAX) 10 MG suppository Place 10 mg rectally daily as needed       hypromellose (ARTIFICIAL TEARS) 0.5 % SOLN ophthalmic solution Place 3 drops into both eyes 3 times daily       polyethylene glycol (MIRALAX/GLYCOLAX) powder Take  17 g by mouth nightly as needed        senna-docusate (SENNA S) 8.6-50 MG per tablet Take 1 tablet by mouth 2 times daily Please hold if having loose stools and contact nurse.       [DISCONTINUED] ASPIRIN EC PO Take 325 mg by mouth daily        [DISCONTINUED] calcium carbonate (OS-FRANCISOC) 500 MG TABS Take 2 tablets by mouth 2 times daily (with meals)       [DISCONTINUED] clopidogrel (PLAVIX) 75 MG tablet Take 1 tablet (75 mg) by mouth daily (Patient not taking: Reported on 2/12/2019) 30 tablet 11     [DISCONTINUED] rosuvastatin (CRESTOR) 40 MG tablet Take 1 tablet (40 mg) by mouth daily 90 tablet 0     No facility-administered encounter medications on file as of 2/12/2019.        Again, thank you for allowing me to participate in the care of your patient.      Sincerely,    Dean Sorensen MD     [facility]

## 2019-02-12 NOTE — PROGRESS NOTES
HPI and Plan:   See dictation    Orders Placed This Encounter   Procedures     Basic metabolic panel     Follow-Up with Cardiac Advanced Practice Provider       Orders Placed This Encounter   Medications     docusate sodium (COLACE) 50 MG capsule     Sig: Take 50 mg by mouth 2 times daily     meloxicam (MOBIC) 15 MG tablet     Sig: Take 15 mg by mouth daily     calcium carbonate 500 mg, elemental, (OSCAL;OYSTER SHELL CALCIUM) 500 MG tablet     Sig: Take 100 tablets by mouth 2 times daily     atorvastatin (LIPITOR) 80 MG tablet     Sig: Take 80 mg by mouth daily     aspirin (ASA) 81 MG EC tablet     Sig: Take 1 tablet (81 mg) by mouth daily     Dispense:  90 tablet     Refill:  3       Medications Discontinued During This Encounter   Medication Reason     rosuvastatin (CRESTOR) 40 MG tablet Dose adjustment     calcium carbonate (OS-FRANCISCO) 500 MG TABS Dose adjustment     clopidogrel (PLAVIX) 75 MG tablet      ASPIRIN EC PO          Encounter Diagnosis   Name Primary?     CAD in native artery        CURRENT MEDICATIONS:  Current Outpatient Medications   Medication Sig Dispense Refill     amLODIPine (NORVASC) 10 MG tablet Take 1 tablet (10 mg) by mouth daily 30 tablet 1     aspirin (ASA) 81 MG EC tablet Take 1 tablet (81 mg) by mouth daily 90 tablet 3     atorvastatin (LIPITOR) 80 MG tablet Take 80 mg by mouth daily       calcium carbonate 500 mg, elemental, (OSCAL;OYSTER SHELL CALCIUM) 500 MG tablet Take 100 tablets by mouth 2 times daily       carvedilol (COREG) 12.5 MG tablet Take 1 tablet (12.5 mg) by mouth 2 times daily (with meals) 60 tablet 1     cholecalciferol (VITAMIN D3) 5000 UNITS CAPS capsule Take 5,000 Units by mouth daily       citalopram (CELEXA) 10 MG tablet Take 30 mg by mouth daily       docusate sodium (COLACE) 50 MG capsule Take 50 mg by mouth 2 times daily       folic acid (FOLVITE) 1 MG tablet Take 1 mg by mouth daily       furosemide (LASIX) 20 MG tablet Take 20 mg by mouth daily        levETIRAcetam (KEPPRA) 500 MG tablet Take 500 mg by mouth 2 times daily       losartan (COZAAR) 100 MG tablet Take 100 mg by mouth daily       meloxicam (MOBIC) 15 MG tablet Take 15 mg by mouth daily       metoclopramide (REGLAN) 5 MG tablet Take 5 mg by mouth 2 times daily And daily PRN at noon       Mirtazapine (REMERON PO) Take 15 mg by mouth 2 times daily       Multiple Vitamins-Minerals (CERTAVITE SENIOR/ANTIOXIDANT PO) Take by mouth daily       omeprazole (PRILOSEC) 40 MG capsule Take 1 capsule (40 mg) by mouth daily Take 30-60 minutes before a meal. 30 capsule 9     oxybutynin (DITROPAN) 5 MG tablet Take 5 mg by mouth 2 times daily       Acetaminophen (TYLENOL PO) Take 1,000 mg by mouth 2 times daily       acetaminophen (TYLENOL) 325 MG tablet Take 650 mg by mouth 2 times daily as needed for mild pain       alendronate (FOSAMAX) 70 MG tablet Take 70 mg by mouth every 7 days On Wednesdays       bisacodyl (DULCOLAX) 10 MG suppository Place 10 mg rectally daily as needed       hypromellose (ARTIFICIAL TEARS) 0.5 % SOLN ophthalmic solution Place 3 drops into both eyes 3 times daily       polyethylene glycol (MIRALAX/GLYCOLAX) powder Take 17 g by mouth nightly as needed        senna-docusate (SENNA S) 8.6-50 MG per tablet Take 1 tablet by mouth 2 times daily Please hold if having loose stools and contact nurse.         ALLERGIES     Allergies   Allergen Reactions     No Known Allergies        PAST MEDICAL HISTORY:  Past Medical History:   Diagnosis Date     Anxiety      Arthritis      Atrial flutter (H)      C1 cervical fracture (H)      CAD (coronary artery disease)      Carotid artery stenosis      CKD (chronic kidney disease)      Depression      H/O alcohol abuse      Hypercholesterolemia      Hypertension      Renal artery stenosis (H)      Seizure disorder (H)      Subarachnoid hemorrhage (H) 2014       PAST SURGICAL HISTORY:  Past Surgical History:   Procedure Laterality Date     APPENDECTOMY       CAROTID  "ENDARTERECTOMY Left      Cataract surgery Right     will have laser eye surgery     CHOLECYSTECTOMY       Coronary stents x 3       HYSTERECTOMY      at age of 40-not cancerus or precancerous      JOINT REPLACEMENT Bilateral      Stenting of renal artery         FAMILY HISTORY:  Family History   Problem Relation Age of Onset     MODESTO.A.ALISA Mother      GILBERTASANIYA. Brother      GILBERTASANIYA. Sister      MICHAEL. Brother      Cancer Sister         Breast cancer     Heart Disease Son        SOCIAL HISTORY:  Social History     Socioeconomic History     Marital status:      Spouse name: None     Number of children: None     Years of education: None     Highest education level: None   Social Needs     Financial resource strain: None     Food insecurity - worry: None     Food insecurity - inability: None     Transportation needs - medical: None     Transportation needs - non-medical: None   Occupational History     None   Tobacco Use     Smoking status: Never Smoker     Smokeless tobacco: Never Used   Substance and Sexual Activity     Alcohol use: No     Drug use: No     Sexual activity: No   Other Topics Concern     Parent/sibling w/ CABG, MI or angioplasty before 65F 55M? Yes     Comment: brother Mi at 23   Social History Narrative     None       Review of Systems:  Skin:  Negative       Eyes:  Positive for glasses    ENT:  Negative      Respiratory:  Positive for dyspnea on exertion sometimes when walking alot    Cardiovascular:    edema;Positive for once in a while swelling in ankles and feet   Gastroenterology: Negative      Genitourinary:  Negative      Musculoskeletal:  Positive for arthritis;neck pain    Neurologic:  Negative      Psychiatric:  Negative      Heme/Lymph/Imm:  Positive for easy bruising    Endocrine:  Negative        Physical Exam:  Vitals: /58 (BP Location: Right arm, Patient Position: Sitting, Cuff Size: Adult Regular)   Pulse 60   Ht 1.549 m (5' 1\")   Wt 81.6 kg (180 lb)   SpO2 93%   BMI 34.01 " kg/m      Constitutional:  cooperative   elderly    Skin:  warm and dry to the touch          Head:  normocephalic        Eyes:  pupils equal and round        Lymph:No Cervical lymphadenopathy present     ENT:  no pallor or cyanosis        Neck:  no stiffness        Respiratory:  clear to auscultation         Cardiac: regular rhythm       systolic ejection murmur        pulses full and equal                                        GI:  abdomen soft        Extremities and Muscular Skeletal:      bilateral LE edema;trace          Neurological:  no gross motor deficits        Psych:  Alert and Oriented x 3        CC  SD [facility]ner[facility]  [address] [place] [address]

## 2019-02-12 NOTE — LETTER
2/12/2019    [facility]  [address]  [facility] [address]    RE: Jade Caba       Dear Colleague,    I had the pleasure of seeing Jade Hanmelissa in the [facility].    HPI and Plan:   See dictation    Orders Placed This Encounter   Procedures     Basic metabolic panel     Follow-Up with Cardiac Advanced Practice Provider       Orders Placed This Encounter   Medications     docusate sodium (COLACE) 50 MG capsule     Sig: Take 50 mg by mouth 2 times daily     meloxicam (MOBIC) 15 MG tablet     Sig: Take 15 mg by mouth daily     calcium carbonate 500 mg, elemental, (OSCAL;OYSTER SHELL CALCIUM) 500 MG tablet     Sig: Take 100 tablets by mouth 2 times daily     atorvastatin (LIPITOR) 80 MG tablet     Sig: Take 80 mg by mouth daily     aspirin (ASA) 81 MG EC tablet     Sig: Take 1 tablet (81 mg) by mouth daily     Dispense:  90 tablet     Refill:  3       Medications Discontinued During This Encounter   Medication Reason     rosuvastatin (CRESTOR) 40 MG tablet Dose adjustment     calcium carbonate (OS-FRANCISCO) 500 MG TABS Dose adjustment     clopidogrel (PLAVIX) 75 MG tablet      ASPIRIN EC PO          Encounter Diagnosis   Name Primary?     CAD in native artery        CURRENT MEDICATIONS:  Current Outpatient Medications   Medication Sig Dispense Refill     amLODIPine (NORVASC) 10 MG tablet Take 1 tablet (10 mg) by mouth daily 30 tablet 1     aspirin (ASA) 81 MG EC tablet Take 1 tablet (81 mg) by mouth daily 90 tablet 3     atorvastatin (LIPITOR) 80 MG tablet Take 80 mg by mouth daily       calcium carbonate 500 mg, elemental, (OSCAL;OYSTER SHELL CALCIUM) 500 MG tablet Take 100 tablets by mouth 2 times daily       carvedilol (COREG) 12.5 MG tablet Take 1 tablet (12.5 mg) by mouth 2 times daily (with meals) 60 tablet 1     cholecalciferol (VITAMIN D3) 5000 UNITS CAPS capsule Take 5,000 Units by mouth daily       citalopram (CELEXA) 10 MG tablet Take 30 mg by mouth daily        docusate sodium (COLACE) 50 MG capsule Take 50 mg by mouth 2 times daily       folic acid (FOLVITE) 1 MG tablet Take 1 mg by mouth daily       furosemide (LASIX) 20 MG tablet Take 20 mg by mouth daily       levETIRAcetam (KEPPRA) 500 MG tablet Take 500 mg by mouth 2 times daily       losartan (COZAAR) 100 MG tablet Take 100 mg by mouth daily       meloxicam (MOBIC) 15 MG tablet Take 15 mg by mouth daily       metoclopramide (REGLAN) 5 MG tablet Take 5 mg by mouth 2 times daily And daily PRN at noon       Mirtazapine (REMERON PO) Take 15 mg by mouth 2 times daily       Multiple Vitamins-Minerals (CERTAVITE SENIOR/ANTIOXIDANT PO) Take by mouth daily       omeprazole (PRILOSEC) 40 MG capsule Take 1 capsule (40 mg) by mouth daily Take 30-60 minutes before a meal. 30 capsule 9     oxybutynin (DITROPAN) 5 MG tablet Take 5 mg by mouth 2 times daily       Acetaminophen (TYLENOL PO) Take 1,000 mg by mouth 2 times daily       acetaminophen (TYLENOL) 325 MG tablet Take 650 mg by mouth 2 times daily as needed for mild pain       alendronate (FOSAMAX) 70 MG tablet Take 70 mg by mouth every 7 days On Wednesdays       bisacodyl (DULCOLAX) 10 MG suppository Place 10 mg rectally daily as needed       hypromellose (ARTIFICIAL TEARS) 0.5 % SOLN ophthalmic solution Place 3 drops into both eyes 3 times daily       polyethylene glycol (MIRALAX/GLYCOLAX) powder Take 17 g by mouth nightly as needed        senna-docusate (SENNA S) 8.6-50 MG per tablet Take 1 tablet by mouth 2 times daily Please hold if having loose stools and contact nurse.         ALLERGIES     Allergies   Allergen Reactions     No Known Allergies        PAST MEDICAL HISTORY:  Past Medical History:   Diagnosis Date     Anxiety      Arthritis      Atrial flutter (H)      C1 cervical fracture (H)      CAD (coronary artery disease)      Carotid artery stenosis      CKD (chronic kidney disease)      Depression      H/O alcohol abuse      Hypercholesterolemia       Hypertension      Renal artery stenosis (H)      Seizure disorder (H)      Subarachnoid hemorrhage (H) 2014       PAST SURGICAL HISTORY:  Past Surgical History:   Procedure Laterality Date     APPENDECTOMY       CAROTID ENDARTERECTOMY Left      Cataract surgery Right     will have laser eye surgery     CHOLECYSTECTOMY       Coronary stents x 3       HYSTERECTOMY      at age of 40-not cancerus or precancerous      JOINT REPLACEMENT Bilateral      Stenting of renal artery         FAMILY HISTORY:  Family History   Problem Relation Age of Onset     C.A.ETELVINA. Mother      C.A.ETELVINA. Brother      C.ASANIYA. Sister      C.ASANIYA. Brother      Cancer Sister         Breast cancer     Heart Disease Son        SOCIAL HISTORY:  Social History     Socioeconomic History     Marital status:      Spouse name: None     Number of children: None     Years of education: None     Highest education level: None   Social Needs     Financial resource strain: None     Food insecurity - worry: None     Food insecurity - inability: None     Transportation needs - medical: None     Transportation needs - non-medical: None   Occupational History     None   Tobacco Use     Smoking status: Never Smoker     Smokeless tobacco: Never Used   Substance and Sexual Activity     Alcohol use: No     Drug use: No     Sexual activity: No   Other Topics Concern     Parent/sibling w/ CABG, MI or angioplasty before 65F 55M? Yes     Comment: brother Mi at 23   Social History Narrative     None       Review of Systems:  Skin:  Negative       Eyes:  Positive for glasses    ENT:  Negative      Respiratory:  Positive for dyspnea on exertion sometimes when walking alot    Cardiovascular:    edema;Positive for once in a while swelling in ankles and feet   Gastroenterology: Negative      Genitourinary:  Negative      Musculoskeletal:  Positive for arthritis;neck pain    Neurologic:  Negative      Psychiatric:  Negative      Heme/Lymph/Imm:  Positive for easy bruising   "  Endocrine:  Negative        Physical Exam:  Vitals: /58 (BP Location: Right arm, Patient Position: Sitting, Cuff Size: Adult Regular)   Pulse 60   Ht 1.549 m (5' 1\")   Wt 81.6 kg (180 lb)   SpO2 93%   BMI 34.01 kg/m       Constitutional:  cooperative   elderly    Skin:  warm and dry to the touch          Head:  normocephalic        Eyes:  pupils equal and round        Lymph:No Cervical lymphadenopathy present     ENT:  no pallor or cyanosis        Neck:  no stiffness        Respiratory:  clear to auscultation         Cardiac: regular rhythm       systolic ejection murmur        pulses full and equal                                        GI:  abdomen soft        Extremities and Muscular Skeletal:      bilateral LE edema;trace          Neurological:  no gross motor deficits        Psych:  Alert and Oriented x 3        CC  Dionna Parnell PA-C  [address] [place] [address]                Thank you for allowing me to participate in the care of your patient.      Sincerely,     Dean Sorensen MD     [facility]    cc:   Dionna Parnell PA-C  [address] [place] [address]        "

## 2019-02-28 ENCOUNTER — PATIENT OUTREACH (OUTPATIENT)
Dept: GERIATRIC MEDICINE | Facility: CLINIC | Age: 84
End: 2019-02-28

## 2019-02-28 RX ORDER — TROLAMINE SALICYLATE 10 G/100G
CREAM TOPICAL PRN
COMMUNITY
End: 2022-09-05

## 2019-02-28 RX ORDER — ROSUVASTATIN CALCIUM 40 MG/1
40 TABLET, COATED ORAL AT BEDTIME
COMMUNITY
End: 2022-09-05

## 2019-02-28 ASSESSMENT — ACTIVITIES OF DAILY LIVING (ADL): DEPENDENT_IADLS:: CLEANING;COOKING;LAUNDRY;SHOPPING;MEDICATION MANAGEMENT;MONEY MANAGEMENT;TRANSPORTATION

## 2019-02-28 ASSESSMENT — PATIENT HEALTH QUESTIONNAIRE - PHQ9: SUM OF ALL RESPONSES TO PHQ QUESTIONS 1-9: 3

## 2019-02-28 NOTE — PROGRESS NOTES
[facility] Care Coordination Contact    [facility] Initial Assessment     Home visit for Initial Health Risk Assessment with Jade Caba completed on February 28, 2019    Type of residence:: Assisted living, [facility].   Current living arrangement:: I live in assisted living     Assessment completed with:: Patient and this CC.     Current Care Plan  Member currently receiving the following home care services:   n/a  Member currently receiving the following community resources: [facility], 24 hr Customized AL services.     Medication Review  Medication reconciliation completed in Epic: Yes. Spoke with Amie in the nursing office to review discrepancies. Alendronate discontinued 11/13/18, to continue Rosuvastatin.   Medication set-up & administration: Independent-does not set up.  Assisting Living staff administers medications.  Medication Risk Assessment Medication (1 or more, place referral to MTM): N/A: No risk factors identified  MTM Referral Placed: No: No risk factors idenified    Mental/Behavioral Health   Depression Screening: See PHQ assessment flowsheet.   Mental health DX:: Yes   Mental health DX how managed:: Medication      Falls Assessment:   Fallen 2 or more times in the past year?: No        ADL/IADL Dependencies:   Dependent ADLs:: Bathing, Ambulation-walker  Dependent IADLs:: Cleaning, Cooking, Laundry, Shopping, Medication Management, Money Management, Transportation    [facility] sponsored benefits: Shared information re: Silver Sneakers/gym memberships, ASA, Calcium +D.    PCA Assessment completed at visit: No     Elderly Waiver Eligibility: Yes-will continue on EW    Care Plan & Recommendations:   Continue current services.  Informed Jade that if she is in need of incontinent supplies to contact this CC.     See LTCC for detailed assessment information.    Follow-Up Plan: Member informed of future contact, plan to f/u with member with a 6 month telephone  assessment.  Contact information shared with member and family, encouraged member to call with any questions or concerns at any time.    [facility] continuum providers: Please refer to Health Care Home on the [facility] Problem List to view this patient's [facility] Care Plan Summary.  2/28/19 Rec'd vm from Betsey ZHAO at [facility] requesting a return call.  3/1/19 VM left with Betsey that CC completed annual assessment with member on 2/28/19, CC was requesting to review assessment and services  3/4/19 Spoke with Betsey, Betsey states that Jade is stable, no falls, no concerns, no changes in ADL's, behavior.  Per Betsey, Jade is only receiving one meal in the dining room, and med administration has decreased to 3x/day. Confirmed that Jade is on GRH.  Explained that this will be a reduction and that the Health Plan will review and complete the authorization.   MMIS completed, Rate Cell A, Case Mix B.   Care plan completed.     Holly Guajardo RN, BC  Manager [facility] Care Coordinator   [phone]  (Fax)

## 2019-03-04 ENCOUNTER — PATIENT OUTREACH (OUTPATIENT)
Dept: GERIATRIC MEDICINE | Facility: CLINIC | Age: 84
End: 2019-03-04

## 2019-03-04 NOTE — PROGRESS NOTES
Received a request to submit a DTR for the reduction of Assisted Living. Documentation completed and faxed to the health plan. Care Coordinator aware.    3/6/19 Rec'd the following information via [employer] Secure web site  [id number] 24hrCL, $ 55.47 daily ICD10 R68.89- approved reduction  3/21/19-2/29/2020  Eden Barbour RN  Utilization   [facility]  [phone]

## 2019-03-04 NOTE — PROGRESS NOTES
[facility] Care Coordination Contact    Jaden Willams,     I am the [facility] care coordinator for Jade Caba, and I am writing to notify you of a change in services.   Assisted Living services have been reduced    This change has occurred because, Jade is only receiving one meal per day from the [facility] and medication administration has decreased from 4x/day to 3x/day.   Jade's family assist her with shopping and Jade is able to prepare small meals independently in her apartment.     I am required by the health plan to notify you of this change. No action is required on your part. Please do not hesitate to contact me with any questions or concerns. Thank you.    Holly Guajardo RN, BC  Manager [facility] Care Coordinator   [phone]  (Fax)

## 2019-03-07 ENCOUNTER — PATIENT OUTREACH (OUTPATIENT)
Dept: GERIATRIC MEDICINE | Facility: CLINIC | Age: 84
End: 2019-03-07

## 2019-03-07 NOTE — PROGRESS NOTES
[facility] Care Coordination Contact    Received after visit chart from care coordinator.  Completed following tasks: Mailed copy of care plan to client, Updated services in access and Submitted referrals/auths for [facility]: Assisted Living  Chart was returned to CC.   Mailed copy of CL tool to member, faxed copy to AL facility, uploaded into [facility] and submitted authorization to health plan.   Provider Signature - No POC Shared:  Member indicates that they do not want their POC shared with any EW providers.     Meli Vazquez  Care Management Specialist  [facility]  [phone]

## 2019-03-07 NOTE — LETTER
March 7, 2019      YOVANA VERDUGO  CARE OF: LAURO SALTER  [address] [facility] [address]      Dear Lauro:    At [facility], we are dedicated to improving your health and well-being. Enclosed is the Comprehensive Care Plan that we developed with you on 02-. Please review the Care Plan carefully.    As a reminder, some of the things we discussed at your visit include:    Your physical and mental health    Ways to reduce falls    Health care needs you may have    Don t forget to contact your care coordinator if you:    Have been hospitalized or plan to be hospitalized     Have had a fall     Have experienced a change in physical health    Are experiencing emotional problems     If you do not agree with your Care Plan, have questions about it, or have experienced a change in your needs, please call me at [phone]. If you are hearing impaired, please call the [place] Relay at 891 or [phone] (aapdma-qh-jortth relay service).    Sincerely,    Holly Guajardo RN    E-mail: [email or web address]  Phone: [phone]      [facility] is a health plan that contracts with both Medicare and the [place] Medical Assistance (Medicaid) program to provide benefits of both programs to enrollees. Enrollment in [facility] depends on contract renewal.    [id number])     [id number] (11/18)

## 2019-03-19 ENCOUNTER — ASSISTED LIVING VISIT (OUTPATIENT)
Dept: GERIATRICS | Facility: CLINIC | Age: 84
End: 2019-03-19
Payer: COMMERCIAL

## 2019-03-19 DIAGNOSIS — M17.0 PRIMARY OSTEOARTHRITIS OF BOTH KNEES: ICD-10-CM

## 2019-03-19 DIAGNOSIS — I10 ESSENTIAL HYPERTENSION: Primary | ICD-10-CM

## 2019-03-19 DIAGNOSIS — R60.0 BILATERAL LEG EDEMA: ICD-10-CM

## 2019-03-19 NOTE — PROGRESS NOTES
[facility]  Chief Complaint   Patient presents with     Annual Comprehensive Exam Assisted Living     [facility] Medical Record Number:  [id number]  Place of Service where encounter took place:  [facility] [[id number]]    HPI:    Jade Caba  is a 85 year old  (8/26/1933), who is being seen today for an annual comprehensive visit. HPI information obtained from: facility chart records, facility staff, patient report and [facility] Epic chart review.  Today's concerns are:  Essential hypertension  BP goals are  <140/90 mm Hg.This is higher than ACC and AHA recommendations due to risk of dizziness and falls. Patient is stable with current plan of care and routine assessment. Cont. On norvasc, coreg, cozaar.    Bilateral leg edema  Increased over past month. Has CHF. Cont on lasix. No recent resp. Distress. Wt. stable    Primary osteoarthritis of both knees  bilat knee pain L>R.  Has had kne inj which have been effective. No recent increased pain. Reports tylenol, prn aspercreme effective        ALLERGIES: No known allergies  PAST MEDICAL HISTORY:  has a past medical history of Anxiety, Arthritis, Atrial flutter (H), C1 cervical fracture (H), CAD (coronary artery disease), Carotid artery stenosis, CKD (chronic kidney disease), Depression, H/O alcohol abuse, Hypercholesterolemia, Hypertension, Renal artery stenosis (H), Seizure disorder (H), and Subarachnoid hemorrhage (H) (2014).  PAST SURGICAL HISTORY:  has a past surgical history that includes carotid endarterectomy (Left); Stenting of renal artery; Coronary stents x 3; Cholecystectomy; appendectomy; joint replacement (Bilateral); Cataract surgery (Right); and Hysterectomy.  IMMUNIZATIONS:  Immunization History   Administered Date(s) Administered     Flu, Unspecified 10/05/2015, 10/01/2018     Influenza (High Dose) 3 valent vaccine 10/03/2013, 10/03/2016, 10/02/2017     Influenza (IIV3) PF 10/22/2012     Pneumo Conj 13-V  (2010&after) 05/26/2016     Pneumococcal 23 valent 08/14/2013     TDAP Vaccine (Boostrix) 08/14/2013     Above immunizations pulled from [facility]. [facility] and facility records also reconciled. Outstanding information sent to  to update [facility].  Future immunizations are not needed at this point as all recommended immunizations are up to date.     Current Outpatient Medications   Medication Sig Dispense Refill     Acetaminophen (TYLENOL PO) Take 1,000 mg by mouth 2 times daily       acetaminophen (TYLENOL) 325 MG tablet Take 650 mg by mouth 2 times daily as needed for mild pain       amLODIPine (NORVASC) 10 MG tablet Take 1 tablet (10 mg) by mouth daily 30 tablet 1     aspirin (ASA) 81 MG EC tablet Take 1 tablet (81 mg) by mouth daily 90 tablet 3     bisacodyl (DULCOLAX) 10 MG suppository Place 10 mg rectally daily as needed       calcium carbonate 500 mg, elemental, (OSCAL;OYSTER SHELL CALCIUM) 500 MG tablet Take 100 tablets by mouth 2 times daily       carvedilol (COREG) 12.5 MG tablet Take 1 tablet (12.5 mg) by mouth 2 times daily (with meals) 60 tablet 1     cholecalciferol (VITAMIN D3) 5000 UNITS CAPS capsule Take 5,000 Units by mouth daily       citalopram (CELEXA) 10 MG tablet Take 30 mg by mouth daily       folic acid (FOLVITE) 1 MG tablet Take 1 mg by mouth daily       furosemide (LASIX) 20 MG tablet Take 20 mg by mouth daily       hypromellose (ARTIFICIAL TEARS) 0.5 % SOLN ophthalmic solution Place 3 drops into both eyes 3 times daily       levETIRAcetam (KEPPRA) 500 MG tablet Take 500 mg by mouth 2 times daily       losartan (COZAAR) 100 MG tablet Take 100 mg by mouth daily       metoclopramide (REGLAN) 5 MG tablet Take 5 mg by mouth 2 times daily And daily PRN at noon       Mirtazapine (REMERON PO) Take 15 mg by mouth 2 times daily       Multiple Vitamins-Minerals (CERTAVITE SENIOR/ANTIOXIDANT PO) Take by mouth daily       omeprazole (PRILOSEC) 40 MG capsule Take 1 capsule (40 mg)  by mouth daily Take 30-60 minutes before a meal. 30 capsule 9     oxybutynin (DITROPAN) 5 MG tablet Take 5 mg by mouth 2 times daily       polyethylene glycol (MIRALAX/GLYCOLAX) powder Take 17 g by mouth nightly as needed        rosuvastatin (CRESTOR) 40 MG tablet Take 40 mg by mouth daily       senna-docusate (SENNA S) 8.6-50 MG per tablet Take 1 tablet by mouth 2 times daily Please hold if having loose stools and contact nurse.       trolamine salicylate (ASPERCREME) 10 % external cream Apply topically as needed for moderate pain (apply twice daily as needed for pain. Left knee)         Case Management:  I have reviewed the Assisted Living care plan, current immunizations and preventive care/cancer screening. .Future cancer screening is not clinically indicated secondary to age/goals of care Patient's desire to return to the community is not present. Current Level of Care is appropriate.    Advance Directive Discussion:    I reviewed the current advanced directives as reflected in EPIC, the POLST and the facility chart, and verified the congruency of orders 3/19/19. I contacted the first party dtr and left a message regarding the plan of Care.  I did review the advance directives with the resident.     Team Discussion:  I communicated with the appropriate disciplines involved with the Plan of Care:   Nursing    Patient's goal is pain control and comfort and tx of reversible illness.  Information reviewed:  Medications, vital signs, orders, and nursing notes.    ROS:  No chest pain, shortness of breath, fevers, chills, headache, nausea, vomiting, dysuria or bowel abnormalities.  Appetite is  normal.  No pain except occ knees.    Vitals:  /78   Pulse 70   Resp 18   Wt 81.6 kg (180 lb)   SpO2 94%   BMI 34.01 kg/m   Body mass index is 34.01 kg/m .  Exam:  GENERAL APPEARANCE:  Alert, in no distress, cooperative  ENT:  Mouth and posterior oropharynx normal, moist mucous membranes, [place]  EYES:  EOM,  conjunctivae, lids, pupils and irises normal, PERRL  NECK:  No adenopathy,masses or thyromegaly, no carotid bruit  RESP:  respiratory effort and palpation of chest normal, lungs clear to auscultation , no respiratory distress  CV:  Palpation and auscultation of heart done , regular rate and rhythm, no murmur, rub, or gallop, peripheral edema 1+ in LEs  ABDOMEN:  normal bowel sounds, soft, nontender, no hepatosplenomegaly or other masses, no guarding or rebound  M/S:   Gait and station normal  muscle strength 5/5 all 4 ext., normal tone  NEURO:   Cranial nerves 2-12 are normal tested and grossly at patient's baseline, alert, speech clear  PSYCH:  insight and judgement impaired, memory impaired , affect and mood normal     Lab/Diagnostic data:     Most Recent 3 CBC's:  Recent Labs   Lab Test 10/09/18 07/27/18 03/12/18  0850 02/02/18  0814   WBC  --  8.5 17.6* 8.8   HGB 13.4 14.1 13.6 12.6   MCV  --  96 95 97   PLT  --  198 227 173     Most Recent 3 BMP's:  Recent Labs   Lab Test 10/09/18 07/27/18 03/14/18  0712    140 140   POTASSIUM 4.0 4.6 4.1   CHLORIDE 105 103 104   CO2 28 28 32   BUN 14 19 12   CR 0.96 0.98 0.82   ANIONGAP 8 9 4   FRANCISCO 9.3 9.7 8.9    102 97       ASSESSMENT/PLAN  Essential hypertension  Gen. Controlled  1. Cont. Coreg, cozaar, norvasc  2. Follow BPs, HRs  3. Monitor for reports of dizziness    Bilateral leg edema  Increased over past month. No resp. Distress  1. Cont. Lasix  2. Change norvasc admin time to hs  3. Start otilio hose  4. Follow wt.s  5. Cbc, bmp in next 1-3 mos    Primary osteoarthritis of both knees  Pain currently controlled  1. Cont. Tylenol  2. Cont. Prn aspercreme  3. For increased pain, may sched. Aspercreme, sched f/u knee inj  4. Monitor for changes in gait      Electronically signed by:  ZAIN Marcos CNP

## 2019-03-19 NOTE — LETTER
3/19/2019        RE: Jade Saeed  [address] [facility] [address]        [facility] [facility]  Chief Complaint   Patient presents with     Annual Comprehensive Exam Assisted Living     [place] Medical Record Number:  [id number]  Place of Service where encounter took place:  [facility] ([facility]) [[id number]]    HPI:    Jade Caba  is a 85 year old  (8/26/1933), who is being seen today for an annual comprehensive visit. HPI information obtained from: facility chart records, facility staff, patient report and [facility] chart review.  Today's concerns are:  Essential hypertension  BP goals are  <140/90 mm Hg.This is higher than ACC and AHA recommendations due to risk of dizziness and falls. Patient is stable with current plan of care and routine assessment. Cont. On norvasc, coreg, cozaar.    Bilateral leg edema  Increased over past month. Has CHF. Cont on lasix. No recent resp. Distress. Wt. stable    Primary osteoarthritis of both knees  bilat knee pain L>R.  Has had kne inj which have been effective. No recent increased pain. Reports tylenol, prn aspercreme effective        ALLERGIES: No known allergies  PAST MEDICAL HISTORY:  has a past medical history of Anxiety, Arthritis, Atrial flutter (H), C1 cervical fracture (H), CAD (coronary artery disease), Carotid artery stenosis, CKD (chronic kidney disease), Depression, H/O alcohol abuse, Hypercholesterolemia, Hypertension, Renal artery stenosis (H), Seizure disorder (H), and Subarachnoid hemorrhage (H) (2014).  PAST SURGICAL HISTORY:  has a past surgical history that includes carotid endarterectomy (Left); Stenting of renal artery; Coronary stents x 3; Cholecystectomy; appendectomy; joint replacement (Bilateral); Cataract surgery (Right); and Hysterectomy.  IMMUNIZATIONS:  Immunization History   Administered Date(s) Administered     Flu, Unspecified 10/05/2015, 10/01/2018     Influenza (High Dose) 3 valent  vaccine 10/03/2013, 10/03/2016, 10/02/2017     Influenza (IIV3) PF 10/22/2012     Pneumo Conj 13-V (2010&after) 05/26/2016     Pneumococcal 23 valent 08/14/2013     TDAP Vaccine (Boostrix) 08/14/2013     Above immunizations pulled from [facility]. [facility] and facility records also reconciled. Outstanding information sent to  to update [facility].  Future immunizations are not needed at this point as all recommended immunizations are up to date.     Current Outpatient Medications   Medication Sig Dispense Refill     Acetaminophen (TYLENOL PO) Take 1,000 mg by mouth 2 times daily       acetaminophen (TYLENOL) 325 MG tablet Take 650 mg by mouth 2 times daily as needed for mild pain       amLODIPine (NORVASC) 10 MG tablet Take 1 tablet (10 mg) by mouth daily 30 tablet 1     aspirin (ASA) 81 MG EC tablet Take 1 tablet (81 mg) by mouth daily 90 tablet 3     bisacodyl (DULCOLAX) 10 MG suppository Place 10 mg rectally daily as needed       calcium carbonate 500 mg, elemental, (OSCAL;OYSTER SHELL CALCIUM) 500 MG tablet Take 100 tablets by mouth 2 times daily       carvedilol (COREG) 12.5 MG tablet Take 1 tablet (12.5 mg) by mouth 2 times daily (with meals) 60 tablet 1     cholecalciferol (VITAMIN D3) 5000 UNITS CAPS capsule Take 5,000 Units by mouth daily       citalopram (CELEXA) 10 MG tablet Take 30 mg by mouth daily       folic acid (FOLVITE) 1 MG tablet Take 1 mg by mouth daily       furosemide (LASIX) 20 MG tablet Take 20 mg by mouth daily       hypromellose (ARTIFICIAL TEARS) 0.5 % SOLN ophthalmic solution Place 3 drops into both eyes 3 times daily       levETIRAcetam (KEPPRA) 500 MG tablet Take 500 mg by mouth 2 times daily       losartan (COZAAR) 100 MG tablet Take 100 mg by mouth daily       metoclopramide (REGLAN) 5 MG tablet Take 5 mg by mouth 2 times daily And daily PRN at noon       Mirtazapine (REMERON PO) Take 15 mg by mouth 2 times daily       Multiple Vitamins-Minerals (CERTAVITE  SENIOR/ANTIOXIDANT PO) Take by mouth daily       omeprazole (PRILOSEC) 40 MG capsule Take 1 capsule (40 mg) by mouth daily Take 30-60 minutes before a meal. 30 capsule 9     oxybutynin (DITROPAN) 5 MG tablet Take 5 mg by mouth 2 times daily       polyethylene glycol (MIRALAX/GLYCOLAX) powder Take 17 g by mouth nightly as needed        rosuvastatin (CRESTOR) 40 MG tablet Take 40 mg by mouth daily       senna-docusate (SENNA S) 8.6-50 MG per tablet Take 1 tablet by mouth 2 times daily Please hold if having loose stools and contact nurse.       trolamine salicylate (ASPERCREME) 10 % external cream Apply topically as needed for moderate pain (apply twice daily as needed for pain. Left knee)         Case Management:  I have reviewed the Assisted Living care plan, current immunizations and preventive care/cancer screening. .Future cancer screening is not clinically indicated secondary to age/goals of care Patient's desire to return to the community is not present. Current Level of Care is appropriate.    Advance Directive Discussion:    I reviewed the current advanced directives as reflected in EPIC, the POLST and the facility chart, and verified the congruency of orders 3/19/19. I contacted the first party dtr and left a message regarding the plan of Care.  I did review the advance directives with the resident.     Team Discussion:  I communicated with the appropriate disciplines involved with the Plan of Care:   Nursing    Patient's goal is pain control and comfort and tx of reversible illness.  Information reviewed:  Medications, vital signs, orders, and nursing notes.    ROS:  No chest pain, shortness of breath, fevers, chills, headache, nausea, vomiting, dysuria or bowel abnormalities.  Appetite is  normal.  No pain except occ knees.    Vitals:  /78   Pulse 70   Resp 18   Wt 81.6 kg (180 lb)   SpO2 94%   BMI 34.01 kg/m    Body mass index is 34.01 kg/m .  Exam:  GENERAL APPEARANCE:  Alert, in no distress,  cooperative  ENT:  Mouth and posterior oropharynx normal, moist mucous membranes, [place]  EYES:  EOM, conjunctivae, lids, pupils and irises normal, PERRL  NECK:  No adenopathy,masses or thyromegaly, no carotid bruit  RESP:  respiratory effort and palpation of chest normal, lungs clear to auscultation , no respiratory distress  CV:  Palpation and auscultation of heart done , regular rate and rhythm, no murmur, rub, or gallop, peripheral edema 1+ in LEs  ABDOMEN:  normal bowel sounds, soft, nontender, no hepatosplenomegaly or other masses, no guarding or rebound  M/S:   Gait and station normal  muscle strength 5/5 all 4 ext., normal tone  NEURO:   Cranial nerves 2-12 are normal tested and grossly at patient's baseline, alert, speech clear  PSYCH:  insight and judgement impaired, memory impaired , affect and mood normal     Lab/Diagnostic data:     Most Recent 3 CBC's:  Recent Labs   Lab Test 10/09/18 07/27/18 03/12/18  0850 02/02/18  0814   WBC  --  8.5 17.6* 8.8   HGB 13.4 14.1 13.6 12.6   MCV  --  96 95 97   PLT  --  198 227 173     Most Recent 3 BMP's:  Recent Labs   Lab Test 10/09/18 07/27/18 03/14/18  0712    140 140   POTASSIUM 4.0 4.6 4.1   CHLORIDE 105 103 104   CO2 28 28 32   BUN 14 19 12   CR 0.96 0.98 0.82   ANIONGAP 8 9 4   FRANCISCO 9.3 9.7 8.9    102 97       ASSESSMENT/PLAN  Essential hypertension  Gen. Controlled  1. Cont. Coreg, cozaar, norvasc  2. Follow BPs, HRs  3. Monitor for reports of dizziness    Bilateral leg edema  Increased over past month. No resp. Distress  1. Cont. Lasix  2. Change norvasc admin time to hs  3. Start otilio hose  4. Follow wt.s  5. Cbc, bmp in next 1-3 mos    Primary osteoarthritis of both knees  Pain currently controlled  1. Cont. Tylenol  2. Cont. Prn aspercreme  3. For increased pain, may sched. Aspercreme, sched f/u knee inj  4. Monitor for changes in gait      Electronically signed by:  ZAIN Marcos CNP           Sincerely,        Jaden Saunders  ZAIN Marinelli CNP

## 2019-03-20 ENCOUNTER — TRANSFERRED RECORDS (OUTPATIENT)
Dept: HEALTH INFORMATION MANAGEMENT | Facility: CLINIC | Age: 84
End: 2019-03-20

## 2019-03-20 ENCOUNTER — RECORDS - HEALTHEAST (OUTPATIENT)
Dept: LAB | Facility: CLINIC | Age: 84
End: 2019-03-20

## 2019-03-20 VITALS
DIASTOLIC BLOOD PRESSURE: 78 MMHG | HEART RATE: 70 BPM | RESPIRATION RATE: 18 BRPM | BODY MASS INDEX: 34.01 KG/M2 | WEIGHT: 180 LBS | SYSTOLIC BLOOD PRESSURE: 131 MMHG | OXYGEN SATURATION: 94 %

## 2019-03-20 LAB
ANION GAP SERPL CALCULATED.3IONS-SCNC: 8 MMOL/L (ref 5–18)
ANION GAP SERPL CALCULATED.3IONS-SCNC: 8 MMOL/L (ref 5–18)
BUN SERPL-MCNC: 16 MG/DL (ref 8–28)
BUN SERPL-MCNC: 16 MG/DL (ref 8–28)
CALCIUM SERPL-MCNC: 9.6 MG/DL (ref 8.5–10.5)
CALCIUM SERPL-MCNC: 9.6 MG/DL (ref 8.5–10.5)
CHLORIDE BLD-SCNC: 103 MMOL/L (ref 98–107)
CHLORIDE SERPLBLD-SCNC: 103 MMOL/L (ref 98–107)
CO2 SERPL-SCNC: 29 MMOL/L (ref 22–31)
CO2 SERPL-SCNC: 29 MMOL/L (ref 22–31)
CREAT SERPL-MCNC: 0.9 MG/DL (ref 0.6–1.1)
CREAT SERPL-MCNC: 0.9 MG/DL (ref 0.6–1.1)
GFR SERPL CREATININE-BSD FRML MDRD: 60 ML/MIN/1.73M2
GFR SERPL CREATININE-BSD FRML MDRD: 60 ML/MIN/1.73M2
GLUCOSE BLD-MCNC: 89 MG/DL (ref 70–125)
GLUCOSE SERPL-MCNC: 89 MG/DL (ref 70–125)
HGB BLD-MCNC: 12.9 G/DL (ref 12–16)
POTASSIUM BLD-SCNC: 4.5 MMOL/L (ref 3.5–5)
POTASSIUM SERPL-SCNC: 4.5 MMOL/L (ref 3.5–5)
SODIUM SERPL-SCNC: 140 MMOL/L (ref 136–145)
SODIUM SERPL-SCNC: 140 MMOL/L (ref 138–145)

## 2019-04-02 ENCOUNTER — ASSISTED LIVING VISIT (OUTPATIENT)
Dept: GERIATRICS | Facility: CLINIC | Age: 84
End: 2019-04-02
Payer: COMMERCIAL

## 2019-04-02 VITALS
SYSTOLIC BLOOD PRESSURE: 123 MMHG | HEART RATE: 64 BPM | WEIGHT: 180 LBS | BODY MASS INDEX: 34.01 KG/M2 | DIASTOLIC BLOOD PRESSURE: 64 MMHG | RESPIRATION RATE: 16 BRPM | OXYGEN SATURATION: 93 %

## 2019-04-02 DIAGNOSIS — F41.9 ANXIETY: ICD-10-CM

## 2019-04-02 DIAGNOSIS — M17.0 PRIMARY OSTEOARTHRITIS OF BOTH KNEES: ICD-10-CM

## 2019-04-02 DIAGNOSIS — R60.0 BILATERAL LEG EDEMA: Primary | ICD-10-CM

## 2019-04-02 NOTE — PROGRESS NOTES
[place] [facility]  [place] Medical Record Number:  [id number]  Place of Service where encounter took place:  [facility] ([facility]) [[id number]]  Chief Complaint   Patient presents with     Edema       HPI:    Jade Caba  is a 85 year old (8/26/1933), who is being seen today for an episodic care visit.  HPI information obtained from: facility chart records, facility staff, patient report and [facility] chart review. Today's concern is: edema, anxiety, pain. Has had increased LE edema. Has CHF. Cont on lasix, recently started on otilio hose. Reports newer bruising to LEs. Reports putting otilio hose on/taking off by self. No increased LE pain. Edema unchanged. Reports knee pain stable. Taking tylenol, prn aspercreme. Reports trying to increased amount of amb. Per day. Cont. To elevate LEs.  Reports mood gen. Stable. No increased anxiety. Cont. On celexa, remeron. No reports of insomnia.       Past Medical and Surgical History reviewed in Epic today.    MEDICATIONS:  Current Outpatient Medications   Medication Sig Dispense Refill     Acetaminophen (TYLENOL PO) Take 1,000 mg by mouth 2 times daily       acetaminophen (TYLENOL) 325 MG tablet Take 650 mg by mouth 2 times daily as needed for mild pain       amLODIPine (NORVASC) 10 MG tablet Take 1 tablet (10 mg) by mouth daily 30 tablet 1     aspirin (ASA) 81 MG EC tablet Take 1 tablet (81 mg) by mouth daily 90 tablet 3     bisacodyl (DULCOLAX) 10 MG suppository Place 10 mg rectally daily as needed       calcium carbonate 500 mg, elemental, (OSCAL;OYSTER SHELL CALCIUM) 500 MG tablet Take 100 tablets by mouth 2 times daily       carvedilol (COREG) 12.5 MG tablet Take 1 tablet (12.5 mg) by mouth 2 times daily (with meals) 60 tablet 1     cholecalciferol (VITAMIN D3) 5000 UNITS CAPS capsule Take 5,000 Units by mouth daily       citalopram (CELEXA) 10 MG tablet Take 30 mg by mouth daily       folic acid (FOLVITE) 1 MG tablet Take 1 mg by mouth  daily       furosemide (LASIX) 20 MG tablet Take 20 mg by mouth daily       hypromellose (ARTIFICIAL TEARS) 0.5 % SOLN ophthalmic solution Place 3 drops into both eyes 3 times daily       levETIRAcetam (KEPPRA) 500 MG tablet Take 500 mg by mouth 2 times daily       losartan (COZAAR) 100 MG tablet Take 100 mg by mouth daily       metoclopramide (REGLAN) 5 MG tablet Take 5 mg by mouth 2 times daily And daily PRN at noon       Mirtazapine (REMERON PO) Take 15 mg by mouth 2 times daily       Multiple Vitamins-Minerals (CERTAVITE SENIOR/ANTIOXIDANT PO) Take by mouth daily       omeprazole (PRILOSEC) 40 MG capsule Take 1 capsule (40 mg) by mouth daily Take 30-60 minutes before a meal. 30 capsule 9     oxybutynin (DITROPAN) 5 MG tablet Take 5 mg by mouth 2 times daily       polyethylene glycol (MIRALAX/GLYCOLAX) powder Take 17 g by mouth nightly as needed        rosuvastatin (CRESTOR) 40 MG tablet Take 40 mg by mouth daily       senna-docusate (SENNA S) 8.6-50 MG per tablet Take 1 tablet by mouth 2 times daily Please hold if having loose stools and contact nurse.       trolamine salicylate (ASPERCREME) 10 % external cream Apply topically as needed for moderate pain (apply twice daily as needed for pain. Left knee)           REVIEW OF SYSTEMS:  No chest pain, shortness of breath, fevers, chills, headache, nausea, vomiting, dysuria or bowel abnormalities.  Appetite is  normal.  No pain except occ L knee.    Objective:  /64   Pulse 64   Resp 16   Wt 81.6 kg (180 lb)   SpO2 93%   BMI 34.01 kg/m    Exam:  GENERAL APPEARANCE:  Alert, in no distress, cooperative  ENT:  Mouth and posterior oropharynx normal, moist mucous membranes, [place]  EYES:  EOM, conjunctivae, lids, pupils and irises normal, PERRL  NECK:  No adenopathy,masses or thyromegaly, no carotid bruit  RESP:  respiratory effort and palpation of chest normal, lungs clear to auscultation , no respiratory distress  CV:  Palpation and auscultation of heart done ,  regular rate and rhythm, no murmur, rub, or gallop, peripheral edema trace+ in LEs  ABDOMEN:  normal bowel sounds, soft, nontender, no hepatosplenomegaly or other masses, no guarding or rebound  M/S:   Gait and station normal  muscle strength 5/5 all 4 ext., normal tone  SKIN:  faint larger bruise L lat pretibial area, smaller resolving bruise same area LLE  NEURO:   Cranial nerves 2-12 are normal tested and grossly at patient's baseline, alert, speech clear  PSYCH:  insight and judgement impaired, memory impaired , affect and mood normal    Labs:     Most Recent 3 CBC's:  Recent Labs   Lab Test 10/09/18 07/27/18 03/12/18  0850 02/02/18  0814   WBC  --  8.5 17.6* 8.8   HGB 13.4 14.1 13.6 12.6   MCV  --  96 95 97   PLT  --  198 227 173     Most Recent 3 BMP's:  Recent Labs   Lab Test 03/20/19 10/09/18 07/27/18    141 140   POTASSIUM 4.5 4.0 4.6   CHLORIDE 103 105 103   CO2 29 28 28   BUN 16 14 19   CR 0.90 0.96 0.98   ANIONGAP 8 8 9   FRANCISCO 9.6 9.3 9.7   GLC 89 110 102       ASSESSMENT/PLAN:  Bilateral leg edema  Trace edema. Bruising apparently from removing/putting on otilio hose per self  1. Discontinue otilio hose  2. Cont. Lasix, change norvasc admin time to pm  3. Cont. To elevate LEs  4. Cont every day wt.s, monitor for resp. Distress  5. Cbc, bmp in next 1-3 mos    Anxiety  Currently controlled  1. Cont. celexa  2. Cont. Bid remeron  3. For stable s/s over next few weeks, may try celexa GDR    Primary osteoarthritis of both knees  Currently stable  1. Cont. Tylenol  2. Cont. Prn aspercreme  3. Monitor for decreased act. Level, reports of increased pain  4. F/u cortisone inj prn      Electronically signed by:  ZAIN Marcos CNP

## 2019-04-02 NOTE — LETTER
4/2/2019        RE: Jade Saeed  [address] [facility] [address]        [place] [facility]  [place] Medical Record Number:  [id number]  Place of Service where encounter took place:  [facility] ([facility]) [[id number]]  Chief Complaint   Patient presents with     Edema       HPI:    Jade Caba  is a 85 year old (8/26/1933), who is being seen today for an episodic care visit.  HPI information obtained from: facility chart records, facility staff, patient report and [facility] chart review. Today's concern is: edema, anxiety, pain. Has had increased LE edema. Has CHF. Cont on lasix, recently started on otilio hose. Reports newer bruising to LEs. Reports putting otilio hose on/taking off by self. No increased LE pain. Edema unchanged. Reports knee pain stable. Taking tylenol, prn aspercreme. Reports trying to increased amount of amb. Per day. Cont. To elevate LEs.  Reports mood gen. Stable. No increased anxiety. Cont. On celexa, remeron. No reports of insomnia.       Past Medical and Surgical History reviewed in Epic today.    MEDICATIONS:  Current Outpatient Medications   Medication Sig Dispense Refill     Acetaminophen (TYLENOL PO) Take 1,000 mg by mouth 2 times daily       acetaminophen (TYLENOL) 325 MG tablet Take 650 mg by mouth 2 times daily as needed for mild pain       amLODIPine (NORVASC) 10 MG tablet Take 1 tablet (10 mg) by mouth daily 30 tablet 1     aspirin (ASA) 81 MG EC tablet Take 1 tablet (81 mg) by mouth daily 90 tablet 3     bisacodyl (DULCOLAX) 10 MG suppository Place 10 mg rectally daily as needed       calcium carbonate 500 mg, elemental, (OSCAL;OYSTER SHELL CALCIUM) 500 MG tablet Take 100 tablets by mouth 2 times daily       carvedilol (COREG) 12.5 MG tablet Take 1 tablet (12.5 mg) by mouth 2 times daily (with meals) 60 tablet 1     cholecalciferol (VITAMIN D3) 5000 UNITS CAPS capsule Take 5,000 Units by mouth daily       citalopram (CELEXA) 10  MG tablet Take 30 mg by mouth daily       folic acid (FOLVITE) 1 MG tablet Take 1 mg by mouth daily       furosemide (LASIX) 20 MG tablet Take 20 mg by mouth daily       hypromellose (ARTIFICIAL TEARS) 0.5 % SOLN ophthalmic solution Place 3 drops into both eyes 3 times daily       levETIRAcetam (KEPPRA) 500 MG tablet Take 500 mg by mouth 2 times daily       losartan (COZAAR) 100 MG tablet Take 100 mg by mouth daily       metoclopramide (REGLAN) 5 MG tablet Take 5 mg by mouth 2 times daily And daily PRN at noon       Mirtazapine (REMERON PO) Take 15 mg by mouth 2 times daily       Multiple Vitamins-Minerals (CERTAVITE SENIOR/ANTIOXIDANT PO) Take by mouth daily       omeprazole (PRILOSEC) 40 MG capsule Take 1 capsule (40 mg) by mouth daily Take 30-60 minutes before a meal. 30 capsule 9     oxybutynin (DITROPAN) 5 MG tablet Take 5 mg by mouth 2 times daily       polyethylene glycol (MIRALAX/GLYCOLAX) powder Take 17 g by mouth nightly as needed        rosuvastatin (CRESTOR) 40 MG tablet Take 40 mg by mouth daily       senna-docusate (SENNA S) 8.6-50 MG per tablet Take 1 tablet by mouth 2 times daily Please hold if having loose stools and contact nurse.       trolamine salicylate (ASPERCREME) 10 % external cream Apply topically as needed for moderate pain (apply twice daily as needed for pain. Left knee)           REVIEW OF SYSTEMS:  No chest pain, shortness of breath, fevers, chills, headache, nausea, vomiting, dysuria or bowel abnormalities.  Appetite is  normal.  No pain except occ L knee.    Objective:  /64   Pulse 64   Resp 16   Wt 81.6 kg (180 lb)   SpO2 93%   BMI 34.01 kg/m     Exam:  GENERAL APPEARANCE:  Alert, in no distress, cooperative  ENT:  Mouth and posterior oropharynx normal, moist mucous membranes, [place]  EYES:  EOM, conjunctivae, lids, pupils and irises normal, PERRL  NECK:  No adenopathy,masses or thyromegaly, no carotid bruit  RESP:  respiratory effort and palpation of chest normal, lungs  clear to auscultation , no respiratory distress  CV:  Palpation and auscultation of heart done , regular rate and rhythm, no murmur, rub, or gallop, peripheral edema trace+ in LEs  ABDOMEN:  normal bowel sounds, soft, nontender, no hepatosplenomegaly or other masses, no guarding or rebound  M/S:   Gait and station normal  muscle strength 5/5 all 4 ext., normal tone  SKIN:  faint larger bruise L lat pretibial area, smaller resolving bruise same area LLE  NEURO:   Cranial nerves 2-12 are normal tested and grossly at patient's baseline, alert, speech clear  PSYCH:  insight and judgement impaired, memory impaired , affect and mood normal    Labs:     Most Recent 3 CBC's:  Recent Labs   Lab Test 10/09/18 07/27/18 03/12/18  0850 02/02/18  0814   WBC  --  8.5 17.6* 8.8   HGB 13.4 14.1 13.6 12.6   MCV  --  96 95 97   PLT  --  198 227 173     Most Recent 3 BMP's:  Recent Labs   Lab Test 03/20/19 10/09/18 07/27/18    141 140   POTASSIUM 4.5 4.0 4.6   CHLORIDE 103 105 103   CO2 29 28 28   BUN 16 14 19   CR 0.90 0.96 0.98   ANIONGAP 8 8 9   FRANCISCO 9.6 9.3 9.7   GLC 89 110 102       ASSESSMENT/PLAN:  Bilateral leg edema  Trace edema. Bruising apparently from removing/putting on otilio hose per self  1. Discontinue otilio hose  2. Cont. Lasix, change norvasc admin time to pm  3. Cont. To elevate LEs  4. Cont every day wt.s, monitor for resp. Distress  5. Cbc, bmp in next 1-3 mos    Anxiety  Currently controlled  1. Cont. celexa  2. Cont. Bid remeron  3. For stable s/s over next few weeks, may try celexa GDR    Primary osteoarthritis of both knees  Currently stable  1. Cont. Tylenol  2. Cont. Prn aspercreme  3. Monitor for decreased act. Level, reports of increased pain  4. F/u cortisone inj prn      Electronically signed by:  ZAIN Marcos CNP             Sincerely,        ZAIN Marcos CNP

## 2019-05-24 ENCOUNTER — ASSISTED LIVING VISIT (OUTPATIENT)
Dept: GERIATRICS | Facility: CLINIC | Age: 84
End: 2019-05-24
Payer: COMMERCIAL

## 2019-05-24 VITALS
WEIGHT: 180 LBS | DIASTOLIC BLOOD PRESSURE: 64 MMHG | SYSTOLIC BLOOD PRESSURE: 123 MMHG | OXYGEN SATURATION: 93 % | RESPIRATION RATE: 16 BRPM | HEART RATE: 64 BPM | BODY MASS INDEX: 34.01 KG/M2

## 2019-05-24 DIAGNOSIS — F41.9 ANXIETY: ICD-10-CM

## 2019-05-24 DIAGNOSIS — G40.909 SEIZURE DISORDER (H): ICD-10-CM

## 2019-05-24 DIAGNOSIS — R41.89 COGNITIVE IMPAIRMENT: ICD-10-CM

## 2019-05-24 DIAGNOSIS — I25.10 CORONARY ARTERY DISEASE INVOLVING NATIVE CORONARY ARTERY OF NATIVE HEART WITHOUT ANGINA PECTORIS: ICD-10-CM

## 2019-05-24 DIAGNOSIS — R09.89 LEFT CAROTID BRUIT: Primary | ICD-10-CM

## 2019-05-24 DIAGNOSIS — I10 ESSENTIAL HYPERTENSION: ICD-10-CM

## 2019-05-24 DIAGNOSIS — M17.0 PRIMARY OSTEOARTHRITIS OF BOTH KNEES: ICD-10-CM

## 2019-05-24 DIAGNOSIS — I50.32 CHRONIC DIASTOLIC CONGESTIVE HEART FAILURE (H): ICD-10-CM

## 2019-05-24 NOTE — LETTER
"    5/24/2019        RE: Jade Saeed  [address] [facility] [address]        Jade Caba is a 85 year old female seen May 24, 2019 at [facility] where she has resided for 5 years (admit 2014), seen to follow up seizure disorder, HTN and anxiety/depression.   Patient is seen in her apartment, up to recliner.  States she is \"good, no problems,\" denies any pain or dyspnea.   Does note some anxiety, for which she gets up and walks around her apartment or in the walls.   Feels she needs to do this several times a day.          Patient was hospitalized in January 2018 with NSTEMI and was found to have a thrombotic lesion in the mid RCA for which she had OLIVIA placed.   There was guidewire trauma in the proximal RCA and she had a OLIVIA placed there, as well as one to LAD.     She has been followed in Cardiology clinic since then.     She is also tx'd for HTN, seizure disorder and anxiety.   She was admitted to AL in 2014 after hospitalization and TCU stay following a fall at home, sustaining SAH, C1 chip fracture, sacral fracture.     Slow recovery.    She was readmitted that year for weight loss and failure to thrive, CPT 4.3  No recent falls reported.     Patient c/o left knee pain, has had cortisone injections with good benefit.   Ambulates without device.      Past Medical History:   Diagnosis Date     Anxiety      Arthritis      Atrial flutter (H)      C1 cervical fracture (H)      CAD (coronary artery disease)      Carotid artery stenosis      CKD (chronic kidney disease)      Depression      H/O alcohol abuse      Hypercholesterolemia      Hypertension      Renal artery stenosis (H)      Seizure disorder (H)      Subarachnoid hemorrhage (H) 2014     Past Surgical History:   Procedure Laterality Date     APPENDECTOMY       CAROTID ENDARTERECTOMY Left      Cataract surgery Right     will have laser eye surgery     CHOLECYSTECTOMY       Coronary stents x 3       HYSTERECTOMY      at age of 40-not " cancerus or precancerous      JOINT REPLACEMENT Bilateral      Stenting of renal artery       SH:  , has 10 children.      ROS:  Reports occ reflux symptoms.   Weight is stable.     Occ constipation.    Ambulates without device, has FWW that she uses if up to BR at night.       EXAM:  NAD, pleasant  /64   Pulse 64   Resp 16   Wt 81.6 kg (180 lb)   SpO2 93%   BMI 34.01 kg/m      Neck supple without adenopathy +left carotid bruit  Lungs clear bilaterally with fair air movement  Heart RRR s1s2, no murmur or gallop  Abd soft, NT, no distention, +BS  Ext with trace edema.   Ambulatory without device.    Neuro: no tremor or stiffness  Psych: affect okay, content anxious    Last Comprehensive Metabolic Panel:  Sodium   Date Value Ref Range Status   03/20/2019 140 138 - 145 mmol/L Final     Potassium   Date Value Ref Range Status   03/20/2019 4.5 3.5 - 5.0 mmol/L Final     Chloride   Date Value Ref Range Status   03/20/2019 103 98 - 107 mmol/L Final     Carbon Dioxide   Date Value Ref Range Status   03/20/2019 29 22 - 31 mmol/L Final     Anion Gap   Date Value Ref Range Status   03/20/2019 8 5 - 18 mmol/L Final     Glucose   Date Value Ref Range Status   03/20/2019 89 70 - 125 mg/dL Final     Urea Nitrogen   Date Value Ref Range Status   03/20/2019 16 8 - 28 mg/dL Final     Creatinine   Date Value Ref Range Status   03/20/2019 0.90 0.60 - 1.10 mg/dL Final     GFR Estimate   Date Value Ref Range Status   03/20/2019 60 (A) >60 ml/min/1.73m2 Final     Calcium   Date Value Ref Range Status   03/20/2019 9.6 8.5 - 10.5 mg/dL Final       IMP/PLAN:  (R09.89) Left carotid bruit    Comment: h/o left carotid endarterectomy    Plan: asx, no further workup indicated     (I21.4) NSTEMI (non-ST elevated myocardial infarction) (H)  (I25.10) Coronary artery disease involving native coronary artery of native heart without angina pectoris  Comment: s/p OLIVIA x3  Plan: ASA+clopidogrel for one year, now just on ASA, follow up with  Cardiology yearly.   Continue beta blocker, statin    ((M17.12) Primary osteoarthritis of left knee  Comment: recurrent   Plan:JNoel to see to consider repeat cortisone injection if needed.  Continue scheduled acetaminophen        (I10) Essential hypertension   Comment: GFR 60  BP Readings from Last 3 Encounters:   05/24/19 123/64   04/02/19 123/64   03/19/19 131/78      Plan: continue amlodipine, carvedilol, losartan    (G40.909) Seizure disorder (H)  Comment: hard to tell how well controlled these are, but no witnessed events recently  Plan: continue Keppra    (F41.1) Anxiety state  Comment: patient already on max treatment  Plan: continue same doses of mirtazapine and citalopram       (R41.89) Cognitive impairment  Comment: by prior testing  Plan: appropriate for AL with med admin and services.     (I50.32) Chronic diastolic congestive heart failure (H)  Comment: stable volume status  Plan: continue current dose of furosemide, check BMP twice yearly    (N39.41) Urgency incontinence  Comment: reports dry mouth as SE of oxybutynin  Plan: decrease to once daily.      (K21.9) Gastroesophageal reflux disease without esophagitis  Comment: also on metoclopramide for ?reflux sx  Plan: continue omeprazole  Trial GDR metoclopramide to prn only.   Can discontinue that if she does not use it.         Osteoporosis  Comment : h/o falls  Plan: vit D daily.  She is eating well     Kerry Machado MD       Sincerely,        Kerry Machado MD

## 2019-05-28 NOTE — PROGRESS NOTES
"Jade Caba is a 85 year old female seen May 24, 2019 at [facility] where she has resided for 5 years (admit 2014), seen to follow up seizure disorder, HTN and anxiety/depression.   Patient is seen in her apartment, up to recliner.  States she is \"good, no problems,\" denies any pain or dyspnea.   Does note some anxiety, for which she gets up and walks around her apartment or in the walls.   Feels she needs to do this several times a day.          Patient was hospitalized in January 2018 with NSTEMI and was found to have a thrombotic lesion in the mid RCA for which she had OLIVIA placed.   There was guidewire trauma in the proximal RCA and she had a OLIVIA placed there, as well as one to LAD.     She has been followed in Cardiology clinic since then.     She is also tx'd for HTN, seizure disorder and anxiety.   She was admitted to AL in 2014 after hospitalization and TCU stay following a fall at home, sustaining SAH, C1 chip fracture, sacral fracture.     Slow recovery.    She was readmitted that year for weight loss and failure to thrive, CPT 4.3  No recent falls reported.     Patient c/o left knee pain, has had cortisone injections with good benefit.   Ambulates without device.      Past Medical History:   Diagnosis Date     Anxiety      Arthritis      Atrial flutter (H)      C1 cervical fracture (H)      CAD (coronary artery disease)      Carotid artery stenosis      CKD (chronic kidney disease)      Depression      H/O alcohol abuse      Hypercholesterolemia      Hypertension      Renal artery stenosis (H)      Seizure disorder (H)      Subarachnoid hemorrhage (H) 2014     Past Surgical History:   Procedure Laterality Date     APPENDECTOMY       CAROTID ENDARTERECTOMY Left      Cataract surgery Right     will have laser eye surgery     CHOLECYSTECTOMY       Coronary stents x 3       HYSTERECTOMY      at age of 40-not cancerus or precancerous      JOINT REPLACEMENT Bilateral      Stenting of renal artery       SH: "  , has 10 children.      ROS:  Reports occ reflux symptoms.   Weight is stable.     Occ constipation.    Ambulates without device, has FWW that she uses if up to BR at night.       EXAM:  NAD, pleasant  /64   Pulse 64   Resp 16   Wt 81.6 kg (180 lb)   SpO2 93%   BMI 34.01 kg/m     Neck supple without adenopathy +left carotid bruit  Lungs clear bilaterally with fair air movement  Heart RRR s1s2, no murmur or gallop  Abd soft, NT, no distention, +BS  Ext with trace edema.   Ambulatory without device.    Neuro: no tremor or stiffness  Psych: affect okay, content anxious    Last Comprehensive Metabolic Panel:  Sodium   Date Value Ref Range Status   03/20/2019 140 138 - 145 mmol/L Final     Potassium   Date Value Ref Range Status   03/20/2019 4.5 3.5 - 5.0 mmol/L Final     Chloride   Date Value Ref Range Status   03/20/2019 103 98 - 107 mmol/L Final     Carbon Dioxide   Date Value Ref Range Status   03/20/2019 29 22 - 31 mmol/L Final     Anion Gap   Date Value Ref Range Status   03/20/2019 8 5 - 18 mmol/L Final     Glucose   Date Value Ref Range Status   03/20/2019 89 70 - 125 mg/dL Final     Urea Nitrogen   Date Value Ref Range Status   03/20/2019 16 8 - 28 mg/dL Final     Creatinine   Date Value Ref Range Status   03/20/2019 0.90 0.60 - 1.10 mg/dL Final     GFR Estimate   Date Value Ref Range Status   03/20/2019 60 (A) >60 ml/min/1.73m2 Final     Calcium   Date Value Ref Range Status   03/20/2019 9.6 8.5 - 10.5 mg/dL Final       IMP/PLAN:  (R09.89) Left carotid bruit    Comment: h/o left carotid endarterectomy    Plan: asx, no further workup indicated     (I21.4) NSTEMI (non-ST elevated myocardial infarction) (H)  (I25.10) Coronary artery disease involving native coronary artery of native heart without angina pectoris  Comment: s/p OLIVIA x3  Plan: ASA+clopidogrel for one year, now just on ASA, follow up with Cardiology yearly.   Continue beta blocker, statin    ((M17.12) Primary osteoarthritis of left  knee  Comment: recurrent   Plan:JNoel to see to consider repeat cortisone injection if needed.  Continue scheduled acetaminophen        (I10) Essential hypertension   Comment: GFR 60  BP Readings from Last 3 Encounters:   05/24/19 123/64   04/02/19 123/64   03/19/19 131/78      Plan: continue amlodipine, carvedilol, losartan    (G40.909) Seizure disorder (H)  Comment: hard to tell how well controlled these are, but no witnessed events recently  Plan: continue Keppra    (F41.1) Anxiety state  Comment: patient already on max treatment  Plan: continue same doses of mirtazapine and citalopram       (R41.89) Cognitive impairment  Comment: by prior testing  Plan: appropriate for AL with med admin and services.     (I50.32) Chronic diastolic congestive heart failure (H)  Comment: stable volume status  Plan: continue current dose of furosemide, check BMP twice yearly    (N39.41) Urgency incontinence  Comment: reports dry mouth as SE of oxybutynin  Plan: decrease to once daily.      (K21.9) Gastroesophageal reflux disease without esophagitis  Comment: also on metoclopramide for ?reflux sx  Plan: continue omeprazole  Trial GDR metoclopramide to prn only.   Can discontinue that if she does not use it.         Osteoporosis  Comment : h/o falls  Plan: vit D daily.  She is eating well     Kerry Machado MD

## 2019-07-18 ENCOUNTER — APPOINTMENT (OUTPATIENT)
Dept: GENERAL RADIOLOGY | Facility: CLINIC | Age: 84
DRG: 193 | End: 2019-07-18
Attending: EMERGENCY MEDICINE
Payer: COMMERCIAL

## 2019-07-18 ENCOUNTER — HOSPITAL ENCOUNTER (INPATIENT)
Facility: CLINIC | Age: 84
LOS: 3 days | Discharge: NURSING, REHAB OR LONG-TERM CARE | DRG: 193 | End: 2019-07-21
Attending: EMERGENCY MEDICINE | Admitting: INTERNAL MEDICINE
Payer: COMMERCIAL

## 2019-07-18 DIAGNOSIS — R61 DIAPHORESIS: ICD-10-CM

## 2019-07-18 DIAGNOSIS — R11.0 NAUSEA: ICD-10-CM

## 2019-07-18 DIAGNOSIS — J18.9 PNEUMONIA DUE TO INFECTIOUS ORGANISM, UNSPECIFIED LATERALITY, UNSPECIFIED PART OF LUNG: Primary | ICD-10-CM

## 2019-07-18 DIAGNOSIS — R07.9 CHEST PAIN, UNSPECIFIED TYPE: ICD-10-CM

## 2019-07-18 DIAGNOSIS — R06.02 SHORTNESS OF BREATH: ICD-10-CM

## 2019-07-18 PROBLEM — I24.9 ACUTE CORONARY SYNDROME (H): Status: ACTIVE | Noted: 2019-07-18

## 2019-07-18 LAB
ANION GAP SERPL CALCULATED.3IONS-SCNC: 4 MMOL/L (ref 3–14)
BASOPHILS # BLD AUTO: 0.1 10E9/L (ref 0–0.2)
BASOPHILS NFR BLD AUTO: 0.5 %
BUN SERPL-MCNC: 21 MG/DL (ref 7–30)
CALCIUM SERPL-MCNC: 9.1 MG/DL (ref 8.5–10.1)
CHLORIDE SERPL-SCNC: 106 MMOL/L (ref 94–109)
CO2 SERPL-SCNC: 28 MMOL/L (ref 20–32)
CREAT SERPL-MCNC: 1.04 MG/DL (ref 0.52–1.04)
DIFFERENTIAL METHOD BLD: ABNORMAL
EOSINOPHIL # BLD AUTO: 0.3 10E9/L (ref 0–0.7)
EOSINOPHIL NFR BLD AUTO: 1.8 %
ERYTHROCYTE [DISTWIDTH] IN BLOOD BY AUTOMATED COUNT: 12 % (ref 10–15)
ERYTHROCYTE [DISTWIDTH] IN BLOOD BY AUTOMATED COUNT: 12.2 % (ref 10–15)
GFR SERPL CREATININE-BSD FRML MDRD: 49 ML/MIN/{1.73_M2}
GLUCOSE SERPL-MCNC: 110 MG/DL (ref 70–99)
HCT VFR BLD AUTO: 41.2 % (ref 35–47)
HCT VFR BLD AUTO: 41.4 % (ref 35–47)
HGB BLD-MCNC: 13.4 G/DL (ref 11.7–15.7)
HGB BLD-MCNC: 13.4 G/DL (ref 11.7–15.7)
IMM GRANULOCYTES # BLD: 0.1 10E9/L (ref 0–0.4)
IMM GRANULOCYTES NFR BLD: 0.3 %
LMWH PPP CHRO-ACNC: 0.4 IU/ML
LYMPHOCYTES # BLD AUTO: 2.3 10E9/L (ref 0.8–5.3)
LYMPHOCYTES NFR BLD AUTO: 15.6 %
MCH RBC QN AUTO: 31 PG (ref 26.5–33)
MCH RBC QN AUTO: 31.6 PG (ref 26.5–33)
MCHC RBC AUTO-ENTMCNC: 32.4 G/DL (ref 31.5–36.5)
MCHC RBC AUTO-ENTMCNC: 32.5 G/DL (ref 31.5–36.5)
MCV RBC AUTO: 96 FL (ref 78–100)
MCV RBC AUTO: 97 FL (ref 78–100)
MONOCYTES # BLD AUTO: 1 10E9/L (ref 0–1.3)
MONOCYTES NFR BLD AUTO: 7.1 %
NEUTROPHILS # BLD AUTO: 11 10E9/L (ref 1.6–8.3)
NEUTROPHILS NFR BLD AUTO: 74.7 %
NRBC # BLD AUTO: 0 10*3/UL
NRBC BLD AUTO-RTO: 0 /100
NT-PROBNP SERPL-MCNC: 385 PG/ML (ref 0–1800)
PLATELET # BLD AUTO: 171 10E9/L (ref 150–450)
PLATELET # BLD AUTO: 186 10E9/L (ref 150–450)
POTASSIUM SERPL-SCNC: 4.2 MMOL/L (ref 3.4–5.3)
RBC # BLD AUTO: 4.24 10E12/L (ref 3.8–5.2)
RBC # BLD AUTO: 4.32 10E12/L (ref 3.8–5.2)
SODIUM SERPL-SCNC: 138 MMOL/L (ref 133–144)
TROPONIN I SERPL-MCNC: <0.015 UG/L (ref 0–0.04)
WBC # BLD AUTO: 11.3 10E9/L (ref 4–11)
WBC # BLD AUTO: 14.7 10E9/L (ref 4–11)

## 2019-07-18 PROCEDURE — 36415 COLL VENOUS BLD VENIPUNCTURE: CPT | Performed by: INTERNAL MEDICINE

## 2019-07-18 PROCEDURE — 84484 ASSAY OF TROPONIN QUANT: CPT | Performed by: EMERGENCY MEDICINE

## 2019-07-18 PROCEDURE — 85027 COMPLETE CBC AUTOMATED: CPT | Performed by: INTERNAL MEDICINE

## 2019-07-18 PROCEDURE — 99223 1ST HOSP IP/OBS HIGH 75: CPT | Mod: AI | Performed by: INTERNAL MEDICINE

## 2019-07-18 PROCEDURE — 25000132 ZZH RX MED GY IP 250 OP 250 PS 637: Performed by: INTERNAL MEDICINE

## 2019-07-18 PROCEDURE — 12000000 ZZH R&B MED SURG/OB

## 2019-07-18 PROCEDURE — 25000132 ZZH RX MED GY IP 250 OP 250 PS 637: Performed by: EMERGENCY MEDICINE

## 2019-07-18 PROCEDURE — 36415 COLL VENOUS BLD VENIPUNCTURE: CPT | Performed by: EMERGENCY MEDICINE

## 2019-07-18 PROCEDURE — 85025 COMPLETE CBC W/AUTO DIFF WBC: CPT | Performed by: EMERGENCY MEDICINE

## 2019-07-18 PROCEDURE — 71046 X-RAY EXAM CHEST 2 VIEWS: CPT

## 2019-07-18 PROCEDURE — 93005 ELECTROCARDIOGRAM TRACING: CPT

## 2019-07-18 PROCEDURE — 85520 HEPARIN ASSAY: CPT | Performed by: INTERNAL MEDICINE

## 2019-07-18 PROCEDURE — 83880 ASSAY OF NATRIURETIC PEPTIDE: CPT | Performed by: EMERGENCY MEDICINE

## 2019-07-18 PROCEDURE — 84484 ASSAY OF TROPONIN QUANT: CPT | Performed by: INTERNAL MEDICINE

## 2019-07-18 PROCEDURE — 80048 BASIC METABOLIC PNL TOTAL CA: CPT | Performed by: EMERGENCY MEDICINE

## 2019-07-18 PROCEDURE — 25000128 H RX IP 250 OP 636: Performed by: INTERNAL MEDICINE

## 2019-07-18 PROCEDURE — 99285 EMERGENCY DEPT VISIT HI MDM: CPT | Mod: 25

## 2019-07-18 RX ORDER — MIRTAZAPINE 15 MG/1
15 TABLET, FILM COATED ORAL 2 TIMES DAILY
Status: DISCONTINUED | OUTPATIENT
Start: 2019-07-18 | End: 2019-07-21 | Stop reason: HOSPADM

## 2019-07-18 RX ORDER — ACETAMINOPHEN 325 MG/1
650 TABLET ORAL EVERY 4 HOURS PRN
Status: DISCONTINUED | OUTPATIENT
Start: 2019-07-18 | End: 2019-07-19

## 2019-07-18 RX ORDER — BISACODYL 5 MG
5 TABLET, DELAYED RELEASE (ENTERIC COATED) ORAL DAILY PRN
Status: DISCONTINUED | OUTPATIENT
Start: 2019-07-18 | End: 2019-07-21 | Stop reason: HOSPADM

## 2019-07-18 RX ORDER — AMLODIPINE BESYLATE 10 MG/1
10 TABLET ORAL AT BEDTIME
Status: DISCONTINUED | OUTPATIENT
Start: 2019-07-18 | End: 2019-07-21 | Stop reason: HOSPADM

## 2019-07-18 RX ORDER — ACETAMINOPHEN 500 MG
1000 TABLET ORAL 2 TIMES DAILY
Status: DISCONTINUED | OUTPATIENT
Start: 2019-07-18 | End: 2019-07-21 | Stop reason: HOSPADM

## 2019-07-18 RX ORDER — FOLIC ACID 1 MG/1
1 TABLET ORAL DAILY
Status: DISCONTINUED | OUTPATIENT
Start: 2019-07-19 | End: 2019-07-21 | Stop reason: HOSPADM

## 2019-07-18 RX ORDER — ROSUVASTATIN CALCIUM 20 MG/1
40 TABLET, COATED ORAL AT BEDTIME
Status: DISCONTINUED | OUTPATIENT
Start: 2019-07-18 | End: 2019-07-21 | Stop reason: HOSPADM

## 2019-07-18 RX ORDER — ASPIRIN 81 MG/1
81 TABLET ORAL DAILY
Status: DISCONTINUED | OUTPATIENT
Start: 2019-07-19 | End: 2019-07-21 | Stop reason: HOSPADM

## 2019-07-18 RX ORDER — LEVETIRACETAM 250 MG/1
500 TABLET ORAL 2 TIMES DAILY
Status: DISCONTINUED | OUTPATIENT
Start: 2019-07-18 | End: 2019-07-21 | Stop reason: HOSPADM

## 2019-07-18 RX ORDER — NALOXONE HYDROCHLORIDE 0.4 MG/ML
.1-.4 INJECTION, SOLUTION INTRAMUSCULAR; INTRAVENOUS; SUBCUTANEOUS
Status: DISCONTINUED | OUTPATIENT
Start: 2019-07-18 | End: 2019-07-21 | Stop reason: HOSPADM

## 2019-07-18 RX ORDER — AMOXICILLIN 250 MG
2 CAPSULE ORAL 2 TIMES DAILY
Status: DISCONTINUED | OUTPATIENT
Start: 2019-07-18 | End: 2019-07-21 | Stop reason: HOSPADM

## 2019-07-18 RX ORDER — BISACODYL 5 MG
15 TABLET, DELAYED RELEASE (ENTERIC COATED) ORAL DAILY PRN
Status: DISCONTINUED | OUTPATIENT
Start: 2019-07-18 | End: 2019-07-21 | Stop reason: HOSPADM

## 2019-07-18 RX ORDER — AMOXICILLIN 250 MG
1 CAPSULE ORAL 2 TIMES DAILY
Status: DISCONTINUED | OUTPATIENT
Start: 2019-07-18 | End: 2019-07-19

## 2019-07-18 RX ORDER — POLYETHYLENE GLYCOL 3350 17 G/17G
17 POWDER, FOR SOLUTION ORAL
Status: DISCONTINUED | OUTPATIENT
Start: 2019-07-18 | End: 2019-07-21 | Stop reason: HOSPADM

## 2019-07-18 RX ORDER — METOCLOPRAMIDE 5 MG/1
5 TABLET ORAL DAILY PRN
Status: DISCONTINUED | OUTPATIENT
Start: 2019-07-18 | End: 2019-07-21 | Stop reason: HOSPADM

## 2019-07-18 RX ORDER — AMLODIPINE BESYLATE 10 MG/1
10 TABLET ORAL AT BEDTIME
COMMUNITY
End: 2021-09-29

## 2019-07-18 RX ORDER — ONDANSETRON 4 MG/1
4 TABLET, ORALLY DISINTEGRATING ORAL EVERY 6 HOURS PRN
Status: DISCONTINUED | OUTPATIENT
Start: 2019-07-18 | End: 2019-07-21 | Stop reason: HOSPADM

## 2019-07-18 RX ORDER — CARVEDILOL 12.5 MG/1
12.5 TABLET ORAL 2 TIMES DAILY WITH MEALS
Status: DISCONTINUED | OUTPATIENT
Start: 2019-07-18 | End: 2019-07-21 | Stop reason: HOSPADM

## 2019-07-18 RX ORDER — OXYBUTYNIN CHLORIDE 5 MG/1
5 TABLET ORAL 2 TIMES DAILY
Status: DISCONTINUED | OUTPATIENT
Start: 2019-07-18 | End: 2019-07-21 | Stop reason: HOSPADM

## 2019-07-18 RX ORDER — BISACODYL 5 MG
10 TABLET, DELAYED RELEASE (ENTERIC COATED) ORAL DAILY PRN
Status: DISCONTINUED | OUTPATIENT
Start: 2019-07-18 | End: 2019-07-21 | Stop reason: HOSPADM

## 2019-07-18 RX ORDER — LOSARTAN POTASSIUM 100 MG/1
100 TABLET ORAL DAILY
Status: DISCONTINUED | OUTPATIENT
Start: 2019-07-19 | End: 2019-07-19 | Stop reason: ALTCHOICE

## 2019-07-18 RX ORDER — NITROGLYCERIN 0.4 MG/1
0.4 TABLET SUBLINGUAL EVERY 5 MIN PRN
Status: DISCONTINUED | OUTPATIENT
Start: 2019-07-18 | End: 2019-07-21 | Stop reason: HOSPADM

## 2019-07-18 RX ORDER — LIDOCAINE 40 MG/G
CREAM TOPICAL
Status: DISCONTINUED | OUTPATIENT
Start: 2019-07-18 | End: 2019-07-21 | Stop reason: HOSPADM

## 2019-07-18 RX ORDER — POLYETHYLENE GLYCOL 3350 17 G/17G
17 POWDER, FOR SOLUTION ORAL
Status: DISCONTINUED | OUTPATIENT
Start: 2019-07-18 | End: 2019-07-18

## 2019-07-18 RX ORDER — BISACODYL 10 MG
10 SUPPOSITORY, RECTAL RECTAL DAILY PRN
Status: DISCONTINUED | OUTPATIENT
Start: 2019-07-18 | End: 2019-07-21 | Stop reason: HOSPADM

## 2019-07-18 RX ORDER — ONDANSETRON 2 MG/ML
4 INJECTION INTRAMUSCULAR; INTRAVENOUS EVERY 6 HOURS PRN
Status: DISCONTINUED | OUTPATIENT
Start: 2019-07-18 | End: 2019-07-21 | Stop reason: HOSPADM

## 2019-07-18 RX ORDER — AMOXICILLIN 250 MG
1 CAPSULE ORAL 2 TIMES DAILY
Status: DISCONTINUED | OUTPATIENT
Start: 2019-07-18 | End: 2019-07-21 | Stop reason: HOSPADM

## 2019-07-18 RX ADMIN — NITROGLYCERIN 0.4 MG: 0.4 TABLET SUBLINGUAL at 13:29

## 2019-07-18 RX ADMIN — CARVEDILOL 12.5 MG: 12.5 TABLET, FILM COATED ORAL at 17:13

## 2019-07-18 RX ADMIN — HEPARIN SODIUM 750 UNITS/HR: 10000 INJECTION, SOLUTION INTRAVENOUS at 17:36

## 2019-07-18 RX ADMIN — Medication 3800 UNITS: at 17:36

## 2019-07-18 RX ADMIN — OXYBUTYNIN CHLORIDE 5 MG: 5 TABLET ORAL at 20:57

## 2019-07-18 RX ADMIN — ACETAMINOPHEN 1000 MG: 500 TABLET, FILM COATED ORAL at 20:56

## 2019-07-18 RX ADMIN — NITROGLYCERIN 0.4 MG: 0.4 TABLET SUBLINGUAL at 13:22

## 2019-07-18 RX ADMIN — LEVETIRACETAM 500 MG: 250 TABLET, FILM COATED ORAL at 20:56

## 2019-07-18 RX ADMIN — SENNOSIDES AND DOCUSATE SODIUM 2 TABLET: 8.6; 5 TABLET ORAL at 20:55

## 2019-07-18 RX ADMIN — MIRTAZAPINE 15 MG: 15 TABLET, FILM COATED ORAL at 20:57

## 2019-07-18 RX ADMIN — ROSUVASTATIN CALCIUM 40 MG: 20 TABLET, FILM COATED ORAL at 20:57

## 2019-07-18 RX ADMIN — AMLODIPINE BESYLATE 10 MG: 10 TABLET ORAL at 20:56

## 2019-07-18 ASSESSMENT — ENCOUNTER SYMPTOMS
CHILLS: 0
NUMBNESS: 0
COUGH: 1
DIAPHORESIS: 1
NAUSEA: 0
FEVER: 0
LIGHT-HEADEDNESS: 1
SHORTNESS OF BREATH: 1

## 2019-07-18 ASSESSMENT — MIFFLIN-ST. JEOR
SCORE: 1214.72
SCORE: 1227.43

## 2019-07-18 ASSESSMENT — ACTIVITIES OF DAILY LIVING (ADL): ADLS_ACUITY_SCORE: 17

## 2019-07-18 NOTE — H&P
History and Physical     Jade Caba MRN# [id number]   YOB: 1933 Age: 85 year old      Date of Admission:  7/18/2019    Primary care provider: Jaden Marinelli          Assessment and Plan:   Summary of Stay: Jade Caba is a 85 year old female with a history of hypertension, hypercholesterolemia, coronary artery disease, diastolic heart failure with most recent echocardiogram in January 2018 showing EF of 55%, depression with anxiety, history of the subarachnoid hemorrhage in 2014 after a fall, who apparently still on seizure prophylaxis who presents with chest pain.     Her CAD history: Initial angioplasty of the circumflex and 97, OLIVIA to LAD in 2004, OLIVIA to the CX and RCA in 2011, recurrent PCI with OLIVIA to the LAD later in 2011, and most recently an ST HOANG in 1/2018 which time she underwent PCI to a thrombotic lesion of the RCA, was found to have significant in-stent restenosis to the LAD and underwent repeat PCI with OLIVIA to the LAD, and history of CVA and right carotid endarterectomy in 2003, left carotid endarterectomy in 2007, and known renal artery stenosis.  She had been on dual antiplatelet therapy with aspirin and clopidogrel until a few months ago at which time she was switched to full dose aspirin    She is admitted on 7/18/2019 with chest pain that is been stuttering for the past 3 weeks, worsening over the past 4 days, BELCHER, and increasing lower extremity edema, all consistent with acute coronary syndrome.  Initial troponin is undetectable, and EKG shows no acute ischemic findings.    She has been admitted to her cardiac unit on a heparin drip, for serial troponins, echocardiogram, formal cardiology consultation, with plans for angiography in the morning  Problem List:   1. Acute coronary syndrome: Discussed with cardiology, admit on heparin, serial tropes, echo, formal cardiology consultation, with plans for angiography in the morning.  She will be n.p.o. after  midnight  2. Hypertension: Continue baseline amlodipine 10 mg, carvedilol 12.5 mg twice daily, furosemide 20 mg p.o. daily and losartan 100 mg a day  3. CAD: Continue aspirin/statin/beta-blocker/arb  4. Remote history of subarachnoid hemorrhage, still on seizure prophylaxis, continue levetiracetam 500 mg twice daily  5. Depression: Continue mirtazapine 15 mg twice daily  DVT Prophylaxis: Heparin drip  Code Status: DNR-discussed with patient at the bedside, and verified by daughters were also at the bedside.  Functional status: She lives in an assisted living facility, but she is independent with all activities  Dempsey: Not present              Chief Complaint:   Neck/shoulder/chest pain       History of Present Illness:   Jade Caba is a 85 year old female with a history of hypertension, hypercholesterolemia, coronary artery disease, diastolic heart failure with most recent echocardiogram in January 2018 showing EF of 55%, depression with anxiety, history of the subarachnoid hemorrhage in 2014 after a fall, who apparently still on seizure prophylaxis who presents with chest pain.     Her CAD history: Initial angioplasty of the circumflex and 97, OLIVIA to LAD in 2004, OLIVIA to the CX and RCA in 2011, recurrent PCI with OLIVIA to the LAD later in 2011, and most recently an ST HOANG in 1/2018 which time she underwent PCI to a thrombotic lesion of the RCA, was found to have significant in-stent restenosis to the LAD and underwent repeat PCI with OLIVIA to the LAD, and history of CVA and right carotid endarterectomy in 2003, left carotid endarterectomy in 2007, and known renal artery stenosis.  She had been on dual antiplatelet therapy with aspirin and clopidogrel until a few months ago at which time she was switched to full dose aspirin.    She presents today with a 2 to 3-week history of progressive neck/shoulder/bilateral arm/chest pain for the past 2 to 3 weeks.  Her actual chief complaint to me is I have my stent pain back.   She describes some pain predominantly in the left lateral side of her neck that will go down both shoulders and arms, that has been intermittent over the past few weeks.  Over the past 4 days though it centered into her chest which she describes as a burning sensation, she is noticed that she feels lightheaded with it but has not passed out, and has been sweaty with it as well.  She notes worsening of her chronic lower extremity edema and dyspnea on exertion.  She is tried Tylenol without improvement.  And given progression she discussed it with the nurse at the assisted living facility who sent her into the emergency room by EMS for further evaluation.    In route she received 1 dose of nitroglycerin with improvement in symptoms, she subsequently received 2 more doses of nitroglycerin in the emergency room with resolution.    Vital signs on presentation she was mildly hypertensive, this is subsequently normalized, she was afebrile.  Labs are notable for undetectable troponin, normal BNP, and EKG without ischemic findings.  BMP also within normal limits, CBC notable for a white count of 14.7 with a leukocytosis.    The history is obtained in discussion with the ER provider Dr. Dea Garg, the patient and her daughters at the bedside all with excellent reliability        Past Medical History:     Past Medical History:   Diagnosis Date     Anxiety      Arthritis      Atrial flutter (H)      C1 cervical fracture (H)      CAD (coronary artery disease)     cath in 1/2018: Angioplasty and stenting of 90% mid RCA stenosis, 50% right ostial coronary stenosis, and ostial LAD stenosis.  Moderate disease throughout the mid LAD and 30 to 50% range.     Carotid artery stenosis      CKD (chronic kidney disease)      Depression      Diastolic heart failure (H)     Most recent echo 1/2018: EF 55%, inferior regional wall motion abnormality, increased left ventricular end-diastolic pressure.     H/O alcohol abuse       Hypercholesterolemia      Hypertension      Renal artery stenosis (H)      Seizure disorder (H)      Subarachnoid hemorrhage (H) 2014             Past Surgical History:     Past Surgical History:   Procedure Laterality Date     APPENDECTOMY       CAROTID ENDARTERECTOMY Left      Cataract surgery Right     will have laser eye surgery     CHOLECYSTECTOMY       Coronary stents x 3       HYSTERECTOMY      at age of 40-not cancerus or precancerous      JOINT REPLACEMENT Bilateral      Stenting of renal artery               Social History:     Social History     Tobacco Use     Smoking status: Never Smoker     Smokeless tobacco: Never Used   Substance Use Topics     Alcohol use: No   Lives at [facility], independent with ADLs          Family History:     Family History   Problem Relation Age of Onset     C.A.D. Mother      C.A.D. Brother      C.ASANIYA. Sister      MODESTO.DANETTE. Brother      Cancer Sister         Breast cancer     Heart Disease Son             Allergies:     Allergies   Allergen Reactions     No Known Allergies              Medications:     Prior to Admission medications    Medication Sig Last Dose Taking? Auth Provider   Acetaminophen (TYLENOL PO) Take 1,000 mg by mouth 2 times daily 7/18/2019 at 0800 Yes Reported, Patient   acetaminophen (TYLENOL) 325 MG tablet Take 650 mg by mouth 2 times daily as needed for mild pain 7/18/2019 at Unknown time Yes Unknown, Entered By History   amLODIPine (NORVASC) 10 MG tablet Take 10 mg by mouth At Bedtime 7/17/2019 at 2000 Yes Unknown, Entered By History   aspirin (ASA) 81 MG EC tablet Take 1 tablet (81 mg) by mouth daily 7/18/2019 at 0800 Yes Dean Sorensen MD   calcium carbonate 500 mg, elemental, (OSCAL;OYSTER SHELL CALCIUM) 500 MG tablet Take 2 tablets by mouth every evening  7/17/2019 at 1700 Yes Reported, Patient   carvedilol (COREG) 12.5 MG tablet Take 1 tablet (12.5 mg) by mouth 2 times daily (with meals) 7/18/2019 at 0800 Yes Giuseppe  Ken Guevara DO   cholecalciferol (VITAMIN D3) 5000 UNITS CAPS capsule Take 5,000 Units by mouth daily 7/18/2019 at 0800 Yes Reported, Patient   citalopram (CELEXA) 10 MG tablet Take 30 mg by mouth daily 7/18/2019 at 0800 Yes Unknown, Entered By History   folic acid (FOLVITE) 1 MG tablet Take 1 mg by mouth daily 7/18/2019 at 0800 Yes Reported, Patient   furosemide (LASIX) 20 MG tablet Take 20 mg by mouth daily 7/18/2019 at 0800 Yes Reported, Patient   hypromellose (ARTIFICIAL TEARS) 0.5 % SOLN ophthalmic solution Place 1 drop into both eyes 3 times daily  7/18/2019 at 0800 Yes Reported, Patient   levETIRAcetam (KEPPRA) 500 MG tablet Take 500 mg by mouth 2 times daily 7/18/2019 at 0800 Yes Reported, Patient   losartan (COZAAR) 100 MG tablet Take 100 mg by mouth daily 7/18/2019 at 0800 Yes Unknown, Entered By History   Mirtazapine (REMERON PO) Take 15 mg by mouth 2 times daily 7/18/2019 at 0800 Yes Reported, Patient   Multiple Vitamins-Minerals (CERTAVITE SENIOR/ANTIOXIDANT PO) Take 1 tablet by mouth daily  7/18/2019 at 0800 Yes Reported, Patient   omeprazole (PRILOSEC) 40 MG capsule Take 1 capsule (40 mg) by mouth daily Take 30-60 minutes before a meal. 7/18/2019 at 0800 Yes Suzi Bailey MD   oxybutynin (DITROPAN) 5 MG tablet Take 5 mg by mouth 2 times daily 7/18/2019 at 0800 Yes Unknown, Entered By History   rosuvastatin (CRESTOR) 40 MG tablet Take 40 mg by mouth At Bedtime  7/17/2019 at 2000 Yes Reported, Patient   senna-docusate (SENNA S) 8.6-50 MG per tablet Take 1 tablet by mouth 2 times daily Please hold if having loose stools and contact nurse. 7/18/2019 at 0800 Yes Unknown, Entered By History   bisacodyl (DULCOLAX) 10 MG suppository Place 10 mg rectally daily as needed Unknown at Unknown time  Luz Ayers APRN CNP   metoclopramide (REGLAN) 5 MG tablet Take 5 mg by mouth daily as needed (at noon)  Unknown at Unknown time  Luz Ayers, APRN CNP   polyethylene glycol (MIRALAX/GLYCOLAX) powder Take 17  "g by mouth nightly as needed  Unknown at Unknown time  Unknown, Entered By History   trolamine salicylate (ASPERCREME) 10 % external cream Apply topically as needed for moderate pain (apply twice daily as needed for pain. Left knee) Unknown at Unknown time  Reported, Patient                 Review of Systems:   A Comprehensive greater than 10 system review of systems was carried out.  Pertinent positives and negatives are noted above.  Otherwise negative for contributory information.           Physical Exam:   Blood pressure 125/48, pulse 63, temperature 97.4  F (36.3  C), temperature source Oral, resp. rate 16, height 1.575 m (5' 2\"), weight 81.6 kg (180 lb), SpO2 94 %, not currently breastfeeding.  Exam:    General:  Pleasant nad looks stated age  HEENT:  Head nc/at sclera clear PERRL O/P:  Moist mucus membranes no posterior pharyngeal erythema or exudate.  Neck is supple  Lungs: cta b nl effort   CV:  RRR no m/r/g no le edema  Abd:  S/nt/nd no r/g  Neuro:  Cn 2-12 grossly intact and strength is intact in b ue and le  Alert and oriented affect appropriate   Skin:  W/d no c/c               Data:          Lab Results   Component Value Date     07/18/2019    Lab Results   Component Value Date    CHLORIDE 106 07/18/2019    Lab Results   Component Value Date    BUN 21 07/18/2019      Lab Results   Component Value Date    POTASSIUM 4.2 07/18/2019    Lab Results   Component Value Date    CO2 28 07/18/2019    Lab Results   Component Value Date    CR 1.04 07/18/2019        Lab Results   Component Value Date    NTBNPI 385 07/18/2019     Lab Results   Component Value Date    WBC 14.7 (H) 07/18/2019    HGB 13.4 07/18/2019    HCT 41.4 07/18/2019    MCV 96 07/18/2019     07/18/2019     Lab Results   Component Value Date     (H) 07/18/2019     Lab Results   Component Value Date    TROPI <0.015 07/18/2019     EKG: My personal read looks like normal sinus rhythm, Q's in 3 and aVL, no acute ST-T findings       " Imaging:     Recent Results (from the past 24 hour(s))   XR Chest 2 Views    Narrative    CHEST TWO VIEWS  7/18/2019 1:58 PM     HISTORY:  Chest pain.    COMPARISON: 3/12/2018.      Impression    IMPRESSION: Mild anterior compression of a lower thoracic vertebral  body is unchanged. Aortic calcification. Chest otherwise negative.  Lungs clear. No pleural effusions. Heart size and pulmonary  vascularity are within normal limits.    MARY BROWN MD

## 2019-07-18 NOTE — PHARMACY-ADMISSION MEDICATION HISTORY
Admission medication history interview status for this patient is complete. See [facility] admission navigator for allergy information, prior to admission medications and immunization status.     Medication history interview source(s):Patient and Caregiver  Medication history resources (including written lists, pill bottles, clinic record): Med list w/ admin times from   Primary pharmacy:[facility]     Changes made to PTA medication list:  Added: none  Deleted: none  Changed: amlodipine, calcium, artificial tears, metoclopramide, crestor     Actions taken by pharmacist (provider contacted, etc):None     Additional medication history information:None    Medication reconciliation/reorder completed by provider prior to medication history? No    Do you take OTC medications (eg tylenol, ibuprofen, fish oil, eye/ear drops, etc)? Y (Y/N)    For patients on insulin therapy: N (Y/N)    Prior to Admission medications    Medication Sig Last Dose Taking? Auth Provider   Acetaminophen (TYLENOL PO) Take 1,000 mg by mouth 2 times daily 7/18/2019 at 0800 Yes Reported, Patient   acetaminophen (TYLENOL) 325 MG tablet Take 650 mg by mouth 2 times daily as needed for mild pain 7/18/2019 at Unknown time Yes Unknown, Entered By History   amLODIPine (NORVASC) 10 MG tablet Take 10 mg by mouth At Bedtime 7/17/2019 at 2000 Yes Unknown, Entered By History   aspirin (ASA) 81 MG EC tablet Take 1 tablet (81 mg) by mouth daily 7/18/2019 at 0800 Yes Dean Sorensen MD   calcium carbonate 500 mg, elemental, (OSCAL;OYSTER SHELL CALCIUM) 500 MG tablet Take 2 tablets by mouth every evening  7/17/2019 at 1700 Yes Reported, Patient   carvedilol (COREG) 12.5 MG tablet Take 1 tablet (12.5 mg) by mouth 2 times daily (with meals) 7/18/2019 at 0800 Yes Ken Chin DO   cholecalciferol (VITAMIN D3) 5000 UNITS CAPS capsule Take 5,000 Units by mouth daily 7/18/2019 at 0800 Yes Reported, Patient   citalopram (CELEXA) 10 MG tablet Take 30 mg by mouth  daily 7/18/2019 at 0800 Yes Unknown, Entered By History   folic acid (FOLVITE) 1 MG tablet Take 1 mg by mouth daily 7/18/2019 at 0800 Yes Reported, Patient   furosemide (LASIX) 20 MG tablet Take 20 mg by mouth daily 7/18/2019 at 0800 Yes Reported, Patient   hypromellose (ARTIFICIAL TEARS) 0.5 % SOLN ophthalmic solution Place 1 drop into both eyes 3 times daily  7/18/2019 at 0800 Yes Reported, Patient   levETIRAcetam (KEPPRA) 500 MG tablet Take 500 mg by mouth 2 times daily 7/18/2019 at 0800 Yes Reported, Patient   losartan (COZAAR) 100 MG tablet Take 100 mg by mouth daily 7/18/2019 at 0800 Yes Unknown, Entered By History   Mirtazapine (REMERON PO) Take 15 mg by mouth 2 times daily 7/18/2019 at 0800 Yes Reported, Patient   Multiple Vitamins-Minerals (CERTAVITE SENIOR/ANTIOXIDANT PO) Take 1 tablet by mouth daily  7/18/2019 at 0800 Yes Reported, Patient   omeprazole (PRILOSEC) 40 MG capsule Take 1 capsule (40 mg) by mouth daily Take 30-60 minutes before a meal. 7/18/2019 at 0800 Yes Suzi Bailey MD   oxybutynin (DITROPAN) 5 MG tablet Take 5 mg by mouth 2 times daily 7/18/2019 at 0800 Yes Unknown, Entered By History   rosuvastatin (CRESTOR) 40 MG tablet Take 40 mg by mouth At Bedtime  7/17/2019 at 2000 Yes Reported, Patient   senna-docusate (SENNA S) 8.6-50 MG per tablet Take 1 tablet by mouth 2 times daily Please hold if having loose stools and contact nurse. 7/18/2019 at 0800 Yes Unknown, Entered By History   bisacodyl (DULCOLAX) 10 MG suppository Place 10 mg rectally daily as needed Unknown at Unknown time  Luz Ayers APRN CNP   metoclopramide (REGLAN) 5 MG tablet Take 5 mg by mouth daily as needed (at noon)  Unknown at Unknown time  Luz Ayers APRN CNP   polyethylene glycol (MIRALAX/GLYCOLAX) powder Take 17 g by mouth nightly as needed  Unknown at Unknown time  Unknown, Entered By History   trolamine salicylate (ASPERCREME) 10 % external cream Apply topically as needed for moderate pain (apply twice  daily as needed for pain. Left knee) Unknown at Unknown time  Reported, Patient

## 2019-07-18 NOTE — ED TRIAGE NOTES
Pt BIBA from home with c/o chest pain that has been present for four days.  States the pain was a 9/10 consistently for the four days.  EMS administered 324mg of ASA and 1 sublingual nitroglycerin.  Pt now rates the pain at 0/10 other that some minor tightness at the base of her neck.  Pt denies SOB.  Pt states she had been taking tylenol at home with no relief in pain.  Pain radiated from chest in to neck and shoulders.  Also reports occasional dizziness.

## 2019-07-18 NOTE — ED TRIAGE NOTES
Pt notes pain is started to come back, rates it at 3-4/10 and feels it mostly in the back of her neck.

## 2019-07-18 NOTE — ED NOTES
Bed: ED18  Expected date:   Expected time:   Means of arrival:   Comments:  pinky [phone] yo CP & dizzy

## 2019-07-18 NOTE — ED PROVIDER NOTES
History     Chief Complaint:  Chest Pain    HPI   Jade Caba is a 85 year old female with a history of CAD, CHF, CKD, HTN, HLD who presents to the emergency department for evaluation of chest pain. The patient reports she has experienced chest pain radiating into her left-sided shoulder and neck for some time, accompanied by intermittent worsening of the pain, diaphoresis, shortness of breath, lightheadedness, and cough. She states her symptoms worsen with exertion. She indicates the pain has notably worsened the past 4 days, prompting her arrival to the ED. The patient denies any numbness, nausea, fever, or chills. She remarks she does frequently have bilateral swollen legs as well. She states she received 4 baby Aspirin en route via EMS, as well as 1 Nitrostat, which improved her pain, though it has begun to return at the ED.    Allergies:  NKDA     Medications:    Norvasc  Aspirin  Coreg   Celexa  Folvite  Lasix  Keppra  Cozaar  Reglan  Omeprazole  Ditropan  Crestor  Senna     Past Medical History:    Anxiety  Arthritis  Atrial flutter  C1 cervical fracture  CAD  Carotid artery stenosis   CKD  CHF  Depression  Alcohol abuse  HLD  HTN  Renal artery stenosis  Seizure   Subarachnoid hemorrhage    Past Surgical History:    Appendectomy  Carotid endarterectomy  Cataract surgery  Cholecystectomy  Hysterectomy  Coronary stents x3  Joint replacement  Renal artery stent placement    Family History:    CAD  Breast cancer  Heart disease    Social History:  Presents with daughter.  Never smoker.  Negative for alcohol use.  Marital Status:   [5]     Review of Systems   Constitutional: Positive for diaphoresis. Negative for chills and fever.   Respiratory: Positive for cough and shortness of breath.    Cardiovascular: Positive for chest pain and leg swelling.   Gastrointestinal: Negative for nausea.   Neurological: Positive for light-headedness. Negative for numbness.   All other systems reviewed and are  "negative.      Physical Exam     Patient Vitals for the past 24 hrs:   BP Temp Temp src Pulse Heart Rate Resp SpO2 Height Weight   07/18/19 1340 111/54 -- -- 72 75 27 96 % -- --   07/18/19 1337 95/47 -- -- 76 73 19 95 % -- --   07/18/19 1335 (!) 88/51 -- -- 75 74 15 95 % -- --   07/18/19 1330 95/54 -- -- 74 75 13 90 % -- --   07/18/19 1325 134/68 -- -- 68 70 28 93 % -- --   07/18/19 1315 152/72 -- -- 77 78 28 93 % -- --   07/18/19 1300 156/61 -- -- 74 75 28 94 % -- --   07/18/19 1259 160/66 97.4  F (36.3  C) Oral -- 72 19 93 % 1.575 m (5' 2\") 81.6 kg (180 lb)     Physical Exam  General: Resting on the bed.  Head: No obvious trauma to head.  Ears, Nose, Throat:  External ears normal.  Nose normal.   Eyes:  Conjunctivae clear.  Pupils are equal, round, and reactive.   Neck: Normal range of motion.  Neck supple.   CV: Regular rate and rhythm.  No murmurs.      Respiratory: Effort normal and breath sounds normal.  No wheezing or crackles.   Gastrointestinal: Soft.  No distension. There is no tenderness.   Musculoskeletal: mild bilateral symmetric swelling without tenderness  Neuro: Alert. Moving all extremities appropriately.  Normal speech.    Skin: Skin is warm and dry.  No rash noted.     Emergency Department Course   ECG:  Time: 1304  Vent. Rate 73 bpm. MN interval 156. QRS duration 84. QT/QTc 400/440. P-R-T axis 30 84 41.  Normal sinus rhythm.  Normal ECG.  Read time: 1315    Imaging:  Radiographic findings were communicated with the patient who voiced understanding of the findings.    XR Chest 2 views:   Mild anterior compression of a lower thoracic vertebral  body is unchanged. Aortic calcification. Chest otherwise negative.  Lungs clear. No pleural effusions. Heart size and pulmonary  vascularity are within normal limits. As per radiology.     Laboratory:  CBC: WBC: 14.7 (H), HGB: 13.4, PLT: 186  BMP: Glucose 110 (H), GFR Estimate 49 (L), o/w WNL (Creatinine: 1.04)    BNP: 385    1325 Troponin I: " <0.015    Interventions:  1329 Nitrostat 0.4 mg Sublingual    Emergency Department Course:  Nursing notes and vitals reviewed. 1314 I performed an exam of the patient as documented above.     Medicine administered as documented above. Blood drawn. This was sent to the lab for further testing, results above.    The patient was sent for a XR Chest while in the emergency department, findings above.     1440 I rechecked the patient and discussed the results of her workup thus far.     1449 I spoke with VALERIA Vyas for hospitalist Dr. Porter, who agreed to admit the patient.    Findings and plan explained to the Patient who consents to admission. Discussed the patient with DORYS Vyas for Dr. Porter, who will admit the patient to an inpatient medical bed for further monitoring, evaluation, and treatment.    I personally reviewed the laboratory results with the Patient and answered all related questions prior to admission.    Impression & Plan      Medical Decision Makin-year-old female with history of prior cardiac stent, CHF presents with chest pressure.  Vital signs were reassuring here except for some mild lower pressure secondary to nitro.  Broad differential was pursued including but not limited to electrolyte metabolic renal dysfunction, acute coronary syndrome, PE, CHF, arrhythmia, pneumonia, pneumothorax, effusion, other.  CBC shows mild leukocytosis but stable hemoglobin.  No fevers, do not suspect infectious etiology.  Likely stress demargination in the setting of pain.  BMP shows no acute electrolyte metabolic renal dysfunction.  BNP was normal not suggestive of acute CHF.  Chest x-ray shows no pneumonia, pneumothorax, effusion.  EKG shows sinus rhythm no acute ST-T wave change.  No evidence of ischemia.  Troponin is negative.  Patient's story is highly concerning for acute coronary syndrome, versus unstable angina.  As patient is pain-free after nitro.  Patient did desat somewhat but otherwise remained  stable.  Considered PE but her story is so concerning for acute coronary syndrome that I think PE is less likely.  She has no pleuritic pain.  No tachycardia, do not suspect PE.  Plan to admit for ACS work-up and assessment.  Patient was admitted to the hospitalist who graciously accepted.    Diagnosis:    ICD-10-CM   1. Chest pain, unspecified type R07.9   2. Shortness of breath R06.02   3. Nausea R11.0   4. Diaphoresis R61       Disposition:  Admitted to VALERIA Vyas.    Javier EDMOND, am serving as a scribe on 7/18/2019 at 1:01 PM to personally document services performed by Dea Garg MD based on my observations and the provider's statements to me.     Javier Madden  7/18/2019   [facility] EMERGENCY DEPARTMENT       Dea Garg MD  07/18/19 173

## 2019-07-19 ENCOUNTER — PATIENT OUTREACH (OUTPATIENT)
Dept: GERIATRIC MEDICINE | Facility: CLINIC | Age: 84
End: 2019-07-19

## 2019-07-19 ENCOUNTER — APPOINTMENT (OUTPATIENT)
Dept: CARDIOLOGY | Facility: CLINIC | Age: 84
DRG: 193 | End: 2019-07-19
Attending: INTERNAL MEDICINE
Payer: COMMERCIAL

## 2019-07-19 ENCOUNTER — SURGERY (OUTPATIENT)
Age: 84
End: 2019-07-19
Payer: COMMERCIAL

## 2019-07-19 LAB
ANION GAP SERPL CALCULATED.3IONS-SCNC: 4 MMOL/L (ref 3–14)
BUN SERPL-MCNC: 18 MG/DL (ref 7–30)
CALCIUM SERPL-MCNC: 9.3 MG/DL (ref 8.5–10.1)
CHLORIDE SERPL-SCNC: 106 MMOL/L (ref 94–109)
CO2 SERPL-SCNC: 29 MMOL/L (ref 20–32)
CREAT SERPL-MCNC: 0.97 MG/DL (ref 0.52–1.04)
ERYTHROCYTE [DISTWIDTH] IN BLOOD BY AUTOMATED COUNT: 12 % (ref 10–15)
GFR SERPL CREATININE-BSD FRML MDRD: 53 ML/MIN/{1.73_M2}
GLUCOSE BLDC GLUCOMTR-MCNC: 105 MG/DL (ref 70–99)
GLUCOSE SERPL-MCNC: 106 MG/DL (ref 70–99)
HCT VFR BLD AUTO: 43.5 % (ref 35–47)
HGB BLD-MCNC: 13.8 G/DL (ref 11.7–15.7)
INTERPRETATION ECG - MUSE: NORMAL
LMWH PPP CHRO-ACNC: 0.21 IU/ML
MCH RBC QN AUTO: 30.9 PG (ref 26.5–33)
MCHC RBC AUTO-ENTMCNC: 31.7 G/DL (ref 31.5–36.5)
MCV RBC AUTO: 98 FL (ref 78–100)
PLATELET # BLD AUTO: 165 10E9/L (ref 150–450)
POTASSIUM SERPL-SCNC: 4.5 MMOL/L (ref 3.4–5.3)
RBC # BLD AUTO: 4.46 10E12/L (ref 3.8–5.2)
SODIUM SERPL-SCNC: 139 MMOL/L (ref 133–144)
WBC # BLD AUTO: 6.5 10E9/L (ref 4–11)

## 2019-07-19 PROCEDURE — C1894 INTRO/SHEATH, NON-LASER: HCPCS | Performed by: INTERNAL MEDICINE

## 2019-07-19 PROCEDURE — 85027 COMPLETE CBC AUTOMATED: CPT | Performed by: INTERNAL MEDICINE

## 2019-07-19 PROCEDURE — 80048 BASIC METABOLIC PNL TOTAL CA: CPT | Performed by: INTERNAL MEDICINE

## 2019-07-19 PROCEDURE — 36415 COLL VENOUS BLD VENIPUNCTURE: CPT | Performed by: INTERNAL MEDICINE

## 2019-07-19 PROCEDURE — 93454 CORONARY ARTERY ANGIO S&I: CPT | Performed by: INTERNAL MEDICINE

## 2019-07-19 PROCEDURE — 99153 MOD SED SAME PHYS/QHP EA: CPT | Performed by: INTERNAL MEDICINE

## 2019-07-19 PROCEDURE — 99152 MOD SED SAME PHYS/QHP 5/>YRS: CPT | Performed by: INTERNAL MEDICINE

## 2019-07-19 PROCEDURE — 00000146 ZZHCL STATISTIC GLUCOSE BY METER IP

## 2019-07-19 PROCEDURE — C1769 GUIDE WIRE: HCPCS | Performed by: INTERNAL MEDICINE

## 2019-07-19 PROCEDURE — 99233 SBSQ HOSP IP/OBS HIGH 50: CPT | Performed by: HOSPITALIST

## 2019-07-19 PROCEDURE — 25000125 ZZHC RX 250: Performed by: INTERNAL MEDICINE

## 2019-07-19 PROCEDURE — 25000125 ZZHC RX 250: Performed by: PHYSICIAN ASSISTANT

## 2019-07-19 PROCEDURE — 12000000 ZZH R&B MED SURG/OB

## 2019-07-19 PROCEDURE — 25000132 ZZH RX MED GY IP 250 OP 250 PS 637: Performed by: INTERNAL MEDICINE

## 2019-07-19 PROCEDURE — 93306 TTE W/DOPPLER COMPLETE: CPT | Mod: 26 | Performed by: INTERNAL MEDICINE

## 2019-07-19 PROCEDURE — 25000132 ZZH RX MED GY IP 250 OP 250 PS 637

## 2019-07-19 PROCEDURE — 40000275 ZZH STATISTIC RCP TIME EA 10 MIN

## 2019-07-19 PROCEDURE — C1887 CATHETER, GUIDING: HCPCS | Performed by: INTERNAL MEDICINE

## 2019-07-19 PROCEDURE — 25800030 ZZH RX IP 258 OP 636: Performed by: INTERNAL MEDICINE

## 2019-07-19 PROCEDURE — 27210794 ZZH OR GENERAL SUPPLY STERILE: Performed by: INTERNAL MEDICINE

## 2019-07-19 PROCEDURE — B2111ZZ FLUOROSCOPY OF MULTIPLE CORONARY ARTERIES USING LOW OSMOLAR CONTRAST: ICD-10-PCS | Performed by: INTERNAL MEDICINE

## 2019-07-19 PROCEDURE — 25000128 H RX IP 250 OP 636: Performed by: INTERNAL MEDICINE

## 2019-07-19 PROCEDURE — 85520 HEPARIN ASSAY: CPT | Performed by: INTERNAL MEDICINE

## 2019-07-19 PROCEDURE — 25500064 ZZH RX 255 OP 636: Performed by: INTERNAL MEDICINE

## 2019-07-19 PROCEDURE — 93005 ELECTROCARDIOGRAM TRACING: CPT

## 2019-07-19 PROCEDURE — 99222 1ST HOSP IP/OBS MODERATE 55: CPT | Mod: 25 | Performed by: INTERNAL MEDICINE

## 2019-07-19 PROCEDURE — 93454 CORONARY ARTERY ANGIO S&I: CPT | Mod: 26 | Performed by: INTERNAL MEDICINE

## 2019-07-19 PROCEDURE — 25800030 ZZH RX IP 258 OP 636: Performed by: PHYSICIAN ASSISTANT

## 2019-07-19 PROCEDURE — 25000132 ZZH RX MED GY IP 250 OP 250 PS 637: Performed by: PHYSICIAN ASSISTANT

## 2019-07-19 PROCEDURE — 40000264 ECHOCARDIOGRAM COMPLETE

## 2019-07-19 PROCEDURE — 93010 ELECTROCARDIOGRAM REPORT: CPT | Performed by: INTERNAL MEDICINE

## 2019-07-19 PROCEDURE — 99207 ZZC CDG-MDM COMPONENT: MEETS LOW - DOWN CODED: CPT | Performed by: HOSPITALIST

## 2019-07-19 RX ORDER — LORAZEPAM 0.5 MG/1
0.5 TABLET ORAL
Status: COMPLETED | OUTPATIENT
Start: 2019-07-19 | End: 2019-07-19

## 2019-07-19 RX ORDER — FENTANYL CITRATE 50 UG/ML
INJECTION, SOLUTION INTRAMUSCULAR; INTRAVENOUS
Status: DISCONTINUED | OUTPATIENT
Start: 2019-07-19 | End: 2019-07-19

## 2019-07-19 RX ORDER — NITROGLYCERIN 5 MG/ML
VIAL (ML) INTRAVENOUS
Status: DISCONTINUED
Start: 2019-07-19 | End: 2019-07-19 | Stop reason: HOSPADM

## 2019-07-19 RX ORDER — ASPIRIN 81 MG/1
243 TABLET ORAL ONCE
Status: COMPLETED | OUTPATIENT
Start: 2019-07-19 | End: 2019-07-19

## 2019-07-19 RX ORDER — VALSARTAN 80 MG/1
320 TABLET ORAL DAILY
Status: DISCONTINUED | OUTPATIENT
Start: 2019-07-20 | End: 2019-07-20

## 2019-07-19 RX ORDER — SODIUM CHLORIDE 9 MG/ML
INJECTION, SOLUTION INTRAVENOUS CONTINUOUS
Status: DISCONTINUED | OUTPATIENT
Start: 2019-07-19 | End: 2019-07-20

## 2019-07-19 RX ORDER — HEPARIN SODIUM 1000 [USP'U]/ML
INJECTION, SOLUTION INTRAVENOUS; SUBCUTANEOUS
Status: DISCONTINUED | OUTPATIENT
Start: 2019-07-19 | End: 2019-07-19

## 2019-07-19 RX ORDER — EPTIFIBATIDE 2 MG/ML
1 INJECTION, SOLUTION INTRAVENOUS CONTINUOUS PRN
Status: DISCONTINUED | OUTPATIENT
Start: 2019-07-19 | End: 2019-07-19

## 2019-07-19 RX ORDER — ATROPINE SULFATE 0.1 MG/ML
0.5 INJECTION INTRAVENOUS EVERY 5 MIN PRN
Status: ACTIVE | OUTPATIENT
Start: 2019-07-19 | End: 2019-07-20

## 2019-07-19 RX ORDER — ACETAMINOPHEN 325 MG/1
650 TABLET ORAL EVERY 4 HOURS PRN
Status: DISCONTINUED | OUTPATIENT
Start: 2019-07-19 | End: 2019-07-20

## 2019-07-19 RX ORDER — HYDROCODONE BITARTRATE AND ACETAMINOPHEN 5; 325 MG/1; MG/1
1-2 TABLET ORAL EVERY 4 HOURS PRN
Status: DISCONTINUED | OUTPATIENT
Start: 2019-07-19 | End: 2019-07-20 | Stop reason: ALTCHOICE

## 2019-07-19 RX ORDER — ARGATROBAN 1 MG/ML
150 INJECTION, SOLUTION INTRAVENOUS
Status: DISCONTINUED | OUTPATIENT
Start: 2019-07-19 | End: 2019-07-19

## 2019-07-19 RX ORDER — NITROGLYCERIN 5 MG/ML
VIAL (ML) INTRAVENOUS
Status: DISCONTINUED | OUTPATIENT
Start: 2019-07-19 | End: 2019-07-19

## 2019-07-19 RX ORDER — LIDOCAINE 40 MG/G
CREAM TOPICAL
Status: DISCONTINUED | OUTPATIENT
Start: 2019-07-19 | End: 2019-07-21 | Stop reason: HOSPADM

## 2019-07-19 RX ORDER — LORAZEPAM 2 MG/ML
0.5 INJECTION INTRAMUSCULAR
Status: COMPLETED | OUTPATIENT
Start: 2019-07-19 | End: 2019-07-19

## 2019-07-19 RX ORDER — DOPAMINE HYDROCHLORIDE 160 MG/100ML
2-20 INJECTION, SOLUTION INTRAVENOUS CONTINUOUS PRN
Status: DISCONTINUED | OUTPATIENT
Start: 2019-07-19 | End: 2019-07-19

## 2019-07-19 RX ORDER — TIROFIBAN HYDROCHLORIDE 50 UG/ML
0.07 INJECTION INTRAVENOUS CONTINUOUS PRN
Status: DISCONTINUED | OUTPATIENT
Start: 2019-07-19 | End: 2019-07-19

## 2019-07-19 RX ORDER — VERAPAMIL HYDROCHLORIDE 2.5 MG/ML
INJECTION, SOLUTION INTRAVENOUS
Status: DISCONTINUED | OUTPATIENT
Start: 2019-07-19 | End: 2019-07-19

## 2019-07-19 RX ORDER — VERAPAMIL HYDROCHLORIDE 2.5 MG/ML
INJECTION, SOLUTION INTRAVENOUS
Status: DISCONTINUED
Start: 2019-07-19 | End: 2019-07-19 | Stop reason: HOSPADM

## 2019-07-19 RX ORDER — EPTIFIBATIDE 2 MG/ML
180 INJECTION, SOLUTION INTRAVENOUS EVERY 10 MIN PRN
Status: DISCONTINUED | OUTPATIENT
Start: 2019-07-19 | End: 2019-07-19

## 2019-07-19 RX ORDER — FLUMAZENIL 0.1 MG/ML
0.2 INJECTION, SOLUTION INTRAVENOUS
Status: ACTIVE | OUTPATIENT
Start: 2019-07-19 | End: 2019-07-20

## 2019-07-19 RX ORDER — FENTANYL CITRATE 50 UG/ML
INJECTION, SOLUTION INTRAMUSCULAR; INTRAVENOUS
Status: DISCONTINUED
Start: 2019-07-19 | End: 2019-07-19 | Stop reason: HOSPADM

## 2019-07-19 RX ORDER — DOBUTAMINE HYDROCHLORIDE 200 MG/100ML
2-20 INJECTION INTRAVENOUS CONTINUOUS PRN
Status: DISCONTINUED | OUTPATIENT
Start: 2019-07-19 | End: 2019-07-19

## 2019-07-19 RX ORDER — POTASSIUM CHLORIDE 1500 MG/1
20 TABLET, EXTENDED RELEASE ORAL
Status: DISCONTINUED | OUTPATIENT
Start: 2019-07-19 | End: 2019-07-19

## 2019-07-19 RX ORDER — SODIUM CHLORIDE 9 MG/ML
INJECTION, SOLUTION INTRAVENOUS CONTINUOUS
Status: DISCONTINUED | OUTPATIENT
Start: 2019-07-19 | End: 2019-07-19

## 2019-07-19 RX ORDER — NOREPINEPHRINE BITARTRATE 0.06 MG/ML
.03-.4 INJECTION, SOLUTION INTRAVENOUS CONTINUOUS PRN
Status: DISCONTINUED | OUTPATIENT
Start: 2019-07-19 | End: 2019-07-19

## 2019-07-19 RX ORDER — NITROGLYCERIN 20 MG/100ML
.07-2 INJECTION INTRAVENOUS CONTINUOUS PRN
Status: DISCONTINUED | OUTPATIENT
Start: 2019-07-19 | End: 2019-07-19

## 2019-07-19 RX ORDER — ASPIRIN 81 MG/1
244 TABLET ORAL ONCE
Status: DISCONTINUED | OUTPATIENT
Start: 2019-07-19 | End: 2019-07-19

## 2019-07-19 RX ORDER — HEPARIN SODIUM 1000 [USP'U]/ML
INJECTION, SOLUTION INTRAVENOUS; SUBCUTANEOUS
Status: DISCONTINUED
Start: 2019-07-19 | End: 2019-07-19 | Stop reason: HOSPADM

## 2019-07-19 RX ORDER — NALOXONE HYDROCHLORIDE 0.4 MG/ML
.2-.4 INJECTION, SOLUTION INTRAMUSCULAR; INTRAVENOUS; SUBCUTANEOUS
Status: ACTIVE | OUTPATIENT
Start: 2019-07-19 | End: 2019-07-20

## 2019-07-19 RX ORDER — FENTANYL CITRATE 50 UG/ML
25-50 INJECTION, SOLUTION INTRAMUSCULAR; INTRAVENOUS
Status: ACTIVE | OUTPATIENT
Start: 2019-07-19 | End: 2019-07-20

## 2019-07-19 RX ORDER — IOPAMIDOL 755 MG/ML
INJECTION, SOLUTION INTRAVASCULAR
Status: DISCONTINUED | OUTPATIENT
Start: 2019-07-19 | End: 2019-07-19 | Stop reason: HOSPADM

## 2019-07-19 RX ORDER — NALOXONE HYDROCHLORIDE 0.4 MG/ML
.1-.4 INJECTION, SOLUTION INTRAMUSCULAR; INTRAVENOUS; SUBCUTANEOUS
Status: DISCONTINUED | OUTPATIENT
Start: 2019-07-19 | End: 2019-07-21 | Stop reason: HOSPADM

## 2019-07-19 RX ORDER — LIDOCAINE HYDROCHLORIDE 10 MG/ML
INJECTION, SOLUTION EPIDURAL; INFILTRATION; INTRACAUDAL; PERINEURAL
Status: DISCONTINUED
Start: 2019-07-19 | End: 2019-07-19 | Stop reason: HOSPADM

## 2019-07-19 RX ORDER — EPTIFIBATIDE 2 MG/ML
2 INJECTION, SOLUTION INTRAVENOUS CONTINUOUS PRN
Status: DISCONTINUED | OUTPATIENT
Start: 2019-07-19 | End: 2019-07-19

## 2019-07-19 RX ORDER — ARGATROBAN 1 MG/ML
350 INJECTION, SOLUTION INTRAVENOUS
Status: DISCONTINUED | OUTPATIENT
Start: 2019-07-19 | End: 2019-07-19

## 2019-07-19 RX ORDER — LIDOCAINE 40 MG/G
CREAM TOPICAL
Status: DISCONTINUED | OUTPATIENT
Start: 2019-07-19 | End: 2019-07-19

## 2019-07-19 RX ADMIN — LIDOCAINE HYDROCHLORIDE 1 ML: 10 INJECTION, SOLUTION INFILTRATION; PERINEURAL at 16:23

## 2019-07-19 RX ADMIN — MIDAZOLAM 0.5 MG: 1 INJECTION INTRAMUSCULAR; INTRAVENOUS at 16:27

## 2019-07-19 RX ADMIN — LEVETIRACETAM 500 MG: 250 TABLET, FILM COATED ORAL at 21:13

## 2019-07-19 RX ADMIN — LORAZEPAM 0.5 MG: 0.5 TABLET ORAL at 14:39

## 2019-07-19 RX ADMIN — ROSUVASTATIN CALCIUM 40 MG: 20 TABLET, FILM COATED ORAL at 21:14

## 2019-07-19 RX ADMIN — FENTANYL CITRATE 25 MCG: 50 INJECTION, SOLUTION INTRAMUSCULAR; INTRAVENOUS at 16:26

## 2019-07-19 RX ADMIN — NITROGLYCERIN 400 MCG: 5 INJECTION, SOLUTION INTRAVENOUS at 16:25

## 2019-07-19 RX ADMIN — FENTANYL CITRATE 25 MCG: 50 INJECTION, SOLUTION INTRAMUSCULAR; INTRAVENOUS at 16:23

## 2019-07-19 RX ADMIN — MIRTAZAPINE 15 MG: 15 TABLET, FILM COATED ORAL at 08:53

## 2019-07-19 RX ADMIN — OXYBUTYNIN CHLORIDE 5 MG: 5 TABLET ORAL at 08:54

## 2019-07-19 RX ADMIN — FOLIC ACID 1 MG: 1 TABLET ORAL at 08:53

## 2019-07-19 RX ADMIN — IOPAMIDOL 65 ML: 755 INJECTION, SOLUTION INTRAVENOUS at 16:53

## 2019-07-19 RX ADMIN — LOSARTAN POTASSIUM 100 MG: 100 TABLET ORAL at 08:53

## 2019-07-19 RX ADMIN — ACETAMINOPHEN 1000 MG: 500 TABLET, FILM COATED ORAL at 21:14

## 2019-07-19 RX ADMIN — OMEPRAZOLE 40 MG: 20 CAPSULE, DELAYED RELEASE ORAL at 08:53

## 2019-07-19 RX ADMIN — HEPARIN SODIUM 5000 UNITS: 1000 INJECTION, SOLUTION INTRAVENOUS; SUBCUTANEOUS at 16:25

## 2019-07-19 RX ADMIN — ACETAMINOPHEN 1000 MG: 500 TABLET, FILM COATED ORAL at 08:54

## 2019-07-19 RX ADMIN — AMLODIPINE BESYLATE 10 MG: 10 TABLET ORAL at 21:14

## 2019-07-19 RX ADMIN — SODIUM CHLORIDE: 9 INJECTION, SOLUTION INTRAVENOUS at 11:24

## 2019-07-19 RX ADMIN — LEVETIRACETAM 500 MG: 250 TABLET, FILM COATED ORAL at 08:53

## 2019-07-19 RX ADMIN — MIDAZOLAM 1 MG: 1 INJECTION INTRAMUSCULAR; INTRAVENOUS at 16:24

## 2019-07-19 RX ADMIN — SENNOSIDES AND DOCUSATE SODIUM 1 TABLET: 8.6; 5 TABLET ORAL at 21:14

## 2019-07-19 RX ADMIN — SENNOSIDES AND DOCUSATE SODIUM 2 TABLET: 8.6; 5 TABLET ORAL at 08:53

## 2019-07-19 RX ADMIN — ASPIRIN 81 MG: 81 TABLET, COATED ORAL at 08:52

## 2019-07-19 RX ADMIN — OXYBUTYNIN CHLORIDE 5 MG: 5 TABLET ORAL at 21:14

## 2019-07-19 RX ADMIN — LORAZEPAM 0.5 MG: 0.5 TABLET ORAL at 10:58

## 2019-07-19 RX ADMIN — ASPIRIN 243 MG: 81 TABLET, COATED ORAL at 11:24

## 2019-07-19 RX ADMIN — VERAPAMIL HYDROCHLORIDE 2.5 MG: 2.5 INJECTION, SOLUTION INTRAVENOUS at 16:25

## 2019-07-19 RX ADMIN — CARVEDILOL 12.5 MG: 12.5 TABLET, FILM COATED ORAL at 08:53

## 2019-07-19 RX ADMIN — MIRTAZAPINE 15 MG: 15 TABLET, FILM COATED ORAL at 21:14

## 2019-07-19 RX ADMIN — SODIUM CHLORIDE: 9 INJECTION, SOLUTION INTRAVENOUS at 17:38

## 2019-07-19 RX ADMIN — CITALOPRAM HYDROBROMIDE 30 MG: 20 TABLET ORAL at 08:54

## 2019-07-19 RX ADMIN — LIDOCAINE HYDROCHLORIDE 6 ML: 10 INJECTION, SOLUTION INFILTRATION; PERINEURAL at 16:36

## 2019-07-19 RX ADMIN — HUMAN ALBUMIN MICROSPHERES AND PERFLUTREN 3 ML: 10; .22 INJECTION, SOLUTION INTRAVENOUS at 10:03

## 2019-07-19 ASSESSMENT — ACTIVITIES OF DAILY LIVING (ADL)
ADLS_ACUITY_SCORE: 17
ADLS_ACUITY_SCORE: 16
ADLS_ACUITY_SCORE: 17

## 2019-07-19 NOTE — PLAN OF CARE
VSS, afeb., LS are dim, 92% on RA.  Pt denies pain, denies sob at rest, but sob w/ activity. Good appetite, but small portion this jennifer. Pt and family updated on POC and questions answered.  2 neg trops, last one to be drawn at 2359. Hep gtt cont at 750 unit(s)/hr, recheck Hep 10A at 2330. Echo and Card consult in AM. Tele is SR. NPO after midnite.

## 2019-07-19 NOTE — PROGRESS NOTES
Brief sw note:    Pt's [employer] CC is Yamilet [phone].  Yamilet called and left a vm for sw that the pt lives at an [facility], but is independent with all ADLs.  Pt has some homemaking services, gets assistance with her meds, and has one meal a day at the [facility].

## 2019-07-19 NOTE — PLAN OF CARE
A&O. Denies chest pain. VSS. 2L O2. Troponin's negative. Heparin gt @ 600 units/hr. Tele SB. NPO since midnight for probable angiogram today. Cardiology consulted. Echo ordered.   Ainsley Multani RN on 7/19/2019 at 4:49 AM

## 2019-07-19 NOTE — PRE-PROCEDURE
I have examined the patient, reviewed the history, medications and pre procedural tests. She has known CAD s/p multiple PCIs, most recently 1/2018. Despite prolonged chest pain, her troponin levels are normal and ECG no changes. TTE shows normal EF with no RWMA I have explained to the patient the risks of death, MI, stroke, hematoma, possible urgent bypass surgery for failed PCI, use of stents, thienopyridine agents, possible peripheral vascular complications, arrhythmia, the use of FFR in clinical decision-making and alternative of medical therapy alone in regards to left heart catheterization, left ventriculography, coronary angiography, and possible percutaneous coronary intervention. The patient voiced understanding and wishes to proceed. The patient has a good right radial pulse, normal ulnar pulse and a normal Luther's sign.

## 2019-07-19 NOTE — PROGRESS NOTES
[facility]    Hospitalist Progress Note  Name: Jade Caba    MRN: [id number]  Provider:  Ken Chin DO MPH  Date of Service: 07/19/2019    Summary of Stay: Jade Caba is a 85 year old female with a history of hypertension, hypercholesterolemia, coronary artery disease, diastolic heart failure with most recent echocardiogram in January 2018 showing EF of 55%, depression with anxiety, history of the subarachnoid hemorrhage in 2014 after a fall, who apparently still on seizure prophylaxis who presents with chest pain.      Her CAD history: Initial angioplasty of the circumflex and 97, OLIVIA to LAD in 2004, OLIVIA to the CX and RCA in 2011, recurrent PCI with OLIVIA to the LAD later in 2011, and most recently an ST HOANG in 1/2018 which time she underwent PCI to a thrombotic lesion of the RCA, was found to have significant in-stent restenosis to the LAD and underwent repeat PCI with OLIVIA to the LAD, and history of CVA and right carotid endarterectomy in 2003, left carotid endarterectomy in 2007, and known renal artery stenosis.  She had been on dual antiplatelet therapy with aspirin and clopidogrel until a few months ago at which time she was switched to full dose aspirin     She is admitted on 7/18/2019 with chest pain that has been stuttering for the past 3 weeks, worsening over the previous 4 days, BELCHER, and increasing lower extremity edema, all consistent with acute coronary syndrome.  Serial troponins undetectable, and EKG shows no acute ischemic findings.     She has been admitted to the cardiac unit on a heparin drip, echocardiogram, and formal cardiology consultation, with plans for angiography this morning.    Problem List:   1. Acute coronary syndrome: Dr Porter discussed with cardiology.  Continue on heparin drip, echo, formal cardiology consultation, with plans for angiography this morning.  Continue NPO status.  Already on proper meds for CAD.  2. Hypertension: Continue baseline amlodipine 10 mg,  carvedilol 12.5 mg twice daily, furosemide 20 mg p.o. daily and losartan 100 mg a day.  BP OK.  3. CAD: Continue aspirin/statin/beta-blocker/arb.  4. Remote history of subarachnoid hemorrhage, still on seizure prophylaxis, continue levetiracetam 500 mg twice daily.  Risk/benefit of heparin and dual antiplatelet meds discussed and in favor of their use in her situation.  5. Depression: Continue mirtazapine 15 mg twice daily.    DVT Prophylaxis: Heparin SQ  Code Status: DNR  Disposition: Expected discharge in 1-2 days to home. Goals prior to discharge include revascularization.   Incidental Findings: None.  Family updated today: Yes      Interval History   Assumed care from previous hospitalist. The history was fully reviewed.  The patient reports doing well. Still some upper chest pain and shortness of breath. No nausea, vomiting, diarrhea, constipation. No fevers. No other specific complaints identified.     -Data reviewed today: I personally reviewed all new labs and imaging results over the last 24 hours.     Physical Exam   Temp: 98.7  F (37.1  C) Temp src: Oral BP: 162/57 Pulse: 63 Heart Rate: 63 Resp: 18 SpO2: 96 % O2 Device: Nasal cannula Oxygen Delivery: 2 LPM  Vitals:    07/18/19 1259 07/18/19 1633   Weight: 81.6 kg (180 lb) 82.9 kg (182 lb 12.8 oz)     Vital Signs with Ranges  Temp:  [96.9  F (36.1  C)-99.4  F (37.4  C)] 98.7  F (37.1  C)  Pulse:  [63-77] 63  Heart Rate:  [61-85] 63  Resp:  [13-28] 18  BP: ()/(47-72) 162/57  SpO2:  [85 %-98 %] 96 %  No intake/output data recorded.    GENERAL: No apparent distress. Awake, alert, and fully oriented.  HEENT: Normocephalic, atraumatic. Extraocular movements intact.  CARDIOVASCULAR: Regular rate and rhythm without murmurs or rubs. No S3.  PULMONARY: Clear bilaterally.  GASTROINTESTINAL: Soft, non-tender, non-distended. Bowel sounds normoactive.   EXTREMITIES: No cyanosis or clubbing. No edema.  NEUROLOGICAL: CN 2-12 grossly intact, no focal neurological  deficits.  DERMATOLOGICAL: No rash, ulcer, bruising, nor jaundice.     Medications     HEParin 600 Units/hr (07/19/19 0745)     - MEDICATION INSTRUCTIONS -         acetaminophen  1,000 mg Oral BID     amLODIPine  10 mg Oral At Bedtime     aspirin  81 mg Oral Daily     carvedilol  12.5 mg Oral BID w/meals     citalopram  30 mg Oral Daily     folic acid  1 mg Oral Daily     levETIRAcetam  500 mg Oral BID     losartan  100 mg Oral Daily     mirtazapine  15 mg Oral BID     omeprazole  40 mg Oral Daily     oxybutynin  5 mg Oral BID     rosuvastatin  40 mg Oral At Bedtime     senna-docusate  1 tablet Oral BID    Or     senna-docusate  2 tablet Oral BID     sodium chloride (PF)  3 mL Intracatheter Q8H     Data     Laboratory:  Recent Labs   Lab 07/19/19  0712 07/18/19  1731 07/18/19  1325   WBC 6.5 11.3* 14.7*   HGB 13.8 13.4 13.4   HCT 43.5 41.2 41.4   MCV 98 97 96    171 186     Recent Labs   Lab 07/19/19  0712 07/18/19  1325    138   POTASSIUM 4.5 4.2   CHLORIDE 106 106   CO2 29 28   ANIONGAP 4 4   * 110*   BUN 18 21   CR 0.97 1.04   GFRESTIMATED 53* 49*   GFRESTBLACK 61 56*   FRANCISCO 9.3 9.1     Recent Labs   Lab 07/18/19  2333 07/18/19  1731 07/18/19  1325   TROPI <0.015 <0.015 <0.015     No results for input(s): CULT in the last 168 hours.    Imaging:  Recent Results (from the past 24 hour(s))   XR Chest 2 Views    Narrative    CHEST TWO VIEWS  7/18/2019 1:58 PM     HISTORY:  Chest pain.    COMPARISON: 3/12/2018.      Impression    IMPRESSION: Mild anterior compression of a lower thoracic vertebral  body is unchanged. Aortic calcification. Chest otherwise negative.  Lungs clear. No pleural effusions. Heart size and pulmonary  vascularity are within normal limits.    MD Ken HORAN DO MPH  [facility] Hospitalist  [address] [place] [address]  Pager: [phone]  07/19/2019

## 2019-07-19 NOTE — PLAN OF CARE
VSS on 2 LPM NC other than /57. Tele SB-SR. Pr reports neck pain, scheduled tylenol given with relief. Pt reports intermittent cp 4/10 with breaths but also intermittently at rest that started 3 weeks ago. EKG today shows normal SR. Echo complete showed EF 60-65%. Plan for angio today. NPO. K 4.5. Pre angio . Continuous hep gtt and NS to PIV Y site. Pt anxious for angio prn po ativan x2 given with relief. SBA.

## 2019-07-19 NOTE — PROGRESS NOTES
[facility] Care Coordination Contact  TRANSITIONS OF CARE (SHEYLA) LOG   SHEYLA tasks should be completed by the CC within one (1) business day of notification of each transition. Follow up contact with member is required after return to their usual care setting.  Note:  If CC finds out about the transitions fifteen (15) days or more after the member has returned to their usual care setting, no SHEYLA log is needed. However, the CC should check in with the member to discuss the transition process, any changes needed to the care plan and document it in a case note.    Member Name:  Jade Caba [facility] Name:  [facility]/Health Plan Member ID#: [id number]   Product: [facility] Care Coordinator Contact:  Holly Guajardo RN Agency/County/Care System: [facility]   Transition Communication Actions from Care Management Contact   Transition #1   Notification Date: 7/19/19 Transition Date:   7/18/19 Transition From: Home     Is this the member s usual care setting?               yes Transition To: Hospital, [facility]   Transition Type:  Unplanned  Reason for Admission/Comments:  Chest pain  [facility] Care Coordination Contact  CC contacted Hospital /discharge plannerSharon [phone] and left a message with this CC contact information, reviewed community POC as well requested to be notified of concerns, care conferences and discharge planning.  Reviewed and update care plan as needed.  Notified community service providers and placed services Assisted Living on hold as needed.  Transition log initiated.   PCP notified of hospitalization via EMR.     Shared CC contact info, care plan/services with receiving setting--Date completed: 7/19/19    Notified PCP of transition--Date completed:  7/18/19     via  EMR   Transition #2   Transition #3  (if applicable)   Notification Date: 7/22/2019  Transition To:  Assisted [facility], [facility]  Transition Date: 7/21/19     Transition Type:     Planned  Notified PCP -- Date completed: 7/22/2019              Shared CC contact info, care plan/services with receiving setting or, if applicable, home care agency--Date completed:  7/22/2019  *Complete additional tasks below, if this transition is a return to usual care setting.      Comments:  See above  Notification Date:          Transition To:    Transition Date:              Transition Type:      Notified PCP--Date completed:          Shared CC contact info, care plan/services with receiving setting or, if applicable, home care agency--Date completed:       *Complete additional tasks below, if this transition is a return to usual care setting.      Comments:       *Complete tasks below when the member is discharging TO their usual care setting within one (1) business day of notification.  For situations where the Care Coordinator is notified of the discharge prior to the date of discharge, the Care Coordinator must follow up with the member or designated representative to confirm that discharge actually occurred and discuss required SHEYLA tasks as outlined in the SHEYLA Instructions.  (This includes situations where it may be a  new  usual care setting for the member. (i.e., a community member who decides upon permanent nursing home placement following hospitalization and rehab).    Date completed: 7/22/2019  Communicated with member or their designated representative about the following:  care transition process; about changes to the member s health status; plan of care updates; education about transitions and how to prevent unplanned transitions/readmissions  Four Pillars for Optimal Transition:    Check  Yes  - if the member, family member and/or SNF/facility staff manages the following:    If  No  provide explanation in the comments section.          [x]  Yes     []  No     Does the member have a follow-up appointment scheduled with primary care or specialist? (Mental health hospitalizations--the appt. should  be w/in 7 days)   [x]  Yes     []  No     Can the member manage their medications or is there a system in place to manage medications (e.g. home care set-up)?         [x]  Yes     []  No     Can the member verbalize warning signs and symptoms to watch for and how to respond?         [x]  Yes     []  No     Does the member use a Personal Health Care Record?  Check  Yes  if visit summary, discharge summary, and/or healthcare summary are being used as a PHR.                                                                                                                                                                                    [x] Yes      [] No      Have you updated the member s care plan?  If  No  provide explanation in comments.   Comments:  02 orders following hospital discharge, per member and AL Staff, 02 will be temporary.

## 2019-07-19 NOTE — PROGRESS NOTES
Coronary angiogram did not show any change in coronary anatomy from a year and 7 months ago.  The history of persistent chest discomfort without a rise in troponin also supports that this discomfort is noncardiac.  Other sources of chest discomfort should be investigated.  Patient's blood pressure is significantly raised and she is on the maximum dose of Norvasc, carvedilol and losartan.  I will switch the losartan out for valsartan which is a more potent angiotensin receptor blocker and use the dose of 320 mg/day.  We will sign off at this stage but please call if any cardiac issues.

## 2019-07-19 NOTE — PROCEDURES
Procedure  1) CAG  Approach RFA single anterior wall stick .  (We attempted RTR, but brachial tortuosity precluded passage of wires and catheter)    Findings : There has been no change in coronary anatomy since the post intervention films in 20018. There is KHARI 3 flow in all vessels.     There are separate orifices for both the nondominant CX and the LAD.     The CX has mild generalized atheromatous change with no focal stenosis and stented sites widely patent. No change since 2018   The LAD is stented from the ostium through the mid LAD past D3. There is minimal  10% in stent restenosis in ostial/proximal LAD. The mid LAD has diffuse moderate in stent restenosis 40%. Distal to D3 the LAD is irregular and tortuous with serial 40 to 50% lesions, unchanged since last study. There is KHARI 3 flow    The dominant RCA exhibits mild generalized atheromatous change. The stented segments remain widely patent.     No indication for ad hoc mechanical revascularization. There has been no change since 2018 and there are no objective signs of ischemia or necrosis.     [place]

## 2019-07-20 ENCOUNTER — APPOINTMENT (OUTPATIENT)
Dept: CT IMAGING | Facility: CLINIC | Age: 84
DRG: 193 | End: 2019-07-20
Attending: HOSPITALIST
Payer: COMMERCIAL

## 2019-07-20 PROCEDURE — 93010 ELECTROCARDIOGRAM REPORT: CPT | Performed by: INTERNAL MEDICINE

## 2019-07-20 PROCEDURE — 25000132 ZZH RX MED GY IP 250 OP 250 PS 637: Performed by: INTERNAL MEDICINE

## 2019-07-20 PROCEDURE — 93005 ELECTROCARDIOGRAM TRACING: CPT

## 2019-07-20 PROCEDURE — 12000000 ZZH R&B MED SURG/OB

## 2019-07-20 PROCEDURE — 25000128 H RX IP 250 OP 636: Performed by: INTERNAL MEDICINE

## 2019-07-20 PROCEDURE — 99232 SBSQ HOSP IP/OBS MODERATE 35: CPT | Performed by: HOSPITALIST

## 2019-07-20 PROCEDURE — 40000275 ZZH STATISTIC RCP TIME EA 10 MIN

## 2019-07-20 PROCEDURE — 71260 CT THORAX DX C+: CPT

## 2019-07-20 PROCEDURE — 25000128 H RX IP 250 OP 636: Performed by: HOSPITALIST

## 2019-07-20 PROCEDURE — 25000132 ZZH RX MED GY IP 250 OP 250 PS 637: Performed by: HOSPITALIST

## 2019-07-20 RX ORDER — OXYCODONE HYDROCHLORIDE 5 MG/1
5 TABLET ORAL EVERY 4 HOURS PRN
Status: DISCONTINUED | OUTPATIENT
Start: 2019-07-20 | End: 2019-07-21 | Stop reason: HOSPADM

## 2019-07-20 RX ORDER — IOPAMIDOL 755 MG/ML
500 INJECTION, SOLUTION INTRAVASCULAR ONCE
Status: COMPLETED | OUTPATIENT
Start: 2019-07-20 | End: 2019-07-20

## 2019-07-20 RX ORDER — ACETAMINOPHEN 650 MG/1
650 SUPPOSITORY RECTAL 2 TIMES DAILY PRN
Status: DISCONTINUED | OUTPATIENT
Start: 2019-07-20 | End: 2019-07-21 | Stop reason: HOSPADM

## 2019-07-20 RX ORDER — ACETAMINOPHEN 325 MG/1
650 TABLET ORAL 2 TIMES DAILY PRN
Status: DISCONTINUED | OUTPATIENT
Start: 2019-07-20 | End: 2019-07-21 | Stop reason: HOSPADM

## 2019-07-20 RX ORDER — VALSARTAN 160 MG/1
320 TABLET ORAL DAILY
Status: DISCONTINUED | OUTPATIENT
Start: 2019-07-20 | End: 2019-07-20

## 2019-07-20 RX ADMIN — MIRTAZAPINE 15 MG: 15 TABLET, FILM COATED ORAL at 21:34

## 2019-07-20 RX ADMIN — ASPIRIN 81 MG: 81 TABLET, COATED ORAL at 08:23

## 2019-07-20 RX ADMIN — MIRTAZAPINE 15 MG: 15 TABLET, FILM COATED ORAL at 08:24

## 2019-07-20 RX ADMIN — SODIUM CHLORIDE 83 ML: 9 INJECTION, SOLUTION INTRAVENOUS at 20:40

## 2019-07-20 RX ADMIN — SENNOSIDES AND DOCUSATE SODIUM 1 TABLET: 8.6; 5 TABLET ORAL at 08:23

## 2019-07-20 RX ADMIN — IOPAMIDOL 69 ML: 755 INJECTION, SOLUTION INTRAVENOUS at 20:40

## 2019-07-20 RX ADMIN — OMEPRAZOLE 40 MG: 20 CAPSULE, DELAYED RELEASE ORAL at 08:23

## 2019-07-20 RX ADMIN — CITALOPRAM HYDROBROMIDE 30 MG: 20 TABLET ORAL at 08:23

## 2019-07-20 RX ADMIN — ROSUVASTATIN CALCIUM 40 MG: 20 TABLET, FILM COATED ORAL at 21:33

## 2019-07-20 RX ADMIN — ACETAMINOPHEN 1000 MG: 500 TABLET, FILM COATED ORAL at 21:33

## 2019-07-20 RX ADMIN — CARVEDILOL 12.5 MG: 12.5 TABLET, FILM COATED ORAL at 17:39

## 2019-07-20 RX ADMIN — SODIUM CHLORIDE 500 ML: 9 INJECTION, SOLUTION INTRAVENOUS at 17:55

## 2019-07-20 RX ADMIN — LEVETIRACETAM 500 MG: 250 TABLET, FILM COATED ORAL at 21:33

## 2019-07-20 RX ADMIN — AMLODIPINE BESYLATE 10 MG: 10 TABLET ORAL at 21:33

## 2019-07-20 RX ADMIN — FOLIC ACID 1 MG: 1 TABLET ORAL at 08:24

## 2019-07-20 RX ADMIN — OXYBUTYNIN CHLORIDE 5 MG: 5 TABLET ORAL at 21:34

## 2019-07-20 RX ADMIN — ACETAMINOPHEN 1000 MG: 500 TABLET, FILM COATED ORAL at 08:23

## 2019-07-20 RX ADMIN — LEVETIRACETAM 500 MG: 250 TABLET, FILM COATED ORAL at 08:23

## 2019-07-20 RX ADMIN — OXYCODONE HYDROCHLORIDE 5 MG: 5 TABLET ORAL at 18:41

## 2019-07-20 RX ADMIN — CARVEDILOL 12.5 MG: 12.5 TABLET, FILM COATED ORAL at 08:23

## 2019-07-20 RX ADMIN — OXYBUTYNIN CHLORIDE 5 MG: 5 TABLET ORAL at 08:24

## 2019-07-20 ASSESSMENT — ACTIVITIES OF DAILY LIVING (ADL)
ADLS_ACUITY_SCORE: 16
ADLS_ACUITY_SCORE: 16
ADLS_ACUITY_SCORE: 20
ADLS_ACUITY_SCORE: 16

## 2019-07-20 NOTE — PLAN OF CARE
Pt A/Ox4, VSS, O2 weaned to 2L NC sats >93%, denies pain/ CP/ SOB, Tele: SR, angiogram today without interventions- failed radial approach but successful with groin approach, TR band removed at X, arm board in place, education given, distal pulses present, CMS intact, LS- clear,  trace edema BLE, pt up with Ax1 and gb, pt tolerating regular diet, continue PO lasix, PTA losartan switched to valsartan per cards, discharge plan 1-2 days, continue POC.

## 2019-07-20 NOTE — PROGRESS NOTES
[facility]    Hospitalist Progress Note  Name: Jade Caba    MRN: [id number]  Provider:  Ken Chin DO MPH  Date of Service: 07/20/2019    Summary of Stay: Jade Caba is a 85 year old female with a history of hypertension, hypercholesterolemia, coronary artery disease, diastolic heart failure with most recent echocardiogram in January 2018 showing EF of 55%, depression with anxiety, history of the subarachnoid hemorrhage in 2014 after a fall, who apparently still on seizure prophylaxis who presented with chest pain.      Her CAD history: Initial angioplasty of the circumflex and 97, OLIVIA to LAD in 2004, OLIVIA to the CX and RCA in 2011, recurrent PCI with OLIVIA to the LAD later in 2011, and most recently an ST HOANG in 1/2018 which time she underwent PCI to a thrombotic lesion of the RCA, was found to have significant in-stent restenosis to the LAD and underwent repeat PCI with OLIVIA to the LAD, and history of CVA and right carotid endarterectomy in 2003, left carotid endarterectomy in 2007, and known renal artery stenosis.  She had been on dual antiplatelet therapy with aspirin and clopidogrel until a few months ago at which time she was switched to full dose aspirin     She was admitted on 7/18/2019 with chest pain that has been stuttering for the past 3 weeks, worsening over the previous 4 days, BELCHER, and increasing lower extremity edema, all concerning for acute coronary syndrome.  Serial troponins undetectable, and EKG shows no acute ischemic findings.  She was been admitted to the cardiac unit on a heparin drip, echocardiogram, and formal cardiology consultation, with plans for angiography.    Coronary angiogram on 7/19 did not show any change in anatomy from 1.5 years ago and with a lack of rise in troponin levels her chest pain is thought to be noncardiac.  She no longer has any chest pain but is hypoxic on room air at rest requiring 2 L of oxygen.  Discussed with patient and family I would like to  rule out other causes of chest pain and hypoxia with CT scan of the chest with contrast which will need to be done tomorrow to prevent contrast-induced nephropathy.  We will hold valsartan for now given these tentative plans for tomorrow.    Problem List:   1. Chest pain and hypoxia: No longer concerning for acute coronary syndrome.  Other etiologies will need to be investigated.  Appreciate cardiology consultation and management yesterday including largely unremarkable coronary angiogram.  Will check CT of the chest with contrast tomorrow.  2. Hypertension: Continue baseline amlodipine 10 mg, carvedilol 12.5 mg twice daily, furosemide 20 mg p.o. daily but hold her ARB today and tomorrow.  BP OK.  3. CAD: Continue aspirin, statin, beta-blocker, but hold ARB.  4. Remote history of subarachnoid hemorrhage, still on seizure prophylaxis, continue levetiracetam 500 mg twice daily.   5. Depression: Continue mirtazapine 15 mg twice daily.    DVT Prophylaxis: PCDs.  Code Status: DNR  Disposition: Expected discharge in 1-2 days to home. Goals prior to discharge include CT chest tomorrow.   Incidental Findings: None.  Family updated today: Yes      Interval History   The patient reports doing well. No further chest pain nor shortness of breath but still requiring O2 while at rest. No nausea, vomiting, diarrhea, constipation. No fevers. No other specific complaints identified.     -Data reviewed today: I personally reviewed all new labs and imaging results over the last 24 hours.     Physical Exam   Temp: 98.1  F (36.7  C) Temp src: Oral BP: 147/56 Pulse: 66 Heart Rate: 75 Resp: 18 SpO2: 94 % O2 Device: Nasal cannula Oxygen Delivery: 1.5 LPM  Vitals:    07/18/19 1259 07/18/19 1633   Weight: 81.6 kg (180 lb) 82.9 kg (182 lb 12.8 oz)     Vital Signs with Ranges  Temp:  [97  F (36.1  C)-98.7  F (37.1  C)] 98.1  F (36.7  C)  Pulse:  [66] 66  Heart Rate:  [59-75] 75  Resp:  [16-20] 18  BP: (105-150)/(42-60) 147/56  SpO2:  [88 %-97  %] 94 %  I/O last 3 completed shifts:  In: 400 [P.O.:400]  Out: 200 [Urine:200]    GENERAL: No apparent distress. Awake, alert, and fully oriented.  HEENT: Normocephalic, atraumatic. Extraocular movements intact.  CARDIOVASCULAR: Regular rate and rhythm without murmurs or rubs. No S3.  PULMONARY: Clear bilaterally.  GASTROINTESTINAL: Soft, non-tender, non-distended. Bowel sounds normoactive.   EXTREMITIES: No cyanosis or clubbing. No edema.  NEUROLOGICAL: CN 2-12 grossly intact, no focal neurological deficits.  DERMATOLOGICAL: No rash, ulcer, bruising, nor jaundice.     Medications     - MEDICATION INSTRUCTIONS -         acetaminophen  1,000 mg Oral BID     amLODIPine  10 mg Oral At Bedtime     aspirin  81 mg Oral Daily     carvedilol  12.5 mg Oral BID w/meals     citalopram  30 mg Oral Daily     folic acid  1 mg Oral Daily     levETIRAcetam  500 mg Oral BID     mirtazapine  15 mg Oral BID     omeprazole  40 mg Oral Daily     oxybutynin  5 mg Oral BID     rosuvastatin  40 mg Oral At Bedtime     senna-docusate  1 tablet Oral BID    Or     senna-docusate  2 tablet Oral BID     sodium chloride (PF)  3 mL Intracatheter Q8H     sodium chloride (PF)  3 mL Intracatheter Q8H     Data     Laboratory:  Recent Labs   Lab 07/19/19  0712 07/18/19  1731 07/18/19  1325   WBC 6.5 11.3* 14.7*   HGB 13.8 13.4 13.4   HCT 43.5 41.2 41.4   MCV 98 97 96    171 186     Recent Labs   Lab 07/19/19  0712 07/18/19  1325    138   POTASSIUM 4.5 4.2   CHLORIDE 106 106   CO2 29 28   ANIONGAP 4 4   * 110*   BUN 18 21   CR 0.97 1.04   GFRESTIMATED 53* 49*   GFRESTBLACK 61 56*   FRANCISCO 9.3 9.1     Recent Labs   Lab 07/18/19  2333 07/18/19  1731 07/18/19  1325   TROPI <0.015 <0.015 <0.015     No results for input(s): CULT in the last 168 hours.    Imaging:  No results found for this or any previous visit (from the past 24 hour(s)).      Ken Chin DO MPH  formerly [facility] Hospitalist  [address] [place] [address]  Pager: [phone]  [phone]  07/20/2019

## 2019-07-20 NOTE — PLAN OF CARE
VSS on 1.5 LPM NC. Unable to wean O2 on shift-O2 sats 88% when reduced from 1.5 LPM to 1 LPM, increased back to 1.5 LPM with O2 sats >92. Denies cp, n/v/d, and SOB.  Tele SR. Trace edema in BLE. Angio yesterday CMS intact and distal pulses present. Fair appetite, yogurt for lunch and muffin for breakfast. CT of chest with contrast tomorrow to r/o other causes of cp and hypoxia on RA. SBA. Ambulated in paz x1, O2 sats dropped to 83% on 1LPM but increased to >93% with a rest. Pt given IS and provided education on use. Continue POC.

## 2019-07-20 NOTE — PROGRESS NOTES
"Pt alert and oriented. On 2L tonight post procedure, will wean off when a wake as pt desats to 86% on RA. No s/s of bleeding from angiogram access sites- groin and radial.Distal pulses strong. Denies any pain.     /42 (BP Location: Left arm)   Pulse 66   Temp 97.7  F (36.5  C) (Oral)   Resp 16   Ht 1.575 m (5' 2\")   Wt 82.9 kg (182 lb 12.8 oz)   SpO2 93%   BMI 33.43 kg/m      "

## 2019-07-21 ENCOUNTER — DOCUMENTATION ONLY (OUTPATIENT)
Dept: MEDSURG UNIT | Facility: CLINIC | Age: 84
End: 2019-07-21

## 2019-07-21 VITALS
OXYGEN SATURATION: 96 % | HEIGHT: 62 IN | HEART RATE: 57 BPM | TEMPERATURE: 98.4 F | DIASTOLIC BLOOD PRESSURE: 47 MMHG | RESPIRATION RATE: 16 BRPM | SYSTOLIC BLOOD PRESSURE: 116 MMHG | WEIGHT: 180.3 LBS | BODY MASS INDEX: 33.18 KG/M2

## 2019-07-21 LAB
ANION GAP SERPL CALCULATED.3IONS-SCNC: 6 MMOL/L (ref 3–14)
BUN SERPL-MCNC: 15 MG/DL (ref 7–30)
CALCIUM SERPL-MCNC: 8.7 MG/DL (ref 8.5–10.1)
CHLORIDE SERPL-SCNC: 109 MMOL/L (ref 94–109)
CO2 SERPL-SCNC: 26 MMOL/L (ref 20–32)
CREAT SERPL-MCNC: 0.85 MG/DL (ref 0.52–1.04)
GFR SERPL CREATININE-BSD FRML MDRD: 62 ML/MIN/{1.73_M2}
GLUCOSE SERPL-MCNC: 93 MG/DL (ref 70–99)
POTASSIUM SERPL-SCNC: 4.4 MMOL/L (ref 3.4–5.3)
SODIUM SERPL-SCNC: 141 MMOL/L (ref 133–144)

## 2019-07-21 PROCEDURE — 25000132 ZZH RX MED GY IP 250 OP 250 PS 637: Performed by: HOSPITALIST

## 2019-07-21 PROCEDURE — 99239 HOSP IP/OBS DSCHRG MGMT >30: CPT | Performed by: HOSPITALIST

## 2019-07-21 PROCEDURE — 80048 BASIC METABOLIC PNL TOTAL CA: CPT | Performed by: HOSPITALIST

## 2019-07-21 PROCEDURE — 25000132 ZZH RX MED GY IP 250 OP 250 PS 637: Performed by: INTERNAL MEDICINE

## 2019-07-21 PROCEDURE — 36415 COLL VENOUS BLD VENIPUNCTURE: CPT | Performed by: HOSPITALIST

## 2019-07-21 RX ORDER — DOXYCYCLINE HYCLATE 100 MG
100 TABLET ORAL 2 TIMES DAILY
Qty: 10 TABLET | Refills: 0 | Status: SHIPPED | OUTPATIENT
Start: 2019-07-21 | End: 2019-07-22

## 2019-07-21 RX ADMIN — LEVETIRACETAM 500 MG: 250 TABLET, FILM COATED ORAL at 08:30

## 2019-07-21 RX ADMIN — ASPIRIN 81 MG: 81 TABLET, COATED ORAL at 08:31

## 2019-07-21 RX ADMIN — CITALOPRAM HYDROBROMIDE 30 MG: 20 TABLET ORAL at 08:30

## 2019-07-21 RX ADMIN — CARVEDILOL 12.5 MG: 12.5 TABLET, FILM COATED ORAL at 08:30

## 2019-07-21 RX ADMIN — OMEPRAZOLE 40 MG: 20 CAPSULE, DELAYED RELEASE ORAL at 08:30

## 2019-07-21 RX ADMIN — MIRTAZAPINE 15 MG: 15 TABLET, FILM COATED ORAL at 08:31

## 2019-07-21 RX ADMIN — FOLIC ACID 1 MG: 1 TABLET ORAL at 08:31

## 2019-07-21 RX ADMIN — OXYBUTYNIN CHLORIDE 5 MG: 5 TABLET ORAL at 08:31

## 2019-07-21 RX ADMIN — ACETAMINOPHEN 1000 MG: 500 TABLET, FILM COATED ORAL at 08:30

## 2019-07-21 RX ADMIN — OXYCODONE HYDROCHLORIDE 5 MG: 5 TABLET ORAL at 08:31

## 2019-07-21 RX ADMIN — SENNOSIDES AND DOCUSATE SODIUM 1 TABLET: 8.6; 5 TABLET ORAL at 08:31

## 2019-07-21 ASSESSMENT — ACTIVITIES OF DAILY LIVING (ADL)
ADLS_ACUITY_SCORE: 20

## 2019-07-21 ASSESSMENT — MIFFLIN-ST. JEOR: SCORE: 1216.09

## 2019-07-21 NOTE — PLAN OF CARE
Patient is in stable condition, VSS, denies pain, cp, and SOB. Patient to discharge to LTC today via daughter.  No barriers to discharge identified.  All discharge education reviewed with patient and daughter in regards to: diet, activity, safety, signs and symptoms to report, new abx s/sx to report, follow up appointments with PCP within one week.  Questions were answered and patient verbalized understanding.  All patient belongings were sent with patient, discharge via wheelchair by staff to main entrance.

## 2019-07-21 NOTE — PLAN OF CARE
VSS. On 1.5 L of O2. Sating 93-94% throughout the night. Pt denies pain. No chest pain all shift. No SOB reported. Slight BELCHER. LS clear. BS active. Trace lower extremity edema. Cards signed off. Discharge possibly today. Continue to monitor per POC.

## 2019-07-21 NOTE — PROGRESS NOTES
Received oxygen referral for patient 10:59am spoke with nurse Deanne to inform her that I would review chart and call her back.   After review of chart called and spoke with Deanne to inform her that medicare with not cover the diagnosis of pneumonia so this would be a out of pocket expense for patient. I did however see CAD and CHF in chart which are qualifying diagnosis if doctor is ok with addending notes and RX we could bill patient insurance. Deanne will page doctor and call me back with update.   12:50pm- Spoke with Deanne informing her that we received documents needed to properly bill patient insurance. Provided ETA of oxygen to bedside.   12:47pm- Called and spoke with patient to offer choice, patient is ok with using [facility] for setup.  Informed patient that we would deliver oxygen to hospital and provided ETA.

## 2019-07-21 NOTE — DISCHARGE SUMMARY
Hospitalist Discharge Summary  [facility]    Jade Caba MRN# [id number]   YOB: 1933 Age: 85 year old     Date of Admission:  7/18/2019  Date of Discharge:  7/21/2019  Admitting Physician:  Nicole Porter MD  Discharge Physician:  Ken Chin  Discharging Service:  Hospitalist     Primary Provider: Jaden Marinelli          Discharge Diagnosis:   Acute hypoxic respiratory failure  Community-acquired pneumonia  Chest pain, possibly related to pneumonia  Hypertension  Coronary artery disease  Remote history of subarachnoid hemorrhage  Depression             Discharge Disposition:   Discharged to home           Allergies:   Allergies   Allergen Reactions     No Known Allergies               Discharge Medications:   Current Discharge Medication List      START taking these medications    Details   doxycycline hyclate (VIBRA-TABS) 100 MG tablet Take 1 tablet (100 mg) by mouth 2 times daily  Qty: 10 tablet, Refills: 0    Associated Diagnoses: Pneumonia due to infectious organism, unspecified laterality, unspecified part of lung         CONTINUE these medications which have NOT CHANGED    Details   !! Acetaminophen (TYLENOL PO) Take 1,000 mg by mouth 2 times daily      !! acetaminophen (TYLENOL) 325 MG tablet Take 650 mg by mouth 2 times daily as needed for mild pain      amLODIPine (NORVASC) 10 MG tablet Take 10 mg by mouth At Bedtime      aspirin (ASA) 81 MG EC tablet Take 1 tablet (81 mg) by mouth daily  Qty: 90 tablet, Refills: 3    Associated Diagnoses: CAD in native artery      calcium carbonate 500 mg, elemental, (OSCAL;OYSTER SHELL CALCIUM) 500 MG tablet Take 2 tablets by mouth every evening       carvedilol (COREG) 12.5 MG tablet Take 1 tablet (12.5 mg) by mouth 2 times daily (with meals)  Qty: 60 tablet, Refills: 1    Associated Diagnoses: CHF (congestive heart failure) (H)      cholecalciferol (VITAMIN D3) 5000 UNITS CAPS capsule Take 5,000 Units by mouth daily     "  citalopram (CELEXA) 10 MG tablet Take 30 mg by mouth daily      folic acid (FOLVITE) 1 MG tablet Take 1 mg by mouth daily      furosemide (LASIX) 20 MG tablet Take 20 mg by mouth daily      hypromellose (ARTIFICIAL TEARS) 0.5 % SOLN ophthalmic solution Place 1 drop into both eyes 3 times daily       levETIRAcetam (KEPPRA) 500 MG tablet Take 500 mg by mouth 2 times daily      losartan (COZAAR) 100 MG tablet Take 100 mg by mouth daily      Mirtazapine (REMERON PO) Take 15 mg by mouth 2 times daily      Multiple Vitamins-Minerals (CERTAVITE SENIOR/ANTIOXIDANT PO) Take 1 tablet by mouth daily       omeprazole (PRILOSEC) 40 MG capsule Take 1 capsule (40 mg) by mouth daily Take 30-60 minutes before a meal.  Qty: 30 capsule, Refills: 9    Associated Diagnoses: Abdominal pain, other specified site      oxybutynin (DITROPAN) 5 MG tablet Take 5 mg by mouth 2 times daily      rosuvastatin (CRESTOR) 40 MG tablet Take 40 mg by mouth At Bedtime       senna-docusate (SENNA S) 8.6-50 MG per tablet Take 1 tablet by mouth 2 times daily Please hold if having loose stools and contact nurse.      bisacodyl (DULCOLAX) 10 MG suppository Place 10 mg rectally daily as needed      metoclopramide (REGLAN) 5 MG tablet Take 5 mg by mouth daily as needed (at noon)       polyethylene glycol (MIRALAX/GLYCOLAX) powder Take 17 g by mouth nightly as needed       trolamine salicylate (ASPERCREME) 10 % external cream Apply topically as needed for moderate pain (apply twice daily as needed for pain. Left knee)       !! - Potential duplicate medications found. Please discuss with provider.                 Condition on Discharge:   Discharge condition: Stable   Discharge vitals: Blood pressure 129/48, pulse 66, temperature 98.2  F (36.8  C), temperature source Oral, resp. rate 16, height 1.575 m (5' 2\"), weight 81.8 kg (180 lb 4.8 oz), SpO2 95 %, not currently breastfeeding.   Code status on discharge: DNR      BASIC PHYSICAL EXAMINATION:  GENERAL: No " apparent distress.  CARDIOVASCULAR: Regular rate and rhythm without murmurs.  PULMONARY: Clear to auscultation bilaterally.   GASTROINTESTINAL: Abdomen soft, non-tender.  EXTREMITIES: No edema, pulses intact.  NEUROLOGIC: No focal deficits.            History of Illness:   See detailed admission note for full details.               Procedures excluding imaging which is summarized below:   Please see details in the electronic medical record.           Consultations:   CARDIOLOGY IP CONSULT  PHARMACY TO DOSE HEPARIN  PHARMACY IP CONSULT  PHARMACY IP CONSULT  SMOKING CESSATION PROGRAM IP CONSULT          Significant Results:   Results for orders placed or performed during the hospital encounter of 07/18/19   XR Chest 2 Views    Narrative    CHEST TWO VIEWS  7/18/2019 1:58 PM     HISTORY:  Chest pain.    COMPARISON: 3/12/2018.      Impression    IMPRESSION: Mild anterior compression of a lower thoracic vertebral  body is unchanged. Aortic calcification. Chest otherwise negative.  Lungs clear. No pleural effusions. Heart size and pulmonary  vascularity are within normal limits.    MARY BROWN MD   CT Chest Pulmonary Embolism w Contrast    Narrative    CT CHEST PULMONARY EMBOLISM WITH CONTRAST 7/20/2019 8:47 PM     HISTORY: PE suspected, low pretest probability; chest pain, shortness  of breath. Evaluate for PE and aortic dissection.      TECHNIQUE: Thin section axial images are performed from the thoracic  inlet to the lung bases utilizing 69 mL of Isovue 370 IV contrast  without adverse event. Coronal reformatted images are also generated.  Radiation dose for this scan was reduced using automated exposure  control, adjustment of the mA and/or kV according to patient size, or  iterative reconstruction technique.    FINDINGS:   Chest: Bibasilar atelectasis and/or infiltrate is noted in the  posterior costophrenic angles, left greater than right. Lungs are  otherwise clear. Trace amount of left pleural fluid and  pericardial  fluid is noted. No right pleural fluid. The heart is normal in size.  Esophagus is unremarkable. Thyroid gland appears normal in size with  multiple cysts and/or nodules noted in each lobe. No enlarged lymph  nodes. A few prominent mediastinal and left hilar lymph nodes are  likely reactive. An enlarged right hilar lymph node measures 1.8 x 1.5  cm. No evidence of pulmonary embolism. Thoracic aorta is unremarkable  with scattered calcified plaque. Extensive coronary artery  calcification is noted. Limited images upper abdomen are within normal  limits. Bone window examination demonstrates degenerative spine  changes.      Impression    IMPRESSION:  1. No evidence of pulmonary embolism. Thoracic aorta is unremarkable.  2. Questionable infiltrate within the posterior right and left lung  base. Enlarged right hilar and prominent mediastinal and left hilar  lymph nodes are present, possibly reactive in nature. Correlate with  patient's clinical picture for the possibility of pneumonia.  3. Extensive coronary artery calcification.     JULIETA LINK MD       Transthoracic Echocardiogram Results:  No results found for this or any previous visit (from the past 4320 hour(s)).             Pending Results:   Unresulted Labs Ordered in the Past 30 Days of this Admission     No orders found from 6/18/2019 to 7/19/2019.                      Discharge Instructions and Follow-Up:   Discharge instructions and follow-up:   Discharge Procedure Orders   Follow-up and recommended labs and tests    Order Comments: Follow up with primary care provider, Jaden Marinelli, within 7 days to evaluate medication change, for hospital follow- up and to follow up on results.  No follow up labs or test are needed.  Consider repeating CT chest in 3 months to assess infiltrates and lymph nodes.  Take doxycycline with small amount of food and several sips of water.  Recheck O2 needs at clinic next week.     Activity   Order Comments:  Your activity upon discharge: activity as tolerated     Order Specific Question Answer Comments   Is discharge order? Yes      DNR (Do Not Resuscitate)     Order Specific Question Answer Comments   Code status determined by: Discussion with patient/legal decision maker      Oxygen Adult   Order Comments: New Home Oxygen Order 2 liter(s) by nasal cannula continuously with use of portable tank. Expected treatment length is 1 months.. Test on conserving device as applicable.    Patients who qualify for home O2 coverage under the CMS guidelines require ABG tests or O2 sat readings obtained closest to, but no earlier than 2 days prior to the discharge, as evidence of the need for home oxygen therapy. Testing must be performed while patient is in the chronic stable state. See notes for O2 sats.    I certify that this patient, Jade Caba has been under my care and that I, or a nurse practitioner or physician's assistant working with me, had a face-to-face encounter that meets the face-to-face encounter requirements with this patient on 7/21/2019. The patient, Jade Caba was evaluated or treated in whole, or in part, for the following medical condition, which necessitates the use of the ordered oxygen. Treatment Diagnosis: Pneumonia.    Attending Provider: Ken Chin  Physician signature: See electronic signature associated with these discharge orders  Date of Order: July 21, 2019     Diet   Order Comments: Follow this diet upon discharge: Cardiac     Order Specific Question Answer Comments   Is discharge order? Yes              Hospital Course:   Summary of Stay: Jade Caba is a 85 year old female with a history of hypertension, hypercholesterolemia, coronary artery disease, diastolic heart failure with most recent echocardiogram in January 2018 showing EF of 55%, depression with anxiety, history of the subarachnoid hemorrhage in 2014 after a fall, who apparently still on seizure prophylaxis who presented with  chest pain.      Her CAD history included initial angioplasty of the circumflex and 97, OLIVIA to LAD in 2004, OLIVIA to the CX and RCA in 2011, recurrent PCI with OLIVIA to the LAD later in 2011, and most recently an STEMI in 1/2018 which time she underwent PCI to a thrombotic lesion of the RCA, was found to have significant in-stent restenosis to the LAD and underwent repeat PCI with OLIVIA to the LAD, and history of CVA and right carotid endarterectomy in 2003, left carotid endarterectomy in 2007, and known renal artery stenosis.  She had been on dual antiplatelet therapy with aspirin and clopidogrel until a few months ago at which time she was switched to full dose aspirin     She was admitted on 7/18/2019 with chest pain that has been stuttering for the past 3 weeks, worsening over the previous 4 days, BELCHER, and increasing lower extremity edema, all concerning for acute coronary syndrome.  Serial troponins undetectable, and EKG shows no acute ischemic findings.  She was been admitted to the cardiac unit on a heparin drip, echocardiogram, and formal cardiology consultation, with plans for angiography.     Coronary angiogram on 7/19 did not show any change in anatomy from 1.5 years ago and with a lack of rise in troponin levels her chest pain is thought to be noncardiac.  She no longer has any chest pain but is hypoxic on room air at rest requiring 2 L of oxygen.  Had another episode of chest pain last night so pursued a CT of the chest with contrast.  This showed no evidence of PE nor aortic dissection.  There was some lymphadenopathy and questionable bilateral infiltrates concerning for pneumonia.  Given her ongoing symptoms, hypoxia, and leukocytosis on admission will treat with a short course of doxycycline for possible community acquired pneumonia.  She is also requiring oxygen at this point will discharge with supplemental O2.    She is largely asymptomatic today.  She appears well and stable and suitable for discharge.   She will follow-up with her PCP in 1 week to assess her oxygenation.  I also discussed with her in detail the recommendation to repeat CT scan in 3 months to evaluate her lymphadenopathy and the potential for an underlying malignancy.    The patient was seen, examined, and counseled on this day. The hospitalization and plan of care was reviewed with the patient and her children extensively. All questions were addressed and the patient agreed to follow-up as noted above.      Total time spent in face to face contact with the patient and coordinating discharge was:  35 Minutes    Ken Chin DO, MPH  [facility] Hospitalist  [address] [place] [address]  Pager: [phone]  07/21/2019

## 2019-07-21 NOTE — PROGRESS NOTES
I certify that this patient, Jade Caba has been under my care and that I, or a nurse practitioner or physician's assistant working with me, had a face-to-face encounter that meets face-to-face encounter requirements with this patient on July 21, 2019.    Jade Caba is now in a chronic stable state and continues to require supplemental oxygen due to continued oxygen desaturation.  This patient has been treated in part, or in whole for the following medical condition(s):  CAD I25.10  Treatments tried and failed or ruled out to treat hypoxemia include ambulation, cath.  If portability is ordered, is the patient mobile within the home? yes

## 2019-07-21 NOTE — DISCHARGE INSTRUCTIONS
Oxygen Provider:  Arranged through [employer], contact number [phone]. If you have any questions or concerns please call the oxygen company directly.

## 2019-07-21 NOTE — PLAN OF CARE
Pt A/Ox4, VSS, O2 1.5L NC sats >93%, pt c/o CP this shift 8-10/10- MD notified, stat EKG completed, 500cc bolus given per MD and CT completed this shift to r/o PE, pt given PRN oxy and tylenol x1 with slight relief, Tele: SR, pt denies SOB, PIV RA SL, LS- clear,  trace edema BLE, pt up with SBA, regular diet, continue PO lasix, PTA losartan switched to valsartan per cards, discharge plan 1-2 days, continue POC.

## 2019-07-22 ENCOUNTER — ASSISTED LIVING VISIT (OUTPATIENT)
Dept: GERIATRICS | Facility: CLINIC | Age: 84
End: 2019-07-22
Payer: COMMERCIAL

## 2019-07-22 ENCOUNTER — PATIENT OUTREACH (OUTPATIENT)
Dept: GERIATRIC MEDICINE | Facility: CLINIC | Age: 84
End: 2019-07-22

## 2019-07-22 VITALS
SYSTOLIC BLOOD PRESSURE: 100 MMHG | OXYGEN SATURATION: 93 % | HEART RATE: 59 BPM | RESPIRATION RATE: 18 BRPM | DIASTOLIC BLOOD PRESSURE: 58 MMHG

## 2019-07-22 DIAGNOSIS — I10 ESSENTIAL HYPERTENSION: ICD-10-CM

## 2019-07-22 DIAGNOSIS — J18.9 PNEUMONIA OF BOTH LOWER LOBES DUE TO INFECTIOUS ORGANISM: Primary | ICD-10-CM

## 2019-07-22 DIAGNOSIS — R53.81 PHYSICAL DECONDITIONING: ICD-10-CM

## 2019-07-22 LAB
INTERPRETATION ECG - MUSE: NORMAL
INTERPRETATION ECG - MUSE: NORMAL

## 2019-07-22 NOTE — PROGRESS NOTES
[facility] [place] [facility]  [place] Medical Record Number:  [id number]  Place of Service where encounter took place:  [facility] [[id number]]  Chief Complaint   Patient presents with     Hospital F/U       HPI:    Jade Caba  is a 85 year old (8/26/1933), who is being seen today for an episodic care visit.  HPI information obtained from: facility chart records, facility staff, patient report and [facility] chart review. Today's concern is: s/p hosp. Stay 7/18/19-7/21/19.  7/18/19 had c.p., BELCHER, increased LE edema. Sent to [facility]. Serial troponins neg, ECG did not show acute ischemia. C.p. Thought to be non cardiac in nature. Chest CT neg for P.E., did show possible bibasilar infiltrate. Also noted: prominent mediastinal, L hilar lymph nodes, possibly reactive in nature. Had leukocytosis. Started on doxycycline for CAP, O2 for hypoxia. DC'd with O2, sats stable.  Has had some physical deconditioning, amb. Less form baseline.  Noted to have lower BP today. For HTN cont on norvasc, coreg, lasix, cozaar.       Past Medical and Surgical History reviewed in Epic today.    MEDICATIONS:  Current Outpatient Medications   Medication Sig Dispense Refill     Acetaminophen (TYLENOL PO) Take 1,000 mg by mouth 2 times daily       acetaminophen (TYLENOL) 325 MG tablet Take 650 mg by mouth 2 times daily as needed for mild pain       amLODIPine (NORVASC) 10 MG tablet Take 10 mg by mouth At Bedtime       aspirin (ASA) 81 MG EC tablet Take 1 tablet (81 mg) by mouth daily 90 tablet 3     bisacodyl (DULCOLAX) 10 MG suppository Place 10 mg rectally daily as needed       calcium carbonate 500 mg, elemental, (OSCAL;OYSTER SHELL CALCIUM) 500 MG tablet Take 2 tablets by mouth every evening        carvedilol (COREG) 12.5 MG tablet Take 1 tablet (12.5 mg) by mouth 2 times daily (with meals) 60 tablet 1     cholecalciferol (VITAMIN D3) 5000 UNITS CAPS capsule Take 5,000 Units by mouth daily       citalopram  (CELEXA) 10 MG tablet Take 30 mg by mouth daily       folic acid (FOLVITE) 1 MG tablet Take 1 mg by mouth daily       furosemide (LASIX) 20 MG tablet Take 20 mg by mouth daily       hypromellose (ARTIFICIAL TEARS) 0.5 % SOLN ophthalmic solution Place 1 drop into both eyes 3 times daily        levETIRAcetam (KEPPRA) 500 MG tablet Take 500 mg by mouth 2 times daily       losartan (COZAAR) 100 MG tablet Take 100 mg by mouth daily       metoclopramide (REGLAN) 5 MG tablet Take 5 mg by mouth daily as needed (at noon)        Mirtazapine (REMERON PO) Take 15 mg by mouth 2 times daily       Multiple Vitamins-Minerals (CERTAVITE SENIOR/ANTIOXIDANT PO) Take 1 tablet by mouth daily        omeprazole (PRILOSEC) 40 MG capsule Take 1 capsule (40 mg) by mouth daily Take 30-60 minutes before a meal. 30 capsule 9     oxybutynin (DITROPAN) 5 MG tablet Take 5 mg by mouth 2 times daily       polyethylene glycol (MIRALAX/GLYCOLAX) powder Take 17 g by mouth nightly as needed        rosuvastatin (CRESTOR) 40 MG tablet Take 40 mg by mouth At Bedtime        senna-docusate (SENNA S) 8.6-50 MG per tablet Take 1 tablet by mouth 2 times daily Please hold if having loose stools and contact nurse.       trolamine salicylate (ASPERCREME) 10 % external cream Apply topically as needed for moderate pain (apply twice daily as needed for pain. Left knee)           REVIEW OF SYSTEMS:  No chest pain, shortness of breath, fevers, chills, headache, nausea, vomiting, dysuria or bowel abnormalities.  Appetite is  normal.  No pain except occ knee pain.    Objective:  /58   Pulse 59   Resp 18   SpO2 93%   Exam:  GENERAL APPEARANCE:  Alert, in no distress, cooperative  ENT:  Mouth and posterior oropharynx normal, moist mucous membranes, [place]  EYES:  EOM, conjunctivae, lids, pupils and irises normal, PERRL  NECK:  No adenopathy,masses or thyromegaly, FROM  RESP:  respiratory effort and palpation of chest normal, lungs clear to auscultation , no  respiratory distress, diminished breath sounds bibasilar  CV:  Palpation and auscultation of heart done , regular rate and rhythm, no murmur, rub, or gallop, no edema  ABDOMEN:  normal bowel sounds, soft, nontender, no hepatosplenomegaly or other masses, no guarding or rebound  M/S:   Gait and station normal  muscle strength 5/5 all 4 ext., normal tone  SKIN:  large bruise R forearm  NEURO:   Cranial nerves 2-12 are normal tested and grossly at patient's baseline, alert, speech clear  PSYCH:  oriented X 3, memory impaired , affect and mood normal    Labs:     Most Recent 3 CBC's:  Recent Labs   Lab Test 07/19/19  0712 07/18/19  1731 07/18/19  1325   WBC 6.5 11.3* 14.7*   HGB 13.8 13.4 13.4   MCV 98 97 96    171 186     Most Recent 3 BMP's:  Recent Labs   Lab Test 07/21/19  0743 07/19/19  0712 07/18/19  1325    139 138   POTASSIUM 4.4 4.5 4.2   CHLORIDE 109 106 106   CO2 26 29 28   BUN 15 18 21   CR 0.85 0.97 1.04   ANIONGAP 6 4 4   FRANCISCO 8.7 9.3 9.1   GLC 93 106* 110*       ASSESSMENT/PLAN:  Pneumonia of both lower lobes due to infectious organism (H)  Hypoxia, recent c.p. Afebrile today  1. Cont. O2  2. Complete doxycycline course  3. Monitor for fever  4. Check O2 sats every day, reassess later this week without O2  5. Monitor for further chest pain    Physical deconditioning  Due to #1  1. Cont. To amb with portable O2 tank  2. Increased amb as emre  3. Monitor for ongoing BELCHER  4. Monitor for changes in gait, falls    Essential hypertension  Lower BP today  1. Encourage fluids  2. Cont. Current BP meds  3. BPs, HRs every day this week, then reassess  4. For further low BPs, may decrease norvasc dose  5. F/u cardiology appt 8/15/19      Electronically signed by:  ZAIN Marcos CNP

## 2019-07-22 NOTE — PROGRESS NOTES
[facility] Care Coordination Contact    CC received notification of discharge to home. Discharge occurred on (Date7/21/2019) from [facility].   CC contacted member and reviewed discharge summary.   Member reports that she is doing ok, states that she was told that she will need to use 02 for 30 days. Member denies feeling SOB. Member reports no other changes.   Call placed to nursing office, spoke with Jo. Jo reports no concerns, reports that member contacted home care office to receive assistance with 02 this morning, but was indep walking back to her apt. Jo states that member was seen by PCP this morning.      Member has had a change in condition: No  Home visit needed: No  Care plan reviewed and updated.  The following home based services Assisted Living were resumed.  New referrals placed: No  Transition log completed.   PCP notified of transition back to home via EMR.  Holly Guajardo RN, BC  Manager [facility] Care Coordinator   [phone]  (Fax)

## 2019-07-22 NOTE — LETTER
7/22/2019        RE: Jade Saeed  [address] [facility] [address]        [place] [facility]  [place] Medical Record Number:  [id number]  Place of Service where encounter took place:  [facility] ([facility]) [[id number]]  Chief Complaint   Patient presents with     Hospital F/U       HPI:    Jade Caba  is a 85 year old (8/26/1933), who is being seen today for an episodic care visit.  HPI information obtained from: facility chart records, facility staff, patient report and [facility] chart review. Today's concern is: s/p hosp. Stay 7/18/19-7/21/19.  7/18/19 had c.p., BELCHER, increased LE edema. Sent to [facility]. Serial troponins neg, ECG did not show acute ischemia. C.p. Thought to be non cardiac in nature. Chest CT neg for P.E., did show possible bibasilar infiltrate. Also noted: prominent mediastinal, L hilar lymph nodes, possibly reactive in nature. Had leukocytosis. Started on doxycycline for CAP, O2 for hypoxia. DC'd with O2, sats stable.  Has had some physical deconditioning, amb. Less form baseline.  Noted to have lower BP today. For HTN cont on norvasc, coreg, lasix, cozaar.       Past Medical and Surgical History reviewed in Epic today.    MEDICATIONS:  Current Outpatient Medications   Medication Sig Dispense Refill     Acetaminophen (TYLENOL PO) Take 1,000 mg by mouth 2 times daily       acetaminophen (TYLENOL) 325 MG tablet Take 650 mg by mouth 2 times daily as needed for mild pain       amLODIPine (NORVASC) 10 MG tablet Take 10 mg by mouth At Bedtime       aspirin (ASA) 81 MG EC tablet Take 1 tablet (81 mg) by mouth daily 90 tablet 3     bisacodyl (DULCOLAX) 10 MG suppository Place 10 mg rectally daily as needed       calcium carbonate 500 mg, elemental, (OSCAL;OYSTER SHELL CALCIUM) 500 MG tablet Take 2 tablets by mouth every evening        carvedilol (COREG) 12.5 MG tablet Take 1 tablet (12.5 mg) by mouth 2 times daily (with meals) 60 tablet 1      cholecalciferol (VITAMIN D3) 5000 UNITS CAPS capsule Take 5,000 Units by mouth daily       citalopram (CELEXA) 10 MG tablet Take 30 mg by mouth daily       folic acid (FOLVITE) 1 MG tablet Take 1 mg by mouth daily       furosemide (LASIX) 20 MG tablet Take 20 mg by mouth daily       hypromellose (ARTIFICIAL TEARS) 0.5 % SOLN ophthalmic solution Place 1 drop into both eyes 3 times daily        levETIRAcetam (KEPPRA) 500 MG tablet Take 500 mg by mouth 2 times daily       losartan (COZAAR) 100 MG tablet Take 100 mg by mouth daily       metoclopramide (REGLAN) 5 MG tablet Take 5 mg by mouth daily as needed (at noon)        Mirtazapine (REMERON PO) Take 15 mg by mouth 2 times daily       Multiple Vitamins-Minerals (CERTAVITE SENIOR/ANTIOXIDANT PO) Take 1 tablet by mouth daily        omeprazole (PRILOSEC) 40 MG capsule Take 1 capsule (40 mg) by mouth daily Take 30-60 minutes before a meal. 30 capsule 9     oxybutynin (DITROPAN) 5 MG tablet Take 5 mg by mouth 2 times daily       polyethylene glycol (MIRALAX/GLYCOLAX) powder Take 17 g by mouth nightly as needed        rosuvastatin (CRESTOR) 40 MG tablet Take 40 mg by mouth At Bedtime        senna-docusate (SENNA S) 8.6-50 MG per tablet Take 1 tablet by mouth 2 times daily Please hold if having loose stools and contact nurse.       trolamine salicylate (ASPERCREME) 10 % external cream Apply topically as needed for moderate pain (apply twice daily as needed for pain. Left knee)           REVIEW OF SYSTEMS:  No chest pain, shortness of breath, fevers, chills, headache, nausea, vomiting, dysuria or bowel abnormalities.  Appetite is  normal.  No pain except occ knee pain.    Objective:  /58   Pulse 59   Resp 18   SpO2 93%   Exam:  GENERAL APPEARANCE:  Alert, in no distress, cooperative  ENT:  Mouth and posterior oropharynx normal, moist mucous membranes, [place]  EYES:  EOM, conjunctivae, lids, pupils and irises normal, PERRL  NECK:  No adenopathy,masses or thyromegaly,  FROM  RESP:  respiratory effort and palpation of chest normal, lungs clear to auscultation , no respiratory distress, diminished breath sounds bibasilar  CV:  Palpation and auscultation of heart done , regular rate and rhythm, no murmur, rub, or gallop, no edema  ABDOMEN:  normal bowel sounds, soft, nontender, no hepatosplenomegaly or other masses, no guarding or rebound  M/S:   Gait and station normal  muscle strength 5/5 all 4 ext., normal tone  SKIN:  large bruise R forearm  NEURO:   Cranial nerves 2-12 are normal tested and grossly at patient's baseline, alert, speech clear  PSYCH:  oriented X 3, memory impaired , affect and mood normal    Labs:     Most Recent 3 CBC's:  Recent Labs   Lab Test 07/19/19  0712 07/18/19  1731 07/18/19  1325   WBC 6.5 11.3* 14.7*   HGB 13.8 13.4 13.4   MCV 98 97 96    171 186     Most Recent 3 BMP's:  Recent Labs   Lab Test 07/21/19  0743 07/19/19  0712 07/18/19  1325    139 138   POTASSIUM 4.4 4.5 4.2   CHLORIDE 109 106 106   CO2 26 29 28   BUN 15 18 21   CR 0.85 0.97 1.04   ANIONGAP 6 4 4   FRANCISCO 8.7 9.3 9.1   GLC 93 106* 110*       ASSESSMENT/PLAN:  Pneumonia of both lower lobes due to infectious organism (H)  Hypoxia, recent c.p. Afebrile today  1. Cont. O2  2. Complete doxycycline course  3. Monitor for fever  4. Check O2 sats every day, reassess later this week without O2  5. Monitor for further chest pain    Physical deconditioning  Due to #1  1. Cont. To amb with portable O2 tank  2. Increased amb as emre  3. Monitor for ongoing BELCHER  4. Monitor for changes in gait, falls    Essential hypertension  Lower BP today  1. Encourage fluids  2. Cont. Current BP meds  3. BPs, HRs every day this week, then reassess  4. For further low BPs, may decrease norvasc dose  5. F/u cardiology appt 8/15/19      Electronically signed by:  ZAIN Marcos CNP             Sincerely,        ZAIN Marcos CNP

## 2019-07-23 ENCOUNTER — PATIENT OUTREACH (OUTPATIENT)
Dept: GERIATRIC MEDICINE | Facility: CLINIC | Age: 84
End: 2019-07-23

## 2019-07-25 NOTE — PROGRESS NOTES
[facility] Care Coordination Contact    7/23/19 Rec'd vm from Roz LEIVA at [facility]. Roz reports member returned from hospital on 7/21/19 is on 02 continuously, staff have been assisting member twice daily with managing the 02 (plugging the 02 machine in and unplugging).  Roz is requesting addition to the RS tool of 15 minutes per day  Roz states that she will update CC once member is weaned off the 02    7/25/19 RS tool updated  7/25/19 Roz is out of the office, VM left with Betsey at [place] to update.  Holly Guajardo RN, BC  Manager [facility] Care Coordinator   [phone]  (Fax)

## 2019-07-26 ENCOUNTER — RECORDS - HEALTHEAST (OUTPATIENT)
Dept: LAB | Facility: CLINIC | Age: 84
End: 2019-07-26

## 2019-07-26 ENCOUNTER — ASSISTED LIVING VISIT (OUTPATIENT)
Dept: GERIATRICS | Facility: CLINIC | Age: 84
End: 2019-07-26
Payer: COMMERCIAL

## 2019-07-26 ENCOUNTER — TRANSFERRED RECORDS (OUTPATIENT)
Dept: HEALTH INFORMATION MANAGEMENT | Facility: CLINIC | Age: 84
End: 2019-07-26

## 2019-07-26 VITALS
SYSTOLIC BLOOD PRESSURE: 132 MMHG | OXYGEN SATURATION: 96 % | DIASTOLIC BLOOD PRESSURE: 64 MMHG | RESPIRATION RATE: 20 BRPM | HEART RATE: 66 BPM

## 2019-07-26 DIAGNOSIS — J18.9 PNEUMONIA OF BOTH LOWER LOBES DUE TO INFECTIOUS ORGANISM: Primary | ICD-10-CM

## 2019-07-26 DIAGNOSIS — M17.0 PRIMARY OSTEOARTHRITIS OF BOTH KNEES: ICD-10-CM

## 2019-07-26 DIAGNOSIS — I10 ESSENTIAL HYPERTENSION: ICD-10-CM

## 2019-07-26 LAB
ANION GAP SERPL CALCULATED.3IONS-SCNC: 9 MMOL/L (ref 5–18)
ANION GAP SERPL CALCULATED.3IONS-SCNC: 9 MMOL/L (ref 5–18)
BASOPHILS # BLD AUTO: 0 THOU/UL (ref 0–0.2)
BASOPHILS NFR BLD AUTO: 1 % (ref 0–2)
BUN SERPL-MCNC: 16 MG/DL (ref 8–28)
BUN SERPL-MCNC: 16 MG/DL (ref 8–28)
CALCIUM SERPL-MCNC: 9.3 MG/DL (ref 8.5–10.5)
CALCIUM SERPL-MCNC: 9.3 MG/DL (ref 8.5–10.5)
CHLORIDE BLD-SCNC: 107 MMOL/L (ref 98–107)
CHLORIDE SERPLBLD-SCNC: 107 MMOL/L (ref 98–107)
CO2 SERPL-SCNC: 24 MMOL/L (ref 22–31)
CO2 SERPL-SCNC: 24 MMOL/L (ref 22–31)
CREAT SERPL-MCNC: 0.87 MG/DL (ref 0.6–1.1)
CREAT SERPL-MCNC: 0.87 MG/DL (ref 0.6–1.1)
DIFFERENTIAL: NORMAL
EOSINOPHIL # BLD AUTO: 0.5 THOU/UL (ref 0–0.4)
EOSINOPHIL NFR BLD AUTO: 6 % (ref 0–6)
ERYTHROCYTE [DISTWIDTH] IN BLOOD BY AUTOMATED COUNT: 12.2 % (ref 11–14.5)
ERYTHROCYTE [DISTWIDTH] IN BLOOD BY AUTOMATED COUNT: 12.2 % (ref 11–14.5)
GFR SERPL CREATININE-BSD FRML MDRD: >60 ML/MIN/1.73M2
GFR SERPL CREATININE-BSD FRML MDRD: >60 ML/MIN/1.73M2
GLUCOSE BLD-MCNC: 130 MG/DL (ref 70–125)
GLUCOSE SERPL-MCNC: 130 MG/DL (ref 70–125)
HCT VFR BLD AUTO: 37.8 % (ref 35–47)
HCT VFR BLD AUTO: 37.8 % (ref 35–47)
HEMOGLOBIN: 12.1 G/DL (ref 12–16)
HGB BLD-MCNC: 12.1 G/DL (ref 12–16)
INR PPP: 1.03 (ref 0.9–1.1)
INR PPP: 1.03 (ref 0.9–1.1)
LYMPHOCYTES # BLD AUTO: 2 THOU/UL (ref 0.8–4.4)
LYMPHOCYTES NFR BLD AUTO: 27 % (ref 20–40)
MCH RBC QN AUTO: 31 PG (ref 27–34)
MCH RBC QN AUTO: 31 PG (ref 27–34)
MCHC RBC AUTO-ENTMCNC: 32 G/DL (ref 32–36)
MCHC RBC AUTO-ENTMCNC: 32 G/DL (ref 32–36)
MCV RBC AUTO: 97 FL (ref 80–100)
MCV RBC AUTO: 97 FL (ref 80–100)
MONOCYTES # BLD AUTO: 0.6 THOU/UL (ref 0–0.9)
MONOCYTES NFR BLD AUTO: 8 % (ref 2–10)
NEUTROPHILS # BLD AUTO: 4.2 THOU/UL (ref 2–7.7)
NEUTROPHILS NFR BLD AUTO: 58 % (ref 50–70)
PLATELET # BLD AUTO: 158 THOU/UL (ref 140–440)
PLATELET # BLD AUTO: 158 THOU/UL (ref 140–440)
PMV BLD AUTO: 12.2 FL (ref 8.5–12.5)
POTASSIUM BLD-SCNC: 4 MMOL/L (ref 3.5–5)
POTASSIUM SERPL-SCNC: 4 MMOL/L (ref 3.5–5)
RBC # BLD AUTO: 3.9 MILL/UL (ref 3.8–5.4)
RBC # BLD AUTO: 3.9 MILL/UL (ref 3.8–5.4)
SODIUM SERPL-SCNC: 140 MMOL/L (ref 136–145)
SODIUM SERPL-SCNC: 140 MMOL/L (ref 136–145)
WBC # BLD AUTO: 7.3 THOU/UL (ref 4–11)
WBC: 7.3 THOU/UL (ref 4–11)

## 2019-07-26 NOTE — LETTER
7/26/2019        RE: Jade Saeed  [address] [facility] [address]        [place] [facility]  [place] Medical Record Number:  [id number]  Place of Service where encounter took place:  [facility] ([facility]) [[id number]]  Chief Complaint   Patient presents with     Breathing Problem       HPI:    Jade Caba  is a 85 year old (8/26/1933), who is being seen today for an episodic care visit.  HPI information obtained from: facility chart records, facility staff, patient report and [facility] chart review. Today's concern is: CAP, HTN, OA.  Hosp. Last week for chest pain. W/u neg for ACS. Chest CT showed possible infiltrate.  given abtx for pneumonia, started on O2. C.p. Resolved. Has ongoing cough.  Upon return found to have lower BPs. Cont on norvasc, coreg, cozaar for HTN.  BPs stable today.  No reports of dizziness.  Reports knee pain ok. Has OA both knees. H/o inj to knees.  Cont. On tylenol, prn aspercreme to knees.        Past Medical and Surgical History reviewed in Epic today.    MEDICATIONS:  Current Outpatient Medications   Medication Sig Dispense Refill     Acetaminophen (TYLENOL PO) Take 1,000 mg by mouth 2 times daily       acetaminophen (TYLENOL) 325 MG tablet Take 650 mg by mouth 2 times daily as needed for mild pain       amLODIPine (NORVASC) 10 MG tablet Take 10 mg by mouth At Bedtime       aspirin (ASA) 81 MG EC tablet Take 1 tablet (81 mg) by mouth daily 90 tablet 3     bisacodyl (DULCOLAX) 10 MG suppository Place 10 mg rectally daily as needed       calcium carbonate 500 mg, elemental, (OSCAL;OYSTER SHELL CALCIUM) 500 MG tablet Take 2 tablets by mouth every evening        carvedilol (COREG) 12.5 MG tablet Take 1 tablet (12.5 mg) by mouth 2 times daily (with meals) 60 tablet 1     cholecalciferol (VITAMIN D3) 5000 UNITS CAPS capsule Take 5,000 Units by mouth daily       citalopram (CELEXA) 10 MG tablet Take 30 mg by mouth daily       folic  acid (FOLVITE) 1 MG tablet Take 1 mg by mouth daily       furosemide (LASIX) 20 MG tablet Take 20 mg by mouth daily       hypromellose (ARTIFICIAL TEARS) 0.5 % SOLN ophthalmic solution Place 1 drop into both eyes 3 times daily        levETIRAcetam (KEPPRA) 500 MG tablet Take 500 mg by mouth 2 times daily       losartan (COZAAR) 100 MG tablet Take 100 mg by mouth daily       metoclopramide (REGLAN) 5 MG tablet Take 5 mg by mouth daily as needed (at noon)        Mirtazapine (REMERON PO) Take 15 mg by mouth 2 times daily       Multiple Vitamins-Minerals (CERTAVITE SENIOR/ANTIOXIDANT PO) Take 1 tablet by mouth daily        omeprazole (PRILOSEC) 40 MG capsule Take 1 capsule (40 mg) by mouth daily Take 30-60 minutes before a meal. 30 capsule 9     oxybutynin (DITROPAN) 5 MG tablet Take 5 mg by mouth 2 times daily       polyethylene glycol (MIRALAX/GLYCOLAX) powder Take 17 g by mouth nightly as needed        rosuvastatin (CRESTOR) 40 MG tablet Take 40 mg by mouth At Bedtime        senna-docusate (SENNA S) 8.6-50 MG per tablet Take 1 tablet by mouth 2 times daily Please hold if having loose stools and contact nurse.       trolamine salicylate (ASPERCREME) 10 % external cream Apply topically as needed for moderate pain (apply twice daily as needed for pain. Left knee)           REVIEW OF SYSTEMS:  CONSTITUTIONAL:  fatigue and forgetfulness, EYES:  glasses or contacts, ENT:  [place], CV:  neg, RESPIRATORY: cough and occ BELCHER, :  neg, GI:  neg, NEURO:  neg, PSYCH: neg and MUSCULOSKELETAL: neg    Objective:  /64   Pulse 66   Resp 20   SpO2 96%   Exam:  GENERAL APPEARANCE:  Alert, in no distress, cooperative  ENT:  Mouth and posterior oropharynx normal, moist mucous membranes, [place]  EYES:  EOM, conjunctivae, lids, pupils and irises normal, PERRL  NECK:  No adenopathy,masses or thyromegaly, FROM  RESP:  respiratory effort and palpation of chest normal, lungs clear to auscultation , no respiratory distress, diminished breath  sounds bibasilar. 02 sats 96% on 2 lpm at rest. remain > 90% with amb.   CV:  Palpation and auscultation of heart done , regular rate and rhythm, no murmur, rub, or gallop, no edema  ABDOMEN:  normal bowel sounds, soft, nontender, no hepatosplenomegaly or other masses, no guarding or rebound  M/S:   Gait and station normal  muscle strength 5/5 all 4 ext., normal tone  NEURO:   Cranial nerves 2-12 are normal tested and grossly at patient's baseline, alert, speech clear  PSYCH:  insight and judgement impaired, memory impaired , affect and mood normal    Labs:     Most Recent 3 CBC's:  Recent Labs   Lab Test 07/19/19  0712 07/18/19  1731 07/18/19  1325   WBC 6.5 11.3* 14.7*   HGB 13.8 13.4 13.4   MCV 98 97 96    171 186     Most Recent 3 BMP's:  Recent Labs   Lab Test 07/21/19  0743 07/19/19  0712 07/18/19  1325    139 138   POTASSIUM 4.4 4.5 4.2   CHLORIDE 109 106 106   CO2 26 29 28   BUN 15 18 21   CR 0.85 0.97 1.04   ANIONGAP 6 4 4   FRANCISCO 8.7 9.3 9.1   GLC 93 106* 110*       ASSESSMENT/PLAN:  Pneumonia of both lower lobes due to infectious organism (H)  Afebrile, ongoing cough. O2 sats stable 2lpm  1. Cont. O2  2. Start mucinex fr cough  3. Follow O2 sats  4. Next week attempt to wean down to 1lpm 02  5. Complete doxycycline course today    Essential hypertension  Lower BPs upon return from hosp, stable today  1. Cont. Current BP meds  2. Follow BPs, HRs  3. Cbc, bmp next week  4. For further lower BPs, may decrease norvasc dose. F/u cardiology appt next month    Primary osteoarthritis of both knees  Gen. Stable  1. Cont. Tylenol  2. Cont. Prn aspercreme  3. F/u bilat knee inj per [facility] over next 1-2 mos prn  4. Encourage amb. As emre.      Electronically signed by:  ZAIN Marcos CNP             Sincerely,        ZAIN Marcos CNP

## 2019-07-26 NOTE — PROGRESS NOTES
[place] [facility]  [place] Medical Record Number:  [id number]  Place of Service where encounter took place:  [facility] ([facility] [[id number]]  Chief Complaint   Patient presents with     Breathing Problem       HPI:    aJde Caba  is a 85 year old (8/26/1933), who is being seen today for an episodic care visit.  HPI information obtained from: facility chart records, facility staff, patient report and [facility] chart review. Today's concern is: CAP, HTN, OA.  Hosp. Last week for chest pain. W/u neg for ACS. Chest CT showed possible infiltrate.  given abtx for pneumonia, started on O2. C.p. Resolved. Has ongoing cough.  Upon return found to have lower BPs. Cont on norvasc, coreg, cozaar for HTN.  BPs stable today.  No reports of dizziness.  Reports knee pain ok. Has OA both knees. H/o inj to knees.  Cont. On tylenol, prn aspercreme to knees.        Past Medical and Surgical History reviewed in Epic today.    MEDICATIONS:  Current Outpatient Medications   Medication Sig Dispense Refill     Acetaminophen (TYLENOL PO) Take 1,000 mg by mouth 2 times daily       acetaminophen (TYLENOL) 325 MG tablet Take 650 mg by mouth 2 times daily as needed for mild pain       amLODIPine (NORVASC) 10 MG tablet Take 10 mg by mouth At Bedtime       aspirin (ASA) 81 MG EC tablet Take 1 tablet (81 mg) by mouth daily 90 tablet 3     bisacodyl (DULCOLAX) 10 MG suppository Place 10 mg rectally daily as needed       calcium carbonate 500 mg, elemental, (OSCAL;OYSTER SHELL CALCIUM) 500 MG tablet Take 2 tablets by mouth every evening        carvedilol (COREG) 12.5 MG tablet Take 1 tablet (12.5 mg) by mouth 2 times daily (with meals) 60 tablet 1     cholecalciferol (VITAMIN D3) 5000 UNITS CAPS capsule Take 5,000 Units by mouth daily       citalopram (CELEXA) 10 MG tablet Take 30 mg by mouth daily       folic acid (FOLVITE) 1 MG tablet Take 1 mg by mouth daily       furosemide (LASIX) 20 MG tablet Take 20  mg by mouth daily       hypromellose (ARTIFICIAL TEARS) 0.5 % SOLN ophthalmic solution Place 1 drop into both eyes 3 times daily        levETIRAcetam (KEPPRA) 500 MG tablet Take 500 mg by mouth 2 times daily       losartan (COZAAR) 100 MG tablet Take 100 mg by mouth daily       metoclopramide (REGLAN) 5 MG tablet Take 5 mg by mouth daily as needed (at noon)        Mirtazapine (REMERON PO) Take 15 mg by mouth 2 times daily       Multiple Vitamins-Minerals (CERTAVITE SENIOR/ANTIOXIDANT PO) Take 1 tablet by mouth daily        omeprazole (PRILOSEC) 40 MG capsule Take 1 capsule (40 mg) by mouth daily Take 30-60 minutes before a meal. 30 capsule 9     oxybutynin (DITROPAN) 5 MG tablet Take 5 mg by mouth 2 times daily       polyethylene glycol (MIRALAX/GLYCOLAX) powder Take 17 g by mouth nightly as needed        rosuvastatin (CRESTOR) 40 MG tablet Take 40 mg by mouth At Bedtime        senna-docusate (SENNA S) 8.6-50 MG per tablet Take 1 tablet by mouth 2 times daily Please hold if having loose stools and contact nurse.       trolamine salicylate (ASPERCREME) 10 % external cream Apply topically as needed for moderate pain (apply twice daily as needed for pain. Left knee)           REVIEW OF SYSTEMS:  CONSTITUTIONAL:  fatigue and forgetfulness, EYES:  glasses or contacts, ENT:  [place], CV:  neg, RESPIRATORY: cough and occ BELCHER, :  neg, GI:  neg, NEURO:  neg, PSYCH: neg and MUSCULOSKELETAL: neg    Objective:  /64   Pulse 66   Resp 20   SpO2 96%   Exam:  GENERAL APPEARANCE:  Alert, in no distress, cooperative  ENT:  Mouth and posterior oropharynx normal, moist mucous membranes, [place]  EYES:  EOM, conjunctivae, lids, pupils and irises normal, PERRL  NECK:  No adenopathy,masses or thyromegaly, FROM  RESP:  respiratory effort and palpation of chest normal, lungs clear to auscultation , no respiratory distress, diminished breath sounds bibasilar. 02 sats 96% on 2 lpm at rest. remain > 90% with amb.   CV:  Palpation and  auscultation of heart done , regular rate and rhythm, no murmur, rub, or gallop, no edema  ABDOMEN:  normal bowel sounds, soft, nontender, no hepatosplenomegaly or other masses, no guarding or rebound  M/S:   Gait and station normal  muscle strength 5/5 all 4 ext., normal tone  NEURO:   Cranial nerves 2-12 are normal tested and grossly at patient's baseline, alert, speech clear  PSYCH:  insight and judgement impaired, memory impaired , affect and mood normal    Labs:     Most Recent 3 CBC's:  Recent Labs   Lab Test 07/19/19  0712 07/18/19  1731 07/18/19  1325   WBC 6.5 11.3* 14.7*   HGB 13.8 13.4 13.4   MCV 98 97 96    171 186     Most Recent 3 BMP's:  Recent Labs   Lab Test 07/21/19  0743 07/19/19  0712 07/18/19  1325    139 138   POTASSIUM 4.4 4.5 4.2   CHLORIDE 109 106 106   CO2 26 29 28   BUN 15 18 21   CR 0.85 0.97 1.04   ANIONGAP 6 4 4   FRANCISCO 8.7 9.3 9.1   GLC 93 106* 110*       ASSESSMENT/PLAN:  Pneumonia of both lower lobes due to infectious organism (H)  Afebrile, ongoing cough. O2 sats stable 2lpm  1. Cont. O2  2. Start mucinex fr cough  3. Follow O2 sats  4. Next week attempt to wean down to 1lpm 02  5. Complete doxycycline course today    Essential hypertension  Lower BPs upon return from hosp, stable today  1. Cont. Current BP meds  2. Follow BPs, HRs  3. Cbc, bmp next week  4. For further lower BPs, may decrease norvasc dose. F/u cardiology appt next month    Primary osteoarthritis of both knees  Gen. Stable  1. Cont. Tylenol  2. Cont. Prn aspercreme  3. F/u bilat knee inj per [facility] over next 1-2 mos prn  4. Encourage amb. As emre.      Electronically signed by:  ZAIN Marcos CNP

## 2019-07-31 ENCOUNTER — ASSISTED LIVING VISIT (OUTPATIENT)
Dept: GERIATRICS | Facility: CLINIC | Age: 84
End: 2019-07-31
Payer: COMMERCIAL

## 2019-07-31 VITALS
RESPIRATION RATE: 18 BRPM | DIASTOLIC BLOOD PRESSURE: 52 MMHG | OXYGEN SATURATION: 97 % | SYSTOLIC BLOOD PRESSURE: 106 MMHG | HEART RATE: 77 BPM

## 2019-07-31 DIAGNOSIS — J18.9 PNEUMONIA OF BOTH LOWER LOBES DUE TO INFECTIOUS ORGANISM: Primary | ICD-10-CM

## 2019-07-31 DIAGNOSIS — I50.32 CHRONIC DIASTOLIC CONGESTIVE HEART FAILURE (H): ICD-10-CM

## 2019-07-31 DIAGNOSIS — I10 ESSENTIAL HYPERTENSION: ICD-10-CM

## 2019-07-31 NOTE — PROGRESS NOTES
[place] [facility]  [place] Medical Record Number:  [id number]  Place of Service where encounter took place:  [facility] ([facility]) [[id number]]  Chief Complaint   Patient presents with     Cough       HPI:    Jade Caba  is a 85 year old (8/26/1933), who is being seen today for an episodic care visit.  HPI information obtained from: facility chart records, facility staff, patient report, [facility] chart review and family/first contact dtr report. Today's concern is: pneumonia, chf, HTN.  S/p hosp stay for pneumonia. 7/18/19 had increased BELCHER, increased LE edema. Chest CT showed possible bibasilar infiltrates.  tx with anbtx, started on 02. O2 sats stable.  Has been refusing O2 past 2 days. No reports of sob. Afebrile. No sig. Cough. Cont on lasix for CHF. LE edema stable.  Has had lower BPs at times. Today /52. Per staff, fluid intake adequate. For HTN cont on norvasc, coreg, cozaar.        Past Medical and Surgical History reviewed in Epic today.    MEDICATIONS:  Current Outpatient Medications   Medication Sig Dispense Refill     Acetaminophen (TYLENOL PO) Take 1,000 mg by mouth 2 times daily       acetaminophen (TYLENOL) 325 MG tablet Take 650 mg by mouth 2 times daily as needed for mild pain       amLODIPine (NORVASC) 10 MG tablet Take 10 mg by mouth At Bedtime       aspirin (ASA) 81 MG EC tablet Take 1 tablet (81 mg) by mouth daily 90 tablet 3     bisacodyl (DULCOLAX) 10 MG suppository Place 10 mg rectally daily as needed       calcium carbonate 500 mg, elemental, (OSCAL;OYSTER SHELL CALCIUM) 500 MG tablet Take 2 tablets by mouth every evening        carvedilol (COREG) 12.5 MG tablet Take 1 tablet (12.5 mg) by mouth 2 times daily (with meals) 60 tablet 1     cholecalciferol (VITAMIN D3) 5000 UNITS CAPS capsule Take 5,000 Units by mouth daily       citalopram (CELEXA) 10 MG tablet Take 30 mg by mouth daily       folic acid (FOLVITE) 1 MG tablet Take 1 mg by mouth  daily       furosemide (LASIX) 20 MG tablet Take 20 mg by mouth daily       hypromellose (ARTIFICIAL TEARS) 0.5 % SOLN ophthalmic solution Place 1 drop into both eyes 3 times daily        levETIRAcetam (KEPPRA) 500 MG tablet Take 500 mg by mouth 2 times daily       losartan (COZAAR) 100 MG tablet Take 100 mg by mouth daily       metoclopramide (REGLAN) 5 MG tablet Take 5 mg by mouth daily as needed (at noon)        Mirtazapine (REMERON PO) Take 15 mg by mouth 2 times daily       Multiple Vitamins-Minerals (CERTAVITE SENIOR/ANTIOXIDANT PO) Take 1 tablet by mouth daily        omeprazole (PRILOSEC) 40 MG capsule Take 1 capsule (40 mg) by mouth daily Take 30-60 minutes before a meal. 30 capsule 9     oxybutynin (DITROPAN) 5 MG tablet Take 5 mg by mouth 2 times daily       polyethylene glycol (MIRALAX/GLYCOLAX) powder Take 17 g by mouth nightly as needed        rosuvastatin (CRESTOR) 40 MG tablet Take 40 mg by mouth At Bedtime        senna-docusate (SENNA S) 8.6-50 MG per tablet Take 1 tablet by mouth 2 times daily Please hold if having loose stools and contact nurse.       trolamine salicylate (ASPERCREME) 10 % external cream Apply topically as needed for moderate pain (apply twice daily as needed for pain. Left knee)           REVIEW OF SYSTEMS:  No chest pain, shortness of breath, fevers, chills, headache, nausea, vomiting, dysuria or bowel abnormalities.  Appetite is  normal.  No pain except occ L knee.    Objective:  /52   Pulse 77   Resp 18   SpO2 97%   Exam:  GENERAL APPEARANCE:  Alert, in no distress, cooperative  ENT:  Mouth and posterior oropharynx normal, moist mucous membranes, [place], no rhinitis  EYES:  PERRL, sl OS periorbital edema  NECK:  No adenopathy,masses or thyromegaly, FROM  RESP:  respiratory effort and palpation of chest normal, lungs clear to auscultation , no respiratory distress, diminished breath sounds bibasilar. O2 sats decrease to 89-90% with extended amb, quickly increase to mid 90s  with rest  CV:  Palpation and auscultation of heart done , regular rate and rhythm, no murmur, rub, or gallop, no edema  ABDOMEN:  normal bowel sounds, soft, nontender, no hepatosplenomegaly or other masses, no guarding or rebound  M/S:   Gait and station normal  muscle strength 5/5 all 4 ext., normal tone  NEURO:   Cranial nerves 2-12 are normal tested and grossly at patient's baseline, alert, speech clear  PSYCH:  memory impaired , affect and mood normal, no apparent anxiety    Labs:     Most Recent 3 CBC's:  Recent Labs   Lab Test 07/26/19 07/19/19  0712 07/18/19  1731   WBC 7.3 6.5 11.3*   HGB 12.1 13.8 13.4   MCV 97 98 97    165 171     Most Recent 3 BMP's:  Recent Labs   Lab Test 07/26/19 07/21/19  0743 07/19/19  0712    141 139   POTASSIUM 4.0 4.4 4.5   CHLORIDE 107 109 106   CO2 24 26 29   BUN 16 15 18   CR 0.87 0.85 0.97   ANIONGAP 9 6 4   FRANCISCO 9.3 8.7 9.3   * 93 106*       ASSESSMENT/PLAN:  Pneumonia of both lower lobes due to infectious organism (H)  Afebrile. O2 sats stable  1. Change O2 to prn 2 lpm to keep sats > 90%  2. Monitor temp every day  3. follow O2 sats, with amb  4. Encourage increased act. Level as emre    Chronic diastolic congestive heart failure (H)  Currently stable  1. Cont. Lasix  2. Elevate LEs when sitting  3. Follow wt.s  4. Monitor for changes in resp status as above    Essential hypertension  Lower BPs at times with SBPs in low 100s  1. Cont. Current BP meds  2. VS every day x 3 days, then reassess  3. Monitor for dizziness  4. For further low BPs, May decrease norvasc dose  5. Bmp in next 1-3 mos      Electronically signed by:  ZAIN Marcos CNP

## 2019-07-31 NOTE — LETTER
7/31/2019        RE: Jade Saeed  [address] [facility] [address]        [facility]  [place] Medical Record Number:  [id number]  Place of Service where encounter took place:  [facility] ([facility]) [[id number]]  Chief Complaint   Patient presents with     Cough       HPI:    Jade Caba  is a 85 year old (8/26/1933), who is being seen today for an episodic care visit.  HPI information obtained from: facility chart records, facility staff, patient report, [facility] chart review and family/first contact dtr report. Today's concern is: pneumonia, chf, HTN.  S/p hosp stay for pneumonia. 7/18/19 had increased BELCHER, increased LE edema. Chest CT showed possible bibasilar infiltrates.  tx with anbtx, started on 02. O2 sats stable.  Has been refusing O2 past 2 days. No reports of sob. Afebrile. No sig. Cough. Cont on lasix for CHF. LE edema stable.  Has had lower BPs at times. Today /52. Per staff, fluid intake adequate. For HTN cont on norvasc, coreg, cozaar.        Past Medical and Surgical History reviewed in Epic today.    MEDICATIONS:  Current Outpatient Medications   Medication Sig Dispense Refill     Acetaminophen (TYLENOL PO) Take 1,000 mg by mouth 2 times daily       acetaminophen (TYLENOL) 325 MG tablet Take 650 mg by mouth 2 times daily as needed for mild pain       amLODIPine (NORVASC) 10 MG tablet Take 10 mg by mouth At Bedtime       aspirin (ASA) 81 MG EC tablet Take 1 tablet (81 mg) by mouth daily 90 tablet 3     bisacodyl (DULCOLAX) 10 MG suppository Place 10 mg rectally daily as needed       calcium carbonate 500 mg, elemental, (OSCAL;OYSTER SHELL CALCIUM) 500 MG tablet Take 2 tablets by mouth every evening        carvedilol (COREG) 12.5 MG tablet Take 1 tablet (12.5 mg) by mouth 2 times daily (with meals) 60 tablet 1     cholecalciferol (VITAMIN D3) 5000 UNITS CAPS capsule Take 5,000 Units by mouth daily       citalopram (CELEXA) 10 MG  tablet Take 30 mg by mouth daily       folic acid (FOLVITE) 1 MG tablet Take 1 mg by mouth daily       furosemide (LASIX) 20 MG tablet Take 20 mg by mouth daily       hypromellose (ARTIFICIAL TEARS) 0.5 % SOLN ophthalmic solution Place 1 drop into both eyes 3 times daily        levETIRAcetam (KEPPRA) 500 MG tablet Take 500 mg by mouth 2 times daily       losartan (COZAAR) 100 MG tablet Take 100 mg by mouth daily       metoclopramide (REGLAN) 5 MG tablet Take 5 mg by mouth daily as needed (at noon)        Mirtazapine (REMERON PO) Take 15 mg by mouth 2 times daily       Multiple Vitamins-Minerals (CERTAVITE SENIOR/ANTIOXIDANT PO) Take 1 tablet by mouth daily        omeprazole (PRILOSEC) 40 MG capsule Take 1 capsule (40 mg) by mouth daily Take 30-60 minutes before a meal. 30 capsule 9     oxybutynin (DITROPAN) 5 MG tablet Take 5 mg by mouth 2 times daily       polyethylene glycol (MIRALAX/GLYCOLAX) powder Take 17 g by mouth nightly as needed        rosuvastatin (CRESTOR) 40 MG tablet Take 40 mg by mouth At Bedtime        senna-docusate (SENNA S) 8.6-50 MG per tablet Take 1 tablet by mouth 2 times daily Please hold if having loose stools and contact nurse.       trolamine salicylate (ASPERCREME) 10 % external cream Apply topically as needed for moderate pain (apply twice daily as needed for pain. Left knee)           REVIEW OF SYSTEMS:  No chest pain, shortness of breath, fevers, chills, headache, nausea, vomiting, dysuria or bowel abnormalities.  Appetite is  normal.  No pain except occ L knee.    Objective:  /52   Pulse 77   Resp 18   SpO2 97%   Exam:  GENERAL APPEARANCE:  Alert, in no distress, cooperative  ENT:  Mouth and posterior oropharynx normal, moist mucous membranes, [place], no rhinitis  EYES:  PERRL, sl OS periorbital edema  NECK:  No adenopathy,masses or thyromegaly, FROM  RESP:  respiratory effort and palpation of chest normal, lungs clear to auscultation , no respiratory distress, diminished breath  sounds bibasilar. O2 sats decrease to 89-90% with extended amb, quickly increase to mid 90s with rest  CV:  Palpation and auscultation of heart done , regular rate and rhythm, no murmur, rub, or gallop, no edema  ABDOMEN:  normal bowel sounds, soft, nontender, no hepatosplenomegaly or other masses, no guarding or rebound  M/S:   Gait and station normal  muscle strength 5/5 all 4 ext., normal tone  NEURO:   Cranial nerves 2-12 are normal tested and grossly at patient's baseline, alert, speech clear  PSYCH:  memory impaired , affect and mood normal, no apparent anxiety    Labs:     Most Recent 3 CBC's:  Recent Labs   Lab Test 07/26/19 07/19/19  0712 07/18/19  1731   WBC 7.3 6.5 11.3*   HGB 12.1 13.8 13.4   MCV 97 98 97    165 171     Most Recent 3 BMP's:  Recent Labs   Lab Test 07/26/19 07/21/19  0743 07/19/19  0712    141 139   POTASSIUM 4.0 4.4 4.5   CHLORIDE 107 109 106   CO2 24 26 29   BUN 16 15 18   CR 0.87 0.85 0.97   ANIONGAP 9 6 4   FRANCISCO 9.3 8.7 9.3   * 93 106*       ASSESSMENT/PLAN:  Pneumonia of both lower lobes due to infectious organism (H)  Afebrile. O2 sats stable  1. Change O2 to prn 2 lpm to keep sats > 90%  2. Monitor temp every day  3. follow O2 sats, with amb  4. Encourage increased act. Level as emre    Chronic diastolic congestive heart failure (H)  Currently stable  1. Cont. Lasix  2. Elevate LEs when sitting  3. Follow wt.s  4. Monitor for changes in resp status as above    Essential hypertension  Lower BPs at times with SBPs in low 100s  1. Cont. Current BP meds  2. VS every day x 3 days, then reassess  3. Monitor for dizziness  4. For further low BPs, May decrease norvasc dose  5. Bmp in next 1-3 mos      Electronically signed by:  ZAIN Marcos CNP             Sincerely,        ZAIN Marcos CNP

## 2019-08-09 ENCOUNTER — ASSISTED LIVING VISIT (OUTPATIENT)
Dept: GERIATRICS | Facility: CLINIC | Age: 84
End: 2019-08-09
Payer: COMMERCIAL

## 2019-08-09 VITALS
HEART RATE: 62 BPM | OXYGEN SATURATION: 94 % | RESPIRATION RATE: 16 BRPM | DIASTOLIC BLOOD PRESSURE: 63 MMHG | SYSTOLIC BLOOD PRESSURE: 117 MMHG

## 2019-08-09 DIAGNOSIS — I10 ESSENTIAL HYPERTENSION: ICD-10-CM

## 2019-08-09 DIAGNOSIS — R06.02 SOB (SHORTNESS OF BREATH): ICD-10-CM

## 2019-08-09 DIAGNOSIS — M17.0 PRIMARY OSTEOARTHRITIS OF BOTH KNEES: Primary | ICD-10-CM

## 2019-08-09 NOTE — LETTER
8/9/2019        RE: Jade Saeed  [address] [facility] [address]        [facility]  [place] Medical Record Number:  [id number]  Place of Service where encounter took place:  [facility] ([facility] [[id number]]  No chief complaint on file.      HPI:    Jade Caba  is a 85 year old (8/26/1933), who is being seen today for an episodic care visit.  HPI information obtained from: facility chart records, facility staff, patient report, [facility] chart review and family/first contact dtr report. Today's concern is: OA, sob, HTN.  Has chronic bilat knee pain L>R.  Cont. On tylenol, prn aspercreme.  Reports aspercreme typically effective. Does not want f/u L knee inj at this time. dtr reports resident did use to go to gym and use recumbent bicycle for exercise, wishes for resident to restart this activity. S/p hosp. Stay for pneumonia last month.  Weaned off O2 last week.  O2 sats stable RA. No c.p.  Act. Level baseline.  Upon return form hosp. Had lower BPs.  BPs stable for past couple weeks. No reports of dizziness. Cont. On norvasc, coreg, lasix, cozaar.       Past Medical and Surgical History reviewed in Epic today.    MEDICATIONS:  Current Outpatient Medications   Medication Sig Dispense Refill     Acetaminophen (TYLENOL PO) Take 1,000 mg by mouth 2 times daily       acetaminophen (TYLENOL) 325 MG tablet Take 650 mg by mouth 2 times daily as needed for mild pain       amLODIPine (NORVASC) 10 MG tablet Take 10 mg by mouth At Bedtime       aspirin (ASA) 81 MG EC tablet Take 1 tablet (81 mg) by mouth daily 90 tablet 3     bisacodyl (DULCOLAX) 10 MG suppository Place 10 mg rectally daily as needed       calcium carbonate 500 mg, elemental, (OSCAL;OYSTER SHELL CALCIUM) 500 MG tablet Take 2 tablets by mouth every evening        carvedilol (COREG) 12.5 MG tablet Take 1 tablet (12.5 mg) by mouth 2 times daily (with meals) 60 tablet 1     cholecalciferol  (VITAMIN D3) 5000 UNITS CAPS capsule Take 5,000 Units by mouth daily       citalopram (CELEXA) 10 MG tablet Take 30 mg by mouth daily       folic acid (FOLVITE) 1 MG tablet Take 1 mg by mouth daily       furosemide (LASIX) 20 MG tablet Take 20 mg by mouth daily       hypromellose (ARTIFICIAL TEARS) 0.5 % SOLN ophthalmic solution Place 1 drop into both eyes 3 times daily        levETIRAcetam (KEPPRA) 500 MG tablet Take 500 mg by mouth 2 times daily       losartan (COZAAR) 100 MG tablet Take 100 mg by mouth daily       metoclopramide (REGLAN) 5 MG tablet Take 5 mg by mouth daily as needed (at noon)        Mirtazapine (REMERON PO) Take 15 mg by mouth 2 times daily       Multiple Vitamins-Minerals (CERTAVITE SENIOR/ANTIOXIDANT PO) Take 1 tablet by mouth daily        omeprazole (PRILOSEC) 40 MG capsule Take 1 capsule (40 mg) by mouth daily Take 30-60 minutes before a meal. 30 capsule 9     oxybutynin (DITROPAN) 5 MG tablet Take 5 mg by mouth 2 times daily       polyethylene glycol (MIRALAX/GLYCOLAX) powder Take 17 g by mouth nightly as needed        rosuvastatin (CRESTOR) 40 MG tablet Take 40 mg by mouth At Bedtime        senna-docusate (SENNA S) 8.6-50 MG per tablet Take 1 tablet by mouth 2 times daily Please hold if having loose stools and contact nurse.       trolamine salicylate (ASPERCREME) 10 % external cream Apply topically as needed for moderate pain (apply twice daily as needed for pain. Left knee)           REVIEW OF SYSTEMS:  No chest pain, shortness of breath, fevers, chills, headache, nausea, vomiting, dysuria or bowel abnormalities.  Appetite is  normal.  No pain except occ L knee.    Objective:  There were no vitals taken for this visit.  Exam:  GENERAL APPEARANCE:  Alert, in no distress, cooperative  ENT:  Mouth and posterior oropharynx normal, moist mucous membranes, [place]  EYES:  EOM, conjunctivae, lids, pupils and irises normal, PERRL  NECK:  No adenopathy,masses or thyromegaly, FROM  RESP:  respiratory  effort and palpation of chest normal, lungs clear to auscultation , no respiratory distress, diminished breath sounds bibasilar  CV:  Palpation and auscultation of heart done , regular rate and rhythm, no murmur, rub, or gallop, no edema  ABDOMEN:  normal bowel sounds, soft, nontender, no hepatosplenomegaly or other masses, no guarding or rebound  M/S:   Gait and station normal  muscle strength 5/5 all 4 ext., normal tone  NEURO:   Cranial nerves 2-12 are normal tested and grossly at patient's baseline, alert, speech clear  PSYCH:  memory impaired , affect and mood normal    Labs:     Most Recent 3 CBC's:  Recent Labs   Lab Test 07/26/19 07/19/19  0712 07/18/19  1731   WBC 7.3 6.5 11.3*   HGB 12.1 13.8 13.4   MCV 97 98 97    165 171     Most Recent 3 BMP's:  Recent Labs   Lab Test 07/26/19 07/21/19  0743 07/19/19  0712    141 139   POTASSIUM 4.0 4.4 4.5   CHLORIDE 107 109 106   CO2 24 26 29   BUN 16 15 18   CR 0.87 0.85 0.97   ANIONGAP 9 6 4   FRANCISCO 9.3 8.7 9.3   * 93 106*       ASSESSMENT/PLAN:  Primary osteoarthritis of both knees  ongoing knee pain L>R, aspercreme typically effective  1. Cont. Tylenol, aspercreme  2. For further increased knee pain may consider f/u L knee inj-does not want at this time  3. Monitor for changes in gait, increased LE weakness  4. Did speak to resident per dtr's request-states she will go to gym at facility to user recumbent bicycle 3x/week    SOB (shortness of breath)  Currently stable. S/p pneumonia. O2 dc'd  1. Cont. To follow O2 sats-stable RA with amb.  2. Monitor for cough, wheezing, fever  3. For above s/s may repeat CXR    Essential hypertension  Currently stable. Recent lower BPs  1. Cont. Current CV meds  2. Follow BPs, HRs  3. Cbc, bmp in next 1-3 mos  4. Cardiology appt next week      Electronically signed by:  Scarlett Sevilla             Sincerely,        ZAIN Marcos CNP

## 2019-08-09 NOTE — PROGRESS NOTES
[facility]  [place] Medical Record Number:  [id number]  Place of Service where encounter took place:  [facility] [[id number]]  No chief complaint on file.      HPI:    Jade Caba  is a 85 year old (8/26/1933), who is being seen today for an episodic care visit.  HPI information obtained from: facility chart records, facility staff, patient report, [facility] chart review and family/first contact dtr report. Today's concern is: OA, sob, HTN.  Has chronic bilat knee pain L>R.  Cont. On tylenol, prn aspercreme.  Reports aspercreme typically effective. Does not want f/u L knee inj at this time. dtr reports resident did use to go to gym and use recumbent bicycle for exercise, wishes for resident to restart this activity. S/p hosp. Stay for pneumonia last month.  Weaned off O2 last week.  O2 sats stable RA. No c.p.  Act. Level baseline.  Upon return form hosp. Had lower BPs.  BPs stable for past couple weeks. No reports of dizziness. Cont. On norvasc, coreg, lasix, cozaar.       Past Medical and Surgical History reviewed in Epic today.    MEDICATIONS:  Current Outpatient Medications   Medication Sig Dispense Refill     Acetaminophen (TYLENOL PO) Take 1,000 mg by mouth 2 times daily       acetaminophen (TYLENOL) 325 MG tablet Take 650 mg by mouth 2 times daily as needed for mild pain       amLODIPine (NORVASC) 10 MG tablet Take 10 mg by mouth At Bedtime       aspirin (ASA) 81 MG EC tablet Take 1 tablet (81 mg) by mouth daily 90 tablet 3     bisacodyl (DULCOLAX) 10 MG suppository Place 10 mg rectally daily as needed       calcium carbonate 500 mg, elemental, (OSCAL;OYSTER SHELL CALCIUM) 500 MG tablet Take 2 tablets by mouth every evening        carvedilol (COREG) 12.5 MG tablet Take 1 tablet (12.5 mg) by mouth 2 times daily (with meals) 60 tablet 1     cholecalciferol (VITAMIN D3) 5000 UNITS CAPS capsule Take 5,000 Units by mouth daily       citalopram (CELEXA) 10 MG  tablet Take 30 mg by mouth daily       folic acid (FOLVITE) 1 MG tablet Take 1 mg by mouth daily       furosemide (LASIX) 20 MG tablet Take 20 mg by mouth daily       hypromellose (ARTIFICIAL TEARS) 0.5 % SOLN ophthalmic solution Place 1 drop into both eyes 3 times daily        levETIRAcetam (KEPPRA) 500 MG tablet Take 500 mg by mouth 2 times daily       losartan (COZAAR) 100 MG tablet Take 100 mg by mouth daily       metoclopramide (REGLAN) 5 MG tablet Take 5 mg by mouth daily as needed (at noon)        Mirtazapine (REMERON PO) Take 15 mg by mouth 2 times daily       Multiple Vitamins-Minerals (CERTAVITE SENIOR/ANTIOXIDANT PO) Take 1 tablet by mouth daily        omeprazole (PRILOSEC) 40 MG capsule Take 1 capsule (40 mg) by mouth daily Take 30-60 minutes before a meal. 30 capsule 9     oxybutynin (DITROPAN) 5 MG tablet Take 5 mg by mouth 2 times daily       polyethylene glycol (MIRALAX/GLYCOLAX) powder Take 17 g by mouth nightly as needed        rosuvastatin (CRESTOR) 40 MG tablet Take 40 mg by mouth At Bedtime        senna-docusate (SENNA S) 8.6-50 MG per tablet Take 1 tablet by mouth 2 times daily Please hold if having loose stools and contact nurse.       trolamine salicylate (ASPERCREME) 10 % external cream Apply topically as needed for moderate pain (apply twice daily as needed for pain. Left knee)           REVIEW OF SYSTEMS:  No chest pain, shortness of breath, fevers, chills, headache, nausea, vomiting, dysuria or bowel abnormalities.  Appetite is  normal.  No pain except occ L knee.    Objective:  There were no vitals taken for this visit.  Exam:  GENERAL APPEARANCE:  Alert, in no distress, cooperative  ENT:  Mouth and posterior oropharynx normal, moist mucous membranes, [place]  EYES:  EOM, conjunctivae, lids, pupils and irises normal, PERRL  NECK:  No adenopathy,masses or thyromegaly, FROM  RESP:  respiratory effort and palpation of chest normal, lungs clear to auscultation , no respiratory distress,  diminished breath sounds bibasilar  CV:  Palpation and auscultation of heart done , regular rate and rhythm, no murmur, rub, or gallop, no edema  ABDOMEN:  normal bowel sounds, soft, nontender, no hepatosplenomegaly or other masses, no guarding or rebound  M/S:   Gait and station normal  muscle strength 5/5 all 4 ext., normal tone  NEURO:   Cranial nerves 2-12 are normal tested and grossly at patient's baseline, alert, speech clear  PSYCH:  memory impaired , affect and mood normal    Labs:     Most Recent 3 CBC's:  Recent Labs   Lab Test 07/26/19 07/19/19  0712 07/18/19  1731   WBC 7.3 6.5 11.3*   HGB 12.1 13.8 13.4   MCV 97 98 97    165 171     Most Recent 3 BMP's:  Recent Labs   Lab Test 07/26/19 07/21/19  0743 07/19/19  0712    141 139   POTASSIUM 4.0 4.4 4.5   CHLORIDE 107 109 106   CO2 24 26 29   BUN 16 15 18   CR 0.87 0.85 0.97   ANIONGAP 9 6 4   FRANCISCO 9.3 8.7 9.3   * 93 106*       ASSESSMENT/PLAN:  Primary osteoarthritis of both knees  ongoing knee pain L>R, aspercreme typically effective  1. Cont. Tylenol, aspercreme  2. For further increased knee pain may consider f/u L knee inj-does not want at this time  3. Monitor for changes in gait, increased LE weakness  4. Did speak to resident per dtr's request-states she will go to gym at facility to user recumbent bicycle 3x/week    SOB (shortness of breath)  Currently stable. S/p pneumonia. O2 dc'd  1. Cont. To follow O2 sats-stable RA with amb.  2. Monitor for cough, wheezing, fever  3. For above s/s may repeat CXR    Essential hypertension  Currently stable. Recent lower BPs  1. Cont. Current CV meds  2. Follow BPs, HRs  3. Cbc, bmp in next 1-3 mos  4. Cardiology appt next week      Electronically signed by:  Scarlett Sevilla

## 2019-08-15 ENCOUNTER — OFFICE VISIT (OUTPATIENT)
Dept: CARDIOLOGY | Facility: CLINIC | Age: 84
End: 2019-08-15
Attending: INTERNAL MEDICINE
Payer: COMMERCIAL

## 2019-08-15 VITALS
HEIGHT: 61 IN | DIASTOLIC BLOOD PRESSURE: 62 MMHG | BODY MASS INDEX: 34.17 KG/M2 | HEART RATE: 65 BPM | WEIGHT: 181 LBS | SYSTOLIC BLOOD PRESSURE: 132 MMHG

## 2019-08-15 DIAGNOSIS — I25.10 CAD IN NATIVE ARTERY: ICD-10-CM

## 2019-08-15 DIAGNOSIS — I25.10 CAD IN NATIVE ARTERY: Primary | ICD-10-CM

## 2019-08-15 DIAGNOSIS — I10 ESSENTIAL HYPERTENSION: ICD-10-CM

## 2019-08-15 LAB
ANION GAP SERPL CALCULATED.3IONS-SCNC: 3 MMOL/L (ref 3–14)
BUN SERPL-MCNC: 20 MG/DL (ref 7–30)
CALCIUM SERPL-MCNC: 9 MG/DL (ref 8.5–10.1)
CHLORIDE SERPL-SCNC: 106 MMOL/L (ref 94–109)
CO2 SERPL-SCNC: 31 MMOL/L (ref 20–32)
CREAT SERPL-MCNC: 1.09 MG/DL (ref 0.52–1.04)
GFR SERPL CREATININE-BSD FRML MDRD: 46 ML/MIN/{1.73_M2}
GLUCOSE SERPL-MCNC: 94 MG/DL (ref 70–99)
POTASSIUM SERPL-SCNC: 3.9 MMOL/L (ref 3.4–5.3)
SODIUM SERPL-SCNC: 140 MMOL/L (ref 133–144)

## 2019-08-15 PROCEDURE — 36415 COLL VENOUS BLD VENIPUNCTURE: CPT | Performed by: INTERNAL MEDICINE

## 2019-08-15 PROCEDURE — 99214 OFFICE O/P EST MOD 30 MIN: CPT | Performed by: PHYSICIAN ASSISTANT

## 2019-08-15 PROCEDURE — 80048 BASIC METABOLIC PNL TOTAL CA: CPT | Performed by: INTERNAL MEDICINE

## 2019-08-15 ASSESSMENT — MIFFLIN-ST. JEOR: SCORE: 1203.39

## 2019-08-15 NOTE — LETTER
8/15/2019      Jaden Marinelli, APRN CNP  [address]  [facility] [address]      RE: Jade Caba       Dear Colleague,    I had the pleasure of seeing Jade Caba in the [facility].    Service Date: 08/15/2019      HISTORY OF PRESENT ILLNESS:  Jade is a pleasant 85-year-old female who presents to the Cardiology office today for a 6-month followup.      Again, she has an extensive cardiovascular history with coronary artery disease for which she has undergone multivessel PCI in the past.  Initially she had angioplasty to the circumflex in 1997.  She subsequently had drug-eluting stent placement to the LAD in 2004, drug-eluting stent placement to the circumflex and RCA in 2011 and subsequent recurrent symptoms with drug-eluting stent placement to the mid LAD, also in 2011.  Somewhat more recently she had non-STEMI in 02/2018 at which time she was noted to have a thrombotic lesion in the mid RCA which was treated with drug-eluting stent placement.  Unfortunately, there guidewire trauma in the proximal RCA and she had stent placement to this area as well.  Additionally, she was found to have significant in-stent restenosis in the LAD and underwent repeat drug-eluting stent placement there too.      In 07/2019, the patient presented to [facility] with chest discomfort.  She did undergo a repeat coronary angiogram which showed no significant change since 2018.  No PCI was performed.  She also has a history of CVA and prior right carotid endarterectomy in 2003 and left carotid endarterectomy in 2007.  She has known renal artery stenosis.  She has previously been noted to have a PFO.  She has hypertension, hyperlipidemia and a prior subarachnoid hemorrhage following a mechanical fall.      During her hospitalization in July, she was noted to have elevated blood pressure and Dr. Carolina recommended changing her losartan to valsartan.  For unknown reasons, it does not  appear that this was done.  Since discharge from the hospital, she has actually had her blood pressure checked several times at her assisted living facility.  Her blood pressures have ranged from 100/58 to 132/64.  The patient states she has been feeling well aside from arthritic pain in her neck and left shoulder.  She really does not have any new questions or concerns today.      CURRENT CARDIAC MEDICATIONS:   1.  Amlodipine 10 mg daily.   2.  Aspirin 81 mg daily.   3.  Carvedilol 12.5 mg b.i.d.   4.  Furosemide 20 mg daily.   5.  Losartan 100 mg daily.   6.  Crestor 40 mg nightly.      The remainder of her medications, allergies and review of systems were reviewed and as are documented separately.      PHYSICAL EXAMINATION:   GENERAL:  The patient is a pleasant 85-year-old female who appears her stated age.  She is in no apparent distress.   VITAL SIGNS:  Her blood pressure is 132/62, pulse is 65, weight is 181 pounds are stable.  Breathing is nonlabored.   LUNGS:  Clear to auscultation bilaterally.   CARDIAC:  Reveals a regular rate and rhythm.  I do not appreciate any significant murmur.   EXTREMITIES:  Lower extremities show trace edema on the top of her feet and at the ankles.   NEUROLOGIC:  Alert and oriented.      She had a basic metabolic panel today.  Her sodium is 140, potassium 3.9, BUN is 20, creatinine is 1.09.      ASSESSMENT AND PLAN:  Again Jade is a pleasant 85-year-old female with an extensive cardiovascular history as detailed above.  Her most recent PCI was in 02/2018 at which time she presented with a non-STEMI.  She had a thrombotic lesion in the mid RCA.  She ended up undergoing drug-eluting stent placement x2 to the proximal and mid RCA along with ostial LAD stent placement due to significant in-stent restenosis.  Her angiogram in 07/2019 showed no significant change compared to a year ago.  She is on a good cardiovascular medication regimen.  Her blood pressure was somewhat elevated during  her recent hospital stay.  However, it appears it has been fairly well controlled at her assisted living facility as well as in the office today.  We will continue her medication regimen unchanged for the time being.  Of course, if her blood pressure is significantly elevated in the future, we could again consider changing the losartan to valsartan.      I have recommended following up in the Cardiology office in approximately 6 months.  She previously was under the care of Dr. Sorensen but she has since left the practice.  She was most recently evaluated by Dr. Carolina during her recent hospitalization and is hoping to establish care with him.  I will place the order to follow up with him, but I am happy to see her back at any point in the future.      Thank you for allowing me to participate in the care of this pleasant patient.         ANTONIO RUCKER PA-C             D: 08/15/2019   T: 08/15/2019   MT: MARILU      Name:     LAURO SALTER   MRN:      [id number]        Account:      [id number]   :      1933           Service Date: 08/15/2019      Document: [id number]         Outpatient Encounter Medications as of 8/15/2019   Medication Sig Dispense Refill     Acetaminophen (TYLENOL PO) Take 1,000 mg by mouth 2 times daily       acetaminophen (TYLENOL) 325 MG tablet Take 650 mg by mouth 2 times daily as needed for mild pain       amLODIPine (NORVASC) 10 MG tablet Take 10 mg by mouth At Bedtime       aspirin (ASA) 81 MG EC tablet Take 1 tablet (81 mg) by mouth daily 90 tablet 3     bisacodyl (DULCOLAX) 10 MG suppository Place 10 mg rectally daily as needed       calcium carbonate 500 mg, elemental, (OSCAL;OYSTER SHELL CALCIUM) 500 MG tablet Take 2 tablets by mouth every evening        carvedilol (COREG) 12.5 MG tablet Take 1 tablet (12.5 mg) by mouth 2 times daily (with meals) 60 tablet 1     cholecalciferol (VITAMIN D3) 5000 UNITS CAPS capsule Take 5,000 Units by mouth daily       citalopram  (CELEXA) 10 MG tablet Take 30 mg by mouth daily       folic acid (FOLVITE) 1 MG tablet Take 1 mg by mouth daily       furosemide (LASIX) 20 MG tablet Take 20 mg by mouth daily       hypromellose (ARTIFICIAL TEARS) 0.5 % SOLN ophthalmic solution Place 1 drop into both eyes 3 times daily        levETIRAcetam (KEPPRA) 500 MG tablet Take 500 mg by mouth 2 times daily       losartan (COZAAR) 100 MG tablet Take 100 mg by mouth daily       metoclopramide (REGLAN) 5 MG tablet Take 5 mg by mouth daily as needed (at noon)        Mirtazapine (REMERON PO) Take 15 mg by mouth 2 times daily       Multiple Vitamins-Minerals (CERTAVITE SENIOR/ANTIOXIDANT PO) Take 1 tablet by mouth daily        omeprazole (PRILOSEC) 40 MG capsule Take 1 capsule (40 mg) by mouth daily Take 30-60 minutes before a meal. 30 capsule 9     oxybutynin (DITROPAN) 5 MG tablet Take 5 mg by mouth 2 times daily       polyethylene glycol (MIRALAX/GLYCOLAX) powder Take 17 g by mouth nightly as needed        rosuvastatin (CRESTOR) 40 MG tablet Take 40 mg by mouth At Bedtime        senna-docusate (SENNA S) 8.6-50 MG per tablet Take 1 tablet by mouth 2 times daily Please hold if having loose stools and contact nurse.       trolamine salicylate (ASPERCREME) 10 % external cream Apply topically as needed for moderate pain (apply twice daily as needed for pain. Left knee)       No facility-administered encounter medications on file as of 8/15/2019.        Again, thank you for allowing me to participate in the care of your patient.      Sincerely,    Dionna Parnell PA-C     [facility]

## 2019-08-15 NOTE — LETTER
8/15/2019    Jaden Marinelli, APRN CNP  [address]  [facility] [address]    RE: aJde Caba       Dear Colleague,    I had the pleasure of seeing UshaELLEN Caba in the [facility].    Please see separate dictation for HPI, PHYSICAL EXAM AND IMPRESSION/PLAN.    CURRENT MEDICATIONS:  Current Outpatient Medications   Medication Sig Dispense Refill     Acetaminophen (TYLENOL PO) Take 1,000 mg by mouth 2 times daily       acetaminophen (TYLENOL) 325 MG tablet Take 650 mg by mouth 2 times daily as needed for mild pain       amLODIPine (NORVASC) 10 MG tablet Take 10 mg by mouth At Bedtime       aspirin (ASA) 81 MG EC tablet Take 1 tablet (81 mg) by mouth daily 90 tablet 3     bisacodyl (DULCOLAX) 10 MG suppository Place 10 mg rectally daily as needed       calcium carbonate 500 mg, elemental, (OSCAL;OYSTER SHELL CALCIUM) 500 MG tablet Take 2 tablets by mouth every evening        carvedilol (COREG) 12.5 MG tablet Take 1 tablet (12.5 mg) by mouth 2 times daily (with meals) 60 tablet 1     cholecalciferol (VITAMIN D3) 5000 UNITS CAPS capsule Take 5,000 Units by mouth daily       citalopram (CELEXA) 10 MG tablet Take 30 mg by mouth daily       folic acid (FOLVITE) 1 MG tablet Take 1 mg by mouth daily       furosemide (LASIX) 20 MG tablet Take 20 mg by mouth daily       hypromellose (ARTIFICIAL TEARS) 0.5 % SOLN ophthalmic solution Place 1 drop into both eyes 3 times daily        levETIRAcetam (KEPPRA) 500 MG tablet Take 500 mg by mouth 2 times daily       losartan (COZAAR) 100 MG tablet Take 100 mg by mouth daily       metoclopramide (REGLAN) 5 MG tablet Take 5 mg by mouth daily as needed (at noon)        Mirtazapine (REMERON PO) Take 15 mg by mouth 2 times daily       Multiple Vitamins-Minerals (CERTAVITE SENIOR/ANTIOXIDANT PO) Take 1 tablet by mouth daily        omeprazole (PRILOSEC) 40 MG capsule Take 1 capsule (40 mg) by mouth daily Take 30-60 minutes before a meal. 30 capsule 9      oxybutynin (DITROPAN) 5 MG tablet Take 5 mg by mouth 2 times daily       polyethylene glycol (MIRALAX/GLYCOLAX) powder Take 17 g by mouth nightly as needed        rosuvastatin (CRESTOR) 40 MG tablet Take 40 mg by mouth At Bedtime        senna-docusate (SENNA S) 8.6-50 MG per tablet Take 1 tablet by mouth 2 times daily Please hold if having loose stools and contact nurse.       trolamine salicylate (ASPERCREME) 10 % external cream Apply topically as needed for moderate pain (apply twice daily as needed for pain. Left knee)         ALLERGIES:     Allergies   Allergen Reactions     No Known Allergies        PAST MEDICAL HISTORY:  Past Medical History:   Diagnosis Date     Anxiety      Arthritis      Atrial flutter (H)      C1 cervical fracture (H)      CAD (coronary artery disease)     cath in 1/2018: Angioplasty and stenting of 90% mid RCA stenosis, 50% right ostial coronary stenosis, and ostial LAD stenosis.  Moderate disease throughout the mid LAD and 30 to 50% range.     Carotid artery stenosis      CKD (chronic kidney disease)      Depression      Diastolic heart failure (H)     Most recent echo 1/2018: EF 55%, inferior regional wall motion abnormality, increased left ventricular end-diastolic pressure.     H/O alcohol abuse      Hypercholesterolemia      Hypertension      Renal artery stenosis (H)      Seizure disorder (H)      Subarachnoid hemorrhage (H) 2014       PAST SURGICAL HISTORY:  Past Surgical History:   Procedure Laterality Date     APPENDECTOMY       CAROTID ENDARTERECTOMY Left      Cataract surgery Right     will have laser eye surgery     CHOLECYSTECTOMY       Coronary stents x 3       CV CORONARY ANGIOGRAM N/A 7/19/2019    Procedure: Coronary Angiogram;  Surgeon: Pablito Emerson MD;  Location:  [facility]     HYSTERECTOMY      at age of 40-not cancerus or precancerous      JOINT REPLACEMENT Bilateral      Stenting of renal artery         SOCIAL HISTORY:  Social History      Socioeconomic History     Marital status:      Spouse name: None     Number of children: None     Years of education: None     Highest education level: None   Occupational History     None   Social Needs     Financial resource strain: None     Food insecurity:     Worry: None     Inability: None     Transportation needs:     Medical: None     Non-medical: None   Tobacco Use     Smoking status: Never Smoker     Smokeless tobacco: Never Used   Substance and Sexual Activity     Alcohol use: No     Drug use: No     Sexual activity: Never   Lifestyle     Physical activity:     Days per week: None     Minutes per session: None     Stress: None   Relationships     Social connections:     Talks on phone: None     Gets together: None     Attends Synagogue service: None     Active member of club or organization: None     Attends meetings of clubs or organizations: None     Relationship status: None     Intimate partner violence:     Fear of current or ex partner: None     Emotionally abused: None     Physically abused: None     Forced sexual activity: None   Other Topics Concern     Parent/sibling w/ CABG, MI or angioplasty before 65F 55M? Yes     Comment: brother Mi at 23   Social History Narrative     None       FAMILY HISTORY:  Family History   Problem Relation Age of Onset     C.A.ALISA Mother      MICHAEL. Brother      JUANITO Sister      JUANITO Brother      Cancer Sister         Breast cancer     Heart Disease Son        Review of Systems:  Skin:  Negative for       Eyes:  Positive for glasses    ENT:  Positive for hearing loss    Respiratory:  Positive for dyspnea on exertion     Cardiovascular:    edema;Positive for    Gastroenterology: Positive for constipation    Genitourinary:  Negative for      Musculoskeletal:  Positive for arthritis;neck pain    Neurologic:  Negative for      Psychiatric:  Positive for sleep disturbances    Heme/Lymph/Imm:  Negative for      Endocrine:  Negative for         Reviewed.  Remainder of the note dictated.    Dionna Parnell PA-C        Service Date: 08/15/2019      HISTORY OF PRESENT ILLNESS:  Jade is a pleasant 85-year-old female who presents to the Cardiology office today for a 6-month followup.      Again, she has an extensive cardiovascular history with coronary artery disease for which she has undergone multivessel PCI in the past.  Initially she had angioplasty to the circumflex in 1997.  She subsequently had drug-eluting stent placement to the LAD in 2004, drug-eluting stent placement to the circumflex and RCA in 2011 and subsequent recurrent symptoms with drug-eluting stent placement to the mid LAD, also in 2011.  Somewhat more recently she had non-STEMI in 02/2018 at which time she was noted to have a thrombotic lesion in the mid RCA which was treated with drug-eluting stent placement.  Unfortunately, there guidewire trauma in the proximal RCA and she had stent placement to this area as well.  Additionally, she was found to have significant in-stent restenosis in the LAD and underwent repeat drug-eluting stent placement there too.      In 07/2019, the patient presented to [facility] with chest discomfort.  She did undergo a repeat coronary angiogram which showed no significant change since 2018.  No PCI was performed.  She also has a history of CVA and prior right carotid endarterectomy in 2003 and left carotid endarterectomy in 2007.  She has known renal artery stenosis.  She has previously been noted to have a PFO.  She has hypertension, hyperlipidemia and a prior subarachnoid hemorrhage following a mechanical fall.      During her hospitalization in July, she was noted to have elevated blood pressure and Dr. Carolina recommended changing her losartan to valsartan.  For unknown reasons, it does not appear that this was done.  Since discharge from the hospital, she has actually had her blood pressure checked several times at her assisted living facility.  Her  blood pressures have ranged from 100/58 to 132/64.  The patient states she has been feeling well aside from arthritic pain in her neck and left shoulder.  She really does not have any new questions or concerns today.      CURRENT CARDIAC MEDICATIONS:   1.  Amlodipine 10 mg daily.   2.  Aspirin 81 mg daily.   3.  Carvedilol 12.5 mg b.i.d.   4.  Furosemide 20 mg daily.   5.  Losartan 100 mg daily.   6.  Crestor 40 mg nightly.      The remainder of her medications, allergies and review of systems were reviewed and as are documented separately.      PHYSICAL EXAMINATION:   GENERAL:  The patient is a pleasant 85-year-old female who appears her stated age.  She is in no apparent distress.   VITAL SIGNS:  Her blood pressure is 132/62, pulse is 65, weight is 181 pounds are stable.  Breathing is nonlabored.   LUNGS:  Clear to auscultation bilaterally.   CARDIAC:  Reveals a regular rate and rhythm.  I do not appreciate any significant murmur.   EXTREMITIES:  Lower extremities show trace edema on the top of her feet and at the ankles.   NEUROLOGIC:  Alert and oriented.      She had a basic metabolic panel today.  Her sodium is 140, potassium 3.9, BUN is 20, creatinine is 1.09.      ASSESSMENT AND PLAN:  Again Jade is a pleasant 85-year-old female with an extensive cardiovascular history as detailed above.  Her most recent PCI was in 02/2018 at which time she presented with a non-STEMI.  She had a thrombotic lesion in the mid RCA.  She ended up undergoing drug-eluting stent placement x2 to the proximal and mid RCA along with ostial LAD stent placement due to significant in-stent restenosis.  Her angiogram in 07/2019 showed no significant change compared to a year ago.  She is on a good cardiovascular medication regimen.  Her blood pressure was somewhat elevated during her recent hospital stay.  However, it appears it has been fairly well controlled at her assisted living facility as well as in the office today.  We will  continue her medication regimen unchanged for the time being.  Of course, if her blood pressure is significantly elevated in the future, we could again consider changing the losartan to valsartan.      I have recommended following up in the Cardiology office in approximately 6 months.  She previously was under the care of Dr. Sorensen but she has since left the practice.  She was most recently evaluated by Dr. Carolina during her recent hospitalization and is hoping to establish care with him.  I will place the order to follow up with him, but I am happy to see her back at any point in the future.      Thank you for allowing me to participate in the care of this pleasant patient.         ANTONIO RUCKER PA-C             D: 08/15/2019   T: 08/15/2019   MT: MARILU      Name:     LAURO SALTER   MRN:      [id number]        Account:      [id number]   :      1933           Service Date: 08/15/2019      Document: [id number]       Thank you for allowing me to participate in the care of your patient.      Sincerely,     Antonio Rucker PA-C     [facility]    cc:   Dean Sorensen MD  [address] [address]

## 2019-08-15 NOTE — PATIENT INSTRUCTIONS
Thank you for your [facility] visit today. Your provider has recommended the following:  Medication Changes:  No changes  Recommendations:  Nothing new at this time  Follow-up:  See Dr Carolina for cardiology follow up in 6 months.    Reminder:  Please bring in your current medication list or your medication, over the counter supplements and vitamin bottles as we will review these at each office visit.

## 2019-08-15 NOTE — PROGRESS NOTES
Service Date: 08/15/2019      HISTORY OF PRESENT ILLNESS:  Jade is a pleasant 85-year-old female who presents to the Cardiology office today for a 6-month followup.      Again, she has an extensive cardiovascular history with coronary artery disease for which she has undergone multivessel PCI in the past.  Initially she had angioplasty to the circumflex in 1997.  She subsequently had drug-eluting stent placement to the LAD in 2004, drug-eluting stent placement to the circumflex and RCA in 2011 and subsequent recurrent symptoms with drug-eluting stent placement to the mid LAD, also in 2011.  Somewhat more recently she had non-STEMI in 02/2018 at which time she was noted to have a thrombotic lesion in the mid RCA which was treated with drug-eluting stent placement.  Unfortunately, there guidewire trauma in the proximal RCA and she had stent placement to this area as well.  Additionally, she was found to have significant in-stent restenosis in the LAD and underwent repeat drug-eluting stent placement there too.      In 07/2019, the patient presented to [facility] with chest discomfort.  She did undergo a repeat coronary angiogram which showed no significant change since 2018.  No PCI was performed.  She also has a history of CVA and prior right carotid endarterectomy in 2003 and left carotid endarterectomy in 2007.  She has known renal artery stenosis.  She has previously been noted to have a PFO.  She has hypertension, hyperlipidemia and a prior subarachnoid hemorrhage following a mechanical fall.      During her hospitalization in July, she was noted to have elevated blood pressure and Dr. Carolina recommended changing her losartan to valsartan.  For unknown reasons, it does not appear that this was done.  Since discharge from the hospital, she has actually had her blood pressure checked several times at her assisted living facility.  Her blood pressures have ranged from 100/58 to 132/64.  The patient states  she has been feeling well aside from arthritic pain in her neck and left shoulder.  She really does not have any new questions or concerns today.      CURRENT CARDIAC MEDICATIONS:   1.  Amlodipine 10 mg daily.   2.  Aspirin 81 mg daily.   3.  Carvedilol 12.5 mg b.i.d.   4.  Furosemide 20 mg daily.   5.  Losartan 100 mg daily.   6.  Crestor 40 mg nightly.      The remainder of her medications, allergies and review of systems were reviewed and as are documented separately.      PHYSICAL EXAMINATION:   GENERAL:  The patient is a pleasant 85-year-old female who appears her stated age.  She is in no apparent distress.   VITAL SIGNS:  Her blood pressure is 132/62, pulse is 65, weight is 181 pounds are stable.  Breathing is nonlabored.   LUNGS:  Clear to auscultation bilaterally.   CARDIAC:  Reveals a regular rate and rhythm.  I do not appreciate any significant murmur.   EXTREMITIES:  Lower extremities show trace edema on the top of her feet and at the ankles.   NEUROLOGIC:  Alert and oriented.      She had a basic metabolic panel today.  Her sodium is 140, potassium 3.9, BUN is 20, creatinine is 1.09.      ASSESSMENT AND PLAN:  Again Jade is a pleasant 85-year-old female with an extensive cardiovascular history as detailed above.  Her most recent PCI was in 02/2018 at which time she presented with a non-STEMI.  She had a thrombotic lesion in the mid RCA.  She ended up undergoing drug-eluting stent placement x2 to the proximal and mid RCA along with ostial LAD stent placement due to significant in-stent restenosis.  Her angiogram in 07/2019 showed no significant change compared to a year ago.  She is on a good cardiovascular medication regimen.  Her blood pressure was somewhat elevated during her recent hospital stay.  However, it appears it has been fairly well controlled at her assisted living facility as well as in the office today.  We will continue her medication regimen unchanged for the time being.  Of course, if  her blood pressure is significantly elevated in the future, we could again consider changing the losartan to valsartan.      I have recommended following up in the Cardiology office in approximately 6 months.  She previously was under the care of Dr. Sorensen but she has since left the practice.  She was most recently evaluated by Dr. Carolina during her recent hospitalization and is hoping to establish care with him.  I will place the order to follow up with him, but I am happy to see her back at any point in the future.      Thank you for allowing me to participate in the care of this pleasant patient.         ANTONIO RUCKER PA-C             D: 08/15/2019   T: 08/15/2019   MT: MARILU      Name:     LAURO SALTER   MRN:      [id number]        Account:      [id number]   :      1933           Service Date: 08/15/2019      Document: [id number]

## 2019-08-15 NOTE — PROGRESS NOTES
Please see separate dictation for HPI, PHYSICAL EXAM AND IMPRESSION/PLAN.    CURRENT MEDICATIONS:  Current Outpatient Medications   Medication Sig Dispense Refill     Acetaminophen (TYLENOL PO) Take 1,000 mg by mouth 2 times daily       acetaminophen (TYLENOL) 325 MG tablet Take 650 mg by mouth 2 times daily as needed for mild pain       amLODIPine (NORVASC) 10 MG tablet Take 10 mg by mouth At Bedtime       aspirin (ASA) 81 MG EC tablet Take 1 tablet (81 mg) by mouth daily 90 tablet 3     bisacodyl (DULCOLAX) 10 MG suppository Place 10 mg rectally daily as needed       calcium carbonate 500 mg, elemental, (OSCAL;OYSTER SHELL CALCIUM) 500 MG tablet Take 2 tablets by mouth every evening        carvedilol (COREG) 12.5 MG tablet Take 1 tablet (12.5 mg) by mouth 2 times daily (with meals) 60 tablet 1     cholecalciferol (VITAMIN D3) 5000 UNITS CAPS capsule Take 5,000 Units by mouth daily       citalopram (CELEXA) 10 MG tablet Take 30 mg by mouth daily       folic acid (FOLVITE) 1 MG tablet Take 1 mg by mouth daily       furosemide (LASIX) 20 MG tablet Take 20 mg by mouth daily       hypromellose (ARTIFICIAL TEARS) 0.5 % SOLN ophthalmic solution Place 1 drop into both eyes 3 times daily        levETIRAcetam (KEPPRA) 500 MG tablet Take 500 mg by mouth 2 times daily       losartan (COZAAR) 100 MG tablet Take 100 mg by mouth daily       metoclopramide (REGLAN) 5 MG tablet Take 5 mg by mouth daily as needed (at noon)        Mirtazapine (REMERON PO) Take 15 mg by mouth 2 times daily       Multiple Vitamins-Minerals (CERTAVITE SENIOR/ANTIOXIDANT PO) Take 1 tablet by mouth daily        omeprazole (PRILOSEC) 40 MG capsule Take 1 capsule (40 mg) by mouth daily Take 30-60 minutes before a meal. 30 capsule 9     oxybutynin (DITROPAN) 5 MG tablet Take 5 mg by mouth 2 times daily       polyethylene glycol (MIRALAX/GLYCOLAX) powder Take 17 g by mouth nightly as needed        rosuvastatin (CRESTOR) 40 MG tablet Take 40 mg by mouth At  Bedtime        senna-docusate (SENNA S) 8.6-50 MG per tablet Take 1 tablet by mouth 2 times daily Please hold if having loose stools and contact nurse.       trolamine salicylate (ASPERCREME) 10 % external cream Apply topically as needed for moderate pain (apply twice daily as needed for pain. Left knee)         ALLERGIES:     Allergies   Allergen Reactions     No Known Allergies        PAST MEDICAL HISTORY:  Past Medical History:   Diagnosis Date     Anxiety      Arthritis      Atrial flutter (H)      C1 cervical fracture (H)      CAD (coronary artery disease)     cath in 1/2018: Angioplasty and stenting of 90% mid RCA stenosis, 50% right ostial coronary stenosis, and ostial LAD stenosis.  Moderate disease throughout the mid LAD and 30 to 50% range.     Carotid artery stenosis      CKD (chronic kidney disease)      Depression      Diastolic heart failure (H)     Most recent echo 1/2018: EF 55%, inferior regional wall motion abnormality, increased left ventricular end-diastolic pressure.     H/O alcohol abuse      Hypercholesterolemia      Hypertension      Renal artery stenosis (H)      Seizure disorder (H)      Subarachnoid hemorrhage (H) 2014       PAST SURGICAL HISTORY:  Past Surgical History:   Procedure Laterality Date     APPENDECTOMY       CAROTID ENDARTERECTOMY Left      Cataract surgery Right     will have laser eye surgery     CHOLECYSTECTOMY       Coronary stents x 3       CV CORONARY ANGIOGRAM N/A 7/19/2019    Procedure: Coronary Angiogram;  Surgeon: Pablito Emerson MD;  Location:  [facility]     HYSTERECTOMY      at age of 40-not cancerus or precancerous      JOINT REPLACEMENT Bilateral      Stenting of renal artery         SOCIAL HISTORY:  Social History     Socioeconomic History     Marital status:      Spouse name: None     Number of children: None     Years of education: None     Highest education level: None   Occupational History     None   Social Needs     Financial  resource strain: None     Food insecurity:     Worry: None     Inability: None     Transportation needs:     Medical: None     Non-medical: None   Tobacco Use     Smoking status: Never Smoker     Smokeless tobacco: Never Used   Substance and Sexual Activity     Alcohol use: No     Drug use: No     Sexual activity: Never   Lifestyle     Physical activity:     Days per week: None     Minutes per session: None     Stress: None   Relationships     Social connections:     Talks on phone: None     Gets together: None     Attends Restorationist service: None     Active member of club or organization: None     Attends meetings of clubs or organizations: None     Relationship status: None     Intimate partner violence:     Fear of current or ex partner: None     Emotionally abused: None     Physically abused: None     Forced sexual activity: None   Other Topics Concern     Parent/sibling w/ CABG, MI or angioplasty before 65F 55M? Yes     Comment: brother Mi at 23   Social History Narrative     None       FAMILY HISTORY:  Family History   Problem Relation Age of Onset     C.A.D. Mother      C.A.D. Brother      C.A.D. Sister      C.A.D. Brother      Cancer Sister         Breast cancer     Heart Disease Son        Review of Systems:  Skin:  Negative for       Eyes:  Positive for glasses    ENT:  Positive for hearing loss    Respiratory:  Positive for dyspnea on exertion     Cardiovascular:    edema;Positive for    Gastroenterology: Positive for constipation    Genitourinary:  Negative for      Musculoskeletal:  Positive for arthritis;neck pain    Neurologic:  Negative for      Psychiatric:  Positive for sleep disturbances    Heme/Lymph/Imm:  Negative for      Endocrine:  Negative for         Reviewed. Remainder of the note dictated.    Dionna Parnell PA-C

## 2019-09-16 ENCOUNTER — ASSISTED LIVING VISIT (OUTPATIENT)
Dept: GERIATRICS | Facility: CLINIC | Age: 84
End: 2019-09-16
Payer: COMMERCIAL

## 2019-09-16 VITALS
OXYGEN SATURATION: 93 % | HEART RATE: 73 BPM | RESPIRATION RATE: 18 BRPM | DIASTOLIC BLOOD PRESSURE: 69 MMHG | SYSTOLIC BLOOD PRESSURE: 113 MMHG

## 2019-09-16 DIAGNOSIS — R06.02 SOB (SHORTNESS OF BREATH): ICD-10-CM

## 2019-09-16 DIAGNOSIS — M17.0 PRIMARY OSTEOARTHRITIS OF BOTH KNEES: Primary | ICD-10-CM

## 2019-09-16 DIAGNOSIS — K59.00 CONSTIPATION, UNSPECIFIED CONSTIPATION TYPE: ICD-10-CM

## 2019-09-16 NOTE — LETTER
9/16/2019        RE: Jade Saeed  [address] [facility] [address]        [facility]  [place] Medical Record Number:  [id number]  Place of Service where encounter took place:  [facility] [[id number]]  Chief Complaint   Patient presents with     Knee Pain       HPI:    Jade Caba  is a 86 year old (8/26/1933), who is being seen today for an episodic care visit.  HPI information obtained from: facility chart records, facility staff, patient report, [facility] chart review and family/first contact dtr report. Today's concern is: knee pain, sob, constipation.  Has OA of L knee with increased knee pain. Cont. On tylenol, aspercreme. Gait steady.  Last knee inj 11/18.  S/p hosp. Stay with pneumonia 7/19. Chest CT done showed prominent lymph nodes mediastinal, L hilar area, thought to be reactive. Recommended f/u chest CT.  Has CHF-stable. No recent sob, weaned off O2.  Reports ongoing constipation. Cont. On senna, miralax, bisacodyl supp.       Past Medical and Surgical History reviewed in Epic today.    MEDICATIONS:  Current Outpatient Medications   Medication Sig Dispense Refill     Acetaminophen (TYLENOL PO) Take 1,000 mg by mouth 2 times daily       acetaminophen (TYLENOL) 325 MG tablet Take 650 mg by mouth 2 times daily as needed for mild pain       amLODIPine (NORVASC) 10 MG tablet Take 10 mg by mouth At Bedtime       aspirin (ASA) 81 MG EC tablet Take 1 tablet (81 mg) by mouth daily 90 tablet 3     bisacodyl (DULCOLAX) 10 MG suppository Place 10 mg rectally daily as needed       calcium carbonate 500 mg, elemental, (OSCAL;OYSTER SHELL CALCIUM) 500 MG tablet Take 2 tablets by mouth every evening        carvedilol (COREG) 12.5 MG tablet Take 1 tablet (12.5 mg) by mouth 2 times daily (with meals) 60 tablet 1     cholecalciferol (VITAMIN D3) 5000 UNITS CAPS capsule Take 5,000 Units by mouth daily       citalopram (CELEXA) 10 MG tablet Take  30 mg by mouth daily       folic acid (FOLVITE) 1 MG tablet Take 1 mg by mouth daily       furosemide (LASIX) 20 MG tablet Take 20 mg by mouth daily       hypromellose (ARTIFICIAL TEARS) 0.5 % SOLN ophthalmic solution Place 1 drop into both eyes 3 times daily        levETIRAcetam (KEPPRA) 500 MG tablet Take 500 mg by mouth 2 times daily       losartan (COZAAR) 100 MG tablet Take 100 mg by mouth daily       metoclopramide (REGLAN) 5 MG tablet Take 5 mg by mouth daily as needed (at noon)        Mirtazapine (REMERON PO) Take 15 mg by mouth 2 times daily       Multiple Vitamins-Minerals (CERTAVITE SENIOR/ANTIOXIDANT PO) Take 1 tablet by mouth daily        omeprazole (PRILOSEC) 40 MG capsule Take 1 capsule (40 mg) by mouth daily Take 30-60 minutes before a meal. 30 capsule 9     oxybutynin (DITROPAN) 5 MG tablet Take 5 mg by mouth 2 times daily       polyethylene glycol (MIRALAX/GLYCOLAX) powder Take 17 g by mouth nightly as needed        rosuvastatin (CRESTOR) 40 MG tablet Take 40 mg by mouth At Bedtime        senna-docusate (SENNA S) 8.6-50 MG per tablet Take 1 tablet by mouth 2 times daily Please hold if having loose stools and contact nurse.       trolamine salicylate (ASPERCREME) 10 % external cream Apply topically as needed for moderate pain (apply twice daily as needed for pain. Left knee)           REVIEW OF SYSTEMS:  CONSTITUTIONAL:  body aches and forgetfulness, EYES:  glasses or contacts, ENT:  [place], CV:  neg, RESPIRATORY: cough, :  neg, GI:  constipation, NEURO:  neg, PSYCH: neg and MUSCULOSKELETAL: joint pain    Objective:  /69   Pulse 73   Resp 18   SpO2 93%   Exam:  GENERAL APPEARANCE:  Alert, in no distress, cooperative  ENT:  Mouth and posterior oropharynx normal, moist mucous membranes, [place]  EYES:  EOM, conjunctivae, lids, pupils and irises normal, PERRL  NECK:  No adenopathy,masses or thyromegaly, FROM  RESP:  respiratory effort and palpation of chest normal, lungs clear to auscultation , no  respiratory distress, diminished breath sounds bibasilar  CV:  Palpation and auscultation of heart done , regular rate and rhythm, no murmur, rub, or gallop, peripheral edema trace+ in LEs  ABDOMEN:  normal bowel sounds, soft, nontender, no hepatosplenomegaly or other masses, no guarding or rebound  M/S:   Gait and station normal  muscle strength 5/5 all 4 ext., mild pain with ROM L knee  NEURO:   Cranial nerves 2-12 are normal tested and grossly at patient's baseline, alert, speech clear  PSYCH:  insight and judgement impaired, memory impaired , affect and mood normal    Labs:     Most Recent 3 CBC's:  Recent Labs   Lab Test 07/26/19 07/19/19  0712 07/18/19  1731   WBC 7.3 6.5 11.3*   HGB 12.1 13.8 13.4   MCV 97 98 97    165 171     Most Recent 3 BMP's:  Recent Labs   Lab Test 08/15/19  1248 07/26/19 07/21/19  0743    140 141   POTASSIUM 3.9 4.0 4.4   CHLORIDE 106 107 109   CO2 31 24 26   BUN 20 16 15   CR 1.09* 0.87 0.85   ANIONGAP 3 9 6   FRANCISCO 9.0 9.3 8.7   GLC 94 130* 93       ASSESSMENT/PLAN:  Primary osteoarthritis of both knees  L knee pain, increased  1. Cont. Tylenol  2. Cont. aspercreme  3. Refer to [facility] for f/u k nee inj  4. Monitor for changes in gait    SOB (shortness of breath)  Currently stable. S/p hosp. With pneumonia 7/19. Enlarged lymph nodes per chest CT thought to be reactive  - CT Chest w Contrast; Future  2. Cont. Lasix for CHF  3. Monitor for wheezing  4. Follow O2 sats    Constipation, unspecified constipation type  Ongoing s/s  1. Cont. Miralax, bisacodyl  2. Increase senna to 2 tabs bid  3. Encourage fluids  4. Reassess over next couple weeks      Electronically signed by:  ZAIN Marcos CNP             Sincerely,        ZAIN Marcos CNP

## 2019-09-17 ENCOUNTER — NURSING HOME VISIT (OUTPATIENT)
Dept: GERIATRICS | Facility: CLINIC | Age: 84
End: 2019-09-17
Payer: COMMERCIAL

## 2019-09-17 DIAGNOSIS — M17.0 PRIMARY OSTEOARTHRITIS OF BOTH KNEES: Primary | ICD-10-CM

## 2019-09-17 NOTE — PROGRESS NOTES
Ortho Nursing home visit    Jade Caba is a 86 year old female who resides at [place], [place]    Patient is seen today for Left knee pain. Has had cortisone injections in the past, for OA, Last injection 7 months ago.      Past Medical History:   Diagnosis Date     Anxiety      Arthritis      Atrial flutter (H)      C1 cervical fracture (H)      CAD (coronary artery disease)     cath in 1/2018: Angioplasty and stenting of 90% mid RCA stenosis, 50% right ostial coronary stenosis, and ostial LAD stenosis.  Moderate disease throughout the mid LAD and 30 to 50% range.     Carotid artery stenosis      CKD (chronic kidney disease)      Depression      Diastolic heart failure (H)     Most recent echo 1/2018: EF 55%, inferior regional wall motion abnormality, increased left ventricular end-diastolic pressure.     H/O alcohol abuse      Hypercholesterolemia      Hypertension      Renal artery stenosis (H)      Seizure disorder (H)      Subarachnoid hemorrhage (H) 2014      Past Surgical History:   Procedure Laterality Date     APPENDECTOMY       CAROTID ENDARTERECTOMY Left      Cataract surgery Right     will have laser eye surgery     CHOLECYSTECTOMY       Coronary stents x 3       CV CORONARY ANGIOGRAM N/A 7/19/2019    Procedure: Coronary Angiogram;  Surgeon: Pablito Emerson MD;  Location:  [facility]     HYSTERECTOMY      at age of 40-not cancerus or precancerous      JOINT REPLACEMENT Bilateral      Stenting of renal artery          Allergies   Allergen Reactions     No Known Allergies       There were no vitals taken for this visit.     Exam: Ambulating in APT; W/O walker, C/O pain with W/B, knee exam, allows PROM -10/90 Lig intact, no noted effusion, no noted swelling, pain medial joint line, some noted crepitus,              X-rays show OA Left knee    ASSESSMENT / PLAN:  After Verbal consent, Left knee prepped, injected with 1 ml depo medrol, 5 ml 1% lidocaine into medial knee joint, tolerated  well, no complaints, plan, F/U prn.                        [id number]          Samy OPA-C [phone] Cell.

## 2019-09-26 ENCOUNTER — PATIENT OUTREACH (OUTPATIENT)
Dept: GERIATRIC MEDICINE | Facility: CLINIC | Age: 84
End: 2019-09-26

## 2019-09-26 NOTE — PROGRESS NOTES
"[facility] Care Coordination Contact      [facility] Six-Month Telephone Assessment    6 month telephone assessment completed on 9/26/2019  Epic notes reviewed, call placed to member.  Jade states that she is doing well, states that she continues to have a cough that keeps her up at night, stating she is scheduled for a CT scan on 10/2/2019.  Jade is no longer using 02  Noted ortho appt for knee pain, Jade states that she rec'd a cortisone injection, \"not very helpful.\" Jade states that the Aspercreme helps for a couple of hours.      ER visits: No  Hospitalizations: Yes -  [facility]  Both admissions due to pneumonia.   TCU stays: No  Significant health status changes: None at this time   Falls/Injuries: No  ADL/IADL changes: No  Changes in services: No    Caregiver Assessment follow up:  NA     Goals: See POC in chart for goal progress documentation.      Will see member in 6 months for an annual health risk assessment.   Encouraged member to call CC with any questions or concerns in the meantime.  Holly Guajardo RN, BC  Manager [facility] Care Coordinator   [phone]  (Fax)             "

## 2019-10-02 ENCOUNTER — HOSPITAL ENCOUNTER (OUTPATIENT)
Dept: CT IMAGING | Facility: CLINIC | Age: 84
Discharge: HOME | End: 2019-10-02
Attending: NURSE PRACTITIONER | Admitting: NURSE PRACTITIONER
Payer: COMMERCIAL

## 2019-10-02 DIAGNOSIS — R06.02 SOB (SHORTNESS OF BREATH): ICD-10-CM

## 2019-10-02 LAB
CREAT BLD-MCNC: 1.1 MG/DL (ref 0.52–1.04)
GFR SERPL CREATININE-BSD FRML MDRD: 47 ML/MIN/{1.73_M2}

## 2019-10-02 PROCEDURE — 25000125 ZZHC RX 250: Performed by: RADIOLOGY

## 2019-10-02 PROCEDURE — 25000128 H RX IP 250 OP 636: Performed by: RADIOLOGY

## 2019-10-02 PROCEDURE — 82565 ASSAY OF CREATININE: CPT

## 2019-10-02 PROCEDURE — 71260 CT THORAX DX C+: CPT

## 2019-10-02 RX ORDER — IOPAMIDOL 755 MG/ML
500 INJECTION, SOLUTION INTRAVASCULAR ONCE
Status: COMPLETED | OUTPATIENT
Start: 2019-10-02 | End: 2019-10-02

## 2019-10-02 RX ADMIN — SODIUM CHLORIDE 60 ML: 9 INJECTION, SOLUTION INTRAVENOUS at 10:31

## 2019-10-02 RX ADMIN — IOPAMIDOL 80 ML: 755 INJECTION, SOLUTION INTRAVENOUS at 10:31

## 2019-10-09 ENCOUNTER — ASSISTED LIVING VISIT (OUTPATIENT)
Dept: GERIATRICS | Facility: CLINIC | Age: 84
End: 2019-10-09
Payer: COMMERCIAL

## 2019-10-09 VITALS
OXYGEN SATURATION: 93 % | RESPIRATION RATE: 18 BRPM | SYSTOLIC BLOOD PRESSURE: 132 MMHG | DIASTOLIC BLOOD PRESSURE: 76 MMHG | HEART RATE: 60 BPM

## 2019-10-09 DIAGNOSIS — M17.0 PRIMARY OSTEOARTHRITIS OF BOTH KNEES: Primary | ICD-10-CM

## 2019-10-09 NOTE — PROGRESS NOTES
[facility]  [place] [facility] Record Number:  [id number]  Place of Service where encounter took place:  [facility] [[id number]]  Chief Complaint   Patient presents with     Knee Pain       HPI:    Jade Caba  is a 86 year old (8/26/1933), who is being seen today for an episodic care visit.  HPI information obtained from: facility chart records, facility staff, patient report, [facility] chart review and family/first contact dtr report. Today's concern is: L knee pain. Has OA knees, chronic bilat knee pain L>R.  Had recent L knee inj per [facility]. Reports as ineffective this time.  Also taking tylenol. Has aspercreme which helps some.  Also discussed chest CT results with resident/dtr. Due to  Decreased in size of nodes, likely reactive due to pneumonia in 7/19. No further imaging needed at this time.       Past Medical and Surgical History reviewed in Epic today.    MEDICATIONS:  Current Outpatient Medications   Medication Sig Dispense Refill     Acetaminophen (TYLENOL PO) Take 1,000 mg by mouth 2 times daily       acetaminophen (TYLENOL) 325 MG tablet Take 650 mg by mouth 2 times daily as needed for mild pain       amLODIPine (NORVASC) 10 MG tablet Take 10 mg by mouth At Bedtime       aspirin (ASA) 81 MG EC tablet Take 1 tablet (81 mg) by mouth daily 90 tablet 3     bisacodyl (DULCOLAX) 10 MG suppository Place 10 mg rectally daily as needed       calcium carbonate 500 mg, elemental, (OSCAL;OYSTER SHELL CALCIUM) 500 MG tablet Take 2 tablets by mouth every evening        carvedilol (COREG) 12.5 MG tablet Take 1 tablet (12.5 mg) by mouth 2 times daily (with meals) 60 tablet 1     cholecalciferol (VITAMIN D3) 5000 UNITS CAPS capsule Take 5,000 Units by mouth daily       citalopram (CELEXA) 10 MG tablet Take 30 mg by mouth daily       diclofenac (VOLTAREN) 1 % topical gel Apply 2 g topically 2 times daily LEFT KNEE       diclofenac (VOLTAREN) 1 % topical gel  Place 2 g onto the skin 2 times daily       folic acid (FOLVITE) 1 MG tablet Take 1 mg by mouth daily       furosemide (LASIX) 20 MG tablet Take 20 mg by mouth daily       hypromellose (ARTIFICIAL TEARS) 0.5 % SOLN ophthalmic solution Place 1 drop into both eyes 3 times daily        levETIRAcetam (KEPPRA) 500 MG tablet Take 500 mg by mouth 2 times daily       losartan (COZAAR) 100 MG tablet Take 100 mg by mouth daily       metoclopramide (REGLAN) 5 MG tablet Take 5 mg by mouth daily as needed (at noon)        Mirtazapine (REMERON PO) Take 15 mg by mouth 2 times daily       Multiple Vitamins-Minerals (CERTAVITE SENIOR/ANTIOXIDANT PO) Take 1 tablet by mouth daily        omeprazole (PRILOSEC) 40 MG capsule Take 1 capsule (40 mg) by mouth daily Take 30-60 minutes before a meal. 30 capsule 9     oxybutynin (DITROPAN) 5 MG tablet Take 5 mg by mouth 2 times daily       polyethylene glycol (MIRALAX/GLYCOLAX) powder Take 17 g by mouth nightly as needed        rosuvastatin (CRESTOR) 40 MG tablet Take 40 mg by mouth At Bedtime        senna-docusate (SENNA S) 8.6-50 MG per tablet Take 2 tablets by mouth At Bedtime Please hold if having loose stools and contact nurse.       trolamine salicylate (ASPERCREME) 10 % external cream Apply topically as needed for moderate pain (apply twice daily as needed for pain. Left knee)           REVIEW OF SYSTEMS:  No chest pain, shortness of breath, fevers, chills, headache, nausea, vomiting, dysuria or bowel abnormalities.  Appetite is  normal.  No pain except occ L knee.    Objective:  /76   Pulse 60   Resp 18   SpO2 93%   Exam:  GENERAL APPEARANCE:  Alert, in no distress, cooperative  ENT:  Mouth and posterior oropharynx normal, moist mucous membranes, [place]  EYES:  EOM, conjunctivae, lids, pupils and irises normal, PERRL  NECK:  No adenopathy,masses or thyromegaly  RESP:  respiratory effort and palpation of chest normal, lungs clear to auscultation , no respiratory distress  CV:   Palpation and auscultation of heart done , regular rate and rhythm, no murmur, rub, or gallop, no edema  ABDOMEN:  normal bowel sounds, soft, nontender, no hepatosplenomegaly or other masses, no guarding or rebound  M/S:   Gait and station normal  SL. flexed at waisr  NEURO:   Cranial nerves 2-12 are normal tested and grossly at patient's baseline  PSYCH:  insight and judgement impaired, memory impaired , affect and mood normal    Labs:     Most Recent 3 CBC's:  Recent Labs   Lab Test 07/26/19 07/19/19  0712 07/18/19  1731   WBC 7.3 6.5 11.3*   HGB 12.1 13.8 13.4   MCV 97 98 97    165 171     Most Recent 3 BMP's:  Recent Labs   Lab Test 08/15/19  1248 07/26/19 07/21/19  0743    140 141   POTASSIUM 3.9 4.0 4.4   CHLORIDE 106 107 109   CO2 31 24 26   BUN 20 16 15   CR 1.09* 0.87 0.85   ANIONGAP 3 9 6   FRANCISCO 9.0 9.3 8.7   GLC 94 130* 93       ASSESSMENT/PLAN:  Primary osteoarthritis of both knees  ongoing L knee pain. S/p inj  1. Cont. Tylenol  2. Cont. Prn aspercreme  3. Start votaren gel bid to L knee  4. Reassess over next couple weeks      Electronically signed by:  ZAIN Marcos CNP

## 2019-10-09 NOTE — LETTER
10/9/2019        RE: Jade Saeed  [address] [facility] [address]        [place] [facility]  [place] Medical Record Number:  [id number]  Place of Service where encounter took place:  [facility] ([facility]) [[id number]]  Chief Complaint   Patient presents with     Knee Pain       HPI:    Jade Caba  is a 86 year old (8/26/1933), who is being seen today for an episodic care visit.  HPI information obtained from: facility chart records, facility staff, patient report, [facility] chart review and family/first contact dtr report. Today's concern is: L knee pain. Has OA knees, chronic bilat knee pain L>R.  Had recent L knee inj per [facility]. Reports as ineffective this time.  Also taking tylenol. Has aspercreme which helps some.  Also discussed chest CT results with resident/dtr. Due to  Decreased in size of nodes, likely reactive due to pneumonia in 7/19. No further imaging needed at this time.       Past Medical and Surgical History reviewed in Epic today.    MEDICATIONS:  Current Outpatient Medications   Medication Sig Dispense Refill     Acetaminophen (TYLENOL PO) Take 1,000 mg by mouth 2 times daily       acetaminophen (TYLENOL) 325 MG tablet Take 650 mg by mouth 2 times daily as needed for mild pain       amLODIPine (NORVASC) 10 MG tablet Take 10 mg by mouth At Bedtime       aspirin (ASA) 81 MG EC tablet Take 1 tablet (81 mg) by mouth daily 90 tablet 3     bisacodyl (DULCOLAX) 10 MG suppository Place 10 mg rectally daily as needed       calcium carbonate 500 mg, elemental, (OSCAL;OYSTER SHELL CALCIUM) 500 MG tablet Take 2 tablets by mouth every evening        carvedilol (COREG) 12.5 MG tablet Take 1 tablet (12.5 mg) by mouth 2 times daily (with meals) 60 tablet 1     cholecalciferol (VITAMIN D3) 5000 UNITS CAPS capsule Take 5,000 Units by mouth daily       citalopram (CELEXA) 10 MG tablet Take 30 mg by mouth daily       diclofenac (VOLTAREN) 1 %  topical gel Apply 2 g topically 2 times daily LEFT KNEE       diclofenac (VOLTAREN) 1 % topical gel Place 2 g onto the skin 2 times daily       folic acid (FOLVITE) 1 MG tablet Take 1 mg by mouth daily       furosemide (LASIX) 20 MG tablet Take 20 mg by mouth daily       hypromellose (ARTIFICIAL TEARS) 0.5 % SOLN ophthalmic solution Place 1 drop into both eyes 3 times daily        levETIRAcetam (KEPPRA) 500 MG tablet Take 500 mg by mouth 2 times daily       losartan (COZAAR) 100 MG tablet Take 100 mg by mouth daily       metoclopramide (REGLAN) 5 MG tablet Take 5 mg by mouth daily as needed (at noon)        Mirtazapine (REMERON PO) Take 15 mg by mouth 2 times daily       Multiple Vitamins-Minerals (CERTAVITE SENIOR/ANTIOXIDANT PO) Take 1 tablet by mouth daily        omeprazole (PRILOSEC) 40 MG capsule Take 1 capsule (40 mg) by mouth daily Take 30-60 minutes before a meal. 30 capsule 9     oxybutynin (DITROPAN) 5 MG tablet Take 5 mg by mouth 2 times daily       polyethylene glycol (MIRALAX/GLYCOLAX) powder Take 17 g by mouth nightly as needed        rosuvastatin (CRESTOR) 40 MG tablet Take 40 mg by mouth At Bedtime        senna-docusate (SENNA S) 8.6-50 MG per tablet Take 2 tablets by mouth At Bedtime Please hold if having loose stools and contact nurse.       trolamine salicylate (ASPERCREME) 10 % external cream Apply topically as needed for moderate pain (apply twice daily as needed for pain. Left knee)           REVIEW OF SYSTEMS:  No chest pain, shortness of breath, fevers, chills, headache, nausea, vomiting, dysuria or bowel abnormalities.  Appetite is  normal.  No pain except occ L knee.    Objective:  /76   Pulse 60   Resp 18   SpO2 93%   Exam:  GENERAL APPEARANCE:  Alert, in no distress, cooperative  ENT:  Mouth and posterior oropharynx normal, moist mucous membranes, [place]  EYES:  EOM, conjunctivae, lids, pupils and irises normal, PERRL  NECK:  No adenopathy,masses or thyromegaly  RESP:  respiratory  effort and palpation of chest normal, lungs clear to auscultation , no respiratory distress  CV:  Palpation and auscultation of heart done , regular rate and rhythm, no murmur, rub, or gallop, no edema  ABDOMEN:  normal bowel sounds, soft, nontender, no hepatosplenomegaly or other masses, no guarding or rebound  M/S:   Gait and station normal  SL. flexed at waisr  NEURO:   Cranial nerves 2-12 are normal tested and grossly at patient's baseline  PSYCH:  insight and judgement impaired, memory impaired , affect and mood normal    Labs:     Most Recent 3 CBC's:  Recent Labs   Lab Test 07/26/19 07/19/19  0712 07/18/19  1731   WBC 7.3 6.5 11.3*   HGB 12.1 13.8 13.4   MCV 97 98 97    165 171     Most Recent 3 BMP's:  Recent Labs   Lab Test 08/15/19  1248 07/26/19 07/21/19  0743    140 141   POTASSIUM 3.9 4.0 4.4   CHLORIDE 106 107 109   CO2 31 24 26   BUN 20 16 15   CR 1.09* 0.87 0.85   ANIONGAP 3 9 6   FRANCISCO 9.0 9.3 8.7   GLC 94 130* 93       ASSESSMENT/PLAN:  Primary osteoarthritis of both knees  ongoing L knee pain. S/p inj  1. Cont. Tylenol  2. Cont. Prn aspercreme  3. Start votaren gel bid to L knee  4. Reassess over next couple weeks      Electronically signed by:  ZAIN Marcos CNP             Sincerely,        ZAIN Marcos CNP

## 2019-12-30 ENCOUNTER — ASSISTED LIVING VISIT (OUTPATIENT)
Dept: GERIATRICS | Facility: CLINIC | Age: 84
End: 2019-12-30
Payer: COMMERCIAL

## 2019-12-30 VITALS
TEMPERATURE: 97.6 F | RESPIRATION RATE: 18 BRPM | OXYGEN SATURATION: 93 % | HEART RATE: 75 BPM | DIASTOLIC BLOOD PRESSURE: 59 MMHG | SYSTOLIC BLOOD PRESSURE: 123 MMHG

## 2019-12-30 DIAGNOSIS — I10 ESSENTIAL HYPERTENSION: ICD-10-CM

## 2019-12-30 DIAGNOSIS — I50.32 CHRONIC DIASTOLIC CONGESTIVE HEART FAILURE (H): ICD-10-CM

## 2019-12-30 DIAGNOSIS — M25.551 HIP PAIN, RIGHT: Primary | ICD-10-CM

## 2019-12-30 NOTE — PROGRESS NOTES
[place] [facility]  [place] Medical Record Number:  [id number]  Place of Service where encounter took place:  [facility] ([facility]) [[id number]]  Chief Complaint   Patient presents with     Pain       HPI:    Jade Caba  is a 86 year old (8/26/1933), who is being seen today for an episodic care visit.  HPI information obtained from: facility chart records, facility staff, patient report, [facility] chart review and family/first contact dtr report. Today's concern is: R hip pain, CHF, HTN.  Has OA, chronic knee pain. Has had bilat hip replaced. Reports over past week has had R hip pain.  Started as pain in R low back, no R hip, R groin area.  No reports of falls. Spoke with dtr who states resident did use steps more while visiting family for holidays.  Using walker now which helps with pain.  Has had decreased amb. For CHF cont. On lasix. No reports of sob. No increased LE edema. BPs stable. No reports of dizziness. Cont. On coreg, losartan, norvasc for HTN.       Past Medical and Surgical History reviewed in Epic today.    MEDICATIONS:  Current Outpatient Medications   Medication Sig Dispense Refill     Acetaminophen (TYLENOL PO) Take 1,000 mg by mouth 2 times daily       acetaminophen (TYLENOL) 325 MG tablet Take 650 mg by mouth 2 times daily as needed for mild pain       amLODIPine (NORVASC) 10 MG tablet Take 10 mg by mouth At Bedtime       aspirin (ASA) 81 MG EC tablet Take 1 tablet (81 mg) by mouth daily 90 tablet 3     bisacodyl (DULCOLAX) 10 MG suppository Place 10 mg rectally daily as needed       calcium carbonate 500 mg, elemental, (OSCAL;OYSTER SHELL CALCIUM) 500 MG tablet Take 2 tablets by mouth every evening        carvedilol (COREG) 12.5 MG tablet Take 1 tablet (12.5 mg) by mouth 2 times daily (with meals) 60 tablet 1     cholecalciferol (VITAMIN D3) 5000 UNITS CAPS capsule Take 5,000 Units by mouth daily       citalopram (CELEXA) 10 MG tablet Take 30 mg by mouth  daily       diclofenac (VOLTAREN) 1 % topical gel Apply 2 g topically 2 times daily LEFT KNEE       diclofenac (VOLTAREN) 1 % topical gel Place 2 g onto the skin 2 times daily       folic acid (FOLVITE) 1 MG tablet Take 1 mg by mouth daily       furosemide (LASIX) 20 MG tablet Take 20 mg by mouth daily       hypromellose (ARTIFICIAL TEARS) 0.5 % SOLN ophthalmic solution Place 1 drop into both eyes 3 times daily        levETIRAcetam (KEPPRA) 500 MG tablet Take 500 mg by mouth 2 times daily       losartan (COZAAR) 100 MG tablet Take 100 mg by mouth daily       metoclopramide (REGLAN) 5 MG tablet Take 5 mg by mouth daily as needed (at noon)        Mirtazapine (REMERON PO) Take 15 mg by mouth 2 times daily       Multiple Vitamins-Minerals (CERTAVITE SENIOR/ANTIOXIDANT PO) Take 1 tablet by mouth daily        omeprazole (PRILOSEC) 40 MG capsule Take 1 capsule (40 mg) by mouth daily Take 30-60 minutes before a meal. 30 capsule 9     oxybutynin (DITROPAN) 5 MG tablet Take 5 mg by mouth 2 times daily       polyethylene glycol (MIRALAX/GLYCOLAX) powder Take 17 g by mouth nightly as needed        rosuvastatin (CRESTOR) 40 MG tablet Take 40 mg by mouth At Bedtime        senna-docusate (SENNA S) 8.6-50 MG per tablet Take 2 tablets by mouth At Bedtime Please hold if having loose stools and contact nurse.       trolamine salicylate (ASPERCREME) 10 % external cream Apply topically as needed for moderate pain (apply twice daily as needed for pain. Left knee)           REVIEW OF SYSTEMS:  No chest pain, shortness of breath, fevers, chills, headache, nausea, vomiting, dysuria or bowel abnormalities.  Appetite is  normal.  No pain except R hip, knees.    Objective:  /59   Pulse 75   Temp 97.6  F (36.4  C)   Resp 18   SpO2 93%   Exam:  GENERAL APPEARANCE:  Alert, in no distress, cooperative  ENT:  Mouth and posterior oropharynx normal, moist mucous membranes, [place]  EYES:  EOM, conjunctivae, lids, pupils and irises normal,  PERRL  NECK:  No adenopathy,masses or thyromegaly, FROM  RESP:  respiratory effort and palpation of chest normal, lungs clear to auscultation , no respiratory distress  CV:  Palpation and auscultation of heart done , regular rate and rhythm, no murmur, rub, or gallop, no edema  ABDOMEN:  normal bowel sounds, soft, nontender, no hepatosplenomegaly or other masses, no guarding or rebound  M/S:   Gait and station normal  muscle strength 5/5 all 4 ext.. no pain with palp. of spine, some pain with palp. R hip, R groin area  SKIN:  no bruising over buttocks, hips  NEURO:   Cranial nerves 2-12 are normal tested and grossly at patient's baseline, alert, speech clear  PSYCH:  insight and judgement impaired, memory impaired , affect and mood normal    Labs:     Most Recent 3 CBC's:  Recent Labs   Lab Test 07/26/19 07/19/19  0712 07/18/19  1731   WBC 7.3 6.5 11.3*   HGB 12.1 13.8 13.4   MCV 97 98 97    165 171     Most Recent 3 BMP's:  Recent Labs   Lab Test 08/15/19  1248 07/26/19 07/21/19  0743    140 141   POTASSIUM 3.9 4.0 4.4   CHLORIDE 106 107 109   CO2 31 24 26   BUN 20 16 15   CR 1.09* 0.87 0.85   ANIONGAP 3 9 6   FRANCISCO 9.0 9.3 8.7   GLC 94 130* 93       ASSESSMENT/PLAN:  Hip pain, right  Newer onset. No reports of trauma to area. H/o bilat hip replacements  1. Cont. Tylenol  2. XR pelvis, R hip  3. Refer to PT  4. Reassess pain over next few days  5. Cont. With 2ww at all times until pain improved    Chronic diastolic congestive heart failure (H)  Currently controlled  1. Cont. Lasix  2. Elevate LEs  3 monitor for reports of BELCHER  4. Follow wt.s    Essential hypertension  BPs stable  1. Cont. Norvasc, coreg, losartan  2. Follow BPs, HRs  3. Bmp next week      Electronically signed by:  ZAIN Marcos CNP

## 2019-12-30 NOTE — LETTER
12/30/2019        RE: Jade Saeed  [address] [facility] [address]        [place] [facility]  [place] Medical Record Number:  [id number]  Place of Service where encounter took place:  [facility] ([facility]) [[id number]]  Chief Complaint   Patient presents with     Pain       HPI:    Jade Caba  is a 86 year old (8/26/1933), who is being seen today for an episodic care visit.  HPI information obtained from: facility chart records, facility staff, patient report, [facility] chart review and family/first contact dtr report. Today's concern is: R hip pain, CHF, HTN.  Has OA, chronic knee pain. Has had bilat hip replaced. Reports over past week has had R hip pain.  Started as pain in R low back, no R hip, R groin area.  No reports of falls. Spoke with dtr who states resident did use steps more while visiting family for holidays.  Using walker now which helps with pain.  Has had decreased amb. For CHF cont. On lasix. No reports of sob. No increased LE edema. BPs stable. No reports of dizziness. Cont. On coreg, losartan, norvasc for HTN.       Past Medical and Surgical History reviewed in Epic today.    MEDICATIONS:  Current Outpatient Medications   Medication Sig Dispense Refill     Acetaminophen (TYLENOL PO) Take 1,000 mg by mouth 2 times daily       acetaminophen (TYLENOL) 325 MG tablet Take 650 mg by mouth 2 times daily as needed for mild pain       amLODIPine (NORVASC) 10 MG tablet Take 10 mg by mouth At Bedtime       aspirin (ASA) 81 MG EC tablet Take 1 tablet (81 mg) by mouth daily 90 tablet 3     bisacodyl (DULCOLAX) 10 MG suppository Place 10 mg rectally daily as needed       calcium carbonate 500 mg, elemental, (OSCAL;OYSTER SHELL CALCIUM) 500 MG tablet Take 2 tablets by mouth every evening        carvedilol (COREG) 12.5 MG tablet Take 1 tablet (12.5 mg) by mouth 2 times daily (with meals) 60 tablet 1     cholecalciferol (VITAMIN D3) 5000 UNITS CAPS  capsule Take 5,000 Units by mouth daily       citalopram (CELEXA) 10 MG tablet Take 30 mg by mouth daily       diclofenac (VOLTAREN) 1 % topical gel Apply 2 g topically 2 times daily LEFT KNEE       diclofenac (VOLTAREN) 1 % topical gel Place 2 g onto the skin 2 times daily       folic acid (FOLVITE) 1 MG tablet Take 1 mg by mouth daily       furosemide (LASIX) 20 MG tablet Take 20 mg by mouth daily       hypromellose (ARTIFICIAL TEARS) 0.5 % SOLN ophthalmic solution Place 1 drop into both eyes 3 times daily        levETIRAcetam (KEPPRA) 500 MG tablet Take 500 mg by mouth 2 times daily       losartan (COZAAR) 100 MG tablet Take 100 mg by mouth daily       metoclopramide (REGLAN) 5 MG tablet Take 5 mg by mouth daily as needed (at noon)        Mirtazapine (REMERON PO) Take 15 mg by mouth 2 times daily       Multiple Vitamins-Minerals (CERTAVITE SENIOR/ANTIOXIDANT PO) Take 1 tablet by mouth daily        omeprazole (PRILOSEC) 40 MG capsule Take 1 capsule (40 mg) by mouth daily Take 30-60 minutes before a meal. 30 capsule 9     oxybutynin (DITROPAN) 5 MG tablet Take 5 mg by mouth 2 times daily       polyethylene glycol (MIRALAX/GLYCOLAX) powder Take 17 g by mouth nightly as needed        rosuvastatin (CRESTOR) 40 MG tablet Take 40 mg by mouth At Bedtime        senna-docusate (SENNA S) 8.6-50 MG per tablet Take 2 tablets by mouth At Bedtime Please hold if having loose stools and contact nurse.       trolamine salicylate (ASPERCREME) 10 % external cream Apply topically as needed for moderate pain (apply twice daily as needed for pain. Left knee)           REVIEW OF SYSTEMS:  No chest pain, shortness of breath, fevers, chills, headache, nausea, vomiting, dysuria or bowel abnormalities.  Appetite is  normal.  No pain except R hip, knees.    Objective:  /59   Pulse 75   Temp 97.6  F (36.4  C)   Resp 18   SpO2 93%   Exam:  GENERAL APPEARANCE:  Alert, in no distress, cooperative  ENT:  Mouth and posterior oropharynx  normal, moist mucous membranes, [place]  EYES:  EOM, conjunctivae, lids, pupils and irises normal, PERRL  NECK:  No adenopathy,masses or thyromegaly, FROM  RESP:  respiratory effort and palpation of chest normal, lungs clear to auscultation , no respiratory distress  CV:  Palpation and auscultation of heart done , regular rate and rhythm, no murmur, rub, or gallop, no edema  ABDOMEN:  normal bowel sounds, soft, nontender, no hepatosplenomegaly or other masses, no guarding or rebound  M/S:   Gait and station normal  muscle strength 5/5 all 4 ext.. no pain with palp. of spine, some pain with palp. R hip, R groin area  SKIN:  no bruising over buttocks, hips  NEURO:   Cranial nerves 2-12 are normal tested and grossly at patient's baseline, alert, speech clear  PSYCH:  insight and judgement impaired, memory impaired , affect and mood normal    Labs:     Most Recent 3 CBC's:  Recent Labs   Lab Test 07/26/19 07/19/19  0712 07/18/19  1731   WBC 7.3 6.5 11.3*   HGB 12.1 13.8 13.4   MCV 97 98 97    165 171     Most Recent 3 BMP's:  Recent Labs   Lab Test 08/15/19  1248 07/26/19 07/21/19  0743    140 141   POTASSIUM 3.9 4.0 4.4   CHLORIDE 106 107 109   CO2 31 24 26   BUN 20 16 15   CR 1.09* 0.87 0.85   ANIONGAP 3 9 6   FRANCISCO 9.0 9.3 8.7   GLC 94 130* 93       ASSESSMENT/PLAN:  Hip pain, right  Newer onset. No reports of trauma to area. H/o bilat hip replacements  1. Cont. Tylenol  2. XR pelvis, R hip  3. Refer to PT  4. Reassess pain over next few days  5. Cont. With 2ww at all times until pain improved    Chronic diastolic congestive heart failure (H)  Currently controlled  1. Cont. Lasix  2. Elevate LEs  3 monitor for reports of BELCHER  4. Follow wt.s    Essential hypertension  BPs stable  1. Cont. Norvasc, coreg, losartan  2. Follow BPs, HRs  3. Bmp next week      Electronically signed by:  ZAIN Marcos CNP             Sincerely,        ZAIN Marcos CNP

## 2020-01-06 ENCOUNTER — RECORDS - HEALTHEAST (OUTPATIENT)
Dept: LAB | Facility: CLINIC | Age: 85
End: 2020-01-06

## 2020-01-06 ENCOUNTER — TRANSFERRED RECORDS (OUTPATIENT)
Dept: HEALTH INFORMATION MANAGEMENT | Facility: CLINIC | Age: 85
End: 2020-01-06

## 2020-01-06 LAB
ANION GAP SERPL CALCULATED.3IONS-SCNC: 6 MMOL/L (ref 5–18)
ANION GAP SERPL CALCULATED.3IONS-SCNC: 6 MMOL/L (ref 5–18)
BUN SERPL-MCNC: 19 MG/DL (ref 8–28)
BUN SERPL-MCNC: 19 MG/DL (ref 8–28)
CALCIUM SERPL-MCNC: 8.9 MG/DL (ref 8.5–10.5)
CALCIUM SERPL-MCNC: 8.9 MG/DL (ref 8.5–10.5)
CHLORIDE BLD-SCNC: 103 MMOL/L (ref 98–107)
CHLORIDE SERPLBLD-SCNC: 103 MMOL/L (ref 98–107)
CO2 SERPL-SCNC: 29 MMOL/L (ref 22–31)
CO2 SERPL-SCNC: 29 MMOL/L (ref 22–31)
CREAT SERPL-MCNC: 1.11 MG/DL (ref 0.6–1.1)
CREAT SERPL-MCNC: 1.11 MG/DL (ref 0.6–1.1)
GFR SERPL CREATININE-BSD FRML MDRD: 47 ML/MIN/1.73M2
GFR SERPL CREATININE-BSD FRML MDRD: 47 ML/MIN/1.73M2
GLUCOSE BLD-MCNC: 103 MG/DL (ref 70–125)
GLUCOSE SERPL-MCNC: 103 MG/DL (ref 70–125)
POTASSIUM BLD-SCNC: 4.6 MMOL/L (ref 3.5–5)
POTASSIUM SERPL-SCNC: 4.6 MMOL/L (ref 3.5–5)
SODIUM SERPL-SCNC: 138 MMOL/L (ref 136–145)
SODIUM SERPL-SCNC: 138 MMOL/L (ref 136–145)

## 2020-01-09 ENCOUNTER — PATIENT OUTREACH (OUTPATIENT)
Dept: GERIATRIC MEDICINE | Facility: CLINIC | Age: 85
End: 2020-01-09

## 2020-01-09 NOTE — PROGRESS NOTES
[facility] Care Coordination Contact    Call placed to member to schedule annual assessment, home visit planned for 1/23/2020 @ 10 AM. Secure e-mail sent to Betsey at [facility] to inform of visit and request copy of AL POC and med list.  Holly Guajardo RN, BC  Manager [facility] Care Coordinator   [phone]  (Fax)

## 2020-01-23 ENCOUNTER — PATIENT OUTREACH (OUTPATIENT)
Dept: GERIATRIC MEDICINE | Facility: CLINIC | Age: 85
End: 2020-01-23

## 2020-01-23 DIAGNOSIS — Z76.89 HEALTH CARE HOME: ICD-10-CM

## 2020-01-23 ASSESSMENT — PATIENT HEALTH QUESTIONNAIRE - PHQ9: SUM OF ALL RESPONSES TO PHQ QUESTIONS 1-9: 4

## 2020-01-23 ASSESSMENT — ACTIVITIES OF DAILY LIVING (ADL): DEPENDENT_IADLS:: CLEANING;LAUNDRY;SHOPPING;MEDICATION MANAGEMENT;MONEY MANAGEMENT;TRANSPORTATION

## 2020-01-23 NOTE — PROGRESS NOTES
[facility] Care Coordination Contact    [facility] Home Visit Assessment     Home visit for Health Risk Assessment with Jade Caba completed on January 23, 2020    Type of residence:: Assisted living, [facility]   Current living arrangement:: I live in assisted living     Assessment completed with:: Patient. CC met with Roz RN following assessment to review findings. Roz reports that member no longer requires 02 and no further staff assistance.  Roz reports no changes and that member is doing well.  CC shared that member reports increased arthritis pain, member states all over and constant. Shared that member has a bottle of 325 mg ASA in her room and reports taking 2 per day as needed (not daily).  Explained that CC encouraged member to review her medications with AL staff.   Roz did f/u with member. Roz will review pain and medications with PCP.     Current Care Plan  Member currently receiving the following home care services:   NA  Member currently receiving the following community resources: [employer] ([facility]).    Medication Review  Medication reconciliation completed in Epic: Yes  Medication set-up & administration: RN set up weekly.  Assisting Living staff administers medications.  Medication Risk Assessment Medication (1 or more, place referral to MTM): N/A: No risk factors identified  MTM Referral Placed: No: No risk factors idenified    Mental/Behavioral Health   Depression Screening: See PHQ assessment flowsheet.   Mental health DX:: Yes   Mental health DX how managed:: Medication  Mental Health Diagnosis: Yes, Mild Major Depression  Mental Health Services: Yes: Current mental health services:  Medication    Falls Assessment:   Fallen 2 or more times in the past year?: No   Any fall with injury in the past year?: No    ADL/IADL Dependencies:   Dependent ADLs:: Ambulation-walker  Dependent IADLs:: Cleaning, Laundry, Shopping, Medication Management, Money Management,  Transportation    [facility] sponsored benefits: Shared information re: Silver Sneakers/gym memberships, ASA, Calcium +D.    PCA Assessment completed at visit: No     Elderly Waiver Eligibility: Yes-will continue on EW    Care Plan & Recommendations:   Continue services at [facility]. Member acknowledged that she no longer receives 02. Explained that there will be a change in authorization to [place], [facility].  Member states that she is maybe interested in moving from her single room apt to a large apt, but was informed by her daughter she could not afford it financially.  Explained that she and her daughter should talk with Betsey ZHAO at [facility]. Explained that some of the apt's are low income, 30% of one's income.  Provided member with another [employer] business card to provide to her daughter as needed.     See [facility] for detailed assessment information.    Follow-Up Plan: Member informed of future contact, plan to f/u with member with a 6 month telephone assessment.  Contact information shared with member and family, encouraged member to call with any questions or concerns at any time.    [facility] care continuum providers: Please refer to Health Care Home on the Epic Problem List to view this patient's [facility] Care Plan Summary.    1/23/2020 DTR completed, member no longer receives 02 and no longer requires staff assistance. DTR due to reduction in daily rate of RS services.  Holly Guajardo RN, BC  Manager [facility] Care Coordinator   [phone]  (Fax)

## 2020-01-24 ENCOUNTER — PATIENT OUTREACH (OUTPATIENT)
Dept: GERIATRIC MEDICINE | Facility: CLINIC | Age: 85
End: 2020-01-24

## 2020-01-24 NOTE — PROGRESS NOTES
[facility] Care Coordination Contact    Received a request to submit a DTR for the reduced of Assisted Living. Documentation completed and faxed to the health plan. Care Coordinator aware.    Eden Barbour RN  Utilization   [facility]  [phone]

## 2020-01-27 ENCOUNTER — ASSISTED LIVING VISIT (OUTPATIENT)
Dept: GERIATRICS | Facility: CLINIC | Age: 85
End: 2020-01-27
Payer: COMMERCIAL

## 2020-01-27 ENCOUNTER — PATIENT OUTREACH (OUTPATIENT)
Dept: GERIATRIC MEDICINE | Facility: CLINIC | Age: 85
End: 2020-01-27

## 2020-01-27 DIAGNOSIS — I10 ESSENTIAL HYPERTENSION: ICD-10-CM

## 2020-01-27 DIAGNOSIS — F41.9 ANXIETY: ICD-10-CM

## 2020-01-27 DIAGNOSIS — R52 PAIN: Primary | ICD-10-CM

## 2020-01-27 NOTE — PROGRESS NOTES
[facility] Care Coordination Contact    MMIS completed, Rate Cell B, Case Mix B.  Care Plan and RS tool completed.  Holly Guajardo RN, BC  Manager [facility] Care Coordinator   [phone]  (Fax)

## 2020-01-27 NOTE — PROGRESS NOTES
[place] [facility]  [place] Medical Record Number:  [id number]  Place of Service where encounter took place:  [facility] ([facility]) [[id number]]  Chief Complaint   Patient presents with     Pain       HPI:    Jade Caba  is a 86 year old (8/26/1933), who is being seen today for an episodic care visit.  HPI information obtained from: facility chart records, facility staff, patient report and [facility] chart review. Today's concern is: pain, anxiety, HTN.  Has chronic L knee pain which is unchanged. Minimal benefit from last knee inj per resident. For past couple weeks reports increased gen. Pain. No recent falls.  Reports some decreased act. Level. Pain more in am, improves with activity. For OA cont. On tylenol, voltaren gel, prn aspercreme. Reports aspercreme more effective for L knee pain vs voltaren gel.  Reports some increased anxiety at times, may be r/t pain. Cont. On celexa, remeron. Reports she feels her BP is elevated in pms, however, does not have BP taken during pms.  Today BP stable. Cont. On norvasc, coreg, losartan.        Past Medical and Surgical History reviewed in Epic today.    MEDICATIONS:  Current Outpatient Medications   Medication Sig Dispense Refill     Acetaminophen (TYLENOL PO) Take 1,000 mg by mouth 3 times daily        amLODIPine (NORVASC) 10 MG tablet Take 10 mg by mouth At Bedtime       aspirin (ASA) 81 MG EC tablet Take 1 tablet (81 mg) by mouth daily 90 tablet 3     bisacodyl (DULCOLAX) 10 MG suppository Place 10 mg rectally daily as needed       calcium carbonate 500 mg, elemental, (OSCAL;OYSTER SHELL CALCIUM) 500 MG tablet Take 2 tablets by mouth every evening        carvedilol (COREG) 12.5 MG tablet Take 1 tablet (12.5 mg) by mouth 2 times daily (with meals) 60 tablet 1     cholecalciferol (VITAMIN D3) 5000 UNITS CAPS capsule Take 5,000 Units by mouth daily       citalopram (CELEXA) 10 MG tablet Take 30 mg by mouth daily       diclofenac  (VOLTAREN) 1 % topical gel Apply 2 g topically 2 times daily as needed LEFT KNEE        folic acid (FOLVITE) 1 MG tablet Take 1 mg by mouth daily       furosemide (LASIX) 20 MG tablet Take 20 mg by mouth daily       hypromellose (ARTIFICIAL TEARS) 0.5 % SOLN ophthalmic solution Place 1 drop into both eyes 3 times daily        levETIRAcetam (KEPPRA) 500 MG tablet Take 500 mg by mouth 2 times daily       losartan (COZAAR) 100 MG tablet Take 100 mg by mouth daily       metoclopramide (REGLAN) 5 MG tablet Take 5 mg by mouth daily as needed (at noon)        Mirtazapine (REMERON PO) Take 15 mg by mouth 2 times daily       Multiple Vitamins-Minerals (CERTAVITE SENIOR/ANTIOXIDANT PO) Take 1 tablet by mouth daily        omeprazole (PRILOSEC) 40 MG capsule Take 1 capsule (40 mg) by mouth daily Take 30-60 minutes before a meal. 30 capsule 9     oxybutynin (DITROPAN) 5 MG tablet Take 5 mg by mouth 2 times daily       polyethylene glycol (MIRALAX/GLYCOLAX) powder Take 17 g by mouth nightly as needed        rosuvastatin (CRESTOR) 40 MG tablet Take 40 mg by mouth At Bedtime        senna-docusate (SENNA S) 8.6-50 MG per tablet Take 2 tablets by mouth At Bedtime Please hold if having loose stools and contact nurse.       trolamine salicylate (ASPERCREME) 10 % external cream Apply topically as needed for moderate pain (apply twice daily as needed for pain. Left knee) AND BID SCHEDULED           REVIEW OF SYSTEMS:  No chest pain, shortness of breath, fevers, chills, headache, nausea, vomiting, dysuria or bowel abnormalities.  Appetite is  normal.  No pain except ongoing knee pain L>R, gen pain at times.    Objective:  /67   Pulse 67   Temp 97.5  F (36.4  C)   Resp 18   SpO2 93%   Exam:  GENERAL APPEARANCE:  Alert, in no distress, cooperative  ENT:  Mouth and posterior oropharynx normal, moist mucous membranes, [place]  EYES:  EOM, conjunctivae, lids, pupils and irises normal, PERRL  NECK:  No adenopathy,masses or thyromegaly,  FROM  RESP:  respiratory effort and palpation of chest normal, lungs clear to auscultation , no respiratory distress  CV:  Palpation and auscultation of heart done , regular rate and rhythm, no murmur, rub, or gallop, peripheral edema trace+ in LEs  ABDOMEN:  normal bowel sounds, soft, nontender, no hepatosplenomegaly or other masses, no guarding or rebound  M/S:   Gait and station normal  muscle strength 5/5 all 4 ext, normal tone  NEURO:   Cranial nerves 2-12 are normal tested and grossly at patient's baseline, alert, speech clear  PSYCH:  insight and judgement impaired, memory impaired , affect and mood normal    Labs:     Most Recent 3 CBC's:  Recent Labs   Lab Test 07/26/19 07/19/19  0712 07/18/19  1731   WBC 7.3 6.5 11.3*   HGB 12.1 13.8 13.4   MCV 97 98 97    165 171     Most Recent 3 BMP's:  Recent Labs   Lab Test 01/06/20 08/15/19  1248 07/26/19    140 140   POTASSIUM 4.6 3.9 4.0   CHLORIDE 103 106 107   CO2 29 31 24   BUN 19 20 16   CR 1.11* 1.09* 0.87   ANIONGAP 6 3 9   FRANCISCO 8.9 9.0 9.3    94 130*       ASSESSMENT/PLAN:  Pain  Increased gen. Pain. L knee pain unchanged  1. Discontinue prn tylenol  2. Increased sched. Tylenol to 1gm tid  3. Change voltaren gel to prn  4. Start bid aspercreme sched. To L knee  5. Reassess pain over next few days    Anxiety  Resident reports some recent increased s/s, may be r/t pain  1. Cont. Celexa, remeron  2. Address pain as above  3. Encourage increased act. level    Essential hypertension  BP sl. Elevated at times. Stable today  1. Cont. Norvasc, cored, losartan  2. Start qpm BPs over next few days, then reassess  3. For elevated BPs in pms, may spilt norvasc dose to bid  4. Cbc, bmp in next 1-3 mos      Electronically signed by:  ZAIN Marcos CNP

## 2020-01-27 NOTE — LETTER
1/27/2020        RE: Jade Saeed  [address] [facility] [address]        [place] [facility]  [place] Medical Record Number:  [id number]  Place of Service where encounter took place:  [facility] ([facility]) [[id number]]  Chief Complaint   Patient presents with     Pain       HPI:    Jade Caba  is a 86 year old (8/26/1933), who is being seen today for an episodic care visit.  HPI information obtained from: facility chart records, facility staff, patient report and [facility] chart review. Today's concern is: pain, anxiety, HTN.  Has chronic L knee pain which is unchanged. Minimal benefit from last knee inj per resident. For past couple weeks reports increased gen. Pain. No recent falls.  Reports some decreased act. Level. Pain more in am, improves with activity. For OA cont. On tylenol, voltaren gel, prn aspercreme. Reports aspercreme more effective for L knee pain vs voltaren gel.  Reports some increased anxiety at times, may be r/t pain. Cont. On celexa, remeron. Reports she feels her BP is elevated in pms, however, does not have BP taken during pms.  Today BP stable. Cont. On norvasc, coreg, losartan.        Past Medical and Surgical History reviewed in Epic today.    MEDICATIONS:  Current Outpatient Medications   Medication Sig Dispense Refill     Acetaminophen (TYLENOL PO) Take 1,000 mg by mouth 3 times daily        amLODIPine (NORVASC) 10 MG tablet Take 10 mg by mouth At Bedtime       aspirin (ASA) 81 MG EC tablet Take 1 tablet (81 mg) by mouth daily 90 tablet 3     bisacodyl (DULCOLAX) 10 MG suppository Place 10 mg rectally daily as needed       calcium carbonate 500 mg, elemental, (OSCAL;OYSTER SHELL CALCIUM) 500 MG tablet Take 2 tablets by mouth every evening        carvedilol (COREG) 12.5 MG tablet Take 1 tablet (12.5 mg) by mouth 2 times daily (with meals) 60 tablet 1     cholecalciferol (VITAMIN D3) 5000 UNITS CAPS capsule Take 5,000 Units by  mouth daily       citalopram (CELEXA) 10 MG tablet Take 30 mg by mouth daily       diclofenac (VOLTAREN) 1 % topical gel Apply 2 g topically 2 times daily as needed LEFT KNEE        folic acid (FOLVITE) 1 MG tablet Take 1 mg by mouth daily       furosemide (LASIX) 20 MG tablet Take 20 mg by mouth daily       hypromellose (ARTIFICIAL TEARS) 0.5 % SOLN ophthalmic solution Place 1 drop into both eyes 3 times daily        levETIRAcetam (KEPPRA) 500 MG tablet Take 500 mg by mouth 2 times daily       losartan (COZAAR) 100 MG tablet Take 100 mg by mouth daily       metoclopramide (REGLAN) 5 MG tablet Take 5 mg by mouth daily as needed (at noon)        Mirtazapine (REMERON PO) Take 15 mg by mouth 2 times daily       Multiple Vitamins-Minerals (CERTAVITE SENIOR/ANTIOXIDANT PO) Take 1 tablet by mouth daily        omeprazole (PRILOSEC) 40 MG capsule Take 1 capsule (40 mg) by mouth daily Take 30-60 minutes before a meal. 30 capsule 9     oxybutynin (DITROPAN) 5 MG tablet Take 5 mg by mouth 2 times daily       polyethylene glycol (MIRALAX/GLYCOLAX) powder Take 17 g by mouth nightly as needed        rosuvastatin (CRESTOR) 40 MG tablet Take 40 mg by mouth At Bedtime        senna-docusate (SENNA S) 8.6-50 MG per tablet Take 2 tablets by mouth At Bedtime Please hold if having loose stools and contact nurse.       trolamine salicylate (ASPERCREME) 10 % external cream Apply topically as needed for moderate pain (apply twice daily as needed for pain. Left knee) AND BID SCHEDULED           REVIEW OF SYSTEMS:  No chest pain, shortness of breath, fevers, chills, headache, nausea, vomiting, dysuria or bowel abnormalities.  Appetite is  normal.  No pain except ongoing knee pain L>R, gen pain at times.    Objective:  /67   Pulse 67   Temp 97.5  F (36.4  C)   Resp 18   SpO2 93%   Exam:  GENERAL APPEARANCE:  Alert, in no distress, cooperative  ENT:  Mouth and posterior oropharynx normal, moist mucous membranes, [place]  EYES:  EOM,  conjunctivae, lids, pupils and irises normal, PERRL  NECK:  No adenopathy,masses or thyromegaly, FROM  RESP:  respiratory effort and palpation of chest normal, lungs clear to auscultation , no respiratory distress  CV:  Palpation and auscultation of heart done , regular rate and rhythm, no murmur, rub, or gallop, peripheral edema trace+ in LEs  ABDOMEN:  normal bowel sounds, soft, nontender, no hepatosplenomegaly or other masses, no guarding or rebound  M/S:   Gait and station normal  muscle strength 5/5 all 4 ext, normal tone  NEURO:   Cranial nerves 2-12 are normal tested and grossly at patient's baseline, alert, speech clear  PSYCH:  insight and judgement impaired, memory impaired , affect and mood normal    Labs:     Most Recent 3 CBC's:  Recent Labs   Lab Test 07/26/19 07/19/19  0712 07/18/19  1731   WBC 7.3 6.5 11.3*   HGB 12.1 13.8 13.4   MCV 97 98 97    165 171     Most Recent 3 BMP's:  Recent Labs   Lab Test 01/06/20 08/15/19  1248 07/26/19    140 140   POTASSIUM 4.6 3.9 4.0   CHLORIDE 103 106 107   CO2 29 31 24   BUN 19 20 16   CR 1.11* 1.09* 0.87   ANIONGAP 6 3 9   FRANCISCO 8.9 9.0 9.3    94 130*       ASSESSMENT/PLAN:  Pain  Increased gen. Pain. L knee pain unchanged  1. Discontinue prn tylenol  2. Increased sched. Tylenol to 1gm tid  3. Change voltaren gel to prn  4. Start bid aspercreme sched. To L knee  5. Reassess pain over next few days    Anxiety  Resident reports some recent increased s/s, may be r/t pain  1. Cont. Celexa, remeron  2. Address pain as above  3. Encourage increased act. level    Essential hypertension  BP sl. Elevated at times. Stable today  1. Cont. Norvasc, cored, losartan  2. Start qpm BPs over next few days, then reassess  3. For elevated BPs in pms, may spilt norvasc dose to bid  4. Cbc, bmp in next 1-3 mos      Electronically signed by:  ZAIN Marcos CNP             Sincerely,        ZAIN Marcos CNP

## 2020-01-28 VITALS
TEMPERATURE: 97.5 F | HEART RATE: 67 BPM | DIASTOLIC BLOOD PRESSURE: 67 MMHG | OXYGEN SATURATION: 93 % | RESPIRATION RATE: 18 BRPM | SYSTOLIC BLOOD PRESSURE: 133 MMHG

## 2020-01-31 ENCOUNTER — PATIENT OUTREACH (OUTPATIENT)
Dept: GERIATRIC MEDICINE | Facility: CLINIC | Age: 85
End: 2020-01-31

## 2020-01-31 NOTE — PROGRESS NOTES
[facility] Care Coordination Contact    Received after visit chart from care coordinator.  Completed following tasks: Mailed copy of care plan to client, Updated services in access and Submitted referrals/auths for AL w/ [facility].     Mailed copy of CL tool to member, faxed copy to AL facility, uploaded into [employer] and submitted authorization to health plan.    Provider Signature - Full Care Plan:  Member indicates that they would like their POC shared with the following EW providers:  [facility].  Letter and full POC faxed to providers for signature.    Jade Asif  Care Management Specialist  [facility]  [phone]

## 2020-01-31 NOTE — LETTER
January 31, 2020      LAURO VERDUGO  [address] [facility] [address]      Dear Lauro:    At [facility], we are dedicated to improving your health and well-being. Enclosed is the Comprehensive Care Plan that we developed with you on 01/23/2020. Please review the Care Plan carefully.    As a reminder, some of the things we discussed at your visit include:    Your physical and mental health    Ways to reduce falls    Health care needs you may have    Don t forget to contact your care coordinator if you:    Have been hospitalized or plan to be hospitalized     Have had a fall     Have experienced a change in physical health    Are experiencing emotional problems     If you do not agree with your Care Plan, have questions about it, or have experienced a change in your needs, please call me at [phone]. If you are hearing impaired, please call the [place] [employer] at 973 or [phone] (quypoh-sp-gypjvy relay service).    Sincerely,    Holly Guajardo RN    E-mail: [email or web address]  Phone: [phone]      [facility] ([facility]) is a health plan that contracts with both Medicare and the [place] Medical Assistance (Medicaid) program to provide benefits of both programs to enrollees. Enrollment in [facility] depends on contract renewal.    [id number])     [id number] (11/18)

## 2020-02-13 NOTE — PROGRESS NOTES
[facility] Care Coordination Contact    Information rec'd from [facility]  Elderly Waiver- [id number] 24hr [facility]ized Living Services rate reduced to $55.84 per day/rate.    ICD10: R68.89   Holly Guajardo RN, BC  Manager [facility] Care Coordinator   [phone]  (Fax)

## 2020-03-02 ENCOUNTER — HOSPITAL ENCOUNTER (INPATIENT)
Facility: CLINIC | Age: 85
LOS: 1 days | Discharge: HOME | DRG: 313 | End: 2020-03-03
Attending: EMERGENCY MEDICINE | Admitting: INTERNAL MEDICINE
Payer: COMMERCIAL

## 2020-03-02 ENCOUNTER — APPOINTMENT (OUTPATIENT)
Dept: GENERAL RADIOLOGY | Facility: CLINIC | Age: 85
DRG: 313 | End: 2020-03-02
Attending: EMERGENCY MEDICINE
Payer: COMMERCIAL

## 2020-03-02 DIAGNOSIS — Z86.79 HISTORY OF CORONARY ARTERY DISEASE: ICD-10-CM

## 2020-03-02 DIAGNOSIS — I25.10 CAD IN NATIVE ARTERY: ICD-10-CM

## 2020-03-02 DIAGNOSIS — R07.9 CHEST PAIN, UNSPECIFIED TYPE: ICD-10-CM

## 2020-03-02 LAB
ANION GAP SERPL CALCULATED.3IONS-SCNC: 5 MMOL/L (ref 3–14)
BASOPHILS # BLD AUTO: 0 10E9/L (ref 0–0.2)
BASOPHILS NFR BLD AUTO: 0.5 %
BUN SERPL-MCNC: 21 MG/DL (ref 7–30)
CALCIUM SERPL-MCNC: 9.2 MG/DL (ref 8.5–10.1)
CHLORIDE SERPL-SCNC: 104 MMOL/L (ref 94–109)
CO2 SERPL-SCNC: 28 MMOL/L (ref 20–32)
CREAT SERPL-MCNC: 0.95 MG/DL (ref 0.52–1.04)
DIFFERENTIAL METHOD BLD: NORMAL
EOSINOPHIL # BLD AUTO: 0.2 10E9/L (ref 0–0.7)
EOSINOPHIL NFR BLD AUTO: 3.1 %
ERYTHROCYTE [DISTWIDTH] IN BLOOD BY AUTOMATED COUNT: 11.7 % (ref 10–15)
GFR SERPL CREATININE-BSD FRML MDRD: 54 ML/MIN/{1.73_M2}
GLUCOSE BLDC GLUCOMTR-MCNC: 104 MG/DL (ref 70–99)
GLUCOSE BLDC GLUCOMTR-MCNC: 96 MG/DL (ref 70–99)
GLUCOSE SERPL-MCNC: 111 MG/DL (ref 70–99)
HCT VFR BLD AUTO: 41.3 % (ref 35–47)
HGB BLD-MCNC: 13.3 G/DL (ref 11.7–15.7)
IMM GRANULOCYTES # BLD: 0 10E9/L (ref 0–0.4)
IMM GRANULOCYTES NFR BLD: 0.3 %
LMWH PPP CHRO-ACNC: 0.33 IU/ML
LYMPHOCYTES # BLD AUTO: 2.4 10E9/L (ref 0.8–5.3)
LYMPHOCYTES NFR BLD AUTO: 31.8 %
MCH RBC QN AUTO: 31.9 PG (ref 26.5–33)
MCHC RBC AUTO-ENTMCNC: 32.2 G/DL (ref 31.5–36.5)
MCV RBC AUTO: 99 FL (ref 78–100)
MONOCYTES # BLD AUTO: 0.8 10E9/L (ref 0–1.3)
MONOCYTES NFR BLD AUTO: 10.4 %
MRSA DNA SPEC QL NAA+PROBE: NEGATIVE
NEUTROPHILS # BLD AUTO: 4 10E9/L (ref 1.6–8.3)
NEUTROPHILS NFR BLD AUTO: 53.9 %
NRBC # BLD AUTO: 0 10*3/UL
NRBC BLD AUTO-RTO: 0 /100
PLATELET # BLD AUTO: 192 10E9/L (ref 150–450)
POTASSIUM SERPL-SCNC: 4.1 MMOL/L (ref 3.4–5.3)
RBC # BLD AUTO: 4.17 10E12/L (ref 3.8–5.2)
SODIUM SERPL-SCNC: 137 MMOL/L (ref 133–144)
SPECIMEN SOURCE: NORMAL
TROPONIN I SERPL-MCNC: <0.015 UG/L (ref 0–0.04)
WBC # BLD AUTO: 7.4 10E9/L (ref 4–11)

## 2020-03-02 PROCEDURE — 71046 X-RAY EXAM CHEST 2 VIEWS: CPT

## 2020-03-02 PROCEDURE — 25000132 ZZH RX MED GY IP 250 OP 250 PS 637: Performed by: EMERGENCY MEDICINE

## 2020-03-02 PROCEDURE — 87641 MR-STAPH DNA AMP PROBE: CPT | Performed by: INTERNAL MEDICINE

## 2020-03-02 PROCEDURE — 93005 ELECTROCARDIOGRAM TRACING: CPT

## 2020-03-02 PROCEDURE — 25000128 H RX IP 250 OP 636: Performed by: EMERGENCY MEDICINE

## 2020-03-02 PROCEDURE — 85520 HEPARIN ASSAY: CPT | Performed by: INTERNAL MEDICINE

## 2020-03-02 PROCEDURE — 20000003 ZZH R&B ICU

## 2020-03-02 PROCEDURE — 00000146 ZZHCL STATISTIC GLUCOSE BY METER IP

## 2020-03-02 PROCEDURE — 99285 EMERGENCY DEPT VISIT HI MDM: CPT | Mod: 25

## 2020-03-02 PROCEDURE — 25000132 ZZH RX MED GY IP 250 OP 250 PS 637: Performed by: INTERNAL MEDICINE

## 2020-03-02 PROCEDURE — 96368 THER/DIAG CONCURRENT INF: CPT

## 2020-03-02 PROCEDURE — 87640 STAPH A DNA AMP PROBE: CPT | Performed by: INTERNAL MEDICINE

## 2020-03-02 PROCEDURE — 96365 THER/PROPH/DIAG IV INF INIT: CPT

## 2020-03-02 PROCEDURE — 80048 BASIC METABOLIC PNL TOTAL CA: CPT | Performed by: EMERGENCY MEDICINE

## 2020-03-02 PROCEDURE — 84484 ASSAY OF TROPONIN QUANT: CPT | Performed by: EMERGENCY MEDICINE

## 2020-03-02 PROCEDURE — 36415 COLL VENOUS BLD VENIPUNCTURE: CPT | Performed by: INTERNAL MEDICINE

## 2020-03-02 PROCEDURE — 85025 COMPLETE CBC W/AUTO DIFF WBC: CPT | Performed by: EMERGENCY MEDICINE

## 2020-03-02 PROCEDURE — 96366 THER/PROPH/DIAG IV INF ADDON: CPT

## 2020-03-02 PROCEDURE — 84484 ASSAY OF TROPONIN QUANT: CPT | Performed by: INTERNAL MEDICINE

## 2020-03-02 PROCEDURE — 99291 CRITICAL CARE FIRST HOUR: CPT | Performed by: INTERNAL MEDICINE

## 2020-03-02 RX ORDER — LOSARTAN POTASSIUM 100 MG/1
100 TABLET ORAL DAILY
Status: DISCONTINUED | OUTPATIENT
Start: 2020-03-03 | End: 2020-03-03 | Stop reason: HOSPADM

## 2020-03-02 RX ORDER — NITROGLYCERIN 0.4 MG/1
0.4 TABLET SUBLINGUAL EVERY 5 MIN PRN
Status: DISCONTINUED | OUTPATIENT
Start: 2020-03-02 | End: 2020-03-02

## 2020-03-02 RX ORDER — AMOXICILLIN 250 MG
2 CAPSULE ORAL AT BEDTIME
Status: DISCONTINUED | OUTPATIENT
Start: 2020-03-02 | End: 2020-03-03 | Stop reason: HOSPADM

## 2020-03-02 RX ORDER — NITROGLYCERIN 20 MG/100ML
.07-2 INJECTION INTRAVENOUS CONTINUOUS
Status: DISCONTINUED | OUTPATIENT
Start: 2020-03-02 | End: 2020-03-03

## 2020-03-02 RX ORDER — HEPARIN SODIUM 10000 [USP'U]/100ML
12 INJECTION, SOLUTION INTRAVENOUS CONTINUOUS
Status: DISCONTINUED | OUTPATIENT
Start: 2020-03-02 | End: 2020-03-02

## 2020-03-02 RX ORDER — NALOXONE HYDROCHLORIDE 0.4 MG/ML
.1-.4 INJECTION, SOLUTION INTRAMUSCULAR; INTRAVENOUS; SUBCUTANEOUS
Status: DISCONTINUED | OUTPATIENT
Start: 2020-03-02 | End: 2020-03-03 | Stop reason: HOSPADM

## 2020-03-02 RX ORDER — ASPIRIN 81 MG/1
81 TABLET ORAL DAILY
Status: DISCONTINUED | OUTPATIENT
Start: 2020-03-03 | End: 2020-03-03 | Stop reason: HOSPADM

## 2020-03-02 RX ORDER — POLYETHYLENE GLYCOL 3350 17 G/17G
17 POWDER, FOR SOLUTION ORAL DAILY PRN
Status: DISCONTINUED | OUTPATIENT
Start: 2020-03-02 | End: 2020-03-03 | Stop reason: HOSPADM

## 2020-03-02 RX ORDER — HEPARIN SODIUM 10000 [USP'U]/100ML
750 INJECTION, SOLUTION INTRAVENOUS CONTINUOUS
Status: DISCONTINUED | OUTPATIENT
Start: 2020-03-02 | End: 2020-03-03

## 2020-03-02 RX ORDER — BISACODYL 10 MG
10 SUPPOSITORY, RECTAL RECTAL DAILY PRN
Status: DISCONTINUED | OUTPATIENT
Start: 2020-03-02 | End: 2020-03-03 | Stop reason: HOSPADM

## 2020-03-02 RX ORDER — ACETAMINOPHEN 325 MG/1
650 TABLET ORAL EVERY 4 HOURS PRN
Status: DISCONTINUED | OUTPATIENT
Start: 2020-03-02 | End: 2020-03-03 | Stop reason: HOSPADM

## 2020-03-02 RX ORDER — ONDANSETRON 2 MG/ML
4 INJECTION INTRAMUSCULAR; INTRAVENOUS EVERY 6 HOURS PRN
Status: DISCONTINUED | OUTPATIENT
Start: 2020-03-02 | End: 2020-03-03 | Stop reason: HOSPADM

## 2020-03-02 RX ORDER — HEPARIN SODIUM 10000 [USP'U]/100ML
750 INJECTION, SOLUTION INTRAVENOUS CONTINUOUS
Status: DISCONTINUED | OUTPATIENT
Start: 2020-03-02 | End: 2020-03-02

## 2020-03-02 RX ORDER — PROCHLORPERAZINE 25 MG
12.5 SUPPOSITORY, RECTAL RECTAL EVERY 12 HOURS PRN
Status: DISCONTINUED | OUTPATIENT
Start: 2020-03-02 | End: 2020-03-03 | Stop reason: HOSPADM

## 2020-03-02 RX ORDER — OXYBUTYNIN CHLORIDE 5 MG/1
5 TABLET ORAL 2 TIMES DAILY
Status: DISCONTINUED | OUTPATIENT
Start: 2020-03-02 | End: 2020-03-03 | Stop reason: HOSPADM

## 2020-03-02 RX ORDER — TROLAMINE SALICYLATE 10 G/100G
CREAM TOPICAL 2 TIMES DAILY PRN
Status: DISCONTINUED | OUTPATIENT
Start: 2020-03-02 | End: 2020-03-03 | Stop reason: HOSPADM

## 2020-03-02 RX ORDER — CARVEDILOL 12.5 MG/1
12.5 TABLET ORAL 2 TIMES DAILY WITH MEALS
Status: DISCONTINUED | OUTPATIENT
Start: 2020-03-02 | End: 2020-03-03 | Stop reason: HOSPADM

## 2020-03-02 RX ORDER — ROSUVASTATIN CALCIUM 20 MG/1
40 TABLET, COATED ORAL AT BEDTIME
Status: DISCONTINUED | OUTPATIENT
Start: 2020-03-02 | End: 2020-03-03 | Stop reason: HOSPADM

## 2020-03-02 RX ORDER — AMLODIPINE BESYLATE 10 MG/1
10 TABLET ORAL AT BEDTIME
Status: DISCONTINUED | OUTPATIENT
Start: 2020-03-02 | End: 2020-03-03 | Stop reason: HOSPADM

## 2020-03-02 RX ORDER — PROCHLORPERAZINE MALEATE 5 MG
5 TABLET ORAL EVERY 6 HOURS PRN
Status: DISCONTINUED | OUTPATIENT
Start: 2020-03-02 | End: 2020-03-03 | Stop reason: HOSPADM

## 2020-03-02 RX ORDER — GLIPIZIDE 10 MG/1
1 TABLET ORAL 3 TIMES DAILY
Status: DISCONTINUED | OUTPATIENT
Start: 2020-03-02 | End: 2020-03-02 | Stop reason: CLARIF

## 2020-03-02 RX ORDER — ONDANSETRON 4 MG/1
4 TABLET, ORALLY DISINTEGRATING ORAL EVERY 6 HOURS PRN
Status: DISCONTINUED | OUTPATIENT
Start: 2020-03-02 | End: 2020-03-03 | Stop reason: HOSPADM

## 2020-03-02 RX ORDER — MIRTAZAPINE 15 MG/1
15 TABLET, FILM COATED ORAL 2 TIMES DAILY
Status: DISCONTINUED | OUTPATIENT
Start: 2020-03-02 | End: 2020-03-03 | Stop reason: HOSPADM

## 2020-03-02 RX ORDER — LEVETIRACETAM 500 MG/1
500 TABLET ORAL 2 TIMES DAILY
Status: DISCONTINUED | OUTPATIENT
Start: 2020-03-02 | End: 2020-03-03 | Stop reason: HOSPADM

## 2020-03-02 RX ADMIN — OXYBUTYNIN CHLORIDE 5 MG: 5 TABLET ORAL at 22:12

## 2020-03-02 RX ADMIN — NITROGLYCERIN 0.07 MCG/KG/MIN: 20 INJECTION INTRAVENOUS at 15:30

## 2020-03-02 RX ADMIN — MIRTAZAPINE 15 MG: 15 TABLET, FILM COATED ORAL at 22:12

## 2020-03-02 RX ADMIN — DEXTRAN 70 AND HYPROMELLOSE 2910 1 DROP: 1; 3 SOLUTION/ DROPS OPHTHALMIC at 22:41

## 2020-03-02 RX ADMIN — HEPARIN SODIUM 12 UNITS/KG/HR: 10000 INJECTION, SOLUTION INTRAVENOUS at 15:53

## 2020-03-02 RX ADMIN — SENNOSIDES AND DOCUSATE SODIUM 2 TABLET: 8.6; 5 TABLET ORAL at 22:12

## 2020-03-02 RX ADMIN — NITROGLYCERIN 0.4 MG: 0.4 TABLET SUBLINGUAL at 14:07

## 2020-03-02 RX ADMIN — CARVEDILOL 12.5 MG: 12.5 TABLET, FILM COATED ORAL at 20:04

## 2020-03-02 RX ADMIN — ROSUVASTATIN CALCIUM 40 MG: 20 TABLET, FILM COATED ORAL at 22:12

## 2020-03-02 RX ADMIN — Medication 3750 UNITS: at 15:55

## 2020-03-02 RX ADMIN — AMLODIPINE BESYLATE 10 MG: 10 TABLET ORAL at 22:12

## 2020-03-02 RX ADMIN — NITROGLYCERIN 0.4 MG: 0.4 TABLET SUBLINGUAL at 14:21

## 2020-03-02 RX ADMIN — LEVETIRACETAM 500 MG: 500 TABLET, FILM COATED ORAL at 22:12

## 2020-03-02 ASSESSMENT — ENCOUNTER SYMPTOMS
COUGH: 1
FEVER: 0
SHORTNESS OF BREATH: 0
CHEST TIGHTNESS: 1

## 2020-03-02 ASSESSMENT — MIFFLIN-ST. JEOR
SCORE: 1233.25
SCORE: 1209.72

## 2020-03-02 ASSESSMENT — ACTIVITIES OF DAILY LIVING (ADL): ADLS_ACUITY_SCORE: 21

## 2020-03-02 NOTE — ED PROVIDER NOTES
History     Chief Complaint:  Chest Pain    HPI   Jade Caba is a 86 year old female with a history of CAD, diastolic heart failure, MI, hypertension, hyperlipidemia, among others who presents from assisted living via EMS for evaluation of intermittent mid sternal stabbing chest pain that began around 0900 today. The patient states today around 1230 while sitting in her recliner and watching TV she had acute onset mid sternal stabbing chest pain. Her pain alleviated after receiving to sublingual nitroglycerin and 324 mg aspirin, prompting her staff to call EMS.    Here, the patient states she has a constant chest pressure. She also endorses a cough. Of note, the patient states her pain is similar to her previous MIs.  She denies any fever, shortness of breath, or current chest pain.     Cardiac/PE/DVT Risk Factors:  History of hypertension - Yes  History of hyperlipidemia - Yes  History of diabetes - No  History of smoking - No  Personal history of PE/DVT - No  Family history of PE/DVT - No  Family history of heart complications - Yes  Recent travel - No  Recent surgery - No  Other immobilizations - No  Cancer - No     Allergies:  No Known Drug Allergies      Medications:    Norvasc  Dulcolax  Coreg  Celexa  Folvite  Lasix  Cozaar  Reglan  Remeron  Prilosec  Ditropan  Crestor  Senna     Past Medical History:    Anxiety  Arthritis  Atrial flutter  C1 cervical fracture  CAD  Carotid artery stenosis  CKD  Depression  Diastolic heart failure  Alcohol abuse  Hyperlipidemia  Hypertension  Renal artery stenosis  Seizure disorder  Subarachnoid hemorrhage   Pneumonia    MI    Past Surgical History:    Appendectomy  Carotid endarterectomy, left  Cataract surgery, right  Cholecystectomy  Coronary stents x 3  Coronary angiogram  Hysterectomy  Joint replacement, bilateral  Stenting of renal artery     Family History:    Mother - CAD  Brother(s)  - CAD  Sister(s) - CAD    Social History:  The patient was accompanied to the ED  "by EMS.  Smoking Status: Never  Smokeless Tobacco: Never  Alcohol Use: Yes  Drug Use: No   Marital Status:   [5]     Review of Systems   Constitutional: Negative for fever.   Respiratory: Positive for cough and chest tightness. Negative for shortness of breath.    Cardiovascular: Positive for chest pain.   All other systems reviewed and are negative.    Physical Exam     Patient Vitals for the past 24 hrs:   BP Temp Temp src Pulse Heart Rate Resp SpO2 Height Weight   03/02/20 1430 139/78 -- -- 71 71 13 93 % -- --   03/02/20 1415 131/76 -- -- 71 67 8 92 % -- --   03/02/20 1400 (!) 174/78 -- -- 66 67 14 97 % -- --   03/02/20 1330 (!) 168/82 98.1  F (36.7  C) Oral 73 73 18 96 % 1.575 m (5' 2\") 81.6 kg (180 lb)     Physical Exam    VS: Reviewed per above  HENT: Mucous membranes moist  EYES: sclera anicteric  CV: Rate as noted, regular rhythm.   RESP: Effort normal. Breath sounds are normal bilaterally.  GI: no tenderness/rebound/guarding, not distended.  NEURO: Alert, moving all extremities  MSK: No deformity of the extremities  SKIN: Warm and dry    Emergency Department Course     ECG:  ECG taken at 1332, ECG read at 1359 by Adarsh Munson MD  Normal sinus rhythm  Normal ECG  No significant change compared to EKG dated 7/18/19  Rate 71 bpm. SC interval 170. QRS duration 84. QT/QTc 400/434. P-R-T axes 57 89 42.      Imaging:  Radiology findings were communicated with the patient who voiced understanding of the findings.    XR Chest 2 Views  IMPRESSION: Slight atelectasis in the left lung base. Clear right lung. No pleural effusion. Normal heart size and pulmonary vascularity. Aortic calcifications. Mild compression deformity of a lower thoracic vertebral body.  Reading per radiology.      Laboratory:  Laboratory findings were communicated with the patient who voiced understanding of the findings.    CBC: AWNL (WBC 7.4, HGB 13.3, )   BMP: Glucose 111 (H), GFR 54 (L) o/w WNL (Creatinine 0.95)   Troponin " (Collected 1337): <0.015     Interventions:  1407 Nitrostat 0.4 mg Sublingual  1421 Nitrostat 0.4 mg Sublingual   1530 Nitroglycerin 50 mg infusion IV  1553 Heparin 55508 units infusion IV  1555 Heparin 3750 Units IV    Emergency Department Course:  Past medical records, nursing notes, and vitals reviewed.  EKG obtained in the ED, see results above.    The patient was sent for a XR Chest 2 Views while in the emergency department, results above.    IV was inserted and blood was drawn for laboratory testing, results above.     (1352)   I performed an exam of the patient as documented above. History obtained from patient.     (1502)   I rechecked the patient and discussed results and plan of care.     (1518)   I spoke with Dr. Godoy of the Hospitalist service regarding patient's presentation, findings, and plan of care.     Findings and plan explained to the Patient who consents to admission. Discussed the patient with Dr. Godoy, who will admit the patient to a ICU bed for further monitoring, evaluation, and treatment.  I personally reviewed the laboratory results with the Patient and answered all related questions prior to admission.     Impression & Plan   Medical Decision Making:  Patient presents to the ER for evaluation of chest pain that onset while at rest a few hours prior to arrival.  In the ER vital signs are within normal limits aside from hypertensive blood pressures up to 174 systolic.  EKG and troponin not suggestive of ACS; however her pain resolved with nitroglycerin and she has history of multivessel PCI.  Ultimately she was started on nitroglycerin infusion as well as heparin due to concern for possible unstable angina.  Chest x-ray did not reveal evidence of pneumonia, pneumothorax, pulmonary edema, widened mediastinum to suggest aortic dissection.  Additionally history is not consistent with aortic dissection.  She remained stable in the ER prior to admission.    Diagnosis:    ICD-10-CM    1. Chest  pain, unspecified type R07.9    2. History of coronary artery disease Z86.79      Disposition:  Admitted to an ICU bed under the care of Dr. Godoy.     Scribe Disclosure:  I, Rey Lopez, am serving as a scribe at 1:47 PM on 3/2/2020 to document services personally performed by Adarsh Munson MD based on my observations and the provider's statements to me.    3/2/2020   [facility] EMERGENCY DEPARTMENT       Adarsh Munson MD  03/02/20 1817

## 2020-03-02 NOTE — H&P
[facility]  Hospitalist Admission Note  Name: Jade Caba    MRN: [id number]  YOB: 1933    Age: 86 year old  Date of admission: 3/2/2020  Primary care provider: Jaden Marinelli    Chief Complaint:  Chest Pain    Assessment and Plan:     Jade Caba is a 86 year old female with PMH including coronary artery disease (multivessel PCI, and NSTEMI in 7/2019), hypertension, hyperlipidemia, depression, degenerative joint disease and history of subarachnoid hemorrhage in 2014 after a fall who was admitted on 3/2/2020 with chest pain which has been improving with nitroglycerin.  Patient was started on nitroglycerin drip and admitted to the ICU for further cardiac work-up given concern of possible ACS.    1.  Chest pain with history of multivessel coronary artery disease: Presented with initial sharp central chest pain which began at 12:30 PM today.  It was not worse with exertion but developed into a pressure type pain with associated nausea.  Nitroglycerin has decreased the intensity of the chest pain.  Her initial troponin is negative and initial EKG shows no acute ischemic changes.  She has significant cardiac history with multiple prior stents and most recent NSTEMI in 7/2019.  It is possible that this represents ACS such as unstable angina.  She was started on nitroglycerin drip in the emergency room which continues to help her chest pain.  Started on heparin.  -Repeat EKG to ensure no dynamic changes  -Serial troponins  -Continue heparin  -Continue nitroglycerin, wean as able  -TTE  -Cardiology consult  -Continue prior to admission cardiac medications including amlodipine, carvedilol, losartan and statin  -Hold Lasix at this time as I am making her n.p.o. at midnight in case she will require a cardiac procedure    2.  Hypertension: Slightly hypertensive in the emergency room.  As above continuing cardiac medications with the exception of Lasix.  She is also on a nitroglycerin drip  which should also lower her blood pressure.    3.  Depression: Continue Celexa and Remeron.    4.  Remote history of subarachnoid hemorrhage in the setting of a fall: Patient had a fall in 2014 and suffered a subarachnoid hemorrhage.  She is still on seizure prophylaxis for this.  Continue Keppra.    5.  DJD: Chronic issues with DJD mainly in the neck and knees.  Continue Tylenol as needed.  Also Voltaren gel and Aspercreme.    Diet: 2 g salt diet with no caffeine, n.p.o. at midnight in case she requires  DVT Prophylaxis: heparin drip  Dempsey Catheter: not present  Code Status: DNR/DNI - discussed with the patient and her son at the bedside     Disposition Plan   Expected discharge: admit to ICU, recommended to prior living arrangement once cardiac workup complete.  Entered: Cheryl Godoy MD 03/02/2020, 3:43 PM     The patient's care was discussed with the Bedside Nurse, Patient and Patient's Family.    I discussed the case with Dr. Espinosa, Intensivist.    Cheryl Godoy MD  [facility]    History of Present Illness:  Jade Caba is a 86 year old female with PMH including coronary artery disease (multivessel PCI, and NSTEMI in 7/2019), hypertension, hyperlipidemia, depression, degenerative joint disease and history of subarachnoid hemorrhage in 2014 after a fall who was admitted on 3/2/2020 with chest pain which has been improving with nitroglycerin.  Patient was started on nitroglycerin drip and admitted to the ICU for further cardiac work-up given concern of possible ACS.  History was obtained through patient interview, discussion with patient's son, chart review and discussion with Dr. Munson in the ER.    The patient has extensive cardiovascular history.  She has coronary artery disease and has undergone multivessel PCI. Initially she had angioplasty to the circumflex in 1997.  She subsequently had drug-eluting stent placement to the LAD in 2004, drug-eluting stent placement to the  "circumflex and RCA in 2011 and subsequent recurrent symptoms with drug-eluting stent placement to the mid LAD, also in 2011.  She had non-STEMI in 02/2018 at which time she was noted to have a thrombotic lesion in the mid RCA which was treated with drug-eluting stent placement.  Unfortunately, there guidewire trauma in the proximal RCA and she had stent placement to this area as well.  Additionally, she was found to have significant in-stent restenosis in the LAD and underwent repeat drug-eluting stent placement there too. In 07/2019, the patient presented to [facility] with chest discomfort.  She did undergo a repeat coronary angiogram which showed no significant change since 2018.  No PCI was performed.      The patient tells me that she woke up this morning feeling fine.  She did eat breakfast and afterwards she \"did not feel the best\".  She has a difficult time telling me exactly why she did not feel well but states she felt \"lazy\" and that her arthritis pain has \"been getting me down\".  At approximately 12:30 PM she was watching TV and working on a Enomaly.  She developed sudden onset central chest pain.  She describes it as a stabbing type pain like a knife in her chest.  She points to the mid sternum.  She does not recall feeling short of breath, diaphoretic or having emesis.  She did feel nauseous.  The pain persisted and she walked to the nurses station.  They took her blood pressure and had her sit down and gave her an aspirin.  When she walked from her apartment to the nursing station the chest pain did not change in intensity.  She does note that with rest and the aspirin the sharp pain change to a pressure type pain.    The pressure type pain persisted when EMS arrived and was present when she arrived in the emergency room.  She did receive nitroglycerin and feels that the pressure type pain did decrease in intensity.  It had been as severe as 9 out of 10 but currently she states it is 4 out of " "10.  She is in the process of being started on a nitroglycerin drip.  She denies recent illness.  She has not had fevers, chills, abdominal pain, diarrhea, lower extremity edema.    She has a hard time telling me exactly what type of chest pain she has had with her prior cardiac events.  She does state that the pain she is experiencing right now feels similar to her prior cardiac event but this time it is not as intense.    I did review her H&P from 7/2019 at which time she was admitted for an end STEMI.  At that time she had 2 to 3 weeks of progressive neck, shoulder, bilateral arm and chest pain.  She actually did complain that she had her \"stent pain back\".  It had progressed to center chest pain which she described as a burning type sensation with intermittent diaphoresis as well.  At that time she also had dyspnea on exertion.    She also has a history of CVA and prior right carotid endarterectomy in 2003 and left carotid endarterectomy in 2007.  She has known renal artery stenosis.  She has previously been noted to have a PFO.  She has hypertension, hyperlipidemia and a prior subarachnoid hemorrhage following a mechanical fall.      Past Medical History:  Past Medical History:   Diagnosis Date     Anxiety      Arthritis      Atrial flutter (H)      C1 cervical fracture (H)      CAD (coronary artery disease)     cath in 1/2018: Angioplasty and stenting of 90% mid RCA stenosis, 50% right ostial coronary stenosis, and ostial LAD stenosis.  Moderate disease throughout the mid LAD and 30 to 50% range.     Carotid artery stenosis      CKD (chronic kidney disease)      Depression      Diastolic heart failure (H)     Most recent echo 1/2018: EF 55%, inferior regional wall motion abnormality, increased left ventricular end-diastolic pressure.     H/O alcohol abuse      Hypercholesterolemia      Hypertension      Renal artery stenosis (H)      Seizure disorder (H)      Subarachnoid hemorrhage (H) 2014     Past Surgical " "History:  Past Surgical History:   Procedure Laterality Date     APPENDECTOMY       CAROTID ENDARTERECTOMY Left      Cataract surgery Right     will have laser eye surgery     CHOLECYSTECTOMY       Coronary stents x 3       CV CORONARY ANGIOGRAM N/A 7/19/2019    Procedure: Coronary Angiogram;  Surgeon: Pablito Emerson MD;  Location:  [facility]     HYSTERECTOMY      at age of 40-not cancerus or precancerous      JOINT REPLACEMENT Bilateral      Stenting of renal artery       Social History:  Social History     Tobacco Use     Smoking status: Never Smoker     Smokeless tobacco: Never Used   Substance Use Topics     Alcohol use: No     Social History     Social History Narrative     Not on file     Family History:  Family History   Problem Relation Age of Onset     C.A.D. Mother      C.A.D. Brother      C.A.D. Sister      C.A.D. Brother      Cancer Sister         Breast cancer     Heart Disease Son      Allergies:  Allergies   Allergen Reactions     No Known Allergies      Medications:  (Not in a hospital admission)    Review of Systems:  A Comprehensive greater than 10 system review of systems was carried out.  Pertinent positives and negatives are noted above.  Otherwise negative for contributory information.     Physical Exam:  Blood pressure 139/78, pulse 71, temperature 98.1  F (36.7  C), temperature source Oral, resp. rate 13, height 1.575 m (5' 2\"), weight 81.6 kg (180 lb), SpO2 93 %, not currently breastfeeding.  Wt Readings from Last 1 Encounters:   03/02/20 81.6 kg (180 lb)     Exam:   General: Alert, awake, no acute distress.  HEENT: NC/AT, eyes anicteric and without injection, EOMI, face symmetric.  Dentition WNL, MMM.  Cardiac: RRR, normal S1, S2.  No murmurs/g/r.  No LE edema  Pulmonary: Normal chest rise, normal work of breathing.  Lungs CTAB wihtout crackles or wheezing  Abdomen: soft, non-tender, non-distended.  Normoactive BS.  No guarding or rebound tenderness.  Extremities: no " deformities.  Warm, well perfused.  Skin: no rashes or lesions noted.  Warm and Dry.  Neuro: No focal deficits noted.  Speech clear.  Coordination and strength grossly normal.  Psych: Appropriate affect. Alert and oriented x3    Data Reviewed Today:  EKG: NSR, no acute ischemic changes  Imaging:  Results for orders placed or performed during the hospital encounter of 03/02/20   XR Chest 2 Views    Narrative    X-RAY CHEST 2 VIEW.    INDICATION: Chest pain.    COMPARISON: Chest CT 10/2/2019.      Impression    IMPRESSION: Slight atelectasis in the left lung base. Clear right  lung. No pleural effusion. Normal heart size and pulmonary  vascularity. Aortic calcifications. Mild compression deformity of a  lower thoracic vertebral body    JOHNNIE CARR MD     Labs:  Recent Labs   Lab 03/02/20  1337   WBC 7.4   HGB 13.3   HCT 41.3   MCV 99        Recent Labs   Lab 03/02/20  1337      POTASSIUM 4.1   CHLORIDE 104   CO2 28   ANIONGAP 5   *   BUN 21   CR 0.95   GFRESTIMATED 54*   GFRESTBLACK 62   FRANCISCO 9.2     Recent Labs   Lab 03/02/20  1337   TROPI <0.015       Cheryl Godoy MD  Hospitalist  [facility]     Evaluation and management time exclusive of procedures was 45 minutes critical care time including: urgent examination and evaluation of the patient, discussion of the patient's condition with other physicians and members of the care team, reviewing data and chart related to the patient, discussion of patient's condition with the family and time utilizing the EMR for documentation of this patient's care.

## 2020-03-02 NOTE — ED TRIAGE NOTES
Pt presents to ED with c/o chest pain that started at approx 9 AM. Reports sharp stabbing pain with cough, but constant pressure. Pressure relieved after 2 SL nitroglycerin 324 MG asa given. Hx of multiple stents and 7 MIs.

## 2020-03-03 ENCOUNTER — PATIENT OUTREACH (OUTPATIENT)
Dept: GERIATRIC MEDICINE | Facility: CLINIC | Age: 85
End: 2020-03-03

## 2020-03-03 ENCOUNTER — APPOINTMENT (OUTPATIENT)
Dept: CARDIOLOGY | Facility: CLINIC | Age: 85
DRG: 313 | End: 2020-03-03
Attending: INTERNAL MEDICINE
Payer: COMMERCIAL

## 2020-03-03 VITALS
DIASTOLIC BLOOD PRESSURE: 70 MMHG | TEMPERATURE: 97.9 F | HEART RATE: 75 BPM | SYSTOLIC BLOOD PRESSURE: 144 MMHG | BODY MASS INDEX: 33.59 KG/M2 | HEIGHT: 62 IN | WEIGHT: 182.54 LBS | OXYGEN SATURATION: 96 % | RESPIRATION RATE: 37 BRPM

## 2020-03-03 LAB
ANION GAP SERPL CALCULATED.3IONS-SCNC: 4 MMOL/L (ref 3–14)
BUN SERPL-MCNC: 17 MG/DL (ref 7–30)
CALCIUM SERPL-MCNC: 8.7 MG/DL (ref 8.5–10.1)
CHLORIDE SERPL-SCNC: 106 MMOL/L (ref 94–109)
CO2 SERPL-SCNC: 29 MMOL/L (ref 20–32)
CREAT SERPL-MCNC: 0.91 MG/DL (ref 0.52–1.04)
ERYTHROCYTE [DISTWIDTH] IN BLOOD BY AUTOMATED COUNT: 11.6 % (ref 10–15)
GFR SERPL CREATININE-BSD FRML MDRD: 57 ML/MIN/{1.73_M2}
GLUCOSE BLDC GLUCOMTR-MCNC: 100 MG/DL (ref 70–99)
GLUCOSE BLDC GLUCOMTR-MCNC: 125 MG/DL (ref 70–99)
GLUCOSE BLDC GLUCOMTR-MCNC: 94 MG/DL (ref 70–99)
GLUCOSE BLDC GLUCOMTR-MCNC: 98 MG/DL (ref 70–99)
GLUCOSE SERPL-MCNC: 103 MG/DL (ref 70–99)
HCT VFR BLD AUTO: 40.8 % (ref 35–47)
HGB BLD-MCNC: 13.1 G/DL (ref 11.7–15.7)
INTERPRETATION ECG - MUSE: NORMAL
LMWH PPP CHRO-ACNC: 0.21 IU/ML
MCH RBC QN AUTO: 31.2 PG (ref 26.5–33)
MCHC RBC AUTO-ENTMCNC: 32.1 G/DL (ref 31.5–36.5)
MCV RBC AUTO: 97 FL (ref 78–100)
PLATELET # BLD AUTO: 172 10E9/L (ref 150–450)
POTASSIUM SERPL-SCNC: 4.1 MMOL/L (ref 3.4–5.3)
RBC # BLD AUTO: 4.2 10E12/L (ref 3.8–5.2)
SODIUM SERPL-SCNC: 139 MMOL/L (ref 133–144)
TROPONIN I SERPL-MCNC: <0.015 UG/L (ref 0–0.04)
WBC # BLD AUTO: 7.9 10E9/L (ref 4–11)

## 2020-03-03 PROCEDURE — 25000132 ZZH RX MED GY IP 250 OP 250 PS 637: Performed by: INTERNAL MEDICINE

## 2020-03-03 PROCEDURE — 84484 ASSAY OF TROPONIN QUANT: CPT | Performed by: INTERNAL MEDICINE

## 2020-03-03 PROCEDURE — 99222 1ST HOSP IP/OBS MODERATE 55: CPT | Performed by: NURSE PRACTITIONER

## 2020-03-03 PROCEDURE — 99221 1ST HOSP IP/OBS SF/LOW 40: CPT | Mod: 25 | Performed by: INTERNAL MEDICINE

## 2020-03-03 PROCEDURE — 85027 COMPLETE CBC AUTOMATED: CPT | Performed by: INTERNAL MEDICINE

## 2020-03-03 PROCEDURE — 93306 TTE W/DOPPLER COMPLETE: CPT | Mod: 26 | Performed by: INTERNAL MEDICINE

## 2020-03-03 PROCEDURE — 80048 BASIC METABOLIC PNL TOTAL CA: CPT | Performed by: INTERNAL MEDICINE

## 2020-03-03 PROCEDURE — 36415 COLL VENOUS BLD VENIPUNCTURE: CPT | Performed by: INTERNAL MEDICINE

## 2020-03-03 PROCEDURE — 85520 HEPARIN ASSAY: CPT | Performed by: INTERNAL MEDICINE

## 2020-03-03 PROCEDURE — 93306 TTE W/DOPPLER COMPLETE: CPT

## 2020-03-03 PROCEDURE — 99238 HOSP IP/OBS DSCHRG MGMT 30/<: CPT | Performed by: INTERNAL MEDICINE

## 2020-03-03 PROCEDURE — 00000146 ZZHCL STATISTIC GLUCOSE BY METER IP

## 2020-03-03 RX ORDER — FUROSEMIDE 20 MG
20 TABLET ORAL DAILY
Status: DISCONTINUED | OUTPATIENT
Start: 2020-03-03 | End: 2020-03-03 | Stop reason: HOSPADM

## 2020-03-03 RX ADMIN — DEXTRAN 70 AND HYPROMELLOSE 2910 1 DROP: 1; 3 SOLUTION/ DROPS OPHTHALMIC at 08:39

## 2020-03-03 RX ADMIN — FUROSEMIDE 20 MG: 20 TABLET ORAL at 09:44

## 2020-03-03 RX ADMIN — OXYBUTYNIN CHLORIDE 5 MG: 5 TABLET ORAL at 08:37

## 2020-03-03 RX ADMIN — ASPIRIN 81 MG: 81 TABLET, COATED ORAL at 08:38

## 2020-03-03 RX ADMIN — LEVETIRACETAM 500 MG: 500 TABLET, FILM COATED ORAL at 08:38

## 2020-03-03 RX ADMIN — CITALOPRAM HYDROBROMIDE 30 MG: 20 TABLET ORAL at 08:37

## 2020-03-03 RX ADMIN — MIRTAZAPINE 15 MG: 15 TABLET, FILM COATED ORAL at 08:37

## 2020-03-03 RX ADMIN — CARVEDILOL 12.5 MG: 12.5 TABLET, FILM COATED ORAL at 08:41

## 2020-03-03 RX ADMIN — OMEPRAZOLE 40 MG: 20 CAPSULE, DELAYED RELEASE ORAL at 08:36

## 2020-03-03 RX ADMIN — LOSARTAN POTASSIUM 100 MG: 100 TABLET, FILM COATED ORAL at 08:41

## 2020-03-03 ASSESSMENT — ACTIVITIES OF DAILY LIVING (ADL)
ADLS_ACUITY_SCORE: 21

## 2020-03-03 ASSESSMENT — MIFFLIN-ST. JEOR: SCORE: 1221.25

## 2020-03-03 NOTE — PROGRESS NOTES
[facility] [place] Care Coordination Contact    TRANSITIONS OF CARE (SHEYLA) LOG   SHEYLA tasks should be completed by the CC within one (1) business day of notification of each transition. Follow up contact with member is required after return to their usual care setting.  Note:  If CC finds out about the transitions fifteen (15) days or more after the member has returned to their usual care setting, no SHEYLA log is needed. However, the CC should check in with the member to discuss the transition process, any changes needed to the care plan and document it in a case note.    Member Name:  Jade Caba [facility] Name:  [facility]/Health Plan Member ID#: [id number]   [place] [facility] [place] Coordinator Contact:  Holly Guajardo RN Agency/County/Care System: [facility] [place]   Transition Communication Actions from Care Management Contact   Transition #1   Notification Date: 3/3/2020 Transition Date:   3/20/2020 Transition From: Assisted Living, [facility]     Is this the member s usual care setting?               yes Transition To: [facility]    CC received notification of Hospital admission,  with Dx of Chest pain, hx of CAD  CC contacted Hospital /discharge plannerIdalmis  [phone]  and left a message with this CC contact information, reviewed community POC as well requested to be notified of concerns, care conferences and discharge planning.  Reviewed and update care plan as needed.  Notified community service providers and placed services Assisted Living on hold as needed.  Transition log initiated.   PCP notified of hospitalization via EMR 3/2/2020  Holly Guajardo RN, BC  Manager [facility] [place] Care Coordinator   [phone]  (Fax)         Transition #2   Transition #3  (if applicable)   Notification Date: 3/4/2020         Transition To:  Assisted Living[facility], member's home  Transition Date: 3/3/2020     Transition Type:    Planned.   CC contacted member and  reviewed discharge summary.  Member has a follow-up appointment with PCP in 7 days: Yes: scheduled on member seen by on site provider earlier today.   Member has had a change in condition: No  Home visit needed: No  Care plan reviewed and updated.  The following home based services Assisted Living were resumed.  New referrals placed: No  Transition log completed.   PCP notified of transition back to home via EMR.  Holly Guajardo RN, BC  Manager [facility] Care Coordinator   [phone]  (Fax)         Notification Date:          Transition To:    Transition Date:              Transition Type:      Notified PCP--Date completed:          Shared CC contact info, care plan/services with receiving setting or, if applicable, home care agency--Date completed:       *Complete additional tasks below, if this transition is a return to usual care setting.      Comments:       *Complete tasks below when the member is discharging TO their usual care setting within one (1) business day of notification.  For situations where the Care Coordinator is notified of the discharge prior to the date of discharge, the Care Coordinator must follow up with the member or designated representative to confirm that discharge actually occurred and discuss required SHEYLA tasks as outlined in the SHEYLA Instructions.  (This includes situations where it may be a  new  usual care setting for the member. (i.e., a community member who decides upon permanent nursing home placement following hospitalization and rehab).    Date completed: 3/4/2020  Communicated with member or their designated representative about the following:  care transition process; about changes to the member s health status; plan of care updates; education about transitions and how to prevent unplanned transitions/readmissions  Four Pillars for Optimal Transition:    Check  Yes  - if the member, family member and/or SNF/facility staff manages the following:    If  No   provide explanation in the comments section.          [x]  Yes     []  No     Does the member have a follow-up appointment scheduled with primary care or specialist? (Mental health hospitalizations--the appt. should be w/in 7 days)   [x]  Yes     []  No     Can the member manage their medications or is there a system in place to manage medications (e.g. home care set-up)?         [x]  Yes     []  No     Can the member verbalize warning signs and symptoms to watch for and how to respond?         [x]  Yes     []  No     Does the member use a Personal Health Care Record?  Check  Yes  if visit summary, discharge summary, and/or healthcare summary are being used as a PHR.                                                                                                                                                                                    [] Yes      [x] No      Have you updated the member s care plan?  If  No  provide explanation in comments.   Comments:

## 2020-03-03 NOTE — CONSULTS
[facility]  Pain Service Consultation   Text Page    Date of Admission:  3/2/2020    Assessment & Plan   Jade Caba is a 86 year old female who was admitted on 3/2/2020. I was asked by Dr. Liang Lane to see the patient for chronic pain management.    1)  Acute chest pain, deemed non-cardiac in etiology by cardiac work up.  History of chronic knee pain due to degenerative joint disease and subacute neck/shoulder pain that has been going on 'for months'.    2)  Patient with chronic knee pain, patient is NOT on chronic opiates.    Baseline 0mg Daily Morphine Equivalent as reported daily use.  Patient has no expected opioid tolerance.     Patient's opioid use in past 24 hours: 0 = 0mg Daily Morphine Equivalent    3)  Risk factors for opioid related harms  - Age > 65 years old  -Anxiety/depression    4)  Opioid induced side-effects:  -not applicable, no opiates prescribed at this time.    5)  Other/Related:    -Deconditioning - patient states she has limited mobility at baseline.  Uses walker, sits most of the day.    PLAN:   1)  Educated patient and son about multimodal pain management plan of care.   2)  Educated about risks/benefits of escalating pain management plan to include opiates, determined to not be appropriate for patient at this time due to mild/moderate pain reports.   3)Non-opioid multimodal medication therapy  -Topical:Voltaren 1% Topical Gel four times daily prn, Aspercreme gel four times daily to neck/shoulders and knee.  -N-SAIDS:Avoid due to CAD, h/o MI.  -Muscle Relaxants:None indicated  -Adjuvants:Acetaminophen 650 mg every 4 hours prn, Gabapentin 100 mg at bedtime  -Antidepresants/anxiolytics:Citalopram 30 mg daily , Mirtazepine 15 mg two times daily.  4)  Non-medication interventions  Heating pad PRN, Relaxation, Distraction with activities such as television, cross word puzzles, social events, Essential oils per nursing, would consider massage therapy of shoulders to assist  with muscle relaxation.  5)  Opioids: Not indicated for this patient's current pain.  No reduction in daily activities or participation in ADLs at nursing facility.    6)  Constipation Prophylaxis   Senna-S 2 tablets daily at bedtime.  7)  Pain Education  - reviewed multimodal pain management plan with patient and her son.  8)  DC Planning   Continued outpatient management of pain per Primary provider team at [facility].  Disposition: LTC  Support systems: son and facility staff.  Outpatient Referrals: none.    Time Spent on this Encounter   Total unit/floor time 50 minutes, time consisted of the following, examination of the patient, reviewing the record and completing documentation. >50% of time spent in counseling and coordination of care.  Time spend counseling with patient and family consisted of the following topics, care planning for discharge and symptom management.  Time spent in coordination of care with Bedside Nurse Gisela and Hospitalist Dr. Kelly.     Sheela PITTMAN, CNP  Pain Management and Palliative Care  [facility]  Pgr: [phone]      Reason for Consult   Reason for consult: I was asked by Dr. Kelly to evaluate this patient for pain management.    Primary Care Physician   Primary Care Physician:Jaden Marinelli  Pain Specialist: N/A    Chief Complaint   Chest pain, non-cardiac.  Shoulder/neck pain, subacute.     History is obtained from the patient and electronic health record    History of Present Illness   Jade Caba is a 86 year old female with history of coronary artery disease, s/p NSTEMI and stent placement in July, 2019, hypertension, hyperlipidemia, depression, degenerative joint disease, remote history of traumatic subarachnoid hemorrhage in 2014.  She presented on 3/2/2020 with chest pain that was responsive to nitroglycerin and aspirin at her LTC facility.  Cardiac work up negative for acute coronary event.  Pain improved today on assessment.   Patient has complaint of neck and shoulder pain that limits motion of her neck from side to side.  She states it came on out of nowhere and could not recall an inciting event.  She denies change in functional status due to pain.  States she does not get relief from her pain with prn tylenol.  Has not used other medications such as her aspercreme to treat her neck pain.    CURRENT PAIN:  Her pain is located in the neck/shoulders bilaterally  It is described as Aching, Burning and Tender  She rates it as ranging between 6/10 and 8/10  The average is 6/10 on a scale of 0-10  Currently it is rated as 6/10  It improves by: nothing has improved the pain over the past few months.  It worsens by: no exacerbating factors that patient could recall.  She has been compliant with the recommendations while in the hospital.      PAIN HISTORY:  Historical pain in left knee, due to DJD. Subacute pain in neck/shoulders that has not been treated with prescriptions.  See above for assessment.      PAST PAIN TREATMENT:   Medications: Aspercreme (to left knee, effective), voltaren gel (not as effective), tylenol (not effective).  Non-phamacologic modalities:none tried.  Previous interventions/surgeries:none.    [facility] database review: no findings of opiate prescriptions in past 12 months.      Past Medical History   I have reviewed this patient's medical history and updated it with pertinent information if needed.   Past Medical History:   Diagnosis Date     Anxiety      Arthritis      Atrial flutter (H)      C1 cervical fracture (H)      CAD (coronary artery disease)     cath in 1/2018: Angioplasty and stenting of 90% mid RCA stenosis, 50% right ostial coronary stenosis, and ostial LAD stenosis.  Moderate disease throughout the mid LAD and 30 to 50% range.     Carotid artery stenosis      CKD (chronic kidney disease)      Depression      Diastolic heart failure (H)     Most recent echo 1/2018: EF 55%, inferior regional wall motion  abnormality, increased left ventricular end-diastolic pressure.     H/O alcohol abuse      Hypercholesterolemia      Hypertension      Renal artery stenosis (H)      Seizure disorder (H)      Subarachnoid hemorrhage (H) 2014       Past Surgical History   I have reviewed this patient's surgical history and updated it with pertinent information if needed.  Past Surgical History:   Procedure Laterality Date     APPENDECTOMY       CAROTID ENDARTERECTOMY Left      Cataract surgery Right     will have laser eye surgery     CHOLECYSTECTOMY       Coronary stents x 3       CV CORONARY ANGIOGRAM N/A 7/19/2019    Procedure: Coronary Angiogram;  Surgeon: Pablito Emerson MD;  Location:  [facility]     HYSTERECTOMY      at age of 40-not cancerus or precancerous      JOINT REPLACEMENT Bilateral      Stenting of renal artery           Prior to Admission Medications   Prior to Admission Medications   Prescriptions Last Dose Informant Patient Reported? Taking?   Acetaminophen (TYLENOL PO)   Yes Yes   Sig: Take 1,000 mg by mouth 3 times daily    Mirtazapine (REMERON PO)   Yes Yes   Sig: Take 15 mg by mouth 2 times daily   Multiple Vitamins-Minerals (CERTAVITE SENIOR/ANTIOXIDANT PO)   Yes Yes   Sig: Take 1 tablet by mouth daily    amLODIPine (NORVASC) 10 MG tablet   Yes Yes   Sig: Take 10 mg by mouth At Bedtime   aspirin (ASA) 81 MG EC tablet   No Yes   Sig: Take 1 tablet (81 mg) by mouth daily   bisacodyl (DULCOLAX) 10 MG suppository  at PRN  Yes Yes   Sig: Place 10 mg rectally daily as needed   calcium carbonate 500 mg, elemental, (OSCAL;OYSTER SHELL CALCIUM) 500 MG tablet   Yes Yes   Sig: Take 2 tablets by mouth every evening    carvedilol (COREG) 12.5 MG tablet   No Yes   Sig: Take 1 tablet (12.5 mg) by mouth 2 times daily (with meals)   cholecalciferol (VITAMIN D3) 5000 UNITS CAPS capsule   Yes Yes   Sig: Take 5,000 Units by mouth daily   citalopram (CELEXA) 10 MG tablet   Yes Yes   Sig: Take 30 mg by mouth  daily   diclofenac (VOLTAREN) 1 % topical gel  at PRN  Yes Yes   Sig: Apply 2 g topically 2 times daily as needed LEFT KNEE    folic acid (FOLVITE) 1 MG tablet   Yes Yes   Sig: Take 1 mg by mouth daily   furosemide (LASIX) 20 MG tablet   Yes Yes   Sig: Take 20 mg by mouth daily   hypromellose (ARTIFICIAL TEARS) 0.5 % SOLN ophthalmic solution   Yes Yes   Sig: Place 1 drop into both eyes 3 times daily    levETIRAcetam (KEPPRA) 500 MG tablet   Yes Yes   Sig: Take 500 mg by mouth 2 times daily   losartan (COZAAR) 100 MG tablet   Yes Yes   Sig: Take 100 mg by mouth daily   metoclopramide (REGLAN) 5 MG tablet   Yes Yes   Sig: Take 5 mg by mouth daily as needed (at noon)    omeprazole (PRILOSEC) 40 MG capsule   No Yes   Sig: Take 1 capsule (40 mg) by mouth daily Take 30-60 minutes before a meal.   oxybutynin (DITROPAN) 5 MG tablet   Yes Yes   Sig: Take 5 mg by mouth 2 times daily   polyethylene glycol (MIRALAX/GLYCOLAX) powder  at PRN  Yes Yes   Sig: Take 17 g by mouth nightly as needed    rosuvastatin (CRESTOR) 40 MG tablet   Yes Yes   Sig: Take 40 mg by mouth At Bedtime    senna-docusate (SENNA S) 8.6-50 MG per tablet   Yes Yes   Sig: Take 2 tablets by mouth At Bedtime Please hold if having loose stools and contact nurse.   trolamine salicylate (ASPERCREME) 10 % external cream  at PRN  Yes Yes   Sig: Apply topically as needed for moderate pain (apply twice daily as needed for pain. Left knee) AND BID SCHEDULED      Facility-Administered Medications: None     Allergies   Allergies   Allergen Reactions     No Known Allergies        Social History   I have reviewed this patient's social history and updated it with pertinent information if needed. Jade Caba  reports that she has never smoked. She has never used smokeless tobacco. She reports that she does not drink alcohol or use drugs.    Family History   I have reviewed this patient's family history and updated it with pertinent information if needed.   Family History    Problem Relation Age of Onset     [email or web address]. Mother      MICHAEL. Brother      MICHAEL. Sister      MICHAEL. Brother      Cancer Sister         Breast cancer     Heart Disease Son      Family history of addiction: none reported.    Review of Systems   The 10 point Review of Systems is negative other than noted in the HPI or here.    Denies Bowel or bladder dysfunction    Physical Exam   Temp:  [97.9  F (36.6  C)-98.2  F (36.8  C)] 97.9  F (36.6  C)  Pulse:  [57-77] 69  Heart Rate:  [56-77] 65  Resp:  [6-32] 12  BP: (119-176)/() 151/103  SpO2:  [87 %-97 %] 96 %  182 lbs 8.65 oz  GEN:  Alert, oriented x 3, appears comfortable, No apparent distress.  HEENT:  Normocephalic/atraumatic, no scleral icterus, no nasal discharge, mouth moist.  CV:  RRR.  +3 DP/PT pulses bilaterally; no edema bilateral lower extremeties.  RESP:  Symmetric chest rise on inhalation noted.  Normal respiratory effort.  ABD:  Rounded, soft, non-tender/non-distended.  +BS  EXT:  Edema & pulses as noted above.  Color, moisture and sensation intact x 4.     M/S:   Tender to palpation lateral/posterior cervical spine, trapezius muscles bilaterally.    SKIN:  Dry to touch, no exanthems noted in the visualized areas.    NEURO: Symmetric strength +5/5.  Sensation to touch intact all extremities.   There is no area of allodynia or hyperesthesia.  PAIN BEHAVIOR: Cooperative  Psych:  Normal affect.  Calm, cooperative, conversant appropriately.       Data   Most Recent 3 CBC's:  Recent Labs   Lab Test 03/03/20  0601 03/02/20  1337 07/26/19   WBC 7.9 7.4 7.3   HGB 13.1 13.3 12.1   MCV 97 99 97    192 158     Most Recent 3 BMP's:  Recent Labs   Lab Test 03/03/20  0601 03/02/20  1337 01/06/20    137 138   POTASSIUM 4.1 4.1 4.6   CHLORIDE 106 104 103   CO2 29 28 29   BUN 17 21 19   CR 0.91 0.95 1.11*   ANIONGAP 4 5 6   FRANCISCO 8.7 9.2 8.9   * 111* 103     Most Recent 2 LFT's:  Recent Labs   Lab Test 02/12/19  0850 10/09/18  03/12/18  0850  01/31/18  1050   AST  --  23  --  14 34   ALT 18 11   < > 17 21   ALKPHOS  --   --   --  54 55   BILITOTAL  --   --   --  0.7 0.8    < > = values in this interval not displayed.

## 2020-03-03 NOTE — PHARMACY-ADMISSION MEDICATION HISTORY
Admission medication history interview status for this patient is complete. See [facility] admission navigator for allergy information, prior to admission medications and immunization status.     Medication history interview source(s):Caregiver ([facility] [phone])  Medication history resources (including written lists, pill bottles, clinic record):Med list  Primary pharmacy: Unknown    Changes made to PTA medication list:  Added: none  Deleted: none  Changed: none    Actions taken by pharmacist (provider contacted, etc):None     Additional medication history information:None    Medication reconciliation/reorder completed by provider prior to medication history?  No    Do you take OTC medications (eg tylenol, ibuprofen, fish oil, eye/ear drops, etc)? Yes     For patients on insulin therapy: No    Prior to Admission medications    Medication Sig Last Dose Taking? Auth Provider   Acetaminophen (TYLENOL PO) Take 1,000 mg by mouth 3 times daily   Yes Reported, Patient   amLODIPine (NORVASC) 10 MG tablet Take 10 mg by mouth At Bedtime  Yes Unknown, Entered By History   aspirin (ASA) 81 MG EC tablet Take 1 tablet (81 mg) by mouth daily  Yes Dean Sorensen MD   bisacodyl (DULCOLAX) 10 MG suppository Place 10 mg rectally daily as needed  at PRN Yes Luz Ayers S, APRN CNP   calcium carbonate 500 mg, elemental, (OSCAL;OYSTER SHELL CALCIUM) 500 MG tablet Take 2 tablets by mouth every evening   Yes Reported, Patient   carvedilol (COREG) 12.5 MG tablet Take 1 tablet (12.5 mg) by mouth 2 times daily (with meals)  Yes Ken Chin DO   cholecalciferol (VITAMIN D3) 5000 UNITS CAPS capsule Take 5,000 Units by mouth daily  Yes Reported, Patient   citalopram (CELEXA) 10 MG tablet Take 30 mg by mouth daily  Yes Unknown, Entered By History   diclofenac (VOLTAREN) 1 % topical gel Apply 2 g topically 2 times daily as needed LEFT KNEE   at PRN Yes Reported, Patient   folic acid (FOLVITE) 1 MG tablet Take 1  mg by mouth daily  Yes Reported, Patient   furosemide (LASIX) 20 MG tablet Take 20 mg by mouth daily  Yes Reported, Patient   hypromellose (ARTIFICIAL TEARS) 0.5 % SOLN ophthalmic solution Place 1 drop into both eyes 3 times daily   Yes Reported, Patient   levETIRAcetam (KEPPRA) 500 MG tablet Take 500 mg by mouth 2 times daily  Yes Reported, Patient   losartan (COZAAR) 100 MG tablet Take 100 mg by mouth daily  Yes Unknown, Entered By History   metoclopramide (REGLAN) 5 MG tablet Take 5 mg by mouth daily as needed (at noon)   Yes Luz Ayers APRN CNP   Mirtazapine (REMERON PO) Take 15 mg by mouth 2 times daily  Yes Reported, Patient   Multiple Vitamins-Minerals (CERTAVITE SENIOR/ANTIOXIDANT PO) Take 1 tablet by mouth daily   Yes Reported, Patient   omeprazole (PRILOSEC) 40 MG capsule Take 1 capsule (40 mg) by mouth daily Take 30-60 minutes before a meal.  Yes Suzi Bailey MD   oxybutynin (DITROPAN) 5 MG tablet Take 5 mg by mouth 2 times daily  Yes Unknown, Entered By History   polyethylene glycol (MIRALAX/GLYCOLAX) powder Take 17 g by mouth nightly as needed   at PRN Yes Unknown, Entered By History   rosuvastatin (CRESTOR) 40 MG tablet Take 40 mg by mouth At Bedtime   Yes Reported, Patient   senna-docusate (SENNA S) 8.6-50 MG per tablet Take 2 tablets by mouth At Bedtime Please hold if having loose stools and contact nurse.  Yes Unknown, Entered By History   trolamine salicylate (ASPERCREME) 10 % external cream Apply topically as needed for moderate pain (apply twice daily as needed for pain. Left knee) AND BID SCHEDULED  at PRN Yes Reported, Patient

## 2020-03-03 NOTE — CONSULTS
[facility]    Cardiology Consultation     Date of Admission:  3/2/2020    Primary Care Physician   Jaden Marinelli     Consult Date:  03/03/2020      REASON FOR CONSULTATION:  Chest pain.      REFERRING PHYSICIAN:  Hospitalist Service.      IMPRESSION:   1.  Noncardiac chest pains.   2.  Chronic coronary artery disease, adequately revascularized.   3.  History of gastroesophageal reflux disease.   4.  Osteoarthritis involving neck and upper spine.   5.  Depression.   6.  History of traumatic subdural hemorrhage.   7.  Hypertension.   8.  Dyslipidemia.      This is a delightful lady whose chest pain is atypical for coronary artery disease.  It is not brought on by exertion and not relieved by rest.  In addition, EKG is unremarkable and troponins are completely negative.  She just had an angiogram in July of last year, which did not show any significant obstructive coronary artery disease.  She does have a history of GERD as well as osteoarthritis of her spine.  I think these are more likely to be the cause of her chest discomfort.  Overall, I think her cardiac condition is stable.  I think she can be discharged from our point of view without further cardiac workup.      She is under regular followup in our clinic.      HISTORY OF PRESENT ILLNESS:  This is a lady with known chronic coronary artery disease.  She has had multiple angiograms with percutaneous revascularization.  She had initial angioplasty of the circumflex in 1997, subsequent drug-eluting stent implantation to the LAD in 2004, drug-eluting stent implantation to the circumflex and RCA in 2011, recurrent PCI with drug-eluting stent to the LAD at the end of 2011 and a non-STEMI in 02/2018 when she had a PCI to a thrombotic lesion of the RCA.  She was also found to have significant in-stent restenosis to the LAD and underwent repeat PCI with drug-eluting stent to the LAD.  In July of last year, she had a repeat coronary angiography  again for recurrent chest pains and on this occasion stents were found to be patent with only mild to moderate nonobstructive disease in the vasculature.      She has been doing well until about a week ago.  She started to have an increased cough.  She always has a lot of mucus.  She would have nonexertional chest discomfort substernally without radiation, lasting for a few seconds to a few minutes.  On the day of admission, she was watching TV when she had a sudden onset of substernal chest discomfort without radiation.  It was not severe.  It lasted for about 15 minutes.  She was given aspirin, which made her pain better.  She was not short of breath and she did not have vomiting, though she did feel nauseous.  The assisted living facility where she resided called EMS and she was admitted from there.        Past Medical History   I have reviewed this patient's medical history and updated it with pertinent information if needed.   Past Medical History:   Diagnosis Date     Anxiety      Arthritis      Atrial flutter (H)      C1 cervical fracture (H)      CAD (coronary artery disease)     cath in 1/2018: Angioplasty and stenting of 90% mid RCA stenosis, 50% right ostial coronary stenosis, and ostial LAD stenosis.  Moderate disease throughout the mid LAD and 30 to 50% range.     Carotid artery stenosis      CKD (chronic kidney disease)      Depression      Diastolic heart failure (H)     Most recent echo 1/2018: EF 55%, inferior regional wall motion abnormality, increased left ventricular end-diastolic pressure.     H/O alcohol abuse      Hypercholesterolemia      Hypertension      Renal artery stenosis (H)      Seizure disorder (H)      Subarachnoid hemorrhage (H) 2014       Past Surgical History   I have reviewed this patient's surgical history and updated it with pertinent information if needed.  Past Surgical History:   Procedure Laterality Date     APPENDECTOMY       CAROTID ENDARTERECTOMY Left      Cataract  surgery Right     will have laser eye surgery     CHOLECYSTECTOMY       Coronary stents x 3       CV CORONARY ANGIOGRAM N/A 7/19/2019    Procedure: Coronary Angiogram;  Surgeon: Pablito Emerson MD;  Location:  [facility]     HYSTERECTOMY      at age of 40-not cancerus or precancerous      JOINT REPLACEMENT Bilateral      Stenting of renal artery         Prior to Admission Medications   Prior to Admission Medications   Prescriptions Last Dose Informant Patient Reported? Taking?   Acetaminophen (TYLENOL PO)   Yes Yes   Sig: Take 1,000 mg by mouth 3 times daily    Mirtazapine (REMERON PO)   Yes Yes   Sig: Take 15 mg by mouth 2 times daily   Multiple Vitamins-Minerals (CERTAVITE SENIOR/ANTIOXIDANT PO)   Yes Yes   Sig: Take 1 tablet by mouth daily    amLODIPine (NORVASC) 10 MG tablet   Yes Yes   Sig: Take 10 mg by mouth At Bedtime   aspirin (ASA) 81 MG EC tablet   No No   Sig: Take 1 tablet (81 mg) by mouth daily   bisacodyl (DULCOLAX) 10 MG suppository  at PRN  Yes Yes   Sig: Place 10 mg rectally daily as needed   calcium carbonate 500 mg, elemental, (OSCAL;OYSTER SHELL CALCIUM) 500 MG tablet   Yes Yes   Sig: Take 2 tablets by mouth every evening    carvedilol (COREG) 12.5 MG tablet   No Yes   Sig: Take 1 tablet (12.5 mg) by mouth 2 times daily (with meals)   cholecalciferol (VITAMIN D3) 5000 UNITS CAPS capsule   Yes Yes   Sig: Take 5,000 Units by mouth daily   citalopram (CELEXA) 10 MG tablet   Yes Yes   Sig: Take 30 mg by mouth daily   diclofenac (VOLTAREN) 1 % topical gel  at PRN  Yes Yes   Sig: Apply 2 g topically 2 times daily as needed LEFT KNEE    folic acid (FOLVITE) 1 MG tablet   Yes Yes   Sig: Take 1 mg by mouth daily   furosemide (LASIX) 20 MG tablet   Yes Yes   Sig: Take 20 mg by mouth daily   hypromellose (ARTIFICIAL TEARS) 0.5 % SOLN ophthalmic solution   Yes Yes   Sig: Place 1 drop into both eyes 3 times daily    levETIRAcetam (KEPPRA) 500 MG tablet   Yes Yes   Sig: Take 500 mg by  mouth 2 times daily   losartan (COZAAR) 100 MG tablet   Yes Yes   Sig: Take 100 mg by mouth daily   metoclopramide (REGLAN) 5 MG tablet   Yes No   Sig: Take 5 mg by mouth daily as needed (at noon)    omeprazole (PRILOSEC) 40 MG capsule   No Yes   Sig: Take 1 capsule (40 mg) by mouth daily Take 30-60 minutes before a meal.   oxybutynin (DITROPAN) 5 MG tablet   Yes Yes   Sig: Take 5 mg by mouth 2 times daily   polyethylene glycol (MIRALAX/GLYCOLAX) powder  at PRN  Yes Yes   Sig: Take 17 g by mouth nightly as needed    rosuvastatin (CRESTOR) 40 MG tablet   Yes Yes   Sig: Take 40 mg by mouth At Bedtime    senna-docusate (SENNA S) 8.6-50 MG per tablet   Yes Yes   Sig: Take 2 tablets by mouth At Bedtime Please hold if having loose stools and contact nurse.   trolamine salicylate (ASPERCREME) 10 % external cream  at PRN  Yes Yes   Sig: Apply topically as needed for moderate pain (apply twice daily as needed for pain. Left knee) AND BID SCHEDULED      Facility-Administered Medications: None     Allergies   Allergies   Allergen Reactions     No Known Allergies        Social History   I have reviewed this patient's social history and updated it with pertinent information if needed. Jade Caba  reports that she has never smoked. She has never used smokeless tobacco. She reports that she does not drink alcohol or use drugs.    Family History   I have reviewed this patient's family history and updated it with pertinent information if needed.   Family History   Problem Relation Age of Onset     C.A.D. Mother      C.A.D. Brother      C.A.D. Sister      C.A.D. Brother      Cancer Sister         Breast cancer     Heart Disease Son        Review of Systems   The 10 point Review of Systems is negative other than noted in the HPI or here.     Physical Exam                    Vital Signs with Ranges  Pulse:  [69] 69  Resp:  [18] 18  BP: (121)/(54) 121/54  SpO2:  [93 %] 93 %  182 lbs 8.65 oz  GENERAL:  A very pleasant lady in no acute  distress.   VITAL SIGNS:  Blood pressure 147/70.  Heart rate is in the 70s.  She is afebrile.   HEENT:  Examination is unremarkable.  Mucous membranes appear normal.  She has no clubbing, no peripheral central cyanosis.   NECK:  No raised JVP and no thyromegaly.   CARDIAC:  Cardiac apex is not palpable.  Heart sounds are normal.   CHEST:  Symmetrical expansion without the use of accessory muscles.  Breath sounds are normal.   ABDOMEN:  Soft and nontender.  No hepatosplenomegaly.  The abdominal aorta is not palpable.   EXTREMITIES:  No significant peripheral edema.  Foot pulses are preserved.   CENTRAL NERVOUS SYSTEM:  Grossly intact.   PSYCHIATRIC:  Mood appears normal.      LABORATORY INVESTIGATIONS:  Troponin x2 completely negative.  CBC normal.        Chest x-ray shows compression deformity of the lower thoracic vertebral body with slight atelectasis left lung base.  Normal heart size and pulmonary vasculature.  Basic metabolic panel within normal limits.        Data   No results found for this or any previous visit (from the past 24 hour(s)).       FELI GALEAS MD, FACC             D: 2020   T: 2020   MT: LAUREN      Name:     LAURO SALTER   MRN:      [id number]        Account:       [id number]   :      1933           Consult Date:  2020      Document: [id number]       cc: Jaden PITTMAN CNP

## 2020-03-03 NOTE — PLAN OF CARE
ICU End of Shift Summary.  For vital signs and complete assessments, please see documentation flowsheets.     Pertinent assessments: AO, no pain, no CP, no SOB. Tele SR. BP elevated. +1 edema BLE. LS diminished at bases. Sats 89-90% on room air. Dry cough that pt reports she has had for several months. Urinates without difficulty. BM this AM per pt.   Major Shift Events: Admit to ICU @ 1800.  No CP @ admission  Plan (Upcoming Events): Titrate Nitroglycerin gtt to CP, HepXa check, Trops q 4hrs  Discharge/Transfer Needs: TBD    Bedside Shift Report Completed : Y  Bedside Safety Check Completed: Y

## 2020-03-03 NOTE — DISCHARGE INSTRUCTIONS
Follow up with Primary Care Provider. Pain Specialist recommends Gabapentin 100 mg at bedtime.  Primary Care Provider to review Pain and Palliative Care notes from inpatient consult and prescribe Gabapentin if deemed appropriate.

## 2020-03-03 NOTE — PLAN OF CARE
ICU End of Shift Summary.  For vital signs and complete assessments, please see documentation flowsheets.     Pertinent assessments: AO, denies pain. No reports of chest pain, sob or nausea. Tele SR, 1*AVB. LS clear, fine crackles to LL base. Lasix given. Urinating without difficulty. BMx1. Tolerating Cardiac diet.    Major Shift Events: ECHO, Cards and Pain consultation  Plan (Upcoming Events): Discharge home to [facility]. Daughter to call to arrange PCP appt for patient.  Discharge/Transfer Needs: VM left for Cielo Ann RN, contact info provided for follow up questions.  Additional copy of discharge instructions provided to pt. Pts daughter will walk discharge info into Seth's RN.    Discharge instructions reviewed, questions answered. Pts belongings were collected and given to pt.

## 2020-03-03 NOTE — DISCHARGE SUMMARY
[facility]  Hospitalist Discharge Summary       Date of Admission:  3/2/2020  Date of Discharge:  3/3/2020  Discharging Provider: Liang Kelly, DO      Discharge Diagnoses   1.  Chest pain.  Initially with concern for possible unstable angina.  She had initially been started on continuous IV heparin and continuous IV nitroglycerin.  Chest pain is resolved by day of discharge.  She was seen in consultation by cardiology.  Serial troponins were unremarkable.  Echocardiogram was done on 3/3 with ejection fraction 60 to 65%.  No regional wall motion abnormalities.  Cardiology did feel that chest pain was not related to a cardiac issue.  Most likely musculoskeletal in nature.  Heparin and nitroglycerin were able to be stopped.  Seen in consultation by pain management team during hospital stay.  Continue pain medications as needed.    2.  Known coronary artery disease.  As noted above, seen in consultation by cardiology during hospital stay.  Echocardiogram reassuring.  Continue aspirin, carvedilol, losartan, and rosuvastatin.    3.  Hypertension.  Continue carvedilol, amlodipine, losartan.  Restart furosemide.    4.  Hyperlipidemia.  Restart rosuvastatin.    5.  GERD.  Continue omeprazole.    6.  Depression.  Continue citalopram.    Follow-ups Needed After Discharge   Follow-up Appointments     Follow-up and recommended labs and tests       Follow up with primary care provider, Jaden Marinelli, within 7   days for hospital follow- up.  The following labs/tests are recommended:   BMP in 7 days.             Discharge Disposition   Discharged to home  Condition at discharge: Stable    Hospital Course   Jade Caba is a 86 year old female with PMH including coronary artery disease (multivessel PCI, and NSTEMI in 7/2019), hypertension, hyperlipidemia, depression, degenerative joint disease and history of subarachnoid hemorrhage in 2014 after a fall who was admitted on 3/2/2020 with chest pain.   Pain was described as starting as a sudden sharp pain in the center of her chest.  Gradually developed into a dull aching pain throughout her chest.  She presented to emergency room for further evaluation.  She did note some improvement with nitroglycerin.  Due to improvement with nitroglycerin, and her extensive history of CAD, there was concern initially for unstable angina.  She was started on continuous heparin drip and continuous nitroglycerin drip.  Placed in the intensive care unit initially.  She did have serial troponins during hospital stay.  These were unremarkable.  She had echocardiogram performed which also was unremarkable.  She was seen in consultation by cardiology.  Cardiology did not feel that chest pain was related to a cardiac etiology.  Most likely musculoskeletal in nature.  She is not having any chest pain on the day of discharge.  She has been restarted on all of her usual cardiac medications.    Consultations This Hospital Stay   PHARMACY TO DOSE HEPARIN  CARDIOLOGY IP CONSULT  PHARMACY TO DOSE HEPARIN  PAIN MANAGEMENT ADULT IP CONSULT    Code Status   DNR/DNI    Time Spent on this Encounter   I spent 25 minutes with Ms. Paris and working on discharge on 3/3/2020.       Liang Kelly, DO  [facility]  ______________________________________________________________________    Physical Exam   Vital Signs: Temp: 97.9  F (36.6  C) Temp src: Oral BP: (!) 144/70 Pulse: 75 Heart Rate: 65 Resp: (!) 37 SpO2: 96 % O2 Device: None (Room air) Oxygen Delivery: 1 LPM  Weight: 182 lbs 8.65 oz  Gen:  NAD, A&Ox3.  Eyes:  PERRL, sclera anicteric.  OP:  MMM, no lesions.  Neck:  Supple.  CV:  Regular, no murmurs.  Lung:  CTA b/l, normal effort.  Ab:  +BS, soft.  Skin:  Warm, dry to touch.  No rash.  Ext:  No pitting edema LE b/l.         Primary Care Physician   Jaden Marinelli    Discharge Orders      Basic metabolic panel     Reason for your hospital stay    Chest pain that was  likely due to musculoskeletal pain     Follow-up and recommended labs and tests     Follow up with primary care provider, Jaden Marinelli, within 7 days for hospital follow- up.  The following labs/tests are recommended: BMP in 7 days.     Activity    Your activity upon discharge: activity as tolerated     Diet    Follow this diet upon discharge: Cardiac         Discharge Medications   Current Discharge Medication List      CONTINUE these medications which have CHANGED    Details   aspirin (ASA) 81 MG EC tablet Take 1 tablet (81 mg) by mouth daily  Qty: 90 tablet, Refills: 3    Associated Diagnoses: CAD in native artery         CONTINUE these medications which have NOT CHANGED    Details   Acetaminophen (TYLENOL PO) Take 1,000 mg by mouth 3 times daily       amLODIPine (NORVASC) 10 MG tablet Take 10 mg by mouth At Bedtime      bisacodyl (DULCOLAX) 10 MG suppository Place 10 mg rectally daily as needed      calcium carbonate 500 mg, elemental, (OSCAL;OYSTER SHELL CALCIUM) 500 MG tablet Take 2 tablets by mouth every evening       carvedilol (COREG) 12.5 MG tablet Take 1 tablet (12.5 mg) by mouth 2 times daily (with meals)  Qty: 60 tablet, Refills: 1    Associated Diagnoses: CHF (congestive heart failure) (H)      cholecalciferol (VITAMIN D3) 5000 UNITS CAPS capsule Take 5,000 Units by mouth daily      citalopram (CELEXA) 10 MG tablet Take 30 mg by mouth daily      diclofenac (VOLTAREN) 1 % topical gel Apply 2 g topically 2 times daily as needed LEFT KNEE       folic acid (FOLVITE) 1 MG tablet Take 1 mg by mouth daily      furosemide (LASIX) 20 MG tablet Take 20 mg by mouth daily      hypromellose (ARTIFICIAL TEARS) 0.5 % SOLN ophthalmic solution Place 1 drop into both eyes 3 times daily       levETIRAcetam (KEPPRA) 500 MG tablet Take 500 mg by mouth 2 times daily      losartan (COZAAR) 100 MG tablet Take 100 mg by mouth daily      Mirtazapine (REMERON PO) Take 15 mg by mouth 2 times daily      Multiple  Vitamins-Minerals (CERTAVITE SENIOR/ANTIOXIDANT PO) Take 1 tablet by mouth daily       omeprazole (PRILOSEC) 40 MG capsule Take 1 capsule (40 mg) by mouth daily Take 30-60 minutes before a meal.  Qty: 30 capsule, Refills: 9    Associated Diagnoses: Abdominal pain, other specified site      oxybutynin (DITROPAN) 5 MG tablet Take 5 mg by mouth 2 times daily      polyethylene glycol (MIRALAX/GLYCOLAX) powder Take 17 g by mouth nightly as needed       rosuvastatin (CRESTOR) 40 MG tablet Take 40 mg by mouth At Bedtime       senna-docusate (SENNA S) 8.6-50 MG per tablet Take 2 tablets by mouth At Bedtime Please hold if having loose stools and contact nurse.      trolamine salicylate (ASPERCREME) 10 % external cream Apply topically as needed for moderate pain (apply twice daily as needed for pain. Left knee) AND BID SCHEDULED         STOP taking these medications       metoclopramide (REGLAN) 5 MG tablet Comments:   Reason for Stopping:             Allergies   Allergies   Allergen Reactions     No Known Allergies

## 2020-03-03 NOTE — PLAN OF CARE
ICU End of Shift Summary.  For vital signs and complete assessments, please see documentation flowsheets.     Pertinent assessments: pt vss through the night, able to titrate nitroglycerin off with no further cp or sob. Pt had good uop and remains on heparin within range.    Major Shift Events: no major events  Plan (Upcoming Events): consider discontinue heparin if appropriate  Discharge/Transfer Needs: tbd    Bedside Shift Report Completed :   Bedside Safety Check Completed:

## 2020-03-04 ENCOUNTER — ASSISTED LIVING VISIT (OUTPATIENT)
Dept: GERIATRICS | Facility: CLINIC | Age: 85
End: 2020-03-04
Payer: COMMERCIAL

## 2020-03-04 VITALS
SYSTOLIC BLOOD PRESSURE: 121 MMHG | HEART RATE: 69 BPM | OXYGEN SATURATION: 93 % | DIASTOLIC BLOOD PRESSURE: 54 MMHG | BODY MASS INDEX: 33.29 KG/M2 | RESPIRATION RATE: 18 BRPM | WEIGHT: 182 LBS

## 2020-03-04 DIAGNOSIS — M54.2 NECK PAIN: ICD-10-CM

## 2020-03-04 DIAGNOSIS — I10 ESSENTIAL HYPERTENSION: ICD-10-CM

## 2020-03-04 DIAGNOSIS — R07.89 OTHER CHEST PAIN: Primary | ICD-10-CM

## 2020-03-04 NOTE — PROGRESS NOTES
[facility]  PRIMARY CARE PROVIDER AND CLINIC:  Jaden Marinelli, APRN CNP, [address] / [place] [address]  Chief Complaint   Patient presents with     Hospital F/U     [place] Medical Record Number:  [id number]  Place of Service where encounter took place:  [facility] ([facility] [[id number]]    Jade Caba  is a 86 year old  (8/26/1933), returned to the above facility from  [facility]. Hospital stay 3/2/20 - 3/3/20. .  Admitted to this facility for  rehab, medical management and nursing care.    HPI:    HPI information obtained from: facility chart records, facility staff, patient report and [facility] chart review.   Brief Summary of Hospital Course:   3/2/20 reported sharp chest pain. Sent to [facility] ED. Started on nitroglycerin, heparin drip. Cardiology consulted. Serial troponins neg.  Echo done. EF 60-65%, no regional wall abnormalities. C.p. thought r/t MSK vs CV issue.     HTN: cont. On norvasc, coreg, losartan, BPs, HRs stable.     Neck pain-ongoing. Has OA, chronic knee pain.  Cont. On tylenol        Updates on Status Since Skilled nursing Admission: VSS. Act. Level per usual. No c.p. since return form ED    CODE STATUS/ADVANCE DIRECTIVES DISCUSSION:   DNR / DNI  Patient's living condition: lives in an assisted living facility  ALLERGIES: No known allergies  PAST MEDICAL HISTORY:  has a past medical history of Anxiety, Arthritis, Atrial flutter (H), C1 cervical fracture (H), CAD (coronary artery disease), Carotid artery stenosis, CKD (chronic kidney disease), Depression, Diastolic heart failure (H), H/O alcohol abuse, Hypercholesterolemia, Hypertension, Renal artery stenosis (H), Seizure disorder (H), and Subarachnoid hemorrhage (H) (2014).  PAST SURGICAL HISTORY:   has a past surgical history that includes carotid endarterectomy (Left); Stenting of renal artery; Coronary stents x 3; Cholecystectomy; appendectomy; joint replacement  (Bilateral); Cataract surgery (Right); Hysterectomy; and Coronary Angiogram (N/A, 7/19/2019).  FAMILY HISTORY: family history includes C.A.D. in her brother, brother, mother, and sister; Cancer in her sister; Heart Disease in her son.  SOCIAL HISTORY:   reports that she has never smoked. She has never used smokeless tobacco. She reports that she does not drink alcohol or use drugs.    Post Discharge Medication Reconciliation Status: discharge medications reconciled, continue medications without change    Current Outpatient Medications   Medication Sig Dispense Refill     Acetaminophen (TYLENOL PO) Take 1,000 mg by mouth 3 times daily        amLODIPine (NORVASC) 10 MG tablet Take 10 mg by mouth At Bedtime       aspirin (ASA) 81 MG EC tablet Take 1 tablet (81 mg) by mouth daily 90 tablet 3     bisacodyl (DULCOLAX) 10 MG suppository Place 10 mg rectally daily as needed       calcium carbonate 500 mg, elemental, (OSCAL;OYSTER SHELL CALCIUM) 500 MG tablet Take 2 tablets by mouth every evening        carvedilol (COREG) 12.5 MG tablet Take 1 tablet (12.5 mg) by mouth 2 times daily (with meals) 60 tablet 1     cholecalciferol (VITAMIN D3) 5000 UNITS CAPS capsule Take 5,000 Units by mouth daily       citalopram (CELEXA) 10 MG tablet Take 30 mg by mouth daily       diclofenac (VOLTAREN) 1 % topical gel Apply 2 g topically 2 times daily as needed LEFT KNEE        folic acid (FOLVITE) 1 MG tablet Take 1 mg by mouth daily       furosemide (LASIX) 20 MG tablet Take 20 mg by mouth daily       hypromellose (ARTIFICIAL TEARS) 0.5 % SOLN ophthalmic solution Place 1 drop into both eyes 3 times daily        levETIRAcetam (KEPPRA) 500 MG tablet Take 500 mg by mouth 2 times daily       losartan (COZAAR) 100 MG tablet Take 100 mg by mouth daily       Mirtazapine (REMERON PO) Take 15 mg by mouth 2 times daily       Multiple Vitamins-Minerals (CERTAVITE SENIOR/ANTIOXIDANT PO) Take 1 tablet by mouth daily        omeprazole (PRILOSEC) 40 MG  capsule Take 1 capsule (40 mg) by mouth daily Take 30-60 minutes before a meal. 30 capsule 9     oxybutynin (DITROPAN) 5 MG tablet Take 5 mg by mouth 2 times daily       polyethylene glycol (MIRALAX/GLYCOLAX) powder Take 17 g by mouth nightly as needed        rosuvastatin (CRESTOR) 40 MG tablet Take 40 mg by mouth At Bedtime        senna-docusate (SENNA S) 8.6-50 MG per tablet Take 2 tablets by mouth At Bedtime Please hold if having loose stools and contact nurse.       trolamine salicylate (ASPERCREME) 10 % external cream Apply topically as needed for moderate pain (apply twice daily as needed for pain. Left knee) AND BID SCHEDULED           ROS:  No chest pain, shortness of breath, fevers, chills, headache, nausea, vomiting, dysuria or bowel abnormalities.  Appetite is  normal.  No pain except occ neck, knees.    Vitals:  /54   Pulse 69   Resp 18   Wt 82.6 kg (182 lb)   SpO2 93%   BMI 33.29 kg/m    Exam:  GENERAL APPEARANCE:  Alert, in no distress, cooperative  ENT:  Mouth and posterior oropharynx normal, moist mucous membranes, [place]  EYES:  EOM, conjunctivae, lids, pupils and irises normal, PERRL  NECK:  No adenopathy,masses or thyromegaly, sl decreased rom rotation of head, neck  RESP:  respiratory effort and palpation of chest normal, lungs clear to auscultation , no respiratory distress  CV:  Palpation and auscultation of heart done , regular rate and rhythm, no murmur, rub, or gallop, no edema  ABDOMEN:  normal bowel sounds, soft, nontender, no hepatosplenomegaly or other masses, no guarding or rebound  M/S:   Gait and station normal  muscle strength 5/5 all 4 ext., normal tone  NEURO:   Cranial nerves 2-12 are normal tested and grossly at patient's baseline, speech clear  PSYCH:  memory impaired , affect and mood normal    Lab/Diagnostic data:    Most Recent 3 CBC's:  Recent Labs   Lab Test 03/03/20  0601 03/02/20  1337 07/26/19   WBC 7.9 7.4 7.3   HGB 13.1 13.3 12.1   MCV 97 99 97    192  158     Most Recent 3 BMP's:  Recent Labs   Lab Test 03/03/20  0601 03/02/20  1337 01/06/20    137 138   POTASSIUM 4.1 4.1 4.6   CHLORIDE 106 104 103   CO2 29 28 29   BUN 17 21 19   CR 0.91 0.95 1.11*   ANIONGAP 4 5 6   FRANCISCO 8.7 9.2 8.9   * 111* 103     Most Recent 3 Troponin's:  Recent Labs   Lab Test 03/03/20  0148 03/02/20  2226 03/02/20  1834   TROPI <0.015 <0.015 <0.015       ASSESSMENT/PLAN:  Other chest pain  Resolved, cardiac w/u neg. In ED  1. Monitor for further c.p.  2. Monitor for changes in resp status  3. F/u with cardiology as sched.    Neck pain  Increased s/s past couple weeks per resident. Has OA. No recent reports of falls, trauma  1. Cont. Tylenol  2. Start bid aspercreme to neck  3. Instructed to start ROM exercises for neck  4. Reassess over next few weeks    Essential hypertension  BP stable  1. Cont. Norvasc, coreg, losartan  2. Follow BPs, HRs  3. Monitor for reports of dizziness  4. Cbc, bmp in  Next 1-3 mos           Electronically signed by:  ZAIN Marcos CNP

## 2020-03-04 NOTE — LETTER
3/4/2020        RE: Jade Caba  [address]  [facility] [address]        [facility]  PRIMARY CARE PROVIDER AND CLINIC:  Jaden Marinelli, ZAIN CNP, [address] / [place] [address]  Chief Complaint   Patient presents with     Hospital F/U     [place] Medical Record Number:  [id number]  Place of Service where encounter took place:  [facility] [[id number]]    Jade Caba  is a 86 year old  (8/26/1933), returned to the above facility from  [facility]. Hospital stay 3/2/20 - 3/3/20. .  Admitted to this facility for  rehab, medical management and nursing care.    HPI:    HPI information obtained from: facility chart records, facility staff, patient report and [facility] chart review.   Brief Summary of Hospital Course:   3/2/20 reported sharp chest pain. Sent to [facility] ED. Started on nitroglycerin, heparin drip. Cardiology consulted. Serial troponins neg.  Echo done. EF 60-65%, no regional wall abnormalities. C.p. thought r/t MSK vs CV issue.     HTN: cont. On norvasc, coreg, losartan, BPs, HRs stable.     Neck pain-ongoing. Has OA, chronic knee pain.  Cont. On tylenol        Updates on Status Since Skilled nursing Admission: VSS. Act. Level per usual. No c.p. since return form ED    CODE STATUS/ADVANCE DIRECTIVES DISCUSSION:   DNR / DNI  Patient's living condition: lives in an assisted living facility  ALLERGIES: No known allergies  PAST MEDICAL HISTORY:  has a past medical history of Anxiety, Arthritis, Atrial flutter (H), C1 cervical fracture (H), CAD (coronary artery disease), Carotid artery stenosis, CKD (chronic kidney disease), Depression, Diastolic heart failure (H), H/O alcohol abuse, Hypercholesterolemia, Hypertension, Renal artery stenosis (H), Seizure disorder (H), and Subarachnoid hemorrhage (H) (2014).  PAST SURGICAL HISTORY:   has a past surgical history that includes carotid endarterectomy (Left); Stenting  of renal artery; Coronary stents x 3; Cholecystectomy; appendectomy; joint replacement (Bilateral); Cataract surgery (Right); Hysterectomy; and Coronary Angiogram (N/A, 7/19/2019).  FAMILY HISTORY: family history includes C.A.D. in her brother, brother, mother, and sister; Cancer in her sister; Heart Disease in her son.  SOCIAL HISTORY:   reports that she has never smoked. She has never used smokeless tobacco. She reports that she does not drink alcohol or use drugs.    Post Discharge Medication Reconciliation Status: discharge medications reconciled, continue medications without change    Current Outpatient Medications   Medication Sig Dispense Refill     Acetaminophen (TYLENOL PO) Take 1,000 mg by mouth 3 times daily        amLODIPine (NORVASC) 10 MG tablet Take 10 mg by mouth At Bedtime       aspirin (ASA) 81 MG EC tablet Take 1 tablet (81 mg) by mouth daily 90 tablet 3     bisacodyl (DULCOLAX) 10 MG suppository Place 10 mg rectally daily as needed       calcium carbonate 500 mg, elemental, (OSCAL;OYSTER SHELL CALCIUM) 500 MG tablet Take 2 tablets by mouth every evening        carvedilol (COREG) 12.5 MG tablet Take 1 tablet (12.5 mg) by mouth 2 times daily (with meals) 60 tablet 1     cholecalciferol (VITAMIN D3) 5000 UNITS CAPS capsule Take 5,000 Units by mouth daily       citalopram (CELEXA) 10 MG tablet Take 30 mg by mouth daily       diclofenac (VOLTAREN) 1 % topical gel Apply 2 g topically 2 times daily as needed LEFT KNEE        folic acid (FOLVITE) 1 MG tablet Take 1 mg by mouth daily       furosemide (LASIX) 20 MG tablet Take 20 mg by mouth daily       hypromellose (ARTIFICIAL TEARS) 0.5 % SOLN ophthalmic solution Place 1 drop into both eyes 3 times daily        levETIRAcetam (KEPPRA) 500 MG tablet Take 500 mg by mouth 2 times daily       losartan (COZAAR) 100 MG tablet Take 100 mg by mouth daily       Mirtazapine (REMERON PO) Take 15 mg by mouth 2 times daily       Multiple Vitamins-Minerals (CERTAVITE  SENIOR/ANTIOXIDANT PO) Take 1 tablet by mouth daily        omeprazole (PRILOSEC) 40 MG capsule Take 1 capsule (40 mg) by mouth daily Take 30-60 minutes before a meal. 30 capsule 9     oxybutynin (DITROPAN) 5 MG tablet Take 5 mg by mouth 2 times daily       polyethylene glycol (MIRALAX/GLYCOLAX) powder Take 17 g by mouth nightly as needed        rosuvastatin (CRESTOR) 40 MG tablet Take 40 mg by mouth At Bedtime        senna-docusate (SENNA S) 8.6-50 MG per tablet Take 2 tablets by mouth At Bedtime Please hold if having loose stools and contact nurse.       trolamine salicylate (ASPERCREME) 10 % external cream Apply topically as needed for moderate pain (apply twice daily as needed for pain. Left knee) AND BID SCHEDULED           ROS:  No chest pain, shortness of breath, fevers, chills, headache, nausea, vomiting, dysuria or bowel abnormalities.  Appetite is  normal.  No pain except occ neck, knees.    Vitals:  /54   Pulse 69   Resp 18   Wt 82.6 kg (182 lb)   SpO2 93%   BMI 33.29 kg/m     Exam:  GENERAL APPEARANCE:  Alert, in no distress, cooperative  ENT:  Mouth and posterior oropharynx normal, moist mucous membranes, [place]  EYES:  EOM, conjunctivae, lids, pupils and irises normal, PERRL  NECK:  No adenopathy,masses or thyromegaly, sl decreased rom rotation of head, neck  RESP:  respiratory effort and palpation of chest normal, lungs clear to auscultation , no respiratory distress  CV:  Palpation and auscultation of heart done , regular rate and rhythm, no murmur, rub, or gallop, no edema  ABDOMEN:  normal bowel sounds, soft, nontender, no hepatosplenomegaly or other masses, no guarding or rebound  M/S:   Gait and station normal  muscle strength 5/5 all 4 ext., normal tone  NEURO:   Cranial nerves 2-12 are normal tested and grossly at patient's baseline, speech clear  PSYCH:  memory impaired , affect and mood normal    Lab/Diagnostic data:    Most Recent 3 CBC's:  Recent Labs   Lab Test 03/03/20  0601  03/02/20  1337 07/26/19   WBC 7.9 7.4 7.3   HGB 13.1 13.3 12.1   MCV 97 99 97    192 158     Most Recent 3 BMP's:  Recent Labs   Lab Test 03/03/20  0601 03/02/20  1337 01/06/20    137 138   POTASSIUM 4.1 4.1 4.6   CHLORIDE 106 104 103   CO2 29 28 29   BUN 17 21 19   CR 0.91 0.95 1.11*   ANIONGAP 4 5 6   FRANCISCO 8.7 9.2 8.9   * 111* 103     Most Recent 3 Troponin's:  Recent Labs   Lab Test 03/03/20  0148 03/02/20  2226 03/02/20  1834   TROPI <0.015 <0.015 <0.015       ASSESSMENT/PLAN:  Other chest pain  Resolved, cardiac w/u neg. In ED  1. Monitor for further c.p.  2. Monitor for changes in resp status  3. F/u with cardiology as sched.    Neck pain  Increased s/s past couple weeks per resident. Has OA. No recent reports of falls, trauma  1. Cont. Tylenol  2. Start bid aspercreme to neck  3. Instructed to start ROM exercises for neck  4. Reassess over next few weeks    Essential hypertension  BP stable  1. Cont. Norvasc, coreg, losartan  2. Follow BPs, HRs  3. Monitor for reports of dizziness  4. Cbc, bmp in  Next 1-3 mos           Electronically signed by:  ZAIN Marcos CNP                       Sincerely,        ZAIN Marcos CNP

## 2020-03-09 ENCOUNTER — RECORDS - HEALTHEAST (OUTPATIENT)
Dept: LAB | Facility: CLINIC | Age: 85
End: 2020-03-09

## 2020-03-10 ENCOUNTER — TRANSFERRED RECORDS (OUTPATIENT)
Dept: HEALTH INFORMATION MANAGEMENT | Facility: CLINIC | Age: 85
End: 2020-03-10

## 2020-03-10 LAB
ANION GAP SERPL CALCULATED.3IONS-SCNC: 9 MMOL/L (ref 5–18)
ANION GAP SERPL CALCULATED.3IONS-SCNC: 9 MMOL/L (ref 5–18)
BUN SERPL-MCNC: 18 MG/DL (ref 8–28)
BUN SERPL-MCNC: 18 MG/DL (ref 8–28)
CALCIUM SERPL-MCNC: 9.3 MG/DL (ref 8.5–10.5)
CALCIUM SERPL-MCNC: 9.3 MG/DL (ref 8.5–10.5)
CHLORIDE BLD-SCNC: 100 MMOL/L (ref 98–107)
CHLORIDE SERPLBLD-SCNC: 100 MMOL/L (ref 98–107)
CO2 SERPL-SCNC: 30 MMOL/L (ref 22–31)
CO2 SERPL-SCNC: 30 MMOL/L (ref 22–31)
CREAT SERPL-MCNC: 1.06 MG/DL (ref 0.6–1.1)
CREAT SERPL-MCNC: 1.06 MG/DL (ref 0.6–1.1)
GFR SERPL CREATININE-BSD FRML MDRD: 49 ML/MIN/1.73M2
GFR SERPL CREATININE-BSD FRML MDRD: 49 ML/MIN/1.73M2
GLUCOSE BLD-MCNC: 113 MG/DL (ref 70–125)
GLUCOSE SERPL-MCNC: 113 MG/DL (ref 70–125)
POTASSIUM BLD-SCNC: 4.3 MMOL/L (ref 3.5–5)
POTASSIUM SERPL-SCNC: 4.3 MMOL/L (ref 3.5–5)
SODIUM SERPL-SCNC: 139 MMOL/L (ref 136–145)
SODIUM SERPL-SCNC: 139 MMOL/L (ref 136–145)

## 2020-03-19 ENCOUNTER — TELEPHONE (OUTPATIENT)
Dept: GERIATRICS | Facility: CLINIC | Age: 85
End: 2020-03-19

## 2020-03-19 DIAGNOSIS — F51.01 PRIMARY INSOMNIA: Primary | ICD-10-CM

## 2020-03-19 RX ORDER — TRAZODONE HYDROCHLORIDE 50 MG/1
25 TABLET, FILM COATED ORAL
Qty: 15 TABLET | Refills: 11 | Status: SHIPPED | OUTPATIENT
Start: 2020-03-19 | End: 2020-04-28 | Stop reason: DRUGHIGH

## 2020-03-19 NOTE — TELEPHONE ENCOUNTER
Resident reports ongoing insomnia. Neck pain more stable with asprecreme, however ongoing insomnia. Start trazodone 25 mg at bedtime prn.  Decrease celexa to 20 mg every day.  Reassess over next couple weeks.  If s/s stable. Attempt GDR of celexa, remeron.

## 2020-03-30 NOTE — PROGRESS NOTES
[facility] Care Coordination Contact    2nd Attempt: Signed Letter not received from 02/03/20, resent per process.    Jade Asif  Care Management Specialist  [facility]  [phone]

## 2020-04-08 NOTE — PROGRESS NOTES
[facility] Care Coordination Contact    No Letter Received: 60 day tracking of letter complete, no letter received from 02/03/2020. Tracking discontinued.    Jade Asif  Care Management Specialist  [facility]  [phone]

## 2020-04-28 ENCOUNTER — TELEPHONE (OUTPATIENT)
Dept: GERIATRICS | Facility: CLINIC | Age: 85
End: 2020-04-28

## 2020-04-28 DIAGNOSIS — F51.01 PRIMARY INSOMNIA: Primary | ICD-10-CM

## 2020-04-28 RX ORDER — TRAZODONE HYDROCHLORIDE 50 MG/1
50 TABLET, FILM COATED ORAL AT BEDTIME
Qty: 30 TABLET | Refills: 11 | Status: SHIPPED | OUTPATIENT
Start: 2020-04-28 | End: 2021-09-29

## 2020-04-28 NOTE — TELEPHONE ENCOUNTER
Resident reports she has been taking her prn trazodone regularly and not always effective. Requesting increased dose of 50 mg at bedtime.

## 2020-07-08 ENCOUNTER — VIRTUAL VISIT (OUTPATIENT)
Dept: GERIATRICS | Facility: CLINIC | Age: 85
End: 2020-07-08
Payer: COMMERCIAL

## 2020-07-08 ENCOUNTER — ASSISTED LIVING VISIT (OUTPATIENT)
Dept: GERIATRICS | Facility: CLINIC | Age: 85
End: 2020-07-08

## 2020-07-08 VITALS
OXYGEN SATURATION: 93 % | SYSTOLIC BLOOD PRESSURE: 158 MMHG | HEART RATE: 63 BPM | DIASTOLIC BLOOD PRESSURE: 70 MMHG | TEMPERATURE: 97.5 F | RESPIRATION RATE: 13 BRPM

## 2020-07-08 DIAGNOSIS — F51.01 PRIMARY INSOMNIA: Primary | ICD-10-CM

## 2020-07-08 DIAGNOSIS — I10 ESSENTIAL HYPERTENSION: ICD-10-CM

## 2020-07-08 DIAGNOSIS — M15.9 OSTEOARTHRITIS OF MULTIPLE JOINTS, UNSPECIFIED OSTEOARTHRITIS TYPE: Primary | ICD-10-CM

## 2020-07-08 DIAGNOSIS — M15.9 OSTEOARTHRITIS OF MULTIPLE JOINTS, UNSPECIFIED OSTEOARTHRITIS TYPE: ICD-10-CM

## 2020-07-08 NOTE — LETTER
"    7/8/2020        RE: Jade Caba  [address]  [facility] [address]        [facility]   Jade Caba is being evaluated via a billable video visit due to the restrictions of the Covid-19 pandemic.   The patient has been notified of following:  \"This video visit will be conducted via a call between you and your provider. We have found that certain health care needs can be provided without the need for an in-person physical exam.  This service lets us provide the care you need with a video conversation. If during the course of the call the provider feels a video visit is not appropriate, you will not be charged for this service.\"   The provider has received verbal consent for a Video Visit from the patient or first contact? Yes  Patient  or facility staff would like the video invitation sent by: Text to cell phone: [phone]  Video Start Time: 0837    [place] Medical Record Number:  [id number]  Place of Location at the time of visit: [facility]  Chief Complaint   Patient presents with     Video Visit     Insomnia     Pain     HPI:  Jade Caba  is a 86 year old (8/26/1933), who is being seen today for a visit.  HPI information obtained from: facility chart records, facility staff, patient report, [facility] chart review and family/first contact dtr report. Today's concern is: insomnia, OA, HTN.  4/28/20 had increased hs trazodone dose for increased insomnia. Resident reports ineffective.  Cont. To have diff. Falling/staying asleep.  [place] have nocturia about 3x/hs. Cont. On oxybutynin.  Takes lasix for CHF. Reports neck pain stable since aspercreme started.  Reports some increased L knee pain above baseline.  Cont. On tylenol, voltaren.  Per staff, no recent changes in gait, uses walker.  Has had knee inj in past. For HTN cont. On cozaar, coreg, norvasc. BP sl elevated this am. HR stable.       Past Medical and Surgical History reviewed in Epic " today.  MEDICATIONS:    Current Outpatient Medications   Medication Sig Dispense Refill     melatonin 5 MG tablet Take 5 mg by mouth At Bedtime       Acetaminophen (TYLENOL PO) Take 1,000 mg by mouth 3 times daily        amLODIPine (NORVASC) 10 MG tablet Take 10 mg by mouth At Bedtime       aspirin (ASA) 81 MG EC tablet Take 1 tablet (81 mg) by mouth daily 90 tablet 3     bisacodyl (DULCOLAX) 10 MG suppository Place 10 mg rectally daily as needed       calcium carbonate 500 mg, elemental, (OSCAL;OYSTER SHELL CALCIUM) 500 MG tablet Take 2 tablets by mouth every evening        carvedilol (COREG) 12.5 MG tablet Take 1 tablet (12.5 mg) by mouth 2 times daily (with meals) 60 tablet 1     cholecalciferol (VITAMIN D3) 5000 UNITS CAPS capsule Take 5,000 Units by mouth daily       citalopram (CELEXA) 10 MG tablet Take 20 mg by mouth daily       diclofenac (VOLTAREN) 1 % topical gel Apply 2 g topically 2 times daily as needed LEFT KNEE        folic acid (FOLVITE) 1 MG tablet Take 1 mg by mouth daily       furosemide (LASIX) 20 MG tablet Take 20 mg by mouth daily       hypromellose (ARTIFICIAL TEARS) 0.5 % SOLN ophthalmic solution Place 1 drop into both eyes 3 times daily        levETIRAcetam (KEPPRA) 500 MG tablet Take 500 mg by mouth 2 times daily       losartan (COZAAR) 100 MG tablet Take 100 mg by mouth daily       Mirtazapine (REMERON PO) Take 15 mg by mouth 2 times daily       Multiple Vitamins-Minerals (CERTAVITE SENIOR/ANTIOXIDANT PO) Take 1 tablet by mouth daily        omeprazole (PRILOSEC) 40 MG capsule Take 1 capsule (40 mg) by mouth daily Take 30-60 minutes before a meal. 30 capsule 9     oxybutynin (DITROPAN) 5 MG tablet Take 5 mg by mouth 2 times daily       polyethylene glycol (MIRALAX/GLYCOLAX) powder Take 17 g by mouth nightly as needed        rosuvastatin (CRESTOR) 40 MG tablet Take 40 mg by mouth At Bedtime        senna-docusate (SENNA S) 8.6-50 MG per tablet Take 2 tablets by mouth At Bedtime Please hold if  having loose stools and contact nurse.       traZODone (DESYREL) 50 MG tablet Take 1 tablet (50 mg) by mouth At Bedtime 30 tablet 11     trolamine salicylate (ASPERCREME) 10 % external cream Apply topically as needed for moderate pain (apply twice daily as needed for pain. Left knee) AND BID SCHEDULED       REVIEW OF SYSTEMS: No chest pain, shortness of breath, fevers, chills, headache, nausea, vomiting, dysuria or bowel abnormalities.  Appetite is  normal.  No pain except occ knees, L>R.  Objective: BP (!) 158/70   Pulse 63   Temp 97.5  F (36.4  C)   Resp 13   SpO2 93%   Limited visit exam done given COVID-19 precautions.   GENERAL APPEARANCE:  Alert, in no distress, cooperative  ENT:  [place]  EYES:  EOM normal, Conjunctiva and lids normal  NECK:  FROM  RESP:  lungs clear to auscultation , no respiratory distress  CV:  peripheral edema trace+ in LEs, rate-normal  M/S:   Gait and station normal  muscle strength appears 5/5 all 4 ext.  NEURO:   Cranial nerves 2-12 are normal tested and grossly at patient's baseline, speech clear  PSYCH:  insight and judgement impaired, memory impaired , affect and mood normal  Labs:     Most Recent 3 CBC's:  Recent Labs   Lab Test 03/03/20  0601 03/02/20  1337 07/26/19   WBC 7.9 7.4 7.3   HGB 13.1 13.3 12.1   MCV 97 99 97    192 158     Most Recent 3 BMP's:  Recent Labs   Lab Test 03/10/20 03/03/20  0601 03/02/20  1337    139 137   POTASSIUM 4.3 4.1 4.1   CHLORIDE 100 106 104   CO2 30 29 28   BUN 18 17 21   CR 1.06 0.91 0.95   ANIONGAP 9 4 5   FRANCISCO 9.3 8.7 9.2    103* 111*     Most Recent 2 LFT's:  Recent Labs   Lab Test 02/12/19  0850 10/09/18  03/12/18  0850 01/31/18  1050   AST  --  23  --  14 34   ALT 18 11   < > 17 21   ALKPHOS  --   --   --  54 55   BILITOTAL  --   --   --  0.7 0.8    < > = values in this interval not displayed.       ASSESSMENT/PLAN:  Primary insomnia  Ongoing, nocturia  1. Cont. Hs trazodone  2. Add melatonin 5 mg at bedtime  3. Cont.  Oxybutynin. Instructed to taper off fluid intake in pm  4. Reassess over next couple weeks    Osteoarthritis of multiple joints, unspecified osteoarthritis type  Neck pain stable, increased L knee pain  1. Cont. Tylenol  2. Cont. voltaren gel  3. [facility] to repeat L knee inj tomorrow  4. Monitor for changes in gait, falls    Essential hypertension  BP sl. Elevated today  1. Cont. Coreg, cozaar, norvasc  2. BP, HR every day x 5 days, then reassess  3. For further increased BPs, may consider increased BP med  4. Bmp, FLP, AST, ALT tomorrow        Electronically signed by:  ZAIN Marcos CNP     Video-Visit Details  Type of service:  Video Visit  Video End Time (time video stopped): 0845  Distant Location (provider location):  [facility]             Sincerely,        ZAIN Marcos CNP

## 2020-07-08 NOTE — PROGRESS NOTES
"[facility]   Jade Caba is being evaluated via a billable video visit due to the restrictions of the Covid-19 pandemic.   The patient has been notified of following:  \"This video visit will be conducted via a call between you and your provider. We have found that certain health care needs can be provided without the need for an in-person physical exam.  This service lets us provide the care you need with a video conversation. If during the course of the call the provider feels a video visit is not appropriate, you will not be charged for this service.\"   The provider has received verbal consent for a Video Visit from the patient or first contact? Yes  Patient  or facility staff would like the video invitation sent by: Text to cell phone: [phone]  Video Start Time: 0837    [place] Medical Record Number:  [id number]  Place of Location at the time of visit: [facility]  Chief Complaint   Patient presents with     Video Visit     Insomnia     Pain     HPI:  Jade Caba  is a 86 year old (8/26/1933), who is being seen today for a visit.  HPI information obtained from: facility chart records, facility staff, patient report, [facility] chart review and family/first contact dtr report. Today's concern is: insomnia, OA, HTN.  4/28/20 had increased hs trazodone dose for increased insomnia. Resident reports ineffective.  Cont. To have diff. Falling/staying asleep.  Santos have nocturia about 3x/hs. Cont. On oxybutynin.  Takes lasix for CHF. Reports neck pain stable since aspercreme started.  Reports some increased L knee pain above baseline.  Cont. On tylenol, voltaren.  Per staff, no recent changes in gait, uses walker.  Has had knee inj in past. For HTN cont. On cozaar, coreg, norvasc. BP sl elevated this am. HR stable.       Past Medical and Surgical History reviewed in Epic today.  MEDICATIONS:    Current Outpatient Medications   Medication Sig Dispense Refill     " melatonin 5 MG tablet Take 5 mg by mouth At Bedtime       Acetaminophen (TYLENOL PO) Take 1,000 mg by mouth 3 times daily        amLODIPine (NORVASC) 10 MG tablet Take 10 mg by mouth At Bedtime       aspirin (ASA) 81 MG EC tablet Take 1 tablet (81 mg) by mouth daily 90 tablet 3     bisacodyl (DULCOLAX) 10 MG suppository Place 10 mg rectally daily as needed       calcium carbonate 500 mg, elemental, (OSCAL;OYSTER SHELL CALCIUM) 500 MG tablet Take 2 tablets by mouth every evening        carvedilol (COREG) 12.5 MG tablet Take 1 tablet (12.5 mg) by mouth 2 times daily (with meals) 60 tablet 1     cholecalciferol (VITAMIN D3) 5000 UNITS CAPS capsule Take 5,000 Units by mouth daily       citalopram (CELEXA) 10 MG tablet Take 20 mg by mouth daily       diclofenac (VOLTAREN) 1 % topical gel Apply 2 g topically 2 times daily as needed LEFT KNEE        folic acid (FOLVITE) 1 MG tablet Take 1 mg by mouth daily       furosemide (LASIX) 20 MG tablet Take 20 mg by mouth daily       hypromellose (ARTIFICIAL TEARS) 0.5 % SOLN ophthalmic solution Place 1 drop into both eyes 3 times daily        levETIRAcetam (KEPPRA) 500 MG tablet Take 500 mg by mouth 2 times daily       losartan (COZAAR) 100 MG tablet Take 100 mg by mouth daily       Mirtazapine (REMERON PO) Take 15 mg by mouth 2 times daily       Multiple Vitamins-Minerals (CERTAVITE SENIOR/ANTIOXIDANT PO) Take 1 tablet by mouth daily        omeprazole (PRILOSEC) 40 MG capsule Take 1 capsule (40 mg) by mouth daily Take 30-60 minutes before a meal. 30 capsule 9     oxybutynin (DITROPAN) 5 MG tablet Take 5 mg by mouth 2 times daily       polyethylene glycol (MIRALAX/GLYCOLAX) powder Take 17 g by mouth nightly as needed        rosuvastatin (CRESTOR) 40 MG tablet Take 40 mg by mouth At Bedtime        senna-docusate (SENNA S) 8.6-50 MG per tablet Take 2 tablets by mouth At Bedtime Please hold if having loose stools and contact nurse.       traZODone (DESYREL) 50 MG tablet Take 1 tablet  (50 mg) by mouth At Bedtime 30 tablet 11     trolamine salicylate (ASPERCREME) 10 % external cream Apply topically as needed for moderate pain (apply twice daily as needed for pain. Left knee) AND BID SCHEDULED       REVIEW OF SYSTEMS: No chest pain, shortness of breath, fevers, chills, headache, nausea, vomiting, dysuria or bowel abnormalities.  Appetite is  normal.  No pain except occ knees, L>R.  Objective: BP (!) 158/70   Pulse 63   Temp 97.5  F (36.4  C)   Resp 13   SpO2 93%   Limited visit exam done given COVID-19 precautions.   GENERAL APPEARANCE:  Alert, in no distress, cooperative  ENT:  [place]  EYES:  EOM normal, Conjunctiva and lids normal  NECK:  FROM  RESP:  lungs clear to auscultation , no respiratory distress  CV:  peripheral edema trace+ in LEs, rate-normal  M/S:   Gait and station normal  muscle strength appears 5/5 all 4 ext.  NEURO:   Cranial nerves 2-12 are normal tested and grossly at patient's baseline, speech clear  PSYCH:  insight and judgement impaired, memory impaired , affect and mood normal  Labs:     Most Recent 3 CBC's:  Recent Labs   Lab Test 03/03/20  0601 03/02/20  1337 07/26/19   WBC 7.9 7.4 7.3   HGB 13.1 13.3 12.1   MCV 97 99 97    192 158     Most Recent 3 BMP's:  Recent Labs   Lab Test 03/10/20 03/03/20  0601 03/02/20  1337    139 137   POTASSIUM 4.3 4.1 4.1   CHLORIDE 100 106 104   CO2 30 29 28   BUN 18 17 21   CR 1.06 0.91 0.95   ANIONGAP 9 4 5   FRANCISCO 9.3 8.7 9.2    103* 111*     Most Recent 2 LFT's:  Recent Labs   Lab Test 02/12/19  0850 10/09/18  03/12/18  0850 01/31/18  1050   AST  --  23  --  14 34   ALT 18 11   < > 17 21   ALKPHOS  --   --   --  54 55   BILITOTAL  --   --   --  0.7 0.8    < > = values in this interval not displayed.       ASSESSMENT/PLAN:  Primary insomnia  Ongoing, nocturia  1. Cont. Hs trazodone  2. Add melatonin 5 mg at bedtime  3. Cont. Oxybutynin. Instructed to taper off fluid intake in pm  4. Reassess over next couple  weeks    Osteoarthritis of multiple joints, unspecified osteoarthritis type  Neck pain stable, increased L knee pain  1. Cont. Tylenol  2. Cont. voltaren gel  3. [facility] to repeat L knee inj tomorrow  4. Monitor for changes in gait, falls    Essential hypertension  BP sl. Elevated today  1. Cont. Coreg, cozaar, norvasc  2. BP, HR every day x 5 days, then reassess  3. For further increased BPs, may consider increased BP med  4. Bmp, FLP, AST, ALT tomorrow        Electronically signed by:  ZAIN Marcos CNP     Video-Visit Details  Type of service:  Video Visit  Video End Time (time video stopped): 0845  Distant Location (provider location):  [facility]

## 2020-07-08 NOTE — PROGRESS NOTES
Ortho Nursing home visit    Jade Caba is a 86 year old female who resides at [facility].    Patient is seen today for Left knee pain has hs of OA and has had injections in the past; requesting same today, albeit they have been of little help , only short term.       Past Medical History:   Diagnosis Date     Anxiety      Arthritis      Atrial flutter (H)      C1 cervical fracture (H)      CAD (coronary artery disease)     cath in 1/2018: Angioplasty and stenting of 90% mid RCA stenosis, 50% right ostial coronary stenosis, and ostial LAD stenosis.  Moderate disease throughout the mid LAD and 30 to 50% range.     Carotid artery stenosis      CKD (chronic kidney disease)      Depression      Diastolic heart failure (H)     Most recent echo 1/2018: EF 55%, inferior regional wall motion abnormality, increased left ventricular end-diastolic pressure.     H/O alcohol abuse      Hypercholesterolemia      Hypertension      Renal artery stenosis (H)      Seizure disorder (H)      Subarachnoid hemorrhage (H) 2014      Past Surgical History:   Procedure Laterality Date     APPENDECTOMY       CAROTID ENDARTERECTOMY Left      Cataract surgery Right     will have laser eye surgery     CHOLECYSTECTOMY       Coronary stents x 3       CV CORONARY ANGIOGRAM N/A 7/19/2019    Procedure: Coronary Angiogram;  Surgeon: Pablito [facility] MD;  Location:  [facility]     HYSTERECTOMY      at age of 40-not cancerus or precancerous      JOINT REPLACEMENT Bilateral      Stenting of renal artery          Allergies   Allergen Reactions     No Known Allergies       There were no vitals taken for this visit.     Exam: Seated in chair allows PROM to left knee , some crepitus noted, no swelling or effusion, pain medial joint line, lig remaind intact;      X-rays show : advanced DJD left knee;    ASSESSMENT / PLAN:  After consent; left knee prepped; injected medial joint space, with 1 ml depo medrol, 4 ml 1% lidocaine, tolerated well,  no complaints,    F/U PRN 4 months    [id number]          JButch OPA-C  [phone] Cell

## 2020-07-09 ENCOUNTER — TRANSFERRED RECORDS (OUTPATIENT)
Dept: HEALTH INFORMATION MANAGEMENT | Facility: CLINIC | Age: 85
End: 2020-07-09

## 2020-07-09 ENCOUNTER — RECORDS - HEALTHEAST (OUTPATIENT)
Dept: LAB | Facility: CLINIC | Age: 85
End: 2020-07-09

## 2020-07-09 LAB
ALT SERPL W P-5'-P-CCNC: 11 U/L (ref 0–45)
ALT SERPL-CCNC: 11 U/L (ref 0–45)
ANION GAP SERPL CALCULATED.3IONS-SCNC: 11 MMOL/L (ref 5–18)
ANION GAP SERPL CALCULATED.3IONS-SCNC: 11 MMOL/L (ref 5–18)
AST SERPL W P-5'-P-CCNC: 19 U/L (ref 0–40)
AST SERPL-CCNC: 19 U/L (ref 0–40)
BUN SERPL-MCNC: 12 MG/DL (ref 8–28)
BUN SERPL-MCNC: 12 MG/DL (ref 8–28)
CALCIUM SERPL-MCNC: 9.4 MG/DL (ref 8.5–10.5)
CALCIUM SERPL-MCNC: 9.4 MG/DL (ref 8.5–10.5)
CHLORIDE BLD-SCNC: 99 MMOL/L (ref 98–107)
CHLORIDE SERPLBLD-SCNC: 99 MMOL/L (ref 98–107)
CHOLEST SERPL-MCNC: 177 MG/DL
CHOLEST SERPL-MCNC: 177 MG/DL
CO2 SERPL-SCNC: 28 MMOL/L (ref 22–31)
CO2 SERPL-SCNC: 28 MMOL/L (ref 22–31)
CREAT SERPL-MCNC: 1.06 MG/DL (ref 0.6–1.1)
CREAT SERPL-MCNC: 1.06 MG/DL (ref 0.6–1.1)
FASTING STATUS PATIENT QL REPORTED: ABNORMAL
GFR SERPL CREATININE-BSD FRML MDRD: 49 ML/MIN/1.73M2
GFR SERPL CREATININE-BSD FRML MDRD: 49 ML/MIN/1.73M2
GLUCOSE BLD-MCNC: 86 MG/DL (ref 70–125)
GLUCOSE SERPL-MCNC: 86 MG/DL (ref 70–125)
HDLC SERPL-MCNC: 44 MG/DL
HDLC SERPL-MCNC: 44 MG/DL
LDLC SERPL CALC-MCNC: 110 MG/DL
LDLC SERPL CALC-MCNC: 110 MG/DL
POTASSIUM BLD-SCNC: 3.8 MMOL/L (ref 3.5–5)
POTASSIUM SERPL-SCNC: 3.8 MMOL/L (ref 3.5–5)
SODIUM SERPL-SCNC: 138 MMOL/L (ref 136–145)
SODIUM SERPL-SCNC: 138 MMOL/L (ref 136–145)
TRIGL SERPL-MCNC: 113 MG/DL
TRIGL SERPL-MCNC: 113 MG/DL

## 2020-07-24 ENCOUNTER — PATIENT OUTREACH (OUTPATIENT)
Dept: GERIATRIC MEDICINE | Facility: CLINIC | Age: 85
End: 2020-07-24

## 2020-07-24 NOTE — PROGRESS NOTES
[facility] Care Coordination Contact      [facility] Six-Month Telephone Assessment    6 month telephone assessment completed on 7/24/20.  Spoke with Jade, she reports that she is doing fine. Reviewed recent visit by ortho, Jade states that this injection was very helpful.  Jade shared that she has been having a lot of difficulty with sleep, stating that the Melatonin was not helpful, but she is managing.  Jade reports that she is able to walk the hallways as long as she is wearing her mask, but they are not able to play Bingo but is thankful that she has her Word Find.     ER visits: No  Hospitalizations: Yes -  [facility]  TCU stays: No  Significant health status changes: None reported  Falls/Injuries: No  ADL/IADL changes: No  Changes in services: No    Caregiver Assessment follow up:  NA    Goals: See POC in chart for goal progress documentation.      Will see member in 6 months for an annual health risk assessment.   Encouraged member to call CC with any questions or concerns in the meantime.   Holly Guajardo RN, BC  Manager [facility] Care Coordinator   [phone]  (Fax)

## 2020-08-31 ENCOUNTER — ASSISTED LIVING VISIT (OUTPATIENT)
Dept: GERIATRICS | Facility: CLINIC | Age: 85
End: 2020-08-31
Payer: COMMERCIAL

## 2020-08-31 VITALS
SYSTOLIC BLOOD PRESSURE: 134 MMHG | DIASTOLIC BLOOD PRESSURE: 64 MMHG | RESPIRATION RATE: 18 BRPM | OXYGEN SATURATION: 94 % | HEART RATE: 72 BPM

## 2020-08-31 DIAGNOSIS — M15.9 OSTEOARTHRITIS OF MULTIPLE JOINTS, UNSPECIFIED OSTEOARTHRITIS TYPE: Primary | ICD-10-CM

## 2020-08-31 DIAGNOSIS — I50.32 CHRONIC DIASTOLIC CONGESTIVE HEART FAILURE (H): ICD-10-CM

## 2020-08-31 DIAGNOSIS — F51.01 PRIMARY INSOMNIA: ICD-10-CM

## 2020-08-31 NOTE — PROGRESS NOTES
[facility]  [facility] Medical Record Number: [id number]  Place of Service where encounter took place: [facility] [[id number]]  Chief Complaint   Patient presents with     Knee Pain       HPI:    Jade Caba is a 87 year old (8/26/1933), who is being seen today for an episodic care visit. HPI information obtained from: facility chart records, facility staff, patient report and [facility] chart review. Today's concern is: OA, CHF, insomnia.  Has h/o bilat knee pain L>R. S/p knee inj. 7/8/20. Reports inj effective for about a month.  Has had some increased L knee pain. Cont. On tylenol, aspercreme.  Reports gait stable.  No recent falls. Activity level baseline.  For CHF cont. On lasix.  LE edema stable.  Reports resp. Status stable.  No sob.  7/9/20 bmp wnl. Has had increased insomnia. Started on hs melatonin, has had increased hs trazodone.  Cont. On  Hs remeron.  Reports insomnia more stable. Over past month.       Past Medical and Surgical History reviewed in Epic today.    MEDICATIONS:    Current Outpatient Medications   Medication Sig Dispense Refill     Acetaminophen (TYLENOL PO) Take 1,000 mg by mouth 3 times daily        amLODIPine (NORVASC) 10 MG tablet Take 10 mg by mouth At Bedtime       aspirin (ASA) 81 MG EC tablet Take 1 tablet (81 mg) by mouth daily 90 tablet 3     bisacodyl (DULCOLAX) 10 MG suppository Place 10 mg rectally daily as needed       calcium carbonate 500 mg, elemental, (OSCAL;OYSTER SHELL CALCIUM) 500 MG tablet Take 2 tablets by mouth every evening        carvedilol (COREG) 12.5 MG tablet Take 1 tablet (12.5 mg) by mouth 2 times daily (with meals) 60 tablet 1     cholecalciferol (VITAMIN D3) 5000 UNITS CAPS capsule Take 5,000 Units by mouth daily       citalopram (CELEXA) 10 MG tablet Take 20 mg by mouth daily       diclofenac (VOLTAREN) 1 % topical gel Apply 2 g topically 2 times daily as needed LEFT KNEE        folic acid (FOLVITE) 1  MG tablet Take 1 mg by mouth daily       furosemide (LASIX) 20 MG tablet Take 20 mg by mouth daily       hypromellose (ARTIFICIAL TEARS) 0.5 % SOLN ophthalmic solution Place 1 drop into both eyes 3 times daily        levETIRAcetam (KEPPRA) 500 MG tablet Take 500 mg by mouth 2 times daily       losartan (COZAAR) 100 MG tablet Take 100 mg by mouth daily       melatonin 5 MG tablet Take 5 mg by mouth At Bedtime       Mirtazapine (REMERON PO) Take 15 mg by mouth 2 times daily       Multiple Vitamins-Minerals (CERTAVITE SENIOR/ANTIOXIDANT PO) Take 1 tablet by mouth daily        omeprazole (PRILOSEC) 40 MG capsule Take 1 capsule (40 mg) by mouth daily Take 30-60 minutes before a meal. 30 capsule 9     oxybutynin (DITROPAN) 5 MG tablet Take 5 mg by mouth 2 times daily       polyethylene glycol (MIRALAX/GLYCOLAX) powder Take 17 g by mouth nightly as needed        rosuvastatin (CRESTOR) 40 MG tablet Take 40 mg by mouth At Bedtime        senna-docusate (SENNA S) 8.6-50 MG per tablet Take 2 tablets by mouth At Bedtime Please hold if having loose stools and contact nurse.       traZODone (DESYREL) 50 MG tablet Take 1 tablet (50 mg) by mouth At Bedtime 30 tablet 11     trolamine salicylate (ASPERCREME) 10 % external cream Apply topically as needed for moderate pain (apply twice daily as needed for pain. Left knee) AND BID SCHEDULED       REVIEW OF SYSTEMS:  No chest pain, shortness of breath, fevers, chills, headache, nausea, vomiting, dysuria or bowel abnormalities.  Appetite is  normal.  No pain except occ knees.    Objective:  /64   Pulse 72   Resp 18   SpO2 94%   Exam:  GENERAL APPEARANCE:  Alert, in no distress, appears healthy, cooperative  ENT:  Mouth and posterior oropharynx normal, moist mucous membranes, [place]  EYES:  EOM, conjunctivae, lids, pupils and irises normal, PERRL  NECK:  No adenopathy,masses or thyromegaly, FROM  RESP:  respiratory effort and palpation of chest normal, lungs clear to auscultation , no  respiratory distress  CV:  Palpation and auscultation of heart done , regular rate and rhythm, no murmur, rub, or gallop, no edema  ABDOMEN:  normal bowel sounds, soft, nontender, no hepatosplenomegaly or other masses, no guarding or rebound  M/S:   Gait and station normal  muscle strength 5/5 all 4 ext., normal tone  NEURO:   Cranial nerves 2-12 are normal tested and grossly at patient's baseline, speech clear  PSYCH:  memory impaired , affect and mood normal    Labs:     Most Recent 3 BMP's:  Recent Labs   Lab Test 07/09/20 03/10/20 03/03/20  0601    139 139   POTASSIUM 3.8 4.3 4.1   CHLORIDE 99 100 106   CO2 28 30 29   BUN 12 18 17   CR 1.06 1.06 0.91   ANIONGAP 11 9 4   FRANCISCO 9.4 9.3 8.7   GLC 86 113 103*       ASSESSMENT/PLAN:  Osteoarthritis of multiple joints, unspecified osteoarthritis type  occ increased L knee pain. No recent changes in gait  1. Cont. Tylenol  2. Cont. aspercreme  3. Will plan to sched. F/u L knee inh 10/20 with [facility]  4. Monitor for changes in gait, decreased act. level    Chronic diastolic congestive heart failure (H)  Currently stable  1. Cont. Lasix  2. Follow BPs, HRs, wt.s  3. Monitor for LE edema, resp. Distress  4. Bmp in next 1-3 mos    Primary insomnia  Gen. Stable. Improved over past month  1. Cont. Melatonin, trazodone  2. Cont. remeron  3. Reassess s/s over next couple mos      Electronically signed by:  ZAIN Marcos CNP

## 2020-08-31 NOTE — LETTER
8/31/2020        RE: Jade Caba  [facility]  [address] [address]  [address]  [place] [address]        [facility]  [place] Medical Record Number: [id number]  Place of Service where encounter took place: [facility] [[id number]]  Chief Complaint   Patient presents with     Knee Pain       HPI:    Jade Caba is a 87 year old (8/26/1933), who is being seen today for an episodic care visit. HPI information obtained from: facility chart records, facility staff, patient report and [facility] chart review. Today's concern is: OA, CHF, insomnia.  Has h/o bilat knee pain L>R. S/p knee inj. 7/8/20. Reports inj effective for about a month.  Has had some increased L knee pain. Cont. On tylenol, aspercreme.  Reports gait stable.  No recent falls. Activity level baseline.  For CHF cont. On lasix.  LE edema stable.  Reports resp. Status stable.  No sob.  7/9/20 bmp wnl. Has had increased insomnia. Started on hs melatonin, has had increased hs trazodone.  Cont. On  Hs remeron.  Reports insomnia more stable. Over past month.       Past Medical and Surgical History reviewed in Epic today.    MEDICATIONS:    Current Outpatient Medications   Medication Sig Dispense Refill     Acetaminophen (TYLENOL PO) Take 1,000 mg by mouth 3 times daily        amLODIPine (NORVASC) 10 MG tablet Take 10 mg by mouth At Bedtime       aspirin (ASA) 81 MG EC tablet Take 1 tablet (81 mg) by mouth daily 90 tablet 3     bisacodyl (DULCOLAX) 10 MG suppository Place 10 mg rectally daily as needed       calcium carbonate 500 mg, elemental, (OSCAL;OYSTER SHELL CALCIUM) 500 MG tablet Take 2 tablets by mouth every evening        carvedilol (COREG) 12.5 MG tablet Take 1 tablet (12.5 mg) by mouth 2 times daily (with meals) 60 tablet 1     cholecalciferol (VITAMIN D3) 5000 UNITS CAPS capsule Take 5,000 Units by mouth daily       citalopram (CELEXA) 10 MG tablet Take 20 mg by mouth daily        diclofenac (VOLTAREN) 1 % topical gel Apply 2 g topically 2 times daily as needed LEFT KNEE        folic acid (FOLVITE) 1 MG tablet Take 1 mg by mouth daily       furosemide (LASIX) 20 MG tablet Take 20 mg by mouth daily       hypromellose (ARTIFICIAL TEARS) 0.5 % SOLN ophthalmic solution Place 1 drop into both eyes 3 times daily        levETIRAcetam (KEPPRA) 500 MG tablet Take 500 mg by mouth 2 times daily       losartan (COZAAR) 100 MG tablet Take 100 mg by mouth daily       melatonin 5 MG tablet Take 5 mg by mouth At Bedtime       Mirtazapine (REMERON PO) Take 15 mg by mouth 2 times daily       Multiple Vitamins-Minerals (CERTAVITE SENIOR/ANTIOXIDANT PO) Take 1 tablet by mouth daily        omeprazole (PRILOSEC) 40 MG capsule Take 1 capsule (40 mg) by mouth daily Take 30-60 minutes before a meal. 30 capsule 9     oxybutynin (DITROPAN) 5 MG tablet Take 5 mg by mouth 2 times daily       polyethylene glycol (MIRALAX/GLYCOLAX) powder Take 17 g by mouth nightly as needed        rosuvastatin (CRESTOR) 40 MG tablet Take 40 mg by mouth At Bedtime        senna-docusate (SENNA S) 8.6-50 MG per tablet Take 2 tablets by mouth At Bedtime Please hold if having loose stools and contact nurse.       traZODone (DESYREL) 50 MG tablet Take 1 tablet (50 mg) by mouth At Bedtime 30 tablet 11     trolamine salicylate (ASPERCREME) 10 % external cream Apply topically as needed for moderate pain (apply twice daily as needed for pain. Left knee) AND BID SCHEDULED       REVIEW OF SYSTEMS:  No chest pain, shortness of breath, fevers, chills, headache, nausea, vomiting, dysuria or bowel abnormalities.  Appetite is  normal.  No pain except occ knees.    Objective:  /64   Pulse 72   Resp 18   SpO2 94%   Exam:  GENERAL APPEARANCE:  Alert, in no distress, appears healthy, cooperative  ENT:  Mouth and posterior oropharynx normal, moist mucous membranes, [place]  EYES:  EOM, conjunctivae, lids, pupils and irises normal, PERRL  NECK:  No  adenopathy,masses or thyromegaly, FROM  RESP:  respiratory effort and palpation of chest normal, lungs clear to auscultation , no respiratory distress  CV:  Palpation and auscultation of heart done , regular rate and rhythm, no murmur, rub, or gallop, no edema  ABDOMEN:  normal bowel sounds, soft, nontender, no hepatosplenomegaly or other masses, no guarding or rebound  M/S:   Gait and station normal  muscle strength 5/5 all 4 ext., normal tone  NEURO:   Cranial nerves 2-12 are normal tested and grossly at patient's baseline, speech clear  PSYCH:  memory impaired , affect and mood normal    Labs:     Most Recent 3 BMP's:  Recent Labs   Lab Test 07/09/20 03/10/20 03/03/20  0601    139 139   POTASSIUM 3.8 4.3 4.1   CHLORIDE 99 100 106   CO2 28 30 29   BUN 12 18 17   CR 1.06 1.06 0.91   ANIONGAP 11 9 4   RFANCISCO 9.4 9.3 8.7   GLC 86 113 103*       ASSESSMENT/PLAN:  Osteoarthritis of multiple joints, unspecified osteoarthritis type  occ increased L knee pain. No recent changes in gait  1. Cont. Tylenol  2. Cont. aspercreme  3. Will plan to sched. F/u L knee inh 10/20 with [facility]  4. Monitor for changes in gait, decreased act. level    Chronic diastolic congestive heart failure (H)  Currently stable  1. Cont. Lasix  2. Follow BPs, HRs, wt.s  3. Monitor for LE edema, resp. Distress  4. Bmp in next 1-3 mos    Primary insomnia  Gen. Stable. Improved over past month  1. Cont. Melatonin, trazodone  2. Cont. remeron  3. Reassess s/s over next couple mos      Electronically signed by:  ZAIN Marcos CNP         Sincerely,        ZAIN Marcos CNP

## 2020-09-22 ENCOUNTER — PATIENT OUTREACH (OUTPATIENT)
Dept: GERIATRIC MEDICINE | Facility: CLINIC | Age: 85
End: 2020-09-22

## 2020-09-22 NOTE — PROGRESS NOTES
[facility] Care Coordination Contact    Rec'd vm from member's daughter Marleen Brownlee stating that they are planning to discharge their mother from [facility] to her home in [place]. Marleen requests a return call. [phone]  Call placed to Marleen, introduced myself and role of CC. Marleen shared that her mother has been experiencing a difficult time with isolation due to COVID and has offered to have her mother move in with her. Marleen states that they have an extra bedroom, her  is retired and her mother would rarely be left alone.   Reviewed Elderly Waiver and types of services covered under EW.   Plan is for discharge early October before MARY break (10/15/20).  Marleen would like someone to assist her mother with showers and possibly a grab bar installed in the shower. Offered PERS and MOW if needed. Marleen agreed to a PERS unit, but will defer MOW at this time.  Marleen shared that she will likely establish care with the [facility] in [place], Marielle Fortune PA-C.   Marleen does not have any preferences for a home care agency. Explained that CC will contact [facility] first.   Marleen stated that she feels she can manage her mother's medications, will have [facility] pill boxes mailed to her home    Marleen's address:  [address] [place] [id number]    Call placed to member who shared that she is very excited to move to her daughter's.  Shared information above, CC will continue to follow  Call placed to [facility], CC informed that they are not able to provide a HHA for bathing if there is no skilled need. CC informed that they are not able to provide a home assessment alone.   Reviewed [facility] find a doc : [facility] [phone] provides skilled care and PERS. VM left with [facility] requesting a return call.   [facility] [phone], left vm requesting a return call.   Holly Guajardo RN, BC  Manager [facility] Coordinator    [phone]  (Fax)       left with Roz LEIVA and Betsey ZHAO at [facility] to inform of discharge plans.   Holly Guajardo RN, BC  Manager [facility] Coordinator   [phone]  (Fax)

## 2020-09-24 NOTE — PROGRESS NOTES
[facility] Care Coordination Contact    9/23/20 Rec'd vm from Marleen inquiring if her mother could receive a new walker and she had questions regarding her mother's SS checks.  9/23/20 Call placed to Marleen, left eileen to report that CC can assist with ordering a new walker, directed her to contact business office at [facility] regarding SS checks.  9/24/20 Rec'd tele call from Marleen. Marleen states that her mother never purchased a walker in the past, current walker was provided by a family member. Marleen states that mother would like a seated 4WW with brakes. Explained that CC will follow up with PCP to obtain orders.  CC provided contact information on [employer] as Marleen had questions regarding finances.   Explained that CC placed 3 calls to homecare agencies, stating that [facility] is not able to open the case and no return calls from the other agencies. CC will cont to follow up.   Marleen states that the plan is to have her mother discharge 10/1 or 10/2/20 and she is able to care for her mother until home care can be obtained.   Call placed to [facility] [phone], spoke with Billie to inquire if they would be able to provide a HHA for 1-2x/wk for bathing. Billie thought that they would be able to have staffing available. Explained that a referral for PT/OT eval for bathroom assess/DME needs. Billie will call CC back, if able to provide the HHA, CC will have homecare orders faxed to [phone]  Holly Guajardo RN, BC  Manager [facility] Care Coordinator   [phone]  (Fax)

## 2020-09-25 ENCOUNTER — TELEPHONE (OUTPATIENT)
Dept: GERIATRIC MEDICINE | Facility: CLINIC | Age: 85
End: 2020-09-25

## 2020-09-25 ENCOUNTER — PATIENT OUTREACH (OUTPATIENT)
Dept: GERIATRIC MEDICINE | Facility: CLINIC | Age: 85
End: 2020-09-25

## 2020-09-25 NOTE — TELEPHONE ENCOUNTER
DME supply(s) have been requested by the patient. DME orders(s) have been queued up and pended for the provider to review. Patient reports the need for DME supplies due to risk for falls/safety. Once completed, please route the encounter to care coordinator to fax completed documentation and order requisition to [facility].       Feel free to contact me if you have questions.  Holly Guajardo RN, BC  Manager [facility] Care Coordinator   [phone]  (Fax)

## 2020-09-25 NOTE — PROGRESS NOTES
[facility] Care Coordination Contact    Voice message left with Billie at [facility] inquiring if they would be able to provide services.   Holly Guajardo RN, BC  Manager [facility] Care Coordinator   [phone]  (Fax)

## 2020-09-29 ENCOUNTER — PATIENT OUTREACH (OUTPATIENT)
Dept: GERIATRIC MEDICINE | Facility: CLINIC | Age: 85
End: 2020-09-29

## 2020-09-29 NOTE — PROGRESS NOTES
[facility] Care Coordination Contact    9/28/20 Rec'd vm from member's daughter Marleen to report that the plan is for her mother to discharge on 10/2/20. Marleen inquired if the pill boxes could be mailed to her home and also inquired on the walker.  [phone]  9/29/20 Call placed to [facility] to f/u on referral for HHA.  Request that CC fax referral/demographic information to [phone]. Informed that they will review the referral and update CC if they can provide service. Explained that a referral for PT/OT eval will also be requested.   To place referral for PERS, contact [phone].    Voice message left with Roz Ann to update on discharge date  Call placed to Marleen, explained that the pill box will be mailed to her home and that a referral has been placed with [facility] for the walker, pending orders from PCP.  Explained that CC has been communicating with [facility], awaiting if they are able to accept the case.  Marleen states that she scheduled an appt to establish care with [facility] for 10/7/20 with SINDY Fortune.  Marleen in agreement with referral for PERS.  Land line [phone].  Holly Guajardo RN, BC  Manager [facility] Care Coordinator   [phone]  (Fax)

## 2020-09-30 NOTE — PROGRESS NOTES
[facility] Care Coordination Contact    Call placed to [facility], spoke with Billie who states that they should be able to staff the HHA and PT/OT. Billie requests orders to be faxed to [phone]  Call placed to [facility], shared that member will be establishing care with SINDY Fortune PA-C on 10/7, request orders for HHA and PT/OT eval to be completed at appointment. Request a return call as needed.    Holly Guajardo RN, BC  Manager [facility] Care Coordinator   [phone]  (Fax)

## 2020-10-01 NOTE — PROGRESS NOTES
[facility] Care Coordination Contact    9/30/2020 Rec'd tele call from Mara at [facility]. Reviewed request for home care orders. Explained that member will be establishing care on 10/7/20 and if orders can be written for a HHA, PT/OT eval to [facility]. Mara inquired if her daughter would accompany member to the appt, CC stated yes and will ensure this with her daughter.   Call placed to Marleen shared the above info. Explained that the PERS was ordered, they will be in contact with her to schedule.  CC working to obtain Rx for walker and referral placed for pill boxes.   Rec'd vm from Mara at [facility] requesting a return call.  10/1/20 Call placed to [facility], left message requesting a return call  10/2/20 Rec'd tele call from Colette at [facility]. Colette shared that Marielle Fortune is not able to write orders for homecare and recommended that member establish care with an MD.  10/2/20 Call placed to Marleen, reviewed above info. Marleen states that she will call the clinic later today and reschedule the appt.  Holly Guajardo RN, BC  Manager [facility] Care Coordinator   [phone]  (Fax)

## 2020-10-02 ENCOUNTER — PATIENT OUTREACH (OUTPATIENT)
Dept: GERIATRIC MEDICINE | Facility: CLINIC | Age: 85
End: 2020-10-02

## 2020-10-02 DIAGNOSIS — Z76.89 HEALTH CARE HOME: ICD-10-CM

## 2020-10-02 NOTE — PROGRESS NOTES
[facility] Care Coordination Contact    Call placed to member's daughter Marleen to f/u on transition from [facility] to her home. Marleen states that they plan to pick her mother up this afternoon. Shared that CC is still working on obtaining orders for the walker, once the orders are rec'd, [facility] will deliver the walker and pill boxes to her home in [place]. Marleen states that she did reschedule her mother's medical appt with an MD for 10/7/2020  Explained that CC will complete a termination of services for the 24 hr CL, Marleen stated understanding.  CC will f/u with member on 10/5/2020  Holly Guajardo RN, BC  Manager [facility] Care Coordinator   [phone]  (Fax)

## 2020-10-05 ENCOUNTER — PATIENT OUTREACH (OUTPATIENT)
Dept: GERIATRIC MEDICINE | Facility: CLINIC | Age: 85
End: 2020-10-05

## 2020-10-05 NOTE — PROGRESS NOTES
[facility] Care Coordination Contact    DTR completed to terminate 24 hr CL services at [facility]. Member moved from [facility] to her daughter Marleen's home in [place], [place] on 10/2/20.  Member agreeable to the move due to restrictions and increasing isolation related to COVID 19.   PCP updated.  Holly Guajardo RN, BC  Manager [facility] Care Coordinator   [phone]  (Fax)

## 2020-10-05 NOTE — PROGRESS NOTES
"[facility] Care Coordination Contact  Call placed to member to f/u on move to her daughter's home. Member shared that she is enjoying the new move, \"they are taking such good care of me.\"  Spoke with member's daughter Marleen, shared that CC will cont to work on getting the Rx for the walker and pill boxes should be delivered shortly. Marleen stated that her mothers [employer] unit was installed this morning, provided information that the new PCP will be Dr. Kunz, appt on 10/7/20. Explained that CC did call [facility] last week to request orders for [facility], PT/OT and HHA. Enc'd Marleen to provide my tele number with clinic staff if there are any questions.  Per direction of [employer], to fax [phone] directly to [employer] had been updated of change of address.   Holly Guajardo RN, BC  Manager [facility] Care Coordinator   [phone]  (Fax)    TRANSITIONS OF CARE (SHEYLA) LOG   SHEYLA tasks should be completed by the CC within one (1) business day of notification of each transition. Follow up contact with member is required after return to their usual care setting.  Note:  If CC finds out about the transitions fifteen (15) days or more after the member has returned to their usual care setting, no SHEYLA log is needed. However, the CC should check in with the member to discuss the transition process, any changes needed to the care plan and document it in a case note.    Member Name:  Jade Caba [facility] Name:  [facility]/Health Plan Member ID#: [id number]   Product: [facility] Care Coordinator Contact:  Holly Guajardo RN Agency/[facility]/Care System: [facility]   Transition Communication Actions from Care Management Contact   Transition #1   Notification Date: 9/22/20 Transition Date:   10/2/20 Transition From: Home[place]     Is this the member s usual care setting?               yes Transition To: daughter's home -new residence.    Transition Type:  " Planned  Reason for Admission/Comments:  Due to COVID 19 and increased isolation, Jade moved to her daughter's home in [place].      Shared CC contact info, care plan/services with receiving setting--Date completed: 9/22/20 and ongoing     Notified PCP of transition--Date completed:  9/22/20 and ongoing      via  EMR   Transition #2   Transition #3  (if applicable)   Notification Date:          Transition To:    Transition Date:      Transition Type:      Notified PCP -- Date completed:               Shared CC contact info, care plan/services with receiving setting or, if applicable, home care agency--Date completed:    *Complete additional tasks below, if this transition is a return to usual care setting.      Comments:      Notification Date:          Transition To:    Transition Date:              Transition Type:      Notified PCP--Date completed:          Shared CC contact info, care plan/services with receiving setting or, if applicable, home care agency--Date completed:       *Complete additional tasks below, if this transition is a return to usual care setting.      Comments:       *Complete tasks below when the member is discharging TO their usual care setting within one (1) business day of notification.  For situations where the Care Coordinator is notified of the discharge prior to the date of discharge, the Care Coordinator must follow up with the member or designated representative to confirm that discharge actually occurred and discuss required SHEYLA tasks as outlined in the SHEYLA Instructions.  (This includes situations where it may be a  new  usual care setting for the member. (i.e., a community member who decides upon permanent nursing home placement following hospitalization and rehab).    Date completed: 10/5/20  Communicated with member or their designated representative about the following:  care transition process; about changes to the member s health status; plan of care updates; education  about transitions and how to prevent unplanned transitions/readmissions  Four Pillars for Optimal Transition:    Check  Yes  - if the member, family member and/or SNF/facility staff manages the following:    If  No  provide explanation in the comments section.          [x]  Yes     []  No     Does the member have a follow-up appointment scheduled with primary care or specialist? (Mental health hospitalizations--the appt. should be w/in 7 days)   [x]  Yes     []  No     Can the member manage their medications or is there a system in place to manage medications (e.g. home care set-up)?         [x]  Yes     []  No     Can the member verbalize warning signs and symptoms to watch for and how to respond?         [x]  Yes     []  No     Does the member use a Personal Health Care Record?  Check  Yes  if visit summary, discharge summary, and/or healthcare summary are being used as a PHR.                                                                                                                                                                                    [x] Yes      [] No      Have you updated the member s care plan?  If  No  provide explanation in comments.   Comments:

## 2020-10-08 ENCOUNTER — PATIENT OUTREACH (OUTPATIENT)
Dept: GERIATRIC MEDICINE | Facility: CLINIC | Age: 85
End: 2020-10-08

## 2020-10-08 DIAGNOSIS — Z76.89 HEALTH CARE HOME: ICD-10-CM

## 2020-10-08 NOTE — PROGRESS NOTES
[facility] Care Coordination Contact    Epic notes reviewed, member established care with [facility], noted referral for home care.  Call placed to [facility], cassie return call to review referral and name of home care agency.  Rec'd tele call from [facility], a referral was placed to [facility]  Call placed to [facility], CC informed that a referral was placed for OT and HHA.  CC informed that the referral will be reviewed and will contact CC.  Call placed to member's daughter Marleen, left vm requesting a return call    Call placed to [facility] to inquire on billing for lifeline. Left vm with Germania in billing. Rec'd tele call from Mercedez at [facility] stating that they no longer do billing for Lif[employer] and we should contact [employer] at [phone]. Explained that [facility] is listed on the DHS website as a provider for lifeline. CC informed that they no longer provide this service and to contact [employer]. Call placed [employer], spoke with Dea who states that they are able [facility]   NPI [id number]. Install 30, monthly wireless cost 42. Auth 10/5-20-1/31/2020. Explained that CC will have a [facility] waiver approval form completed. Provided CC contact and fax number.     Rec'd vm from Madina at [facility] to report that they do not have a referral for member. Madina states that at this time their PT is at capacity and recommend referral to go to [facility].  [phone]  Call placed to [facility], spoke with Billie who states that the referral was not received. Billie requests orders and face to face notes be faxed to [phone].  Call placed to [facility], reviewed information above. CC informed that they will fax face to face visit notes and orders to [facility].   Holly Guajardo RN, BC  Manager [facility] Care Coordinator   [phone]   (Fax)

## 2020-10-09 NOTE — PROGRESS NOTES
[facility] Care Coordination Contact    10/8/20  left with Billie at [facility] inquiring if they rec'd the orders/referral for homecare.    10/9/20 Spoke with Billie, she confirmed that orders for homecare were received and will f/u with CC on 10/12/20  Holly Guajardo RN, BC  Manager [facility] Coordinator   [phone]  (Fax)

## 2020-10-13 NOTE — PROGRESS NOTES
"[facility] Care Coordination Contact    Rec'd tele call from Marleen, shared information below. Marleen confirmed PT visit tomorrow at 12:30. Request that Marleen provide this CC name and contact info to PT and request a return call to review recommendations for current walker referral. Marleen shared that her mother is doing well, \"overall she is happier.\"  Marleen reports that her mother is indep with dressing, grooming, toileting, transfers and walking in their home. Marleen states that her mother has some difficulty with steps. Explained that the PT/OT assessment will assist with any equipment needs.  Marleen states that she is unsure if they will need a HHA, will cont with HHA referral at this time and review continued need.   Holly Guajardo RN, BC  Manager [facility] Care Coordinator   [phone]  (Fax)    "

## 2020-10-13 NOTE — PROGRESS NOTES
[facility] Care Coordination Contact    10/12/20 Out of office message. Rec'd vm from member's daughter Marleen requesting a return call regarding walker and pill box. Marleen shared that her mother would like to go for walks outside, but needs a walker.    10/13/20 Per [facility] they are awaiting authorization from [facility] for pill boxes.  CC to f/u with Jaden BLANCAS regarding auth for walker.   Per [facility] they are processing authorizations and will get to this auth this week.     10/13/20 Rec'd tele call from Billie at [facility] to report that an OT cannot open the case, requesting a referral for PT to open member to home care.  Call placed to [facility] to share above information, request PT to be added to homecare referral and faxed to [facility].   10/13/20 Call placed to Marleen, left vm to state that [facility] is waiting for [employer] authorization, hoping that the auth will be completed this week. Explained that CC will attempt to reach her later today.   10/13/20 Rec'd tele call from Billie, she reports that PT can open member's case tomorrow at 12:30, CC provided contact information for daughter Marleen home and cell.  Billie inquired if CC will write an auth to [facility], explained that no auth from CC is required. Billie again confirmed that they are contracted with [facility] and she will f/u with [facility].   10/13/20 Attempted to reach Marleenawilda.  Holly Guajardo RN, BC  Manager [facility] Coordinator   [phone]  (Fax)

## 2020-10-15 ENCOUNTER — PATIENT OUTREACH (OUTPATIENT)
Dept: GERIATRIC MEDICINE | Facility: CLINIC | Age: 85
End: 2020-10-15

## 2020-10-15 NOTE — PROGRESS NOTES
[facility] Care Coordination Contact    Call placed to Jesika, PT with [facility] ([phone]).  Jade Lewis shared that the POC will be 1 PT visit per week x 3 weeks to work on endurance with steps (split level home with 8-9 steps).  Jade Lewis stated that Jade would like to be able to get outside and go for walks. Jade Lewis is recommending a seated 4 WW, stating that she gave her one to borrow until she receives her new walker.  Jade Lewis shared that member is very happy to be living with her daughter, reports no concerns. Jade Lewis stated that she observed member to get in/out of the shower be herself, stating that OT will likely recommend a grab bar to be installed in the shower. Jade Lewis states that the HHA will be cancelled per request from member and family.   Jade Lewis recommends having a handrail installed in the garage entry (2 steps) and a handrail installed in the front entry.   Mara -[phone].  PLAN:  CC to f/u with referral for walker  CC to f/u with OT early next week on recommendations  CC to f/u with family regarding hand rails.  CC to place referrals for grab bar and hand rails with [facility].   Holly Guajardo RN, BC  Manager [facility] Coordinator   [phone]  (Fax)

## 2020-10-16 NOTE — PROGRESS NOTES
[facility] Care Coordination Contact    Per request, re-faxed [facility] otis for nevaeh to Jaden TAYLOR.   Call placed to member's son in law Kevin to review recommendations from PT for handrail placement in the garage entry and front door entry. Kevin states that he has already installed. Reviewed recommendation for a grab bar to be installed in the shower. Kevin states that Mara RUELAS ([phone]) completed a visit yesterday and recommended a suction grab bar. Kevin states that he is unsure of what type or how a grab bar can be installed because of the dynamics of the shower. Kevin's preference is to have a grab bar installed vs a suction grab bar. Explained that CC will f/u with Mara  VM left with Mara to introduce myself and request a return call.  Holly Guajardo RN, BC  Manager [facility] Care Coordinator   [phone]  (Fax)

## 2020-10-16 NOTE — PROGRESS NOTES
"[facility] Care Coordination Contact    10/16/20 Rec'd tele call from Mara OT [facility]. Mara shared that the shower is a walk in corner shower with a fiberglass surround and the entrance is glass.  Mara states that she is recommending (2) 12\" suction cup grab bar, one to be mounted on the glass door and the other to the far side wall. Mara does not recommend placing any other grab bar because they tend to leak behind the fiberglass.     Rec'd orders for walker, e-mailed to [facility]. Request referral for (2) 12\" suction cup grab bars.  CC to follow.  10/20/20 Re sent orders to [facility] for walker and request for suction grab bars as CC rec'd no f/u.  CC rec'd e-mail from [facility] that an EW auth is required for walker bag. Per [facility] the walker bag comes with the walker.    10/21/20 E-mailed waiver approval form for the walker bag and (2) 12\" suction grab bars. [facility] stated that they will send out the pill boxes and walker shortly.  Call placed to member's daughter Marleen to update on the above information. Shared that CC did speak with her  Kevin on Friday and his preference was to have grab bars more stable than suction. Marleen agreed to have the suction grab bars sent to her home.   Marleen reports that everything is going very well with her mother, she has no questions. Marleen stated that they do not need a HHA for showers, that she is available to assist her mother as needed.   Holly Guajardo RN, BC  Manager [facility] Care Coordinator   [phone]  (Fax)        "

## 2020-10-19 ENCOUNTER — PATIENT OUTREACH (OUTPATIENT)
Dept: GERIATRIC MEDICINE | Facility: CLINIC | Age: 85
End: 2020-10-19

## 2020-10-19 NOTE — PROGRESS NOTES
[facility] Care Coordination Contact    Received a request to submit a DTR for the terminated of Assisted Living. Documentation completed and faxed to the health plan. Care Coordinator aware.    Eden Barbour RN  Utilization   [facility]  [phone]

## 2020-10-23 NOTE — PROGRESS NOTES
Per Malcolm at [facility], DME was delivered.  Jade Caba 8/26/1933 4 wheeled seated walker 10/16/2020 DELIVERED   Jade Caba 8/26/1933 pill box 9/30/2020 DELIVERED     Amie Miranda  Care Management Specialist  [facility]  [phone]

## 2020-10-27 ENCOUNTER — PATIENT OUTREACH (OUTPATIENT)
Dept: GERIATRIC MEDICINE | Facility: CLINIC | Age: 85
End: 2020-10-27

## 2020-10-27 NOTE — PROGRESS NOTES
[facility] Care Coordination Contact    VM left with member's daughter Marleen to inquire if walker and med boxes were delivered.   Holly Guajardo RN, BC  Manager [facility] Care Coordinator   [phone]  (Fax)

## 2020-10-29 NOTE — PROGRESS NOTES
[facility] Care Coordination Contact    Left vm with Marleen requesting a return call to inquire if her mother rec'd the DME.     UTF completed for transfer 11/1/20    Rec'd return call from Marleen, she shared that member rec'd all DME.  Holly Guajardo RN, BC  Manager [facility] Care Coordinator   [phone]  (Fax)

## 2020-10-30 NOTE — PROGRESS NOTES
Per Malcolm at [facility], DME was delivered.    Jade Caba  8/26/1933  walker seat  10/16/2020 DELIVERED   Jade Caba  8/26/1933 walker bag, suction wall bar  10/21/2020 DELIVERED       Amie Miranda  Care Management Specialist  [facility]  [phone]

## 2020-11-05 ENCOUNTER — PATIENT OUTREACH (OUTPATIENT)
Dept: GERIATRIC MEDICINE | Facility: CLINIC | Age: 85
End: 2020-11-05

## 2020-11-05 NOTE — PROGRESS NOTES
[facility] Care Coordination Contact    No longer active with [facility] case management effective 10/31/2020.  Reason for community disenrollment: Change Care System/PCC to [facility] Guajardo RN, BC  Manager [facility] Care Coordinator   [phone]  (Fax)

## 2021-08-16 ENCOUNTER — TRANSFERRED RECORDS (OUTPATIENT)
Dept: HEALTH INFORMATION MANAGEMENT | Facility: CLINIC | Age: 86
End: 2021-08-16

## 2021-08-20 ENCOUNTER — OFFICE VISIT (OUTPATIENT)
Dept: CARDIOLOGY | Facility: CLINIC | Age: 86
End: 2021-08-20
Payer: COMMERCIAL

## 2021-08-20 VITALS
SYSTOLIC BLOOD PRESSURE: 130 MMHG | HEIGHT: 62 IN | BODY MASS INDEX: 27.6 KG/M2 | HEART RATE: 60 BPM | WEIGHT: 150 LBS | DIASTOLIC BLOOD PRESSURE: 64 MMHG

## 2021-08-20 DIAGNOSIS — I25.10 ARTERIOSCLEROSIS OF CORONARY ARTERY: ICD-10-CM

## 2021-08-20 DIAGNOSIS — R42 DIZZINESS AND GIDDINESS: ICD-10-CM

## 2021-08-20 DIAGNOSIS — I10 ESSENTIAL HYPERTENSION: ICD-10-CM

## 2021-08-20 DIAGNOSIS — I70.1 RENAL ARTERY STENOSIS (H): ICD-10-CM

## 2021-08-20 DIAGNOSIS — Z98.890 H/O CAROTID ENDARTERECTOMY: Primary | ICD-10-CM

## 2021-08-20 PROCEDURE — 99214 OFFICE O/P EST MOD 30 MIN: CPT | Performed by: INTERNAL MEDICINE

## 2021-08-20 RX ORDER — CARVEDILOL 3.12 MG/1
3.12 TABLET ORAL 2 TIMES DAILY WITH MEALS
Qty: 180 TABLET | Refills: 3 | Status: ON HOLD | OUTPATIENT
Start: 2021-08-20 | End: 2023-01-01

## 2021-08-20 ASSESSMENT — MIFFLIN-ST. JEOR: SCORE: 1068.65

## 2021-08-20 NOTE — PROGRESS NOTES
Cardiology Progress Note          Assessment and Plan:       1. Orthostasis, dyspnea on exertion and lower extremity fatigue    These all may be attributable to chronotropic blunting in the setting of poor vascular compliance from arterial sclerosis.  We will try cutting her carvedilol from 12.5 mg twice daily down to just 3.125 mg twice daily.  She will follow-up with Dionna Parnell in 2 to 4 weeks to see if her symptoms have improved.  If no significant improvement, may consider 1 week ZIO and/or further ischemic evaluation with nuclear stress testing.      2. History of carotid endarterectomy    Recheck with carotid ultrasound      3. Right renal artery stenosis    Follow-up renal artery ultrasound      4. Coronary artery disease status post PCI    Continue medical management at this point.  Patient is on maximum dose statin.    30 minutes was spent with the patient, precharting and reviewing tests as well as post visit charting all done today..    This note was transcribed using electronic voice recognition software and there may be typographical errors present.                    Interval History:     The patient is a very pleasant 87 year old whom I am meeting for the first time today.  She is here with her daughter, Marleen.  She has history of coronary artery disease status post PCI last being in 2018.  She also has bilateral carotid endarterectomy and right renal artery stenosis at the origin.  She has very stiff vessels overall and poor vascular compliance which can exacerbate swings of blood pressure.    She has recently been having symptoms of orthostasis, likely compounded by blunted heart rates on carvedilol 12.5 mg twice daily.  She is and has been borderline bradycardic for the last few clinic visits.  She also has dyspnea on exertion and decreased lower extremity stamina.                     Review of Systems:     Review of Systems:  Skin:  Negative for     Eyes:  Positive for glasses  ENT:   "Positive for hearing loss  Respiratory:  Positive for dyspnea on exertion  Cardiovascular:  Negative for;palpitations;chest pain edema;Positive for;lightheadedness  Gastroenterology: Positive for constipation  Genitourinary:  Negative for incontinence  Musculoskeletal:  Positive for arthritis;neck pain  Neurologic:  Negative for    Psychiatric:  Positive for sleep disturbances  Heme/Lymph/Imm:  Negative for easy bruising  Endocrine:  Negative for                Physical Exam:     Vitals: /64   Pulse 60   Ht 1.575 m (5' 2\")   Wt 68 kg (150 lb)   BMI 27.44 kg/m    Constitutional:  cooperative   elderly    Skin:  warm and dry to the touch        Head:  normocephalic        Eyes:       equal and reactive    Chest:  normal symmetry        Cardiac: regular rhythm bradycardic     systolic ejection murmur;grade 1          Extremities and Back:       trace edema    Neurological:  no gross motor deficits                 Medications:     Current Outpatient Medications   Medication Sig Dispense Refill     Acetaminophen (TYLENOL PO) Take 1,000 mg by mouth 3 times daily        aspirin (ASA) 81 MG EC tablet Take 1 tablet (81 mg) by mouth daily 90 tablet 3     carvedilol (COREG) 3.125 MG tablet Take 1 tablet (3.125 mg) by mouth 2 times daily (with meals) 180 tablet 3     cholecalciferol (VITAMIN D3) 5000 UNITS CAPS capsule Take 5,000 Units by mouth daily       citalopram (CELEXA) 10 MG tablet Take 20 mg by mouth daily       furosemide (LASIX) 20 MG tablet Take 20 mg by mouth daily       levETIRAcetam (KEPPRA) 500 MG tablet Take 500 mg by mouth 2 times daily       losartan (COZAAR) 100 MG tablet Take 100 mg by mouth daily       omeprazole (PRILOSEC) 40 MG capsule Take 1 capsule (40 mg) by mouth daily Take 30-60 minutes before a meal. 30 capsule 9     oxybutynin (DITROPAN) 5 MG tablet Take 5 mg by mouth 2 times daily       polyethylene glycol (MIRALAX/GLYCOLAX) powder Take 17 g by mouth nightly as needed        rosuvastatin " (CRESTOR) 40 MG tablet Take 40 mg by mouth At Bedtime        amLODIPine (NORVASC) 10 MG tablet Take 10 mg by mouth At Bedtime (Patient not taking: Reported on 8/20/2021)       bisacodyl (DULCOLAX) 10 MG suppository Place 10 mg rectally daily as needed (Patient not taking: Reported on 8/20/2021)       calcium carbonate 500 mg, elemental, (OSCAL;OYSTER SHELL CALCIUM) 500 MG tablet Take 2 tablets by mouth every evening  (Patient not taking: Reported on 8/20/2021)       diclofenac (VOLTAREN) 1 % topical gel Apply 2 g topically 2 times daily as needed LEFT KNEE  (Patient not taking: Reported on 8/20/2021)       folic acid (FOLVITE) 1 MG tablet Take 1 mg by mouth daily (Patient not taking: Reported on 8/20/2021)       hypromellose (ARTIFICIAL TEARS) 0.5 % SOLN ophthalmic solution Place 1 drop into both eyes 3 times daily        melatonin 5 MG tablet Take 5 mg by mouth At Bedtime (Patient not taking: Reported on 8/20/2021)       Mirtazapine (REMERON PO) Take 15 mg by mouth 2 times daily (Patient not taking: Reported on 8/20/2021)       Multiple Vitamins-Minerals (CERTAVITE SENIOR/ANTIOXIDANT PO) Take 1 tablet by mouth daily  (Patient not taking: Reported on 8/20/2021)       senna-docusate (SENNA S) 8.6-50 MG per tablet Take 2 tablets by mouth At Bedtime Please hold if having loose stools and contact nurse. (Patient not taking: Reported on 8/20/2021)       traZODone (DESYREL) 50 MG tablet Take 1 tablet (50 mg) by mouth At Bedtime (Patient not taking: Reported on 8/20/2021) 30 tablet 11     trolamine salicylate (ASPERCREME) 10 % external cream Apply topically as needed for moderate pain (apply twice daily as needed for pain. Left knee) AND BID SCHEDULED                  Data:   All laboratory data reviewed  No results found for this or any previous visit (from the past 24 hour(s)).    All laboratory data reviewed  Lab Results   Component Value Date    CHOL 177 07/09/2020    CHOL 177 07/09/2020     Lab Results   Component  Value Date    HDL 44 07/09/2020    HDL 44 07/09/2020     Lab Results   Component Value Date     07/09/2020     07/09/2020     Lab Results   Component Value Date    TRIG 113 07/09/2020    TRIG 113 07/09/2020     Lab Results   Component Value Date    CHOLHDLRATIO 3.7 08/14/2013     TSH   Date Value Ref Range Status   07/03/2013 2.56 0.4 - 5.0 mU/L Final     Last Basic Metabolic Panel:  Lab Results   Component Value Date     07/09/2020     07/09/2020      Lab Results   Component Value Date    POTASSIUM 3.8 07/09/2020    POTASSIUM 3.8 07/09/2020     Lab Results   Component Value Date    CHLORIDE 99 07/09/2020    CHLORIDE 99 07/09/2020     Lab Results   Component Value Date    FRANCISCO 9.4 07/09/2020    FRANCISCO 9.4 07/09/2020     Lab Results   Component Value Date    CO2 28 07/09/2020    CO2 28 07/09/2020     Lab Results   Component Value Date    BUN 12 07/09/2020    BUN 12 07/09/2020     Lab Results   Component Value Date    CR 1.06 07/09/2020    CR 1.06 07/09/2020     Lab Results   Component Value Date    GLC 86 07/09/2020    GLC 86 07/09/2020     Lab Results   Component Value Date    WBC 7.9 03/03/2020     Lab Results   Component Value Date    RBC 4.20 03/03/2020     Lab Results   Component Value Date    HGB 13.1 03/03/2020     Lab Results   Component Value Date    HCT 40.8 03/03/2020     Lab Results   Component Value Date    MCV 97 03/03/2020     Lab Results   Component Value Date    MCH 31.2 03/03/2020     Lab Results   Component Value Date    MCHC 32.1 03/03/2020     Lab Results   Component Value Date    RDW 11.6 03/03/2020     Lab Results   Component Value Date     03/03/2020

## 2021-08-20 NOTE — LETTER
8/20/2021    Zully Kunz, DO  [address] [facility] [address]    RE: Jade Caba       Dear Colleague,    I had the pleasure of seeing Jade Caba in the [facility].    Cardiology Progress Note          Assessment and Plan:       1. Orthostasis, dyspnea on exertion and lower extremity fatigue    These all may be attributable to chronotropic blunting in the setting of poor vascular compliance from arterial sclerosis.  We will try cutting her carvedilol from 12.5 mg twice daily down to just 3.125 mg twice daily.  She will follow-up with Dionna Parnell in 2 to 4 weeks to see if her symptoms have improved.  If no significant improvement, may consider 1 week ZIO and/or further ischemic evaluation with nuclear stress testing.      2. History of carotid endarterectomy    Recheck with carotid ultrasound      3. Right renal artery stenosis    Follow-up renal artery ultrasound      4. Coronary artery disease status post PCI    Continue medical management at this point.  Patient is on maximum dose statin.    30 minutes was spent with the patient, precharting and reviewing tests as well as post visit charting all done today..    This note was transcribed using electronic voice recognition software and there may be typographical errors present.                    Interval History:     The patient is a very pleasant 87 year old whom I am meeting for the first time today.  She is here with her daughter, Marleen.  She has history of coronary artery disease status post PCI last being in 2018.  She also has bilateral carotid endarterectomy and right renal artery stenosis at the origin.  She has very stiff vessels overall and poor vascular compliance which can exacerbate swings of blood pressure.    She has recently been having symptoms of orthostasis, likely compounded by blunted heart rates on carvedilol 12.5 mg twice daily.  She is and has been borderline  "bradycardic for the last few clinic visits.  She also has dyspnea on exertion and decreased lower extremity stamina.                     Review of Systems:     Review of Systems:  Skin:  Negative for     Eyes:  Positive for glasses  ENT:  Positive for hearing loss  Respiratory:  Positive for dyspnea on exertion  Cardiovascular:  Negative for;palpitations;chest pain edema;Positive for;lightheadedness  Gastroenterology: Positive for constipation  Genitourinary:  Negative for incontinence  Musculoskeletal:  Positive for arthritis;neck pain  Neurologic:  Negative for    Psychiatric:  Positive for sleep disturbances  Heme/Lymph/Imm:  Negative for easy bruising  Endocrine:  Negative for                Physical Exam:     Vitals: /64   Pulse 60   Ht 1.575 m (5' 2\")   Wt 68 kg (150 lb)   BMI 27.44 kg/m    Constitutional:  cooperative   elderly    Skin:  warm and dry to the touch        Head:  normocephalic        Eyes:       equal and reactive    Chest:  normal symmetry        Cardiac: regular rhythm bradycardic     systolic ejection murmur;grade 1          Extremities and Back:       trace edema    Neurological:  no gross motor deficits                 Medications:     Current Outpatient Medications   Medication Sig Dispense Refill     Acetaminophen (TYLENOL PO) Take 1,000 mg by mouth 3 times daily        aspirin (ASA) 81 MG EC tablet Take 1 tablet (81 mg) by mouth daily 90 tablet 3     carvedilol (COREG) 3.125 MG tablet Take 1 tablet (3.125 mg) by mouth 2 times daily (with meals) 180 tablet 3     cholecalciferol (VITAMIN D3) 5000 UNITS CAPS capsule Take 5,000 Units by mouth daily       citalopram (CELEXA) 10 MG tablet Take 20 mg by mouth daily       furosemide (LASIX) 20 MG tablet Take 20 mg by mouth daily       levETIRAcetam (KEPPRA) 500 MG tablet Take 500 mg by mouth 2 times daily       losartan (COZAAR) 100 MG tablet Take 100 mg by mouth daily       omeprazole (PRILOSEC) 40 MG capsule Take 1 capsule (40 mg) by " mouth daily Take 30-60 minutes before a meal. 30 capsule 9     oxybutynin (DITROPAN) 5 MG tablet Take 5 mg by mouth 2 times daily       polyethylene glycol (MIRALAX/GLYCOLAX) powder Take 17 g by mouth nightly as needed        rosuvastatin (CRESTOR) 40 MG tablet Take 40 mg by mouth At Bedtime        amLODIPine (NORVASC) 10 MG tablet Take 10 mg by mouth At Bedtime (Patient not taking: Reported on 8/20/2021)       bisacodyl (DULCOLAX) 10 MG suppository Place 10 mg rectally daily as needed (Patient not taking: Reported on 8/20/2021)       calcium carbonate 500 mg, elemental, (OSCAL;OYSTER SHELL CALCIUM) 500 MG tablet Take 2 tablets by mouth every evening  (Patient not taking: Reported on 8/20/2021)       diclofenac (VOLTAREN) 1 % topical gel Apply 2 g topically 2 times daily as needed LEFT KNEE  (Patient not taking: Reported on 8/20/2021)       folic acid (FOLVITE) 1 MG tablet Take 1 mg by mouth daily (Patient not taking: Reported on 8/20/2021)       hypromellose (ARTIFICIAL TEARS) 0.5 % SOLN ophthalmic solution Place 1 drop into both eyes 3 times daily        melatonin 5 MG tablet Take 5 mg by mouth At Bedtime (Patient not taking: Reported on 8/20/2021)       Mirtazapine (REMERON PO) Take 15 mg by mouth 2 times daily (Patient not taking: Reported on 8/20/2021)       Multiple Vitamins-Minerals (CERTAVITE SENIOR/ANTIOXIDANT PO) Take 1 tablet by mouth daily  (Patient not taking: Reported on 8/20/2021)       senna-docusate (SENNA S) 8.6-50 MG per tablet Take 2 tablets by mouth At Bedtime Please hold if having loose stools and contact nurse. (Patient not taking: Reported on 8/20/2021)       traZODone (DESYREL) 50 MG tablet Take 1 tablet (50 mg) by mouth At Bedtime (Patient not taking: Reported on 8/20/2021) 30 tablet 11     trolamine salicylate (ASPERCREME) 10 % external cream Apply topically as needed for moderate pain (apply twice daily as needed for pain. Left knee) AND BID SCHEDULED                  Data:   All  laboratory data reviewed  No results found for this or any previous visit (from the past 24 hour(s)).    All laboratory data reviewed  Lab Results   Component Value Date    CHOL 177 07/09/2020    CHOL 177 07/09/2020     Lab Results   Component Value Date    HDL 44 07/09/2020    HDL 44 07/09/2020     Lab Results   Component Value Date     07/09/2020     07/09/2020     Lab Results   Component Value Date    TRIG 113 07/09/2020    TRIG 113 07/09/2020     Lab Results   Component Value Date    CHOLHDLRATIO 3.7 08/14/2013     TSH   Date Value Ref Range Status   07/03/2013 2.56 0.4 - 5.0 mU/L Final     Last Basic Metabolic Panel:  Lab Results   Component Value Date     07/09/2020     07/09/2020      Lab Results   Component Value Date    POTASSIUM 3.8 07/09/2020    POTASSIUM 3.8 07/09/2020     Lab Results   Component Value Date    CHLORIDE 99 07/09/2020    CHLORIDE 99 07/09/2020     Lab Results   Component Value Date    FRANCISCO 9.4 07/09/2020    FRANCISCO 9.4 07/09/2020     Lab Results   Component Value Date    CO2 28 07/09/2020    CO2 28 07/09/2020     Lab Results   Component Value Date    BUN 12 07/09/2020    BUN 12 07/09/2020     Lab Results   Component Value Date    CR 1.06 07/09/2020    CR 1.06 07/09/2020     Lab Results   Component Value Date    GLC 86 07/09/2020    GLC 86 07/09/2020     Lab Results   Component Value Date    WBC 7.9 03/03/2020     Lab Results   Component Value Date    RBC 4.20 03/03/2020     Lab Results   Component Value Date    HGB 13.1 03/03/2020     Lab Results   Component Value Date    HCT 40.8 03/03/2020     Lab Results   Component Value Date    MCV 97 03/03/2020     Lab Results   Component Value Date    MCH 31.2 03/03/2020     Lab Results   Component Value Date    MCHC 32.1 03/03/2020     Lab Results   Component Value Date    RDW 11.6 03/03/2020     Lab Results   Component Value Date     03/03/2020                   Thank you for allowing me to participate in the care of  your patient.      Sincerely,     James Rojas MD     [facility]  cc:   No referring provider defined for this encounter.

## 2021-09-24 ENCOUNTER — HOSPITAL ENCOUNTER (OUTPATIENT)
Dept: CARDIOLOGY | Facility: CLINIC | Age: 86
End: 2021-09-24
Attending: INTERNAL MEDICINE
Payer: COMMERCIAL

## 2021-09-24 DIAGNOSIS — I70.1 RENAL ARTERY STENOSIS (H): ICD-10-CM

## 2021-09-24 DIAGNOSIS — R42 DIZZINESS AND GIDDINESS: ICD-10-CM

## 2021-09-24 DIAGNOSIS — Z98.890 H/O CAROTID ENDARTERECTOMY: ICD-10-CM

## 2021-09-24 PROCEDURE — 93880 EXTRACRANIAL BILAT STUDY: CPT

## 2021-09-24 PROCEDURE — 93975 VASCULAR STUDY: CPT

## 2021-09-24 PROCEDURE — 93880 EXTRACRANIAL BILAT STUDY: CPT | Mod: 26 | Performed by: INTERNAL MEDICINE

## 2021-09-24 PROCEDURE — 93975 VASCULAR STUDY: CPT | Mod: 26 | Performed by: INTERNAL MEDICINE

## 2021-09-24 PROCEDURE — 76770 US EXAM ABDO BACK WALL COMP: CPT | Mod: 26 | Performed by: INTERNAL MEDICINE

## 2021-09-29 ENCOUNTER — OFFICE VISIT (OUTPATIENT)
Dept: CARDIOLOGY | Facility: CLINIC | Age: 86
End: 2021-09-29
Attending: INTERNAL MEDICINE
Payer: COMMERCIAL

## 2021-09-29 VITALS
WEIGHT: 146.8 LBS | SYSTOLIC BLOOD PRESSURE: 130 MMHG | HEART RATE: 65 BPM | BODY MASS INDEX: 27.02 KG/M2 | HEIGHT: 62 IN | OXYGEN SATURATION: 99 % | DIASTOLIC BLOOD PRESSURE: 76 MMHG

## 2021-09-29 DIAGNOSIS — I70.1 RENAL ARTERY STENOSIS (H): ICD-10-CM

## 2021-09-29 DIAGNOSIS — I25.10 CORONARY ARTERY DISEASE INVOLVING NATIVE CORONARY ARTERY OF NATIVE HEART WITHOUT ANGINA PECTORIS: ICD-10-CM

## 2021-09-29 DIAGNOSIS — Z98.890 H/O CAROTID ENDARTERECTOMY: ICD-10-CM

## 2021-09-29 DIAGNOSIS — R42 DIZZINESS AND GIDDINESS: Primary | ICD-10-CM

## 2021-09-29 DIAGNOSIS — I10 ESSENTIAL HYPERTENSION: ICD-10-CM

## 2021-09-29 DIAGNOSIS — I65.23 BILATERAL CAROTID ARTERY STENOSIS: ICD-10-CM

## 2021-09-29 PROCEDURE — 99214 OFFICE O/P EST MOD 30 MIN: CPT | Performed by: PHYSICIAN ASSISTANT

## 2021-09-29 ASSESSMENT — MIFFLIN-ST. JEOR: SCORE: 1049.13

## 2021-09-29 NOTE — PATIENT INSTRUCTIONS
Thank you for your [facility] visit today. Your provider has recommended the following:  Medication Changes:  No changes  Recommendations:  I will talk with Dr Rojas about the carotid ultrasound and get back to you if any further testing is needed  Follow-up:  See Dr Rojas or an [facility] for cardiology follow up in 1 year.    Reminder:  Please bring in your current medication list or your medication, over the counter supplements and vitamin bottles as we will review these at each office visit.

## 2021-09-29 NOTE — PROGRESS NOTES
SERVICE DATE: 9/29/2021     Primary Cardiologist: Dr Rojas    REASON FOR VISIT:  Jade Caba presents for follow up after medication changes and a carotid ultrasound    HISTORY OF PRESENT ILLNESS/ASSESSMENT AND PLAN:  Cardiovascular history includes coronary artery disease for which she has undergone multivessel PCI in the past.  Initially she had angioplasty to the circumflex in 1997.  She subsequently had drug-eluting stent placement to the LAD in 2004, drug-eluting stent placement to the circumflex and RCA in 2011 and subsequent recurrent symptoms with drug-eluting stent placement to the mid LAD, also in 2011.  She had non-STEMI in 02/2018 at which time she was noted to have a thrombotic lesion in the mid RCA which was treated with drug-eluting stent placement.  Unfortunately, there guidewire trauma in the proximal RCA and she had stent placement to this area as well.  Additionally, she was found to have significant in-stent restenosis in the LAD and underwent repeat drug-eluting stent placement there too. In 07/2019, she did undergo a repeat coronary angiogram which showed no significant change since 2018.  No PCI was performed.  She also has a history of CVA and prior right carotid endarterectomy in 2003 and left carotid endarterectomy in 2007.  She has known renal artery stenosis.  She has previously been noted to have a PFO.  She has hypertension, hyperlipidemia and a prior subarachnoid hemorrhage following a mechanical fall.     She recently was seen by Dr Rojas 8/20 for dizziness/lightheadedness that was felt to likely be from some orthostasis, likely compounded by blunted heart rates on carvedilol. Also dyspnea on exertion and decreased lower extremity stamina. Carvedilol decreased from 12.5 mg 2x daily to 3.125 mg 2x daily. If symptoms don't improve, Dr Rojas recommended considering a 1 week Zio or further ischemic eval w/nuclear stress test. Repeat carotid and renal ultrasounds were also ordered.     She  actually also was recently seen by [facility]rology[facility] (Suzi Davila PA-C). She is being weaned off her Keppra. 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 It looks like a Zio Patch monitor was also recommended.     At this time Jade is doing fairly well. Her daughter states there has been a significant improvement in her symptoms since lowering the dose of carvedilol. She still occ gets dizzy when going from bent over to standing but not really when going from sitting to standing. No CP. No other neuro symptoms. Pt's daughter states they did not follow through with the recommendations from the neuro clinic since they had come to the cardiology clinic and we had made some changes and ordered testing.      1. Lightheadedness, orthostatic component.  -Improved with lower dose of carvedilol, will continue with this dose.    2. CAD with hx of multivessel PCI  -Stable, continue ASA and rosuvastatin    3. MIKEL  -Stable renal artery ultrasound    4. Carotid artery disease, hx of prior s/p left endarterectomy 2007, right endarterectomy 2003  -Less than 50% on the R, ~70% on the L by ultrasound.  -Not symptomatic, likely continue with medical management but will review with Dr Rojas to ensure he does not recommend additional testing. [ADDENDUM 9/30/2021: D/W Dr Rojas, he agree with medical management and follow up ultrasound in a year or so]    Follow up: 1 year with an [facility].  Of course, the patient was encouraged to contact us sooner with questions or concerns.    Dionna Parnell PA-C      CURRENT MEDICATIONS:   Current Outpatient Medications   Medication Sig Dispense Refill     Acetaminophen (TYLENOL PO) Take 650 mg by mouth 3 times daily        aspirin (ASA) 81 MG EC tablet Take 1 tablet (81 mg) by mouth daily 90 tablet 3     carvedilol (COREG) 3.125 MG tablet Take 1 tablet (3.125 mg) by mouth 2 times daily (with meals) 180 tablet 3      "cholecalciferol (VITAMIN D3) 5000 UNITS CAPS capsule Take 5,000 Units by mouth daily       citalopram (CELEXA) 10 MG tablet Take 20 mg by mouth daily       furosemide (LASIX) 20 MG tablet Take 20 mg by mouth daily       losartan (COZAAR) 100 MG tablet Take 100 mg by mouth daily       omeprazole (PRILOSEC) 40 MG capsule Take 1 capsule (40 mg) by mouth daily Take 30-60 minutes before a meal. 30 capsule 9     oxybutynin (DITROPAN) 5 MG tablet Take 5 mg by mouth 2 times daily       rosuvastatin (CRESTOR) 40 MG tablet Take 40 mg by mouth At Bedtime        hypromellose (ARTIFICIAL TEARS) 0.5 % SOLN ophthalmic solution Place 1 drop into both eyes 3 times daily        polyethylene glycol (MIRALAX/GLYCOLAX) powder Take 17 g by mouth nightly as needed        trolamine salicylate (ASPERCREME) 10 % external cream Apply topically as needed for moderate pain (apply twice daily as needed for pain. Left knee) AND BID SCHEDULED         ALLERGIES:  No Known Allergies    ROS:  Skin:  Positive for bruising   Eyes:  Positive for eye pain  ENT:  Negative    Respiratory:  Negative    Cardiovascular:    lightheadedness;Positive for;edema  Gastroenterology: Negative    Genitourinary:  Negative    Musculoskeletal:  Negative    Neurologic:  Positive for numbness or tingling of feet  Psychiatric:  Positive for sleep disturbances  Heme/Lymph/Imm:  Positive for easy bruising  Endocrine:  Negative      PHYSICAL EXAMINATION:    GENERAL:  The patient is a pleasant 88 year old who appears their stated age.  In no apparent distress.  VITAL SIGNS:  There were no vitals taken for this visit.    RESPIRATORY:  Breathing is nonlabored.    CARDIAC:  RRR, no murmur  EXTREMITIES:  No BLE edema  NEUROLOGIC:  Alert and oriented.    DATA REVIEWED:    -Carotid ultrasound 9/24/21-less than 50% on the R, ~70% on the L (of note I think the report has this miss labeled in the impression as \"right\")  -Renal ultrasound 9/24/21- stable 60% stenosis in the R and normal " L julius.

## 2021-09-29 NOTE — LETTER
9/29/2021    Zully Kunz DO  [address] [facility] [address]    RE: Jade Caba       Dear Colleague,    I had the pleasure of seeing Jade Caba in the [facility].    SERVICE DATE: 9/29/2021     Primary Cardiologist: Dr Rojas    REASON FOR VISIT:  Jade Caba presents for follow up after medication changes and a carotid ultrasound    HISTORY OF PRESENT ILLNESS/ASSESSMENT AND PLAN:  Cardiovascular history includes coronary artery disease for which she has undergone multivessel PCI in the past.  Initially she had angioplasty to the circumflex in 1997.  She subsequently had drug-eluting stent placement to the LAD in 2004, drug-eluting stent placement to the circumflex and RCA in 2011 and subsequent recurrent symptoms with drug-eluting stent placement to the mid LAD, also in 2011.  She had non-STEMI in 02/2018 at which time she was noted to have a thrombotic lesion in the mid RCA which was treated with drug-eluting stent placement.  Unfortunately, there guidewire trauma in the proximal RCA and she had stent placement to this area as well.  Additionally, she was found to have significant in-stent restenosis in the LAD and underwent repeat drug-eluting stent placement there too. In 07/2019, she did undergo a repeat coronary angiogram which showed no significant change since 2018.  No PCI was performed.  She also has a history of CVA and prior right carotid endarterectomy in 2003 and left carotid endarterectomy in 2007.  She has known renal artery stenosis.  She has previously been noted to have a PFO.  She has hypertension, hyperlipidemia and a prior subarachnoid hemorrhage following a mechanical fall.     She recently was seen by Dr Rojas 8/20 for dizziness/lightheadedness that was felt to likely be from some orthostasis, likely compounded by blunted heart rates on carvedilol. Also dyspnea on exertion and decreased lower extremity stamina. Carvedilol  decreased from 12.5 mg 2x daily to 3.125 mg 2x daily. If symptoms don't improve, Dr Rojas recommended considering a 1 week Zio or further ischemic eval w/nuclear stress test. Repeat carotid and renal ultrasounds were also ordered.     She actually also was recently seen by [facility] (Suzi Davila PA-C). She is being weaned off her Keppra. 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 It looks like a Zio Patch monitor was also recommended.     At this time Jade is doing fairly well. Her daughter states there has been a significant improvement in her symptoms since lowering the dose of carvedilol. She still occ gets dizzy when going from bent over to standing but not really when going from sitting to standing. No CP. No other neuro symptoms. Pt's daughter states they did not follow through with the recommendations from the neuro clinic since they had come to the cardiology clinic and we had made some changes and ordered testing.      1. Lightheadedness, orthostatic component.  -Improved with lower dose of carvedilol, will continue with this dose.    2. CAD with hx of multivessel PCI  -Stable, continue ASA and rosuvastatin    3. MIKEL  -Stable renal artery ultrasound    4. Carotid artery disease, hx of prior s/p left endarterectomy 2007, right endarterectomy 2003  -Less than 50% on the R, ~70% on the L by ultrasound.  -Not symptomatic, likely continue with medical management but will review with Dr Rojas to ensure he does not recommend additional testing. [ADDENDUM 9/30/2021: D/W Dr Rojas, he agree with medical management and follow up ultrasound in a year or so]    Follow up: 1 year with an LUCI.  Of course, the patient was encouraged to contact us sooner with questions or concerns.    Dionna Parnell PA-C      CURRENT MEDICATIONS:   Current Outpatient Medications   Medication Sig Dispense Refill     Acetaminophen (TYLENOL PO)  Take 650 mg by mouth 3 times daily        aspirin (ASA) 81 MG EC tablet Take 1 tablet (81 mg) by mouth daily 90 tablet 3     carvedilol (COREG) 3.125 MG tablet Take 1 tablet (3.125 mg) by mouth 2 times daily (with meals) 180 tablet 3     cholecalciferol (VITAMIN D3) 5000 UNITS CAPS capsule Take 5,000 Units by mouth daily       citalopram (CELEXA) 10 MG tablet Take 20 mg by mouth daily       furosemide (LASIX) 20 MG tablet Take 20 mg by mouth daily       losartan (COZAAR) 100 MG tablet Take 100 mg by mouth daily       omeprazole (PRILOSEC) 40 MG capsule Take 1 capsule (40 mg) by mouth daily Take 30-60 minutes before a meal. 30 capsule 9     oxybutynin (DITROPAN) 5 MG tablet Take 5 mg by mouth 2 times daily       rosuvastatin (CRESTOR) 40 MG tablet Take 40 mg by mouth At Bedtime        hypromellose (ARTIFICIAL TEARS) 0.5 % SOLN ophthalmic solution Place 1 drop into both eyes 3 times daily        polyethylene glycol (MIRALAX/GLYCOLAX) powder Take 17 g by mouth nightly as needed        trolamine salicylate (ASPERCREME) 10 % external cream Apply topically as needed for moderate pain (apply twice daily as needed for pain. Left knee) AND BID SCHEDULED         ALLERGIES:  No Known Allergies    ROS:  Skin:  Positive for bruising   Eyes:  Positive for eye pain  ENT:  Negative    Respiratory:  Negative    Cardiovascular:    lightheadedness;Positive for;edema  Gastroenterology: Negative    Genitourinary:  Negative    Musculoskeletal:  Negative    Neurologic:  Positive for numbness or tingling of feet  Psychiatric:  Positive for sleep disturbances  Heme/Lymph/Imm:  Positive for easy bruising  Endocrine:  Negative      PHYSICAL EXAMINATION:    GENERAL:  The patient is a pleasant 88 year old who appears their stated age.  In no apparent distress.  VITAL SIGNS:  There were no vitals taken for this visit.    RESPIRATORY:  Breathing is nonlabored.    CARDIAC:  RRR, no murmur  EXTREMITIES:  No BLE edema  NEUROLOGIC:  Alert and  "oriented.    DATA REVIEWED:    -Carotid ultrasound 9/24/21-less than 50% on the R, ~70% on the L (of note I think the report has this miss labeled in the impression as \"right\")  -Renal ultrasound 9/24/21- stable 60% stenosis in the R and normal L velocities.      Thank you for allowing me to participate in the care of your patient.      Sincerely,     Dionna Parnell PA-C     [facility]  cc:   James Rojas MD  [address] [facility] [address]  [place] [address]        "

## 2021-09-30 ENCOUNTER — MYC MEDICAL ADVICE (OUTPATIENT)
Dept: CARDIOLOGY | Facility: CLINIC | Age: 86
End: 2021-09-30

## 2021-10-23 ENCOUNTER — HEALTH MAINTENANCE LETTER (OUTPATIENT)
Age: 86
End: 2021-10-23

## 2022-05-23 ENCOUNTER — MEDICAL CORRESPONDENCE (OUTPATIENT)
Dept: HEALTH INFORMATION MANAGEMENT | Facility: CLINIC | Age: 87
End: 2022-05-23

## 2022-09-05 ENCOUNTER — HOSPITAL ENCOUNTER (OUTPATIENT)
Facility: CLINIC | Age: 87
Setting detail: OBSERVATION
Discharge: NURSING, REHAB OR LONG-TERM CARE | End: 2022-09-06
Attending: EMERGENCY MEDICINE | Admitting: INTERNAL MEDICINE
Payer: COMMERCIAL

## 2022-09-05 ENCOUNTER — APPOINTMENT (OUTPATIENT)
Dept: CT IMAGING | Facility: CLINIC | Age: 87
End: 2022-09-05
Attending: EMERGENCY MEDICINE
Payer: COMMERCIAL

## 2022-09-05 DIAGNOSIS — M54.2 NECK PAIN: ICD-10-CM

## 2022-09-05 DIAGNOSIS — I65.23 BILATERAL CAROTID ARTERY STENOSIS: Primary | ICD-10-CM

## 2022-09-05 DIAGNOSIS — R79.89 ELEVATED TROPONIN: ICD-10-CM

## 2022-09-05 LAB
ALBUMIN SERPL BCG-MCNC: 3.7 G/DL (ref 3.5–5.2)
ALP SERPL-CCNC: 51 U/L (ref 35–104)
ALT SERPL W P-5'-P-CCNC: 19 U/L (ref 10–35)
ANION GAP SERPL CALCULATED.3IONS-SCNC: 9 MMOL/L (ref 7–15)
AST SERPL W P-5'-P-CCNC: 23 U/L (ref 10–35)
BASOPHILS # BLD AUTO: 0 10E3/UL (ref 0–0.2)
BASOPHILS NFR BLD AUTO: 1 %
BILIRUB SERPL-MCNC: 0.4 MG/DL
BUN SERPL-MCNC: 24.8 MG/DL (ref 8–23)
CALCIUM SERPL-MCNC: 9.5 MG/DL (ref 8.8–10.2)
CHLORIDE SERPL-SCNC: 90 MMOL/L (ref 98–107)
CREAT SERPL-MCNC: 1.13 MG/DL (ref 0.51–0.95)
DEPRECATED HCO3 PLAS-SCNC: 32 MMOL/L (ref 22–29)
EOSINOPHIL # BLD AUTO: 0.1 10E3/UL (ref 0–0.7)
EOSINOPHIL NFR BLD AUTO: 1 %
ERYTHROCYTE [DISTWIDTH] IN BLOOD BY AUTOMATED COUNT: 11.8 % (ref 10–15)
GFR SERPL CREATININE-BSD FRML MDRD: 46 ML/MIN/1.73M2
GLUCOSE SERPL-MCNC: 102 MG/DL (ref 70–99)
HCT VFR BLD AUTO: 41.5 % (ref 35–47)
HGB BLD-MCNC: 13.9 G/DL (ref 11.7–15.7)
IMM GRANULOCYTES # BLD: 0 10E3/UL
IMM GRANULOCYTES NFR BLD: 1 %
LYMPHOCYTES # BLD AUTO: 2 10E3/UL (ref 0.8–5.3)
LYMPHOCYTES NFR BLD AUTO: 24 %
MCH RBC QN AUTO: 31.4 PG (ref 26.5–33)
MCHC RBC AUTO-ENTMCNC: 33.5 G/DL (ref 31.5–36.5)
MCV RBC AUTO: 94 FL (ref 78–100)
MONOCYTES # BLD AUTO: 0.6 10E3/UL (ref 0–1.3)
MONOCYTES NFR BLD AUTO: 8 %
NEUTROPHILS # BLD AUTO: 5.5 10E3/UL (ref 1.6–8.3)
NEUTROPHILS NFR BLD AUTO: 65 %
NRBC # BLD AUTO: 0 10E3/UL
NRBC BLD AUTO-RTO: 0 /100
PLATELET # BLD AUTO: 204 10E3/UL (ref 150–450)
POTASSIUM SERPL-SCNC: 3.7 MMOL/L (ref 3.4–5.3)
PROT SERPL-MCNC: 6.4 G/DL (ref 6.4–8.3)
RBC # BLD AUTO: 4.42 10E6/UL (ref 3.8–5.2)
SARS-COV-2 RNA RESP QL NAA+PROBE: NEGATIVE
SODIUM SERPL-SCNC: 131 MMOL/L (ref 136–145)
TROPONIN T SERPL HS-MCNC: 19 NG/L
TROPONIN T SERPL HS-MCNC: 22 NG/L
TSH SERPL DL<=0.005 MIU/L-ACNC: 3.17 UIU/ML (ref 0.3–4.2)
WBC # BLD AUTO: 8.3 10E3/UL (ref 4–11)

## 2022-09-05 PROCEDURE — 258N000003 HC RX IP 258 OP 636: Performed by: EMERGENCY MEDICINE

## 2022-09-05 PROCEDURE — 250N000013 HC RX MED GY IP 250 OP 250 PS 637: Performed by: INTERNAL MEDICINE

## 2022-09-05 PROCEDURE — 99285 EMERGENCY DEPT VISIT HI MDM: CPT | Mod: 25

## 2022-09-05 PROCEDURE — 96374 THER/PROPH/DIAG INJ IV PUSH: CPT

## 2022-09-05 PROCEDURE — 84484 ASSAY OF TROPONIN QUANT: CPT | Performed by: INTERNAL MEDICINE

## 2022-09-05 PROCEDURE — 250N000011 HC RX IP 250 OP 636: Performed by: EMERGENCY MEDICINE

## 2022-09-05 PROCEDURE — 96361 HYDRATE IV INFUSION ADD-ON: CPT | Mod: XU

## 2022-09-05 PROCEDURE — 70491 CT SOFT TISSUE NECK W/DYE: CPT

## 2022-09-05 PROCEDURE — 36415 COLL VENOUS BLD VENIPUNCTURE: CPT | Mod: 59 | Performed by: INTERNAL MEDICINE

## 2022-09-05 PROCEDURE — 96361 HYDRATE IV INFUSION ADD-ON: CPT

## 2022-09-05 PROCEDURE — G0378 HOSPITAL OBSERVATION PER HR: HCPCS

## 2022-09-05 PROCEDURE — 93005 ELECTROCARDIOGRAM TRACING: CPT

## 2022-09-05 PROCEDURE — 82947 ASSAY GLUCOSE BLOOD QUANT: CPT | Performed by: EMERGENCY MEDICINE

## 2022-09-05 PROCEDURE — 250N000009 HC RX 250: Performed by: EMERGENCY MEDICINE

## 2022-09-05 PROCEDURE — 250N000013 HC RX MED GY IP 250 OP 250 PS 637: Performed by: EMERGENCY MEDICINE

## 2022-09-05 PROCEDURE — 84484 ASSAY OF TROPONIN QUANT: CPT | Performed by: EMERGENCY MEDICINE

## 2022-09-05 PROCEDURE — U0003 INFECTIOUS AGENT DETECTION BY NUCLEIC ACID (DNA OR RNA); SEVERE ACUTE RESPIRATORY SYNDROME CORONAVIRUS 2 (SARS-COV-2) (CORONAVIRUS DISEASE [COVID-19]), AMPLIFIED PROBE TECHNIQUE, MAKING USE OF HIGH THROUGHPUT TECHNOLOGIES AS DESCRIBED BY CMS-2020-01-R: HCPCS | Performed by: EMERGENCY MEDICINE

## 2022-09-05 PROCEDURE — 99220 PR INITIAL OBSERVATION CARE,LEVEL III: CPT | Performed by: INTERNAL MEDICINE

## 2022-09-05 PROCEDURE — 258N000003 HC RX IP 258 OP 636: Performed by: INTERNAL MEDICINE

## 2022-09-05 PROCEDURE — C9803 HOPD COVID-19 SPEC COLLECT: HCPCS

## 2022-09-05 PROCEDURE — 85014 HEMATOCRIT: CPT | Performed by: EMERGENCY MEDICINE

## 2022-09-05 PROCEDURE — 36415 COLL VENOUS BLD VENIPUNCTURE: CPT | Performed by: EMERGENCY MEDICINE

## 2022-09-05 PROCEDURE — 84443 ASSAY THYROID STIM HORMONE: CPT | Performed by: INTERNAL MEDICINE

## 2022-09-05 RX ORDER — CARBOXYMETHYLCELLULOSE SODIUM 5 MG/ML
1 SOLUTION/ DROPS OPHTHALMIC 2 TIMES DAILY
COMMUNITY

## 2022-09-05 RX ORDER — PRAVASTATIN SODIUM 20 MG
20 TABLET ORAL DAILY
Status: ON HOLD | COMMUNITY
End: 2022-09-06

## 2022-09-05 RX ORDER — ACETAMINOPHEN 325 MG/1
650 TABLET ORAL EVERY 6 HOURS PRN
Status: DISCONTINUED | OUTPATIENT
Start: 2022-09-05 | End: 2022-09-06 | Stop reason: HOSPADM

## 2022-09-05 RX ORDER — HYDRALAZINE HYDROCHLORIDE 20 MG/ML
10 INJECTION INTRAMUSCULAR; INTRAVENOUS EVERY 4 HOURS PRN
Status: DISCONTINUED | OUTPATIENT
Start: 2022-09-05 | End: 2022-09-06 | Stop reason: HOSPADM

## 2022-09-05 RX ORDER — ONDANSETRON 2 MG/ML
4 INJECTION INTRAMUSCULAR; INTRAVENOUS EVERY 6 HOURS PRN
Status: DISCONTINUED | OUTPATIENT
Start: 2022-09-05 | End: 2022-09-06 | Stop reason: HOSPADM

## 2022-09-05 RX ORDER — CALCIUM CARBONATE 500(1250)
2 TABLET ORAL EVERY EVENING
COMMUNITY
End: 2023-01-01

## 2022-09-05 RX ORDER — POLYETHYLENE GLYCOL 3350 17 G/17G
17 POWDER, FOR SOLUTION ORAL DAILY PRN
Status: DISCONTINUED | OUTPATIENT
Start: 2022-09-05 | End: 2022-09-06 | Stop reason: HOSPADM

## 2022-09-05 RX ORDER — CHLORTHALIDONE 25 MG/1
25 TABLET ORAL DAILY
COMMUNITY
End: 2022-09-06

## 2022-09-05 RX ORDER — ACETAMINOPHEN 650 MG/1
650 SUPPOSITORY RECTAL EVERY 6 HOURS PRN
Status: DISCONTINUED | OUTPATIENT
Start: 2022-09-05 | End: 2022-09-06 | Stop reason: HOSPADM

## 2022-09-05 RX ORDER — ONDANSETRON 4 MG/1
4 TABLET, ORALLY DISINTEGRATING ORAL EVERY 6 HOURS PRN
Status: DISCONTINUED | OUTPATIENT
Start: 2022-09-05 | End: 2022-09-06 | Stop reason: HOSPADM

## 2022-09-05 RX ORDER — ACETAMINOPHEN 500 MG
500 TABLET ORAL 2 TIMES DAILY
COMMUNITY
End: 2023-01-01

## 2022-09-05 RX ORDER — HYDROMORPHONE HYDROCHLORIDE 1 MG/ML
0.5 INJECTION, SOLUTION INTRAMUSCULAR; INTRAVENOUS; SUBCUTANEOUS
Status: DISCONTINUED | OUTPATIENT
Start: 2022-09-05 | End: 2022-09-05

## 2022-09-05 RX ORDER — ASPIRIN 325 MG
325 TABLET ORAL ONCE
Status: COMPLETED | OUTPATIENT
Start: 2022-09-05 | End: 2022-09-05

## 2022-09-05 RX ORDER — IOPAMIDOL 755 MG/ML
500 INJECTION, SOLUTION INTRAVASCULAR ONCE
Status: COMPLETED | OUTPATIENT
Start: 2022-09-05 | End: 2022-09-05

## 2022-09-05 RX ORDER — AMOXICILLIN 250 MG
1 CAPSULE ORAL 2 TIMES DAILY PRN
Status: DISCONTINUED | OUTPATIENT
Start: 2022-09-05 | End: 2022-09-06 | Stop reason: HOSPADM

## 2022-09-05 RX ORDER — ASPIRIN 81 MG/1
81 TABLET ORAL DAILY
Status: DISCONTINUED | OUTPATIENT
Start: 2022-09-06 | End: 2022-09-06 | Stop reason: HOSPADM

## 2022-09-05 RX ORDER — SODIUM CHLORIDE 9 MG/ML
INJECTION, SOLUTION INTRAVENOUS CONTINUOUS
Status: ACTIVE | OUTPATIENT
Start: 2022-09-05 | End: 2022-09-06

## 2022-09-05 RX ORDER — AMOXICILLIN 250 MG
2 CAPSULE ORAL 2 TIMES DAILY PRN
Status: DISCONTINUED | OUTPATIENT
Start: 2022-09-05 | End: 2022-09-06 | Stop reason: HOSPADM

## 2022-09-05 RX ORDER — NITROGLYCERIN 0.4 MG/1
0.4 TABLET SUBLINGUAL EVERY 5 MIN PRN
Status: DISCONTINUED | OUTPATIENT
Start: 2022-09-05 | End: 2022-09-06 | Stop reason: HOSPADM

## 2022-09-05 RX ORDER — FOLIC ACID 1 MG/1
1 TABLET ORAL DAILY
COMMUNITY
End: 2023-01-01

## 2022-09-05 RX ADMIN — IOPAMIDOL 100 ML: 755 INJECTION, SOLUTION INTRAVENOUS at 16:11

## 2022-09-05 RX ADMIN — HYDROMORPHONE HYDROCHLORIDE 0.5 MG: 1 INJECTION, SOLUTION INTRAMUSCULAR; INTRAVENOUS; SUBCUTANEOUS at 14:56

## 2022-09-05 RX ADMIN — HYDROMORPHONE HYDROCHLORIDE 1 MG: 2 TABLET ORAL at 23:06

## 2022-09-05 RX ADMIN — ASPIRIN 325 MG ORAL TABLET 325 MG: 325 PILL ORAL at 22:39

## 2022-09-05 RX ADMIN — SODIUM CHLORIDE: 9 INJECTION, SOLUTION INTRAVENOUS at 22:41

## 2022-09-05 RX ADMIN — SODIUM CHLORIDE 1000 ML: 9 INJECTION, SOLUTION INTRAVENOUS at 14:54

## 2022-09-05 RX ADMIN — SODIUM CHLORIDE 65 ML: 9 INJECTION, SOLUTION INTRAVENOUS at 16:11

## 2022-09-05 ASSESSMENT — ACTIVITIES OF DAILY LIVING (ADL)
ADLS_ACUITY_SCORE: 35
ADLS_ACUITY_SCORE: 26

## 2022-09-05 ASSESSMENT — ENCOUNTER SYMPTOMS
NECK PAIN: 1
TROUBLE SWALLOWING: 1
HEADACHES: 1
FEVER: 0
EYE PAIN: 1

## 2022-09-05 NOTE — ED TRIAGE NOTES
"Pt reports 1 week of aching left sided neck pain that is radiating into shoulder and left eye. Swelling to left jaw/neck. Per daughter pt sounds \"raspy\" and pt endorses difficulty swallowing. Denies SOB or sore throat. Denies unilateral weakness. ABCs intact.      "

## 2022-09-05 NOTE — ED PROVIDER NOTES
"  History   Chief Complaint:  Neck Pain       The history is provided by the patient.      Jade Caba is a 89 year old female with history of CVA who presents with left sided neck pain that radiates into the head and left eye for the past week. The patient states that all three areas or her pain started at the same time, describing her pain as a burning sensation. She rates her pain as a 9/10. The patient also reports mild difficulty swallowing, saying it feels \"like her saliva gets stuck in her throat\". She denies fever, recent travel, chest pain, or vision changes. The patient says that she is certain she hasn't had any recent falls or injuries.     She took tylenol this morning with her routine medications.    Review of Systems   Constitutional: Negative for fever.   HENT: Positive for trouble swallowing.    Eyes: Positive for pain. Negative for visual disturbance.   Cardiovascular: Negative for chest pain.   Musculoskeletal: Positive for neck pain.   Neurological: Positive for headaches.   All other systems reviewed and are negative.    Allergies:  The patient has no known allergies.     Medications:  Aspirin  Carvedilol  Citalopram  Furosemide  Losartan  Keppra  Omeprazole  Oxybutynin  Rosuvastatin  Pravastatin  Docusate sodium    Past Medical History:     Renal Artery stenosis  Hypertension   Arteriosclerosis of coronary artery  Depression  Hypercholesterolemia  CKD  Hyperlipidemia  Seizure disorder  OA knee  Chondrocalcinosis  Subarachnoid hemorrhage  Fracture of C1 vertebra  Sacral fracture  Atrial flutter  Alcohol abuse  Constipation  Anxiety  FTT in adult  Acute renal failure  Diastolic CHF  CVA  klebsiella cystitis     Past Surgical History:    Appendectomy   Carotid endarterectomy on R  Cholecystectomy   Coronary stents x 3  CV coronary angiogram  Hysterectomy  Joint replacement     Family History:    CAD x 4  Heart disease    Social History:  The patient presents to the ED with her daughter.   PCP: " Zully Kunz     Physical Exam     Patient Vitals for the past 24 hrs:   BP Temp Temp src Pulse Resp SpO2   09/05/22 1730 (!) 145/88 -- -- 79 -- 99 %   09/05/22 1700 (!) 175/81 -- -- 93 -- 97 %   09/05/22 1630 (!) 189/81 -- -- 95 18 97 %   09/05/22 1530 (!) 196/83 -- -- 70 -- 99 %   09/05/22 1450 (!) 179/73 -- -- 75 -- 100 %   09/05/22 1440 (!) 185/77 -- -- 69 -- 99 %   09/05/22 1400 (!) 192/87 98.1  F (36.7  C) Temporal 76 18 99 %     Physical Exam  Constitutional:       Appearance: She is well-developed.   HENT:      Right Ear: External ear normal.      Left Ear: External ear normal.      Mouth/Throat:      Mouth: Mucous membranes are moist.      Pharynx: Oropharynx is clear. No oropharyngeal exudate or posterior oropharyngeal erythema.      Comments: Normal dentition. No dental infection. No trismus. Voice normal  Eyes:      General: No scleral icterus.     Extraocular Movements: Extraocular movements intact.      Conjunctiva/sclera: Conjunctivae normal.      Pupils: Pupils are equal, round, and reactive to light.   Neck:      Vascular: No carotid bruit or JVD.      Comments: TTP left sided jawline down towards the clavicle, no obvious swelling or skin color change. No LAD. No facial swelling or periorbital swelling or pain.   Cardiovascular:      Rate and Rhythm: Normal rate and regular rhythm.      Heart sounds: Normal heart sounds. No murmur heard.    No friction rub. No gallop.   Pulmonary:      Effort: Pulmonary effort is normal. No respiratory distress.      Breath sounds: Normal breath sounds. No wheezing or rales.   Abdominal:      General: Bowel sounds are normal. There is no distension.      Palpations: Abdomen is soft. There is no mass.      Tenderness: There is no abdominal tenderness.   Musculoskeletal:         General: Normal range of motion.      Cervical back: Normal range of motion and neck supple. Tenderness present.   Lymphadenopathy:      Cervical: No cervical adenopathy.   Skin:     General:  Skin is warm and dry.      Capillary Refill: Capillary refill takes less than 2 seconds.      Findings: No rash.   Neurological:      General: No focal deficit present.      Mental Status: She is alert.      Cranial Nerves: No cranial nerve deficit.           Emergency Department Course   ECG:  ECG taken at 1436, ECG read at 1437  Normal sinus rhythm  Normal ECG  Rate 71 bpm. MD interval 170 ms. QRS duration 94 ms. QT/QTc 392/425 ms. P-R-T axes 78 84 40.    Imaging:  CT Soft Tissue Neck w Contrast   Final Result   IMPRESSION:    1.  No CT findings in the neck to explain the patient's left neck and face pain.   2.  Occluded right ICA at the carotid bifurcation with a high-grade tandem stenosis/occlusion at the cavernous segment. This is of uncertain chronicity, however favor chronic as there is redemonstration of a chronic lacunar infarct in the right caudate    head also seen in 2014.   3.  Multiple thyroid nodules measuring up to 17 mm on the left. Consider outpatient ultrasound follow-up, as clinically warranted.      Finding #2 discussed with Dr. Gómez at 5:36 PM on 09/05/2022.        Report per radiology    Laboratory:  Labs Ordered and Resulted from Time of ED Arrival to Time of ED Departure   COMPREHENSIVE METABOLIC PANEL - Abnormal       Result Value    Sodium 131 (*)     Potassium 3.7      Creatinine 1.13 (*)     Urea Nitrogen 24.8 (*)     Chloride 90 (*)     Carbon Dioxide (CO2) 32 (*)     Anion Gap 9      Glucose 102 (*)     Calcium 9.5      Protein Total 6.4      Albumin 3.7      Bilirubin Total 0.4      Alkaline Phosphatase 51      AST 23      ALT 19      GFR Estimate 46 (*)    TROPONIN T, HIGH SENSITIVITY - Abnormal    Troponin T, High Sensitivity 19 (*)    TROPONIN T, HIGH SENSITIVITY - Abnormal    Troponin T, High Sensitivity 22 (*)    CBC WITH PLATELETS AND DIFFERENTIAL    WBC Count 8.3      RBC Count 4.42      Hemoglobin 13.9      Hematocrit 41.5      MCV 94      MCH 31.4      MCHC 33.5      RDW  11.8      Platelet Count 204      % Neutrophils 65      % Lymphocytes 24      % Monocytes 8      % Eosinophils 1      % Basophils 1      % Immature Granulocytes 1      NRBCs per 100 WBC 0      Absolute Neutrophils 5.5      Absolute Lymphocytes 2.0      Absolute Monocytes 0.6      Absolute Eosinophils 0.1      Absolute Basophils 0.0      Absolute Immature Granulocytes 0.0      Absolute NRBCs 0.0     TSH WITH FREE T4 REFLEX      Emergency Department Course:     Reviewed:  I reviewed nursing notes, vitals, past medical history and Care Everywhere    Assessments:  1429 I obtained history and examined the patient as noted above.   1913 I rechecked the patient and explained findings.     Consults:  1738 I spoke with radiology.   1906 I spoke with Dr. Chin, hospitalist.    Interventions:  Medications   HYDROmorphone (PF) (DILAUDID) injection 0.5 mg (0.5 mg Intravenous Given 9/5/22 1456)   0.9% sodium chloride BOLUS (0 mLs Intravenous Stopped 9/5/22 1554)   CT Scan Flush (65 mLs Intravenous Given 9/5/22 1611)   iopamidol (ISOVUE-370) solution 500 mL (100 mLs Intravenous Given 9/5/22 1611)     Disposition:  The patient was admitted to the hospital under the care of Dr. Chin.     Impression & Plan     Medical Decision Making:  Patient presents today with her daughter for evaluation of left-sided neck pain for the last week.  Her pain seems to be musculoskeletal on exam although it is not 100% reproducible.  I denies any dental infection or facial infection.  There is no obvious swelling or skin changes.  Given the concern that she is feeling some throat symptoms, I did do a CT soft tissue neck.  There is no obvious mass or abnormalities to account for the symptoms.  Her blood vessels on the left side of her neck looks open.  She did have chronically occluded right ICA.  This correlates with old infarct on the right side as well.  She is not having any right-sided symptoms.  I did confirm with her her daughter that she did not  previously have had surgery on right carotid.  Her initial troponin was mildly elevated.  A repeat did show some slight uptake.  She has normal EKG.  Given that she is having neck and jaw pain of unexplained origin along with an elevated troponin, I do believe she warrants admission for further evaluation.  The certainly could be cardiac given she does have multiple risk factors.  After discussing this with her and her daughter, they did agree to be admitted for observation.  Patient is admitted to  the patient telemetry.  She is stable for admission.    Diagnosis:    ICD-10-CM    1. Elevated troponin  R77.8    2. Neck pain  M54.2        Scribe Disclosure:  I, Rigo Vyas, am serving as a scribe at 2:29 PM on 9/5/2022 to document services personally performed by Gabby Gómez MD based on my observations and the provider's statements to me.              Gabby Gómez MD  09/05/22 [id number]

## 2022-09-05 NOTE — ED NOTES
Pt ambulated to bathroom with cane. Ambulated without assistance. Pt back in room and writer reconnected to monitor.

## 2022-09-06 ENCOUNTER — APPOINTMENT (OUTPATIENT)
Dept: CARDIOLOGY | Facility: CLINIC | Age: 87
End: 2022-09-06
Attending: INTERNAL MEDICINE
Payer: COMMERCIAL

## 2022-09-06 ENCOUNTER — TELEPHONE (OUTPATIENT)
Dept: OTHER | Facility: CLINIC | Age: 87
End: 2022-09-06

## 2022-09-06 ENCOUNTER — APPOINTMENT (OUTPATIENT)
Dept: ULTRASOUND IMAGING | Facility: CLINIC | Age: 87
End: 2022-09-06
Attending: PHYSICIAN ASSISTANT
Payer: COMMERCIAL

## 2022-09-06 VITALS
OXYGEN SATURATION: 97 % | HEART RATE: 76 BPM | TEMPERATURE: 98.2 F | DIASTOLIC BLOOD PRESSURE: 74 MMHG | SYSTOLIC BLOOD PRESSURE: 155 MMHG | RESPIRATION RATE: 16 BRPM

## 2022-09-06 DIAGNOSIS — I65.23 BILATERAL CAROTID ARTERY STENOSIS: Primary | ICD-10-CM

## 2022-09-06 LAB
ANION GAP SERPL CALCULATED.3IONS-SCNC: 8 MMOL/L (ref 7–15)
ATRIAL RATE - MUSE: 71 BPM
BUN SERPL-MCNC: 23.2 MG/DL (ref 8–23)
CALCIUM SERPL-MCNC: 8.7 MG/DL (ref 8.8–10.2)
CHLORIDE SERPL-SCNC: 95 MMOL/L (ref 98–107)
CREAT SERPL-MCNC: 0.98 MG/DL (ref 0.51–0.95)
DEPRECATED HCO3 PLAS-SCNC: 30 MMOL/L (ref 22–29)
DIASTOLIC BLOOD PRESSURE - MUSE: NORMAL MMHG
ERYTHROCYTE [DISTWIDTH] IN BLOOD BY AUTOMATED COUNT: 11.9 % (ref 10–15)
GFR SERPL CREATININE-BSD FRML MDRD: 55 ML/MIN/1.73M2
GLUCOSE SERPL-MCNC: 94 MG/DL (ref 70–99)
HCT VFR BLD AUTO: 41.6 % (ref 35–47)
HGB BLD-MCNC: 13.8 G/DL (ref 11.7–15.7)
HOLD SPECIMEN: NORMAL
INTERPRETATION ECG - MUSE: NORMAL
LVEF ECHO: NORMAL
MCH RBC QN AUTO: 31.4 PG (ref 26.5–33)
MCHC RBC AUTO-ENTMCNC: 33.2 G/DL (ref 31.5–36.5)
MCV RBC AUTO: 95 FL (ref 78–100)
P AXIS - MUSE: 78 DEGREES
PLATELET # BLD AUTO: 179 10E3/UL (ref 150–450)
POTASSIUM SERPL-SCNC: 3.7 MMOL/L (ref 3.4–5.3)
PR INTERVAL - MUSE: 170 MS
QRS DURATION - MUSE: 94 MS
QT - MUSE: 392 MS
QTC - MUSE: 425 MS
R AXIS - MUSE: 84 DEGREES
RBC # BLD AUTO: 4.39 10E6/UL (ref 3.8–5.2)
SODIUM SERPL-SCNC: 133 MMOL/L (ref 136–145)
SYSTOLIC BLOOD PRESSURE - MUSE: NORMAL MMHG
T AXIS - MUSE: 40 DEGREES
TROPONIN T SERPL HS-MCNC: 40 NG/L
TROPONIN T SERPL HS-MCNC: 45 NG/L
TROPONIN T SERPL HS-MCNC: 61 NG/L
VENTRICULAR RATE- MUSE: 71 BPM
WBC # BLD AUTO: 8.2 10E3/UL (ref 4–11)

## 2022-09-06 PROCEDURE — 80048 BASIC METABOLIC PNL TOTAL CA: CPT | Performed by: INTERNAL MEDICINE

## 2022-09-06 PROCEDURE — 250N000013 HC RX MED GY IP 250 OP 250 PS 637: Performed by: PHYSICIAN ASSISTANT

## 2022-09-06 PROCEDURE — 84484 ASSAY OF TROPONIN QUANT: CPT | Performed by: INTERNAL MEDICINE

## 2022-09-06 PROCEDURE — 93880 EXTRACRANIAL BILAT STUDY: CPT

## 2022-09-06 PROCEDURE — 250N000013 HC RX MED GY IP 250 OP 250 PS 637: Performed by: INTERNAL MEDICINE

## 2022-09-06 PROCEDURE — 999N000208 ECHOCARDIOGRAM COMPLETE

## 2022-09-06 PROCEDURE — 93306 TTE W/DOPPLER COMPLETE: CPT | Mod: 26 | Performed by: INTERNAL MEDICINE

## 2022-09-06 PROCEDURE — G0378 HOSPITAL OBSERVATION PER HR: HCPCS

## 2022-09-06 PROCEDURE — 36415 COLL VENOUS BLD VENIPUNCTURE: CPT | Performed by: INTERNAL MEDICINE

## 2022-09-06 PROCEDURE — 255N000002 HC RX 255 OP 636: Performed by: INTERNAL MEDICINE

## 2022-09-06 PROCEDURE — 96361 HYDRATE IV INFUSION ADD-ON: CPT | Mod: XU

## 2022-09-06 PROCEDURE — 85014 HEMATOCRIT: CPT | Performed by: INTERNAL MEDICINE

## 2022-09-06 PROCEDURE — 99217 PR OBSERVATION CARE DISCHARGE: CPT | Performed by: PHYSICIAN ASSISTANT

## 2022-09-06 RX ORDER — NALOXONE HYDROCHLORIDE 0.4 MG/ML
0.4 INJECTION, SOLUTION INTRAMUSCULAR; INTRAVENOUS; SUBCUTANEOUS
Status: DISCONTINUED | OUTPATIENT
Start: 2022-09-06 | End: 2022-09-06 | Stop reason: HOSPADM

## 2022-09-06 RX ORDER — FOLIC ACID 1 MG/1
1 TABLET ORAL DAILY
Status: DISCONTINUED | OUTPATIENT
Start: 2022-09-06 | End: 2022-09-06 | Stop reason: HOSPADM

## 2022-09-06 RX ORDER — CITALOPRAM HYDROBROMIDE 20 MG/1
20 TABLET ORAL EVERY EVENING
Status: DISCONTINUED | OUTPATIENT
Start: 2022-09-06 | End: 2022-09-06 | Stop reason: HOSPADM

## 2022-09-06 RX ORDER — NALOXONE HYDROCHLORIDE 0.4 MG/ML
0.2 INJECTION, SOLUTION INTRAMUSCULAR; INTRAVENOUS; SUBCUTANEOUS
Status: DISCONTINUED | OUTPATIENT
Start: 2022-09-06 | End: 2022-09-06 | Stop reason: HOSPADM

## 2022-09-06 RX ORDER — PRAVASTATIN SODIUM 20 MG
20 TABLET ORAL EVERY EVENING
COMMUNITY
Start: 2022-09-06 | End: 2022-10-10 | Stop reason: ALTCHOICE

## 2022-09-06 RX ORDER — CARVEDILOL 3.12 MG/1
3.12 TABLET ORAL 2 TIMES DAILY WITH MEALS
Status: DISCONTINUED | OUTPATIENT
Start: 2022-09-06 | End: 2022-09-06 | Stop reason: HOSPADM

## 2022-09-06 RX ORDER — OXYBUTYNIN CHLORIDE 5 MG/1
5 TABLET ORAL 2 TIMES DAILY
Status: DISCONTINUED | OUTPATIENT
Start: 2022-09-06 | End: 2022-09-06 | Stop reason: HOSPADM

## 2022-09-06 RX ORDER — LOSARTAN POTASSIUM 100 MG/1
100 TABLET ORAL DAILY
Status: DISCONTINUED | OUTPATIENT
Start: 2022-09-06 | End: 2022-09-06 | Stop reason: HOSPADM

## 2022-09-06 RX ORDER — CITALOPRAM HYDROBROMIDE 10 MG/1
20 TABLET ORAL EVERY EVENING
COMMUNITY
Start: 2022-09-06 | End: 2023-01-01

## 2022-09-06 RX ORDER — PRAVASTATIN SODIUM 20 MG
20 TABLET ORAL EVERY EVENING
Status: DISCONTINUED | OUTPATIENT
Start: 2022-09-06 | End: 2022-09-06 | Stop reason: HOSPADM

## 2022-09-06 RX ORDER — FUROSEMIDE 20 MG
10 TABLET ORAL DAILY
COMMUNITY
Start: 2022-09-06 | End: 2022-09-19

## 2022-09-06 RX ORDER — PANTOPRAZOLE SODIUM 40 MG/1
40 TABLET, DELAYED RELEASE ORAL
Status: DISCONTINUED | OUTPATIENT
Start: 2022-09-06 | End: 2022-09-06 | Stop reason: HOSPADM

## 2022-09-06 RX ADMIN — ACETAMINOPHEN 650 MG: 325 TABLET, FILM COATED ORAL at 09:27

## 2022-09-06 RX ADMIN — LOSARTAN POTASSIUM 100 MG: 100 TABLET, FILM COATED ORAL at 09:28

## 2022-09-06 RX ADMIN — HUMAN ALBUMIN MICROSPHERES AND PERFLUTREN 3 ML: 10; .22 INJECTION, SOLUTION INTRAVENOUS at 08:24

## 2022-09-06 RX ADMIN — OXYBUTYNIN CHLORIDE 5 MG: 5 TABLET ORAL at 09:29

## 2022-09-06 RX ADMIN — PANTOPRAZOLE SODIUM 40 MG: 40 TABLET, DELAYED RELEASE ORAL at 09:28

## 2022-09-06 RX ADMIN — ASPIRIN 81 MG: 81 TABLET, COATED ORAL at 09:28

## 2022-09-06 RX ADMIN — Medication 1 MG: at 09:29

## 2022-09-06 RX ADMIN — CARVEDILOL 3.12 MG: 3.12 TABLET, FILM COATED ORAL at 09:28

## 2022-09-06 ASSESSMENT — ACTIVITIES OF DAILY LIVING (ADL)
ADLS_ACUITY_SCORE: 32
DEPENDENT_IADLS:: CLEANING;LAUNDRY;SHOPPING;MEDICATION MANAGEMENT;MONEY MANAGEMENT;TRANSPORTATION
ADLS_ACUITY_SCORE: 26
ADLS_ACUITY_SCORE: 26
ADLS_ACUITY_SCORE: 32

## 2022-09-06 NOTE — PLAN OF CARE
ROOM # 202-1    Living Situation [facility] in [place]   Facility name:[facility]  : Jennifer / Bianca ( Daughters)    Activity level at baseline: Independent with a cane   Activity level on admit: A X 1    Who will be transporting you at discharge: Jennifer ( Daughter)    Patient registered to observation; given Patient Bill of Rights; given the opportunity to ask questions about observation status and their plan of care.  Patient has been oriented to the observation room, bathroom and call light is in place.    Discussed discharge goals and expectations with patient/family.

## 2022-09-06 NOTE — DISCHARGE SUMMARY
[facility]  Hospitalist Discharge Summary      Date of Admission:  9/5/2022  Date of Discharge:  9/6/2022  5:30 PM  Discharging Provider: Uzma Eastman PA-C  Discharge Service: Hospitalist Service    Discharge Diagnoses   Neck pain  Elevated troponin  Hypertensive urgency  Hyponatremia  Thyroid nodules  R ICA occlusion  GEMA    Follow-ups Needed After Discharge   Follow-up Appointments     Follow-up and recommended labs and tests       Follow up with primary care provider, Zully Kunz, within 7 days for   hospital follow- up.  The following labs/tests are recommended: BMP and   further BP management.    Stop Chlorthalidone if still on med list at facility  Reduce lasix to 10 mg daily  Follow up with Cardiology or Vascular Medicine regarding carotid US   results.  Continue baby aspirin             Unresulted Labs Ordered in the Past 30 Days of this Admission     Date and Time Order Name Status Description    9/6/2022  6:01 AM Troponin I In process       These results will be followed up by PCP, cardiology    Discharge Disposition   Discharged to home  Condition at discharge: Stable      Hospital Course   Jade Caba is a 89 year old female with past medical history of hypertension, known carotid artery stenosis, coronary artery disease status post PCI, diastolic CHF, seizure disorder status post subarachnoid hemorrhage in 2014, history of atrial flutter, hyperlipidemia who presented on 9/5/2022 with chief complaint of neck pain.  The patient presents with her daughter who is at bedside.  The patient states for the past week she has been dealing with intermittent left-sided neck pain.  She states that it radiates up into her left eye when it is really bad.  She states it is intermittent in nature and comes and goes.  Patient states that it seems to be worse in the evenings.  She states she has taken Tylenol and that seems to help but it always seems to come back.  She describes the pain as  "a \"burning sensation\".  She denies any rashes anywhere.  She localizes the left-sided neck pain to just under the left side of her mandible.  She does report that she has been taking her medications as prescribed and denies running out of any meds.  She does state that her blood pressure usually runs high despite her blood pressure medications.       In the emergency department, the patient was found to have a temperature of 98.1  F, heart rate 76, blood pressure in 192/87, respiratory rate 18, SPO2 99% on room air.  Initial lab work showed sodium 131, chloride 90, bicarb 32, BUN/creatinine 24.8/1.13, high-sensitivity troponin 19 that increased to 22, CBC was within normal limits.  CT soft tissue of the neck showed a known occluded right ICA at the carotid bifurcation with a high-grade tandem stenosis/occlusion at the cavernous segment which is of uncertain chronicity favoring chronic as there is redemonstration of a chronic lacunar infarct in the right caudate head also seen in 2014, multiple thyroid nodules measuring up to 17 mm on the left.  EKG showed normal sinus rhythm.  The patient's pain improved with IV Dilaudid in the ED.   She was admitted to [facility] for further work up. Her neck pain improved over night. Her sodium mildly improved and her Cr nearly normalized. Troponin was trended and maxed out at 61 then trended down. She denied any chest pain or SOB. Echocardiogram was obtained and showed normal EF without wall motion abnormalities. R sided pressures were slightly higher than previous. She was noted to have complete occlusion of the R internal carotid artery. She does have a hx of carotid artery stenosis and is s/p carotid endarterectomy 15-20 yrs ago. Carotid US last year showed ~50% stenosis on the right. Findings were discussed with vascular surgery who recommended repeat carotid US at this time and close outpatient follow up in their clinic. US was repeated, which did show progression of R carotid " artery stenosis to complete occlusion, likely chronic in nature. Pt discharged home with close outpatient follow up.      Multiple Thyroid Nodules  - Recommend outpatient thyroid US in 3-4 weeks with PCP.        Consultations This Hospital Stay   CARE MANAGEMENT / SOCIAL WORK IP CONSULT    Code Status   No CPR- Do NOT Intubate    Time Spent on this Encounter   IUzma PA-C, personally saw the patient today and spent greater than 30 minutes discharging this patient.       Uzma Eastman PA-C  [facility] OBSERVATION DEPT  [address] [facility] [address]  Phone: [phone]  ______________________________________________________________________    Physical Exam   Vital Signs: Temp: 98.5  F (36.9  C) Temp src: Oral BP: (!) 148/67 Pulse: 72   Resp: 12 SpO2: 95 % O2 Device: None (Room air)    Weight: 0 lbs 0 oz    GENERAL:  Comfortable.  PSYCH: pleasant, oriented, No acute distress.  HEART:  Normal S1, S2 with no murmur, no pericardial rub, gallops or S3 or S4.  LUNGS:  Clear to auscultation, normal Respiratory effort. No wheezing, rales or ronchi.  GI:  Soft, normal bowel sounds. Non-tender, non distended.   EXTREMITIES:  No pedal edema, +2 pulses bilateral and equal.  SKIN:  Dry to touch, No rash, wound or ulcerations.  NEUROLOGIC:  Grossly intact        Primary Care Physician   Zully Kunz    Discharge Orders      Vascular Medicine Referral      Reason for your hospital stay    Left sided neck pain of unclear etiology, which has improved. You were also found to have hyponatremia and mild increase in renal function and elevated blood pressures. CT of the neck did not show any acute process that would be a source of your pain. It did show complete occlusion of the R internal carotid artery. Your sodium and kidney function improved some. It is recommended that you reduce your home lasix to 10 mg. It is unclear if you are still taking chlorthalidone at your facility, if you  are, this should be stopped. Your troponin was mildly elevated, it was trended and remained fairly flat. An echocardiogram was obtained that showed a normal EF without wall motion abnormality, right sided pressure was mildly increased from previous echo. I did speak with Vascular Surgery regarding the complete occlusion of the R carotid artery, given this is asymptomatic, they recommended repeat carotid US and follow up with Cardiology or vascular medicine in the near future.     Follow-up and recommended labs and tests     Follow up with primary care provider, Zully Kunz, within 7 days for hospital follow- up.  The following labs/tests are recommended: BMP and further BP management.    Stop Chlorthalidone if still on med list at facility  Reduce lasix to 10 mg daily  Follow up with Cardiology or Vascular Medicine regarding carotid US results.  Continue baby aspirin     Activity    Your activity upon discharge: activity as tolerated     When to contact your care team    Call your primary doctor if you have any of the following: temperature greater than 101, shortness of breath, increased pain, or increased weakness.     Diet    Follow this diet upon discharge: Regular       Significant Results and Procedures   Most Recent 3 CBC's:Recent Labs   Lab Test 09/06/22  0601 09/05/22  1458 03/03/20  0601   WBC 8.2 8.3 7.9   HGB 13.8 13.9 13.1   MCV 95 94 97    204 172     Most Recent 3 BMP's:Recent Labs   Lab Test 09/06/22  0601 09/05/22  1458 07/09/20  0840   * 131* 138   POTASSIUM 3.7 3.7 3.8   CHLORIDE 95* 90* 99   CO2 30* 32* 28   BUN 23.2* 24.8* 12   CR 0.98* 1.13* 1.06   ANIONGAP 8 9 11   FRANCISCO 8.7* 9.5 9.4   GLC 94 102* 86     Most Recent 2 LFT's:Recent Labs   Lab Test 09/05/22  1458 07/09/20  0840 04/23/18  0825 03/12/18  0850   AST 23 19   < > 14   ALT 19 11   < > 17   ALKPHOS 51  --   --  54   BILITOTAL 0.4  --   --  0.7    < > = values in this interval not displayed.     Most Recent 3  Troponin's:Recent Labs   Lab Test 03/03/20  0148 03/02/20  2226 03/02/20  1834   TROPI <0.015 <0.015 <0.015     7-Day Micro Results     Collected Updated Procedure Result Status      09/05/2022 2140 09/05/2022 2217 Asymptomatic COVID-19 Virus (Coronavirus) by PCR Nasopharyngeal [[id number]]    Swab from Nasopharyngeal    Final result Component Value   SARS CoV2 PCR Negative   NEGATIVE: SARS-CoV-2 (COVID-19) RNA not detected, presumed negative.                Most Recent TSH and T4:Recent Labs   Lab Test 09/05/22  1727   TSH 3.17     Most Recent Urinalysis:Recent Labs   Lab Test 03/12/18  1158   COLOR Yellow   APPEARANCE Slightly Cloudy   URINEGLC Negative   URINEBILI Negative   URINEKETONE Negative   SG 1.018   UBLD Large*   URINEPH 5.0   PROTEIN 30*   NITRITE Positive*   LEUKEST Small*   RBCU 15*   WBCU 18*   ,   Results for orders placed or performed during the hospital encounter of 09/05/22   CT Soft Tissue Neck w Contrast    Narrative    EXAM: CT SOFT TISSUE NECK W CONTRAST  LOCATION: [facility]  DATE/TIME: 9/5/2022 4:21 PM    INDICATION: Left neck and face pain with dysphasia  COMPARISON: CT head without contrast 12/27/2014.  CONTRAST: 100mL Isovue 370  TECHNIQUE: Routine CT Soft Tissue Neck with IV contrast. Multiplanar reformats. Dose reduction techniques were used.    FINDINGS:     MUCOSAL SPACES/SOFT TISSUES: Unremarkable mucosal spaces of the upper aerodigestive tract. No mucosal mass or inflammation identified. Normal vocal cords and infraglottic trachea. Normal parapharyngeal space and subcutaneous soft tissues. Unremarkable   oral cavity,  spaces, and floor of mouth structures.    LYMPH NODES: No pathologic lymph nodes by size or morphology criteria.     SALIVARY GLANDS: Normal parotid and submandibular glands.    THYROID: Bilateral thyroid nodules measuring up to 17 mm on the left.     VESSELS: Occluded right ICA at the carotid bifurcation. High-grade tandem stenosis  versus occlusion at the right cavernous ICA.    VISUALIZED INTRACRANIAL/ORBITS/SINUSES: Chronic lacunar infarct in the right caudate head, similar in appearance compared to 2014.  Visualized paranasal sinuses and mastoid air cells are clear.    OTHER: No destructive osseous lesion. Mild scarring in the lung apices.       Impression    IMPRESSION:   1.  No CT findings in the neck to explain the patient's left neck and face pain.  2.  Occluded right ICA at the carotid bifurcation with a high-grade tandem stenosis/occlusion at the cavernous segment. This is of uncertain chronicity, however favor chronic as there is redemonstration of a chronic lacunar infarct in the right caudate   head also seen in 2014.  3.  Multiple thyroid nodules measuring up to 17 mm on the left. Consider outpatient ultrasound follow-up, as clinically warranted.    Finding #2 discussed with Dr. Gómez at 5:36 PM on 09/05/2022.   US Carotid Bilateral    Narrative    BILATERAL CAROTID ULTRASOUND September 6, 2022 2:59 PM     HISTORY: Carotid artery stenosis.    COMPARISON: None.    RIGHT CAROTID FINDINGS: There is mild atherosclerotic plaque in the  common carotid artery. The proximal most internal carotid artery may  be patent, though unable to visualize mid and distal internal carotid  artery segments with blood flow. ECA also not visible. Presumed  occlusion of the right internal carotid artery is new from previous  exam.  Right Vertebral: Antegrade flow.   Right ECA: Not visible, as before.    LEFT CAROTID FINDINGS: There is mild to moderate soft atherosclerotic  plaque in the proximal internal carotid artery.  Left ICA PSV:  269  cm/sec.  Left ICA EDV:  68 cm/sec.  Left ICA/CCA PSV Ratio:  2.9.    These indicate greater than 70% diameter stenosis of the left ICA.    Left Vertebral: Antegrade flow.   Left ECA: Antegrade flow.     Causes of Decreased Accuracy: Somewhat discrepant findings with left  peak systolic velocity in the internal carotid  artery suggesting  greater than 70% stenosis. However, diastolic velocity in ratio  suggests 50-69% stenosis.      Impression    IMPRESSION:    1. Occluded right internal carotid artery new since 2021.  2. Greater than 70% diameter stenosis of the left internal carotid  artery relative to the distal internal carotid artery diameter.  However, these values are discrepant in that peak systolic velocity  suggests greater than 70% stenosis, though remaining criteria suggest  50-69% stenosis.    Giving findings above with presumed occlusion of the right internal  carotid artery and somewhat discrepant findings in the left carotid  arterial system would also reference the CTA of the neck performed  today prior.    FELI MEYERS MD         SYSTEM ID:  [id number]   Echocardiogram Complete     Value    LVEF  60-65%    Narrative    [id number]  Pending sale to [facility]  [id number]  [id number]     [facility]  Echocardiography Laboratory  [address] [place] [address]     Name: LAURO SALTER  MRN: [id number]  : 1933  Study Date: 2022 07:48 AM  Age: 89 yrs  Gender: Female  Patient Location: [facility]  Reason For Study: MI  Ordering Physician: JAYCEE ESTEVEZ  Referring Physician: Zully Kunz DO  Performed By: Nancy Lagunas RDCS     BSA: 1.7 m2  Height: 62 in  Weight: 146 lb  HR: 64  BP: 159/85 mmHg  ______________________________________________________________________________  Procedure  Complete Portable Echo Adult. Optison (NDC #6279-1163) given intravenously.  ______________________________________________________________________________  Interpretation Summary     Technically challenging study.     Left ventricular systolic function is normal. The visual ejection fraction is  60-65%. Mild septal flattening suggesting elevated RV pressures.  Right ventricle is not well visualized, however it is probably mildly dilated  and global systolic function is probably  borderline to mildly reduced.  The aortic valve is trileaflet with aortic valve sclerosis. There is trace to  mild aortic regurgitation.  There is mild mitral stenosis. Mean gradient 3 mmHg at 63 bpm.  Mild to moderate (1-2+) tricuspid regurgitation.  Pulmonary hypertension present. The right ventricular systolic pressure is  approximated at 58mmHg plus the right atrial pressure.  Dilation of the inferior vena cava is present with normal respiratory  variation in diameter.     This study was compared to a TTE from 3/3/2020. Estimated right-sided  pressures are mildly higher on this study, and RV global systolic function is  mildly worse.     ______________________________________________________________________________  Left Ventricle  The left ventricle is normal in size. There is normal left ventricular wall  thickness. Left ventricular systolic function is normal. The visual ejection  fraction is 60-65%. Left ventricular diastolic function is not assessable.  Flattened septum is consistent with RV pressure overload.     Right Ventricle  The right ventricle is not well visualized. Right ventricle is not well  visualized, however it is probably mildly dilated and global systolic function  is probably borderline to mildly reduced.     Atria  The left atrium is mild to moderately dilated. The right atrium is mildly  dilated.     Mitral Valve  There is severe mitral annular calcification. There is trace mitral  regurgitation. There is mild mitral stenosis. Mean gradient 3 mmHg at 63 bpm.     Tricuspid Valve  There is mild to moderate (1-2+) tricuspid regurgitation. The right  ventricular systolic pressure is approximated at 58mmHg plus the right atrial  pressure. Pulmonary hypertension.     Aortic Valve  The aortic valve is trileaflet with aortic valve sclerosis. There is trace to  mild aortic regurgitation.     Pulmonic Valve  The pulmonic valve is not well seen, but is grossly normal.     Vessels  The aortic root is  normal size. Normal size ascending aorta. Dilation of the  inferior vena cava is present with normal respiratory variation in diameter.     ______________________________________________________________________________  MMode/2D Measurements & Calculations  IVSd: 0.97 cm  LVIDd: 4.1 cm  LVIDs: 2.2 cm  LVPWd: 1.0 cm  FS: 45.5 %  LV mass(C)d: 129.7 grams  LV mass(C)dI: 77.5 grams/m2  Ao root diam: 3.0 cm  LA dimension: 4.1 cm  asc Aorta Diam: 3.2 cm  LA/Ao: 1.4  LVOT diam: 2.1 cm  LVOT area: 3.5 cm2     LA Volume (BP): 69.0 ml  LA Volume Index (BP): 41.3 ml/m2  RWT: 0.49     Doppler Measurements & Calculations  MV E max freddy: 93.6 cm/sec  MV A max freddy: 138.0 cm/sec  MV E/A: 0.68  MV max P.3 mmHg  MV mean P.9 mmHg  MV V2 VTI: 43.3 cm  MV dec time: 0.18 sec  Ao V2 max: 160.0 cm/sec  Ao max PG: 10.0 mmHg  AI P1/2t: 643.4 msec     PA acc time: 0.09 sec  TR max freddy: 382.0 cm/sec  TR max P.4 mmHg  E/E' av.3  Lateral E/e': 26.1  Medial E/e': 20.5     ______________________________________________________________________________  Report approved by: Nory Walker 2022 09:37 AM               Discharge Medications   Current Discharge Medication List      CONTINUE these medications which have CHANGED    Details   citalopram (CELEXA) 10 MG tablet Take 2 tablets (20 mg) by mouth every evening      furosemide (LASIX) 20 MG tablet Take 0.5 tablets (10 mg) by mouth daily      pravastatin (PRAVACHOL) 20 MG tablet Take 1 tablet (20 mg) by mouth every evening         CONTINUE these medications which have NOT CHANGED    Details   !! Acetaminophen (TYLENOL PO) Take 650 mg by mouth every 6 hours as needed      !! acetaminophen (TYLENOL) 500 MG tablet Take 500 mg by mouth 2 times daily      aspirin (ASA) 81 MG EC tablet Take 1 tablet (81 mg) by mouth daily  Qty: 90 tablet, Refills: 3    Associated Diagnoses: CAD in native artery      calcium carbonate 500 mg, elemental, (OSCAL) 500 MG tablet Take 2 tablets by mouth  every evening      carboxymethylcellulose PF (REFRESH PLUS) 0.5 % ophthalmic solution Place 1 drop into both eyes 2 times daily      carvedilol (COREG) 3.125 MG tablet Take 1 tablet (3.125 mg) by mouth 2 times daily (with meals)  Qty: 180 tablet, Refills: 3    Associated Diagnoses: H/O carotid endarterectomy; Renal artery stenosis (H); Dizziness and giddiness; Essential hypertension      cholecalciferol (VITAMIN D3) 5000 UNITS CAPS capsule Take 5,000 Units by mouth daily      folic acid (FOLVITE) 1 MG tablet Take 1 mg by mouth daily      L-Methylfolate-B6-B12 (FOLBIC RF PO) Take 1 tablet by mouth daily      losartan (COZAAR) 100 MG tablet Take 100 mg by mouth daily      Multiple Vitamins-Minerals (CERTA-FREDERICK PO) Take 1 tablet by mouth daily      omeprazole (PRILOSEC) 40 MG capsule Take 1 capsule (40 mg) by mouth daily Take 30-60 minutes before a meal.  Qty: 30 capsule, Refills: 9    Associated Diagnoses: Abdominal pain, other specified site      oxybutynin (DITROPAN) 5 MG tablet Take 5 mg by mouth 2 times daily      polyethylene glycol (MIRALAX/GLYCOLAX) powder Take 17 g by mouth nightly as needed        !! - Potential duplicate medications found. Please discuss with provider.      STOP taking these medications       chlorthalidone (HYGROTON) 25 MG tablet Comments:   Reason for Stopping:             Allergies   No Known Allergies

## 2022-09-06 NOTE — H&P
"[facility]  History and Physical  Hospitalist - Cuong Chin DO       Date of Admission:  9/5/2022    Chief Complaint   Neck pain    History is obtained from the patient, the patient's daughter Bianca at bedside, the emergency department physician, as well as the electronic medical record.    History of Present Illness   Jade Caba is a 89 year old female with past medical history of hypertension, known carotid artery stenosis, coronary artery disease status post PCI, diastolic CHF, seizure disorder status post subarachnoid hemorrhage in 2014, history of atrial flutter, hyperlipidemia who presented on 9/5/2022 with chief complaint of neck pain.  The patient presents with her daughter who is at bedside.  The patient states for the past week she has been dealing with intermittent left-sided neck pain.  She states that it radiates up into her left eye when it is really bad.  She states it is intermittent in nature and comes and goes.  Patient states that it seems to be worse in the evenings.  She states she has taken Tylenol and that seems to help but it always seems to come back.  She describes the pain as a \"burning sensation\".  She denies any rashes anywhere.  She localizes the left-sided neck pain to just under the left side of her mandible.  She does report that she has been taking her medications as prescribed and denies running out of any meds.  She does state that her blood pressure usually runs high despite her blood pressure medications.      In the emergency department, the patient was found to have a temperature of 98.1  F, heart rate 76, blood pressure in 192/87, respiratory rate 18, SPO2 99% on room air.  Initial lab work showed sodium 131, chloride 90, bicarb 32, BUN/creatinine 24.8/1.13, high-sensitivity troponin 19 that increased to 22, CBC was within normal limits.  CT soft tissue of the neck showed a known occluded right ICA at the carotid bifurcation with a high-grade tandem " stenosis/occlusion at the cavernous segment which is of uncertain chronicity favoring chronic as there is redemonstration of a chronic lacunar infarct in the right caudate head also seen in 2014, multiple thyroid nodules measuring up to 17 mm on the left.  EKG showed normal sinus rhythm.  The patient's pain improved with IV Dilaudid in the ED.     ASSESSMENT/PLAN    Left Sided Neck Pain  Elevated Troponin  - Possible this is cardiac in nature given her vague symptoms and cardiac hx.  Though mild hyponatremia, 20:1 ration of BUN/Cr would suggest possibly related to dehydration.  Pt appears to be on lasix at baseline.  States she has been eating and drinking well.    - IVF overnight  - Telemetry  - Check Echocardiogram  - ASA 81 mg daily  - Trend troponin overnight  - Check TSH given hypertension and multiple thyroid nodules    Hypertensive Urgency  - Pt states her BP normally runs high despite her medications  - Resume PTA Carvedilol, Losartan once confirmed by pharmacy  - Hold lasix and consider reduced dose at discharge    Hyponatremia  - Baseline Cr = 1.06 and not far off  - IVF overnight  - Daily BMP    Multiple Thyroid Nodules  - Recommend outpatient thyroid US in 3-4 weeks with PCP.  This was relayed to pt's daughter at bedside    Coronary Artery Disease s/p PCI in 2018 to Mid RCA  - Resume PTA ASA 81 mg daily and Statin once confirmed by pharmacy    Chronic Medical Problems:  Known R ICA Occlusion  Hyperlipidemia  Seizure Disorder s/p SAH in 2014  Diastolic CHF  Atrial Flutter  Osteoarthritis  Renal Artery Stenosis    PLAN: Resume home medications as appropriate once confirmed by pharmacy.     DVT Prophylaxis: Pneumatic Compression Devices  Code Status: DNR / DNI  Discharge Plan: Tomorrow to [facility] pending echo results.    Cuong Chin DO    Primary Care Physician   Zully  Shaqra    -----------------------------------------------------------------------------------------------------------------------------------------------------------------------------------------------------    Past Medical History    I have reviewed this patient's medical history and updated it with pertinent information if needed.   Past Medical History:   Diagnosis Date     Anxiety      Arthritis      Atrial flutter (H)      C1 cervical fracture (H)      CAD (coronary artery disease)     cath in 1/2018: Angioplasty and stenting of 90% mid RCA stenosis, 50% right ostial coronary stenosis, and ostial LAD stenosis.  Moderate disease throughout the mid LAD and 30 to 50% range.     Carotid artery stenosis      CKD (chronic kidney disease)      Depression      Diastolic heart failure (H)     Most recent echo 1/2018: EF 55%, inferior regional wall motion abnormality, increased left ventricular end-diastolic pressure.     H/O alcohol abuse      Hypercholesterolemia      Hypertension      Renal artery stenosis (H)      Seizure disorder (H)      Subarachnoid hemorrhage (H) 2014       Past Surgical History   I have reviewed this patient's surgical history and updated it with pertinent information if needed.  Past Surgical History:   Procedure Laterality Date     APPENDECTOMY       CAROTID ENDARTERECTOMY Left      Cataract surgery Right     will have laser eye surgery     CHOLECYSTECTOMY       Coronary stents x 3       CV CORONARY ANGIOGRAM N/A 7/19/2019    Procedure: Coronary Angiogram;  Surgeon: Pablito Emerson MD;  Location:  [facility]     HYSTERECTOMY      at age of 40-not cancerus or precancerous      JOINT REPLACEMENT Bilateral      Stenting of renal artery         Prior to Admission Medications   Prior to Admission Medications   Prescriptions Last Dose Informant Patient Reported? Taking?   Acetaminophen (TYLENOL PO)   Yes No   Sig: Take 650 mg by mouth 3 times daily    aspirin (ASA) 81 MG EC  tablet   No No   Sig: Take 1 tablet (81 mg) by mouth daily   carvedilol (COREG) 3.125 MG tablet   No No   Sig: Take 1 tablet (3.125 mg) by mouth 2 times daily (with meals)   cholecalciferol (VITAMIN D3) 5000 UNITS CAPS capsule   Yes No   Sig: Take 5,000 Units by mouth daily   citalopram (CELEXA) 10 MG tablet   Yes No   Sig: Take 20 mg by mouth daily   furosemide (LASIX) 20 MG tablet   Yes No   Sig: Take 20 mg by mouth daily   hypromellose (ARTIFICIAL TEARS) 0.5 % SOLN ophthalmic solution   Yes No   Sig: Place 1 drop into both eyes 3 times daily    losartan (COZAAR) 100 MG tablet   Yes No   Sig: Take 100 mg by mouth daily   omeprazole (PRILOSEC) 40 MG capsule   No No   Sig: Take 1 capsule (40 mg) by mouth daily Take 30-60 minutes before a meal.   oxybutynin (DITROPAN) 5 MG tablet   Yes No   Sig: Take 5 mg by mouth 2 times daily   polyethylene glycol (MIRALAX/GLYCOLAX) powder   Yes No   Sig: Take 17 g by mouth nightly as needed    rosuvastatin (CRESTOR) 40 MG tablet   Yes No   Sig: Take 40 mg by mouth At Bedtime    trolamine salicylate (ASPERCREME) 10 % external cream   Yes No   Sig: Apply topically as needed for moderate pain (apply twice daily as needed for pain. Left knee) AND BID SCHEDULED      Facility-Administered Medications: None     Allergies   No Known Allergies    Social History   I have reviewed this patient's social history and updated it with pertinent information if needed. Jade Caba  reports that she has never smoked. She has never used smokeless tobacco. She reports that she does not drink alcohol and does not use drugs.    Family History   I have reviewed this patient's family history and updated it with pertinent information if needed.   Family History   Problem Relation Age of Onset     C.A.D. Mother      C.A.D. Brother      C.A.D. Sister      C.A.D. Brother      Cancer Sister         Breast cancer     Heart Disease Son         -----------------------------------------------------------------------------------------------------------------------------------------------------------------------------------------------------    Review of Systems   The 10 point Review of Systems is negative other than noted in the HPI or here.     Physical Exam   Temp: 98.1  F (36.7  C) Temp src: Temporal BP: (!) 145/88 Pulse: 79   Resp: 18 SpO2: 99 % O2 Device: None (Room air)    Vital Signs with Ranges  Temp:  [98.1  F (36.7  C)] 98.1  F (36.7  C)  Pulse:  [69-95] 79  Resp:  [18] 18  BP: (145-196)/(73-88) 145/88  SpO2:  [97 %-100 %] 99 %  0 lbs 0 oz    Constitutional: Awake, alert, cooperative, no apparent distress.  Eyes: Conjunctiva and pupils examined and normal.  HEENT: Moist mucous membranes, normal dentition.  Respiratory: Clear to auscultation bilaterally, no crackles or wheezing.  Cardiovascular: Regular rate and rhythm, normal S1 and S2, and no murmur noted.  GI: Soft, non-distended, non-tender, normal bowel sounds.  Lymph/Hematologic: No anterior cervical or supraclavicular adenopathy.  Skin: No rashes, no cyanosis, no edema.  Musculoskeletal: No joint swelling, erythema or tenderness.  Neurologic: Cranial nerves 2-12 intact, normal strength and sensation.  Psychiatric: Alert, oriented to person, place and time, no obvious anxiety or depression.     Data     Recent Labs   Lab 09/05/22  1458   WBC 8.3   HGB 13.9   MCV 94      *   POTASSIUM 3.7   CHLORIDE 90*   CO2 32*   BUN 24.8*   CR 1.13*   ANIONGAP 9   FRANCISCO 9.5   *   ALBUMIN 3.7   PROTTOTAL 6.4   BILITOTAL 0.4   ALKPHOS 51   ALT 19   AST 23       Recent Results (from the past 24 hour(s))   CT Soft Tissue Neck w Contrast    Narrative    EXAM: CT SOFT TISSUE NECK W CONTRAST  LOCATION: [facility]  DATE/TIME: 9/5/2022 4:21 PM    INDICATION: Left neck and face pain with dysphasia  COMPARISON: CT head without contrast 12/27/2014.  CONTRAST: 100mL Isovue  370  TECHNIQUE: Routine CT Soft Tissue Neck with IV contrast. Multiplanar reformats. Dose reduction techniques were used.    FINDINGS:     MUCOSAL SPACES/SOFT TISSUES: Unremarkable mucosal spaces of the upper aerodigestive tract. No mucosal mass or inflammation identified. Normal vocal cords and infraglottic trachea. Normal parapharyngeal space and subcutaneous soft tissues. Unremarkable   oral cavity,  spaces, and floor of mouth structures.    LYMPH NODES: No pathologic lymph nodes by size or morphology criteria.     SALIVARY GLANDS: Normal parotid and submandibular glands.    THYROID: Bilateral thyroid nodules measuring up to 17 mm on the left.     VESSELS: Occluded right ICA at the carotid bifurcation. High-grade tandem stenosis versus occlusion at the right cavernous ICA.    VISUALIZED INTRACRANIAL/ORBITS/SINUSES: Chronic lacunar infarct in the right caudate head, similar in appearance compared to 2014.  Visualized paranasal sinuses and mastoid air cells are clear.    OTHER: No destructive osseous lesion. Mild scarring in the lung apices.       Impression    IMPRESSION:   1.  No CT findings in the neck to explain the patient's left neck and face pain.  2.  Occluded right ICA at the carotid bifurcation with a high-grade tandem stenosis/occlusion at the cavernous segment. This is of uncertain chronicity, however favor chronic as there is redemonstration of a chronic lacunar infarct in the right caudate   head also seen in 2014.  3.  Multiple thyroid nodules measuring up to 17 mm on the left. Consider outpatient ultrasound follow-up, as clinically warranted.    Finding #2 discussed with Dr. Gómez at 5:36 PM on 09/05/2022.

## 2022-09-06 NOTE — CONSULTS
Care Management Initial Consult    General Information  Assessment completed with: Patient, Patient  Type of CM/SW Visit: Initial Assessment    Primary Care Provider verified and updated as needed: No   Readmission within the last 30 days: no previous admission in last 30 days      Reason for Consult: discharge planning  Advance Care Planning:            Communication Assessment  Patient's communication style: spoken language (English or Bilingual)    Hearing Difficulty or Deaf: yes   Wear Glasses or Blind: yes    Cognitive  Cognitive/Neuro/Behavioral: WDL                      Living Environment:   People in home: alone, facility resident     Current living Arrangements: assisted living      Able to return to prior arrangements: yes       Family/Social Support:  Care provided by: self (Facility Staff)  Provides care for: no one, unable/limited ability to care for self     Children          Description of Support System: Supportive, Involved    Support Assessment: Adequate family and caregiver support, Patient communicates needs well met    Current Resources:   Patient receiving home care services: No     Community Resources: [facility]  Equipment currently used at home: cane, straight  Supplies currently used at home: None    Employment/Financial:  Employment Status:          Financial Concerns:     Referral to Financial Worker: No       Lifestyle & Psychosocial Needs:  Social Determinants of Health     Tobacco Use: Low Risk      Smoking Tobacco Use: Never Smoker     Smokeless Tobacco Use: Never Used   Alcohol Use: Not on file   Financial Resource Strain: Not on file   Food Insecurity: Not on file   Transportation Needs: Not on file   Physical Activity: Not on file   Stress: Not on file   Social Connections: Not on file   Intimate Partner Violence: Not on file   Depression: Not at risk     PHQ-2 Score: 1   Housing Stability: Not on file       Functional Status:  Prior to admission patient needed assistance:       Dependent IADLs:: Cleaning, Laundry, Shopping, Medication Management, Money Management, Transportation  Assesssment of Functional Status: Has complex medical needs, requires placement in a facility, Needs assistance with laundry/houskeeping, Needs assistance with meals, Needs assistance with medications, Needs assistance with transportation    Mental Health Status:  Mental Health Status: No Current Concerns       Chemical Dependency Status:  Chemical Dependency Status: No Current Concerns           Care Management Discharge Note    Discharge Date: 09/06/2022       Discharge Disposition: Assisted Living    Discharge Services: None    Discharge DME: None    Discharge Transportation: family or friend will provide    Private pay costs discussed: Not applicable    PAS Confirmation Code:    Patient/family educated on Medicare website which has current facility and service quality ratings: no    Education Provided on the Discharge Plan:    Persons Notified of Discharge Plans: Patient  Patient/Family in Agreement with the Plan: yes    Additional Information:  SW met with patient at [facility].     Patient lives in [facility] ([place] ([phone]). Patient gave SW permission to call facility but there was no answer. SW LVM requesting a call back. Patient reports she gets medication administration and meals. Her daughter helps with laundry. Patient feels well taken care of and declines needing other services. Patient likes to stay social and play card games.     Patient has 10 children. Some live out of state. She has 2 children near by that assist her when needed. Family can transport at discharge.     SAROJ Kahn, [facility]  Emergency Room   [phone]-Please contact the SW on the floor in which the patient is staying for any questions or concerns        Zeinab Miles

## 2022-09-06 NOTE — PHARMACY-ADMISSION MEDICATION HISTORY
Admission medication history interview status for this patient is complete. See [facility] admission navigator for allergy information, prior to admission medications and immunization status.     Medication history interview done, indicate source(s): Patient and Caregiver  Medication history resources (including written lists, pill bottles, clinic record):None  Pharmacy: -    Changes made to PTA medication list:  Added: refresh plus, chlorthalidone, folic, folbic, mvi, ca carb, pravastatin  Changed: tylenol,   Reported as Not Taking: -  Removed: crestor, aspercreme, hypromellose    Actions taken by pharmacist (provider contacted, etc):Called rita  for med rec     Additional medication history information:None    Medication reconciliation/reorder completed by provider prior to medication history?  no   (Y/N)     For patients on insulin therapy:   Do you use sliding scale insulin based on blood sugars?   What is your pre-meal insulin coverage?    Do you typically eat three meals a day?   How many times do you check your blood glucose per day?   How many episodes of hypoglycemia do you typically have per month?   Do you have a Continuous Glucose Monitor (CGM)?      Prior to Admission medications    Medication Sig Last Dose Taking? Auth Provider Long Term End Date   Acetaminophen (TYLENOL PO) Take 650 mg by mouth every 6 hours as needed  Yes Reported, Patient     acetaminophen (TYLENOL) 500 MG tablet Take 500 mg by mouth 2 times daily 9/5/2022 at x1 Yes Unknown, Entered By History     aspirin (ASA) 81 MG EC tablet Take 1 tablet (81 mg) by mouth daily 9/5/2022 at Unknown time Yes Liang Kelly D, DO No    calcium carbonate 500 mg, elemental, (OSCAL) 500 MG tablet Take 2 tablets by mouth every evening 9/4/2022 at Unknown time Yes Unknown, Entered By History     carboxymethylcellulose PF (REFRESH PLUS) 0.5 % ophthalmic solution Place 1 drop into both eyes 2 times daily 9/5/2022 at x1 Yes Unknown, Entered By  History     carvedilol (COREG) 3.125 MG tablet Take 1 tablet (3.125 mg) by mouth 2 times daily (with meals) 9/5/2022 at x1 Yes James Rojas MD Yes    chlorthalidone (HYGROTON) 25 MG tablet Take 25 mg by mouth daily 9/5/2022 at Unknown time Yes Unknown, Entered By History Yes    cholecalciferol (VITAMIN D3) 5000 UNITS CAPS capsule Take 5,000 Units by mouth daily 9/5/2022 at Unknown time Yes Reported, Patient     citalopram (CELEXA) 10 MG tablet Take 20 mg by mouth daily 9/4/2022 at pm Yes Unknown, Entered By History No    folic acid (FOLVITE) 1 MG tablet Take 1 mg by mouth daily 9/5/2022 at Unknown time Yes Unknown, Entered By History     furosemide (LASIX) 20 MG tablet Take 20 mg by mouth daily 9/5/2022 at Unknown time Yes Reported, Patient No    L-Methylfolate-B6-B12 (FOLBIC RF PO) Take 1 tablet by mouth daily 9/5/2022 at Unknown time Yes Unknown, Entered By History     losartan (COZAAR) 100 MG tablet Take 100 mg by mouth daily 9/5/2022 at Unknown time Yes Unknown, Entered By History No    Multiple Vitamins-Minerals (CERTA-FREDERICK PO) Take 1 tablet by mouth daily 9/5/2022 at Unknown time Yes Unknown, Entered By History     omeprazole (PRILOSEC) 40 MG capsule Take 1 capsule (40 mg) by mouth daily Take 30-60 minutes before a meal. 9/5/2022 at Unknown time Yes Suzi Bailey MD     oxybutynin (DITROPAN) 5 MG tablet Take 5 mg by mouth 2 times daily 9/5/2022 at x1 Yes Unknown, Entered By History     pravastatin (PRAVACHOL) 20 MG tablet Take 20 mg by mouth daily 9/4/2022 at pm Yes Unknown, Entered By History Yes    polyethylene glycol (MIRALAX/GLYCOLAX) powder Take 17 g by mouth nightly as needed    Unknown, Entered By History

## 2022-09-06 NOTE — PLAN OF CARE
Patient's After Visit Summary was reviewed with patient and/or daughter.   Patient verbalized understanding of After Visit Summary, recommended follow up and was given an opportunity to ask questions.   Discharge medications sent home with patient/family: No  Discharged with daughter.       IV removed. Belongings remain with patient. Stable when leaving 202-1.

## 2022-09-06 NOTE — PROGRESS NOTES
Care Management Follow Up    Length of Stay (days): 0    Expected Discharge Date: 09/06/2022     Concerns to be Addressed: Discharge planning  Patient plan of care discussed at interdisciplinary rounds: Yes    Anticipated Discharge Disposition: [facility]    Patient/Family in Agreement with the Plan: Yes    Referrals Placed by CM/SW:  None at this time  Private pay costs discussed: Not applicable    Additional Information:  SW received call back from RN at [facility], [phone]. They assist patient with meds, monitor BP, and SBA for bathing. They prefer pt return by 1700 today. Orders to be faxed to (f) [phone]. New meds to be filled and sent with. SW will continue to follow.     SAROJ Collado, [facility]   Inpatient Care Coordination  [facility]   [phone]

## 2022-09-06 NOTE — PLAN OF CARE
PRIMARY DIAGNOSIS: Acute Left-Sided Neck Pain   OUTPATIENT/OBSERVATION GOALS TO BE MET BEFORE DISCHARGE:    1.  Pain controlled with oral analgesia: Yes    2.  Vital signs stable: Yes    3.  Diagnotic testing complete: Yes    4.  Cleared from consultants (if applicable): No    5. Return to near baseline physical activity: Yes     Discharge Planner Nurse   Safe discharge environment identified: Yes  Barriers to discharge: Yes       Entered by: Kenny Barrientos RN 09/06/2022        Please review provider order for any additional goals.   Nurse to notify provider when observation goals have been met and patient is ready for discharge.

## 2022-09-06 NOTE — TELEPHONE ENCOUNTER
Pt referred to [facility] by Uzma Eastman PA-C for carotid artery stenosis  This is a previous Dr. Tamayo patient.     Carotid artery disease, hx of prior s/p left endarterectomy 2007, right endarterectomy 2003  -Less than 50% on the R, ~70% on the L by ultrasound.  -Not symptomatic, likely continue with medical management but will review with Dr Rojas to ensure he does not recommend additional testing. [ADDENDUM 9/30/2021: D/W Dr Rojas, he agree with medical management and follow up ultrasound in a year or so]      Patient has soft tissue CT neck in Epic.     Pt needs to be scheduled for left carotid US and consult with vascular surgery. This is a Dr. Tamayo patient but due to his current leave patient will need to be scheduled with different vascular surgeon. Will route to scheduling to coordinate an appointment in next week or 2.    Appointment note: Previous Dr. Tamayo patient. Follow up for right ICA occluded. s/p left endarterectomy 2007, right endarterectomy 2003. Needs left carotid US prior.         Lidia TOLENTINO, RN    [facility]  Office: [phone]  Fax: [phone]

## 2022-09-06 NOTE — PLAN OF CARE
PRIMARY DIAGNOSIS: Left sided neck pain  OUTPATIENT/OBSERVATION GOALS TO BE MET BEFORE DISCHARGE:  1. Pain Status: Pain free.    2. Return to near baseline physical activity: Yes    3. Cleared for discharge by consultants (if involved): No    Discharge Planner Nurse   Safe discharge environment identified: Yes  Barriers to discharge: Yes       A & O X 4. Lung sound clear all lobes. Denies SOB. Bowel sound present X 4 and active. VSS: Stable.  A X 1. Comes from assisted living in [place]. On regular diet. BUR 24.8, Cr 1.13, Troponin 22. CBC is normal . NS infusing at 75 mL. On cardiac monitoring, cardiac rhythm SR 70s-80s per telemetry .       Entered by: Chely Vázquez RN 09/06/2022 05:45     Please review provider order for any additional goals.   Nurse to notify provider when observation goals have been met and patient is ready for discharge.

## 2022-09-06 NOTE — ED NOTES
[facility]  ED Nurse Handoff Report    Jade Caba is a 89 year old female   ED Chief complaint: Neck Pain  . ED Diagnosis:   Final diagnoses:   Elevated troponin   Neck pain     Allergies: No Known Allergies    Code Status: Full Code  Activity level - Baseline/Home:  Independent. Activity Level - Current:   Independent. Lift room needed: No. Bariatric: No   Needed: No   Isolation: No. Infection: Not Applicable.     Vital Signs:   Vitals:    09/05/22 1630 09/05/22 1700 09/05/22 1730 09/05/22 1930   BP: (!) 189/81 (!) 175/81 (!) 145/88 (!) 159/85   Pulse: 95 93 79 109   Resp: 18      Temp:       TempSrc:       SpO2: 97% 97% 99%        Cardiac Rhythm:  ,      Pain level:    Patient confused: No. Patient Falls Risk: No.   Elimination Status: Has voided   Patient Report - Initial Complaint: left sided neck pain that radiates into the head and left eye for the past week. Focused Assessment: Left neck pain. Pleasant. Alert and oriented  Tests Performed: Labs and imaging. Abnormal Results:   Labs Ordered and Resulted from Time of ED Arrival to Time of ED Departure   COMPREHENSIVE METABOLIC PANEL - Abnormal       Result Value    Sodium 131 (*)     Potassium 3.7      Creatinine 1.13 (*)     Urea Nitrogen 24.8 (*)     Chloride 90 (*)     Carbon Dioxide (CO2) 32 (*)     Anion Gap 9      Glucose 102 (*)     Calcium 9.5      Protein Total 6.4      Albumin 3.7      Bilirubin Total 0.4      Alkaline Phosphatase 51      AST 23      ALT 19      GFR Estimate 46 (*)    TROPONIN T, HIGH SENSITIVITY - Abnormal    Troponin T, High Sensitivity 19 (*)    TROPONIN T, HIGH SENSITIVITY - Abnormal    Troponin T, High Sensitivity 22 (*)    TSH WITH FREE T4 REFLEX - Normal    TSH 3.17     CBC WITH PLATELETS AND DIFFERENTIAL    WBC Count 8.3      RBC Count 4.42      Hemoglobin 13.9      Hematocrit 41.5      MCV 94      MCH 31.4      MCHC 33.5      RDW 11.8      Platelet Count 204      % Neutrophils 65      % Lymphocytes  24      % Monocytes 8      % Eosinophils 1      % Basophils 1      % Immature Granulocytes 1      NRBCs per 100 WBC 0      Absolute Neutrophils 5.5      Absolute Lymphocytes 2.0      Absolute Monocytes 0.6      Absolute Eosinophils 0.1      Absolute Basophils 0.0      Absolute Immature Granulocytes 0.0      Absolute NRBCs 0.0       Family Comments: family at bedside  OBS brochure/video discussed/provided to patient:  Yes  ED Medications:   Medications   HYDROmorphone (PF) (DILAUDID) injection 0.5 mg (0.5 mg Intravenous Given 9/5/22 1456)   0.9% sodium chloride BOLUS (0 mLs Intravenous Stopped 9/5/22 1554)   CT Scan Flush (65 mLs Intravenous Given 9/5/22 1611)   iopamidol (ISOVUE-370) solution 500 mL (100 mLs Intravenous Given 9/5/22 1611)     Drips infusing:  No  For the majority of the shift, the patient's behavior Green.     Sepsis treatment initiated: No     Patient tested for COVID 19 prior to admission: YES    ED Nurse Name/Phone Number: Kimber Smith RN,   8:05 PM        RECEIVING UNIT ED HANDOFF REVIEW    Above ED Nurse Handoff Report was reviewed: Yes  Reviewed by: Chely Vázquez RN on September 6, 2022 at 12:02 AM

## 2022-09-06 NOTE — PLAN OF CARE
PRIMARY DIAGNOSIS: Left sided neck pain  OUTPATIENT/OBSERVATION GOALS TO BE MET BEFORE DISCHARGE:  1. Pain Status: Pain free.    2. Return to near baseline physical activity: Yes    3. Cleared for discharge by consultants (if involved): No    Discharge Planner Nurse   Safe discharge environment identified: Yes  Barriers to discharge: Yes       Entered by: Chely Vázquez RN 09/06/2022 0055     Please review provider order for any additional goals.   Nurse to notify provider when observation goals have been met and patient is ready for discharge.

## 2022-09-06 NOTE — INTERIM SUMMARY
Vascular Surgery Staff    Asked to review CT angiogram of this 89-year-old female patient who is status post bilateral carotid endarterectomies approximately 20 years ago who noted some left neck pain but was admitted for elevated troponin and hypertension.    In review of her carotid duplex study last year, both carotid arteries were patent, however the end-diastolic velocities were extremely low on the right in 2021 suggesting distal occlusive disease, thus it is not surprising to learn on her recent CT angiogram of the neck that the right internal carotid artery is now occluded.  Her common and external carotid arteries are patent on the right.  The left common, external and internal carotid arteries are patent.  She has extensive occlusive disease at the origin of her left subclavian artery as well as her aortic arch.    Assessment and plan: Chronic right internal carotid artery occlusion.  Given the extremely low end-diastolic velocity seen on last year's carotid duplex study, the finding of an occluded right internal carotid artery is not surprising.  Given the disease at the origin of her left subclavian artery, would recommend blood pressures only be taken on the right.  She should be maintained on aspirin 81 mg daily, statin and have a carotid duplex study.  She is set to follow-up with her cardiologist, Dr. Rojas this fall.  If there are issues with symptomatic carotid disease, she could be referred back to vascular surgery.    Thank you!    Marley Man MD, DFSVS, RPVI  Director, [facility]  Chief, Vascular and Endovascular Surgery  [facility]  [email or web address]  Pager: 1. Send message or 10 digit call back number Securely via [employer] with the [employer] Web Console (learn more here)              2. Outside of [facility]? Call [phone]

## 2022-09-06 NOTE — PLAN OF CARE
PRIMARY DIAGNOSIS: Acute Left-Sided Neck Pain   OUTPATIENT/OBSERVATION GOALS TO BE MET BEFORE DISCHARGE:    1.  Pain controlled with oral analgesia: Yes    2.  Vital signs stable: Yes    3.  Diagnotic testing complete: Yes    4.  Cleared from consultants (if applicable): No    5. Return to near baseline physical activity: Yes     Discharge Planner Nurse   Safe discharge environment identified: Yes  Barriers to discharge: Yes       Entered by: Kenny Barrientos RN 09/06/2022         A/O x4. VSS on room air. Assist of SBA. Tolerating regular diet. Lung sounds diminished. Bowel sounds active. passing flatus. Last BM 9/5. Adequate urine output. Skin intact ex. bruising right wrist. Pain managed with PRN tylenol. Denies nausea. Tele NSR - 70s.    Please review provider order for any additional goals.   Nurse to notify provider when observation goals have been met and patient is ready for discharge.

## 2022-09-06 NOTE — PLAN OF CARE
PRIMARY DIAGNOSIS: Acute Left-Sided Neck Pain   OUTPATIENT/OBSERVATION GOALS TO BE MET BEFORE DISCHARGE:    1.  Pain controlled with oral analgesia: Yes    2.  Vital signs stable: Yes    3.  Diagnotic testing complete: Yes    4.  Cleared from consultants (if applicable): Yes    5. Return to near baseline physical activity: Yes     Discharge Planner Nurse   Safe discharge environment identified: Yes  Barriers to discharge: No       Entered by: Kenny Barrientos RN 09/06/2022         A/O x4. VSS on room air. Assist of SBA. Tolerating regular diet. Lung sounds diminished. Bowel sounds active. passing flatus. Last BM 9/5. Adequate urine output. Skin intact ex. bruising right wrist. Denies pain. Denies nausea. Tele NSR - 70s.  Plan to discharge back to [facility] via daughter at 1715.  Please review provider order for any additional goals.   Nurse to notify provider when observation goals have been met and patient is ready for discharge.

## 2022-09-19 ENCOUNTER — TELEPHONE (OUTPATIENT)
Dept: CARDIOLOGY | Facility: CLINIC | Age: 87
End: 2022-09-19

## 2022-09-19 ENCOUNTER — OFFICE VISIT (OUTPATIENT)
Dept: CARDIOLOGY | Facility: CLINIC | Age: 87
End: 2022-09-19
Payer: COMMERCIAL

## 2022-09-19 VITALS
HEART RATE: 67 BPM | WEIGHT: 145.4 LBS | DIASTOLIC BLOOD PRESSURE: 60 MMHG | SYSTOLIC BLOOD PRESSURE: 180 MMHG | HEIGHT: 61 IN | BODY MASS INDEX: 27.45 KG/M2 | OXYGEN SATURATION: 95 %

## 2022-09-19 DIAGNOSIS — I10 ESSENTIAL HYPERTENSION: Primary | ICD-10-CM

## 2022-09-19 DIAGNOSIS — I50.32 CHRONIC DIASTOLIC CONGESTIVE HEART FAILURE (H): Primary | ICD-10-CM

## 2022-09-19 DIAGNOSIS — I25.10 CORONARY ATHEROSCLEROSIS: ICD-10-CM

## 2022-09-19 PROCEDURE — 99214 OFFICE O/P EST MOD 30 MIN: CPT | Performed by: PHYSICIAN ASSISTANT

## 2022-09-19 RX ORDER — ALENDRONATE SODIUM 70 MG/1
TABLET ORAL
Status: ON HOLD | COMMUNITY
Start: 2022-09-16 | End: 2023-01-01

## 2022-09-19 RX ORDER — FUROSEMIDE 20 MG
20 TABLET ORAL DAILY
Start: 2022-09-19 | End: 2023-03-10

## 2022-09-19 NOTE — TELEPHONE ENCOUNTER
"----- Message from DORYS Temple sent at 9/19/2022  3:55 PM CDT -----  Regarding: labs 1 week [place]  I increased her lasix from 10mg to 20mg daily. I\"d like her to have a BMP in 1 week.  She requested orders faxed to [facility]?  Can you coordinate with patient to arrange this please, then send results my way?    Thanks!    Kristel Call PA McMahon, Bullis Mary, RN  Can you also call the daughter and try to clarify the following:   It looks like she was initially on ROSUVASTATIN and I don't see when/why it got changed to PRAVASTATIN?     She has CAD and bad carotid disease. Last LDL was 96 that I can see from about a year ago.   Can we try to clarify this please?     I would like to add an FLP to the BMP next week as well to help figure out where we go from here.   Thanks.   Kristel   ==========================================    9/219/22 Called to patient. Daughter Bianca answered phoine. Bianca reports she is driving and asks we leave a message. Message left on Bianca's voice mail and Bianca will check on medications / statins and call back tomorrow.   Faxed order for labs to [facility].  Jade Mercedes RN 09/19/22 4:34 PM        "

## 2022-09-19 NOTE — LETTER
9/19/2022    [facility]  [address]  [facility] [address]    RE: Jade Caba       Dear Colleague,     I had the pleasure of seeing Jade Caba in the [facility].        Cardiology Progress Note    Date of Service: 09/19/22      Reason for visit: Annual follow up, coronary artery disease.    Primary cardiologist: Dr. James Rojas      HPI:  Ms. Caba is a pleasant 89 year old female with a PMHx including seizure disorder, subarachnoid hemorrhage s/p mechanical fall, CVA, right carotid endarterectomy in 2003, left carotid endarterectomy in 2007, renal artery stenosis, HTN, hyperlipidemia, and coronary artery disease s/p multivessel PCIs. As outlined in previous documentation, she initially had angioplasty to the circumflex in 1997.  She subsequently had drug-eluting stent placement to the LAD in 2004, drug-eluting stent placement to the circumflex and RCA in 2011 and recurrent symptoms with drug-eluting stent placement to the mid LAD, also in 2011.  She had non-STEMI in 02/2018 at which time she was noted to have a thrombotic lesion in the mid RCA which was treated with drug-eluting stent placement.  Unfortunately, there guidewire trauma in the proximal RCA and she had stent placement to this area as well.  Additionally, she was found to have significant in-stent restenosis in the LAD and underwent repeat drug-eluting stent placement there too. In 07/2019, she did undergo a repeat coronary angiogram which showed no significant change since 2018. And no intervention was performed.      She was seen most recently in clinic by Dionna Parnell PA-C in Sept of 2021. At that time, she had been dealing with some dizziness that was felt possibly from orthostasis possibly compounded by blunted HR on carvedilol, which was then decreased. She had also been following with her neurology clinic and was being weaned off Keppra. At that time MRI of the brain showed occlusion of the  right ICA, an MRA of the head and neck along with a neuro IR consult and Ziopatch were ordered. However, it appears that her daughter relayed they did not follow through with these recommendations. Clinically at that time, it sounds as if she was doing better with reduced dose of carvedilol.     More recently, Jade was hospitalized from 9/5-9/6 after presenting with neck pain. Her blood pressure control remained suboptimal as well. She had a CT soft tissue of the neck which showed a known occluded right ICA. Neck pain improved with IV pain control. She did have an elevated troponin but this was felt likely to be demand related. Echocardiogram showed normal EF with no WMAs. Due to some renal concerns I believe, her Lasix was reduced from 20mg to 10mg dailym and it appears that her chlorthalidone was discontinued. Vascular surgery had been consulted and recommended repeat carotid US and close outpatient follow up in their clinic. This showed progression of R carotid artery stenosis to complete occlusion, likely chronic in nature.     Today, I am meeting Jade and her daughter in clinic for follow up. She tells me that her neck is still bothering her quite a bit. She is using Tylenol but this doesn't help much; it appears Voltaren gel was also recommended per PCP. It is mostly on the left side of her neck, and she says they were unsure what the cause is. She has follow up for the right ICA occlusion with Vascular on 9/29. She reports that her breathing is good, but she has noted over the last few days that she is having more swelling in both of her ankles. No new CP, or palpitations. She will on occasion feel a bit dizzy but just sits and puts her head down slightly which seems to help. She is able to ambulate with her cane without dizziness/presyncope. Notably, her BP was quite elevated today in the clinic; initially 180 systolic, but did come down to 160/70 on repeat check. However, upon questioning she tells me that  on checks at her facility it is often 110-120s.     The patient did not have any new labs performed for our visit today, but most recent available labs were reviewed as below.       ASSESSMENT/PLAN:    1. Coronary artery disease.   --Hx of multivessel PCI as outlined above. Currently free of angina. Last angiogram from July 2019 at which time no intervention was performed, and ongoing medical mgmt recommended.    --Continue ASA 81mg daily.   --Post visit I noted that her rosuvastatin was at some point changed to pravastatin, though I am not clear when/why this change occurred. Will reach out to pt's daughter to see if we can clarify this. The last LDL I can locate from Nov 2021 ([facility]) was 98. Will recheck FLP with next lab draw.    2. Carotid artery disease.   --Hx of left endarterectomy 2007, right endarterectomy 2003. With recent hospital stay, had a CT soft tissue of the neck which showed a known occluded right ICA. She has vascular surgery follow up already scheduled next week.   I do not believe her neck pain is related to this; it appears PCP is working on pain mgmt strategies.    --ASA daily, need to clarify statin as above.     3. Hypertension.   --This remains an issue. It sounds as if her BP is fluctuating quite a bit. Here she was anywhere from 160-180 systolic but she relays that at her facility it has been running 110-120s mostly. Her chlorthalidone was discontinued recently, and lasix reduced from 20mg to 10mg daily. As she is having some more edema in her ankles, will increase Lasix back to 20mg and repeat BMP in 1 week. May need further adjustments pending vascular evaluation as well.    --Neck pain may be playing a role.  She also has known MIKEL s/p stenting.      Follow up plan: Return in 6 months to see Dr. Rojas with labs, or sooner if needed.       Orders this Visit:  Orders Placed This Encounter   Procedures     Basic metabolic panel     Follow-Up with Cardiology     Orders Placed This  Encounter   Medications     alendronate (FOSAMAX) 70 MG tablet     Sig: TAKE 1 TABLET (70 MG) BY MOUTH ONCE A WEEK IN THE MORNING. TAKE ON EMPTY STOMACH WITH FULL GLASS OF WATER. DO NOT LIE DOWN FOR 1 HR.     furosemide (LASIX) 20 MG tablet     Sig: Take 1 tablet (20 mg) by mouth daily     Medications Discontinued During This Encounter   Medication Reason     furosemide (LASIX) 20 MG tablet            CURRENT MEDICATIONS:  Current Outpatient Medications   Medication Sig Dispense Refill     Acetaminophen (TYLENOL PO) Take 650 mg by mouth every 6 hours as needed       acetaminophen (TYLENOL) 500 MG tablet Take 500 mg by mouth 2 times daily       alendronate (FOSAMAX) 70 MG tablet TAKE 1 TABLET (70 MG) BY MOUTH ONCE A WEEK IN THE MORNING. TAKE ON EMPTY STOMACH WITH FULL GLASS OF WATER. DO NOT LIE DOWN FOR 1 HR.       aspirin (ASA) 81 MG EC tablet Take 1 tablet (81 mg) by mouth daily 90 tablet 3     calcium carbonate 500 mg (elemental) (OSCAL) 500 MG tablet Take 2 tablets by mouth every evening       carboxymethylcellulose PF (REFRESH PLUS) 0.5 % ophthalmic solution Place 1 drop into both eyes 2 times daily       carvedilol (COREG) 3.125 MG tablet Take 1 tablet (3.125 mg) by mouth 2 times daily (with meals) 180 tablet 3     cholecalciferol (VITAMIN D3) 5000 UNITS CAPS capsule Take 5,000 Units by mouth daily       citalopram (CELEXA) 10 MG tablet Take 2 tablets (20 mg) by mouth every evening       folic acid (FOLVITE) 1 MG tablet Take 1 mg by mouth daily       furosemide (LASIX) 20 MG tablet Take 1 tablet (20 mg) by mouth daily       L-Methylfolate-B6-B12 (FOLBIC RF PO) Take 1 tablet by mouth daily       losartan (COZAAR) 100 MG tablet Take 100 mg by mouth daily       Multiple Vitamins-Minerals (CERTA-FREDERICK PO) Take 1 tablet by mouth daily       omeprazole (PRILOSEC) 40 MG capsule Take 1 capsule (40 mg) by mouth daily Take 30-60 minutes before a meal. 30 capsule 9     oxybutynin (DITROPAN) 5 MG tablet Take 5 mg by mouth 2  "times daily       polyethylene glycol (MIRALAX/GLYCOLAX) powder Take 17 g by mouth nightly as needed        pravastatin (PRAVACHOL) 20 MG tablet Take 1 tablet (20 mg) by mouth every evening         ALLERGIES   No Known Allergies    PAST MEDICAL HISTORY:  Past Medical History:   Diagnosis Date     Anxiety      Arthritis      Atrial flutter (H)      C1 cervical fracture (H)      CAD (coronary artery disease)     cath in 1/2018: Angioplasty and stenting of 90% mid RCA stenosis, 50% right ostial coronary stenosis, and ostial LAD stenosis.  Moderate disease throughout the mid LAD and 30 to 50% range.     Carotid artery stenosis      CKD (chronic kidney disease)      Depression      Diastolic heart failure (H)     Most recent echo 1/2018: EF 55%, inferior regional wall motion abnormality, increased left ventricular end-diastolic pressure.     H/O alcohol abuse      Hypercholesterolemia      Hypertension      Renal artery stenosis (H)      Seizure disorder (H)      Subarachnoid hemorrhage (H) 2014         Review of Systems:  Cardiovascular: negative for chest pain, palpitations, orthopnea, pos LE edema  Constitutional: negative for chills, sweats, fevers   Resp: Negative for dyspnea at rest, dyspnea on exertion, known chronic lung disease  HEENT: Negative for new visual changes, frequent headaches  Hematologic/lymphatic: negative for current systemic anticoagulation, hx of blood clots  Neurological: negative for focal weakness, LOC, seizures, syncope/presyncope. Pos occ dizziness.      Physical Exam:  Vitals: BP (!) 180/60 (BP Location: Right arm, Patient Position: Sitting, Cuff Size: Adult Regular)   Pulse 67   Ht 1.549 m (5' 1\")   Wt 66 kg (145 lb 6.4 oz)   LMP  (LMP Unknown)   SpO2 95%   Breastfeeding No   BMI 27.47 kg/m     Wt Readings from Last 4 Encounters:   09/19/22 66 kg (145 lb 6.4 oz)   09/29/21 66.6 kg (146 lb 12.8 oz)   08/20/21 68 kg (150 lb)   03/04/20 82.6 kg (182 lb)       GEN:  In general, this is " a well nourished female in no acute distress on RA.  Patient ambulatory, accompanied by her daughter.  HEENT:  Pupils grossly equal, sclerae nonicteric.   NECK: Supple, trachea midline. No JVD.   C/V:  Regular rate and rhythm, no murmur, rub or gallop. No S3 or RV heave.   RESP: Respirations are unlabored. No use of accessory muscles. Clear to auscultation bilaterally without wheezing, rales, or rhonchi.  GI: Abdomen soft, nontender, nondistended.   EXTREM: 1+ bilateral ankle edema. No cyanosis or clubbing.  NEURO: Alert and oriented, cooperative. Gait not formally assessed. No obvious focal deficits.   SKIN: Warm and dry.       Recent Lab Results:  LIPID RESULTS:  Lab Results   Component Value Date    CHOL 177 07/09/2020    CHOL 177 07/09/2020    HDL 44 (L) 07/09/2020    HDL 44 (A) 07/09/2020     07/09/2020     07/09/2020    TRIG 113 07/09/2020    TRIG 113 07/09/2020    CHOLHDLRATIO 3.7 08/14/2013       LIVER ENZYME RESULTS:  Lab Results   Component Value Date    AST 23 09/05/2022    AST 19 07/09/2020    ALT 19 09/05/2022    ALT 11 07/09/2020       CBC RESULTS:  Lab Results   Component Value Date    WBC 8.2 09/06/2022    WBC 7.9 03/03/2020    RBC 4.39 09/06/2022    RBC 4.20 03/03/2020    HGB 13.8 09/06/2022    HGB 13.1 03/03/2020    HCT 41.6 09/06/2022    HCT 40.8 03/03/2020    MCV 95 09/06/2022    MCV 97 03/03/2020    MCH 31.4 09/06/2022    MCH 31.2 03/03/2020    MCHC 33.2 09/06/2022    MCHC 32.1 03/03/2020    RDW 11.9 09/06/2022    RDW 11.6 03/03/2020     09/06/2022     03/03/2020       BMP RESULTS:  Lab Results   Component Value Date     (L) 09/06/2022     07/09/2020    POTASSIUM 3.7 09/06/2022    POTASSIUM 3.8 07/09/2020    POTASSIUM 3.8 07/09/2020    CHLORIDE 95 (L) 09/06/2022    CHLORIDE 99 07/09/2020    CHLORIDE 99 07/09/2020    CO2 30 (H) 09/06/2022    CO2 28 07/09/2020    CO2 28 07/09/2020    ANIONGAP 8 09/06/2022    ANIONGAP 11 07/09/2020    ANIONGAP 11 07/09/2020     GLC 94 09/06/2022    GLC 86 07/09/2020    GLC 86 07/09/2020    BUN 23.2 (H) 09/06/2022    BUN 12 07/09/2020    BUN 12 07/09/2020    CR 0.98 (H) 09/06/2022    CR 1.06 07/09/2020    GFRESTIMATED 55 (L) 09/06/2022    GFRESTIMATED 49 (L) 07/09/2020    GFRESTIMATED 49 (A) 07/09/2020    GFRESTBLACK 60 (L) 07/09/2020    GFRESTBLACK 60 (A) 07/09/2020    FRANCISCO 8.7 (L) 09/06/2022    FRANCISCO 9.4 07/09/2020          Additional pertinent testing:    Echocardiogram 9/6/22  Interpretation Summary     Technically challenging study.     Left ventricular systolic function is normal. The visual ejection fraction is  60-65%. Mild septal flattening suggesting elevated RV pressures.  Right ventricle is not well visualized, however it is probably mildly dilated  and global systolic function is probably borderline to mildly reduced.  The aortic valve is trileaflet with aortic valve sclerosis. There is trace to  mild aortic regurgitation.  There is mild mitral stenosis. Mean gradient 3 mmHg at 63 bpm.  Mild to moderate (1-2+) tricuspid regurgitation.  Pulmonary hypertension present. The right ventricular systolic pressure is  approximated at 58mmHg plus the right atrial pressure.  Dilation of the inferior vena cava is present with normal respiratory  variation in diameter.     This study was compared to a TTE from 3/3/2020. Estimated right-sided  pressures are mildly higher on this study, and RV global systolic function is  mildly worse.      35 total minutes was spent today including chart review, precharting, history and exam, post visit documentation, and reviewing studies as outlined above.       Kristel Call PA-C  [facility]  Pager [phone]    Thank you for allowing me to participate in the care of your patient.      Sincerely,     DORYS Temple     [facility]  cc:   No referring provider defined for this encounter.

## 2022-09-19 NOTE — PATIENT INSTRUCTIONS
Visit Summary:    Today we discussed:   We will go BACK UP on your Lasix (furosemide) to 20mg daily.  We will contact you to arrange for repeat labs in 1 week in [place] to recheck your kidney labs. We will then call your daughter with results.   Keep your appointment with the vascular surgeon on 9/29 as scheduled.     Follow up:   With Dr. Rojas in 6 months, or sooner if needed.     Please call my nurse Jade at [phone] with any questions or concerns.

## 2022-09-19 NOTE — PROGRESS NOTES
Cardiology Progress Note    Date of Service: 09/19/22      Reason for visit: Annual follow up, coronary artery disease.    Primary cardiologist: Dr. James Rojas      HPI:  Ms. Caba is a pleasant 89 year old female with a PMHx including seizure disorder, subarachnoid hemorrhage s/p mechanical fall, CVA, right carotid endarterectomy in 2003, left carotid endarterectomy in 2007, renal artery stenosis, HTN, hyperlipidemia, and coronary artery disease s/p multivessel PCIs. As outlined in previous documentation, she initially had angioplasty to the circumflex in 1997.  She subsequently had drug-eluting stent placement to the LAD in 2004, drug-eluting stent placement to the circumflex and RCA in 2011 and recurrent symptoms with drug-eluting stent placement to the mid LAD, also in 2011.  She had non-STEMI in 02/2018 at which time she was noted to have a thrombotic lesion in the mid RCA which was treated with drug-eluting stent placement.  Unfortunately, there guidewire trauma in the proximal RCA and she had stent placement to this area as well.  Additionally, she was found to have significant in-stent restenosis in the LAD and underwent repeat drug-eluting stent placement there too. In 07/2019, she did undergo a repeat coronary angiogram which showed no significant change since 2018. And no intervention was performed.      She was seen most recently in clinic by Dionna Parnell PA-C in Sept of 2021. At that time, she had been dealing with some dizziness that was felt possibly from orthostasis possibly compounded by blunted HR on carvedilol, which was then decreased. She had also been following with her neurology clinic and was being weaned off Keppra. At that time MRI of the brain showed occlusion of the right ICA, an MRA of the head and neck along with a neuro IR consult and Ziopatch were ordered. However, it appears that her daughter relayed they did not follow through with these recommendations. Clinically at  that time, it sounds as if she was doing better with reduced dose of carvedilol.     More recently, Jade was hospitalized from 9/5-9/6 after presenting with neck pain. Her blood pressure control remained suboptimal as well. She had a CT soft tissue of the neck which showed a known occluded right ICA. Neck pain improved with IV pain control. She did have an elevated troponin but this was felt likely to be demand related. Echocardiogram showed normal EF with no WMAs. Due to some renal concerns I believe, her Lasix was reduced from 20mg to 10mg dailym and it appears that her chlorthalidone was discontinued. Vascular surgery had been consulted and recommended repeat carotid US and close outpatient follow up in their clinic. This showed progression of R carotid artery stenosis to complete occlusion, likely chronic in nature.     Today, I am meeting Jade and her daughter in clinic for follow up. She tells me that her neck is still bothering her quite a bit. She is using Tylenol but this doesn't help much; it appears Voltaren gel was also recommended per PCP. It is mostly on the left side of her neck, and she says they were unsure what the cause is. She has follow up for the right ICA occlusion with Vascular on 9/29. She reports that her breathing is good, but she has noted over the last few days that she is having more swelling in both of her ankles. No new CP, or palpitations. She will on occasion feel a bit dizzy but just sits and puts her head down slightly which seems to help. She is able to ambulate with her cane without dizziness/presyncope. Notably, her BP was quite elevated today in the clinic; initially 180 systolic, but did come down to 160/70 on repeat check. However, upon questioning she tells me that on checks at her facility it is often 110-120s.     The patient did not have any new labs performed for our visit today, but most recent available labs were reviewed as below.       ASSESSMENT/PLAN:    1.  Coronary artery disease.   --Hx of multivessel PCI as outlined above. Currently free of angina. Last angiogram from July 2019 at which time no intervention was performed, and ongoing medical mgmt recommended.    --Continue ASA 81mg daily.   --Post visit I noted that her rosuvastatin was at some point changed to pravastatin, though I am not clear when/why this change occurred. Will reach out to pt's daughter to see if we can clarify this. The last LDL I can locate from Nov 2021 ([facility]) was 98. Will recheck FLP with next lab draw. [Addendum: 9/20/22: RN spoke with Daughter. PCP changed rosuvatatin to pravastatin due to c/o muscle twitching. It sounds as if this has improved but not fully gone away. Pending lab results and visit with vascular, may need to revisit intensification of statin once again.]    2. Carotid artery disease.   --Hx of left endarterectomy 2007, right endarterectomy 2003. With recent hospital stay, had a CT soft tissue of the neck which showed a known occluded right ICA. She has vascular surgery follow up already scheduled next week.   I do not believe her neck pain is related to this; it appears PCP is working on pain mgmt strategies.    --ASA daily, need to clarify statin as above.     3. Hypertension.   --This remains an issue. It sounds as if her BP is fluctuating quite a bit. Here she was anywhere from 160-180 systolic but she relays that at her facility it has been running 110-120s mostly. Her chlorthalidone was discontinued recently, and lasix reduced from 20mg to 10mg daily. As she is having some more edema in her ankles, will increase Lasix back to 20mg and repeat BMP in 1 week. May need further adjustments pending vascular evaluation as well.    --Neck pain may be playing a role.  She also has known MIKEL s/p stenting.      Follow up plan: Return in 6 months to see Dr. Rojas with labs, or sooner if needed.       Orders this Visit:  Orders Placed This Encounter   Procedures     Basic  metabolic panel     Follow-Up with Cardiology     Orders Placed This Encounter   Medications     alendronate (FOSAMAX) 70 MG tablet     Sig: TAKE 1 TABLET (70 MG) BY MOUTH ONCE A WEEK IN THE MORNING. TAKE ON EMPTY STOMACH WITH FULL GLASS OF WATER. DO NOT LIE DOWN FOR 1 HR.     furosemide (LASIX) 20 MG tablet     Sig: Take 1 tablet (20 mg) by mouth daily     Medications Discontinued During This Encounter   Medication Reason     furosemide (LASIX) 20 MG tablet            CURRENT MEDICATIONS:  Current Outpatient Medications   Medication Sig Dispense Refill     Acetaminophen (TYLENOL PO) Take 650 mg by mouth every 6 hours as needed       acetaminophen (TYLENOL) 500 MG tablet Take 500 mg by mouth 2 times daily       alendronate (FOSAMAX) 70 MG tablet TAKE 1 TABLET (70 MG) BY MOUTH ONCE A WEEK IN THE MORNING. TAKE ON EMPTY STOMACH WITH FULL GLASS OF WATER. DO NOT LIE DOWN FOR 1 HR.       aspirin (ASA) 81 MG EC tablet Take 1 tablet (81 mg) by mouth daily 90 tablet 3     calcium carbonate 500 mg (elemental) (OSCAL) 500 MG tablet Take 2 tablets by mouth every evening       carboxymethylcellulose PF (REFRESH PLUS) 0.5 % ophthalmic solution Place 1 drop into both eyes 2 times daily       carvedilol (COREG) 3.125 MG tablet Take 1 tablet (3.125 mg) by mouth 2 times daily (with meals) 180 tablet 3     cholecalciferol (VITAMIN D3) 5000 UNITS CAPS capsule Take 5,000 Units by mouth daily       citalopram (CELEXA) 10 MG tablet Take 2 tablets (20 mg) by mouth every evening       folic acid (FOLVITE) 1 MG tablet Take 1 mg by mouth daily       furosemide (LASIX) 20 MG tablet Take 1 tablet (20 mg) by mouth daily       L-Methylfolate-B6-B12 (FOLBIC RF PO) Take 1 tablet by mouth daily       losartan (COZAAR) 100 MG tablet Take 100 mg by mouth daily       Multiple Vitamins-Minerals (CERTA-FREDERICK PO) Take 1 tablet by mouth daily       omeprazole (PRILOSEC) 40 MG capsule Take 1 capsule (40 mg) by mouth daily Take 30-60 minutes before a meal. 30  "capsule 9     oxybutynin (DITROPAN) 5 MG tablet Take 5 mg by mouth 2 times daily       polyethylene glycol (MIRALAX/GLYCOLAX) powder Take 17 g by mouth nightly as needed        pravastatin (PRAVACHOL) 20 MG tablet Take 1 tablet (20 mg) by mouth every evening         ALLERGIES   No Known Allergies    PAST MEDICAL HISTORY:  Past Medical History:   Diagnosis Date     Anxiety      Arthritis      Atrial flutter (H)      C1 cervical fracture (H)      CAD (coronary artery disease)     cath in 1/2018: Angioplasty and stenting of 90% mid RCA stenosis, 50% right ostial coronary stenosis, and ostial LAD stenosis.  Moderate disease throughout the mid LAD and 30 to 50% range.     Carotid artery stenosis      CKD (chronic kidney disease)      Depression      Diastolic heart failure (H)     Most recent echo 1/2018: EF 55%, inferior regional wall motion abnormality, increased left ventricular end-diastolic pressure.     H/O alcohol abuse      Hypercholesterolemia      Hypertension      Renal artery stenosis (H)      Seizure disorder (H)      Subarachnoid hemorrhage (H) 2014         Review of Systems:  Cardiovascular: negative for chest pain, palpitations, orthopnea, pos LE edema  Constitutional: negative for chills, sweats, fevers   Resp: Negative for dyspnea at rest, dyspnea on exertion, known chronic lung disease  HEENT: Negative for new visual changes, frequent headaches  Hematologic/lymphatic: negative for current systemic anticoagulation, hx of blood clots  Neurological: negative for focal weakness, LOC, seizures, syncope/presyncope. Pos occ dizziness.      Physical Exam:  Vitals: BP (!) 180/60 (BP Location: Right arm, Patient Position: Sitting, Cuff Size: Adult Regular)   Pulse 67   Ht 1.549 m (5' 1\")   Wt 66 kg (145 lb 6.4 oz)   LMP  (LMP Unknown)   SpO2 95%   Breastfeeding No   BMI 27.47 kg/m     Wt Readings from Last 4 Encounters:   09/19/22 66 kg (145 lb 6.4 oz)   09/29/21 66.6 kg (146 lb 12.8 oz)   08/20/21 68 kg " (150 lb)   03/04/20 82.6 kg (182 lb)       GEN:  In general, this is a well nourished female in no acute distress on RA.  Patient ambulatory, accompanied by her daughter.  HEENT:  Pupils grossly equal, sclerae nonicteric.   NECK: Supple, trachea midline. No JVD.   C/V:  Regular rate and rhythm, no murmur, rub or gallop. No S3 or RV heave.   RESP: Respirations are unlabored. No use of accessory muscles. Clear to auscultation bilaterally without wheezing, rales, or rhonchi.  GI: Abdomen soft, nontender, nondistended.   EXTREM: 1+ bilateral ankle edema. No cyanosis or clubbing.  NEURO: Alert and oriented, cooperative. Gait not formally assessed. No obvious focal deficits.   SKIN: Warm and dry.       Recent Lab Results:  LIPID RESULTS:  Lab Results   Component Value Date    CHOL 177 07/09/2020    CHOL 177 07/09/2020    HDL 44 (L) 07/09/2020    HDL 44 (A) 07/09/2020     07/09/2020     07/09/2020    TRIG 113 07/09/2020    TRIG 113 07/09/2020    CHOLHDLRATIO 3.7 08/14/2013       LIVER ENZYME RESULTS:  Lab Results   Component Value Date    AST 23 09/05/2022    AST 19 07/09/2020    ALT 19 09/05/2022    ALT 11 07/09/2020       CBC RESULTS:  Lab Results   Component Value Date    WBC 8.2 09/06/2022    WBC 7.9 03/03/2020    RBC 4.39 09/06/2022    RBC 4.20 03/03/2020    HGB 13.8 09/06/2022    HGB 13.1 03/03/2020    HCT 41.6 09/06/2022    HCT 40.8 03/03/2020    MCV 95 09/06/2022    MCV 97 03/03/2020    MCH 31.4 09/06/2022    MCH 31.2 03/03/2020    MCHC 33.2 09/06/2022    MCHC 32.1 03/03/2020    RDW 11.9 09/06/2022    RDW 11.6 03/03/2020     09/06/2022     03/03/2020       BMP RESULTS:  Lab Results   Component Value Date     (L) 09/06/2022     07/09/2020    POTASSIUM 3.7 09/06/2022    POTASSIUM 3.8 07/09/2020    POTASSIUM 3.8 07/09/2020    CHLORIDE 95 (L) 09/06/2022    CHLORIDE 99 07/09/2020    CHLORIDE 99 07/09/2020    CO2 30 (H) 09/06/2022    CO2 28 07/09/2020    CO2 28 07/09/2020     ANIONGAP 8 09/06/2022    ANIONGAP 11 07/09/2020    ANIONGAP 11 07/09/2020    GLC 94 09/06/2022    GLC 86 07/09/2020    GLC 86 07/09/2020    BUN 23.2 (H) 09/06/2022    BUN 12 07/09/2020    BUN 12 07/09/2020    CR 0.98 (H) 09/06/2022    CR 1.06 07/09/2020    GFRESTIMATED 55 (L) 09/06/2022    GFRESTIMATED 49 (L) 07/09/2020    GFRESTIMATED 49 (A) 07/09/2020    GFRESTBLACK 60 (L) 07/09/2020    GFRESTBLACK 60 (A) 07/09/2020    FRANCISCO 8.7 (L) 09/06/2022    FRANCISCO 9.4 07/09/2020          Additional pertinent testing:    Echocardiogram 9/6/22  Interpretation Summary     Technically challenging study.     Left ventricular systolic function is normal. The visual ejection fraction is  60-65%. Mild septal flattening suggesting elevated RV pressures.  Right ventricle is not well visualized, however it is probably mildly dilated  and global systolic function is probably borderline to mildly reduced.  The aortic valve is trileaflet with aortic valve sclerosis. There is trace to  mild aortic regurgitation.  There is mild mitral stenosis. Mean gradient 3 mmHg at 63 bpm.  Mild to moderate (1-2+) tricuspid regurgitation.  Pulmonary hypertension present. The right ventricular systolic pressure is  approximated at 58mmHg plus the right atrial pressure.  Dilation of the inferior vena cava is present with normal respiratory  variation in diameter.     This study was compared to a TTE from 3/3/2020. Estimated right-sided  pressures are mildly higher on this study, and RV global systolic function is  mildly worse.      35 total minutes was spent today including chart review, precharting, history and exam, post visit documentation, and reviewing studies as outlined above.       Kristel Call PA-C  [facility]  Pager [phone]

## 2022-09-20 ENCOUNTER — TELEPHONE (OUTPATIENT)
Dept: CARDIOLOGY | Facility: CLINIC | Age: 87
End: 2022-09-20

## 2022-09-20 NOTE — TELEPHONE ENCOUNTER
Spoke with patient's daughter, Bianca. She states the reason the rosuvastatin was changed to pravastatin was due to a c/o of muscle twitching at night. The PCP Dr. Lai reviewed the chart on a visit at [facility] on 7/5/2022 and decided to do a 1 week hold of the rosuvastatin for this issue. Since there was some improvement, he changed the patient to the pravastatin. Daughter Bianca says the muscle twitching is not completely gone, but it has improved since that change.    Reviewed with Bianca that the labs to be checked will be both a bmp and an flp/alt. She is awaiting a message from the [facility] chart for the patient to confirm the lab date, but believes they have those orders.    Bianca asks that if the lasix 20mg dose is going to continue after the lab check, then would appreciate an update to the PCP with that plan.     Will message LUCI Kristel Call and ABBIE Hansen with the update.

## 2022-09-21 NOTE — TELEPHONE ENCOUNTER
Kristel Call, Perla Doran, RN 16 hours ago (5:07 PM)     Thanks for looking into this.   Please let her know that we will decide after the labs whether we need to adjust the statin medication again. Her LDL may guide this, as she has CAD and occluded carotid. She has a vascular appt in a week or so, so she can review with them as well.     In regard to the Lasix, she can stay on the 20mg of Lasix if feeling well, and repeat labs are ok too. Her PCP can certainly manage from there if they wish.     Thanks,   Kristel      =================    9/21/22 See previous RN notes from 9/19 and 9/20.   Lab orders were sent to [facility] on 9/19.  Called to Daughter Bianca with Kristel Call PA-C recommendations and comments. Left message to call back to discuss.  Jade Mercedes RN 09/21/22 9:22 AM    Call back from daughter Bianca. Bianca states understanding of Kristel Plan and will be setting up lab in [place]. Bianca reports they would like Kristel to manage labs ordered. Messaged nurse team to watch for results.  Jade Mercedes RN 09/21/22 11:29 AM

## 2022-09-29 ENCOUNTER — OFFICE VISIT (OUTPATIENT)
Dept: SURGERY | Facility: CLINIC | Age: 87
End: 2022-09-29
Attending: PHYSICIAN ASSISTANT
Payer: COMMERCIAL

## 2022-09-29 VITALS
SYSTOLIC BLOOD PRESSURE: 146 MMHG | OXYGEN SATURATION: 95 % | WEIGHT: 145 LBS | BODY MASS INDEX: 27.38 KG/M2 | HEIGHT: 61 IN | HEART RATE: 69 BPM | DIASTOLIC BLOOD PRESSURE: 70 MMHG | RESPIRATION RATE: 16 BRPM

## 2022-09-29 DIAGNOSIS — I65.23 BILATERAL CAROTID ARTERY STENOSIS: ICD-10-CM

## 2022-09-29 PROCEDURE — 99203 OFFICE O/P NEW LOW 30 MIN: CPT | Performed by: SURGERY

## 2022-09-29 NOTE — PROGRESS NOTES
Mrs. Jade Caba is an 89-year-old female who has been referred to us for further evaluation of the finding of right internal carotid artery occlusion.  Patient was recently admitted and evaluated in the emergency department because she presented with pain on the left side of the neck.  She underwent a CT scan of the soft tissue which shows occlusion of the right internal carotid artery.    I went back and looked at the old report.  Patient had a right carotid endarterectomy done somewhere in [place] during the timeframe between 2004 and 2006.  Somewhere after the endarterectomy she went on to occlude the right internal carotid artery.  Fortunately this was asymptomatic.  There is an operative report from December 2006 and the my , Dr. Tamayo, that the right internal carotid artery was occluded.      Essentially this right internal carotid artery has been occluded for 17+ years now.    On the recent imaging the left internal carotid artery repair is widely patent.    I have reassured the patient and her daughter that she has an asymptomatic chronically occluded right internal carotid artery and no specific treatment is advised.

## 2022-10-03 NOTE — TELEPHONE ENCOUNTER
Labs drawn 9/28/22sent to Kristel Call PA-C to review.  Jade Mercedes RN 10/03/22 9:53 AM  .  d ago     SODIUM 135 - 145 mmol/L 134 Low     POTASSIUM 3.5 - 5.0 mmol/L 4.8    CHLORIDE 98 - 110 mmol/L 97 Low     CO2,TOTAL 21 - 31 mmol/L 28    ANION GAP 5 - 18 9    GLUCOSE 65 - 100 mg/dL 101 High     CALCIUM 8.5 - 10.5 mg/dL 9.3    BUN 8 - 25 mg/dL 18    CREATININE 0.57 - 1.11 mg/dL 1.05    BUN/CREAT RATIO           10 - 20 17    eGFR >90 mL/min/1.73m2 51 Low          Ref Range & Units 5 d ago   ALT (SGPT) 8 - 45 IU/L 14      CHOLESTEROL,TOTAL 100 - 199 mg/dL 194    TRIGLYCERIDES <150 mg/dL 89    HDL CHOLESTEROL >40 mg/dL 47    NON-HDL CHOLESTEROL <145 mg/dl 147 High     CHOL/HDL RATIO            <4.50                 4.13    LDL CHOLESTEROL <=130 mg/dL 129    VLDL CHOLESTEROL <=30 mg/dL 18    PROVIDER ORDERED STATUS  RANDOM    Resulting Agency  [facility]   Specimen Collected: 09/28/22 10:04 AM Last Resulted: 09/28/22  6:53 PM   Received From: [facility]  Result Received: 09/29/22  2:44 PM

## 2022-10-03 NOTE — TELEPHONE ENCOUNTER
Kristel Call PA  You 33 minutes ago (1:21 PM)     Reviewed. Few things:     1. If her swelling is under good control we can drop her Lasix back to 10mg daily (Confirm she was still on 20mg).   2. Her LDL is up higher than last year and would like closer to 70 given her CAD history. If she is willing, I would like to change her back to the rosuvastatin again at prior dose.     Then repeat BMP/FLP in about a month please.   We can do a virtual visit to follow up on labs after that.     Thanks    =============================    10/3/2022 Called to patient daughter - left message to call back.  Jade Mercedes RN 10/03/22 1:57 PM      10/5/22 Called to daughter Bianca and reviewed Kristel's plan as above. Bianca reports Jade felt improved off rosuvastatin ie less sleeplessness and less shakiness. Bianca will check with Jade on swelling and if she is wiling to go back to rosuvastatin. If lasix does changes, daughter will need us to contact [facility] where patient lives with new orders.  Await Bianca call back.  Jade Mercedes RN 10/05/22 10:57 AM

## 2022-10-04 DIAGNOSIS — I65.21 OCCLUSION OF RIGHT CAROTID ARTERY: ICD-10-CM

## 2022-10-04 DIAGNOSIS — I65.22 CAROTID STENOSIS, LEFT: Primary | ICD-10-CM

## 2022-10-04 NOTE — PATIENT INSTRUCTIONS
Thank you for allowing us to provide your medical care. Please see below for follow up instructions pertaining to your 9/29/22 medical appointment.      Follow up plan:  Next visit: in 1 year for carotid ultrasound and office visit with Dr. Tamayo.   Please call our office with questions or concerns at [phone].     RANDA Cardoso, RN  [facility]  Office:  [phone] Fax: [phone]

## 2022-10-04 NOTE — TELEPHONE ENCOUNTER
Attempted to return daughter's call. Daughter said she was hard to get  phone due to work.   Message left to return call or call tomorrow after 8:30 am.

## 2022-10-10 RX ORDER — ROSUVASTATIN CALCIUM 40 MG/1
40 TABLET, COATED ORAL DAILY
Qty: 90 TABLET | Refills: 0 | Status: SHIPPED | OUTPATIENT
Start: 2022-10-10 | End: 2023-03-08

## 2022-10-10 RX ORDER — ROSUVASTATIN CALCIUM 40 MG/1
40 TABLET, COATED ORAL DAILY
Qty: 90 TABLET | Refills: 0 | Status: SHIPPED | OUTPATIENT
Start: 2022-10-10 | End: 2022-10-10

## 2022-10-10 NOTE — TELEPHONE ENCOUNTER
10/10/22 Call back from patient daughter Bianca. Bianca reports Jade is agreeable to retry rosuvastatin and asks prescription be sent to [facility] in [place] and fax order to assisted living for them to change medication. Fax 3 [phone].  Also, Jade is feeling well on furosemide 20 mg daily, 20 mg is controlling leg swelling but at end of day starts to return, therefore, Jade would like to stay on that diuretic dose of furosemide 20 mg daily.   Previous rosuvastatin dose of 40 mg daily at bedtime.  Prescription sent to Pharmacy and to Assisted Living.  Orders in for BMP and FLP for one month.  Update to Kristel SAUL.  Jade Joseph Mercedes RN 10/10/22 12:12 PM

## 2022-10-16 ENCOUNTER — APPOINTMENT (OUTPATIENT)
Dept: CT IMAGING | Facility: CLINIC | Age: 87
End: 2022-10-16
Attending: EMERGENCY MEDICINE
Payer: COMMERCIAL

## 2022-10-16 ENCOUNTER — HOSPITAL ENCOUNTER (EMERGENCY)
Facility: CLINIC | Age: 87
Discharge: HOME | End: 2022-10-16
Attending: EMERGENCY MEDICINE | Admitting: EMERGENCY MEDICINE
Payer: COMMERCIAL

## 2022-10-16 ENCOUNTER — APPOINTMENT (OUTPATIENT)
Dept: GENERAL RADIOLOGY | Facility: CLINIC | Age: 87
End: 2022-10-16
Attending: EMERGENCY MEDICINE
Payer: COMMERCIAL

## 2022-10-16 VITALS
TEMPERATURE: 97.1 F | HEART RATE: 90 BPM | DIASTOLIC BLOOD PRESSURE: 92 MMHG | SYSTOLIC BLOOD PRESSURE: 201 MMHG | RESPIRATION RATE: 16 BRPM | OXYGEN SATURATION: 100 %

## 2022-10-16 DIAGNOSIS — M54.6 THORACIC SPINE PAIN: ICD-10-CM

## 2022-10-16 DIAGNOSIS — S51.011A LACERATION OF RIGHT ELBOW, INITIAL ENCOUNTER: ICD-10-CM

## 2022-10-16 DIAGNOSIS — S00.03XA CONTUSION OF SCALP, INITIAL ENCOUNTER: ICD-10-CM
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
## 2022-10-16 PROCEDURE — 72170 X-RAY EXAM OF PELVIS: CPT

## 2022-10-16 PROCEDURE — 99285 EMERGENCY DEPT VISIT HI MDM: CPT | Mod: 25

## 2022-10-16 PROCEDURE — 72072 X-RAY EXAM THORAC SPINE 3VWS: CPT

## 2022-10-16 PROCEDURE — 250N000013 HC RX MED GY IP 250 OP 250 PS 637: Performed by: EMERGENCY MEDICINE

## 2022-10-16 PROCEDURE — 70450 CT HEAD/BRAIN W/O DYE: CPT

## 2022-10-16 PROCEDURE — 72125 CT NECK SPINE W/O DYE: CPT

## 2022-10-16 PROCEDURE — 12001 RPR S/N/AX/GEN/TRNK 2.5CM/<: CPT

## 2022-10-16 RX ORDER — LIDOCAINE HYDROCHLORIDE AND EPINEPHRINE 10; 10 MG/ML; UG/ML
INJECTION, SOLUTION INFILTRATION; PERINEURAL
Status: DISCONTINUED
Start: 2022-10-16 | End: 2022-10-17 | Stop reason: HOSPADM

## 2022-10-16 RX ORDER — ACETAMINOPHEN 325 MG/1
650 TABLET ORAL ONCE
Status: COMPLETED | OUTPATIENT
Start: 2022-10-16 | End: 2022-10-16

## 2022-10-16 RX ADMIN — ACETAMINOPHEN 650 MG: 325 TABLET, FILM COATED ORAL at 20:35

## 2022-10-16 ASSESSMENT — ENCOUNTER SYMPTOMS
VOMITING: 0
SHORTNESS OF BREATH: 0
BACK PAIN: 1
WOUND: 1
NECK PAIN: 1
LIGHT-HEADEDNESS: 0
NAUSEA: 0

## 2022-10-16 ASSESSMENT — ACTIVITIES OF DAILY LIVING (ADL): ADLS_ACUITY_SCORE: 35

## 2022-10-17 NOTE — ED TRIAGE NOTES
Pt fell while in shower at 1745. States she fell and hit head, right elbow and back. Lac to right elbow dressed by assisted living staff. Daughter with patient. Pt unsure if she is on blood thinners, records just state aspirin. Denies LOC. Pt took home BP meds at 1900     Triage Assessment     Row Name 10/16/22 1944       Triage Assessment (Adult)    Airway WDL WDL       Cognitive/Neuro/Behavioral WDL    Cognitive/Neuro/Behavioral WDL WDL

## 2022-10-17 NOTE — ED PROVIDER NOTES
History     Chief Complaint:    Fall      HPI   Jade Caba is a 89 year old female who presents after a fall.  The patient states that she was getting out of her shower and slipped.  She hit her head but did not lose consciousness.  She also notes right-sided neck discomfort and midline upper back discomfort.  She also injured her right elbow.  She does take aspirin but is not on any other blood thinners.  She has been able to ambulate without issue after this fall.    Review of Systems   Respiratory: Negative for shortness of breath.    Cardiovascular: Negative for chest pain.   Gastrointestinal: Negative for nausea and vomiting.   Musculoskeletal: Positive for back pain and neck pain.   Skin: Positive for wound.   Neurological: Negative for syncope and light-headedness.   All other systems reviewed and are negative.      Allergies:  No Known Allergies    Medications:    Acetaminophen (TYLENOL PO)  acetaminophen (TYLENOL) 500 MG tablet  alendronate (FOSAMAX) 70 MG tablet  aspirin (ASA) 81 MG EC tablet  calcium carbonate 500 mg (elemental) (OSCAL) 500 MG tablet  carboxymethylcellulose PF (REFRESH PLUS) 0.5 % ophthalmic solution  carvedilol (COREG) 3.125 MG tablet  cholecalciferol (VITAMIN D3) 5000 UNITS CAPS capsule  citalopram (CELEXA) 10 MG tablet  folic acid (FOLVITE) 1 MG tablet  furosemide (LASIX) 20 MG tablet  L-Methylfolate-B6-B12 (FOLBIC RF PO)  losartan (COZAAR) 100 MG tablet  Multiple Vitamins-Minerals (CERTA-FREDERICK PO)  omeprazole (PRILOSEC) 40 MG capsule  oxybutynin (DITROPAN) 5 MG tablet  polyethylene glycol (MIRALAX/GLYCOLAX) powder  rosuvastatin (CRESTOR) 40 MG tablet         Past Medical History:   Past Surgical History:     Past Medical History:   Diagnosis Date     Anxiety      Arthritis      Atrial flutter (H)      C1 cervical fracture (H)      CAD (coronary artery disease)      Carotid artery stenosis      CKD (chronic kidney disease)      Depression      Diastolic heart failure (H)      H/O  alcohol abuse      Hypercholesterolemia      Hypertension      Renal artery stenosis (H)      Seizure disorder (H)      Subarachnoid hemorrhage (H) 2014    Past Surgical History:   Procedure Laterality Date     APPENDECTOMY       CAROTID ENDARTERECTOMY Left      Cataract surgery Right     will have laser eye surgery     CHOLECYSTECTOMY       Coronary stents x 3       CV CORONARY ANGIOGRAM N/A 7/19/2019    Procedure: Coronary Angiogram;  Surgeon: Pablito Emerson MD;  Location:  [facility]     HYSTERECTOMY      at age of 40-not cancerus or precancerous      JOINT REPLACEMENT Bilateral      Stenting of renal artery        Patient Active Problem List    Diagnosis Date Noted     Neck pain 09/05/2022     Priority: Medium     Elevated troponin 09/05/2022     Priority: Medium     Chest pain 03/02/2020     Priority: Medium     Acute coronary syndrome (H) 07/18/2019     Priority: Medium     Aspiration pneumonitis (H) 03/14/2018     Priority: Medium     Klebsiella cystitis 03/14/2018     Priority: Medium     Antibiotic-associated diarrhea 03/14/2018     Priority: Medium     Pneumonia 03/12/2018     Priority: Medium     ACS (acute coronary syndrome) (H) 01/31/2018     Priority: Medium     Chronic diastolic congestive heart failure (H) 10/28/2016     Priority: Medium     Anxiety 10/28/2016     Priority: Medium     Urgency incontinence 10/28/2016     Priority: Medium     Advance care planning 10/11/2016     Priority: Medium     Advance Care Planning 4/10/2017: Receipt of ACP document:  Received: POLST which was signed and dated by provider on 1-9-17.  Document previously scanned on 1-24-17.  Order reviewed and found to be valid.  Code Status reflects choices in most recent ACP document..  Confirmed/documented designated decision maker(s).  Added by Philly Vyas RN Advance Care Planning Liaison with [facility]  Advance Care Planning 3/30/2017: ACP Review of Chart.  Reviewed chart for advance care plan.   Jade Caba has an up to date POLST on file. Reviewed POLST with member.   Added by Trish Guajardo  Advance Care Planning 2016: Receipt of ACP document:  Received: POLST which was signed and dated by provider on 9/15/16.  Document previously scanned on 16.  Order reviewed and found to be valid.  Code Status reflects choices in most recent ACP document.  Confirmed/documented designated decision maker(s).  Added by Yvonne Mistry   Advance Care Planning Liaison    Advance Care Planning 2016: ACP Review of Chart :  Reviewed chart for advance care plan.  Jade Caba has no plan or code status on file. Discussed available resources, declined to complete a Health Care Directive. Confirmed code status reflects current choices pending further ACP discussions.  CM to follow up with Assisting Living POLST per care plan Added by Trish Guajardo           [facility] 2016     Priority: Medium     No longer active with [facility] case management effective 2020.  Reason for community disenrollment: Change Care System/PCC to [facility] Care Coordination   Holly Guajardo RN  [phone]    [facility] CARE PLAN SUMMARY    Client Name:  Jade Caba  Address:    10/2/20 move to daughter Marleen Brownlee's home:  [address] [place] [address]  [address]      C/O YOVANA SAEED  [address] [place] [address] Phone: [email or web address] [email or web address] [phone]  Home: [phone]   :  1933 Date of Assessment:  2020   Health Plan:  [facility]  2017  Health Plan Number: [id number] Medical Assistance Number: [id number]  Financial Worker:    Case #:  [id number]   [facility] :  Holly Guajardo RN CM Phone:  [phone]  CM Fax:  [phone]   FVP Enrollment Date: 16 Case Mix:  B  Rate Cell:  B  Waiver Type:  EW   Emergency Contact: Yovana Saeed   Home Phone: [phone]  Work Phone: [phone]  Mobile Phone:  "[phone]  Relation: Daughter  Secondary Emergency Contact: Marleen Brownlee           [facility]  Home Phone: [phone]  Relation: Daughter Language:  English  :  No   Health Care Agent/POA:  POLST on file  Advanced Directives/Living Will: POLST on file    Primary Care Clinic/Phone/Fax:  [facility]/(p) [phone], (f) [phone] Primary Dx:  G40.909  Secondary Dx:  I50.22   Primary Physician:  Jaden Marinelli   Height:  5' 1\"  Weight:  181 lbs   Specialty Physician:      Dr. Parnell, Cardiology Audiologist:     Eye Care Provider:    [facility]     Corrective lenses, last exam 4 years ago. Reports no concerns Dental Care Provider:    [facility]: [facility] [phone] or [phone]        Other:        [facility] CURRENT SERVICES SUMMARY  Equipment owned/DME history: walker, cane (family purchased lift chair 2018)   SERVICE TYPE/PROVIDER NAME/PHONE AUTH DATE FREQUENCY Units OR $ Amt DESCRIPTION   Medical Transportation: [facility] Health Ride [phone] 1/1/2020-  1/31/2021   as needed     ASSISTED LIVING: [facility]    Fax: [phone] 2/1/2020-  1/31/2021 monthly See RS/AL Tool Jade discharged from [place] on 10/3/2020    PERS provided  By [facility] care  Fax: [phone]  Tele [phone]         PERS [employer]   NPI [id number]  Tele [phone]  Fax [phone] 10/5/20 Daily PERS   Install 30  Monthly 42                  CHF exacerbation (H) 02/25/2015     Priority: Medium     Cognitive impairment 01/08/2015     Priority: Medium     FTT (failure to thrive) in adult 12/30/2014     Priority: Medium     Fall 12/30/2014     Priority: Medium     ARF (acute renal failure) (H) 12/27/2014     Priority: Medium     Constipation 03/24/2014     Priority: Medium     Sacral fracture, closed (H) 03/18/2014     Priority: Medium     Atrial flutter (H) 03/18/2014     Priority: Medium     Alcohol " abuse 03/18/2014     Priority: Medium     Subarachnoid hemorrhage (H) 03/09/2014     Priority: Medium     Fracture of C1 vertebra, closed (H) 03/09/2014     Priority: Medium     Chondrocalcinosis 12/30/2013     Priority: Medium     Osteoarthritis of knee 12/19/2013     Priority: Medium     Anxiety state 12/09/2013     Priority: Medium     Problem list name updated by automated process. Provider to review       Hypercholesteremia 08/14/2013     Priority: Medium     Seizure disorder (H) 08/14/2013     Priority: Medium     Hyperlipidemia LDL goal <100 07/10/2013     Priority: Medium     CKD (chronic kidney disease) stage 3, GFR 30-59 ml/min (H) 07/03/2013     Priority: Medium     Renal artery stenosis (H) 05/28/2013     Priority: Medium     HTN (hypertension) 05/28/2013     Priority: Medium     Mild major depression (H) 05/28/2013     Priority: Medium     Hypercholesterolemia 05/28/2013     Priority: Medium     Arteriosclerosis of coronary artery 03/05/2013     Priority: Medium     S/p 7 stents, 3 heart attacks     Coronary artery disease (CAD)       Atherosclerosis of renal artery (H) 03/05/2013     Priority: Medium     Renal Artery Stenosis       Peripheral vascular disease (H) 03/05/2013     Priority: Medium     Periperal vascular disease       Carotid stenosis 10/21/2011     Priority: Medium     Overview:   Stent left carotid, Dr. Tamayo,  Right-sided CEA ([place]) ~2002       H/O: CVA (cerebrovascular accident) 10/21/2011     Priority: Medium     Overview:   CT head: Chronic-appearing right caudate nucleus lacunar infarction       History of rib fracture 10/21/2011     Priority: Medium     Overview:   Right-sided, in context of fall, 06/2011       Accelerating angina (H) 10/03/2011     Priority: Medium     PFO (patent foramen ovale) 10/03/2011     Priority: Medium     Pulmonary hypertension (H) 10/03/2011     Priority: Medium     Coronary atherosclerosis 01/15/2006     Priority: Medium     Overview:   had MI in  1997 S/p stenting of circumflex artery in 1997 and drug eluting stent to LAD in 2/2004 cardiologist is Dr Arechiga in [place]  10/4/11: s/p PTCA/OLIVIA pcirc, PTCA/OLIVIA mcirc, PTCA/OLIVIA dRCA, Plavix min 1 year, prefer 2 years       Depressive disorder 01/15/2006     Priority: Medium     Esophageal reflux 01/15/2006     Priority: Medium     Occlusion and stenosis of carotid artery 01/15/2006     Priority: Medium     Overview:   s/p Rt ICA endarterectomy, CUS in 2/2004 showed complete occlusion of RT ICA and 40-60% of LICA. CEA was done before that            Family History  Family History   Problem Relation Age of Onset     C.A.ETELVINA. Mother      MODESTO.DANETTE. Brother      MICHAEL. Sister      JUANITO Brother      Cancer Sister         Breast cancer     Heart Disease Son        Social History:  PCP: [facility]  Presents to the ED with her daughter    Physical Exam     Patient Vitals for the past 24 hrs:   BP Temp Temp src Pulse Resp SpO2   10/16/22 2230 (!) 201/92 -- -- 90 -- --   10/16/22 2215 (!) 176/81 -- -- 93 -- --   10/16/22 2200 (!) 178/88 -- -- 98 -- --   10/16/22 2145 (!) 188/99 -- -- 93 -- --   10/16/22 2126 -- -- -- -- 16 100 %   10/16/22 2125 (!) 186/79 -- -- 81 -- --   10/16/22 1948 -- 97.1  F (36.2  C) Temporal -- -- --   10/16/22 1947 (!) 209/106 -- -- 76 20 96 %   10/16/22 1945 (!) 210/112 -- -- -- -- --       Physical Exam    Constitutional: Vital signs reviewed as above.   Head: No obvious external signs of trauma noted. Head is without raccoon's eyes and without Bocanegra's sign. No external signs of basilar skull fracture. No signs of facial bone instability.  Eyes: Conjunctivae and EOM are normal. Pupils are equal, round, and reactive to light.  ENT:  Right Ear: External ear and ear canal normal. No lacerations. No mastoid tenderness. No hemotympanum.   Left Ear: External ear and ear canal normal. No lacerations. No mastoid tenderness. No hemotympanum.   Neck: No spinous process tenderness  present. No tracheal deviation present.   Cardiovascular: Normal rate, regular rhythm, S1 normal, S2 normal and normal pulses.   Pulmonary/Chest: Effort normal and breath sounds normal. No accessory muscle usage. No respiratory distress. Patient has no decreased breath sounds. Patient has no wheezes. Patient has no rhonchi. Patient has no rales. Patient exhibits no bony tenderness and no retraction.   Abdominal: Soft. Normal appearance and bowel sounds are normal. Patient exhibits no distension. There is no tenderness. There is no rebound.   Musculoskeletal/Extremities:        Back:   Cervical back: No midline TTP. There is some right paraspinal tenderness   Thoracic back: There is midline TTP between the scalupae   Lumbar back: Normal.  No midline TTP.       Pelvis:   There is mild right ASIS tenderness to palpation   No pelvic pain or instability to compression       Extremities:   No deformities noted.  There is no bony tenderness in the bilateral clavicles, shoulders, humerus, elbows, forearms, wrists, or hands.  There is also no bony tenderness in the bilateral trochanteric regions, femoral regions, knees, tibia/fibula, ankles, or feet.  Neurological: Patient is alert and oriented to person, place, and time. Patient has normal strength. Patient is not disoriented. No cranial nerve deficit or sensory deficit. GCS eye subscore is 4. GCS verbal subscore is 5. GCS motor subscore is 6.   Skin: Skin is warm, dry and intact.  There is a contusion noted especially on the left posterior aspect of the scalp.  There is a 2.5 cm laceration on the right elbow.      Emergency Department Course     Imaging:  Thoracic spine XR, 3 views   Final Result   IMPRESSION: There is mild smooth convex right midthoracic curvature. Accentuation normal thoracic kyphosis, similar to prior. There is stable anterior wedging of the T9 level with approximately 50% ventral height loss. More mild height loss is seen at    the more caudal thoracic  levels with moderate height loss seen at L1. Height loss at these levels is very similar to prior. There is moderate midthoracic interspace narrowing, stable. Visualized portion of the ribs are intact. Visualized portion of the    lungs are clear. Coarse cardiac and aortic calcifications. Surgical clips right upper quadrant. Mesenteric stents.      XR Pelvis 1/2 Views   Final Result   IMPRESSION:   1.  No fracture or joint malalignment.   2.  Bilateral total hip arthroplasties.   3.  Benign bone island in the left iliac wing.   4.  Bone demineralization.   5.  Atherosclerotic calcification.      Cervical spine CT w/o contrast   Final Result   IMPRESSION:   HEAD CT:   1.  Right temporoparietal and left parietal scalp hematomas without acute intracranial abnormality.      CERVICAL SPINE CT:   1.  No CT evidence for acute fracture or post traumatic subluxation.         Head CT w/o contrast   Final Result   IMPRESSION:   HEAD CT:   1.  Right temporoparietal and left parietal scalp hematomas without acute intracranial abnormality.      CERVICAL SPINE CT:   1.  No CT evidence for acute fracture or post traumatic subluxation.             Laboratory:  Labs Ordered and Resulted from Time of ED Arrival to Time of ED Departure - No data to display    Procedures:    Laceration Repair      Procedure: Laceration Repair    Indication: Laceration    Consent: Verbal    Location: Right elbow    Length: 2.5 cm    Preparation: Irrigation with Sterile Saline.    Anesthesia/Sedation: Lidocaine with Epinephrine - 1%      Treatment/Exploration: Wound explored, no foreign bodies found     Closure: The wound was closed with one layer. Skin/superficial layer was closed with 6 x 4-0 Nylon using Interrupted sutures.     Patient Status: The patient tolerated the procedure well: Yes. There were no complications.      Emergency Department Course:           Reviewed:    I reviewed nursing notes, vitals and past history    Assessments/Consults:   ED  Course as of 10/17/22 0050   Sun Oct 16, 2022   2009 Dr. Hill' evaluation.   2119 Lidocaine-epi applied. Wound irrigated after.   2222 Sutured her wound       Interventions:  Medications   lidocaine 1% with EPINEPHrine 1:100,000 1 %-1:[id number] injection (has no administration in time range)   acetaminophen (TYLENOL) tablet 650 mg (650 mg Oral Given 10/16/22 2035)       Disposition:  The patient was discharged to home.    Impression & Plan      CMS Diagnoses:         Medical Decision Making:  This 89-year-old female patient presents to the ED due to a fall.  Please see the HPI and exam for specifics.  The patient remained well in the ED.  A broad differential was considered.  The above areas of concern were imaged, and fortunately no acute injury was found.  The patient's laceration on her right elbow was repaired as above.  She tolerated this well and was able to be discharged in stable condition to follow-up in the outpatient setting.  Anticipatory guidance given to patient and daughter prior to discharge.    Critical Care time:  none    Diagnosis:    ICD-10-CM    1. Fall, initial encounter  W19.XXXA       2. Contusion of scalp, initial encounter  S00.03XA       3. Laceration of right elbow, initial encounter  S51.011A       4. Thoracic spine pain  M54.6           Discharge Medications:  Discharge Medication List as of 10/16/2022 10:50 PM                 Omid Hill,   10/17/22 0050

## 2022-10-17 NOTE — DISCHARGE INSTRUCTIONS
What do you do next:   Continue your home medications unless we have specifically changed them  Keep your wound clean and dry.  Do not soak the wound.  Water may run over it and you may shower.  Gently cleanse the area with soap and water and pat the area dry.  Your sutures should be removed in approximately 1 week to 10 days.    Follow up as indicated below    When do you return: If you have uncontrolled pain, persistent bloody or foul-smelling drainage from your wound, redness surrounding the wound, or any other symptoms that concern you, please return to the ED for reevaluation.    Thank you for allowing us to care for you today.

## 2022-10-24 ENCOUNTER — APPOINTMENT (OUTPATIENT)
Dept: CT IMAGING | Facility: CLINIC | Age: 87
End: 2022-10-24
Attending: EMERGENCY MEDICINE
Payer: COMMERCIAL

## 2022-10-24 ENCOUNTER — HOSPITAL ENCOUNTER (EMERGENCY)
Facility: CLINIC | Age: 87
Discharge: HOME | End: 2022-10-24
Attending: EMERGENCY MEDICINE | Admitting: EMERGENCY MEDICINE
Payer: COMMERCIAL

## 2022-10-24 VITALS
TEMPERATURE: 97.6 F | BODY MASS INDEX: 27.56 KG/M2 | RESPIRATION RATE: 18 BRPM | SYSTOLIC BLOOD PRESSURE: 195 MMHG | HEART RATE: 74 BPM | HEIGHT: 61 IN | OXYGEN SATURATION: 97 % | WEIGHT: 146 LBS | DIASTOLIC BLOOD PRESSURE: 74 MMHG
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
## 2022-10-24 DIAGNOSIS — M54.6 ACUTE LEFT-SIDED THORACIC BACK PAIN: ICD-10-CM

## 2022-10-24 PROCEDURE — 99284 EMERGENCY DEPT VISIT MOD MDM: CPT | Mod: 25

## 2022-10-24 PROCEDURE — 250N000013 HC RX MED GY IP 250 OP 250 PS 637: Performed by: EMERGENCY MEDICINE

## 2022-10-24 PROCEDURE — 71250 CT THORAX DX C-: CPT

## 2022-10-24 RX ORDER — LIDOCAINE 4 G/G
1 PATCH TOPICAL ONCE
Status: DISCONTINUED | OUTPATIENT
Start: 2022-10-24 | End: 2022-10-24 | Stop reason: HOSPADM

## 2022-10-24 RX ADMIN — LIDOCAINE 1 PATCH: 560 PATCH PERCUTANEOUS; TOPICAL; TRANSDERMAL at 13:10

## 2022-10-24 ASSESSMENT — ENCOUNTER SYMPTOMS
FEVER: 0
COUGH: 0

## 2022-10-24 ASSESSMENT — ACTIVITIES OF DAILY LIVING (ADL)
ADLS_ACUITY_SCORE: 35
ADLS_ACUITY_SCORE: 35

## 2022-10-24 NOTE — ED TRIAGE NOTES
ABCs intact. Pt fell on 10/16 when getting out of the shower. Pt was seen in the ER after fall. Pt c/o L lower back pain. Denies ua symptoms or loss of bowel or bladder. Pt has been taking tylenol for pain.

## 2022-10-24 NOTE — DISCHARGE INSTRUCTIONS
*You may resume diet and activities.  *Take medications as prescribed.  Tylenol for pain, lidocaine patch. Continue your current medications.  *Follow-up with your doctor in the next 2-3 days for reevaluation and ongoing medication prescriptions.  *Return if you develop difficulty in breathing, numbness, weakness, bowel or bladder incontinence, faint or feel like you will faint or become worse in any way.    Discharge Instructions  Back Pain  You were seen today for back pain. Back pain can have many causes, but most will get better without surgery or other specific treatment. Sometimes there is a herniated ( slipped ) disc. We don t usually do MRI scans to look for these right away, since most herniated discs will get better on their own with time.  Today, we did not find any evidence that your back pain was caused by a serious condition, such as an infection, fracture, or tumor. However, sometimes symptoms develop over time and cannot be found during an emergency visit, so it is very important that you follow up with your primary doctor.  Return to the Emergency Department if:  You develop a fever with your back pain.   You have weakness or change in sensation in one or both legs.  You lose control of your bowels or bladder, or can t empty your bladder.  Your pain gets much worse.     Follow-up with your doctor:  Unless your pain has completely gone away, please make an appointment with your doctor within one week.  You may need further management of your back pain, such as more pain medication, imaging such as an X-ray or MRI, or physical therapy.    What can I do to help myself?  Remain active -- People are often afraid that they will hurt their back further or delay recovery by remaining active, but this is one of the best things you can do for your back. In fact, prolonged bed rest is not recommended. Studies have shown that people with low back pain recover faster when they remain active. Movement helps to  bring blood flow to the muscles and relieve muscle spasms as well as preventing loss of muscle strength.  Heat -- Using a heating pad can help with low back pain during the first few weeks. Do not sleep with a heating pad, as you can be burned.   Pain medications -- Take a pain medication such as, acetaminophen (Tylenol ), ibuprofen (Advil , Nuprin  ) or naproxen (Aleve ).  If you have been given a narcotic (such as codeine, hydrocodone, or oxycodone) or a muscle relaxant (such as Flexeril   or Soma  ), do not drive for four hours after you have taken it. If the narcotic contains acetaminophen (Tylenol), do not take Tylenol with it. All narcotics will cause constipation, so eat a high fiber diet.          Remember that you can always come back to the Emergency Department if you are not able to see your regular doctor in the amount of time listed above, if you get any new symptoms, or if there is anything that worries you.    Opioid Medication Information    You have been given a prescription for an opioid (narcotic) pain medicine and/or have received a pain medicine while here in the Emergency Department. These medicines can make you drowsy or impaired. You must not drive, operate dangerous equipment, or engage in any other dangerous activities while taking these medications. If you drive while taking these medications, you could be arrested for DUI, or driving under the influence. Do not drink any alcohol while you are taking these medications.   Opioid pain medications can cause addiction. If you have a history of chemical dependency of any type, you are at a higher risk of becoming addicted to pain medications.  Only take these prescribed medications to treat your pain when all other options have been tried. Take it for as short a time and as few doses as possible. Store your pain pills in a secure place, as they are frequently stolen and provide a dangerous opportunity for children or visitors in your house to  start abusing these powerful medications. We will not replace any lost or stolen medicine.  As soon as your pain is better, you should flush all your remaining medication.   Many prescription pain medications contain Tylenol  (acetaminophen), including Vicodin , Tylenol #3 , Norco , Lortab , and Percocet .  You should not take any extra pills of Tylenol  if you are using these prescription medications or you can get very sick.  Do not ever take more than 4000 mg of acetaminophen in any 24 hour period.  All opioids tend to cause constipation. Drink plenty of water and eat foods that have a lot of fiber, such as fruits, vegetables, prune juice, apple juice and high fiber cereal.  Take a laxative if you don t move your bowels at least every other day. Miralax , Milk of Magnesia, Colace , or Senna  can be used to keep you regular.

## 2022-10-24 NOTE — ED NOTES
Per request of patient and daughter, 6 sutures removed. Edges well approximated with no redness or swelling.

## 2022-10-24 NOTE — ED PROVIDER NOTES
"  History   Chief Complaint:  Back Pain       HPI   Jade Caba is a 89 year old female who presents with back pain. The patient had a fall on 10/16 and was seen after this and discharged home. Over the last 3 days she has had increasing pain over her left shoulder blade that is worse with a deep breath or movement.  She has been taking Tylenol with no relief. She denies leg swelling, leg pain, chest pain, cough or coughing up blood. No fevers. No pain elsewhere. She has been getting around on her own at her assisted living but this is where she fell and daughter is concerned about any strong pain medications. No fevers.    Review of Systems   Constitutional: Negative for fever.   Respiratory: Negative for cough.    Cardiovascular: Negative for leg swelling (- leg pain).   All other systems reviewed and are negative.      Allergies:  The patient has no known allergies.     Medications:  Fosamax  Aspirin 81 mg  Oscal  Coreg  Celexa  Folvite  Lasix  Cozaar  Omeprazole   Ditropan   Crestor    Past Medical History:     Renal artery stenosis  Hypertension   arteriorscleorsis of coronary artery   Depression  CKD stage 3   Hyperlipidemia  Seizure disorder  OA of knee  Chondrocalcinosis   subarachniod hemorrhage   Atrial flutter  Alcohol abuse   Anxiety   CHF  Failure to thrive  ARF  PVD  PFO  GERD  CVA  ACS     Past Surgical History:    Appendectomy   Carotid endarterectomy   Cataract surgery   Cholecystectomy   CV coronary angiogram   Coronary stent x 3   Hysterectomy   Renal artery stenting   Joint replacement      Family History:    Mother: CAD    Social History:  The patient presents to the ED with her daughter  She lives in an assisted living facility  [facility]P: [facility]     Physical Exam     Patient Vitals for the past 24 hrs:   BP Temp Temp src Pulse Resp SpO2 Height Weight   10/24/22 0932 (!) 195/74 97.6  F (36.4  C) Temporal 74 18 97 % 1.549 m (5' 1\") 66.2 kg (146 lb)       Physical Exam  General: " Well-nourished, no acute distress, appears to be in pain  Eyes: PERRL, conjunctivae pink no scleral icterus or conjunctival injection  ENT:  Moist mucus membranes, posterior oropharynx clear without erythema or exudates, no hemotympanum  Respiratory:  Lungs clear to auscultation bilaterally, no crackles/rubs/wheezes.  Good air movement.  No seatbelt sign or ecchymoses  CV: Normal rate and rhythm, no murmurs/rubs/gallops  GI:  Abdomen soft and non-distended.  Normoactive BS.  No tenderness, guarding or rebound  Skin: No zoster rash. Warm, dry.  No rashes or petechiae. Old ecchymosis over right neck/upper chest  Musculoskeletal: No peripheral edema or calf tenderness.  +point tenderness just medial to scapula on the left. No crepitus or stepoffs. No midline tenderness of the cervical/thoracic/lumbar spine.  No tenderness/crepitus/bony stepoffs over the clavicles, remainder of chest wall, pelvis, arms or legs.  Neuro: Alert and oriented to person/place/time  Psychiatric: Normal affect      Emergency Department Course     Imaging:  Chest CT w/o contrast   Final Result   IMPRESSION:     1. No evidence of acute osseous pathology.   2. Extensive atherosclerotic vascular calcification of the coronary   arteries.   3. 1.3 cm left thyroid nodule, can be further evaluated with thyroid   ultrasound if clinically warranted.   4. Small cluster of small nodular pulmonary opacities in the anterior   aspect of right upper lobe, could be infectious.       ARELIS LUONG MD            SYSTEM ID:  [id number]        Report per radiology    Emergency Department Course:     Reviewed:  I reviewed nursing notes, vitals, past medical history and Care Everywhere    Assessments:  1244 I obtained history and examined the patient as noted above. Plan discussed and agreed on with patient and daughter.     Interventions:  Lidocaine 4%, 1 patch, transdermal     Disposition:  The patient was discharged to home.     Impression & Plan   Medical  Decision Making:  Jade Caba is a 89 year old female who comes with ongoing pain after an accidental fall a week prior.  She complains of pain just around her scapula on the left that is very bothersome and not controlled by Tylenol.  She had a negative work-up on her previous ED visit but given the recurrent and bothersome pain, repeat CT scan was obtained.  Fortunately no signs of rib fracture, scapular fracture, thoracic spine fracture, pulmonary contusion, pneumothorax or other intrathoracic abnormality.  She had some areas of inflammation or possible infection noted on the CT but she is afebrile without a cough and we will avoid treating with antibiotics given possible side effects in the absence of symptoms.  The areas are not located where she is having the pain.  I discussed pain control with the patient and her daughter.  Given concerns about recurrent falls, her daughter was very adamant that we should not start her on any oxycodone or strong pain medication.  I think this is a very reasonable concern.  We will try treatment with a lidocaine patch and the first patch was placed here.  I recommended that they obtain the 4% lidocaine patches from the pharmacy if this is effective for her given that the 5% prescription patches are not typically covered by insurance and are quite expensive.  The patient and her daughter were comfortable with the plan for discharge home.  At this time, with reasonable clinical confidence, I do believe she safe for discharge home.      Diagnosis:    ICD-10-CM    1. Acute left-sided thoracic back pain  M54.6       2. Fall, initial encounter  W19.XXXA           Discharge Medications:  New Prescriptions    No medications on file       Scribe Disclosure:  I, Cuco Linares, am serving as a scribe at 12:42 PM on 10/24/2022 to document services personally performed by Marley Andrew MD based on my observations and the provider's statements to me.            Marley Andrew MD  10/24/22  [id number]

## 2022-10-24 NOTE — ED NOTES
Rapid Assessment Note    History:   Jade Caba is a 89 year old female who presents for further evaluation after a fall.  She previously had negative imaging and laceration repair after a fall out of the shower.  Subsequently she has had left upper posterior chest wall pain, worse with movement and inspiration.  She denies any chest pain or dyspnea at this time.    Exam:   General:  Alert, interactive  Cardiovascular:  Well perfused  Chest; +TTP left upper chest wall and mid thoracic spine, no stepoffs or crepitus. No respiratory distress, no accessory muscle use  Neuro:  Moving all 4 extremities  Skin:  Warm, dry  Psych:  Normal affect    Plan of Care:   I evaluated the patient and developed an initial plan of care. I discussed this plan and explained that I, or one of my partners, would be returning to complete the evaluation.        Ken Gardner MD  10/24/22 8868

## 2022-11-16 NOTE — PROGRESS NOTES
6 month telephone assessment. EPIC notes reviewed.  Secure e-mail sent to Betsey ZHAO at [facility] to inquire on client  VM left with client, CC to follow up  Holly Guajardo RN, BC  Manager [facility] Care Coordinator   [phone]  (Fax)    
[facility] Care Coordination Contact    [facility] Six-Month Telephone Assessment    6 month telephone assessment completed on 9/13/18.  Call placed to Jade, she states that she is feeling well.    ER visits: No  Hospitalizations: No  TCU stays: No  Significant health status changes: None reported  Falls/Injuries: No  ADL/IADL changes: No  Changes in services: No    Caregiver Assessment follow up:  N/A     Goals: See POC in chart for goal progress documentation.    Jade would like to schedule a dental appt, states that her preference is to have her daughter schedule the appt. CC provided [facility] contact number, enc'd a call back to CC as needed.     Will see member in 6 months for an annual health risk assessment.   Encouraged member to call CC with any questions or concerns in the meantime.   Holly Guajardo RN, BC  Manager [facility] Care Coordinator   [phone]  (Fax)            
Medical Decision Making

## 2023-01-01 ENCOUNTER — HEALTH MAINTENANCE LETTER (OUTPATIENT)
Age: 88
End: 2023-01-01

## 2023-01-01 ENCOUNTER — MEDICAL CORRESPONDENCE (OUTPATIENT)
Dept: HEALTH INFORMATION MANAGEMENT | Facility: CLINIC | Age: 88
End: 2023-01-01

## 2023-01-01 ENCOUNTER — TELEPHONE (OUTPATIENT)
Dept: NEUROSURGERY | Facility: CLINIC | Age: 88
End: 2023-01-01

## 2023-01-01 ENCOUNTER — PATIENT OUTREACH (OUTPATIENT)
Dept: GERIATRIC MEDICINE | Facility: CLINIC | Age: 88
End: 2023-01-01
Payer: COMMERCIAL

## 2023-01-01 ENCOUNTER — HOSPITAL ENCOUNTER (EMERGENCY)
Facility: CLINIC | Age: 88
Discharge: HOME | End: 2023-04-22
Attending: EMERGENCY MEDICINE | Admitting: EMERGENCY MEDICINE
Payer: COMMERCIAL

## 2023-01-01 ENCOUNTER — MYC MEDICAL ADVICE (OUTPATIENT)
Dept: INTERNAL MEDICINE | Facility: CLINIC | Age: 88
End: 2023-01-01
Payer: COMMERCIAL

## 2023-01-01 ENCOUNTER — HOSPITAL ENCOUNTER (OUTPATIENT)
Dept: ULTRASOUND IMAGING | Facility: CLINIC | Age: 88
Discharge: HOME | End: 2023-10-05
Attending: SURGERY | Admitting: SURGERY
Payer: COMMERCIAL

## 2023-01-01 ENCOUNTER — TELEPHONE (OUTPATIENT)
Dept: INTERNAL MEDICINE | Facility: CLINIC | Age: 88
End: 2023-01-01

## 2023-01-01 ENCOUNTER — APPOINTMENT (OUTPATIENT)
Dept: PHYSICAL THERAPY | Facility: CLINIC | Age: 88
DRG: 683 | End: 2023-01-01
Attending: HOSPITALIST
Payer: COMMERCIAL

## 2023-01-01 ENCOUNTER — OFFICE VISIT (OUTPATIENT)
Dept: INTERNAL MEDICINE | Facility: CLINIC | Age: 88
End: 2023-01-01
Payer: COMMERCIAL

## 2023-01-01 ENCOUNTER — OFFICE VISIT (OUTPATIENT)
Dept: URGENT CARE | Facility: URGENT CARE | Age: 88
End: 2023-01-01
Payer: COMMERCIAL

## 2023-01-01 ENCOUNTER — TELEPHONE (OUTPATIENT)
Dept: INTERNAL MEDICINE | Facility: CLINIC | Age: 88
End: 2023-01-01
Payer: COMMERCIAL

## 2023-01-01 ENCOUNTER — HOSPITAL ENCOUNTER (OUTPATIENT)
Dept: MRI IMAGING | Facility: CLINIC | Age: 88
Discharge: HOME | End: 2023-10-13
Attending: NURSE PRACTITIONER | Admitting: NURSE PRACTITIONER
Payer: COMMERCIAL

## 2023-01-01 ENCOUNTER — APPOINTMENT (OUTPATIENT)
Dept: CT IMAGING | Facility: CLINIC | Age: 88
DRG: 683 | End: 2023-01-01
Attending: SOCIAL WORKER
Payer: COMMERCIAL

## 2023-01-01 ENCOUNTER — APPOINTMENT (OUTPATIENT)
Dept: CT IMAGING | Facility: CLINIC | Age: 88
DRG: 683 | End: 2023-01-01
Attending: INTERNAL MEDICINE
Payer: COMMERCIAL

## 2023-01-01 ENCOUNTER — OFFICE VISIT (OUTPATIENT)
Dept: PHYSICAL MEDICINE AND REHAB | Facility: CLINIC | Age: 88
End: 2023-01-01
Payer: COMMERCIAL

## 2023-01-01 ENCOUNTER — RADIOLOGY INJECTION OFFICE VISIT (OUTPATIENT)
Dept: PHYSICAL MEDICINE AND REHAB | Facility: CLINIC | Age: 88
End: 2023-01-01
Attending: NURSE PRACTITIONER
Payer: COMMERCIAL

## 2023-01-01 ENCOUNTER — LAB REQUISITION (OUTPATIENT)
Dept: LAB | Facility: CLINIC | Age: 88
End: 2023-01-01
Payer: COMMERCIAL

## 2023-01-01 ENCOUNTER — HOSPITAL ENCOUNTER (INPATIENT)
Facility: CLINIC | Age: 88
LOS: 1 days | Discharge: NURSING, REHAB OR LONG-TERM CARE | DRG: 683 | End: 2023-12-29
Attending: SOCIAL WORKER | Admitting: HOSPITALIST
Payer: COMMERCIAL

## 2023-01-01 ENCOUNTER — APPOINTMENT (OUTPATIENT)
Dept: CARDIOLOGY | Facility: CLINIC | Age: 88
DRG: 683 | End: 2023-01-01
Attending: HOSPITALIST
Payer: COMMERCIAL

## 2023-01-01 ENCOUNTER — TELEPHONE (OUTPATIENT)
Dept: PHYSICAL MEDICINE AND REHAB | Facility: CLINIC | Age: 88
End: 2023-01-01
Payer: COMMERCIAL

## 2023-01-01 ENCOUNTER — MYC MEDICAL ADVICE (OUTPATIENT)
Dept: INTERNAL MEDICINE | Facility: CLINIC | Age: 88
End: 2023-01-01

## 2023-01-01 ENCOUNTER — OFFICE VISIT (OUTPATIENT)
Dept: SURGERY | Facility: CLINIC | Age: 88
End: 2023-01-01
Attending: SURGERY
Payer: COMMERCIAL

## 2023-01-01 ENCOUNTER — OFFICE VISIT (OUTPATIENT)
Dept: INTERNAL MEDICINE | Facility: CLINIC | Age: 88
End: 2023-01-01
Attending: INTERNAL MEDICINE
Payer: COMMERCIAL

## 2023-01-01 ENCOUNTER — TELEPHONE (OUTPATIENT)
Dept: WOUND CARE | Facility: CLINIC | Age: 88
End: 2023-01-01
Payer: COMMERCIAL

## 2023-01-01 ENCOUNTER — OFFICE VISIT (OUTPATIENT)
Dept: NEUROSURGERY | Facility: CLINIC | Age: 88
End: 2023-01-01
Attending: INTERNAL MEDICINE
Payer: COMMERCIAL

## 2023-01-01 VITALS
BODY MASS INDEX: 26.43 KG/M2 | SYSTOLIC BLOOD PRESSURE: 138 MMHG | HEART RATE: 69 BPM | WEIGHT: 140 LBS | DIASTOLIC BLOOD PRESSURE: 60 MMHG | RESPIRATION RATE: 15 BRPM | HEIGHT: 61 IN | OXYGEN SATURATION: 98 %

## 2023-01-01 VITALS
OXYGEN SATURATION: 91 % | DIASTOLIC BLOOD PRESSURE: 76 MMHG | TEMPERATURE: 97.4 F | RESPIRATION RATE: 18 BRPM | HEART RATE: 95 BPM | SYSTOLIC BLOOD PRESSURE: 129 MMHG

## 2023-01-01 VITALS
RESPIRATION RATE: 20 BRPM | DIASTOLIC BLOOD PRESSURE: 73 MMHG | SYSTOLIC BLOOD PRESSURE: 180 MMHG | TEMPERATURE: 98.8 F | OXYGEN SATURATION: 96 % | HEART RATE: 106 BPM

## 2023-01-01 VITALS
OXYGEN SATURATION: 99 % | TEMPERATURE: 98.1 F | SYSTOLIC BLOOD PRESSURE: 143 MMHG | DIASTOLIC BLOOD PRESSURE: 59 MMHG | HEIGHT: 61 IN | HEART RATE: 68 BPM | RESPIRATION RATE: 18 BRPM | BODY MASS INDEX: 25.49 KG/M2 | WEIGHT: 135 LBS

## 2023-01-01 VITALS
DIASTOLIC BLOOD PRESSURE: 64 MMHG | HEART RATE: 95 BPM | TEMPERATURE: 98.8 F | WEIGHT: 138.9 LBS | OXYGEN SATURATION: 95 % | BODY MASS INDEX: 26.22 KG/M2 | HEIGHT: 61 IN | RESPIRATION RATE: 16 BRPM | SYSTOLIC BLOOD PRESSURE: 130 MMHG

## 2023-01-01 VITALS
HEIGHT: 61 IN | WEIGHT: 143 LBS | TEMPERATURE: 98.1 F | OXYGEN SATURATION: 98 % | DIASTOLIC BLOOD PRESSURE: 71 MMHG | RESPIRATION RATE: 20 BRPM | SYSTOLIC BLOOD PRESSURE: 160 MMHG | HEART RATE: 71 BPM | BODY MASS INDEX: 27 KG/M2

## 2023-01-01 VITALS
OXYGEN SATURATION: 98 % | WEIGHT: 137 LBS | HEART RATE: 73 BPM | DIASTOLIC BLOOD PRESSURE: 76 MMHG | TEMPERATURE: 98.7 F | SYSTOLIC BLOOD PRESSURE: 128 MMHG | BODY MASS INDEX: 25.89 KG/M2 | RESPIRATION RATE: 14 BRPM

## 2023-01-01 VITALS — HEART RATE: 94 BPM | DIASTOLIC BLOOD PRESSURE: 64 MMHG | SYSTOLIC BLOOD PRESSURE: 136 MMHG

## 2023-01-01 VITALS
HEART RATE: 71 BPM | WEIGHT: 135 LBS | SYSTOLIC BLOOD PRESSURE: 153 MMHG | BODY MASS INDEX: 25.49 KG/M2 | HEIGHT: 61 IN | OXYGEN SATURATION: 96 % | DIASTOLIC BLOOD PRESSURE: 71 MMHG

## 2023-01-01 VITALS
SYSTOLIC BLOOD PRESSURE: 126 MMHG | TEMPERATURE: 97.2 F | OXYGEN SATURATION: 97 % | HEART RATE: 63 BPM | RESPIRATION RATE: 18 BRPM | DIASTOLIC BLOOD PRESSURE: 58 MMHG

## 2023-01-01 DIAGNOSIS — F10.21 ALCOHOL DEPENDENCE, IN REMISSION (H): ICD-10-CM

## 2023-01-01 DIAGNOSIS — M54.12 CERVICAL RADICULOPATHY: ICD-10-CM

## 2023-01-01 DIAGNOSIS — I10 PRIMARY HYPERTENSION: ICD-10-CM

## 2023-01-01 DIAGNOSIS — E87.1 HYPONATREMIA: ICD-10-CM

## 2023-01-01 DIAGNOSIS — Z23 NEED FOR INFLUENZA VACCINATION: ICD-10-CM

## 2023-01-01 DIAGNOSIS — R30.0 DYSURIA: ICD-10-CM

## 2023-01-01 DIAGNOSIS — F10.10 ALCOHOL ABUSE: ICD-10-CM

## 2023-01-01 DIAGNOSIS — N18.31 STAGE 3A CHRONIC KIDNEY DISEASE (H): Primary | ICD-10-CM

## 2023-01-01 DIAGNOSIS — M54.2 NECK PAIN: ICD-10-CM

## 2023-01-01 DIAGNOSIS — R39.9 URINARY SYMPTOM OR SIGN: Primary | ICD-10-CM

## 2023-01-01 DIAGNOSIS — R39.9 URINARY SYMPTOM OR SIGN: ICD-10-CM

## 2023-01-01 DIAGNOSIS — Z87.828 H/O LACERATION OF SKIN: ICD-10-CM

## 2023-01-01 DIAGNOSIS — I65.22 CAROTID STENOSIS, LEFT: ICD-10-CM

## 2023-01-01 DIAGNOSIS — M54.81 OCCIPITAL NEURALGIA OF LEFT SIDE: ICD-10-CM

## 2023-01-01 DIAGNOSIS — M54.12 CERVICAL RADICULOPATHY: Primary | ICD-10-CM
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
## 2023-01-01 DIAGNOSIS — I70.1 RENAL ARTERY STENOSIS (H): ICD-10-CM

## 2023-01-01 DIAGNOSIS — I65.21 OCCLUSION OF RIGHT CAROTID ARTERY: Primary | ICD-10-CM

## 2023-01-01 DIAGNOSIS — Z00.00 ENCOUNTER FOR MEDICARE ANNUAL WELLNESS EXAM: Primary | ICD-10-CM

## 2023-01-01 DIAGNOSIS — I27.20 PULMONARY HYPERTENSION (H): ICD-10-CM

## 2023-01-01 DIAGNOSIS — I10 ESSENTIAL (PRIMARY) HYPERTENSION: ICD-10-CM

## 2023-01-01 DIAGNOSIS — L98.9 SKIN LESION: Primary | ICD-10-CM

## 2023-01-01 DIAGNOSIS — G47.9 SLEEP DIFFICULTIES: ICD-10-CM

## 2023-01-01 DIAGNOSIS — S81.812A SKIN TEAR OF LEFT LOWER LEG WITHOUT COMPLICATION, INITIAL ENCOUNTER: ICD-10-CM

## 2023-01-01 DIAGNOSIS — F33.9 DEPRESSION, RECURRENT (H): ICD-10-CM

## 2023-01-01 DIAGNOSIS — Z09 HOSPITAL DISCHARGE FOLLOW-UP: ICD-10-CM

## 2023-01-01 DIAGNOSIS — I65.21 RIGHT CAROTID ARTERY OCCLUSION: Primary | ICD-10-CM

## 2023-01-01 DIAGNOSIS — R29.6 FALLS FREQUENTLY: ICD-10-CM

## 2023-01-01 DIAGNOSIS — I25.10 CAD IN NATIVE ARTERY: ICD-10-CM

## 2023-01-01 DIAGNOSIS — I10 ESSENTIAL HYPERTENSION: ICD-10-CM

## 2023-01-01 DIAGNOSIS — I48.91 ATRIAL FIBRILLATION, UNSPECIFIED TYPE (H): Primary | ICD-10-CM

## 2023-01-01 DIAGNOSIS — I65.21 OCCLUSION OF RIGHT CAROTID ARTERY: ICD-10-CM

## 2023-01-01 DIAGNOSIS — R53.1 GENERALIZED WEAKNESS: ICD-10-CM

## 2023-01-01 DIAGNOSIS — S51.012A SKIN TEAR OF LEFT ELBOW WITHOUT COMPLICATION, INITIAL ENCOUNTER: Primary | ICD-10-CM

## 2023-01-01 DIAGNOSIS — E78.5 HYPERLIPIDEMIA, UNSPECIFIED: ICD-10-CM

## 2023-01-01 DIAGNOSIS — S51.019A SKIN TEAR OF ELBOW WITHOUT COMPLICATION, UNSPECIFIED LATERALITY, INITIAL ENCOUNTER: ICD-10-CM

## 2023-01-01 DIAGNOSIS — S32.10XD CLOSED FRACTURE OF SACRUM WITH ROUTINE HEALING, UNSPECIFIED PORTION OF SACRUM, SUBSEQUENT ENCOUNTER: ICD-10-CM

## 2023-01-01 DIAGNOSIS — E46 PROTEIN DEFICIENCY DISEASE (H): ICD-10-CM

## 2023-01-01 DIAGNOSIS — H91.90 HEARING LOSS, UNSPECIFIED HEARING LOSS TYPE, UNSPECIFIED LATERALITY: Primary | ICD-10-CM

## 2023-01-01 LAB
ALBUMIN SERPL BCG-MCNC: 3.3 G/DL (ref 3.5–5.2)
ALBUMIN SERPL BCG-MCNC: 3.6 G/DL (ref 3.5–5.2)
ALBUMIN UR-MCNC: NEGATIVE MG/DL
ALP SERPL-CCNC: 52 U/L (ref 40–150)
ALP SERPL-CCNC: 55 U/L (ref 40–150)
ALT SERPL W P-5'-P-CCNC: 24 U/L (ref 0–50)
ALT SERPL W P-5'-P-CCNC: 35 U/L (ref 0–50)
ANION GAP SERPL CALCULATED.3IONS-SCNC: 10 MMOL/L (ref 7–15)
ANION GAP SERPL CALCULATED.3IONS-SCNC: 11 MMOL/L (ref 7–15)
ANION GAP SERPL CALCULATED.3IONS-SCNC: 12 MMOL/L (ref 7–15)
ANION GAP SERPL CALCULATED.3IONS-SCNC: 9 MMOL/L (ref 7–15)
ANION GAP SERPL CALCULATED.3IONS-SCNC: 9 MMOL/L (ref 7–15)
APPEARANCE UR: ABNORMAL
APPEARANCE UR: ABNORMAL
APPEARANCE UR: CLEAR
AST SERPL W P-5'-P-CCNC: 28 U/L (ref 0–45)
AST SERPL W P-5'-P-CCNC: 30 U/L (ref 0–45)
ATRIAL RATE - MUSE: 115 BPM
BACTERIA #/AREA URNS HPF: ABNORMAL /HPF
BACTERIA UR CULT: ABNORMAL
BACTERIA UR CULT: ABNORMAL
BACTERIA UR CULT: NORMAL
BASOPHILS # BLD AUTO: 0 10E3/UL (ref 0–0.2)
BASOPHILS NFR BLD AUTO: 0 %
BILIRUB DIRECT SERPL-MCNC: <0.2 MG/DL (ref 0–0.3)
BILIRUB SERPL-MCNC: 0.2 MG/DL
BILIRUB SERPL-MCNC: 0.3 MG/DL
BILIRUB UR QL STRIP: ABNORMAL
BILIRUB UR QL STRIP: NEGATIVE
BILIRUB UR QL STRIP: NEGATIVE
BUN SERPL-MCNC: 29.8 MG/DL (ref 8–23)
BUN SERPL-MCNC: 32.2 MG/DL (ref 8–23)
BUN SERPL-MCNC: 37.7 MG/DL (ref 8–23)
BUN SERPL-MCNC: 45 MG/DL (ref 8–23)
BUN SERPL-MCNC: 46.2 MG/DL (ref 8–23)
CALCIUM SERPL-MCNC: 8.4 MG/DL (ref 8.2–9.6)
CALCIUM SERPL-MCNC: 8.7 MG/DL (ref 8.2–9.6)
CALCIUM SERPL-MCNC: 8.8 MG/DL (ref 8.2–9.6)
CHLORIDE SERPL-SCNC: 85 MMOL/L (ref 98–107)
CHLORIDE SERPL-SCNC: 87 MMOL/L (ref 98–107)
CHLORIDE SERPL-SCNC: 88 MMOL/L (ref 98–107)
CHLORIDE SERPL-SCNC: 94 MMOL/L (ref 98–107)
CHLORIDE SERPL-SCNC: 94 MMOL/L (ref 98–107)
CHOLEST SERPL-MCNC: 155 MG/DL
COLOR UR AUTO: ABNORMAL
COLOR UR AUTO: ABNORMAL
COLOR UR AUTO: YELLOW
CREAT SERPL-MCNC: 1.18 MG/DL (ref 0.51–0.95)
CREAT SERPL-MCNC: 1.29 MG/DL (ref 0.51–0.95)
CREAT SERPL-MCNC: 1.56 MG/DL (ref 0.51–0.95)
CREAT SERPL-MCNC: 1.89 MG/DL (ref 0.51–0.95)
CREAT SERPL-MCNC: 1.91 MG/DL (ref 0.51–0.95)
CREAT UR-MCNC: 53.3 MG/DL
DEPRECATED HCO3 PLAS-SCNC: 22 MMOL/L (ref 22–29)
DEPRECATED HCO3 PLAS-SCNC: 24 MMOL/L (ref 22–29)
DEPRECATED HCO3 PLAS-SCNC: 24 MMOL/L (ref 22–29)
DEPRECATED HCO3 PLAS-SCNC: 25 MMOL/L (ref 22–29)
DEPRECATED HCO3 PLAS-SCNC: 26 MMOL/L (ref 22–29)
DIASTOLIC BLOOD PRESSURE - MUSE: NORMAL MMHG
EGFRCR SERPLBLD CKD-EPI 2021: 24 ML/MIN/1.73M2
EGFRCR SERPLBLD CKD-EPI 2021: 25 ML/MIN/1.73M2
EGFRCR SERPLBLD CKD-EPI 2021: 31 ML/MIN/1.73M2
EGFRCR SERPLBLD CKD-EPI 2021: 39 ML/MIN/1.73M2
EGFRCR SERPLBLD CKD-EPI 2021: 44 ML/MIN/1.73M2
EOSINOPHIL # BLD AUTO: 0.1 10E3/UL (ref 0–0.7)
EOSINOPHIL # BLD AUTO: 0.1 10E3/UL (ref 0–0.7)
EOSINOPHIL # BLD AUTO: 0.2 10E3/UL (ref 0–0.7)
EOSINOPHIL NFR BLD AUTO: 1 %
EOSINOPHIL NFR BLD AUTO: 1 %
EOSINOPHIL NFR BLD AUTO: 3 %
ERYTHROCYTE [DISTWIDTH] IN BLOOD BY AUTOMATED COUNT: 12.3 % (ref 10–15)
ERYTHROCYTE [DISTWIDTH] IN BLOOD BY AUTOMATED COUNT: 12.5 % (ref 10–15)
ERYTHROCYTE [DISTWIDTH] IN BLOOD BY AUTOMATED COUNT: 12.6 % (ref 10–15)
ERYTHROCYTE [DISTWIDTH] IN BLOOD BY AUTOMATED COUNT: 12.7 % (ref 10–15)
FASTING STATUS PATIENT QL REPORTED: ABNORMAL
FOLATE SERPL-MCNC: >40 NG/ML (ref 4.6–34.8)
GLUCOSE SERPL-MCNC: 105 MG/DL (ref 70–99)
GLUCOSE SERPL-MCNC: 107 MG/DL (ref 70–99)
GLUCOSE SERPL-MCNC: 115 MG/DL (ref 70–99)
GLUCOSE SERPL-MCNC: 124 MG/DL (ref 70–99)
GLUCOSE SERPL-MCNC: 158 MG/DL (ref 70–99)
GLUCOSE UR STRIP-MCNC: NEGATIVE MG/DL
HCT VFR BLD AUTO: 30.4 % (ref 35–47)
HCT VFR BLD AUTO: 31.2 % (ref 35–47)
HCT VFR BLD AUTO: 31.7 % (ref 35–47)
HCT VFR BLD AUTO: 32.8 % (ref 35–47)
HDLC SERPL-MCNC: 38 MG/DL
HGB BLD-MCNC: 10.2 G/DL (ref 11.7–15.7)
HGB BLD-MCNC: 10.2 G/DL (ref 11.7–15.7)
HGB BLD-MCNC: 10.7 G/DL (ref 11.7–15.7)
HGB BLD-MCNC: 11.1 G/DL (ref 11.7–15.7)
HGB UR QL STRIP: ABNORMAL
HGB UR QL STRIP: ABNORMAL
HGB UR QL STRIP: NEGATIVE
IMM GRANULOCYTES # BLD: 0 10E3/UL
IMM GRANULOCYTES NFR BLD: 0 %
INTERPRETATION ECG - MUSE: NORMAL
KETONES UR STRIP-MCNC: NEGATIVE MG/DL
LDLC SERPL CALC-MCNC: 102 MG/DL
LEUKOCYTE ESTERASE UR QL STRIP: ABNORMAL
LYMPHOCYTES # BLD AUTO: 0.9 10E3/UL (ref 0.8–5.3)
LYMPHOCYTES # BLD AUTO: 1.2 10E3/UL (ref 0.8–5.3)
LYMPHOCYTES # BLD AUTO: 1.5 10E3/UL (ref 0.8–5.3)
LYMPHOCYTES NFR BLD AUTO: 12 %
LYMPHOCYTES NFR BLD AUTO: 19 %
LYMPHOCYTES NFR BLD AUTO: 22 %
MAGNESIUM SERPL-MCNC: 1.7 MG/DL (ref 1.7–2.3)
MCH RBC QN AUTO: 30.5 PG (ref 26.5–33)
MCH RBC QN AUTO: 31.2 PG (ref 26.5–33)
MCH RBC QN AUTO: 31.2 PG (ref 26.5–33)
MCH RBC QN AUTO: 31.4 PG (ref 26.5–33)
MCHC RBC AUTO-ENTMCNC: 32.7 G/DL (ref 31.5–36.5)
MCHC RBC AUTO-ENTMCNC: 33.6 G/DL (ref 31.5–36.5)
MCHC RBC AUTO-ENTMCNC: 33.8 G/DL (ref 31.5–36.5)
MCHC RBC AUTO-ENTMCNC: 33.8 G/DL (ref 31.5–36.5)
MCV RBC AUTO: 92 FL (ref 78–100)
MCV RBC AUTO: 93 FL (ref 78–100)
MONOCYTES # BLD AUTO: 0.7 10E3/UL (ref 0–1.3)
MONOCYTES # BLD AUTO: 0.8 10E3/UL (ref 0–1.3)
MONOCYTES # BLD AUTO: 0.8 10E3/UL (ref 0–1.3)
MONOCYTES NFR BLD AUTO: 12 %
MONOCYTES NFR BLD AUTO: 13 %
MONOCYTES NFR BLD AUTO: 9 %
MUCOUS THREADS #/AREA URNS LPF: PRESENT /LPF
NEUTROPHILS # BLD AUTO: 4.3 10E3/UL (ref 1.6–8.3)
NEUTROPHILS # BLD AUTO: 4.3 10E3/UL (ref 1.6–8.3)
NEUTROPHILS # BLD AUTO: 6.1 10E3/UL (ref 1.6–8.3)
NEUTROPHILS NFR BLD AUTO: 63 %
NEUTROPHILS NFR BLD AUTO: 67 %
NEUTROPHILS NFR BLD AUTO: 78 %
NITRATE UR QL: NEGATIVE
NONHDLC SERPL-MCNC: 117 MG/DL
NRBC # BLD AUTO: 0 10E3/UL
NRBC BLD AUTO-RTO: 0 /100
OSMOLALITY SERPL: 270 MMOL/KG (ref 280–301)
P AXIS - MUSE: NORMAL DEGREES
PH UR STRIP: 6 [PH] (ref 5–7)
PH UR STRIP: 6.5 [PH] (ref 5–7)
PH UR STRIP: 7 [PH] (ref 5–7)
PLATELET # BLD AUTO: 170 10E3/UL (ref 150–450)
PLATELET # BLD AUTO: 175 10E3/UL (ref 150–450)
PLATELET # BLD AUTO: 186 10E3/UL (ref 150–450)
PLATELET # BLD AUTO: 187 10E3/UL (ref 150–450)
POTASSIUM SERPL-SCNC: 4.6 MMOL/L (ref 3.4–5.3)
POTASSIUM SERPL-SCNC: 4.6 MMOL/L (ref 3.4–5.3)
POTASSIUM SERPL-SCNC: 4.8 MMOL/L (ref 3.4–5.3)
POTASSIUM SERPL-SCNC: 5 MMOL/L (ref 3.4–5.3)
POTASSIUM SERPL-SCNC: 5.4 MMOL/L (ref 3.4–5.3)
PR INTERVAL - MUSE: NORMAL MS
PROT SERPL-MCNC: 5.7 G/DL (ref 6.4–8.3)
PROT SERPL-MCNC: 6 G/DL (ref 6.4–8.3)
QRS DURATION - MUSE: 94 MS
QT - MUSE: 354 MS
QTC - MUSE: 442 MS
R AXIS - MUSE: 94 DEGREES
RBC # BLD AUTO: 3.27 10E6/UL (ref 3.8–5.2)
RBC # BLD AUTO: 3.34 10E6/UL (ref 3.8–5.2)
RBC # BLD AUTO: 3.43 10E6/UL (ref 3.8–5.2)
RBC # BLD AUTO: 3.53 10E6/UL (ref 3.8–5.2)
RBC #/AREA URNS AUTO: ABNORMAL /HPF
RBC URINE: 17 /HPF
RBC URINE: 5 /HPF
SODIUM SERPL-SCNC: 122 MMOL/L (ref 135–145)
SODIUM SERPL-SCNC: 124 MMOL/L (ref 135–145)
SODIUM SERPL-SCNC: 124 MMOL/L (ref 135–145)
SODIUM SERPL-SCNC: 125 MMOL/L (ref 135–145)
SODIUM SERPL-SCNC: 125 MMOL/L (ref 135–145)
SODIUM SERPL-SCNC: 127 MMOL/L (ref 135–145)
SODIUM UR-SCNC: 56 MMOL/L
SP GR UR STRIP: 1.01 (ref 1–1.03)
SQUAMOUS #/AREA URNS AUTO: ABNORMAL /LPF
SQUAMOUS EPITHELIAL: <1 /HPF
SYSTOLIC BLOOD PRESSURE - MUSE: NORMAL MMHG
T AXIS - MUSE: 12 DEGREES
TRIGL SERPL-MCNC: 73 MG/DL
TROPONIN T SERPL HS-MCNC: 38 NG/L
UROBILINOGEN UR STRIP-ACNC: 0.2 E.U./DL
UROBILINOGEN UR STRIP-MCNC: NORMAL MG/DL
UROBILINOGEN UR STRIP-MCNC: NORMAL MG/DL
VENTRICULAR RATE- MUSE: 94 BPM
WBC # BLD AUTO: 6.4 10E3/UL (ref 4–11)
WBC # BLD AUTO: 6.7 10E3/UL (ref 4–11)
WBC # BLD AUTO: 6.8 10E3/UL (ref 4–11)
WBC # BLD AUTO: 7.9 10E3/UL (ref 4–11)
WBC #/AREA URNS AUTO: ABNORMAL /HPF
WBC CLUMPS #/AREA URNS HPF: PRESENT /HPF
WBC CLUMPS #/AREA URNS HPF: PRESENT /HPF
WBC URINE: 12 /HPF
WBC URINE: >100 /HPF

## 2023-01-01 PROCEDURE — 85027 COMPLETE CBC AUTOMATED: CPT | Performed by: SOCIAL WORKER

## 2023-01-01 PROCEDURE — 93882 EXTRACRANIAL UNI/LTD STUDY: CPT | Mod: LT

## 2023-01-01 PROCEDURE — 36415 COLL VENOUS BLD VENIPUNCTURE: CPT | Mod: ORL | Performed by: NURSE PRACTITIONER

## 2023-01-01 PROCEDURE — G0378 HOSPITAL OBSERVATION PER HR: HCPCS

## 2023-01-01 PROCEDURE — 250N000013 HC RX MED GY IP 250 OP 250 PS 637: Performed by: SOCIAL WORKER

## 2023-01-01 PROCEDURE — 36415 COLL VENOUS BLD VENIPUNCTURE: CPT | Performed by: EMERGENCY MEDICINE

## 2023-01-01 PROCEDURE — 99223 1ST HOSP IP/OBS HIGH 75: CPT | Mod: AI | Performed by: HOSPITALIST

## 2023-01-01 PROCEDURE — 250N000011 HC RX IP 250 OP 636: Performed by: INTERNAL MEDICINE

## 2023-01-01 PROCEDURE — 82570 ASSAY OF URINE CREATININE: CPT | Performed by: SOCIAL WORKER

## 2023-01-01 PROCEDURE — 97161 PT EVAL LOW COMPLEX 20 MIN: CPT | Mod: GP | Performed by: PHYSICAL THERAPIST

## 2023-01-01 PROCEDURE — 99283 EMERGENCY DEPT VISIT LOW MDM: CPT

## 2023-01-01 PROCEDURE — 93244 EXT ECG>48HR<7D REV&INTERPJ: CPT | Performed by: INTERNAL MEDICINE

## 2023-01-01 PROCEDURE — 99204 OFFICE O/P NEW MOD 45 MIN: CPT | Performed by: NURSE PRACTITIONER

## 2023-01-01 PROCEDURE — 82746 ASSAY OF FOLIC ACID SERUM: CPT | Mod: ORL | Performed by: NURSE PRACTITIONER

## 2023-01-01 PROCEDURE — 84484 ASSAY OF TROPONIN QUANT: CPT | Performed by: EMERGENCY MEDICINE

## 2023-01-01 PROCEDURE — 80053 COMPREHEN METABOLIC PANEL: CPT | Mod: ORL | Performed by: NURSE PRACTITIONER

## 2023-01-01 PROCEDURE — 99233 SBSQ HOSP IP/OBS HIGH 50: CPT | Performed by: INTERNAL MEDICINE

## 2023-01-01 PROCEDURE — 85025 COMPLETE CBC W/AUTO DIFF WBC: CPT | Performed by: INTERNAL MEDICINE

## 2023-01-01 PROCEDURE — 84484 ASSAY OF TROPONIN QUANT: CPT | Performed by: SOCIAL WORKER

## 2023-01-01 PROCEDURE — 250N000013 HC RX MED GY IP 250 OP 250 PS 637: Performed by: INTERNAL MEDICINE

## 2023-01-01 PROCEDURE — 99397 PER PM REEVAL EST PAT 65+ YR: CPT | Performed by: INTERNAL MEDICINE

## 2023-01-01 PROCEDURE — 250N000013 HC RX MED GY IP 250 OP 250 PS 637: Performed by: HOSPITALIST

## 2023-01-01 PROCEDURE — 36415 COLL VENOUS BLD VENIPUNCTURE: CPT | Performed by: INTERNAL MEDICINE

## 2023-01-01 PROCEDURE — 99213 OFFICE O/P EST LOW 20 MIN: CPT | Performed by: SURGERY

## 2023-01-01 PROCEDURE — 258N000003 HC RX IP 258 OP 636: Performed by: SOCIAL WORKER

## 2023-01-01 PROCEDURE — 70450 CT HEAD/BRAIN W/O DYE: CPT

## 2023-01-01 PROCEDURE — G0008 ADMIN INFLUENZA VIRUS VAC: HCPCS | Performed by: INTERNAL MEDICINE

## 2023-01-01 PROCEDURE — 84295 ASSAY OF SERUM SODIUM: CPT | Performed by: HOSPITALIST

## 2023-01-01 PROCEDURE — 80048 BASIC METABOLIC PNL TOTAL CA: CPT | Performed by: HOSPITALIST

## 2023-01-01 PROCEDURE — 85025 COMPLETE CBC W/AUTO DIFF WBC: CPT | Performed by: HOSPITALIST

## 2023-01-01 PROCEDURE — 90471 IMMUNIZATION ADMIN: CPT | Performed by: EMERGENCY MEDICINE

## 2023-01-01 PROCEDURE — 83735 ASSAY OF MAGNESIUM: CPT | Performed by: INTERNAL MEDICINE

## 2023-01-01 PROCEDURE — 87086 URINE CULTURE/COLONY COUNT: CPT | Performed by: INTERNAL MEDICINE

## 2023-01-01 PROCEDURE — 81001 URINALYSIS AUTO W/SCOPE: CPT | Performed by: INTERNAL MEDICINE

## 2023-01-01 PROCEDURE — 120N000001 HC R&B MED SURG/OB

## 2023-01-01 PROCEDURE — 87186 SC STD MICRODIL/AGAR DIL: CPT | Performed by: INTERNAL MEDICINE

## 2023-01-01 PROCEDURE — 250N000013 HC RX MED GY IP 250 OP 250 PS 637: Performed by: EMERGENCY MEDICINE

## 2023-01-01 PROCEDURE — 99239 HOSP IP/OBS DSCHRG MGMT >30: CPT | Performed by: HOSPITALIST

## 2023-01-01 PROCEDURE — 250N000011 HC RX IP 250 OP 636: Performed by: HOSPITALIST

## 2023-01-01 PROCEDURE — 81001 URINALYSIS AUTO W/SCOPE: CPT | Mod: ORL | Performed by: NURSE PRACTITIONER

## 2023-01-01 PROCEDURE — 36415 COLL VENOUS BLD VENIPUNCTURE: CPT | Performed by: HOSPITALIST

## 2023-01-01 PROCEDURE — 99214 OFFICE O/P EST MOD 30 MIN: CPT | Performed by: PHYSICIAN ASSISTANT

## 2023-01-01 PROCEDURE — 99214 OFFICE O/P EST MOD 30 MIN: CPT | Performed by: INTERNAL MEDICINE

## 2023-01-01 PROCEDURE — 85025 COMPLETE CBC W/AUTO DIFF WBC: CPT | Mod: ORL | Performed by: NURSE PRACTITIONER

## 2023-01-01 PROCEDURE — 90662 IIV NO PRSV INCREASED AG IM: CPT | Performed by: INTERNAL MEDICINE

## 2023-01-01 PROCEDURE — 99213 OFFICE O/P EST LOW 20 MIN: CPT | Performed by: INTERNAL MEDICINE

## 2023-01-01 PROCEDURE — 258N000003 HC RX IP 258 OP 636: Performed by: HOSPITALIST

## 2023-01-01 PROCEDURE — P9604 ONE-WAY ALLOW PRORATED TRIP: HCPCS | Mod: ORL | Performed by: NURSE PRACTITIONER

## 2023-01-01 PROCEDURE — 80053 COMPREHEN METABOLIC PANEL: CPT | Performed by: INTERNAL MEDICINE

## 2023-01-01 PROCEDURE — 99285 EMERGENCY DEPT VISIT HI MDM: CPT | Mod: 25

## 2023-01-01 PROCEDURE — 96360 HYDRATION IV INFUSION INIT: CPT

## 2023-01-01 PROCEDURE — 90714 TD VACC NO PRESV 7 YRS+ IM: CPT | Performed by: EMERGENCY MEDICINE

## 2023-01-01 PROCEDURE — 87186 SC STD MICRODIL/AGAR DIL: CPT | Mod: ORL | Performed by: NURSE PRACTITIONER

## 2023-01-01 PROCEDURE — 250N000011 HC RX IP 250 OP 636: Performed by: EMERGENCY MEDICINE

## 2023-01-01 PROCEDURE — 84300 ASSAY OF URINE SODIUM: CPT | Performed by: SOCIAL WORKER

## 2023-01-01 PROCEDURE — 72125 CT NECK SPINE W/O DYE: CPT

## 2023-01-01 PROCEDURE — 80061 LIPID PANEL: CPT | Mod: ORL | Performed by: NURSE PRACTITIONER

## 2023-01-01 PROCEDURE — 72141 MRI NECK SPINE W/O DYE: CPT

## 2023-01-01 PROCEDURE — 99215 OFFICE O/P EST HI 40 MIN: CPT | Performed by: NURSE PRACTITIONER

## 2023-01-01 PROCEDURE — 93005 ELECTROCARDIOGRAM TRACING: CPT

## 2023-01-01 PROCEDURE — 85027 COMPLETE CBC AUTOMATED: CPT | Performed by: EMERGENCY MEDICINE

## 2023-01-01 PROCEDURE — 83930 ASSAY OF BLOOD OSMOLALITY: CPT | Performed by: SOCIAL WORKER

## 2023-01-01 PROCEDURE — 84295 ASSAY OF SERUM SODIUM: CPT | Performed by: INTERNAL MEDICINE

## 2023-01-01 PROCEDURE — 93242 EXT ECG>48HR<7D RECORDING: CPT

## 2023-01-01 PROCEDURE — 62321 NJX INTERLAMINAR CRV/THRC: CPT | Performed by: PAIN MEDICINE

## 2023-01-01 PROCEDURE — 87086 URINE CULTURE/COLONY COUNT: CPT | Mod: ORL | Performed by: NURSE PRACTITIONER

## 2023-01-01 PROCEDURE — 99214 OFFICE O/P EST MOD 30 MIN: CPT | Mod: 25 | Performed by: INTERNAL MEDICINE

## 2023-01-01 RX ORDER — SODIUM CHLORIDE 9 MG/ML
INJECTION, SOLUTION INTRAVENOUS CONTINUOUS
Status: DISCONTINUED | OUTPATIENT
Start: 2023-01-01 | End: 2023-01-01

## 2023-01-01 RX ORDER — FOLIC ACID 1 MG/1
TABLET ORAL
Qty: 30 TABLET | Refills: 0 | Status: SHIPPED | OUTPATIENT
Start: 2023-01-01

## 2023-01-01 RX ORDER — CARVEDILOL 3.12 MG/1
3.12 TABLET ORAL 2 TIMES DAILY WITH MEALS
Status: DISCONTINUED | OUTPATIENT
Start: 2023-01-01 | End: 2023-01-01

## 2023-01-01 RX ORDER — CITALOPRAM HYDROBROMIDE 20 MG/1
20 TABLET ORAL EVERY EVENING
Status: DISCONTINUED | OUTPATIENT
Start: 2023-01-01 | End: 2023-01-01 | Stop reason: HOSPADM

## 2023-01-01 RX ORDER — LOSARTAN POTASSIUM 100 MG/1
100 TABLET ORAL DAILY
Status: DISCONTINUED | OUTPATIENT
Start: 2023-01-01 | End: 2023-01-01 | Stop reason: HOSPADM

## 2023-01-01 RX ORDER — HYDROCHLOROTHIAZIDE 25 MG/1
25 TABLET ORAL DAILY
Qty: 90 TABLET | Refills: 1 | Status: ON HOLD | OUTPATIENT
Start: 2023-01-01 | End: 2023-01-01

## 2023-01-01 RX ORDER — OXYBUTYNIN CHLORIDE 5 MG/1
5 TABLET ORAL 2 TIMES DAILY
Status: DISCONTINUED | OUTPATIENT
Start: 2023-01-01 | End: 2023-01-01 | Stop reason: HOSPADM

## 2023-01-01 RX ORDER — LOSARTAN POTASSIUM 100 MG/1
100 TABLET ORAL DAILY
Qty: 90 TABLET | Refills: 1 | Status: SHIPPED | OUTPATIENT
Start: 2023-01-01 | End: 2024-01-01

## 2023-01-01 RX ORDER — HYDROCHLOROTHIAZIDE 25 MG/1
25 TABLET ORAL DAILY
Qty: 30 TABLET | Refills: 0 | Status: SHIPPED | OUTPATIENT
Start: 2023-01-01 | End: 2023-01-01

## 2023-01-01 RX ORDER — ACETAMINOPHEN 325 MG/1
650 TABLET ORAL EVERY 4 HOURS PRN
COMMUNITY
End: 2024-01-01

## 2023-01-01 RX ORDER — ASPIRIN 81 MG/1
TABLET, DELAYED RELEASE ORAL
Qty: 30 TABLET | Refills: 0 | Status: SHIPPED | OUTPATIENT
Start: 2023-01-01 | End: 2024-01-01

## 2023-01-01 RX ORDER — LIDOCAINE HYDROCHLORIDE 10 MG/ML
INJECTION, SOLUTION EPIDURAL; INFILTRATION; INTRACAUDAL; PERINEURAL
Status: COMPLETED | OUTPATIENT
Start: 2023-01-01 | End: 2023-01-01

## 2023-01-01 RX ORDER — HYDROCHLOROTHIAZIDE 25 MG/1
25 TABLET ORAL DAILY
Qty: 90 TABLET | Refills: 1 | Status: SHIPPED | OUTPATIENT
Start: 2023-01-01 | End: 2023-01-01

## 2023-01-01 RX ORDER — MULTIVIT-MIN/FA/LYCOPEN/LUTEIN .4-300-25
TABLET ORAL
Qty: 30 TABLET | Refills: 0 | Status: SHIPPED | OUTPATIENT
Start: 2023-01-01

## 2023-01-01 RX ORDER — GABAPENTIN 100 MG/1
100 CAPSULE ORAL AT BEDTIME
Qty: 30 CAPSULE | Refills: 0 | Status: ON HOLD | OUTPATIENT
Start: 2023-01-01 | End: 2023-01-01

## 2023-01-01 RX ORDER — CARBOXYMETHYLCELLULOSE SODIUM 5 MG/ML
1 SOLUTION/ DROPS OPHTHALMIC 2 TIMES DAILY
Status: DISCONTINUED | OUTPATIENT
Start: 2023-01-01 | End: 2023-01-01 | Stop reason: HOSPADM

## 2023-01-01 RX ORDER — ASPIRIN 81 MG/1
81 TABLET ORAL DAILY
Status: DISCONTINUED | OUTPATIENT
Start: 2023-01-01 | End: 2023-01-01 | Stop reason: HOSPADM

## 2023-01-01 RX ORDER — POLYETHYLENE GLYCOL 3350 17 G/17G
17 POWDER, FOR SOLUTION ORAL EVERY OTHER DAY
Status: DISCONTINUED | OUTPATIENT
Start: 2023-01-01 | End: 2023-01-01 | Stop reason: HOSPADM

## 2023-01-01 RX ORDER — PANTOPRAZOLE SODIUM 40 MG/1
40 TABLET, DELAYED RELEASE ORAL
Status: DISCONTINUED | OUTPATIENT
Start: 2023-01-01 | End: 2023-01-01 | Stop reason: HOSPADM

## 2023-01-01 RX ORDER — AMOXICILLIN 250 MG
2 CAPSULE ORAL 2 TIMES DAILY PRN
Status: DISCONTINUED | OUTPATIENT
Start: 2023-01-01 | End: 2023-01-01 | Stop reason: HOSPADM

## 2023-01-01 RX ORDER — CEFTRIAXONE 1 G/1
1 INJECTION, POWDER, FOR SOLUTION INTRAMUSCULAR; INTRAVENOUS EVERY 24 HOURS
Status: DISCONTINUED | OUTPATIENT
Start: 2023-01-01 | End: 2023-01-01

## 2023-01-01 RX ORDER — CEPHALEXIN 500 MG/1
500 CAPSULE ORAL 4 TIMES DAILY
Qty: 20 CAPSULE | Refills: 0 | Status: SHIPPED | OUTPATIENT
Start: 2023-01-01 | End: 2023-01-01

## 2023-01-01 RX ORDER — SODIUM CHLORIDE 1 G/1
1 TABLET ORAL
Status: DISCONTINUED | OUTPATIENT
Start: 2023-01-01 | End: 2023-01-01 | Stop reason: HOSPADM

## 2023-01-01 RX ORDER — POLYETHYLENE GLYCOL 3350 17 G/17G
17 POWDER, FOR SOLUTION ORAL
Status: DISCONTINUED | OUTPATIENT
Start: 2023-01-01 | End: 2023-01-01

## 2023-01-01 RX ORDER — TRAZODONE HYDROCHLORIDE 50 MG/1
25 TABLET, FILM COATED ORAL AT BEDTIME
COMMUNITY
Start: 2023-01-01 | End: 2024-01-01

## 2023-01-01 RX ORDER — ONDANSETRON 4 MG/1
4 TABLET, ORALLY DISINTEGRATING ORAL EVERY 6 HOURS PRN
Status: DISCONTINUED | OUTPATIENT
Start: 2023-01-01 | End: 2023-01-01 | Stop reason: HOSPADM

## 2023-01-01 RX ORDER — B12/LEVOMEFOLATE CALCIUM/B-6 2-1.13-25
TABLET ORAL DAILY
Status: DISCONTINUED | OUTPATIENT
Start: 2023-01-01 | End: 2023-01-01 | Stop reason: HOSPADM

## 2023-01-01 RX ORDER — ACETAMINOPHEN 650 MG/1
650 SUPPOSITORY RECTAL EVERY 4 HOURS PRN
Status: DISCONTINUED | OUTPATIENT
Start: 2023-01-01 | End: 2023-01-01 | Stop reason: HOSPADM

## 2023-01-01 RX ORDER — AMOXICILLIN 250 MG
1 CAPSULE ORAL 2 TIMES DAILY PRN
Status: DISCONTINUED | OUTPATIENT
Start: 2023-01-01 | End: 2023-01-01 | Stop reason: HOSPADM

## 2023-01-01 RX ORDER — SULFAMETHOXAZOLE/TRIMETHOPRIM 800-160 MG
1 TABLET ORAL DAILY
Status: DISCONTINUED | OUTPATIENT
Start: 2023-01-01 | End: 2023-01-01

## 2023-01-01 RX ORDER — TRIAMCINOLONE ACETONIDE 1 MG/G
CREAM TOPICAL 2 TIMES DAILY
Qty: 30 G | Refills: 0 | Status: SHIPPED | OUTPATIENT
Start: 2023-01-01 | End: 2023-01-01

## 2023-01-01 RX ORDER — OLOPATADINE HYDROCHLORIDE 1 MG/ML
1 SOLUTION/ DROPS OPHTHALMIC DAILY
Status: DISCONTINUED | OUTPATIENT
Start: 2023-01-01 | End: 2023-01-01 | Stop reason: HOSPADM

## 2023-01-01 RX ORDER — ONDANSETRON 2 MG/ML
4 INJECTION INTRAMUSCULAR; INTRAVENOUS EVERY 6 HOURS PRN
Status: DISCONTINUED | OUTPATIENT
Start: 2023-01-01 | End: 2023-01-01 | Stop reason: HOSPADM

## 2023-01-01 RX ORDER — GABAPENTIN 100 MG/1
100 CAPSULE ORAL AT BEDTIME
Status: DISCONTINUED | OUTPATIENT
Start: 2023-01-01 | End: 2023-01-01 | Stop reason: HOSPADM

## 2023-01-01 RX ORDER — OMEPRAZOLE 40 MG/1
CAPSULE, DELAYED RELEASE ORAL
Qty: 30 CAPSULE | Refills: 0 | Status: SHIPPED | OUTPATIENT
Start: 2023-01-01

## 2023-01-01 RX ORDER — GABAPENTIN 100 MG/1
100 CAPSULE ORAL AT BEDTIME
Qty: 30 CAPSULE | Refills: 0 | Status: SHIPPED | OUTPATIENT
Start: 2023-01-01 | End: 2023-01-01

## 2023-01-01 RX ORDER — SULFAMETHOXAZOLE/TRIMETHOPRIM 800-160 MG
1 TABLET ORAL 2 TIMES DAILY
Qty: 14 TABLET | Refills: 0 | Status: ON HOLD | OUTPATIENT
Start: 2023-01-01 | End: 2023-01-01

## 2023-01-01 RX ORDER — CALCIUM CARBONATE 500(1250)
500 TABLET ORAL EVERY EVENING
Status: DISCONTINUED | OUTPATIENT
Start: 2023-01-01 | End: 2023-01-01 | Stop reason: HOSPADM

## 2023-01-01 RX ORDER — METOPROLOL TARTRATE 25 MG/1
25 TABLET, FILM COATED ORAL 2 TIMES DAILY
Qty: 60 TABLET | Refills: 1 | Status: SHIPPED | OUTPATIENT
Start: 2023-01-01 | End: 2024-01-01

## 2023-01-01 RX ORDER — FUROSEMIDE 20 MG
30 TABLET ORAL DAILY
Qty: 135 TABLET | Refills: 3 | Status: SHIPPED | OUTPATIENT
Start: 2023-01-01 | End: 2024-01-01

## 2023-01-01 RX ORDER — CEFTRIAXONE 1 G/1
1 INJECTION, POWDER, FOR SOLUTION INTRAMUSCULAR; INTRAVENOUS ONCE
Status: COMPLETED | OUTPATIENT
Start: 2023-01-01 | End: 2023-01-01

## 2023-01-01 RX ORDER — CITALOPRAM HYDROBROMIDE 20 MG/1
20 TABLET ORAL EVERY EVENING
Status: ON HOLD | COMMUNITY
End: 2023-01-01

## 2023-01-01 RX ORDER — OLOPATADINE HYDROCHLORIDE 2 MG/ML
1 SOLUTION/ DROPS OPHTHALMIC DAILY
Status: DISCONTINUED | OUTPATIENT
Start: 2023-01-01 | End: 2023-01-01

## 2023-01-01 RX ORDER — CITALOPRAM HYDROBROMIDE 10 MG/1
20 TABLET ORAL EVERY EVENING
Qty: 180 TABLET | Refills: 1 | Status: SHIPPED | OUTPATIENT
Start: 2023-01-01 | End: 2023-01-01

## 2023-01-01 RX ORDER — ACETAMINOPHEN 325 MG/1
650 TABLET ORAL EVERY 4 HOURS PRN
Status: DISCONTINUED | OUTPATIENT
Start: 2023-01-01 | End: 2023-01-01 | Stop reason: HOSPADM

## 2023-01-01 RX ORDER — FOLIC ACID 1 MG/1
1 TABLET ORAL DAILY
Status: DISCONTINUED | OUTPATIENT
Start: 2023-01-01 | End: 2023-01-01 | Stop reason: HOSPADM

## 2023-01-01 RX ORDER — DEXAMETHASONE SODIUM PHOSPHATE 10 MG/ML
INJECTION, SOLUTION INTRAMUSCULAR; INTRAVENOUS
Status: COMPLETED | OUTPATIENT
Start: 2023-01-01 | End: 2023-01-01

## 2023-01-01 RX ORDER — PSEUDOEPHED/ACETAMINOPH/DIPHEN 30MG-500MG
TABLET ORAL
Qty: 60 TABLET | Refills: 0 | Status: SHIPPED | OUTPATIENT
Start: 2023-01-01 | End: 2024-01-01

## 2023-01-01 RX ORDER — CALCIUM CARBONATE 500(1250)
TABLET ORAL
Qty: 60 TABLET | Refills: 0 | Status: SHIPPED | OUTPATIENT
Start: 2023-01-01

## 2023-01-01 RX ORDER — TRAZODONE HYDROCHLORIDE 50 MG/1
50 TABLET, FILM COATED ORAL AT BEDTIME
Status: DISCONTINUED | OUTPATIENT
Start: 2023-01-01 | End: 2023-01-01

## 2023-01-01 RX ADMIN — SODIUM CHLORIDE: 900 INJECTION INTRAVENOUS at 04:26

## 2023-01-01 RX ADMIN — CITALOPRAM HYDROBROMIDE 20 MG: 20 TABLET, FILM COATED ORAL at 20:31

## 2023-01-01 RX ADMIN — Medication 125 MCG: at 08:30

## 2023-01-01 RX ADMIN — FOLIC ACID 1 MG: 1 TABLET ORAL at 08:30

## 2023-01-01 RX ADMIN — TRAZODONE HYDROCHLORIDE 25 MG: 50 TABLET ORAL at 20:28

## 2023-01-01 RX ADMIN — PANTOPRAZOLE SODIUM 40 MG: 40 TABLET, DELAYED RELEASE ORAL at 15:56

## 2023-01-01 RX ADMIN — PANTOPRAZOLE SODIUM 40 MG: 40 TABLET, DELAYED RELEASE ORAL at 09:40

## 2023-01-01 RX ADMIN — ACETAMINOPHEN 650 MG: 325 TABLET, FILM COATED ORAL at 20:30

## 2023-01-01 RX ADMIN — ACETAMINOPHEN 650 MG: 325 TABLET, FILM COATED ORAL at 14:04

## 2023-01-01 RX ADMIN — ASPIRIN 81 MG: 81 TABLET, COATED ORAL at 09:40

## 2023-01-01 RX ADMIN — Medication 1 G: at 08:30

## 2023-01-01 RX ADMIN — Medication 1 G: at 17:57

## 2023-01-01 RX ADMIN — SODIUM CHLORIDE: 900 INJECTION INTRAVENOUS at 05:25

## 2023-01-01 RX ADMIN — CARBOXYMETHYLCELLULOSE SODIUM 1 DROP: 5 SOLUTION/ DROPS OPHTHALMIC at 08:30

## 2023-01-01 RX ADMIN — CEFTRIAXONE 1 G: 1 INJECTION, POWDER, FOR SOLUTION INTRAMUSCULAR; INTRAVENOUS at 10:57

## 2023-01-01 RX ADMIN — CEFTRIAXONE 1 G: 1 INJECTION, POWDER, FOR SOLUTION INTRAMUSCULAR; INTRAVENOUS at 15:56

## 2023-01-01 RX ADMIN — POLYETHYLENE GLYCOL 3350 17 G: 17 POWDER, FOR SOLUTION ORAL at 13:35

## 2023-01-01 RX ADMIN — Medication 1 G: at 08:43

## 2023-01-01 RX ADMIN — GABAPENTIN 100 MG: 100 CAPSULE ORAL at 21:58

## 2023-01-01 RX ADMIN — ACETAMINOPHEN 650 MG: 325 TABLET, FILM COATED ORAL at 11:10

## 2023-01-01 RX ADMIN — CARVEDILOL 3.12 MG: 3.12 TABLET, FILM COATED ORAL at 09:41

## 2023-01-01 RX ADMIN — TRAZODONE HYDROCHLORIDE 50 MG: 50 TABLET ORAL at 00:19

## 2023-01-01 RX ADMIN — CLOSTRIDIUM TETANI TOXOID ANTIGEN (FORMALDEHYDE INACTIVATED) AND CORYNEBACTERIUM DIPHTHERIAE TOXOID ANTIGEN (FORMALDEHYDE INACTIVATED) 0.5 ML: 5; 2 INJECTION, SUSPENSION INTRAMUSCULAR at 09:47

## 2023-01-01 RX ADMIN — CARBOXYMETHYLCELLULOSE SODIUM 1 DROP: 5 SOLUTION/ DROPS OPHTHALMIC at 20:31

## 2023-01-01 RX ADMIN — CARVEDILOL 3.12 MG: 3.12 TABLET, FILM COATED ORAL at 08:29

## 2023-01-01 RX ADMIN — DEXAMETHASONE SODIUM PHOSPHATE 10 MG: 10 INJECTION, SOLUTION INTRAMUSCULAR; INTRAVENOUS at 08:55

## 2023-01-01 RX ADMIN — METOPROLOL TARTRATE 12.5 MG: 25 TABLET, FILM COATED ORAL at 11:00

## 2023-01-01 RX ADMIN — PANTOPRAZOLE SODIUM 40 MG: 40 TABLET, DELAYED RELEASE ORAL at 08:29

## 2023-01-01 RX ADMIN — LOSARTAN POTASSIUM 100 MG: 100 TABLET, FILM COATED ORAL at 09:07

## 2023-01-01 RX ADMIN — CALCIUM 500 MG: 500 TABLET ORAL at 20:31

## 2023-01-01 RX ADMIN — OXYBUTYNIN CHLORIDE 5 MG: 5 TABLET ORAL at 09:41

## 2023-01-01 RX ADMIN — Medication 1 G: at 11:58

## 2023-01-01 RX ADMIN — Medication 1 G: at 13:35

## 2023-01-01 RX ADMIN — OLOPATADINE HYDROCHLORIDE 1 DROP: 1 SOLUTION OPHTHALMIC at 10:29

## 2023-01-01 RX ADMIN — SULFAMETHOXAZOLE AND TRIMETHOPRIM 1 TABLET: 800; 160 TABLET ORAL at 13:36

## 2023-01-01 RX ADMIN — SODIUM CHLORIDE: 900 INJECTION INTRAVENOUS at 15:57

## 2023-01-01 RX ADMIN — LOSARTAN POTASSIUM 100 MG: 100 TABLET, FILM COATED ORAL at 10:28

## 2023-01-01 RX ADMIN — SODIUM CHLORIDE 500 ML: 9 INJECTION, SOLUTION INTRAVENOUS at 00:25

## 2023-01-01 RX ADMIN — Medication 3 MG: at 02:35

## 2023-01-01 RX ADMIN — CARBOXYMETHYLCELLULOSE SODIUM 1 DROP: 5 SOLUTION/ DROPS OPHTHALMIC at 08:43

## 2023-01-01 RX ADMIN — LIDOCAINE HYDROCHLORIDE 1 ML: 10 INJECTION, SOLUTION EPIDURAL; INFILTRATION; INTRACAUDAL; PERINEURAL at 08:55

## 2023-01-01 RX ADMIN — ASPIRIN 81 MG: 81 TABLET, COATED ORAL at 08:30

## 2023-01-01 RX ADMIN — OXYBUTYNIN CHLORIDE 5 MG: 5 TABLET ORAL at 08:29

## 2023-01-01 RX ADMIN — CARVEDILOL 3.12 MG: 3.12 TABLET, FILM COATED ORAL at 17:57

## 2023-01-01 RX ADMIN — OXYBUTYNIN CHLORIDE 5 MG: 5 TABLET ORAL at 20:31

## 2023-01-01 ASSESSMENT — ENCOUNTER SYMPTOMS
GASTROINTESTINAL NEGATIVE: 1
HEADACHES: 0
SLEEP DISTURBANCE: 1
CARDIOVASCULAR NEGATIVE: 1
ARTHRALGIAS: 0
JOINT SWELLING: 0
PALPITATIONS: 0
SORE THROAT: 0
FREQUENCY: 0
ABDOMINAL PAIN: 0
DYSURIA: 1
ARTHRALGIAS: 1
NAUSEA: 0
WOUND: 1
CONSTITUTIONAL NEGATIVE: 1
NERVOUS/ANXIOUS: 0
RESPIRATORY NEGATIVE: 1
CONSTIPATION: 1
BREAST MASS: 0
HEMATURIA: 0
SHORTNESS OF BREATH: 0
DIARRHEA: 0
FEVER: 0
MYALGIAS: 1
DIZZINESS: 0
EYE PAIN: 0
HEMATOCHEZIA: 0
COUGH: 1
NECK PAIN: 0
CHILLS: 0
PARESTHESIAS: 0
WEAKNESS: 0
NECK PAIN: 1
HEARTBURN: 0

## 2023-01-01 ASSESSMENT — ACTIVITIES OF DAILY LIVING (ADL)
ADLS_ACUITY_SCORE: 37
CURRENT_FUNCTION: PREPARING MEALS REQUIRES ASSISTANCE
ADLS_ACUITY_SCORE: 35
ADLS_ACUITY_SCORE: 28
CURRENT_FUNCTION: HOUSEWORK REQUIRES ASSISTANCE
CURRENT_FUNCTION: MEDICATION ADMINISTRATION REQUIRES ASSISTANCE
ADLS_ACUITY_SCORE: 28
ADLS_ACUITY_SCORE: 32
ADLS_ACUITY_SCORE: 32
ADLS_ACUITY_SCORE: 28
ADLS_ACUITY_SCORE: 28
ADLS_ACUITY_SCORE: 35
ADLS_ACUITY_SCORE: 34
ADLS_ACUITY_SCORE: 35
ADLS_ACUITY_SCORE: 28
CURRENT_FUNCTION: MONEY MANAGEMENT REQUIRES ASSISTANCE
ADLS_ACUITY_SCORE: 35
ADLS_ACUITY_SCORE: 32
CURRENT_FUNCTION: SHOPPING REQUIRES ASSISTANCE
CURRENT_FUNCTION: TRANSPORTATION REQUIRES ASSISTANCE
ADLS_ACUITY_SCORE: 32
ADLS_ACUITY_SCORE: 32
CURRENT_FUNCTION: BATHING REQUIRES ASSISTANCE
ADLS_ACUITY_SCORE: 32
ADLS_ACUITY_SCORE: 35
CURRENT_FUNCTION: LAUNDRY REQUIRES ASSISTANCE
ADLS_ACUITY_SCORE: 35
ADLS_ACUITY_SCORE: 32
ADLS_ACUITY_SCORE: 35

## 2023-01-01 ASSESSMENT — PAIN SCALES - GENERAL
PAINLEVEL: EXTREME PAIN (8)
PAINLEVEL: EXTREME PAIN (8)
PAINLEVEL: SEVERE PAIN (7)
PAINLEVEL: EXTREME PAIN (8)
PAINLEVEL: SEVERE PAIN (6)

## 2023-01-01 ASSESSMENT — PATIENT HEALTH QUESTIONNAIRE - PHQ9
SUM OF ALL RESPONSES TO PHQ QUESTIONS 1-9: 5
10. IF YOU CHECKED OFF ANY PROBLEMS, HOW DIFFICULT HAVE THESE PROBLEMS MADE IT FOR YOU TO DO YOUR WORK, TAKE CARE OF THINGS AT HOME, OR GET ALONG WITH OTHER PEOPLE: SOMEWHAT DIFFICULT
SUM OF ALL RESPONSES TO PHQ QUESTIONS 1-9: 5

## 2023-01-04 NOTE — ED NOTES
[facility]  ED Nurse Handoff Report    Jade Caba is a 85 year old female   ED Chief complaint: Chest Pain  . ED Diagnosis:   Final diagnoses:   Chest pain, unspecified type   Shortness of breath   Nausea   Diaphoresis     Allergies:   Allergies   Allergen Reactions     No Known Allergies        Code Status: Full Code  Activity level - Baseline/Home:  Independent. Activity Level - Current:   Stand by Assist. Lift room needed: No. Bariatric: No   Needed: No   Isolation: No. Infection: Not Applicable.     Vital Signs:   Vitals:    07/18/19 1335 07/18/19 1337 07/18/19 1340 07/18/19 1430   BP: (!) 88/51 95/47 111/54    Pulse: 75 76 72    Resp: 15 19 27 23   Temp:       TempSrc:       SpO2: 95% 95% 96% 98%   Weight:       Height:           Cardiac Rhythm:  ,   Cardiac  Cardiac Rhythm: Normal sinus rhythm  Pain level: 0-10 Pain Scale: 3  Patient confused: No. Patient Falls Risk: Yes.   Elimination Status: Has voided   Patient Report - Initial Complaint: chest pain. Focused Assessment:    Pt BIBA from home with c/o chest pain that has been present for four days.  States the pain was a 9/10 consistently for the four days.  EMS administered 324mg of ASA and 1 sublingual nitroglycerin.  Pt now rates the pain at 0/10 other that some minor tightness at the base of her neck.  Pt denies SOB.  Pt states she had been taking tylenol at home with no relief in pain.  Pain radiated from chest in to neck and shoulders.  Also reports occasional dizziness.     Pt's pain returned in ED and improved after nitroglycerin tablet. Pt dropped BP to 88 systolic after second nitroglycerin dose in ED, but they returned to normal after a few minutes. Pt placed on O2 in ED as O2 sats dropped to 88% on RA. Pt does not usually wear O2.   Tests Performed:   XR Chest 2 Views   Preliminary Result   IMPRESSION: Mild anterior compression of a lower thoracic vertebral   body is unchanged. Aortic calcification. Chest otherwise  negative.   Lungs clear. No pleural effusions. Heart size and pulmonary   vascularity are within normal limits.        Labs Ordered and Resulted from Time of ED Arrival Up to the Time of Departure from the ED   BASIC METABOLIC PANEL - Abnormal; Notable for the following components:       Result Value    Glucose 110 (*)     GFR Estimate 49 (*)     GFR Estimate If Black 56 (*)     All other components within normal limits   CBC WITH PLATELETS DIFFERENTIAL - Abnormal; Notable for the following components:    WBC 14.7 (*)     Absolute Neutrophil 11.0 (*)     All other components within normal limits   TROPONIN I   NT PROBNP INPATIENT      Treatments provided: monitoring  Family Comments: daughter present  OBS brochure/video discussed/provided to patient:  Yes  ED Medications:   Medications   nitroGLYcerin (NITROSTAT) sublingual tablet 0.4 mg (0.4 mg Sublingual Given 7/18/19 1329)     Drips infusing:  No  For the majority of the shift, the patient's behavior Green. Interventions performed were rounding.     Severe Sepsis OR Septic Shock Diagnosis Present: No      ED Nurse Name/Phone Number: Barbara Santillan,   3:04 PM.  RECEIVING UNIT ED HANDOFF REVIEW    Above ED Nurse Handoff Report was reviewed: {yes  Reviewed by: Lynnette Anderson on July 18, 2019 at 4:08 PM      Opt out

## 2023-02-28 ENCOUNTER — TELEPHONE (OUTPATIENT)
Dept: CARDIOLOGY | Facility: CLINIC | Age: 88
End: 2023-02-28
Payer: COMMERCIAL

## 2023-02-28 NOTE — TELEPHONE ENCOUNTER
[facility] Call Center    Phone Message    May a detailed message be left on voicemail: yes     Reason for Call: Other: Pt's daughter would like to know if upcoming labs are fasting labs or not, pleae reach out to [phone] to discuss.      Action Taken: Other: Cardiology    Travel Screening: Not Applicable     Thank you!  [facility]

## 2023-02-28 NOTE — TELEPHONE ENCOUNTER
Call returned and advised that these labs would be fasting.  Mariela Mitchell RN on 2/28/2023 at 10:15 AM

## 2023-03-07 ASSESSMENT — ENCOUNTER SYMPTOMS
SORE THROAT: 0
JOINT SWELLING: 1
HEADACHES: 0
DIZZINESS: 0
DYSURIA: 0
EYE PAIN: 0
ARTHRALGIAS: 0
ABDOMINAL PAIN: 0
CHILLS: 0
NERVOUS/ANXIOUS: 1
MYALGIAS: 1
HEMATURIA: 0
PALPITATIONS: 0
FEVER: 0
CONSTIPATION: 0
NAUSEA: 0
COUGH: 0
DIARRHEA: 0
SHORTNESS OF BREATH: 0
WEAKNESS: 0
FREQUENCY: 1
PARESTHESIAS: 0
HEARTBURN: 0
HEMATOCHEZIA: 0
BREAST MASS: 0

## 2023-03-07 ASSESSMENT — ACTIVITIES OF DAILY LIVING (ADL)
CURRENT_FUNCTION: BATHING REQUIRES ASSISTANCE
CURRENT_FUNCTION: LAUNDRY REQUIRES ASSISTANCE
CURRENT_FUNCTION: HOUSEWORK REQUIRES ASSISTANCE
CURRENT_FUNCTION: SHOPPING REQUIRES ASSISTANCE
CURRENT_FUNCTION: MONEY MANAGEMENT REQUIRES ASSISTANCE
CURRENT_FUNCTION: MEDICATION ADMINISTRATION REQUIRES ASSISTANCE
CURRENT_FUNCTION: PREPARING MEALS REQUIRES ASSISTANCE
CURRENT_FUNCTION: TRANSPORTATION REQUIRES ASSISTANCE

## 2023-03-08 ENCOUNTER — OFFICE VISIT (OUTPATIENT)
Dept: INTERNAL MEDICINE | Facility: CLINIC | Age: 88
End: 2023-03-08
Payer: COMMERCIAL

## 2023-03-08 VITALS
WEIGHT: 139 LBS | BODY MASS INDEX: 26.24 KG/M2 | HEART RATE: 66 BPM | RESPIRATION RATE: 18 BRPM | HEIGHT: 61 IN | SYSTOLIC BLOOD PRESSURE: 189 MMHG | DIASTOLIC BLOOD PRESSURE: 81 MMHG | OXYGEN SATURATION: 99 % | TEMPERATURE: 97.5 F

## 2023-03-08 DIAGNOSIS — I48.92 ATRIAL FLUTTER, UNSPECIFIED TYPE (H): ICD-10-CM

## 2023-03-08 DIAGNOSIS — N18.30 STAGE 3 CHRONIC KIDNEY DISEASE, UNSPECIFIED WHETHER STAGE 3A OR 3B CKD (H): ICD-10-CM

## 2023-03-08 DIAGNOSIS — I10 PRIMARY HYPERTENSION: Primary | ICD-10-CM

## 2023-03-08 PROBLEM — I24.9 ACUTE CORONARY SYNDROME (H): Status: RESOLVED | Noted: 2019-07-18 | Resolved: 2023-03-08

## 2023-03-08 PROBLEM — I24.9 ACS (ACUTE CORONARY SYNDROME) (H): Status: RESOLVED | Noted: 2018-01-31 | Resolved: 2023-03-08

## 2023-03-08 PROBLEM — R79.89 ELEVATED TROPONIN: Status: RESOLVED | Noted: 2022-09-05 | Resolved: 2023-03-08

## 2023-03-08 PROBLEM — J18.9 PNEUMONIA: Status: RESOLVED | Noted: 2018-03-12 | Resolved: 2023-03-08

## 2023-03-08 PROBLEM — J69.0 ASPIRATION PNEUMONITIS (H): Status: RESOLVED | Noted: 2018-03-14 | Resolved: 2023-03-08

## 2023-03-08 PROBLEM — R07.9 CHEST PAIN: Status: RESOLVED | Noted: 2020-03-02 | Resolved: 2023-03-08

## 2023-03-08 PROCEDURE — 99204 OFFICE O/P NEW MOD 45 MIN: CPT | Performed by: INTERNAL MEDICINE

## 2023-03-08 RX ORDER — HYDROCHLOROTHIAZIDE 12.5 MG/1
12.5 TABLET ORAL DAILY
Qty: 30 TABLET | Refills: 0 | Status: SHIPPED | OUTPATIENT
Start: 2023-03-08 | End: 2023-03-08

## 2023-03-08 RX ORDER — HYDROCHLOROTHIAZIDE 12.5 MG/1
12.5 TABLET ORAL DAILY
Qty: 30 TABLET | Refills: 0 | Status: SHIPPED | OUTPATIENT
Start: 2023-03-08 | End: 2023-01-01

## 2023-03-08 ASSESSMENT — ACTIVITIES OF DAILY LIVING (ADL)
CURRENT_FUNCTION: MONEY MANAGEMENT REQUIRES ASSISTANCE
CURRENT_FUNCTION: LAUNDRY REQUIRES ASSISTANCE
CURRENT_FUNCTION: BATHING REQUIRES ASSISTANCE
CURRENT_FUNCTION: PREPARING MEALS REQUIRES ASSISTANCE
CURRENT_FUNCTION: MEDICATION ADMINISTRATION REQUIRES ASSISTANCE
CURRENT_FUNCTION: TRANSPORTATION REQUIRES ASSISTANCE
CURRENT_FUNCTION: SHOPPING REQUIRES ASSISTANCE
CURRENT_FUNCTION: HOUSEWORK REQUIRES ASSISTANCE

## 2023-03-08 ASSESSMENT — ENCOUNTER SYMPTOMS
CHILLS: 0
SHORTNESS OF BREATH: 0
JOINT SWELLING: 1
CONSTIPATION: 0
DIARRHEA: 0
PARESTHESIAS: 0
MYALGIAS: 1
ARTHRALGIAS: 0
HEARTBURN: 0
EYE PAIN: 0
BREAST MASS: 0
HEMATOCHEZIA: 0
NERVOUS/ANXIOUS: 1
DIZZINESS: 0
FREQUENCY: 1
PALPITATIONS: 0
WEAKNESS: 0
ABDOMINAL PAIN: 0
HEMATURIA: 0
SORE THROAT: 0
DYSURIA: 0
FEVER: 0
HEADACHES: 0
NAUSEA: 0
COUGH: 0

## 2023-03-08 ASSESSMENT — PATIENT HEALTH QUESTIONNAIRE - PHQ9
10. IF YOU CHECKED OFF ANY PROBLEMS, HOW DIFFICULT HAVE THESE PROBLEMS MADE IT FOR YOU TO DO YOUR WORK, TAKE CARE OF THINGS AT HOME, OR GET ALONG WITH OTHER PEOPLE: NOT DIFFICULT AT ALL
SUM OF ALL RESPONSES TO PHQ QUESTIONS 1-9: 4
SUM OF ALL RESPONSES TO PHQ QUESTIONS 1-9: 4

## 2023-03-08 NOTE — PROGRESS NOTES
"  Assessment & Plan     CKD (chronic kidney disease) stage 3, GFR 30-59 ml/min (H)  We will continue to monitor kidney function with routine blood and urine test.    Primary hypertension  At this time, anticipate the patient blood pressure is not under good control.  After much discussion, she will continue her losartan 100 mg daily for management of her blood pressure.  We will also add hydrochlorothiazide 12.5 mg daily to her medication regimen to see if this does provide her with any improvement in her blood pressure control.  Side effects of each medication were discussed.  I did request that the assisted living facility staff check her blood pressure and document the readings every other day.  Patient will follow-up in approximately 3 weeks for repeat assessment of her blood pressure.  Her daughter is aware that they can contact the clinic should she have any issues tolerating her medication.  - hydrochlorothiazide (HYDRODIURIL) 12.5 MG tablet; Take 1 tablet (12.5 mg) by mouth daily    Atrial flutter, unspecified type (H)  We will continue Coreg 3.125 mg twice per day for ongoing management.      Prescription drug management  30 minutes spent on the date of the encounter doing chart review, history and exam, documentation and further activities per the note       BMI:   Estimated body mass index is 26.26 kg/m  as calculated from the following:    Height as of this encounter: 1.549 m (5' 1\").    Weight as of this encounter: 63 kg (139 lb).       See Patient Instructions    Return in about 3 weeks (around 3/29/2023) for Follow up HTN.    Sanford Canales MD  [facility] SG Hansen is a 89 year old, presenting for the following health issues:  Establish Care      WithoutPatient is an 80-year-old  female who presents to clinic as a new patient to establish care.  Her main concern today is in regards to her blood pressure.  Patient has had difficult to control blood " "pressure over the past several years.  He has been placed on several blood pressure medications 60s.  Patient was ultimately found to have renal artery stenosis, and she did have a renal stent placed several years ago.  Patient is currently taking losartan 100 mg daily and carvedilol 3.125 mg twice per day for management of her blood pressure and atrial flutter.  Patient reports that her systolic blood pressure readings tend to range between 140 and 180.  She does reside in an assisted living facility, and they do check her blood pressure a few times per week.  Patient is not currently reporting any issues with headaches, blurry vision, chest pain, shortness of breath.  Her blood pressure upon arrival to the clinic today was noted to be 189/81.  Her daughter is wondering what else can be done for her blood pressure at this time.    Healthy Habits:     In general, how would you rate your overall health?  Good    Frequency of exercise:  6-7 days/week    Duration of exercise:  15-30 minutes    Do you usually eat at least 4 servings of fruit and vegetables a day, include whole grains    & fiber and avoid regularly eating high fat or \"junk\" foods?  No    Taking medications regularly:  Yes    Medication side effects:  None    Ability to successfully perform activities of daily living:  Transportation requires assistance, shopping requires assistance, preparing meals requires assistance, housework requires assistance, bathing requires assistance, laundry requires assistance, medication administration requires assistance and money management requires assistance    Home Safety:  No safety concerns identified    Hearing Impairment:  Need to ask people to speak up or repeat themselves    In the past 6 months, have you been bothered by leaking of urine?  No    In general, how would you rate your overall mental or emotional health?  Fair      PHQ-2 Total Score: 0         Review of Systems   Constitutional: Negative for chills " "and fever.   HENT: Negative for congestion, ear pain, hearing loss and sore throat.    Eyes: Negative for pain and visual disturbance.   Respiratory: Negative for cough and shortness of breath.    Cardiovascular: Negative for chest pain, palpitations and peripheral edema.   Gastrointestinal: Negative for abdominal pain, constipation, diarrhea, heartburn, hematochezia and nausea.   Breasts:  Negative for tenderness, breast mass and discharge.   Genitourinary: Positive for frequency. Negative for dysuria, genital sores, hematuria, pelvic pain, urgency, vaginal bleeding and vaginal discharge.   Musculoskeletal: Positive for joint swelling and myalgias. Negative for arthralgias.   Skin: Negative for rash.   Neurological: Negative for dizziness, weakness, headaches and paresthesias.   Psychiatric/Behavioral: Negative for mood changes. The patient is nervous/anxious.           Objective    BP (!) 189/81   Pulse 66   Temp 97.5  F (36.4  C)   Resp 18   Ht 1.549 m (5' 1\")   Wt 63 kg (139 lb)   LMP  (LMP Unknown)   SpO2 99%   Breastfeeding No   BMI 26.26 kg/m    Body mass index is 26.26 kg/m .  Physical Exam  Vitals reviewed.   Constitutional:       Appearance: Normal appearance.   HENT:      Head: Normocephalic and atraumatic.      Mouth/Throat:      Mouth: Mucous membranes are moist.      Pharynx: Oropharynx is clear.   Eyes:      Extraocular Movements: Extraocular movements intact.      Conjunctiva/sclera: Conjunctivae normal.      Pupils: Pupils are equal, round, and reactive to light.   Cardiovascular:      Rate and Rhythm: Normal rate and regular rhythm.      Pulses: Normal pulses.      Heart sounds: Normal heart sounds.   Pulmonary:      Effort: Pulmonary effort is normal.      Breath sounds: Normal breath sounds.   Abdominal:      General: Bowel sounds are normal.      Palpations: Abdomen is soft.   Skin:     General: Skin is warm.      Capillary Refill: Capillary refill takes less than 2 seconds. "   Neurological:      General: No focal deficit present.      Mental Status: She is alert and oriented to person, place, and time.                Answers for HPI/ROS submitted by the patient on 3/8/2023  If you checked off any problems, how difficult have these problems made it for you to do your work, take care of things at home, or get along with other people?: Not difficult at all  PHQ9 TOTAL SCORE: 4

## 2023-03-10 ENCOUNTER — OFFICE VISIT (OUTPATIENT)
Dept: CARDIOLOGY | Facility: CLINIC | Age: 88
End: 2023-03-10
Attending: PHYSICIAN ASSISTANT
Payer: COMMERCIAL

## 2023-03-10 ENCOUNTER — LAB (OUTPATIENT)
Dept: LAB | Facility: CLINIC | Age: 88
End: 2023-03-10
Payer: COMMERCIAL

## 2023-03-10 VITALS
OXYGEN SATURATION: 95 % | BODY MASS INDEX: 26.22 KG/M2 | DIASTOLIC BLOOD PRESSURE: 78 MMHG | HEART RATE: 65 BPM | HEIGHT: 61 IN | WEIGHT: 138.9 LBS | SYSTOLIC BLOOD PRESSURE: 190 MMHG

## 2023-03-10 DIAGNOSIS — I25.10 CORONARY ATHEROSCLEROSIS: ICD-10-CM

## 2023-03-10 DIAGNOSIS — I50.32 CHRONIC DIASTOLIC CONGESTIVE HEART FAILURE (H): ICD-10-CM

## 2023-03-10 DIAGNOSIS — I10 ESSENTIAL HYPERTENSION: ICD-10-CM

## 2023-03-10 LAB
ALT SERPL W P-5'-P-CCNC: 14 U/L (ref 10–35)
ANION GAP SERPL CALCULATED.3IONS-SCNC: 9 MMOL/L (ref 7–15)
BUN SERPL-MCNC: 23.6 MG/DL (ref 8–23)
CALCIUM SERPL-MCNC: 9.7 MG/DL (ref 8.8–10.2)
CHLORIDE SERPL-SCNC: 97 MMOL/L (ref 98–107)
CHOLEST SERPL-MCNC: 236 MG/DL
CREAT SERPL-MCNC: 0.97 MG/DL (ref 0.51–0.95)
DEPRECATED HCO3 PLAS-SCNC: 31 MMOL/L (ref 22–29)
GFR SERPL CREATININE-BSD FRML MDRD: 56 ML/MIN/1.73M2
GLUCOSE SERPL-MCNC: 102 MG/DL (ref 70–99)
HDLC SERPL-MCNC: 49 MG/DL
LDLC SERPL CALC-MCNC: 166 MG/DL
NONHDLC SERPL-MCNC: 187 MG/DL
POTASSIUM SERPL-SCNC: 4.4 MMOL/L (ref 3.4–5.3)
SODIUM SERPL-SCNC: 137 MMOL/L (ref 136–145)
TRIGL SERPL-MCNC: 103 MG/DL

## 2023-03-10 PROCEDURE — 80061 LIPID PANEL: CPT | Performed by: PHYSICIAN ASSISTANT

## 2023-03-10 PROCEDURE — 84460 ALANINE AMINO (ALT) (SGPT): CPT | Performed by: PHYSICIAN ASSISTANT

## 2023-03-10 PROCEDURE — 36415 COLL VENOUS BLD VENIPUNCTURE: CPT | Performed by: PHYSICIAN ASSISTANT

## 2023-03-10 PROCEDURE — 99214 OFFICE O/P EST MOD 30 MIN: CPT | Performed by: INTERNAL MEDICINE

## 2023-03-10 PROCEDURE — 80048 BASIC METABOLIC PNL TOTAL CA: CPT | Performed by: PHYSICIAN ASSISTANT

## 2023-03-10 RX ORDER — FUROSEMIDE 20 MG
30 TABLET ORAL DAILY
Qty: 135 TABLET | Refills: 3 | Status: ON HOLD | OUTPATIENT
Start: 2023-03-10 | End: 2023-01-01

## 2023-03-10 RX ORDER — OLOPATADINE HYDROCHLORIDE 2 MG/ML
1 SOLUTION/ DROPS OPHTHALMIC DAILY
COMMUNITY
Start: 2023-01-28

## 2023-03-10 NOTE — PROGRESS NOTES
"Cardiology Progress Note          Assessment and Plan:       1. Coronary artery disease status post PCI, currently asymptomatic without worsening dyspnea on exertion or exertional chest discomfort    Continue current cardiovascular medications.      2. Hypertension, suboptimal control    Slight increase in her furosemide from 20 mg to 30 mg daily.    Patient has hold parameters for reducing her furosemide if her blood pressure gets too low.      30 minutes was spent with the patient, precharting and reviewing tests as well as post visit charting all done today..    This note was transcribed using electronic voice recognition software and there may be typographical errors present.                    Interval History:     The patient is a very pleasant 89 year old whom I have been following for coronary artery disease and hypertension.  She just had hydrochlorothiazide added to her medical regimen 2 days ago by her primary.  Her blood pressure is still elevated.  We discussed increasing her furosemide slightly in order to help a little bit more.    We also discussed how her wide pulse pressure suggests significant vascular stiffness which is consistent from what we know of her anatomy.  We also discussed how the blood pressure can drop precipitously if the volume status drops too low and that we have to be very cautious.                     Review of Systems:     Review of Systems:  Skin:  Negative     Eyes:  Positive for glasses  ENT:  Positive for hearing loss  Respiratory:  Negative    Cardiovascular:    Positive for;edema  Gastroenterology:      Genitourinary:  Positive for incontinence  Musculoskeletal:  Positive for neck pain  Neurologic:       Psychiatric:       Heme/Lymph/Imm:       Endocrine:                   Physical Exam:     Vitals: BP (!) 190/78 (BP Location: Right arm, Patient Position: Sitting, Cuff Size: Adult Regular)   Pulse 65   Ht 1.549 m (5' 1\")   Wt 63 kg (138 lb 14.4 oz)   LMP  (LMP " Unknown)   SpO2 95%   BMI 26.24 kg/m    Constitutional:  cooperative overweight elderly    Skin:  warm and dry to the touch        Head:  normocephalic        Chest:  normal symmetry        Cardiac: regular rhythm bradycardic     systolic ejection murmur;grade 1          Extremities and Back:           Neurological:  no gross motor deficits;affect appropriate                 Medications:     Current Outpatient Medications   Medication Sig Dispense Refill     Acetaminophen (TYLENOL PO) Take 650 mg by mouth every 6 hours as needed       acetaminophen (TYLENOL) 500 MG tablet Take 500 mg by mouth 2 times daily       alendronate (FOSAMAX) 70 MG tablet TAKE 1 TABLET (70 MG) BY MOUTH ONCE A WEEK IN THE MORNING. TAKE ON EMPTY STOMACH WITH FULL GLASS OF WATER. DO NOT LIE DOWN FOR 1 HR.       aspirin (ASA) 81 MG EC tablet Take 1 tablet (81 mg) by mouth daily 90 tablet 3     calcium carbonate 500 mg (elemental) (OSCAL) 500 MG tablet Take 2 tablets by mouth every evening       carboxymethylcellulose PF (REFRESH PLUS) 0.5 % ophthalmic solution Place 1 drop into both eyes 2 times daily       carvedilol (COREG) 3.125 MG tablet Take 1 tablet (3.125 mg) by mouth 2 times daily (with meals) 180 tablet 3     cholecalciferol (VITAMIN D3) 5000 UNITS CAPS capsule Take 5,000 Units by mouth daily       citalopram (CELEXA) 10 MG tablet Take 2 tablets (20 mg) by mouth every evening       folic acid (FOLVITE) 1 MG tablet Take 1 mg by mouth daily       furosemide (LASIX) 20 MG tablet Take 1.5 tablets (30 mg) by mouth daily 135 tablet 3     hydrochlorothiazide (HYDRODIURIL) 12.5 MG tablet Take 1 tablet (12.5 mg) by mouth daily 30 tablet 0     L-Methylfolate-B6-B12 (FOLBIC RF PO) Take 1 tablet by mouth daily       losartan (COZAAR) 100 MG tablet Take 100 mg by mouth daily       Multiple Vitamins-Minerals (CERTA-FREDERICK PO) Take 1 tablet by mouth daily       olopatadine (PATADAY) 0.2 % ophthalmic solution PLACE 1 DROP INTO BOTH EYES ONCE DAILY.        omeprazole (PRILOSEC) 40 MG capsule Take 1 capsule (40 mg) by mouth daily Take 30-60 minutes before a meal. 30 capsule 9     oxybutynin (DITROPAN) 5 MG tablet Take 5 mg by mouth 2 times daily       polyethylene glycol (MIRALAX/GLYCOLAX) powder Take 17 g by mouth nightly as needed                   Data:   All laboratory data reviewed  Results for orders placed or performed in visit on 03/10/23 (from the past 24 hour(s))   Basic metabolic panel   Result Value Ref Range    Sodium 137 136 - 145 mmol/L    Potassium 4.4 3.4 - 5.3 mmol/L    Chloride 97 (L) 98 - 107 mmol/L    Carbon Dioxide (CO2) 31 (H) 22 - 29 mmol/L    Anion Gap 9 7 - 15 mmol/L    Urea Nitrogen 23.6 (H) 8.0 - 23.0 mg/dL    Creatinine 0.97 (H) 0.51 - 0.95 mg/dL    Calcium 9.7 8.8 - 10.2 mg/dL    Glucose 102 (H) 70 - 99 mg/dL    GFR Estimate 56 (L) >60 mL/min/1.73m2   ALT   Result Value Ref Range    ALT 14 10 - 35 U/L       All laboratory data reviewed  Lab Results   Component Value Date    CHOL 177 07/09/2020    CHOL 177 07/09/2020     Lab Results   Component Value Date    HDL 44 07/09/2020    HDL 44 07/09/2020     Lab Results   Component Value Date     07/09/2020     07/09/2020     Lab Results   Component Value Date    TRIG 89 09/28/2022    TRIG 113 07/09/2020     Lab Results   Component Value Date    CHOLHDLRATIO 3.7 08/14/2013     TSH   Date Value Ref Range Status   09/05/2022 3.17 0.30 - 4.20 uIU/mL Final   07/03/2013 2.56 0.4 - 5.0 mU/L Final     Last Basic Metabolic Panel:  Lab Results   Component Value Date     03/10/2023     07/09/2020      Lab Results   Component Value Date    POTASSIUM 4.4 03/10/2023    POTASSIUM 3.8 07/09/2020    POTASSIUM 3.8 07/09/2020     Lab Results   Component Value Date    CHLORIDE 97 03/10/2023    CHLORIDE 97 09/28/2022    CHLORIDE 99 07/09/2020     Lab Results   Component Value Date    FRANCISCO 9.7 03/10/2023    FRANCISCO 9.4 07/09/2020     Lab Results   Component Value Date    CO2 31 03/10/2023     CO2 28 07/09/2020    CO2 28 07/09/2020     Lab Results   Component Value Date    BUN 23.6 03/10/2023    BUN 12 07/09/2020    BUN 12 07/09/2020     Lab Results   Component Value Date    CR 0.97 03/10/2023    CR 1.06 07/09/2020     Lab Results   Component Value Date     03/10/2023    GLC 86 07/09/2020    GLC 86 07/09/2020     Lab Results   Component Value Date    WBC 8.2 09/06/2022    WBC 7.9 03/03/2020     Lab Results   Component Value Date    RBC 4.39 09/06/2022    RBC 4.20 03/03/2020     Lab Results   Component Value Date    HGB 13.8 09/06/2022    HGB 13.1 03/03/2020     Lab Results   Component Value Date    HCT 41.6 09/06/2022    HCT 40.8 03/03/2020     Lab Results   Component Value Date    MCV 95 09/06/2022    MCV 97 03/03/2020     Lab Results   Component Value Date    MCH 31.4 09/06/2022    MCH 31.2 03/03/2020     Lab Results   Component Value Date    MCHC 33.2 09/06/2022    MCHC 32.1 03/03/2020     Lab Results   Component Value Date    RDW 11.9 09/06/2022    RDW 11.6 03/03/2020     Lab Results   Component Value Date     09/06/2022     03/03/2020

## 2023-03-10 NOTE — LETTER
3/10/2023    Physician No Ref-Primary  No address on file    RE: Jade Caba       Dear Colleague,     I had the pleasure of seeing Jade Caba in the [facility].  Cardiology Progress Note          Assessment and Plan:       1. Coronary artery disease status post PCI, currently asymptomatic without worsening dyspnea on exertion or exertional chest discomfort    Continue current cardiovascular medications.      2. Hypertension, suboptimal control    Slight increase in her furosemide from 20 mg to 30 mg daily.    Patient has hold parameters for reducing her furosemide if her blood pressure gets too low.      30 minutes was spent with the patient, precharting and reviewing tests as well as post visit charting all done today..    This note was transcribed using electronic voice recognition software and there may be typographical errors present.                    Interval History:     The patient is a very pleasant 89 year old whom I have been following for coronary artery disease and hypertension.  She just had hydrochlorothiazide added to her medical regimen 2 days ago by her primary.  Her blood pressure is still elevated.  We discussed increasing her furosemide slightly in order to help a little bit more.    We also discussed how her wide pulse pressure suggests significant vascular stiffness which is consistent from what we know of her anatomy.  We also discussed how the blood pressure can drop precipitously if the volume status drops too low and that we have to be very cautious.                     Review of Systems:     Review of Systems:  Skin:  Negative     Eyes:  Positive for glasses  ENT:  Positive for hearing loss  Respiratory:  Negative    Cardiovascular:    Positive for;edema  Gastroenterology:      Genitourinary:  Positive for incontinence  Musculoskeletal:  Positive for neck pain  Neurologic:       Psychiatric:       Heme/Lymph/Imm:       Endocrine:                   Physical Exam:  "    Vitals: BP (!) 190/78 (BP Location: Right arm, Patient Position: Sitting, Cuff Size: Adult Regular)   Pulse 65   Ht 1.549 m (5' 1\")   Wt 63 kg (138 lb 14.4 oz)   LMP  (LMP Unknown)   SpO2 95%   BMI 26.24 kg/m    Constitutional:  cooperative overweight elderly    Skin:  warm and dry to the touch        Head:  normocephalic        Chest:  normal symmetry        Cardiac: regular rhythm bradycardic     systolic ejection murmur;grade 1          Extremities and Back:           Neurological:  no gross motor deficits;affect appropriate                 Medications:     Current Outpatient Medications   Medication Sig Dispense Refill     Acetaminophen (TYLENOL PO) Take 650 mg by mouth every 6 hours as needed       acetaminophen (TYLENOL) 500 MG tablet Take 500 mg by mouth 2 times daily       alendronate (FOSAMAX) 70 MG tablet TAKE 1 TABLET (70 MG) BY MOUTH ONCE A WEEK IN THE MORNING. TAKE ON EMPTY STOMACH WITH FULL GLASS OF WATER. DO NOT LIE DOWN FOR 1 HR.       aspirin (ASA) 81 MG EC tablet Take 1 tablet (81 mg) by mouth daily 90 tablet 3     calcium carbonate 500 mg (elemental) (OSCAL) 500 MG tablet Take 2 tablets by mouth every evening       carboxymethylcellulose PF (REFRESH PLUS) 0.5 % ophthalmic solution Place 1 drop into both eyes 2 times daily       carvedilol (COREG) 3.125 MG tablet Take 1 tablet (3.125 mg) by mouth 2 times daily (with meals) 180 tablet 3     cholecalciferol (VITAMIN D3) 5000 UNITS CAPS capsule Take 5,000 Units by mouth daily       citalopram (CELEXA) 10 MG tablet Take 2 tablets (20 mg) by mouth every evening       folic acid (FOLVITE) 1 MG tablet Take 1 mg by mouth daily       furosemide (LASIX) 20 MG tablet Take 1.5 tablets (30 mg) by mouth daily 135 tablet 3     hydrochlorothiazide (HYDRODIURIL) 12.5 MG tablet Take 1 tablet (12.5 mg) by mouth daily 30 tablet 0     L-Methylfolate-B6-B12 (FOLBIC RF PO) Take 1 tablet by mouth daily       losartan (COZAAR) 100 MG tablet Take 100 mg by mouth " daily       Multiple Vitamins-Minerals (CERTA-FREDERICK PO) Take 1 tablet by mouth daily       olopatadine (PATADAY) 0.2 % ophthalmic solution PLACE 1 DROP INTO BOTH EYES ONCE DAILY.       omeprazole (PRILOSEC) 40 MG capsule Take 1 capsule (40 mg) by mouth daily Take 30-60 minutes before a meal. 30 capsule 9     oxybutynin (DITROPAN) 5 MG tablet Take 5 mg by mouth 2 times daily       polyethylene glycol (MIRALAX/GLYCOLAX) powder Take 17 g by mouth nightly as needed                   Data:   All laboratory data reviewed  Results for orders placed or performed in visit on 03/10/23 (from the past 24 hour(s))   Basic metabolic panel   Result Value Ref Range    Sodium 137 136 - 145 mmol/L    Potassium 4.4 3.4 - 5.3 mmol/L    Chloride 97 (L) 98 - 107 mmol/L    Carbon Dioxide (CO2) 31 (H) 22 - 29 mmol/L    Anion Gap 9 7 - 15 mmol/L    Urea Nitrogen 23.6 (H) 8.0 - 23.0 mg/dL    Creatinine 0.97 (H) 0.51 - 0.95 mg/dL    Calcium 9.7 8.8 - 10.2 mg/dL    Glucose 102 (H) 70 - 99 mg/dL    GFR Estimate 56 (L) >60 mL/min/1.73m2   ALT   Result Value Ref Range    ALT 14 10 - 35 U/L       All laboratory data reviewed  Lab Results   Component Value Date    CHOL 177 07/09/2020    CHOL 177 07/09/2020     Lab Results   Component Value Date    HDL 44 07/09/2020    HDL 44 07/09/2020     Lab Results   Component Value Date     07/09/2020     07/09/2020     Lab Results   Component Value Date    TRIG 89 09/28/2022    TRIG 113 07/09/2020     Lab Results   Component Value Date    CHOLHDLRATIO 3.7 08/14/2013     TSH   Date Value Ref Range Status   09/05/2022 3.17 0.30 - 4.20 uIU/mL Final   07/03/2013 2.56 0.4 - 5.0 mU/L Final     Last Basic Metabolic Panel:  Lab Results   Component Value Date     03/10/2023     07/09/2020      Lab Results   Component Value Date    POTASSIUM 4.4 03/10/2023    POTASSIUM 3.8 07/09/2020    POTASSIUM 3.8 07/09/2020     Lab Results   Component Value Date    CHLORIDE 97 03/10/2023    CHLORIDE 97  09/28/2022    CHLORIDE 99 07/09/2020     Lab Results   Component Value Date    FRANCISCO 9.7 03/10/2023    FRANCISCO 9.4 07/09/2020     Lab Results   Component Value Date    CO2 31 03/10/2023    CO2 28 07/09/2020    CO2 28 07/09/2020     Lab Results   Component Value Date    BUN 23.6 03/10/2023    BUN 12 07/09/2020    BUN 12 07/09/2020     Lab Results   Component Value Date    CR 0.97 03/10/2023    CR 1.06 07/09/2020     Lab Results   Component Value Date     03/10/2023    GLC 86 07/09/2020    GLC 86 07/09/2020     Lab Results   Component Value Date    WBC 8.2 09/06/2022    WBC 7.9 03/03/2020     Lab Results   Component Value Date    RBC 4.39 09/06/2022    RBC 4.20 03/03/2020     Lab Results   Component Value Date    HGB 13.8 09/06/2022    HGB 13.1 03/03/2020     Lab Results   Component Value Date    HCT 41.6 09/06/2022    HCT 40.8 03/03/2020     Lab Results   Component Value Date    MCV 95 09/06/2022    MCV 97 03/03/2020     Lab Results   Component Value Date    MCH 31.4 09/06/2022    MCH 31.2 03/03/2020     Lab Results   Component Value Date    MCHC 33.2 09/06/2022    MCHC 32.1 03/03/2020     Lab Results   Component Value Date    RDW 11.9 09/06/2022    RDW 11.6 03/03/2020     Lab Results   Component Value Date     09/06/2022     03/03/2020     Thank you for allowing me to participate in the care of your patient.      Sincerely,     James Rojas MD     [facility]  cc:   DORYS Temple  [facility]  [address] [place] [address]

## 2023-03-10 NOTE — PATIENT INSTRUCTIONS
Please increase the FUROSEMIDE from 20mg a day to 30mg per day.     If you get lightheaded or feel dizzy, then check your blood pressure and if it's lower than 120, then reduce the FUROSEMIDE back to 20mg that day.    So basically take 30mg if pressure is above 120 and 20mg if underneath that.

## 2023-04-18 NOTE — TELEPHONE ENCOUNTER
Sent MyChart message in 4/1/23 [employer] message asking patient to send updated blood pressure readings as patient was advised in 4/1/23 MyChart message to increase Hydrochlorothiazide and follow up in 2-3 weeks.

## 2023-04-22 NOTE — ED TRIAGE NOTES
A&O x4.  ABC's intact.      Pt arrives with c/o losing her balance while walking to the bathroom and fell injuring left knee, bilateral elbow, and left upper arm.  Denies hitting head and unsure if on blood thinners.  Last tetanus per [facility] 2013.

## 2023-04-22 NOTE — ED PROVIDER NOTES
History     Chief Complaint:  Fall     HPI   Jade Caba is a 89 year old female with a history of arthritis, CAD, chronic kidney disease, diastolic heart failure, hypertension, and subarachnoid hemorrhage, who presents after a fall wherein she attempted to ambulate to the bathroom with her cane when she fell backwards around 7.5 hours ago. Patient denies head injury. She states she attempted to stand, which caused skin tears to her bilateral elbows as well as her left knee. She reports she put Band-Aids on and was able to go back to bed. Denies neck or rib pain. Her last tetanus shot ws 08/14/2013.    Independent Historian:   None - Patient Only    Review of External Notes: N/A    ROS:  Review of Systems   Musculoskeletal: Negative for neck pain.        (-) rib pain   Skin: Positive for wound (skin tears to bilat elbows and L knee).   All other systems reviewed and are negative.    Allergies:  The patient has no known allergies to medications    Medications:    Alendronate   Aspirin  Calcium carbonate  Carboxymethylcellulose  Carvedilol  Cholecalciferol  Celexa  Folic acid  Furosemide  Hydrochlorothiazide   Losartan  Multivitamins with minerals  Olopatadine  Omeprazole  Oxybutynin  Polyethylene glycol     Past Medical History:    Anxiety  Arthritis  Atrial flutter  C1 cervical fracture  CAD  Carotid artery stenosis  CKD  Depression  Diastolic heart failure  Alcohol abuse  Hypercholesterolemia   Hypertension   Renal artery stenosis  Seizure disorder  Subarachnoid hemorrhage    Past Surgical History:   Appendectomy  Carotid endarterectomy  Cataract surgery  Cholecystectomy   Coronary angiogram  Hysterectomy  Joint replacement, bilateral   Stenting of renal artery      Family History:    CAD  Caner  Heart disease    Social History:  Patient presents with her daughter.  PCP: No Ref-Primary, Physician     Physical Exam     Patient Vitals for the past 24 hrs:   BP Temp Temp src Pulse Resp SpO2   04/22/23 1100 -- -- --  -- -- 96 %   04/22/23 0928 (!) 180/73 98.8  F (37.1  C) Oral 106 20 92 %        Physical Exam  GENERAL: well developed, pleasant  HEAD: atraumatic  EYES: pupils reactive, extraocular muscles intact, conjunctivae normal  ENT:  mucus membranes moist  NECK:  trachea midline, normal range of motion  RESPIRATORY: no tachypnea, breath sounds clear to auscultation   CVS: normal S1/S2, no murmurs, intact distal pulses  ABDOMEN: soft, nontender, nondistention  MUSCULOSKELETAL: no deformities  SKIN: warm and dry, no acute rashes or ulceration. Skin tears to bilateral elbows and left knee.  NEURO: GCS 15, cranial nerves intact, alert and oriented x3  PSYCH:  Mood/affect normal    Emergency Department Course     Emergency Department Course & Assessments:       Interventions:  Medications   Td (tetanus & diphtheria toxoids) -  adult formulation - for ages 7 years and older (0.5 mLs Intramuscular $Given 4/22/23 0908)        Assessments:  0930 I entered the patient's room and obtained history.  1035 I performed a laceration repair procedure, see procedure note above.    Independent Interpretation (X-rays, CTs, rhythm strip):  None    Consultations/Discussion of Management or Tests:  None        Social Determinants of Health affecting care:   None    Disposition:  The patient was discharged to home.     Impression & Plan      Medical Decision Making:  Patient presents with mechanical fall with skin tears to both elbows and left knee.  Wounds were cleansed and attempted to put a suture in the skin tear on the lower leg as it is quite lengthy but the skin was too thin or frail and tension was too tight for this to work.  They were dressed with Tegaderm.  She would like something to prevent infection.  Discussed outpatient management.    Diagnosis:    ICD-10-CM    1. Skin tear of elbow without complication, unspecified laterality, initial encounter  S51.019A       2. Skin tear of left lower leg without complication, initial encounter   S81.812A       3. Fall, initial encounter  W19.XXXA            Discharge Medications:  Discharge Medication List as of 4/22/2023 10:55 AM      START taking these medications    Details   cephALEXin (KEFLEX) 500 MG capsule Take 1 capsule (500 mg) by mouth 4 times daily for 5 days, Disp-20 capsule, R-0, E-Prescribe           Scribe Disclosure:  Carol EDMOND Hired, am serving as a scribe at 10:08 AM on 4/22/2023 to document services personally performed by Chino Cortés MD based on my observations and the provider's statements to me.     4/22/2023   Chino Cortés MD Adams, Shaun L, MD  04/22/23 [id number]

## 2023-04-25 NOTE — PROGRESS NOTES
Assessment & Plan     Stage 3a chronic kidney disease (H)  Keep hydration, avoid NSAID use, monitor renal function     Hospital discharge follow-up  No evidence of wounds infection, no MS changes, no falls.     H/O laceration of skin  Wound care advised   Wounds were cleansed with steryl water , applied Bacitracin and dressing     Falls frequently  Falls precautions, PT OT advised     Primary hypertension  Controlled on treatment              MED REC REQUIRED  Post Medication Reconciliation Status: discharge medications reconciled, continue medications without change  See Patient Instructions    Clement Naranjo MD  [facility] is [facility] 89 year [facility], presenting for the following health issues:  ER F/U        4/25/2023     7:20 AM   Additional Questions   Roomed by Uzma ESTEVEZ   Accompanied by daughter     HPI     ED/UC Followup:    Facility:  [facility]  Date of visit: 04/22/2023  Reason for visit: skin tear both elbows, and L knee from fall  Current Status:     Patient is seen for a follow up visit.  Seen in ER after a fall at her home, lost balance , sustained elbows and knees injuries with lacerations of the skin on the elbows and left knee.   No fractures, no head injury. No change of mentation. Has history of falls. Uses a cane and walker. Has poor balance.   Received TD in ER. Wounds were treated. On antibiotic.   No increased pain, swelling, redness. Still bleeding from the left knee wound with change of dressings.   Has h/o HTN. on medical treatment. BP has been controlled. No side effects from medications. No CP, HA, dizziness. good compliance with medications and low salt diet.  Has h/o CRF. Monitoring BP, BG, medications, avoiding OTC NSAIDs. Needs periodic recheck of kidney function.        Review of Systems   Constitutional, HEENT, cardiovascular, pulmonary, gi and gu systems are negative, except as otherwise noted.      Objective    /64 (BP Location: Left  "arm, Cuff Size: Adult Regular)   Pulse 95   Temp 98.8  F (37.1  C) (Tympanic)   Resp 16   Ht 1.549 m (5' 1\")   Wt 63 kg (138 lb 14.4 oz)   LMP  (LMP Unknown)   SpO2 95%   BMI 26.24 kg/m    Body mass index is 26.24 kg/m .  Physical Exam   GENERAL: elderly, frail, alert and no distress  NECK: no adenopathy, no asymmetry, masses, or scars and thyroid normal to palpation  RESP: lungs clear to auscultation - no rales, rhonchi or wheezes  CV: regular rate and rhythm, normal S1 S2, no S3 or S4, no murmur, click or rub  ABDOMEN: soft, nontender, no hepatosplenomegaly, no masses and bowel sounds normal  MS: no gross musculoskeletal defects noted, 1+ LE edema  SKIN: no suspicious lesions or rashes  Bilateral elbows 2 cm skin abrasions with sloughing of the superficial skin layer, over the olecranons , no bleeding, drainage, swelling.   Left knee large skin tear over the knee cap with bleeding when dressing is changed. No pus, swelling, redness.   Bilateral knees bruises     Lab on 03/10/2023   Component Date Value Ref Range Status     Sodium 03/10/2023 137  136 - 145 mmol/L Final     Potassium 03/10/2023 4.4  3.4 - 5.3 mmol/L Final     Chloride 03/10/2023 97 (L)  98 - 107 mmol/L Final     Carbon Dioxide (CO2) 03/10/2023 31 (H)  22 - 29 mmol/L Final     Anion Gap 03/10/2023 9  7 - 15 mmol/L Final     Urea Nitrogen 03/10/2023 23.6 (H)  8.0 - 23.0 mg/dL Final     Creatinine 03/10/2023 0.97 (H)  0.51 - 0.95 mg/dL Final     Calcium 03/10/2023 9.7  8.8 - 10.2 mg/dL Final     Glucose 03/10/2023 102 (H)  70 - 99 mg/dL Final     GFR Estimate 03/10/2023 56 (L)  >60 mL/min/1.73m2 Final    eGFR calculated using 2021 CKD-EPI equation.     Cholesterol 03/10/2023 236 (H)  <200 mg/dL Final     Triglycerides 03/10/2023 103  <150 mg/dL Final     Direct Measure HDL 03/10/2023 49 (L)  >=50 mg/dL Final     LDL Cholesterol Calculated 03/10/2023 166 (H)  <=100 mg/dL Final     Non HDL Cholesterol 03/10/2023 187 (H)  <130 mg/dL Final     ALT " 03/10/2023 14  10 - 35 U/L Final

## 2023-04-25 NOTE — TELEPHONE ENCOUNTER
Fax received from [facility] Physician Order Sheet 04/25/23 for review and signature.  Put in Dr. Canales's in basket.

## 2023-04-25 NOTE — PATIENT INSTRUCTIONS
Clean the wounds every 48 hours on the elbows and left knee with soap water or wound cleansing solution , tap dry , apply topical Bacitracin and cover with non stick gauze.   Recommend PT and OT for balance, strengthening.

## 2023-04-28 NOTE — TELEPHONE ENCOUNTER
PHYSICAL THERAPY /OCCUPATIONAL THERAPY order/ audiologist order received via fax. Form in your mailbox to be signed.

## 2023-05-09 NOTE — PATIENT INSTRUCTIONS
I recommend wound dressing change daily or if saturated. Non-adherent dressing (adaptic), then gauze, then coban. Once it stops oozing ok to use Telfa, gauze, then coban.    Wound care referral placed today.    Antibiotic Rx Keflex 500 mg 4 times daily for 5 days for infection prophylaxis.

## 2023-05-09 NOTE — TELEPHONE ENCOUNTER
Isabelle calling from [facility] stating patient had a fall and went to the  today and wanted to let provider know.     Both skin tears reopened and left knee wound opened even further.  Elbow skin tear is still small.  Patient has new wound care orders. See  note for more information    Patient will need physical therapy and they are trying to find a home care agency that accepts her insurance.  They may need to do the wound care at the [facility] until they find a new home care agency for wound care too.     Call Beth (nursing supervisor) back at [phone] to [facility]

## 2023-05-09 NOTE — PROGRESS NOTES
Assessment & Plan     Skin tear of left elbow without complication, initial encounter  Acute problem.  Not amenable to sutures given very thin skin. Wound dressing today with adaptic, gauze and Coban.  Keflex is prescribed today for infection prophylaxis at her request.   Wound care referral placed.  Home care referral also placed.  Hopefully she will have a home health aide come to the assisted Living for wound dressing changes.  Wound care instructions are given today.  Follow-up if any worsening symptoms.  Patient and her daughter agree with the plan.  - cephALEXin (KEFLEX) 500 MG capsule  Dispense: 20 capsule; Refill: 0  - Wound Care Referral  - Home Care Referral    Skin tear of left lower leg without complication, initial encounter  Acute problem.  Not amenable to sutures given very thin skin. Wound dressing today with adaptic, gauze and Coban. Keflex is prescribed today for infection prophylaxis at her request.  Wound care referral placed.  Home care referral also placed.  Hopefully she will have a home health aide come to the assisted Living for wound dressing changes.  Wound care instructions are given today.  Follow-up if any worsening symptoms.  Patient and her daughter agree with the plan.  - cephALEXin (KEFLEX) 500 MG capsule  Dispense: 20 capsule; Refill: 0  - Wound Care Referral  - Home Care Referral     35 minutes spent on the date of the encounter doing chart review, history and exam, patient education, documentation, and further activities per the note.      Return in about 1 week (around 5/16/2023) for Symptoms failing to improve follow up with primary.    Catie Young PA-C  [facility]     Jade is a 89 year old female who presents to clinic today for the following health issues:  Chief Complaint   Patient presents with     Knee Problem     Sore on left knee and left elbow x feel 2x, but first one was 3 weeks ago and last night -- MAY need wound care      HPI  Patient is presenting to urgent care today accompanied by her daughter with a complaint of a skin tear left elbow and left knee.  Of note, she had a recent skin tear in those same areas, was seen in the emergency room 4/22/2023.  Skin tears were not amenable to sutures, wound care instructions were given.  Daughter reports that the wounds were healing appropriately until yesterday when she fell again.  Patient lives in an assisted living ([facility] She denies hitting her head. She is not on a blood thinner.      Review of Systems  Constitutional, HEENT, cardiovascular, pulmonary, GI, , musculoskeletal, neuro, skin, endocrine and psych systems are negative, except as otherwise noted.      Objective    /76 (BP Location: Right arm, Patient Position: Chair, Cuff Size: Adult Regular)   Pulse 73   Temp 98.7  F (37.1  C) (Oral)   Resp 14   Wt 62.1 kg (137 lb)   LMP  (LMP Unknown)   SpO2 98%   BMI 25.89 kg/m    Physical Exam   GENERAL: healthy, alert and no distress  RESP: Normal breathing effort  SKIN: Left elbow has a 3 cm vertical skin tear noted, bleeding is controlled.  On the left knee: There is moderate ecchymosis noted. vertical Skin tear approximately 5 cm in length.  Bleeding is controlled.  Adjacent to that skin tear is the previous wound which is healing appropriately.  No concern for infection today.

## 2023-05-10 NOTE — TELEPHONE ENCOUNTER
Consult received via Work[employer] from Catie Young PA-C in  URGENT CARE for skin tears of the elbow and knee    Please schedule with providers Elijah Howell Omlie or Mary at [facility] [place] for next available appointment.    If not able to schedule within 2 weeks, please route to RN    **If scheduling with Sadie SHAW or Dr. Mike please schedule a follow up 2-3 weeks after initial appointment.    Is the patient able to make their own medical decisions? Yes, per chart review.    Is patient a AUGUSTO lift? PLEASE INQUIRE WHEN MAKING THE APPOINTMENT AND PUT IN APPOINTMENT NOTES    Routing to  Wound Healing Scheduling.

## 2023-05-11 NOTE — TELEPHONE ENCOUNTER
Spoke with daughter Bianca. Patient doesn't travel well. She wanted someone to come into her assisted living to see her.  Gave her information for [employer] and [employer].  Asked her to call if things change.

## 2023-07-11 NOTE — PROGRESS NOTES
[facility] Contact    Member became effective with  [facility] on 7/1/2023 with [facility].  Previous Health Plan: [facility]  Previous Care System: [facility]  Previous care coordinators name and number: Sarita Ortiz [phone]  Waiver Type: EW  Last MMIS Entry: Date 11/9/22 and Type REASSMT  MMIS visit date (and type) if different from above: n/a  Services Listed in MMIS:  08 C MED APPTS 55 I ARRG TRANS 35 F CASE MGMT  06 C HOMEMAKER 62 F LAUNDRY 53 F MED ADMIN  07 I MONEY MGT 05 C TANA DINE 61 C LESS 24   UTF received: No: Requested on 7/11/2023 via email.  CC copied on email.  Address/Phone discrepancy: n/a    Yvonne Zayas  Case Management Specialist  [facility]  [phone]

## 2023-07-11 NOTE — LETTER
July 11, 2023    LAURO HUGHES GAETANO  [address] , [address]  [place] [address]      Dear Lauro:    As a member of [facility] ([facility]), you are provided a care coordinator. I will be your new care coordinator as of 7/1/2023. I will be calling you soon to see how you are doing and determine your needs.    If you have any questions, please feel free to call me at [phone]. If you reach my voice mail, please leave a message and your phone number. If you are hearing impaired, please call the [place] Relay at 425 or [phone] ([id number] relay service).    I look forward to speaking with you soon.    Sincerely,    Holly Guajardo RN    E-mail: [email or web address]  Phone: [phone]      [facility] is a health plan that contracts with both Medicare and the [place] Medical Assistance (Medicaid) program to provide benefits of both programs to enrollees. Enrollment in [facility] depends on contract renewal.      [facility]  [id number] DHS Approved ([id number])  [id number] (11/18)

## 2023-07-12 NOTE — TELEPHONE ENCOUNTER
[employer] message sent to patient asking if she has confirmed whether [facility] is in-network for [employer]. If it is, we can request referral from PCP.     Lynnette Ibanez RN  [facility]

## 2023-07-12 NOTE — TELEPHONE ENCOUNTER
Message forwarded to referral coordinator to review if referral can be placed to [facility] for hearing evaluation.   Fax number: [phone]     Lynnette Ibanez RN  [facility]

## 2023-07-12 NOTE — TELEPHONE ENCOUNTER
[employer]- We do not send referrals for payment for are plans. These plans do not require referrals. Patient just need to stay within the [facility] network. Patient should check wit [facility] to see if this clinic is in her network. If in-network we can then fax the referral.    Jennie ESTEVEZ-referral coordinator

## 2023-07-14 NOTE — TELEPHONE ENCOUNTER
Referral faxed to [facility]. [employer] message sent to patient informing her referral was approved and faxed.     Lynnette Ibanez RN  [facility]

## 2023-07-14 NOTE — TELEPHONE ENCOUNTER
Patient sent message stating she has confirmed [facility] is within network for her and requesting referral be placed. Per Referral Coordinator, ok for referral to be placed if in-network. Referral pended, routed to provider to review.     Lynnette Ibanez RN  [facility]

## 2023-07-14 NOTE — TELEPHONE ENCOUNTER
[facility] is calling to ask for a signed medication list to be faxed over to them at [phone].  Form in your mailbox to be signed.      Isabelle [phone]

## 2023-07-19 NOTE — Clinical Note
Dr. Canales, I am a care coordinator with [facility] recently assigned to work with Jade Caba. We are required by the health plan to share information with Jade's PCP.  Jade is doing well at [facility], Jade and family report that she is doing well. Thank you, Feel free to contact me if I can be of assistance. Holly Guajardo RN, BC Manager [facility] Care Coordinator  [phone]  (Fax)

## 2023-07-19 NOTE — PROGRESS NOTES
"[facility] Care Coordination Contact    [facility] Change (Transfer)    Member is new enrollee to [facility] effective 7/1/2023 with [facility]. Member transferred from [facility].    No home visit required because this care coordinator (CC) has received all required documentation from the previous CC.    Writer t/c to daughter Jennifer, introduced self as member's new CC. Confirmed with Jennifer that the welcome letter with writer's name and contact information has been received.  Reviewed LTCC/Health Risk Assessment (HRA) and POC with Jennifer. No changes noted.  Transitional HRA completed. Care Plan Summary updated and reflects current services. Explained to Jennifer that CC will also reach out to member.  Required referral authorization information communicated to CMS: Yes  Jennifer stated that her mother has been doing really well recently. Discussed fall in May and changes in medications to improve b/p control. Jennifer stated no further falls, per EMR blood pressure is within parameter. Jennifer would like options to schedule an eye exam. CC will f/u with [facility] to inquire on onsite providers, if not CC will provide options near [place].  Called placed to member, she stated that she loves being at the [facility], \"everyone is so friendly.\"  Member has no questions or concerns, stated that her health feels well, no recent falls and reports that the open area on her left knee has now healed.     Writer reviewed the following with member:    ER visits: Yes -  [facility]  Fall  Hospitalizations: No  TCU stays: No  Significant health status changes: Not at this time.   Falls/Injuries: Yes: 5/2023 Fall with abrasions to left elbow and knee   ADL/IADL changes: No  Changes in services: No    Follow-Up Plan: Member informed of future contact, plan to f/u with member with at next regularly scheduled contact in October for annual assessment.  Contact information " shared with member and family, encouraged member to call with any questions or concerns.    Rec'd tele call from Fatmata Anthony Waldo Brumfield stated that there auth  2023. CC confirmed daily rate and RS tool, no changes. Provided previous CC name and contact information, explained that [facility] is responsible 23. Fatmata stated that member is dong fine, no concerns, no changes.  Received e-mail from Fatmata with updated Kardex and nursing notes.   Holly Guajardo RN, BC  Manager [facility] Care Coordinator   [phone]  (Fax)

## 2023-08-01 NOTE — TELEPHONE ENCOUNTER
[facility] in [place] calling for a signed med list fax to them at [phone]  Phone: [phone] Isabelle

## 2023-08-11 NOTE — PROGRESS NOTES
Assessment & Plan     Skin lesion  At this time, it is somewhat difficult to fully assess the lesion on her nose.  Patient has been scratching at the lesion frequently, and it does have significant signs of excoriation noted.  Patient did wish to proceed with a conservative treatment plan for this issue.  A prescription for triamcinolone 0.1% cream discharge to apply to affected area twice per day has been submitted to her pharmacy.  I did recommend that they try the topical steroid cream for 5 days to see if it makes any improvement in the appearance of the lesion.  Should she fail to improve, we could always consider a dermatology referral.  - triamcinolone (KENALOG) 0.1 % external cream; Apply topically 2 times daily    Neck pain  Given that she is unable to use anti-inflammatories due to her medical history, we did discuss possibility of her being evaluated for possible corticosteroid injections into her neck for pain management.  Both patient and her daughter were very interested in this option, and a referral to the spine clinic was placed.  - Spine  Referral; Future    Sleep difficulties  Patient's family has been very reluctant to have her try any type of sleep aid due to her prior history of falls.  I did recommend that they may want to consider a trial of trazodone rather than a more potent sleep aid, such as Ambien or Lunesta.  Some information about the medication was provided to the patient and her family.  Her daughter did state that she would like to discuss the medication with her other siblings as well as the patient's pharmacist.  They will let us know if they would like to proceed with a trial of this medication.    Prescription drug management  30 minutes spent by me on the date of the encounter doing chart review, history and exam, documentation and further activities per the note       See Patient Instructions    Sanford Canales MD  [facility]  SG Hansen is a 89 year old, presenting for the following health issues:  Derm Problem (On face)      Patient is an 89-year-old  female who presents to the clinic with multiple concerns.  Her first issue is in regards to a spot on her skin on her face.  Patient reports that for the past several months she has had an area of hyperpigmentation and itching located on the bridge of her nose.  She cannot recall any new exposures or events that may have triggered the development of this rash.  She does occasionally put moisturizing lotion on the lesion, and it does provide her with some relief from the itching.  Unfortunately, it has not made any improvement in the appearance of the rash.  Patient does state that she had issues with eczema as a child, but she denies any other history of skin conditions.  Patient also has had difficulty with sleeping for an extended period of time.  She is currently taking an unspecified dose of melatonin, but she has found it to not be very effective.  Patient states that she is typically up until 3 or 4 in the morning until she can fall asleep.  Her family has been resistant to any type of sleep aid due to concerns about falls, but they are wondering if there may be some options that would be acceptable.  Patient has also had persistent issues with neck pain.  She states that her neck is very stiff and it causes her some discomfort with turning her head to the left and right.  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  She does get occasional relief from the use of Advil, but she is concerned about using that particular medication given her cardiac and kidney issues.    History of Present Illness       Reason for visit:  Rash on face/arthritis in neck pain  Symptom onset:  More than a month  Symptom progression:  Staying the same  Had these symptoms before:  " Yes    She eats 2-3 servings of fruits and vegetables daily.She consumes 1 sweetened beverage(s) daily.She exercises with enough effort to increase her heart rate 60 or more minutes per day.  She exercises with enough effort to increase her heart rate 7 days per week.   She is taking medications regularly.         Review of Systems   Constitutional: Negative.    HENT: Negative.     Respiratory: Negative.     Cardiovascular: Negative.    Gastrointestinal: Negative.    Musculoskeletal:  Positive for arthralgias and neck pain.   Skin:  Positive for rash.   Psychiatric/Behavioral:  Positive for sleep disturbance.             Objective    BP (!) 143/59   Pulse 68   Temp 98.1  F (36.7  C)   Resp 18   Ht 1.549 m (5' 1\")   Wt 61.2 kg (135 lb)   LMP  (LMP Unknown)   SpO2 99%   Breastfeeding No   BMI 25.51 kg/m    Body mass index is 25.51 kg/m .  Physical Exam  Vitals reviewed.   HENT:      Head: Normocephalic and atraumatic.      Right Ear: Tympanic membrane, ear canal and external ear normal.      Left Ear: Tympanic membrane, ear canal and external ear normal.      Mouth/Throat:      Mouth: Mucous membranes are moist.      Pharynx: Oropharynx is clear.   Eyes:      Extraocular Movements: Extraocular movements intact.      Conjunctiva/sclera: Conjunctivae normal.      Pupils: Pupils are equal, round, and reactive to light.   Cardiovascular:      Rate and Rhythm: Normal rate and regular rhythm.      Pulses: Normal pulses.      Heart sounds: Normal heart sounds.   Pulmonary:      Effort: Pulmonary effort is normal.      Breath sounds: Normal breath sounds.   Musculoskeletal:      Cervical back: Rigidity and tenderness present.   Skin:     Capillary Refill: Capillary refill takes less than 2 seconds.      Findings: Rash (Hyperpigmented area noted over nasal bridge.  Signs of excoriation appreciated.  No pustules or vesicles noted.) present.   Neurological:      Mental Status: She is alert and oriented to person, " place, and time. Mental status is at baseline.

## 2023-08-11 NOTE — PATIENT INSTRUCTIONS
We will proceed with trial of triamcinolone on skin lesion.    Spine referral for chronic neck pain.

## 2023-08-21 NOTE — PROGRESS NOTES
[facility] Care Coordination Contact    Rec'd email from Fatmata at [facility] to report that [facility] offers Podiatry and Audiology - no dental or vision at this time are offered onsite.  Call placed to member's daughter Jennifer to share information above.   Jennifer stated that there is no urgency in scheduling dental and eye.  Per Jennifer, CC emailed [facility] contact information to obtain assistance with scheduling a dental appt. CC also provided 3 options of in network [facility] providers: [facility], [facility] and [facility].   Holly Guajardo RN, BC  Manager [facility] Care Coordinator   [phone]  (Fax)

## 2023-09-08 NOTE — PATIENT INSTRUCTIONS
Prescribed Gabapentin today, 100 mg tablets, to be taken at bedtime for your nerve pain.     Please call if you have any questions regarding how to take your medication  Clinic Phone # [phone]    An MRI was ordered for you today.  You will be contacted by scheduling within 3 days.    If you are not contacted, please call Radiology at [phone].  [facility] [phone]. [facility] [phone].    Based on your MRI we will decide plan. Please call to review results    ~Please call our [facility] Nurse Navigation line [phone] with any questions or concerns about your treatment plan, if symptoms worsen and you would like to be seen urgently, or if you have any new or worsening numbness, weakness, or problems controlling bladder and bowel function.  ~You are also welcome to contact Nayeli Mora via [employer], but please be aware that responses to [employer] message may take 2-3 days due to the high volume of patients seen in clinic.

## 2023-09-08 NOTE — LETTER
9/8/2023       RE: Jade Caba  [id number]  [address]  [facility] [address]     Dear Colleague,    Thank you for referring your patient, Jade Caba, to the  [facility] at [facility]. Please see a copy of my visit note below.    ASSESSMENT: Jade Caba is a 89 year old female presents for consultation at the request of PCP Sanford Canales, who presents today for new patient evaluation of :     -Neck pain    Patient is neurologically intact on exam.  We reviewed her CT cervical spine at time of visit, she has significant wear-and-tear degenerative findings including fusion of C3-4 lateral masses.  She has tenderness left occipital area and I think she would be a good candidate for occipital nerve blocks.  Given that her neck pain began and has been worse since a trauma, I did recommend doing an MRI to assist with treatment planning.  She would be a good candidate for potential cervical epidural steroid injections for her left arm pain as well if imaging results offered explanation. We will review her imaging and make sure there is no significant concern.  She is going to start taking gabapentin at nighttime for now.         No data to display                     Diagnoses and all orders for this visit:  Cervical radiculopathy  -     MR Cervical Spine w/o Contrast; Future  -     gabapentin (NEURONTIN) 100 MG capsule; Take 1 capsule (100 mg) by mouth At Bedtime  Neck pain  -     Spine  Referral  Occipital neuralgia of left side  -     MR Cervical Spine w/o Contrast; Future  -     gabapentin (NEURONTIN) 100 MG capsule; Take 1 capsule (100 mg) by mouth At Bedtime       PLAN:  Reviewed spine anatomy and disease process. Discussed diagnosis and treatment options with the patient today. A shared decision making model was used. The patient's values and choices were respected. The following represents what was discussed and decided upon  by the provider and the patient.     -DIAGNOSTIC TESTS:  Images were personally reviewed and interpreted and explained to patient today using spine model.   -- I ordered a cervical spine MRI without contrast today    -PHYSICAL THERAPY:    Patient does not wish to pursue physical therapy at this time, and has been doing stretching exercises daily on her own without relief.  We did discuss the importance of core strengthening, ROM, stretching exercises with the patient and how each of these entities is important in decreasing pain.  Did offer a referral for formal physical therapy if desired at any time    -MEDICATIONS:    Discussed multiple medication options today with patient. Discussed risks, side effects, and proper use of medications. Patient verbalized understanding.  I offered and she wishes to start gabapentin 100 mg to be taken at nighttime only.  She can continue Tylenol as needed.    -INTERVENTIONS:    Discussed risks and benefits of injections with patient today.  I think there would be a role for left occipital nerve block here, however given her symptoms have been persistent since a traumatic injury and she is developing numbness in her right hand, I recommend MRI prior to intervention.  We also discussed the role for potential cervical epidural steroid injections, but I do think her left upper neck pain is worse than her arm at this point.    -PATIENT EDUCATION: Total time of 40 minutes, on the day of service, spent with the patient, reviewing the chart, placing orders, and documenting.   -Today we also discussed the issues related to the pros and cons of the current treatment plan.    -FOLLOW-UP:   We will call Jade about the results of her MRI scan    Advised patient to call the [facility] if symptoms worsen or if they develop red flag symptoms such as numbness, weakness, severe pain uncontrolled by current pain med regimen, or any new or worsening problems controlling bladder and bowel function.    ______________________________________________________________________    SUBJECTIVE:   Jade Caba  is a 89 year old female history of MI with 8 stents, osteoporosis on Fosamax prior serious infection, blood clots, rheumatoid arthritis who presents today for new patient evaluation of neck pain       Jade is here today accompanied by her son.  She is complaining of constant severe left-sided upper neck pain for approximately the last year.  Her pain is always an 8 out of 10.  Turning her head especially to the left makes her pain worse.  The pain feels sharp.  It sometimes radiates up above her ear on the left.  It also sometimes radiates down the left arm into the hand and no particular distribution.  She describes numbness and tingling in her first and second digits on the right hand and it is now starting in the third digit on the right hand as well.  Symptoms began in October of last year after a fall injury, at which time she was seen in the emergency room and had a CT scan of her neck done, which was unrevealing for a serious injury.  She has since been dealing with the pain on her own.  She takes Tylenol as needed, without much improvement.  She has difficulty sleeping due to the pain      -Treatment to Date:     -Medications:  Tylenol    Current Outpatient Medications   Medication    Acetaminophen (TYLENOL PO)    acetaminophen (TYLENOL) 500 MG tablet    alendronate (FOSAMAX) 70 MG tablet    aspirin (ASA) 81 MG EC tablet    calcium carbonate 500 mg (elemental) (OSCAL) 500 MG tablet    carboxymethylcellulose PF (REFRESH PLUS) 0.5 % ophthalmic solution    carvedilol (COREG) 3.125 MG tablet    cholecalciferol (VITAMIN D3) 5000 UNITS CAPS capsule    citalopram (CELEXA) 10 MG tablet    folic acid (FOLVITE) 1 MG tablet    furosemide (LASIX) 20 MG tablet    gabapentin (NEURONTIN) 100 MG capsule    hydrochlorothiazide (HYDRODIURIL) 12.5 MG tablet    hydrochlorothiazide (HYDRODIURIL) 25 MG tablet     L-Methylfolate-B6-B12 (FOLBIC RF PO)    losartan (COZAAR) 100 MG tablet    Multiple Vitamins-Minerals (CERTA-FREDERICK PO)    olopatadine (PATADAY) 0.2 % ophthalmic solution    omeprazole (PRILOSEC) 40 MG capsule    oxybutynin (DITROPAN) 5 MG tablet    polyethylene glycol (MIRALAX/GLYCOLAX) powder    triamcinolone (KENALOG) 0.1 % external cream     No current facility-administered medications for this visit.       No Known Allergies    Past Medical History:   Diagnosis Date    Anxiety     Arthritis     Atrial flutter (H)     C1 cervical fracture (H)     CAD (coronary artery disease)     cath in 1/2018: Angioplasty and stenting of 90% mid RCA stenosis, 50% right ostial coronary stenosis, and ostial LAD stenosis.  Moderate disease throughout the mid LAD and 30 to 50% range.    Carotid artery stenosis     CKD (chronic kidney disease)     Depression     Diastolic heart failure (H)     Most recent echo 1/2018: EF 55%, inferior regional wall motion abnormality, increased left ventricular end-diastolic pressure.    H/O alcohol abuse     Hypercholesterolemia     Hypertension     Renal artery stenosis (H)     Seizure disorder (H)     Subarachnoid hemorrhage (H) 2014        Patient Active Problem List   Diagnosis    Renal artery stenosis (H)    HTN (hypertension)    Arteriosclerosis of coronary artery    Carotid stenosis    CKD (chronic kidney disease) stage 3, GFR 30-59 ml/min (H)    Hyperlipidemia LDL goal <100    Seizure disorder (H)    Osteoarthritis of knee    Chondrocalcinosis    Subarachnoid hemorrhage (H)    Fracture of C1 vertebra, closed (H)    Sacral fracture, closed (H)    Atrial flutter (H)    Alcohol abuse    Constipation    ARF (acute renal failure) (H)    FTT (failure to thrive) in adult    Fall    Cognitive impairment    Health Care Home    Advance care planning    Chronic diastolic congestive heart failure (H)    Coronary atherosclerosis    Anxiety    Urgency incontinence    Klebsiella cystitis     Antibiotic-associated diarrhea    Accelerating angina (H)    Depressive disorder    Esophageal reflux    H/O: CVA (cerebrovascular accident)    History of rib fracture    Occlusion and stenosis of carotid artery    PFO (patent foramen ovale)    Pulmonary hypertension (H)    Peripheral vascular disease (H)    Neck pain       Past Surgical History:   Procedure Laterality Date    APPENDECTOMY      CAROTID ENDARTERECTOMY Left     Cataract surgery Right     will have laser eye surgery    CHOLECYSTECTOMY      Coronary stents x 3      CV CORONARY ANGIOGRAM N/A 7/19/2019    Procedure: Coronary Angiogram;  Surgeon: Pablito Emerson MD;  Location:  [facility]    HYSTERECTOMY      at age of 40-not cancerus or precancerous     JOINT REPLACEMENT Bilateral     Stenting of renal artery         Family History   Problem Relation Age of Onset    C.A.D. Mother     C.A.D. Brother     C.A.D. Sister     C.A.ETELVINA. Brother     Cancer Sister         Breast cancer    Heart Disease Son        Reviewed past medical, surgical, and family history with patient found on new patient intake packet located in EMR Media tab.     SOCIAL HX: Non-smoker, no alcohol use, history of heavy drinking, no recreational drug use    Oswestry (ESTEPHANIE) Questionnaire:         No data to display                Neck Disability Index:       No data to display                   PHQ-2 Score:       3/7/2023     6:07 PM 3/5/2023    10:53 AM   PHQ-2 ( 1999 [employer])   Q1: Little interest or pleasure in doing things 0 0   Q2: Feeling down, depressed or hopeless 0 0   PHQ-2 Score 0 0   Q1: Little interest or pleasure in doing things Not at all    Q2: Feeling down, depressed or hopeless Not at all    PHQ-2 Score 0           ROS: Positive for joint pain, muscle pain, open wounds, itching, dizziness, insomnia, anxiety.  Specifically negative for bowel/bladder dysfunction, balance changes, headache, dizziness, foot drop, fevers, chills, appetite changes,  "nausea/vomiting, unexplained weight loss. Otherwise 13 systems reviewed are negative. Please see the patient's intake questionnaire from today for details.    OBJECTIVE:  BP (!) 153/71   Pulse 71   Ht 1.549 m (5' 1\")   Wt 61.2 kg (135 lb)   LMP  (LMP Unknown)   SpO2 96%   BMI 25.51 kg/m      PHYSICAL EXAMINATION:  --CONSTITUTIONAL: Vital signs as above. No acute distress. The patient is well nourished and well groomed.  --PSYCHIATRIC: The patient is awake, alert, oriented to person, place, and time, and answering questions appropriately with clear speech. Appropriate mood and affect   --HEENT: Sclera are non-injected. Extraocular muscles are intact. Moist oral mucosa.  --SKIN: Skin over the face, bilateral upper extremities, and posterior torso is clean, dry, intact without rashes.  --RESPIRATORY: Normal rhythm and effort. No abnormal accessory muscle breathing patterns noted.     --NEUROLOGIC: CN III-XII are grossly intact.   --GROSS MOTOR: Easily arises from a seated position. Toe walking, heel walking are normal.  Tandem was not attempted    --UPPER EXTREMITY MOTOR TESTING:  Shoulder abduction: right 5/5, left 5/5  Triceps: right 5/5 left 5/5  Biceps:right 5/5  left 5/5,   Wrist flexion: right 5/5  left 5/5,   Wrist extension: right 5/5  left 5/5,   Hand :  right 5/5  left 5/5,   Intrinsics: right 5/5  left 5/5,   Extensors: right 5/5  left 5/5,     --LOWER EXTREMITY MOTOR TESTING  Hip flexion: right 5/5  left 5/5,   Quads:right 5/5  left 5/5,   Hamstrings: right 5/5  left 5/5,   Dorsiflexion: right 5/5  left 5/5,   Plantarflexion: right 5/5  left 5/5,   EHL: right 5/5  left 5/5,     REFLEXES: 1/4 symmetric triceps, biceps, brachioradialis bilaterally. 0/4 symmetric patellar, achilles reflex bilaterally.  Negative Clonus, Babinski, and Alaniz's bilaterally.      SENSATION: Decreased to light touch first 3 digits of right hand.  Sensation otherwise intact throughout  the upper and lower " extremities  --VASCULAR: Warm upper and lower limbs bilaterally.     --CERVICAL SPINE: Inspection reveals no evidence of deformity or swelling. Range of motion is quite limited in lateral rotation especially to the left.  Mildly limited with cervical flexion, extension.  Positive point tenderness to palpation of mid cervical spine.  No swelling or redness over this area     No tenderness to palpation of traps, scaps, or paraspinal musculature    Tender left occipital area and surrounding soft tissue.  No redness, swelling      RESULTS:   Prior medical records from [facility] were reviewed today.         IMAGING:  Spine imaging was personally reviewed and interpreted today. The images were shown to the patient and the findings were explained using a spine model.  EXAM: CT HEAD W/O CONTRAST, CT CERVICAL SPINE W/O CONTRAST  LOCATION: [facility]  DATE/TIME: 10/16/2022 8:55 PM     INDICATION: Fall, posterior scalp contusion  COMPARISON:  CT neck 09/05/2022.  TECHNIQUE:   1) Routine CT Head without IV contrast. Multiplanar reformats. Dose reduction techniques were used.  2) Routine CT Cervical Spine without IV contrast. Multiplanar reformats. Dose reduction techniques were used.     FINDINGS:   HEAD CT:   INTRACRANIAL CONTENTS: No intracranial hemorrhage, extraaxial collection, or mass effect.  No CT evidence of acute infarct. Mild presumed chronic small vessel ischemic changes with a chronic lacunar infarct in the right caudate head. Mild generalized   volume loss. No hydrocephalus.      VISUALIZED ORBITS/SINUSES/MASTOIDS: Prior bilateral cataract surgery. Visualized portions of the orbits are otherwise unremarkable. No paranasal sinus mucosal disease. No middle ear or mastoid effusion.     BONES/SOFT TISSUES:  Right temporoparietal and left parietal scalp hematomas. No skull fracture.     CERVICAL SPINE CT:   VERTEBRA: Normal vertebral body heights and alignment. No  fracture or posttraumatic subluxation. Osseous fusion of the left C3-C4 lateral masses.     CANAL/FORAMINA: Multilevel spondylosis. No high-grade canal or neural foraminal stenosis.     PARASPINAL:  Thyroid nodules measuring up to 15 mm on the left, unchanged since the 09/05/2022 CT. Visualized lung fields are clear.                                                                      IMPRESSION:  HEAD CT:  1.  Right temporoparietal and left parietal scalp hematomas without acute intracranial abnormality.     CERVICAL SPINE CT:  1.  No CT evidence for acute fracture or post traumatic subluxation.         Again, thank you for allowing me to participate in the care of your patient.      Sincerely,    ZAIN Faria CNP

## 2023-09-08 NOTE — TELEPHONE ENCOUNTER
[facility] Call Center    Phone Message    May a detailed message be left on voicemail: yes     Reason for Call: Nurse from Assisted living facility calling because they need a signed order for medication so they can legally give medication Please fax a signed order so that they can give patient Gabapentin 100MG 1 tab to be taken at hour of sleep    Action Taken: NS Clinical Support Pool [[id number]]    Travel Screening: Not Applicable

## 2023-09-08 NOTE — PROGRESS NOTES
ASSESSMENT: Jade Caba is a 89 year old female presents for consultation at the request of PCP Sanford Canales, who presents today for new patient evaluation of :     -Neck pain    Patient is neurologically intact on exam.  We reviewed her CT cervical spine at time of visit, she has significant wear-and-tear degenerative findings including fusion of C3-4 lateral masses.  She has tenderness left occipital area and I think she would be a good candidate for occipital nerve blocks.  Given that her neck pain began and has been worse since a trauma, I did recommend doing an MRI to assist with treatment planning.  She would be a good candidate for potential cervical epidural steroid injections for her left arm pain as well if imaging results offered explanation. We will review her imaging and make sure there is no significant concern.  She is going to start taking gabapentin at nighttime for now.         No data to display                     Diagnoses and all orders for this visit:  Cervical radiculopathy  -     MR Cervical Spine w/o Contrast; Future  -     gabapentin (NEURONTIN) 100 MG capsule; Take 1 capsule (100 mg) by mouth At Bedtime  Neck pain  -     Spine  Referral  Occipital neuralgia of left side  -     MR Cervical Spine w/o Contrast; Future  -     gabapentin (NEURONTIN) 100 MG capsule; Take 1 capsule (100 mg) by mouth At Bedtime       PLAN:  Reviewed spine anatomy and disease process. Discussed diagnosis and treatment options with the patient today. A shared decision making model was used. The patient's values and choices were respected. The following represents what was discussed and decided upon by the provider and the patient.     -DIAGNOSTIC TESTS:  Images were personally reviewed and interpreted and explained to patient today using spine model.   -- I ordered a cervical spine MRI without contrast today    -PHYSICAL THERAPY:    Patient does not wish to pursue physical therapy at this time,  and has been doing stretching exercises daily on her own without relief.  We did discuss the importance of core strengthening, ROM, stretching exercises with the patient and how each of these entities is important in decreasing pain.  Did offer a referral for formal physical therapy if desired at any time    -MEDICATIONS:    Discussed multiple medication options today with patient. Discussed risks, side effects, and proper use of medications. Patient verbalized understanding.  I offered and she wishes to start gabapentin 100 mg to be taken at nighttime only.  She can continue Tylenol as needed.    -INTERVENTIONS:    Discussed risks and benefits of injections with patient today.  I think there would be a role for left occipital nerve block here, however given her symptoms have been persistent since a traumatic injury and she is developing numbness in her right hand, I recommend MRI prior to intervention.  We also discussed the role for potential cervical epidural steroid injections, but I do think her left upper neck pain is worse than her arm at this point.    -PATIENT EDUCATION: Total time of 40 minutes, on the day of service, spent with the patient, reviewing the chart, placing orders, and documenting.   -Today we also discussed the issues related to the pros and cons of the current treatment plan.    -FOLLOW-UP:   We will call Jade about the results of her MRI scan    Advised patient to call the [facility] if symptoms worsen or if they develop red flag symptoms such as numbness, weakness, severe pain uncontrolled by current pain med regimen, or any new or worsening problems controlling bladder and bowel function.   ______________________________________________________________________    SUBJECTIVE:   Jade Caba  is a 89 year old female history of MI with 8 stents, osteoporosis on Fosamax prior serious infection, blood clots, rheumatoid arthritis who presents today for new patient evaluation of neck pain        Jade is here today accompanied by her son.  She is complaining of constant severe left-sided upper neck pain for approximately the last year.  Her pain is always an 8 out of 10.  Turning her head especially to the left makes her pain worse.  The pain feels sharp.  It sometimes radiates up above her ear on the left.  It also sometimes radiates down the left arm into the hand and no particular distribution.  She describes numbness and tingling in her first and second digits on the right hand and it is now starting in the third digit on the right hand as well.  Symptoms began in October of last year after a fall injury, at which time she was seen in the emergency room and had a CT scan of her neck done, which was unrevealing for a serious injury.  She has since been dealing with the pain on her own.  She takes Tylenol as needed, without much improvement.  She has difficulty sleeping due to the pain      -Treatment to Date:     -Medications:  Tylenol    Current Outpatient Medications   Medication    Acetaminophen (TYLENOL PO)    acetaminophen (TYLENOL) 500 MG tablet    alendronate (FOSAMAX) 70 MG tablet    aspirin (ASA) 81 MG EC tablet    calcium carbonate 500 mg (elemental) (OSCAL) 500 MG tablet    carboxymethylcellulose PF (REFRESH PLUS) 0.5 % ophthalmic solution    carvedilol (COREG) 3.125 MG tablet    cholecalciferol (VITAMIN D3) 5000 UNITS CAPS capsule    citalopram (CELEXA) 10 MG tablet    folic acid (FOLVITE) 1 MG tablet    furosemide (LASIX) 20 MG tablet    gabapentin (NEURONTIN) 100 MG capsule    hydrochlorothiazide (HYDRODIURIL) 12.5 MG tablet    hydrochlorothiazide (HYDRODIURIL) 25 MG tablet    L-Methylfolate-B6-B12 (FOLBIC RF PO)    losartan (COZAAR) 100 MG tablet    Multiple Vitamins-Minerals (CERTA-FREDERICK PO)    olopatadine (PATADAY) 0.2 % ophthalmic solution    omeprazole (PRILOSEC) 40 MG capsule    oxybutynin (DITROPAN) 5 MG tablet    polyethylene glycol (MIRALAX/GLYCOLAX) powder    triamcinolone  (KENALOG) 0.1 % external cream     No current facility-administered medications for this visit.       No Known Allergies    Past Medical History:   Diagnosis Date    Anxiety     Arthritis     Atrial flutter (H)     C1 cervical fracture (H)     CAD (coronary artery disease)     cath in 1/2018: Angioplasty and stenting of 90% mid RCA stenosis, 50% right ostial coronary stenosis, and ostial LAD stenosis.  Moderate disease throughout the mid LAD and 30 to 50% range.    Carotid artery stenosis     CKD (chronic kidney disease)     Depression     Diastolic heart failure (H)     Most recent echo 1/2018: EF 55%, inferior regional wall motion abnormality, increased left ventricular end-diastolic pressure.    H/O alcohol abuse     Hypercholesterolemia     Hypertension     Renal artery stenosis (H)     Seizure disorder (H)     Subarachnoid hemorrhage (H) 2014        Patient Active Problem List   Diagnosis    Renal artery stenosis (H)    HTN (hypertension)    Arteriosclerosis of coronary artery    Carotid stenosis    CKD (chronic kidney disease) stage 3, GFR 30-59 ml/min (H)    Hyperlipidemia LDL goal <100    Seizure disorder (H)    Osteoarthritis of knee    Chondrocalcinosis    Subarachnoid hemorrhage (H)    Fracture of C1 vertebra, closed (H)    Sacral fracture, closed (H)    Atrial flutter (H)    Alcohol abuse    Constipation    ARF (acute renal failure) (H)    FTT (failure to thrive) in adult    Fall    Cognitive impairment    Health Care Home    Advance care planning    Chronic diastolic congestive heart failure (H)    Coronary atherosclerosis    Anxiety    Urgency incontinence    Klebsiella cystitis    Antibiotic-associated diarrhea    Accelerating angina (H)    Depressive disorder    Esophageal reflux    H/O: CVA (cerebrovascular accident)    History of rib fracture    Occlusion and stenosis of carotid artery    PFO (patent foramen ovale)    Pulmonary hypertension (H)    Peripheral vascular disease (H)    Neck pain  "      Past Surgical History:   Procedure Laterality Date    APPENDECTOMY      CAROTID ENDARTERECTOMY Left     Cataract surgery Right     will have laser eye surgery    CHOLECYSTECTOMY      Coronary stents x 3      CV CORONARY ANGIOGRAM N/A 7/19/2019    Procedure: Coronary Angiogram;  Surgeon: Pablito Emerson MD;  Location:  [facility]    HYSTERECTOMY      at age of 40-not cancerus or precancerous     JOINT REPLACEMENT Bilateral     Stenting of renal artery         Family History   Problem Relation Age of Onset    C.A.D. Mother     C.A.D. Brother     C.A.D. Sister     C.A.D. Brother     Cancer Sister         Breast cancer    Heart Disease Son        Reviewed past medical, surgical, and family history with patient found on new patient intake packet located in EMR Media tab.     SOCIAL HX: Non-smoker, no alcohol use, history of heavy drinking, no recreational drug use    Oswestry (ESTEPHANIE) Questionnaire:         No data to display                Neck Disability Index:       No data to display                   PHQ-2 Score:       3/7/2023     6:07 PM 3/5/2023    10:53 AM   PHQ-2 ( 1999 [employer])   Q1: Little interest or pleasure in doing things 0 0   Q2: Feeling down, depressed or hopeless 0 0   PHQ-2 Score 0 0   Q1: Little interest or pleasure in doing things Not at all    Q2: Feeling down, depressed or hopeless Not at all    PHQ-2 Score 0           ROS: Positive for joint pain, muscle pain, open wounds, itching, dizziness, insomnia, anxiety.  Specifically negative for bowel/bladder dysfunction, balance changes, headache, dizziness, foot drop, fevers, chills, appetite changes, nausea/vomiting, unexplained weight loss. Otherwise 13 systems reviewed are negative. Please see the patient's intake questionnaire from today for details.    OBJECTIVE:  BP (!) 153/71   Pulse 71   Ht 1.549 m (5' 1\")   Wt 61.2 kg (135 lb)   LMP  (LMP Unknown)   SpO2 96%   BMI 25.51 kg/m      PHYSICAL " EXAMINATION:  --CONSTITUTIONAL: Vital signs as above. No acute distress. The patient is well nourished and well groomed.  --PSYCHIATRIC: The patient is awake, alert, oriented to person, place, and time, and answering questions appropriately with clear speech. Appropriate mood and affect   --HEENT: Sclera are non-injected. Extraocular muscles are intact. Moist oral mucosa.  --SKIN: Skin over the face, bilateral upper extremities, and posterior torso is clean, dry, intact without rashes.  --RESPIRATORY: Normal rhythm and effort. No abnormal accessory muscle breathing patterns noted.     --NEUROLOGIC: CN III-XII are grossly intact.   --GROSS MOTOR: Easily arises from a seated position. Toe walking, heel walking are normal.  Tandem was not attempted    --UPPER EXTREMITY MOTOR TESTING:  Shoulder abduction: right 5/5, left 5/5  Triceps: right 5/5 left 5/5  Biceps:right 5/5  left 5/5,   Wrist flexion: right 5/5  left 5/5,   Wrist extension: right 5/5  left 5/5,   Hand :  right 5/5  left 5/5,   Intrinsics: right 5/5  left 5/5,   Extensors: right 5/5  left 5/5,     --LOWER EXTREMITY MOTOR TESTING  Hip flexion: right 5/5  left 5/5,   Quads:right 5/5  left 5/5,   Hamstrings: right 5/5  left 5/5,   Dorsiflexion: right 5/5  left 5/5,   Plantarflexion: right 5/5  left 5/5,   EHL: right 5/5  left 5/5,     REFLEXES: 1/4 symmetric triceps, biceps, brachioradialis bilaterally. 0/4 symmetric patellar, achilles reflex bilaterally.  Negative Clonus, Babinski, and Alaniz's bilaterally.      SENSATION: Decreased to light touch first 3 digits of right hand.  Sensation otherwise intact throughout  the upper and lower extremities  --VASCULAR: Warm upper and lower limbs bilaterally.     --CERVICAL SPINE: Inspection reveals no evidence of deformity or swelling. Range of motion is quite limited in lateral rotation especially to the left.  Mildly limited with cervical flexion, extension.  Positive point tenderness to palpation of mid  cervical spine.  No swelling or redness over this area     No tenderness to palpation of traps, scaps, or paraspinal musculature    Tender left occipital area and surrounding soft tissue.  No redness, swelling      RESULTS:   Prior medical records from [facility] and Care Everywhere were reviewed today.         IMAGING:  Spine imaging was personally reviewed and interpreted today. The images were shown to the patient and the findings were explained using a spine model.  EXAM: CT HEAD W/O CONTRAST, CT CERVICAL SPINE W/O CONTRAST  LOCATION: [facility]  DATE/TIME: 10/16/2022 8:55 PM     INDICATION: Fall, posterior scalp contusion  COMPARISON:  CT neck 09/05/2022.  TECHNIQUE:   1) Routine CT Head without IV contrast. Multiplanar reformats. Dose reduction techniques were used.  2) Routine CT Cervical Spine without IV contrast. Multiplanar reformats. Dose reduction techniques were used.     FINDINGS:   HEAD CT:   INTRACRANIAL CONTENTS: No intracranial hemorrhage, extraaxial collection, or mass effect.  No CT evidence of acute infarct. Mild presumed chronic small vessel ischemic changes with a chronic lacunar infarct in the right caudate head. Mild generalized   volume loss. No hydrocephalus.      VISUALIZED ORBITS/SINUSES/MASTOIDS: Prior bilateral cataract surgery. Visualized portions of the orbits are otherwise unremarkable. No paranasal sinus mucosal disease. No middle ear or mastoid effusion.     BONES/SOFT TISSUES:  Right temporoparietal and left parietal scalp hematomas. No skull fracture.     CERVICAL SPINE CT:   VERTEBRA: Normal vertebral body heights and alignment. No fracture or posttraumatic subluxation. Osseous fusion of the left C3-C4 lateral masses.     CANAL/FORAMINA: Multilevel spondylosis. No high-grade canal or neural foraminal stenosis.     PARASPINAL:  Thyroid nodules measuring up to 15 mm on the left, unchanged since the 09/05/2022 CT. Visualized lung fields are clear.                                                                       IMPRESSION:  HEAD CT:  1.  Right temporoparietal and left parietal scalp hematomas without acute intracranial abnormality.     CERVICAL SPINE CT:  1.  No CT evidence for acute fracture or post traumatic subluxation.         Nayeli LAKHANIC  [facility]  O. [phone]

## 2023-09-19 NOTE — PROGRESS NOTES
[facility] Care Coordination Contact    Rec'd email from member's daughter Jennifer to report that there has been a change in member's medical services. Per Jennifer, her mother will be seen by [facility] a Residential Care Program at [facility].  Jennifer stated her mother signed the consent form 9/13/2023.    Jennifer stated that her mother was also wondering if there is a special telephone for better hearing that is covered under her insurance.    Secure email sent to Jennifer to inform her that her mother will be assigned a [facility] 10/1/2023.  CC provided information on the [facility] program and shared the link with the application and more information.  Request a call back if she has questions.    Holly Guajardo RN, BC  Manager [facility] Care Coordinator   [phone]  (Fax)

## 2023-09-26 NOTE — PROGRESS NOTES
[facility] Care Coordination Contact    9/21/2023 Rec'd email from member's daughter Jennifer inquiring on her mother's in network dental clinics. Per Jennifer, she needs to schedule an appt due to report that her mother has a bad tooth ache. Jennifer shared that her mother's current dentist has not availability until November.     CC sent follow up email with [facility] information, requesting a call back as needed explaining CC can assist with scheduling a dental exam.  Holly Guajardo RN, BC  Manager [facility] Care Coordinator   [phone]  (Fax)

## 2023-10-03 NOTE — PROGRESS NOTES
[facility] Care Coordination Contact    No longer active with [facility] case management effective 9/30/2023.  Reason for community disenrollment: Change Care System/PCC to [facility].  Holly Guajardo RN, BC  Manager [facility] Care Coordinator   [phone]  (Fax)

## 2023-10-05 NOTE — PROGRESS NOTES
Mrs. Jade Caba is a 89-year-old female with history of bilateral carotid endarterectomies.  The right carotid endarterectomy site occluded almost 2 decades ago.  This all came to light when states she was having neck pain and was found to have moderate stenosis of the left carotid endarterectomy site.  I had seen her and reassured her and her daughter.    She returns for follow-up.  She continues to complain of left-sided neck pain.    Duplex sonography shows moderate stenosis even though the peak systolic velocities are elevated this is due to compensating increased flow on the left side because of the right-sided occlusion.  Last year when the ultrasound was done she also had a CT scan of the soft tissue of the neck which did not note significant stenosis of the left cervical internal carotid artery.    Generally I would not recommend continued follow-up because of her age as she would not be a candidate for additional interventions.  However the patient and her daughter are still quite nervous and would like continued surveillance.    We will see her back in 1 year with left carotid duplex sonography.

## 2023-10-13 NOTE — TELEPHONE ENCOUNTER
----- Message from Anthony Romero DO sent at 10/13/2023  9:16 AM CDT -----  Please call the patient and let her know that I reviewed MRI of her cervical spine as Nayeli Mora is out of the office today.  It does show wear-and-tear changes including arthritis throughout her neck.  At C6-7 there is narrowing around where the nerve comes down her arm which could potentially be reason for her pain.  I recommend that she follow-up with Nayeli Mora to review imaging and treatment plan.

## 2023-10-13 NOTE — TELEPHONE ENCOUNTER
Phone call to patient to review results and provider's recommendations. Only number listed in patient's chart is for her daughter Bianca. She states patient has a hard time hearing and understanding over the phone.   Explained covering provider is recommending patient return to clinic for an appointment to review the results in person and discuss treatment options. Transferred to scheduling to make appointment for her mother.

## 2023-10-25 PROBLEM — E46: Status: ACTIVE | Noted: 2023-01-01

## 2023-10-25 NOTE — TELEPHONE ENCOUNTER
Patient called back to reschedule appt with Nayeli Mora, due to provider being out on 10/27. Patient begrudgingly took appt in [place] because [place] location was out to 12/8/23. Patient is wondering if there would be any way to schedule appt prior to 12/8 in [place] instead of [place].

## 2023-10-25 NOTE — PATIENT INSTRUCTIONS
Patient Education   Personalized Prevention Plan  You are due for the preventive services outlined below.  Your care team is available to assist you in scheduling these services.  If you have already completed any of these items, please share that information with your care team to update in your medical record.  Health Maintenance Due   Topic Date Due     Heart Failure Action Plan  Never done     RSV VACCINE 60+ (1 - 1-dose 60+ series) Never done     Kidney Microalbumin Urine Test  10/03/2014     Flu Vaccine (1) 09/01/2023     COVID-19 Vaccine (6 - 2023-24 season) 09/01/2023     Complete Blood Count  09/06/2023     Basic Metabolic Panel  09/10/2023     Hemoglobin  09/06/2023     Learning About Dietary Guidelines  What are the Dietary Guidelines for Americans?     Dietary Guidelines for Americans provide tips for eating well and staying healthy. This helps reduce the risk for long-term (chronic) diseases.  These guidelines recommend that you:  Eat and drink the right amount for you. The [place] government's food guide is called MyPlate. It can help you make your own well-balanced eating plan.  Try to balance your eating with your activity. This helps you stay at a healthy weight.  Drink alcohol in moderation, if at all.  Limit foods high in salt, saturated fat, trans fat, and added sugar.  These guidelines are from the [place] Department of Agriculture and the [place] Department of Health and Human Services. They are updated every 5 years.  What is MyPlate?  MyPlate is the [place] government's food guide. It can help you make your own well-balanced eating plan. A balanced eating plan means that you eat enough, but not too much, and that your food gives you the nutrients you need to stay healthy.  MyPlate focuses on eating plenty of whole grains, fruits, and vegetables, and on limiting fat and sugar. It is available online at www.ChooseMyPlate.gov.  How can you get started?  If you're trying to eat healthier, you can slowly  "change your eating habits over time. You don't have to make big changes all at once. Start by adding one or two healthy foods to your meals each day.  Grains  Choose whole-grain breads and cereals and whole-wheat pasta and whole-grain crackers.  Vegetables  Eat a variety of vegetables every day. They have lots of nutrients and are part of a heart-healthy diet.  Fruits  Eat a variety of fruits every day. Fruits contain lots of nutrients. Choose fresh fruit instead of fruit juice.  Protein foods  Choose fish and lean poultry more often. Eat red meat and fried meats less often. Dried beans, tofu, and nuts are also good sources of protein.  Dairy  Choose low-fat or fat-free products from this food group. If you have problems digesting milk, try eating cheese or yogurt instead.  Fats and oils  Limit fats and oils if you're trying to cut calories. Choose healthy fats when you cook. These include canola oil and olive oil.  Where can you learn more?  Go to https://www.[employer].net/patiented  Enter D676 in the search box to learn more about \"Learning About Dietary Guidelines.\"  Current as of: March 1, 2023               Content Version: 13.7    7497-3148 [employer].   Care instructions adapted under license by your healthcare professional. If you have questions about a medical condition or this instruction, always ask your healthcare professional. [employer] disclaims any warranty or liability for your use of this information.      Activities of Daily Living    Your Health Risk Assessment indicates you have difficulties with activities of daily living such as housework, bathing, preparing meals, taking medication, etc. Please make a follow up appointment for us to address this issue in more detail.  Hearing Loss: Care Instructions  Overview     Hearing loss is a sudden or slow decrease in how well you hear. It can range from slight to profound. Permanent hearing loss can occur with aging. It " also can happen when you are exposed long-term to loud noise. Examples include listening to loud music, riding motorcycles, or being around other loud machines.  Hearing loss can affect your work and home life. It can make you feel lonely or depressed. You may feel that you have lost your independence. But hearing aids and other devices can help you hear better and feel connected to others.  Follow-up care is a key part of your treatment and safety. Be sure to make and go to all appointments, and call your doctor if you are having problems. It's also a good idea to know your test results and keep a list of the medicines you take.  How can you care for yourself at home?  Avoid loud noises whenever possible. This helps keep your hearing from getting worse.  Always wear hearing protection around loud noises.  Wear a hearing aid as directed.  A professional can help you pick a hearing aid that will work best for you.  You can also get hearing aids over the counter for mild to moderate hearing loss.  Have hearing tests as your doctor suggests. They can show whether your hearing has changed. Your hearing aid may need to be adjusted.  Use other devices as needed. These may include:  Telephone amplifiers and hearing aids that can connect to a television, stereo, radio, or microphone.  Devices that use lights or vibrations. These alert you to the doorbell, a ringing telephone, or a baby monitor.  Television closed-captioning. This shows the words at the bottom of the screen. Most new TVs can do this.  TTY (text telephone). This lets you type messages back and forth on the telephone instead of talking or listening. These devices are also called TDD. When messages are typed on the keyboard, they are sent over the phone line to a receiving TTY. The message is shown on a monitor.  Use text messaging, social media, and email if it is hard for you to communicate by telephone.  Try to learn a listening technique called  "speechreading. It is not lipreading. You pay attention to people's gestures, expressions, posture, and tone of voice. These clues can help you understand what a person is saying. Face the person you are talking to, and have them face you. Make sure the lighting is good. You need to see the other person's face clearly.  Think about counseling if you need help to adjust to your hearing loss.  When should you call for help?  Watch closely for changes in your health, and be sure to contact your doctor if:    You think your hearing is getting worse.     You have new symptoms, such as dizziness or nausea.   Where can you learn more?  Go to https://www.[employer].net/patiented  Enter R798 in the search box to learn more about \"Hearing Loss: Care Instructions.\"  Current as of: March 1, 2023               Content Version: 13.7    4837-4008 [employer].   Care instructions adapted under license by your healthcare professional. If you have questions about a medical condition or this instruction, always ask your healthcare professional. [employer] disclaims any warranty or liability for your use of this information.      Bladder Training: Care Instructions  Your Care Instructions     Bladder training is used to treat urge incontinence and stress incontinence. Urge incontinence means that the need to urinate comes on so fast that you can't get to a toilet in time. Stress incontinence means that you leak urine because of pressure on your bladder. For example, it may happen when you laugh, cough, or lift something heavy.  Bladder training can increase how long you can wait before you have to urinate. It can also help your bladder hold more urine. And it can give you better control over the urge to urinate.  It is important to remember that bladder training takes a few weeks to a few months to make a difference. You may not see results right away, but don't give up.  Follow-up care is a key part of your " treatment and safety. Be sure to make and go to all appointments, and call your doctor if you are having problems. It's also a good idea to know your test results and keep a list of the medicines you take.  How can you care for yourself at home?  Work with your doctor to come up with a bladder training program that is right for you. You may use one or more of the following methods.  Delayed urination  In the beginning, try to keep from urinating for 5 minutes after you first feel the need to go.  While you wait, take deep, slow breaths to relax. Kegel exercises can also help you delay the need to go to the bathroom.  After some practice, when you can easily wait 5 minutes to urinate, try to wait 10 minutes before you urinate.  Slowly increase the waiting period until you are able to control when you have to urinate.  Scheduled urination  Empty your bladder when you first wake up in the morning.  Schedule times throughout the day when you will urinate.  Start by going to the bathroom every hour, even if you don't need to go.  Slowly increase the time between trips to the bathroom.  When you have found a schedule that works well for you, keep doing it.  If you wake up during the night and have to urinate, do it. Apply your schedule to waking hours only.  Kegel exercises  These tighten and strengthen pelvic muscles, which can help you control the flow of urine. (If doing these exercises causes pain, stop doing them and talk with your doctor.) To do Kegel exercises:  Squeeze your muscles as if you were trying not to pass gas. Or squeeze your muscles as if you were stopping the flow of urine. Your belly, legs, and buttocks shouldn't move.  Hold the squeeze for 3 seconds, then relax for 5 to 10 seconds.  Start with 3 seconds, then add 1 second each week until you are able to squeeze for 10 seconds.  Repeat the exercise 10 times a session. Do 3 to 8 sessions a day.  When should you call for help?  Watch closely for changes  "in your health, and be sure to contact your doctor if:    Your incontinence is getting worse.     You do not get better as expected.   Where can you learn more?  Go to https://www.[employer].net/patiented  Enter V684 in the search box to learn more about \"Bladder Training: Care Instructions.\"  Current as of: March 1, 2023               Content Version: 13.7    [id number] [employer].   Care instructions adapted under license by your healthcare professional. If you have questions about a medical condition or this instruction, always ask your healthcare professional. [employer] disclaims any warranty or liability for your use of this information.      Learning About Depression Screening  What is depression screening?  Depression screening is a way to see if you have depression symptoms. It may be done by a doctor or counselor. It's often part of a routine checkup. That's because your mental health is just as important as your physical health.  Depression is a mental health condition that affects how you feel, think, and act. You may:  Have less energy.  Lose interest in your daily activities.  Feel sad and grouchy for a long time.  Depression is very common. It affects people of all ages.  Many things can lead to depression. Some people become depressed after they have a stroke or find out they have a major illness like cancer or heart disease. The death of a loved one or a breakup may lead to depression. It can run in families. Most experts believe that a combination of inherited genes and stressful life events can cause it.  What happens during screening?  You may be asked to fill out a form about your depression symptoms. You and the doctor will discuss your answers. The doctor may ask you more questions to learn more about how you think, act, and feel.  What happens after screening?  If you have symptoms of depression, your doctor will talk to you about your options.  Doctors usually " "treat depression with medicines or counseling. Often, combining the two works best. Many people don't get help because they think that they'll get over the depression on their own. But people with depression may not get better unless they get treatment.  The cause of depression is not well understood. There may be many factors involved. But if you have depression, it's not your fault.  A serious symptom of depression is thinking about death or suicide. If you or someone you care about talks about this or about feeling hopeless, get help right away.  It's important to know that depression can be treated. Medicine, counseling, and self-care may help.  Where can you learn more?  Go to https://www.[employer].net/patiented  Enter T185 in the search box to learn more about \"Learning About Depression Screening.\"  Current as of: October 20, 2022               Content Version: 13.7    [id number] [employer].   Care instructions adapted under license by your healthcare professional. If you have questions about a medical condition or this instruction, always ask your healthcare professional. [employer] disclaims any warranty or liability for your use of this information.      Preventing Falls: Care Instructions    Talk to your doctor about the medicines you take. Ask if any of them increase the risk of falls and whether they can be changed or stopped.   Try to exercise regularly. It can help improve your strength and balance. This can help lower your risk of falling.     Practice fall safety and prevention.    Wear low-heeled shoes that fit well and give your feet good support. Talk to your doctor if you have foot problems that make this hard.  Carry a cellphone or wear a medical alert device that you can use to call for help.  Use stepladders instead of chairs to reach high objects. Don't climb if you're at risk for falls. Ask for help, if needed.  Wear the correct eyeglasses, if you need them.    " "Make your home safer.    Remove rugs, cords, clutter, and furniture from walkways.  Keep your house well lit. Use night-lights in hallways and bathrooms.  Install and use sturdy handrails on stairways.  Wear nonskid footwear, even inside. Don't walk barefoot or in socks without shoes.    Be safe outside.    Use handrails, curb cuts, and ramps whenever possible.  Keep your hands free by using a shoulder bag or backpack.  Try to walk in well-lit areas. Watch out for uneven ground, changes in pavement, and debris.  Be careful in the winter. Walk on the grass or gravel when sidewalks are slippery. Use de-icer on steps and walkways. Add non-slip devices to shoes.    Put grab bars and nonskid mats in your shower or tub and near the toilet. Try to use a shower chair or bath bench when bathing.   Get into a tub or shower by putting in your weaker leg first. Get out with your strong side first. Have a phone or medical alert device in the bathroom with you.   Where can you learn more?  Go to https://www.[employer].net/patiented  Enter G117 in the search box to learn more about \"Preventing Falls: Care Instructions.\"  Current as of: November 9, 2022               Content Version: 13.7    [id number] [employer].   Care instructions adapted under license by your healthcare professional. If you have questions about a medical condition or this instruction, always ask your healthcare professional. [employer] disclaims any warranty or liability for your use of this information.      How to Get Up Safely After a Fall: Care Instructions  Overview     If you have injuries, health problems, or other reasons that may make it easy for you to fall at home, it is a good idea to learn how to get up safely after a fall. Learning how to get up correctly can help you avoid making an injury worse.  Also, knowing what to do if you cannot get up can help you stay safe until help arrives.  Follow-up care is a key part of " your treatment and safety. Be sure to make and go to all appointments, and call your doctor if you are having problems. It's also a good idea to know your test results and keep a list of the medicines you take.  How can you care for yourself after a fall?  If you think you can get up  First lie still for a few minutes and think about how you feel. If your body feels okay and you think you can get up safely, follow the rest of the steps below:  Look for a chair or other piece of furniture that is close to you.  Roll onto your side and rest. Roll by turning your head in the direction you want to roll, move your shoulder and arm, then hip and leg in the same direction.  Lie still for a moment to let your blood pressure adjust.  Slowly push your upper body up, lift your head, and take a moment to rest.  Slowly get up on your hands and knees, and crawl to the chair or other stable piece of furniture.  Put your hands on the chair.  Move one foot forward, and place it flat on the floor. Your other leg should be bent with the knee on the floor.  Rise slowly, turn your body, and sit in the chair. Stay seated for a bit and think about how you feel. Call for help. Even if you feel okay, let someone know what happened to you. You might not know that you have a serious injury.  If you cannot get up  If you think you are injured after a fall or you cannot get up, try not to panic.  Call out for help.  If you have a phone within reach or you have an emergency call device, use it to call for help.  If you do not have a phone within reach, try to slide yourself toward it. If you cannot get to the phone, try to slide toward a door or window or a place where you think you can be heard.  [place] or use an object to make noise so someone might hear you.  If you can reach something that you can use for a pillow, place it under your head. Try to stay warm by covering yourself with a blanket or clothing while you wait for help.  When should  "you call for help?   Call 911 anytime you think you may need emergency care. For example, call if:    You passed out (lost consciousness).     You cannot get up after a fall.     You have severe pain.   Call your doctor now or seek immediate medical care if:    You have new or worse pain.     You are dizzy or lightheaded.     You hit your head.   Watch closely for changes in your health, and be sure to contact your doctor if:    You do not get better as expected.   Where can you learn more?  Go to https://www.[employer].net/patiented  Enter G513 in the search box to learn more about \"How to Get Up Safely After a Fall: Care Instructions.\"  Current as of: November 14, 2022               Content Version: 13.7    [id number] [employer].   Care instructions adapted under license by your healthcare professional. If you have questions about a medical condition or this instruction, always ask your healthcare professional. [employer] disclaims any warranty or liability for your use of this information.         "

## 2023-10-25 NOTE — PROGRESS NOTES
"SUBJECTIVE:   Jade is a 89 year old who presents for Preventive Visit.      Are you in the first 12 months of your Medicare coverage?  No    Patient is a 89-year-old  female who presents to the clinic for her annual wellness exam.  Her main concern today is in regards to some issues with urination.  For the past 2 to 3 weeks she has had difficulties with increased frequency of urination as well as dysuria.  Patient has not noted any blood in her urine.  She denies any issues with fever, chills, abdominal pain, or back pain.  Patient would like to be evaluated for a urinary tract infection today.  She does have a history of hypertension for which she has been taking losartan 100 mg daily and hydrochlorothiazide 25 mg daily for ongoing management of her blood pressure.  Patient is not checking her blood pressure very frequently outside the clinic setting.  She is tolerating medication without issue.  She does take 20 mg of citalopram for management of her depression.  She has been on this medication for quite some time.  Patient does feel that the medication has been helpful in regards to her mood.  She denies any history of depression, anhedonia, or thoughts of self-harm.  Patient would like to update her influenza vaccination today.    Healthy Habits:     In general, how would you rate your overall health?  Good    Frequency of exercise:  6-7 days/week    Duration of exercise:  15-30 minutes    Do you usually eat at least 4 servings of fruit and vegetables a day, include whole grains    & fiber and avoid regularly eating high fat or \"junk\" foods?  No    Taking medications regularly:  Yes    Medication side effects:  None    Ability to successfully perform activities of daily living:  Transportation requires assistance, shopping requires assistance, preparing meals requires assistance, housework requires assistance, bathing requires assistance, laundry requires assistance, medication administration requires " assistance and money management requires assistance    Home Safety:  No safety concerns identified    Hearing Impairment:  Need to ask people to speak up or repeat themselves, difficulty understanding soft or whispered speech and difficulty understanding speech on the telephone    In the past 6 months, have you been bothered by leaking of urine? Yes    In general, how would you rate your overall mental or emotional health?  Good    Additional concerns today:  Yes      Today's PHQ-9 Score:       10/25/2023     9:30 AM   PHQ-9 SCORE   PHQ-9 Total Score MyChart 5 (Mild depression)   PHQ-9 Total Score 5       Have you ever done Advance Care Planning? (For example, a Health Directive, POLST, or a discussion with a medical provider or your loved ones about your wishes): Yes, advance care planning is on file.       Fall risk  Fallen 2 or more times in the past year?: Yes  Any fall with injury in the past year?: Yes    Cognitive Screening   1) Repeat 3 items (Leader, Season, Table)    2) Clock draw:   NORMAL  3) 3 item recall: Recalls 3 objects  Results: NORMAL clock, 1-2 items recalled: COGNITIVE IMPAIRMENT LESS LIKELY    Mini-CogTM Copyright LEONARDO Mancuso. Licensed by the author for use in [facility]; reprinted with permission (soob@[facility]). All rights reserved.      Do you have sleep apnea, excessive snoring or daytime drowsiness? : no    Reviewed and updated as needed this visit by clinical staff   Tobacco  Allergies  Meds              Reviewed and updated as needed this visit by Provider                 Social History     Tobacco Use    Smoking status: Never     Passive exposure: Never    Smokeless tobacco: Never   Substance Use Topics    Alcohol use: No           10/25/2023     9:31 AM   Alcohol Use   Prescreen: >3 drinks/day or >7 drinks/week? No     Do you have a current opioid prescription? No  Do you use any other controlled substances or medications that are not prescribed by a provider?  None      Hypertension Follow-up    Do you check your blood pressure regularly outside of the clinic? Yes   Are you following a low salt diet? Yes  Are your blood pressures ever more than 140 on the top number (systolic) OR more   than 90 on the bottom number (diastolic), for example 140/90? No    Current providers sharing in care for this patient include:   Patient Care Team:  Sanford Canales MD as PCP - General (Internal Medicine)  Kristel Call PA as Physician Assistant (Cardiovascular Disease)  Sanford Canales MD as Assigned PCP  James Rojas MD as Assigned Heart and Vascular Provider  Nayeli Mora APRN CNP as Assigned Neuroscience Provider    The following health maintenance items are reviewed in Epic and correct as of today:  Health Maintenance   Topic Date Due    HF ACTION PLAN  Never done    RSV VACCINE 60+ (1 - 1-dose 60+ series) Never done    MICROALBUMIN  10/03/2014    ADVANCE CARE PLANNING  04/10/2022    MEDICARE ANNUAL WELLNESS VISIT  06/21/2023    INFLUENZA VACCINE (1) 09/01/2023    COVID-19 Vaccine (6 - 2023-24 season) 09/01/2023    CBC  09/06/2023    BMP  09/10/2023    HEMOGLOBIN  09/06/2023    ANNUAL REVIEW OF HM ORDERS  03/08/2024    ALT  03/10/2024    LIPID  03/10/2024    PHQ-9  04/25/2024    FALL RISK ASSESSMENT  10/25/2024    DTAP/TDAP/TD IMMUNIZATION (5 - Td or Tdap) 04/22/2033    TSH W/FREE T4 REFLEX  Completed    Pneumococcal Vaccine: 65+ Years  Completed    URINALYSIS  Completed    ZOSTER IMMUNIZATION  Completed    IPV IMMUNIZATION  Aged Out    HPV IMMUNIZATION  Aged Out    MENINGITIS IMMUNIZATION  Aged Out     Labs reviewed in EPIC    Mammogram Screening - Patient over age 75, has elected to discontinue screenings.  Pertinent mammograms are reviewed under the imaging tab.    Review of Systems   Constitutional:  Negative for chills and fever.   HENT:  Positive for hearing loss. Negative for congestion, ear pain and sore throat.    Eyes:  Positive for  "visual disturbance. Negative for pain.   Respiratory:  Positive for cough. Negative for shortness of breath.    Cardiovascular:  Negative for chest pain, palpitations and peripheral edema.   Gastrointestinal:  Positive for constipation. Negative for abdominal pain, diarrhea, heartburn, hematochezia and nausea.   Breasts:  Negative for tenderness, breast mass and discharge.   Genitourinary:  Positive for dysuria. Negative for frequency, genital sores, hematuria, pelvic pain, urgency, vaginal bleeding and vaginal discharge.   Musculoskeletal:  Positive for myalgias. Negative for arthralgias and joint swelling.   Skin:  Negative for rash.   Neurological:  Negative for dizziness, weakness, headaches and paresthesias.   Psychiatric/Behavioral:  Negative for mood changes. The patient is not nervous/anxious.        OBJECTIVE:   Blood pressure (!) 142/72, pulse 71, temperature 98.1  F (36.7  C), resp. rate 20, height 1.549 m (5' 1\"), weight 64.9 kg (143 lb), SpO2 98%, not currently breastfeeding.    Physical Exam  Vitals reviewed.   HENT:      Head: Normocephalic and atraumatic.      Right Ear: Tympanic membrane, ear canal and external ear normal.      Left Ear: Tympanic membrane, ear canal and external ear normal.      Mouth/Throat:      Mouth: Mucous membranes are moist.      Pharynx: Oropharynx is clear.   Eyes:      Conjunctiva/sclera: Conjunctivae normal.      Pupils: Pupils are equal, round, and reactive to light.   Cardiovascular:      Rate and Rhythm: Normal rate and regular rhythm.      Pulses: Normal pulses.      Heart sounds: Normal heart sounds.   Pulmonary:      Effort: Pulmonary effort is normal.      Breath sounds: Normal breath sounds.   Abdominal:      General: Bowel sounds are normal.      Palpations: Abdomen is soft.      Tenderness: There is no right CVA tenderness or left CVA tenderness.   Musculoskeletal:      Cervical back: Neck supple.   Skin:     General: Skin is warm and dry.   Neurological:      " Mental Status: She is alert and oriented to person, place, and time. Mental status is at baseline.   Psychiatric:         Attention and Perception: Attention normal.         Mood and Affect: Mood normal.         Speech: Speech normal.         Behavior: Behavior normal.      Comments: PHQ-9 score is 5.       Diagnostic Test Results:  Labs reviewed in Epic    ASSESSMENT / PLAN:   (Z00.00) Encounter for Medicare annual wellness exam  (primary encounter diagnosis)  Comment: Adult wellness plan reviewed.    (R39.9) Urinary symptom or sign  Comment: Patient did submit a urine sample today.  Urinalysis is currently pending.  She will be contacted with results once they are available for review.  Antibiotic will be prescribed if indicated.    (I10) Primary hypertension  Comment: Patient's repeat blood pressure was improved.  Her last metabolic panel was collected in March 2023.  No maladies were noted.  Patient will continue her losartan 100 mg daily and hydrochlorothiazide 25 mg daily for ongoing management of her blood pressure.  Side effects of each medication were reviewed.  Patient was encouraged to monitor blood pressure outside the clinic today.    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  No change in status.    (I27.20) Pulmonary hypertension (H)  Comment: Followed by cardiology.    (I70.1) Renal artery stenosis (H24)  Comment: Followed by cardiology.    (F33.9) Depression, recurrent (H24)  Comment: Patient's PHQ-9 score is noted to be 5.  This would indicate that depression is currently under good control.  She will continue her escitalopram at 20 mg by mouth per day.  Side effects of SSRIs were reviewed.  Patient is aware that should she have any issues with her medication or worsening of her mood that she can contact the clinic for assistance.    (Z23) Need for influenza vaccination  Comment: Influenza immunization administered today.    Patient has been advised of split billing  "requirements and indicates understanding: Yes      COUNSELING:  Reviewed preventive health counseling, as reflected in patient instructions      BMI:   Estimated body mass index is 26.45 kg/m  as calculated from the following:    Height as of this encounter: 1.549 m (5' 1\").    Weight as of 10/5/23: 63.5 kg (140 lb).         She reports that she has never smoked. She has never been exposed to tobacco smoke. She has never used smokeless tobacco.      Appropriate preventive services were discussed with this patient, including applicable screening as appropriate for fall prevention, nutrition, physical activity, Tobacco-use cessation, weight loss and cognition.  Checklist reviewing preventive services available has been given to the patient.    Reviewed patients plan of care and provided an AVS. The Basic Care Plan (routine screening as documented in Health Maintenance) for Jade meets the Care Plan requirement. This Care Plan has been established and reviewed with the Patient and daughter.      Sanford Canales MD  [facility]    Identified Health Risks:  I have reviewed Opioid Use Disorder and Substance Use Disorder risk factors and made any needed referrals.   Answers submitted by the patient for this visit:  Patient Health Questionnaire (Submitted on 10/25/2023)  If you checked off any problems, how difficult have these problems made it for you to do your work, take care of things at home, or get along with other people?: Somewhat difficult  PHQ9 TOTAL SCORE: 5    "

## 2023-10-25 NOTE — PROGRESS NOTES
The patient was counseled and encouraged to consider modifying their diet and eating habits. She was provided with information on recommended healthy diet options.  The patient reports that she has difficulty with activities of daily living. I have asked that the patient make a follow up appointment in *** weeks where this issue will be further evaluated and addressed.  The patient was provided with written information regarding signs of hearing loss.  Information on urinary incontinence and treatment options given to patient.  The patient's PHQ-9 score is consistent with mild depression. She was provided with information regarding depression and was advised to schedule a follow up appointment in *** weeks to further address this issue.  She is at risk for falling and has been provided with information to reduce the risk of falling at home.

## 2023-10-26 NOTE — TELEPHONE ENCOUNTER
"Please see patient's [employer] message below.    Per result note message 10/25/23:    \"Please let Jade's daughter know that her urine sample was suggestive of infection.  I will send in a prescription for an antibiotic to [facility] pharmacy at this time.\"    And antibiotic e-scribed to [facility] 10/25/23.    Patient informed via [employer].     "

## 2023-10-27 NOTE — TELEPHONE ENCOUNTER
Patient daughter calls returning phone call. Informed of message of UTI and antibiotic sent to her [facility].

## 2023-11-09 NOTE — PATIENT INSTRUCTIONS
An injection has been ordered today to potentially help with your pain symptoms. These injections do not fix what is going on in your back, therefore they typically do not take away the pain completely, however they can many times help improve symptoms. Injections should always be completed along with other modalities such as physical therapy for the best long term outcomes. If injections alone are done, then pain will likely return.     [facility] Injection Requirements:    A  is required for all fluoroscopically-guided injections.  Injection appointments may be cancelled if there are signs/symptoms of an active infection or if the patient is being actively treated with antibiotics for a diagnosed infection.  Patients may have their steroid injection cancelled if they have had another steroid injection within 2 weeks.  Diabetic patients will have their blood glucose levels checked the day of their injection and the appointment will be rescheduled if the blood glucose level is 300 or higher.  Patients with allergies to cortisone, local anesthetics, iodine, or contrast dye should contact the Spine Center to further discuss these considerations.  Patients scheduled for medial branch block diagnostic injections should refrain from taking pain medication the day of the procedure.  The medial branch block injection appointment will be rescheduled if the patient's pain rating is not 5/10 or greater at the time of the procedure.  Patients taking warfarin/Coumadin will have their INR checked the day of the procedure and the procedure may be rescheduled if the INR is greater than 3.0.  Please contact the Spine Center (#[phone]) if you are taking any prescription blood-thinning medications (warfarin, Plavix, Lovenox, Eliquis, Brilinta, Effient, etc.) as special dosing adjustments may need to be made depending on the type of injection you are scheduled to receive.  It is recommended  that you delay having your steroid injection if you have received a flu shot or shingles vaccine within 2 weeks.       Prescribed Gabapentin today, 100 mg tablets, to be titrated up to 1 tablets 3 times a day as tolerated for your nerve pain. Please follow Gabapentin dosing chart below.    Gabapentin 100mg Dosing Chart    DATE  MORNING AFTERNOON BEDTIME    Day 1 1 0 1    Day 2 1 0 1    Day 3 1 0 1    Day 4 1 1 1    Day 5 1 1 1    Day 6 1 1 1     Continue medication, taking 1 capsules three times daily    Please call if you have any questions regarding how to take your medication  Clinic Phone # [phone]      We talked about pursuing L occipital nerve block or cervical MBB/RFA depending on results to cervical spine injection      ~Please call our [facility] Nurse Navigation line [phone] with any questions or concerns about your treatment plan, if symptoms worsen and you would like to be seen urgently, or if you have any new or worsening numbness, weakness, or problems controlling bladder and bowel function.  ~You are also welcome to contact Nayeli Mora via [employer], but please be aware that responses to [employer] message may take 2-3 days due to the high volume of patients seen in clinic.

## 2023-11-09 NOTE — PROGRESS NOTES
ASSESSMENT: Jade Caba is a 89 year old female presents for consultation at the request of PCP Sanford Canales, who presents today for a follow up patient evaluation of :     -Neck pain    Patient is neurologically intact on exam.  We reviewed her cervical spine MRI at time of visit.  She has no evidence of fracture or severe spinal canal stenosis.  She does have chronic degenerative wear-and-tear findings including including disc bulges causing varying degrees of mostly right-sided foraminal narrowing.  She does have left-sided foraminal stenosis at C6-7.  Her arm pain has improved, however her neck pain is persistent and left-sided.  I offered her a C7-T1 interlaminar epidural steroid injection.  If limited benefit, could consider a left occipital nerve block, left C2-3-4 medial branch blocks, or possibly a peripheral nerve stimulator if fails all.  We will increase gabapentin to 100 mg 3 times daily.    Personally reviewed reviewed her CT cervical spine, which shows significant left greater than right sided narrowing of the joints between C1-2. Fusion left C3-4 lateral masses      Diagnoses and all orders for this visit:  Cervical radiculopathy  -     Pain Interlaminar Epidural Strd Inj Cervical; Future       PLAN:  Reviewed spine anatomy and disease process. Discussed diagnosis and treatment options with the patient today. A shared decision making model was used. The patient's values and choices were respected. The following represents what was discussed and decided upon by the provider and the patient.     -DIAGNOSTIC TESTS:  Images were personally reviewed and interpreted and explained to patient today using spine model.   -- I did not order any new diagnostic imaging studies today.  We could consider doing a right upper extremity EMG in the future for right hand numbness and tingling    -PHYSICAL THERAPY:    Patient feels that her pain is significant and would wish to proceed with epidural steroid  injection today.  Patient does not wish to pursue physical therapy at this time, and has been doing stretching exercises daily on her own without relief.  Hopefully we can pursue physical therapy when pain is under better control after injection potentially.  We did discuss the importance of core strengthening, ROM, stretching exercises with the patient and how each of these entities is important in decreasing pain.     -MEDICATIONS:    -Recommended increasing gabapentin per titration schedule to 100 mg 3 times daily.  We can increase this further from there if helpful.  If experience side effects, can reduce this back to 100 mg at nighttime.  -Patient will continue over-the-counter Tylenol as needed  Discussed risks, side effects, and proper use of medications. Patient verbalized understanding.    -INTERVENTIONS:    Discussed risks and benefits of cervical epidural steroid injections with patient today.  She request to proceed.  I have ordered a interlaminar C7-T1 epidural steroid injection.  I think there would be a role for a left occipital nerve block if does not improve.  We also discussed the role for potential cervical medial branch blocks C2-3-4. Peripheral nerve stimulator if fails all.     -PATIENT EDUCATION: Total time of 40 minutes, on the day of service, spent with the patient, reviewing the chart, placing orders, and documenting.   -Today we also discussed the issues related to the pros and cons of the current treatment plan.    -FOLLOW-UP:   We we will follow-up per routine following cervical epidural steroid injection  Advised patient to call the [facility] if symptoms worsen or if they develop red flag symptoms such as numbness, weakness, severe pain uncontrolled by current pain med regimen, or any new or worsening problems controlling bladder and bowel function.   ______________________________________________________________________    SUBJECTIVE:   Jade endorses ongoing neck pain radiating into  her left trap without improvement since last appointment.  She rates her pain at an 8 today it is worse with moving her neck it improves with sitting still to a 5 out of 10.  She denies any radiating pain down her left arm today.  She feels her pain is significant and wishes to have an injection today.  She was hoping it would be today.  She has been taking Tylenol over-the-counter as needed and gabapentin 100 mg at nighttime.  The medications are helpful.  She has baseline numbness and tingling which is chronic in her right first through third digits which has not changed.  She is here with her son today.    She denies any radicular arm pain, numbness beyond baseline, tingling, weakness, changes in bowel or bladder control      Per previous visit:  HPI  Jade Caba  is a 89 year old female history of MI with 8 stents, osteoporosis on Fosamax prior serious infection, blood clots, rheumatoid arthritis who presents today for new patient evaluation of neck pain       Jade is here today accompanied by her son.  She is complaining of constant severe left-sided upper neck pain for approximately the last year.  Her pain is always an 8 out of 10.  Turning her head especially to the left makes her pain worse.  The pain feels sharp.  It sometimes radiates up above her ear on the left.  It also sometimes radiates down the left arm into the hand and no particular distribution.  She describes numbness and tingling in her first and second digits on the right hand and it is now starting in the third digit on the right hand as well.  Symptoms began in October of last year after a fall injury, at which time she was seen in the emergency room and had a CT scan of her neck done, which was unrevealing for a serious injury.  She has since been dealing with the pain on her own.  She takes Tylenol as needed, without much improvement.  She has difficulty sleeping due to the pain      -Treatment to Date:      -Medications:  Tylenol  Gabapentin    Current Outpatient Medications   Medication    acetaminophen (TYLENOL) 500 MG tablet    alendronate (FOSAMAX) 70 MG tablet    aspirin (ASA) 81 MG EC tablet    calcium carbonate 500 mg (elemental) (OSCAL) 500 MG tablet    carboxymethylcellulose PF (REFRESH PLUS) 0.5 % ophthalmic solution    carvedilol (COREG) 3.125 MG tablet    cholecalciferol (VITAMIN D3) 5000 UNITS CAPS capsule    citalopram (CELEXA) 10 MG tablet    folic acid (FOLVITE) 1 MG tablet    furosemide (LASIX) 20 MG tablet    gabapentin (NEURONTIN) 100 MG capsule    hydrochlorothiazide (HYDRODIURIL) 25 MG tablet    L-Methylfolate-B6-B12 (FOLBIC RF PO)    losartan (COZAAR) 100 MG tablet    Multiple Vitamins-Minerals (CERTA-FREDERICK PO)    olopatadine (PATADAY) 0.2 % ophthalmic solution    omeprazole (PRILOSEC) 40 MG capsule    oxybutynin (DITROPAN) 5 MG tablet    polyethylene glycol (MIRALAX/GLYCOLAX) powder    sulfamethoxazole-trimethoprim (BACTRIM DS) 800-160 MG tablet    triamcinolone (KENALOG) 0.1 % external cream     No current facility-administered medications for this visit.       No Known Allergies    Past Medical History:   Diagnosis Date    Anxiety     Arthritis     Atrial flutter (H)     C1 cervical fracture (H)     CAD (coronary artery disease)     cath in 1/2018: Angioplasty and stenting of 90% mid RCA stenosis, 50% right ostial coronary stenosis, and ostial LAD stenosis.  Moderate disease throughout the mid LAD and 30 to 50% range.    Carotid artery stenosis     CKD (chronic kidney disease)     Depression     Diastolic heart failure (H)     Most recent echo 1/2018: EF 55%, inferior regional wall motion abnormality, increased left ventricular end-diastolic pressure.    H/O alcohol abuse     Hypercholesterolemia     Hypertension     Renal artery stenosis (H24)     Seizure disorder (H)     Subarachnoid hemorrhage (H) 2014        Patient Active Problem List   Diagnosis    Renal artery stenosis (H24)    HTN  (hypertension)    Arteriosclerosis of coronary artery    Carotid stenosis    CKD (chronic kidney disease) stage 3, GFR 30-59 ml/min (H)    Hyperlipidemia LDL goal <100    Seizure disorder (H)    Osteoarthritis of knee    Chondrocalcinosis    Subarachnoid hemorrhage (H)    Fracture of C1 vertebra, closed (H)    Sacral fracture, closed (H)    Atrial flutter (H)    Alcohol abuse    Constipation    ARF (acute renal failure) (H24)    FTT (failure to thrive) in adult    Fall    Cognitive impairment    Health Care Home    Advance care planning    Chronic diastolic congestive heart failure (H)    Coronary atherosclerosis    Anxiety    Urgency incontinence    Klebsiella cystitis    Antibiotic-associated diarrhea    Accelerating angina (H)    Depression, recurrent (H24)    Esophageal reflux    H/O: CVA (cerebrovascular accident)    History of rib fracture    Occlusion and stenosis of carotid artery    PFO (patent foramen ovale)    Pulmonary hypertension (H)    Peripheral vascular disease (H24)    Neck pain    Protein deficiency disease (H24)       Past Surgical History:   Procedure Laterality Date    APPENDECTOMY      CAROTID ENDARTERECTOMY Left     Cataract surgery Right     will have laser eye surgery    CHOLECYSTECTOMY      Coronary stents x 3      CV CORONARY ANGIOGRAM N/A 7/19/2019    Procedure: Coronary Angiogram;  Surgeon: Pablito Emerson MD;  Location:  [facility]    HYSTERECTOMY      at age of 40-not cancerus or precancerous     JOINT REPLACEMENT Bilateral     Stenting of renal artery         Reviewed past medical, surgical, and family history with patient found on new patient intake packet located in [employer] tab.     SOCIAL HX: Non-smoker, no alcohol use, history of heavy drinking, no recreational drug use      OBJECTIVE:  /64   Pulse 94   LMP  (LMP Unknown)     PHYSICAL EXAMINATION:  --CONSTITUTIONAL: Vital signs as above. No acute distress. The patient is well nourished and well  groomed.  --PSYCHIATRIC: The patient is awake, alert, oriented to person, place, and time, and answering questions appropriately with clear speech. Appropriate mood and affect   --HEENT: Sclera are non-injected. Extraocular muscles are intact. Moist oral mucosa.  --SKIN: Skin over the face, bilateral upper extremities, and posterior torso is clean, dry, intact without rashes.  --RESPIRATORY: Normal rhythm and effort. No abnormal accessory muscle breathing patterns noted.     --GROSS MOTOR: Easily arises from a seated position.     --UPPER EXTREMITY MOTOR TESTING:  Shoulder abduction: right 5/5, left 5/5  Triceps: right 5/5 left 5/5  Biceps:right 5/5  left 5/5,   Hand :  right 5/5  left 5/5,     --LOWER EXTREMITY MOTOR TESTING  Hip flexion: right 5/5  left 5/5,   Quads:right 5/5  left 5/5,   Hamstrings: right 5/5  left 5/5,   Dorsiflexion: right 5/5  left 5/5,   Plantarflexion: right 5/5  left 5/5,   EHL: right 5/5  left 5/5,     SENSATION: Decreased to light touch first 3 digits of right hand.  Sensation otherwise intact throughout  the upper and lower extremities    --VASCULAR: Warm upper and lower limbs bilaterally.     --CERVICAL SPINE: Inspection reveals no evidence of deformity or swelling.  Negative point tenderness to palpation of mid cervical spine today.  No swelling or redness over this area.     No tenderness to palpation of right trap, scaps, or paraspinal musculature    Tender left occipital area and paraspinal tenderness into left trap.  No redness, swelling.      RESULTS:   Prior medical records from [facility] and [facility] Everywhere were reviewed today.         IMAGING:  Spine imaging was personally reviewed and interpreted today. The images were shown to the patient and the findings were explained using a spine model.  MRI OF THE CERVICAL SPINE WITHOUT CONTRAST 10/13/2023 8:03 AM     COMPARISON: Cervical spine CT scan 10/6/2022.     HISTORY: Cervical radiculopathy, point tenderness C-spine,  right 1-3  digits numbness; Occipital neuralgia of left side.     TECHNIQUE: Multiplanar, multisequence MRI images of the cervical spine  were acquired without intravenous contrast.     FINDINGS: Mild degenerative anterolisthesis of C7 upon T1 again noted.  Alignment of the cervical vertebrae is otherwise normal. Vertebral  body heights of the cervical spine are normal. Marrow signal normal.  Craniocervical alignment is normal. No evidence for fracture or  pathologic bony lesion.      There is loss of disc height, disc desiccation and posterior disc  bulging to varying degrees at all levels of the cervical spine.       The cervical spinal cord is mildly deformed by degenerative changes at  the C4-C5 and C5-C6 levels. The cervical spinal cord is otherwise  normal in contour. There is no abnormal signal in the cervical spinal  cord. There is no evidence for intrathecal abnormality.     Level by level:     C2-C3: There is facet arthropathy bilaterally, uncinate hypertrophy  bilaterally and a posterior broad-based disc-osteophyte complex. No  spinal canal stenosis. No foraminal stenosis on either side.     C3-C4: There is facet arthropathy bilaterally, uncinate hypertrophy  bilaterally and a posterior broad-based disc-osteophyte complex. No  spinal canal stenosis. No left foraminal stenosis. Mild right  foraminal narrowing.     C4-C5: There is facet arthropathy bilaterally, uncinate hypertrophy  bilaterally and a posterior broad-based disc-osteophyte complex with a  superimposed small posterior central disc herniation (protrusion).  Minimal spinal canal narrowing with minimal indentation of the  anterior central aspect of the cervical spinal cord. No left foraminal  stenosis. Mild right foraminal narrowing.     C5-C6: There is facet arthropathy bilaterally, uncinate hypertrophy  bilaterally and a posterior broad-based disc-osteophyte complex with a  superimposed tiny posterior central disc herniation (protrusion).  Mild  spinal canal narrowing with mild indentation of the anterior central  aspect of the cervical spinal cord. Mild bilateral neural foraminal  narrowing.     C6-C7: There is facet arthropathy bilaterally, uncinate hypertrophy  bilaterally and a posterior broad-based disc-osteophyte complex. No  spinal canal stenosis. Moderate left foraminal stenosis. No right  foraminal stenosis.     C7-T1: There is facet arthropathy bilaterally, uncinate hypertrophy  bilaterally and a posterior broad-based disc-osteophyte complex. No  spinal canal stenosis. No foraminal stenosis on either side.                                                                      IMPRESSION:  1. Diffuse degenerative change of the cervical spine as detailed above  without appreciable change from the comparison study.  2. Moderate degenerative neural foraminal stenosis on the left at  C6-C7.  3. No other significant spinal canal or neural foraminal narrowing of  the cervical spine.     NBA BACK MD         SYSTEM ID:  [id number]    Narrative & Impression   EXAM: CT HEAD W/O CONTRAST, CT CERVICAL SPINE W/O CONTRAST  LOCATION: [facility]  DATE/TIME: 10/16/2022 8:55 PM     INDICATION: Fall, posterior scalp contusion  COMPARISON:  CT neck 09/05/2022.  TECHNIQUE:   1) Routine CT Head without IV contrast. Multiplanar reformats. Dose reduction techniques were used.  2) Routine CT Cervical Spine without IV contrast. Multiplanar reformats. Dose reduction techniques were used.     FINDINGS:   HEAD CT:   INTRACRANIAL CONTENTS: No intracranial hemorrhage, extraaxial collection, or mass effect.  No CT evidence of acute infarct. Mild presumed chronic small vessel ischemic changes with a chronic lacunar infarct in the right caudate head. Mild generalized   volume loss. No hydrocephalus.      VISUALIZED ORBITS/SINUSES/MASTOIDS: Prior bilateral cataract surgery. Visualized portions of the orbits are otherwise unremarkable. No paranasal  sinus mucosal disease. No middle ear or mastoid effusion.     BONES/SOFT TISSUES:  Right temporoparietal and left parietal scalp hematomas. No skull fracture.     CERVICAL SPINE CT:   VERTEBRA: Normal vertebral body heights and alignment. No fracture or posttraumatic subluxation. Osseous fusion of the left C3-C4 lateral masses.     CANAL/FORAMINA: Multilevel spondylosis. No high-grade canal or neural foraminal stenosis.     PARASPINAL:  Thyroid nodules measuring up to 15 mm on the left, unchanged since the 09/05/2022 CT. Visualized lung fields are clear.                                                                      IMPRESSION:  HEAD CT:  1.  Right temporoparietal and left parietal scalp hematomas without acute intracranial abnormality.     CERVICAL SPINE CT:  1.  No CT evidence for acute fracture or post traumatic subluxation.              Nayeli MICHELLEP-C  [facility]  O. [phone]

## 2023-11-09 NOTE — LETTER
11/9/2023         RE: Jade Caba  [address]  [address]  [place] [address]        Dear Colleague,    Thank you for referring your patient, Jade Caba, to the [facility]. Please see a copy of my visit note below.    ASSESSMENT: Jade Caba is a 89 year old female presents for consultation at the request of PCP Sanford Canales, who presents today for a follow up patient evaluation of :     -Neck pain    Patient is neurologically intact on exam.  We reviewed her cervical spine MRI at time of visit.  She has no evidence of fracture or severe spinal canal stenosis.  She does have chronic degenerative wear-and-tear findings including including disc bulges causing varying degrees of mostly right-sided foraminal narrowing.  She does have left-sided foraminal stenosis at C6-7.  Her arm pain has improved, however her neck pain is persistent and left-sided.  I offered her a C7-T1 interlaminar epidural steroid injection.  If limited benefit, could consider a left occipital nerve block, left C2-3-4 medial branch blocks, or possibly a peripheral nerve stimulator if fails all.  We will increase gabapentin to 100 mg 3 times daily.    Personally reviewed reviewed her CT cervical spine, which shows significant left greater than right sided narrowing of the joints between C1-2. Fusion left C3-4 lateral masses      Diagnoses and all orders for this visit:  Cervical radiculopathy  -     Pain Interlaminar Epidural Strd Inj Cervical; Future       PLAN:  Reviewed spine anatomy and disease process. Discussed diagnosis and treatment options with the patient today. A shared decision making model was used. The patient's values and choices were respected. The following represents what was discussed and decided upon by the provider and the patient.     -DIAGNOSTIC TESTS:  Images were personally reviewed and interpreted and explained to patient today using spine model.   -- I did not order any  new diagnostic imaging studies today.  We could consider doing a right upper extremity EMG in the future for right hand numbness and tingling    -PHYSICAL THERAPY:    Patient feels that her pain is significant and would wish to proceed with epidural steroid injection today.  Patient does not wish to pursue physical therapy at this time, and has been doing stretching exercises daily on her own without relief.  Hopefully we can pursue physical therapy when pain is under better control after injection potentially.  We did discuss the importance of core strengthening, ROM, stretching exercises with the patient and how each of these entities is important in decreasing pain.     -MEDICATIONS:    -Recommended increasing gabapentin per titration schedule to 100 mg 3 times daily.  We can increase this further from there if helpful.  If experience side effects, can reduce this back to 100 mg at nighttime.  -Patient will continue over-the-counter Tylenol as needed  Discussed risks, side effects, and proper use of medications. Patient verbalized understanding.    -INTERVENTIONS:    Discussed risks and benefits of cervical epidural steroid injections with patient today.  She request to proceed.  I have ordered a interlaminar C7-T1 epidural steroid injection.  I think there would be a role for a left occipital nerve block if does not improve.  We also discussed the role for potential cervical medial branch blocks C2-3-4. Peripheral nerve stimulator if fails all.     -PATIENT EDUCATION: Total time of 40 minutes, on the day of service, spent with the patient, reviewing the chart, placing orders, and documenting.   -Today we also discussed the issues related to the pros and cons of the current treatment plan.    -FOLLOW-UP:   We we will follow-up per routine following cervical epidural steroid injection  Advised patient to call the Spine Center if symptoms worsen or if they develop red flag symptoms such as numbness, weakness,  severe pain uncontrolled by current pain med regimen, or any new or worsening problems controlling bladder and bowel function.   ______________________________________________________________________    SUBJECTIVE:   Jade endorses ongoing neck pain radiating into her left trap without improvement since last appointment.  She rates her pain at an 8 today it is worse with moving her neck it improves with sitting still to a 5 out of 10.  She denies any radiating pain down her left arm today.  She feels her pain is significant and wishes to have an injection today.  She was hoping it would be today.  She has been taking Tylenol over-the-counter as needed and gabapentin 100 mg at nighttime.  The medications are helpful.  She has baseline numbness and tingling which is chronic in her right first through third digits which has not changed.  She is here with her son today.    She denies any radicular arm pain, numbness beyond baseline, tingling, weakness, changes in bowel or bladder control      Per previous visit:  HPI  Jade Caba  is a 89 year old female history of MI with 8 stents, osteoporosis on Fosamax prior serious infection, blood clots, rheumatoid arthritis who presents today for new patient evaluation of neck pain       Jade is here today accompanied by her son.  She is complaining of constant severe left-sided upper neck pain for approximately the last year.  Her pain is always an 8 out of 10.  Turning her head especially to the left makes her pain worse.  The pain feels sharp.  It sometimes radiates up above her ear on the left.  It also sometimes radiates down the left arm into the hand and no particular distribution.  She describes numbness and tingling in her first and second digits on the right hand and it is now starting in the third digit on the right hand as well.  Symptoms began in October of last year after a fall injury, at which time she was seen in the emergency room and had a CT scan of her neck  done, which was unrevealing for a serious injury.  She has since been dealing with the pain on her own.  She takes Tylenol as needed, without much improvement.  She has difficulty sleeping due to the pain      -Treatment to Date:     -Medications:  Tylenol  Gabapentin    Current Outpatient Medications   Medication     acetaminophen (TYLENOL) 500 MG tablet     alendronate (FOSAMAX) 70 MG tablet     aspirin (ASA) 81 MG EC tablet     calcium carbonate 500 mg (elemental) (OSCAL) 500 MG tablet     carboxymethylcellulose PF (REFRESH PLUS) 0.5 % ophthalmic solution     carvedilol (COREG) 3.125 MG tablet     cholecalciferol (VITAMIN D3) 5000 UNITS CAPS capsule     citalopram (CELEXA) 10 MG tablet     folic acid (FOLVITE) 1 MG tablet     furosemide (LASIX) 20 MG tablet     gabapentin (NEURONTIN) 100 MG capsule     hydrochlorothiazide (HYDRODIURIL) 25 MG tablet     L-Methylfolate-B6-B12 (FOLBIC RF PO)     losartan (COZAAR) 100 MG tablet     Multiple Vitamins-Minerals (CERTA-FREDERICK PO)     olopatadine (PATADAY) 0.2 % ophthalmic solution     omeprazole (PRILOSEC) 40 MG capsule     oxybutynin (DITROPAN) 5 MG tablet     polyethylene glycol (MIRALAX/GLYCOLAX) powder     sulfamethoxazole-trimethoprim (BACTRIM DS) 800-160 MG tablet     triamcinolone (KENALOG) 0.1 % external cream     No current facility-administered medications for this visit.       No Known Allergies    Past Medical History:   Diagnosis Date     Anxiety      Arthritis      Atrial flutter (H)      C1 cervical fracture (H)      CAD (coronary artery disease)     cath in 1/2018: Angioplasty and stenting of 90% mid RCA stenosis, 50% right ostial coronary stenosis, and ostial LAD stenosis.  Moderate disease throughout the mid LAD and 30 to 50% range.     Carotid artery stenosis      CKD (chronic kidney disease)      Depression      Diastolic heart failure (H)     Most recent echo 1/2018: EF 55%, inferior regional wall motion abnormality, increased left ventricular  end-diastolic pressure.     H/O alcohol abuse      Hypercholesterolemia      Hypertension      Renal artery stenosis (H24)      Seizure disorder (H)      Subarachnoid hemorrhage (H) 2014        Patient Active Problem List   Diagnosis     Renal artery stenosis (H24)     HTN (hypertension)     Arteriosclerosis of coronary artery     Carotid stenosis     CKD (chronic kidney disease) stage 3, GFR 30-59 ml/min (H)     Hyperlipidemia LDL goal <100     Seizure disorder (H)     Osteoarthritis of knee     Chondrocalcinosis     Subarachnoid hemorrhage (H)     Fracture of C1 vertebra, closed (H)     Sacral fracture, closed (H)     Atrial flutter (H)     Alcohol abuse     Constipation     ARF (acute renal failure) (H24)     FTT (failure to thrive) in adult     Fall     Cognitive impairment     Health Care Home     Advance care planning     Chronic diastolic congestive heart failure (H)     Coronary atherosclerosis     Anxiety     Urgency incontinence     Klebsiella cystitis     Antibiotic-associated diarrhea     Accelerating angina (H)     Depression, recurrent (H24)     Esophageal reflux     H/O: CVA (cerebrovascular accident)     History of rib fracture     Occlusion and stenosis of carotid artery     PFO (patent foramen ovale)     Pulmonary hypertension (H)     Peripheral vascular disease (H24)     Neck pain     Protein deficiency disease (H24)       Past Surgical History:   Procedure Laterality Date     APPENDECTOMY       CAROTID ENDARTERECTOMY Left      Cataract surgery Right     will have laser eye surgery     CHOLECYSTECTOMY       Coronary stents x 3       CV CORONARY ANGIOGRAM N/A 7/19/2019    Procedure: Coronary Angiogram;  Surgeon: Pablito Emerson MD;  Location:  [facility]     HYSTERECTOMY      at age of 40-not cancerus or precancerous      JOINT REPLACEMENT Bilateral      Stenting of renal artery         Reviewed past medical, surgical, and family history with patient found on new patient intake  packet located in [employer] tab.     SOCIAL HX: Non-smoker, no alcohol use, history of heavy drinking, no recreational drug use      OBJECTIVE:  /64   Pulse 94   LMP  (LMP Unknown)     PHYSICAL EXAMINATION:  --CONSTITUTIONAL: Vital signs as above. No acute distress. The patient is well nourished and well groomed.  --PSYCHIATRIC: The patient is awake, alert, oriented to person, place, and time, and answering questions appropriately with clear speech. Appropriate mood and affect   --HEENT: Sclera are non-injected. Extraocular muscles are intact. Moist oral mucosa.  --SKIN: Skin over the face, bilateral upper extremities, and posterior torso is clean, dry, intact without rashes.  --RESPIRATORY: Normal rhythm and effort. No abnormal accessory muscle breathing patterns noted.     --GROSS MOTOR: Easily arises from a seated position.     --UPPER EXTREMITY MOTOR TESTING:  Shoulder abduction: right 5/5, left 5/5  Triceps: right 5/5 left 5/5  Biceps:right 5/5  left 5/5,   Hand :  right 5/5  left 5/5,     --LOWER EXTREMITY MOTOR TESTING  Hip flexion: right 5/5  left 5/5,   Quads:right 5/5  left 5/5,   Hamstrings: right 5/5  left 5/5,   Dorsiflexion: right 5/5  left 5/5,   Plantarflexion: right 5/5  left 5/5,   EHL: right 5/5  left 5/5,     SENSATION: Decreased to light touch first 3 digits of right hand.  Sensation otherwise intact throughout  the upper and lower extremities    --VASCULAR: Warm upper and lower limbs bilaterally.     --CERVICAL SPINE: Inspection reveals no evidence of deformity or swelling.  Negative point tenderness to palpation of mid cervical spine today.  No swelling or redness over this area.     No tenderness to palpation of right trap, scaps, or paraspinal musculature    Tender left occipital area and paraspinal tenderness into left trap.  No redness, swelling.      RESULTS:   Prior medical records from [facility]where were reviewed today.         IMAGING:  Spine imaging  was personally reviewed and interpreted today. The images were shown to the patient and the findings were explained using a spine model.  MRI OF THE CERVICAL SPINE WITHOUT CONTRAST 10/13/2023 8:03 AM     COMPARISON: Cervical spine CT scan 10/6/2022.     HISTORY: Cervical radiculopathy, point tenderness C-spine, right 1-3  digits numbness; Occipital neuralgia of left side.     TECHNIQUE: Multiplanar, multisequence MRI images of the cervical spine  were acquired without intravenous contrast.     FINDINGS: Mild degenerative anterolisthesis of C7 upon T1 again noted.  Alignment of the cervical vertebrae is otherwise normal. Vertebral  body heights of the cervical spine are normal. Marrow signal normal.  Craniocervical alignment is normal. No evidence for fracture or  pathologic bony lesion.      There is loss of disc height, disc desiccation and posterior disc  bulging to varying degrees at all levels of the cervical spine.       The cervical spinal cord is mildly deformed by degenerative changes at  the C4-C5 and C5-C6 levels. The cervical spinal cord is otherwise  normal in contour. There is no abnormal signal in the cervical spinal  cord. There is no evidence for intrathecal abnormality.     Level by level:     C2-C3: There is facet arthropathy bilaterally, uncinate hypertrophy  bilaterally and a posterior broad-based disc-osteophyte complex. No  spinal canal stenosis. No foraminal stenosis on either side.     C3-C4: There is facet arthropathy bilaterally, uncinate hypertrophy  bilaterally and a posterior broad-based disc-osteophyte complex. No  spinal canal stenosis. No left foraminal stenosis. Mild right  foraminal narrowing.     C4-C5: There is facet arthropathy bilaterally, uncinate hypertrophy  bilaterally and a posterior broad-based disc-osteophyte complex with a  superimposed small posterior central disc herniation (protrusion).  Minimal spinal canal narrowing with minimal indentation of the  anterior central  aspect of the cervical spinal cord. No left foraminal  stenosis. Mild right foraminal narrowing.     C5-C6: There is facet arthropathy bilaterally, uncinate hypertrophy  bilaterally and a posterior broad-based disc-osteophyte complex with a  superimposed tiny posterior central disc herniation (protrusion). Mild  spinal canal narrowing with mild indentation of the anterior central  aspect of the cervical spinal cord. Mild bilateral neural foraminal  narrowing.     C6-C7: There is facet arthropathy bilaterally, uncinate hypertrophy  bilaterally and a posterior broad-based disc-osteophyte complex. No  spinal canal stenosis. Moderate left foraminal stenosis. No right  foraminal stenosis.     C7-T1: There is facet arthropathy bilaterally, uncinate hypertrophy  bilaterally and a posterior broad-based disc-osteophyte complex. No  spinal canal stenosis. No foraminal stenosis on either side.                                                                      IMPRESSION:  1. Diffuse degenerative change of the cervical spine as detailed above  without appreciable change from the comparison study.  2. Moderate degenerative neural foraminal stenosis on the left at  C6-C7.  3. No other significant spinal canal or neural foraminal narrowing of  the cervical spine.     NBA BACK MD         SYSTEM ID:  [id number]    Narrative & Impression   EXAM: CT HEAD W/O CONTRAST, CT CERVICAL SPINE W/O CONTRAST  LOCATION: [facility]  DATE/TIME: 10/16/2022 8:55 PM     INDICATION: Fall, posterior scalp contusion  COMPARISON:  CT neck 09/05/2022.  TECHNIQUE:   1) Routine CT Head without IV contrast. Multiplanar reformats. Dose reduction techniques were used.  2) Routine CT Cervical Spine without IV contrast. Multiplanar reformats. Dose reduction techniques were used.     FINDINGS:   HEAD CT:   INTRACRANIAL CONTENTS: No intracranial hemorrhage, extraaxial collection, or mass effect.  No CT evidence of acute infarct. Mild  presumed chronic small vessel ischemic changes with a chronic lacunar infarct in the right caudate head. Mild generalized   volume loss. No hydrocephalus.      VISUALIZED ORBITS/SINUSES/MASTOIDS: Prior bilateral cataract surgery. Visualized portions of the orbits are otherwise unremarkable. No paranasal sinus mucosal disease. No middle ear or mastoid effusion.     BONES/SOFT TISSUES:  Right temporoparietal and left parietal scalp hematomas. No skull fracture.     CERVICAL SPINE CT:   VERTEBRA: Normal vertebral body heights and alignment. No fracture or posttraumatic subluxation. Osseous fusion of the left C3-C4 lateral masses.     CANAL/FORAMINA: Multilevel spondylosis. No high-grade canal or neural foraminal stenosis.     PARASPINAL:  Thyroid nodules measuring up to 15 mm on the left, unchanged since the 09/05/2022 CT. Visualized lung fields are clear.                                                                      IMPRESSION:  HEAD CT:  1.  Right temporoparietal and left parietal scalp hematomas without acute intracranial abnormality.     CERVICAL SPINE CT:  1.  No CT evidence for acute fracture or post traumatic subluxation.              Nayeli MICHELLEP-C  [facility]  O. [phone]      Again, thank you for allowing me to participate in the care of your patient.        Sincerely,        Nayeli Mora, ZAIN CNP

## 2023-11-24 NOTE — PATIENT INSTRUCTIONS
DISCHARGE INSTRUCTIONS    During office hours (8:00 a.m.- 4:00 p.m.) questions or concerns may be answered  by calling Spine Center Navigation Nurses at  [phone].  Messages received after hours will be returned the following business day.      In the case of an emergency, please dial 911 or seek assistance at the nearest Emergency Room/Urgent Care facility.     All Patients:    You may experience an increase in your symptoms for the first 2 days (It may take anywhere between 2 days- 2 weeks for the steroid to have maximum effect).    You may use ice on the injection site, as frequently as 20 minutes each hour if needed.    You may take your pain medicine.    You may continue taking your regular medication after your injection. If you have had a Medial Branch Block you may resume pain medication once your pain diary is completed.    You may shower. No swimming, tub bath or hot tub for 48 hours.  You may remove your bandaid/bandage as soon as you are home.    You may resume light activities, as tolerated.    Resume your usual diet as tolerated.    It is strongly advised that you do not drive for 1-3 hours post injection.    If you have had oral sedation:  Do not drive for 8 hours post injection.      If you have had IV sedation:  Do not drive for 24 hours post injection.  Do not operate hazardous machinery or make important personal/business decisions for 24 hours.      POSSIBLE STEROID SIDE EFFECTS (If steroid/cortisone was used for your procedure)    -If you experience these symptoms, it should only last for a short period    Swelling of the legs              Skin redness (flushing)     Mouth (oral) irritation   Blood sugar (glucose) levels            Sweats                    Mood changes  Headache  Sleeplessness  Weakened immune system for up to 14 days, which could increase the risk of jostin the COVID-19 virus and/or experiencing more severe symptoms of the disease, if exposed.  Decreased  effectiveness of the flu vaccine if given within 2 weeks of the steroid.         POSSIBLE PROCEDURE SIDE EFFECTS  -Call the Spine Center if you are concerned  Increased Pain           Increased numbness/tingling      Nausea/Vomiting          Bruising/bleeding at site      Redness or swelling                                              Difficulty walking      Weakness           Fever greater than 100.5    *In the event of a severe headache after an epidural steroid injection that is relieved by lying down, please call the [facility] to speak with a clinical staff member*

## 2023-12-06 NOTE — TELEPHONE ENCOUNTER
Some med's do not have diagnosis - please advise     Thank you     Anika Moreno RN, BSN  [facility]

## 2023-12-27 PROBLEM — E87.1 HYPONATREMIA: Status: ACTIVE | Noted: 2023-01-01

## 2023-12-28 PROBLEM — R53.1 GENERALIZED WEAKNESS: Status: ACTIVE | Noted: 2023-01-01

## 2023-12-28 NOTE — PLAN OF CARE
PRIMARY DIAGNOSIS: GENERALIZED WEAKNESS/FALL/HYPONATREMIA    OUTPATIENT/OBSERVATION GOALS TO BE MET BEFORE DISCHARGE  1. Orthostatic performed: No    2. Tolerating PO medications: Yes    3. Return to near baseline physical activity: Yes    4. Cleared for discharge by consultants (if involved): No    Discharge Planner Nurse   Safe discharge environment identified: Yes  Barriers to discharge: Yes       Entered by: Rosa Kelley RN 12/28/2023 1:14 PM   Pt. A&O, BROCK wears hearing aids, has neck pain, tylenol was offered but pt. Refused, on NSIV 75 ML/HR continuing fluids, CMS intact, has bruises all over the body, edema in BLE.  BP (!) 157/104 (BP Location: Left arm)   Pulse 98   Temp 98.2  F (36.8  C) (Oral)   Resp 16   LMP  (LMP Unknown)   SpO2 95%    Please review provider order for any additional goals.   Nurse to notify provider when observation goals have been met and patient is ready for discharge.Goal Outcome Evaluation:      Plan of Care Reviewed With: patient

## 2023-12-28 NOTE — PROGRESS NOTES
Care Management Follow Up    Length of Stay (days): 0    Expected Discharge Date: 12/30/2023     Concerns to be Addressed: discharge planning      Patient plan of care discussed at interdisciplinary rounds: Yes    Anticipated Discharge Disposition:  Return to [facility] p:[phone] f: [phone]     Anticipated Discharge Services:  declined HC  Anticipated Discharge DME:      Referrals Placed by CM/SW:  none  Private pay costs discussed: Not applicable    Additional Information:  [facility] following for discharge planning.     Received return call from Amie at [facility]. Pt resides in their correction. Amie requests pt returns by 2pm. New meds to be sent to [facility] in . Amie requests to be kept updated on discharge timeline.     Social work will continue to follow and assist with discharge planning as needed.    EB Davidson, [facility]  Care Coordination  [phone]    EB Garcia

## 2023-12-28 NOTE — PROGRESS NOTES
[facility]    ED Boarding Nurse Handoff Addendum Report:    Date/time: 12/28/2023, 11:55 AM    Activity Level: assist of 1    Fall Risk: Yes:  nonskid shoes/slippers when out of bed    Active Infusions: NS at 75    Current Meds Due: Yes - see MAR    Current care needs: None    Oxygen requirements (liters/min and/or FiO2): RA    Respiratory status: Room air    Vital signs (within last 30 minutes):    Vitals:    12/28/23 0425 12/28/23 0558 12/28/23 0740 12/28/23 1139   BP:  119/48 136/85    BP Location:   Left arm    Pulse: 116 98     Resp: 18 16     Temp:   98.3  F (36.8  C) 98.2  F (36.8  C)   TempSrc:   Oral Oral   SpO2:   93%        Focused assessment within last 30 minutes:    Pt is A/O x4, up with A1 walker/gb. Ambulated hallways. PRN tylenol given for chronic neck pain. Serial NA+ checks, still at 122. PT and OT consulted. Pt can be anxious at times, given lavendar oil and walked outside room. Pt to transfer to room 204-02.     ED Boarding Nurse name: Carol Villanueva RN

## 2023-12-28 NOTE — CONSULTS
Care Management Follow Up    Length of Stay (days): 0    Expected Discharge Date: 12/29/2023     Concerns to be Addressed:       Patient plan of care discussed at interdisciplinary rounds: Yes    Anticipated Discharge Disposition:  Assisted Living         Referrals Placed by CM/CECE:    Private pay costs discussed: Not applicable    Additional Information:  SW met with patient briefly to complete the MOON & attempt initial assessment. Patient needed to go down for a CT scan once MICHAEL was completed so SW was unable to do an initial assessment. Patient is hard of hearing & needed everything repeated at a raised level multiple times.   SW attempted to contact [facility] ([phone]) where it is noted patient resides but did not reach anyone so left a voicemail for staff to call back to discuss discharge planning.     RAFAEL Cowan

## 2023-12-28 NOTE — PLAN OF CARE
Occupational Therapy: Orders received. Chart reviewed and discussed with evaluating physical therapist.? Occupational Therapy not indicated as pt has no selfcare, self care adaptive equipment needs, or IADLs.? Defer discharge recommendations to PT.? Will complete orders.

## 2023-12-28 NOTE — PROCEDURES
ROOM # 204-2    Living Situation Long term facility :  Facility name:  : daughter     Activity level at baseline: independent  Activity level on admit: SBA    Who will be transporting you at discharge: DAUGHTER    Patient registered to observation; given Patient Bill of Rights; given the opportunity to ask questions about observation status and their plan of care.  Patient has been oriented to the observation room, bathroom and call light is in place.  BP (!) 157/104 (BP Location: Left arm)   Pulse 98   Temp 98.2  F (36.8  C) (Oral)   Resp 16   LMP  (LMP Unknown)   SpO2 95%    Discussed discharge goals and expectations with patient/family.

## 2023-12-28 NOTE — ED PROVIDER NOTES
"  History     Chief Complaint:  Hyponatremia       HPI   Jade Caba is a 89 year old female with a past medical history of CKD, cognitive impairment, CAD, recurrent UTIs, prior alcohol use disorder, heart failure, who presented to the emergency department with a chief complaint of abnormal lab.  Patient had labs that were drawn today at her assisted living facility and she was noted to have sodium of 122.  She reports feeling overall weak for 2 weeks.  Additionally reports a fall on Lois jennifer, did hit her head at that time and was not evaluated.  She denies any chest pain or shortness of breath.  No nausea or vomiting.  She has not had any fevers.  No seizures.  She reports no alcohol use recently and daughter confirms this.  Daughter reports that she may not be drinking very much water.Is currently taking antibiotic for UTI however she is not sure what it is.      Independent Historian:    Daughter - They report as above    Review of External Notes:  Reviewed the telephone call from the earlier in the day: \"assisted living is alert sodium is 124 will be going to the hospital in [place] \"    Reviewed home care visit from 12/20: \"Pt is a 89 year old female residing in [facility]. Has lived in facility since May 2022. Signed on with [facility] Nov 2023. PMH: HTN, insomnia, depression, GERD, OA of bilateral knees, chronic diastolic congestive heart failure,CAD, Seizure disorder, carotid artery stenosis, hx of subarachnoid hemorrhage, hx of CVA, CAD, depression, hx of ETOH dependence, aflutterPrimary contact: YAW is Babar Pacheco, [phone] Status: DNR/DNI, Treat reversible conditions. Ok for IM/IV abx, no TFLab Schedule: Monitoring every 6 months due to chronic conditions and multiple medications.Specialists: Vascular Specialist [facility], sees annually for carotid artery stenosis MD James Rojas is Cardiologist, sees annually \"    Allergies:  No Known Allergies "     Physical Exam   Patient Vitals for the past 24 hrs:   BP Temp Temp src Pulse Resp SpO2   12/27/23 2215 (!) 171/72 -- -- 100 -- 92 %   12/27/23 1955 -- 97.8  F (36.6  C) Temporal -- -- --   12/27/23 1950 109/83 -- -- 88 18 100 %        Physical Exam  General: Overall stable and nontoxic appearing  HEENT: Conjunctivae clear, no scleral icterus, mucous membranes dry  Hematoma over the posterior vertex   Neuro: Alert, no facial droop, no lateral weakness, gait is somewhat unsteady  CV: Regular rate and rhythm, radial and DP pulses equal  Respiratory: No signs of respiratory distress, lungs clear to auscultation bilaterally   Abdomen: Soft, without rigidity or rebound throughout  MSK: Bruising on hands bilaterally     Emergency Department Course   ECG  ECG results from 12/27/23   EKG 12-lead, tracing only     Value    Systolic Blood Pressure     Diastolic Blood Pressure     Ventricular Rate 94    Atrial Rate 115    DE Interval     QRS Duration 94        QTc 442    P Axis     R AXIS 94    T Axis 12    Interpretation ECG      Atrial fibrillation  Rightward axis  Abnormal ECG  When compared with ECG of 05-SEP-2022 14:36,  Atrial fibrillation has replaced Sinus rhythm  Nonspecific T wave abnormality now evident in Inferior leads            Laboratory: Imaging:   Labs Ordered and Resulted from Time of ED Arrival to Time of ED Departure   BASIC METABOLIC PANEL - Abnormal       Result Value    Sodium 122 (*)     Potassium 4.8      Chloride 85 (*)     Carbon Dioxide (CO2) 25      Anion Gap 12      Urea Nitrogen 46.2 (*)     Creatinine 1.91 (*)     GFR Estimate 24 (*)     Calcium 8.7      Glucose 124 (*)    TROPONIN T, HIGH SENSITIVITY - Abnormal    Troponin T, High Sensitivity 38 (*)    CBC WITH PLATELETS AND DIFFERENTIAL - Abnormal    WBC Count 6.8      RBC Count 3.53 (*)     Hemoglobin 11.1 (*)     Hematocrit 32.8 (*)     MCV 93      MCH 31.4      MCHC 33.8      RDW 12.6      Platelet Count 186      % Neutrophils 63       % Lymphocytes 22      % Monocytes 12      % Eosinophils 3      % Basophils 0      % Immature Granulocytes 0      NRBCs per 100 WBC 0      Absolute Neutrophils 4.3      Absolute Lymphocytes 1.5      Absolute Monocytes 0.8      Absolute Eosinophils 0.2      Absolute Basophils 0.0      Absolute Immature Granulocytes 0.0      Absolute NRBCs 0.0     OSMOLALITY - Abnormal    Osmolality Blood 270 (*)    SODIUM RANDOM URINE    Sodium Urine mmol/L 56     CREATININE RANDOM URINE    Creatinine Urine mg/dL 53.3       Head CT w/o contrast   Final Result   IMPRESSION:   1.  No acute intracranial process.                 Procedures       Emergency Department Course & Assessments:             Interventions:  Medications   traZODone (DESYREL) tablet 50 mg (50 mg Oral $Given 23 0019)   sodium chloride 0.9% BOLUS 500 mL (500 mLs Intravenous $New Bag 23 0025)        Assessments, Independent Interpretation, Consult/Discussion of ManagementTests:  ED Course as of 23 0307   Wed Dec 27, 2023   2252 Patient denies any alcohol use    2319 CT head without any acute findings on my review       Social Determinants of Health affecting care:  None    Disposition:  The patient was admitted to the hospital under the care of Dr. Velásquez    Impression & Plan    CMS Diagnoses: None    Code Status: Prior    Medical Decision Makin-year-old female with a hx of CKD, cognitive impairment, CAD, recurrent UTIs, prior alcohol use disorder, heart failure, presents to the emergency department with a chief complaint of abnormal lab and generalized weakness for 2 weeks.  On exam overall stable nontoxic-appearing.  No unilateral weakness.  CT head was obtained here in the setting of reported fall on , this did not show any signs of bleeding.  Labs were repeated here and she was noted to have hyponatremia to 122.  EKG showed atrial fibrillation, per chart review she does carry a diagnosis of chronic atrial fibrillation from  as far back as 2016, no signs of A-fib with RVR.  She denied any chest pain or shortness of breath and troponin was consistent with her baseline, did not think that her presentation was consistent with ACS.  She did not report any seizures, but I do not see any emergent indications for hyper tonics.  She did have evidence of an GEMA in comparison to creatinine from 9 months previously, hemoglobin also mildly low from previous of 113 1 year ago.  Given the hyponatremia and the weakness, she was discussed with hospitalist Dr. Velásquez who kindly accepted her for admission.     Diagnosis:    ICD-10-CM    1. Hyponatremia  E87.1       2. Generalized weakness  R53.1            Discharge Medications:  New Prescriptions    No medications on file          12/28/2023   Zeinab Finney, Carol Tavarez MD  12/28/23 0327

## 2023-12-28 NOTE — PROGRESS NOTES
12/28/23 8625   Appointment Info   Signing Clinician's Name / Credentials (PT) Renee Santillan, MELYSSA   Quick Adds   Quick Adds Certification   Living Environment   People in Home facility resident   Current Living Arrangements assisted living   Home Accessibility no concerns   Transportation Anticipated family or friend will provide   Living Environment Comments lives in an assisted living apt   Self-Care   Usual Activity Tolerance good   Current Activity Tolerance moderate   Regular Exercise Yes   Activity/Exercise Type walking;strength training  (reports walking > 6x/day; ex's in room)   Exercise Amount/Frequency daily   Equipment Currently Used at Home walker, rolling  (4WW)   Fall history within last six months yes   Number of times patient has fallen within last six months 1   Activity/Exercise/Self-Care Comment normally independent with mobility and cares; use of 4WW   General Information   Onset of Illness/Injury or Date of Surgery 12/27/23   Referring Physician Tono Velásquez MD   Patient/Family Therapy Goals Statement (PT) return home   Pertinent History of Current Problem (include personal factors and/or comorbidities that impact the POC) per chart: Jade Caba is a 89 year old female admitted on 12/27/2023. She came to the emergency department at the recommendation of her primary care clinic because of low sodium and had routine lab workup.  She has felt weak in the last 2 weeks, she had an accidental fall [employer] night when she lost balance leaving her assisted living facility.  Sodium admission 122.  She is on diuretic therapy, at least hydrochlorothiazide and Lasix are listed in the home medications; found to have Hyponatremia; see medical record for further infomration   Existing Precautions/Restrictions fall   General Observations patient in bathroom with nursing upon therapist arrival   Cognition   Cognitive Status Comments appears forgetful   Pain Assessment   Patient Currently in Pain No    Posture    Posture Comments forward flexed at head and trunk   Range of Motion (ROM)   ROM Comment WFL   Strength (Manual Muscle Testing)   Strength Comments WFL; reports some decreased activity tolerance   Bed Mobility   Comment, (Bed Mobility) NT; up in bathroom and then wanting to go to chair   Transfers   Comment, (Transfers) sit>stand with use of 4WW and SBA   Gait/Stairs (Locomotion)   Comment, (Gait/Stairs) SBA with 4WW   Balance   Balance Comments current need for walker; no gross LOB   Clinical Impression   Criteria for Skilled Therapeutic Intervention Evaluation only;No problems identified which require skilled intervention   Functional limitations due to impairments mild SOB with activity   Clinical Presentation (PT Evaluation Complexity) stable   Clinical Presentation Rationale clinical judgement   Clinical Decision Making (Complexity) low complexity   Risk & Benefits of therapy have been explained evaluation/treatment results reviewed;risks/benefits reviewed;current/potential barriers reviewed;participants voiced agreement with care plan;participants included;patient   Clinical Impression Comments Patient near baseline level of function other than mild SOB   PT Total Evaluation Time   PT Eval, Low Complexity Minutes (23719) 17   Therapy Certification   Start of care date 12/28/23   Certification date from 12/28/23   Certification date to 12/28/23   Medical Diagnosis Hyponatremia; weakness   Physical Therapy Goals   PT Frequency Other (see comments)  (eval only)   PT Discharge Planning   PT Plan discharge from PT   PT Discharge Recommendation (DC Rec) home with assist  (return to snf with prior assist)   PT Rationale for DC Rec Patient appears at or near her baseline level of function with exception of patient endorsing mild SOB with activity that is new; able to demonstrate safe use of 4WW; can get increased services at facility as needed; declines Home PT secondary to wanting to continue doing ex's  on her own   PT Brief overview of current status SBA to mod I with 4WW   PT Equipment Needed at Discharge walker, rolling  (has own 4WW)   Total Session Time   Total Session Time (sum of timed and untimed services) 17     [facility]  OUTPATIENT PHYSICAL THERAPY EVALUATION  PLAN OF TREATMENT FOR OUTPATIENT REHABILITATION  (COMPLETE FOR INITIAL CLAIMS ONLY)  Patient's Last Name, First Name, M.I.  YOB: 1933  GertrudisJade HUGHES                        Provider's Name  [facility] Medical Record No.  [id number]                             Onset Date:  12/27/23   Start of Care Date:  12/28/23   Type:     _X_PT   ___OT   ___SLP Medical Diagnosis:  Hyponatremia; weakness              PT Diagnosis:    Visits from SOC:  1     See note for plan of treatment, functional goals and certification details    I CERTIFY THE NEED FOR THESE SERVICES FURNISHED UNDER        THIS PLAN OF TREATMENT AND WHILE UNDER MY CARE     (Physician co-signature of this document indicates review and certification of the therapy plan).

## 2023-12-28 NOTE — UTILIZATION REVIEW
Admission Status; Secondary Review Determination    Under the authority of the Utilization Management Committee, the utilization review process indicated a secondary review on the above patient. The review outcome is based on review of the medical records, discussions with staff, and applying clinical experience noted on the date of the review.    (x) Inpatient Status Appropriate - This patient's medical care is consistent with medical management for inpatient care and reasonable inpatient medical practice.    RATIONALE FOR DETERMINATION: 89-year-old female with cognitive impairment, CAD, previous alcohol use disorder, heart failure on dual diuretics now presents with progressive increased weakness resulting in falls and found to have marked hyponatremia.  On the day of admission patient's sodium level fluctuated between 122-124.  The diuretics were held and patient started on normal saline infusion.  Follow-up sodium levels remain at 122-124 on hospital day 2 requiring further hospital management appropriate for inpatient care.    At the time of admission with the information available to the attending physician more than 2 nights Hospital complex care was anticipated, based on patient risk of adverse outcome if treated as outpatient and complex care required. Inpatient admission is appropriate based on the Medicare guidelines.    This document was produced using voice recognition software    The information on this document is developed by the utilization review team in order for the business office to ensure compliance. This only denotes the appropriateness of proper admission status and does not reflect the quality of care rendered.    The definitions of Inpatient Status and Observation Status used in making the determination above are those provided in the CMS Coverage Manual, Chapter 1 and Chapter 6, section 70.4.    Sincerely,    Jonathan Scott MD  Utilization Review  Physician Advisor  [facility]  Services.

## 2023-12-28 NOTE — TELEPHONE ENCOUNTER
FYI - Status Update    Who is Calling: Homecare    Update: assisted living is alert sodium is 124 will be going to the hospital in [place]    Does caller want a call/response back: No

## 2023-12-28 NOTE — PHARMACY-ADMISSION MEDICATION HISTORY
Pharmacist Admission Medication History    Admission medication history is complete. The information provided in this note is only as accurate as the sources available at the time of the update.    Information Source(s): Facility (U/NH/) medication list/MAR and CareEverywhere/SureScripts via N/A    Pertinent Information: Marcelino Gtz [place] ([phone])    Changes made to PTA medication list:  Added:  Trazodone, biotene, prn apap  Deleted: triamcinolone, duplicate MVI  Changed: miralax PRN --> every other day    Medication Affordability:  Not including over the counter (OTC) medications, was there a time in the past 3 months when you did not take your medications as prescribed because of cost?: Unable to Assess    Allergies reviewed with patient and updates made in EHR: yes, per facility list      Medication History Completed By: Lashon Rodgers RPH 12/28/2023 11:00 AM    Prior to Admission medications    Medication Sig Last Dose Taking? Auth Provider Long Term End Date   acetaminophen (TYLENOL) 325 MG tablet Take 650 mg by mouth every 4 hours as needed for mild pain Unknown at - Yes Unknown, Entered By History     acetaminophen (TYLENOL) 500 MG tablet TAKE 1 TAB BY MOUTH TWICE ADAY *MAX APAP 4G/24hrs* 12/27/2023 at pm Yes Alona Beatty MD     alendronate (FOSAMAX) 70 MG tablet TAKE 1 TABLET (70 MG) BY MOUTH ONCE A WEEK IN THE MORNING. TAKE ON EMPTY STOMACH WITH FULL GLASS OF WATER. DO NOT LIE DOWN FOR 1 HR. 12/22/2023 at am Yes Reported, Patient No    artificial saliva (BIOTENE MT) AERS spray Take 1 spray by mouth 4 times daily 12/27/2023 at - Yes Unknown, Entered By History     calcium carbonate 500 mg, elemental, (OSCAL) 500 MG tablet TAKE 2 TABS (1,000mg) BY MOUTH DAILY IN THE EVENING FOR SUPPLEMENT 12/27/2023 at pm Yes Alona Beatty MD     carboxymethylcellulose PF (REFRESH PLUS) 0.5 % ophthalmic solution Place 1 drop into both eyes 2 times daily 12/27/2023 at pm Yes Unknown, Entered By  History     carvedilol (COREG) 3.125 MG tablet Take 1 tablet (3.125 mg) by mouth 2 times daily (with meals) 12/27/2023 at pm Yes James Rojas MD Yes    cholecalciferol (VITAMIN D3) 5000 UNITS CAPS capsule Take 5,000 Units by mouth daily 12/27/2023 at am Yes Reported, Patient     citalopram (CELEXA) 20 MG tablet Take 20 mg by mouth every evening 12/27/2023 at pm Yes Unknown, Entered By History No    folic acid (FOLVITE) 1 MG tablet TAKE 1 TAB BY MOUTH ONCE DAILY FOR SUPPLEMENT 12/27/2023 at am Yes Alona Beatty MD     furosemide (LASIX) 20 MG tablet Take 1.5 tablets (30 mg) by mouth daily 12/27/2023 at am Yes James Rojas MD Yes    gabapentin (NEURONTIN) 100 MG capsule Take 1 capsule (100 mg) by mouth At Bedtime 12/27/2023 at pm Yes Nayeli Mora APRN CNP Yes    hydrochlorothiazide (HYDRODIURIL) 25 MG tablet Take 1 tablet (25 mg) by mouth daily 12/27/2023 at am Yes Sanford Canales MD Yes    hypromellose (ARTIFICIAL TEARS) 0.5 % SOLN ophthalmic solution Place 1 drop into both eyes every hour as needed for dry eyes Unknown at - Yes Unknown, Entered By History     L-Methylfolate-B6-B12 (FOLBIC RF PO) Take 1 tablet by mouth daily 12/27/2023 at am Yes Unknown, Entered By History     losartan (COZAAR) 100 MG tablet Take 1 tablet (100 mg) by mouth daily 12/27/2023 at am Yes Sanford Canales MD Yes    Multiple Vitamins-Minerals (CEROVITE SENIOR) TABS TAKE 1 TAB BY MOUTH DAILY IN THE MORNING FOR SUPPLEMENT 12/27/2023 at am Yes Alona Beatty MD     olopatadine (PATADAY) 0.2 % ophthalmic solution PLACE 1 DROP INTO BOTH EYES ONCE DAILY. 12/27/2023 at am Yes Reported, Patient     omeprazole (PRILOSEC) 40 MG DR capsule TAKE 1 CAP BY MOUTH ONCE DAILY BEFORE A MEAL FOR GERD 12/27/2023 at am Yes Alona Beatty MD     oxybutynin (DITROPAN) 5 MG tablet Take 5 mg by mouth 2 times daily 12/27/2023 at pm Yes Unknown, Entered By History     polyethylene glycol (MIRALAX/GLYCOLAX)  powder Take 17 g by mouth every other day 12/27/2023 at am Yes Unknown, Entered By History     SM ASPIRIN ADULT LOW STRENGTH 81 MG EC tablet TAKE 1 TAB BY MOUTH ONCE DAILY WITH A MEAL 12/27/2023 at am Yes Alona Beatty MD No    sulfamethoxazole-trimethoprim (BACTRIM DS) 800-160 MG tablet Take 1 tablet by mouth 2 times daily 12/27/2023 at pm Yes Sanford Canales MD     traZODone (DESYREL) 50 MG tablet Take 25 mg by mouth at bedtime 12/27/2023 at pm Yes Unknown, Entered By History No 6/16/24

## 2023-12-28 NOTE — PROGRESS NOTES
[facility]    Hospitalist Progress Note      Assessment & Plan   Jade Caba is a 89 year old woman who was admitted on 12/27/2023. PMH significant for anxiety, depression, history of mild hyponatremia, atrial flutter, CAD, carotid artery stenosis, CKD 3b, HFpEF, hypertension, hyperlipidemia, history of subarachnoid hemorrhage, and history of seizure disorder (has weaned off Keppra). She came to the emergency department at the recommendation of her primary care clinic because of low sodium. She had reported feeling weak x 2 weeks prior to presentation. She had a urine culture sent 12/19 that grew > 100k pan-sensitive E coli. Started on Bactrim 12/27. Patient had a fall 12/25 in which she hit her head, but was not evaluated at that time. Fall occurred when she was leaving her [place] to visit family. Patient denied any recent alcohol and daughter confirmed this. Daughter did note that patient has not been eating or drinking well lately. She is also on both furosemide and hydrochlorothiazide. Na 122 at admission. Patient brought in to observation for further evaluation.     Symptomatic hyponatremia  Hyponatremia seems to be acute on chronic.   Dry on exam and with GEMA.   Patient on both hydrochlorothiazide and furosemide. Started on Bactrim for UTI 12/27.  Poor PO intake recently.   PTA hydrochlorothiazide and furosemide held on admission.   Given possibility that Bactrim is also contributing to hyponatremia, stopping that and will give ceftriaxone here.  - Patient with good PO intake here. Eating well.   - Continue IV fluids with NS at 75 ml/hour. Reasonable to continue salt tabs at this time as well, but ideally will not need on discharge.   - Continue to follow Na q 8 hours through tomorrow.     Generalized muscle weakness  Recent mechanical fall, 12/25  Neck pain  - Patient hit head on fall. CT head completed on admission without acute process.  - Complaining of neck pain 12/28 and will obtain  CT C spine.   - PT and OT consulted.     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   - Diuretics on hold.  - Avoid nephrotoxins and renally dose medications.   - Adding on UA to previously collected urine.   - Changing Bactrim to ceftriaxone.   - Continue NS at 75 ml/hour at this time.   - BUN baseline in 20s, Cr baseline around 1.0. BUN on admission 45, Cr 1.89.   - BUN/Cr improving with IV fluids. Continue to monitor and follow BMP.     Recent UTI  Patient did have urine culture 12/19 which grew > 100k pan-sensitive E coli. Uncertain if there was more recent culture from facility, but will add on UA/culture from presentation 12/27.   Patient also presenting with GEMA and hyponatremia.  Change Bactrim to Ceftriaxone with plan to continue through at least 12/29 for course that was started 12/27.  Follow add on UA/culture.    Anxiety  Depression  Insomnia  - Continue PTA citalopram HS, trazodone.    Essential hypertension  - Holding PTA losartan at this time with GEMA. Resume when able. Also holding diuretics in context of hyponatremia.  - Continue BB.     Diastolic dysfunction  No evidence of heart failure.   Holding diuretics as above. Would likely only resume furosemide on discharge.     Coronary artery disease and PAD  Appears to be stable. Continue PTA aspirin, BB,     History of seizure disorder  She has weaned off Keppra. No recent seizures.     DVT Prophylaxis: Pneumatic Compression Devices and ambulatory  Code Status: No CPR- Do NOT Intubate  Expected discharge: DNR/DNI    Disposition: Anticipate that patient will remain in the hospital for at least 1-2 more days. SW consulted. Note that patient meets inpatient criteria and will admitted inpatient at this time.     Amira Steward MD FACP  Hospitalist Service  [facility]      Interval History   Patient reports feeling less lightheaded than presentation. States that she feels she  is moving better. Renal function slowly improving. Continuing IV fluids. Will follow sodium. Changing antibiotics and rechecking urine. PT, OT, SW consulted. Patient also complaining of neck pain today. Good ROM and no spinous process tenderness. With fall on 12/25, checking CT C spine, as well. Patient also complaining of her chronic anxiety. Reassuring her about continuing chronic medications.     -Data reviewed today: I reviewed all new labs and imaging results over the last 24 hours.       Physical Exam   Temp: 98.2  F (36.8  C) Temp src: Oral BP: (!) 157/104 Pulse: 98   Resp: 16 SpO2: 95 % O2 Device: None (Room air)    There were no vitals filed for this visit.  Vital Signs with Ranges  Temp:  [97.8  F (36.6  C)-98.3  F (36.8  C)] 98.2  F (36.8  C)  Pulse:  [] 98  Resp:  [16-19] 16  BP: (109-180)/() 157/104  SpO2:  [91 %-100 %] 95 %  No intake/output data recorded.    Constitutional: Pleasant older woman seen resting in bed. Patient was sleeping initially, but woke easily and sat upright in bed. Alert. Oriented to person. No acute distress.   HEENT: NCAT. EOMI. Moist oral mucosa.  Neck: Patient reports pain to neck. Good ROM side to side. No spine tenderness.   Respiratory: Clear to auscultation bilaterally. No crackles or wheezes.  Cardiovascular: Regular rate and rhythm.  GI: Soft, nontender, nondistended.  Musculoskeletal: No gross deformities.   Neurologic: Alert and oriented. No focal neurologic deficits. Did not assess gait.  Psych: Patient calm at time of exam. Appears slightly anxious.     Medications    sodium chloride 75 mL/hr at 12/28/23 1228      aspirin  81 mg Oral Daily    calcium carbonate 500 mg (elemental)  500 mg Oral QPM    carboxymethylcellulose PF  1 drop Both Eyes BID    carvedilol  3.125 mg Oral BID w/meals    cefTRIAXone  1 g Intravenous Q24H    [START ON 12/29/2023] cholecalciferol  125 mcg Oral Daily    citalopram  20 mg Oral QPM    Folbic RF   Oral Daily    folic acid  1  mg Oral Daily    gabapentin  100 mg Oral At Bedtime    losartan  100 mg Oral Daily    [START ON 12/29/2023] olopatadine  1 drop Both Eyes Daily    oxyBUTYnin  5 mg Oral BID    pantoprazole  40 mg Oral BID AC    polyethylene glycol  17 g Oral Every Other Day    sodium chloride  1 g Oral TID w/meals    traZODone  25 mg Oral At Bedtime       Data   Recent Labs   Lab 12/28/23  1406 12/28/23  1047 12/28/23  0804 12/27/23 2003 12/27/23  1021   WBC  --   --  7.9 6.8 6.7   HGB  --   --  10.7* 11.1* 10.2*   MCV  --   --  92 93 93   PLT  --   --  175 186 187   * 122* 122* 122* 124*   POTASSIUM  --   --  4.6 4.8 4.6   CHLORIDE  --   --  87* 85* 88*   CO2  --   --  24 25 26   BUN  --   --  37.7* 46.2* 45.0*   CR  --   --  1.56* 1.91* 1.89*   ANIONGAP  --   --  11 12 10   FRANCISCO  --   --  8.7 8.7 8.8   GLC  --   --  115* 124* 107*   ALBUMIN  --   --   --   --  3.6   PROTTOTAL  --   --   --   --  6.0*   BILITOTAL  --   --   --   --  0.3   ALKPHOS  --   --   --   --  55   ALT  --   --   --   --  24   AST  --   --   --   --  30       Recent Results (from the past 24 hour(s))   Head CT w/o contrast    Narrative    EXAM: CT HEAD W/O CONTRAST  LOCATION: [facility]  DATE: 12/27/2023    INDICATION: Head injury on [place], feeling lightheaded since then  COMPARISON: 10/06/2022  TECHNIQUE: Routine CT Head without IV contrast. Multiplanar reformats. Dose reduction techniques were used.    FINDINGS:  INTRACRANIAL CONTENTS: No intracranial hemorrhage, extraaxial collection, or mass effect.  Chronic lacunar infarction head of the right caudate nucleus. Moderate presumed chronic small vessel ischemic changes. Moderate generalized volume loss. No   hydrocephalus.     VISUALIZED ORBITS/SINUSES/MASTOIDS: No intraorbital abnormality. No paranasal sinus mucosal disease. No middle ear or mastoid effusion.    BONES/SOFT TISSUES: No acute abnormality.      Impression    IMPRESSION:  1.  No acute intracranial process.

## 2023-12-28 NOTE — H&P
[facility]    History and Physical - Hospitalist Service       Date of Admission:  12/27/2023    Assessment & Plan      Jade Caba is a 89 year old female admitted on 12/27/2023. She came to the emergency department at the recommendation of her primary care clinic because of low sodium in a routine lab workup.  She has felt weak in the last 2 weeks, she had an accidental fall Simpson night when she lost balance leaving her assisted living facility.  Sodium admission 122.  She is on diuretic therapy, at least hydrochlorothiazide and Lasix are listed in the home medications.  At the moment of this dictation, osmolalities and urine sodium random and are not available.     Symptomatic hyponatremia.  Generalized muscle weakness.  Recent mechanical fall.  Hyponatremia seems to be acute on chronic, at the moment she appears to be euvolemic.  Suspect this is the consequence of diuretic use and poor oral intake of dietary sodium.  No urgency for rapid correction of sodium.  I am holding on her home diuretics.  Gentle hydration overnight with normal saline, sodium chloride tablets 1 g 3 times daily and allow regular diet.  Will check sodium every 4 hours.   Admit to observation.  Keep on telemetry.  Strict I's and O's and daily weight.  Physical therapy and Occupational Therapy assessment and treatment.    Other active chronic health problems.  Essential hypertension.  I will continue losartan, holding the diuretic in the context of hypo Na  Diastolic dysfunction.  No evidence of heart failure.  Continue medications prior to admission.  Chronic kidney disease Stage IV.  Acute component with mild worsening of her baseline function, suspect that this is consequence of hyponatremia and use of diuretics.  Coronary artery disease and PAD.  Appears to be stable.  Depression.  Continue home meds  Seizure disorder.  She is not on any AED.    Diet:  Regular   DVT Prophylaxis: Pneumatic Compression  Devices  Dempsey Catheter: Not present  Lines: None     Code Status: DNR/DNI       Clinically Significant Risk Factors Present on Admission          # Hyponatremia: Lowest Na = 122 mmol/L in last 2 days, will monitor as appropriate        # Drug Induced Platelet Defect: home medication list includes an antiplatelet medication   # Hypertension: Noted on problem list  # Chronic heart failure with preserved ejection fraction: heart failure noted on problem list and last echo with EF >50%                Disposition Plan     Discharge to [facility] in the next 24 to 48 hours.       Tono Velásquez MD  Hospitalist Service  [facility]  Securely message with [employer] (more info)  Text page via [facility] Paging/Directory     ______________________________________________________________________    Chief Complaint   Weakness     History is obtained from the patient, electronic health record, and emergency department physician    History of Present Illness   Jade Caba is a 89 year old female who presents from her assisted living accompanied by her daughter because of weakness.  Patient had a routine check in her clinic and today she was notified of a low sodium and need to present to the emergency department.  She has been complaining of lightheadedness in the last 2 weeks.  On Christmas eve she had a fall backwards, when she was putting on her coat getting out of [place] in her way to her family's house.  She states she lost the balance.  She hit her head but did not loss consciousness.  She has been on treatment for UTI recently diagnosed, she is a still taking the antibiotic.  She denies nausea or vomiting, no fevers.  No diarrhea.  No urinary symptoms.  She has been taking her medications apparently, she has listed hydrochlorothiazide and furosemide in her home meds.  I have discussed this case with Dr. YU who is requesting admission.    Vital signs:  Temp: 97.8  F (36.6  C) Temp src: Temporal BP: (!) 171/72  "Pulse: 100   Resp: 18 SpO2: 92 % O2 Device: None (Room air)        Estimated body mass index is 27.02 kg/m  as calculated from the following:    Height as of 10/25/23: 1.549 m (5' 1\").    Weight as of 10/25/23: 64.9 kg (143 lb).    Lab w/up  CBC with normal white count, hemoglobin 11.1, normal platelet and differential.  Chemistry sodium 122, chloride 85, BUN 48.2, creatinine 1.91, GFR 24,  Glycemia 124, troponin T high-sensitivity 38  CT of the head   Negative for any acute intracranial process.      Past Medical History    Past Medical History:   Diagnosis Date    Anxiety     Arthritis     Atrial flutter (H)     C1 cervical fracture (H)     CAD (coronary artery disease)     cath in 1/2018: Angioplasty and stenting of 90% mid RCA stenosis, 50% right ostial coronary stenosis, and ostial LAD stenosis.  Moderate disease throughout the mid LAD and 30 to 50% range.    Carotid artery stenosis     CKD (chronic kidney disease)     Depression     Diastolic heart failure (H)     Most recent echo 1/2018: EF 55%, inferior regional wall motion abnormality, increased left ventricular end-diastolic pressure.    H/O alcohol abuse     Hypercholesterolemia     Hypertension     Renal artery stenosis (H24)     Seizure disorder (H)     Subarachnoid hemorrhage (H) 2014       Past Surgical History   Past Surgical History:   Procedure Laterality Date    APPENDECTOMY      CAROTID ENDARTERECTOMY Left     Cataract surgery Right     will have laser eye surgery    CHOLECYSTECTOMY      Coronary stents x 3      CV CORONARY ANGIOGRAM N/A 7/19/2019    Procedure: Coronary Angiogram;  Surgeon: Pablito Emerson MD;  Location:  [facility]    HYSTERECTOMY      at age of 40-not cancerus or precancerous     JOINT REPLACEMENT Bilateral     Stenting of renal artery         Prior to Admission Medications   Prior to Admission Medications   Prescriptions Last Dose Informant Patient Reported? Taking?   L-Methylfolate-B6-B12 (FOLBIC RF PO)   " Yes No   Sig: Take 1 tablet by mouth daily   Multiple Vitamins-Minerals (CEROVITE SENIOR) TABS   No No   Sig: TAKE 1 TAB BY MOUTH DAILY IN THE MORNING FOR SUPPLEMENT   Multiple Vitamins-Minerals (CERTA-FREDERICK PO)   Yes No   Sig: Take 1 tablet by mouth daily   SM ASPIRIN ADULT LOW STRENGTH 81 MG EC tablet   No No   Sig: TAKE 1 TAB BY MOUTH ONCE DAILY WITH A MEAL   acetaminophen (TYLENOL) 500 MG tablet   No No   Sig: TAKE 1 TAB BY MOUTH TWICE ADAY *MAX APAP 4G/24hrs*   alendronate (FOSAMAX) 70 MG tablet   Yes No   Sig: TAKE 1 TABLET (70 MG) BY MOUTH ONCE A WEEK IN THE MORNING. TAKE ON EMPTY STOMACH WITH FULL GLASS OF WATER. DO NOT LIE DOWN FOR 1 HR.   calcium carbonate 500 mg, elemental, (OSCAL) 500 MG tablet   No No   Sig: TAKE 2 TABS (1,000mg) BY MOUTH DAILY IN THE EVENING FOR SUPPLEMENT   carboxymethylcellulose PF (REFRESH PLUS) 0.5 % ophthalmic solution   Yes No   Sig: Place 1 drop into both eyes 2 times daily   carvedilol (COREG) 3.125 MG tablet   No No   Sig: Take 1 tablet (3.125 mg) by mouth 2 times daily (with meals)   cholecalciferol (VITAMIN D3) 5000 UNITS CAPS capsule   Yes No   Sig: Take 5,000 Units by mouth daily   citalopram (CELEXA) 10 MG tablet   No No   Sig: Take 2 tablets (20 mg) by mouth every evening   folic acid (FOLVITE) 1 MG tablet   No No   Sig: TAKE 1 TAB BY MOUTH ONCE DAILY FOR SUPPLEMENT   furosemide (LASIX) 20 MG tablet   No No   Sig: Take 1.5 tablets (30 mg) by mouth daily   gabapentin (NEURONTIN) 100 MG capsule   No No   Sig: Take 1 capsule (100 mg) by mouth At Bedtime   hydrochlorothiazide (HYDRODIURIL) 25 MG tablet   No No   Sig: Take 1 tablet (25 mg) by mouth daily   losartan (COZAAR) 100 MG tablet   No No   Sig: Take 1 tablet (100 mg) by mouth daily   olopatadine (PATADAY) 0.2 % ophthalmic solution   Yes No   Sig: PLACE 1 DROP INTO BOTH EYES ONCE DAILY.   omeprazole (PRILOSEC) 40 MG DR capsule   No No   Sig: TAKE 1 CAP BY MOUTH ONCE DAILY BEFORE A MEAL FOR GERD   oxybutynin (DITROPAN) 5  MG tablet   Yes No   Sig: Take 5 mg by mouth 2 times daily   polyethylene glycol (MIRALAX/GLYCOLAX) powder   Yes No   Sig: Take 17 g by mouth nightly as needed    sulfamethoxazole-trimethoprim (BACTRIM DS) 800-160 MG tablet   No No   Sig: Take 1 tablet by mouth 2 times daily   triamcinolone (KENALOG) 0.1 % external cream   No No   Sig: Apply topically 2 times daily      Facility-Administered Medications: None        Review of Systems    The 10 point Review of Systems is negative other than noted in the HPI or here.       Physical Exam   Vital Signs: Temp: 97.8  F (36.6  C) Temp src: Temporal BP: (!) 171/72 Pulse: 100   Resp: 18 SpO2: 92 % O2 Device: None (Room air)    Weight: 0 lbs 0 oz    GEN:  Alert, cooperative, appears comfortable, NAD.  HEENT:  Normocephalic/atraumatic, no scleral icterus, no nasal discharge, mouth moist.  CV: IRIR, no murmur heard, no  JVD seen.  LUNGS:  Clear to auscultation bilaterally without rales/rhonchi/wheezing/retractions.  Symmetric chest rise on inhalation noted.  ABD:  Active bowel sounds, soft, non-tender/non-distended.  No rebound/guarding/rigidity.  EXT:  No edema or cyanosis.  No joint synovitis noted.  SKIN:  Dry to touch, no exanthems noted in the visualized areas.  Neuro.  Moving all 4 extremities.  Strength seems to be equal and symmetric.      Medical Decision Making     75 MINUTES SPENT BY ME on the date of service doing chart review, history, exam, documentation & further activities per the note.      Data     I have personally reviewed the following data over the past 24 hrs:    6.8  \   11.1 (L)   / 186     122 (L) 85 (L) 46.2 (H) /  124 (H)   4.8 25 1.91 (H) \     ALT: 24 AST: 30 AP: 55 TBILI: 0.3   ALB: 3.6 TOT PROTEIN: 6.0 (L) LIPASE: N/A     Trop: 38 (H) BNP: N/A       Imaging results reviewed over the past 24 hrs:   Recent Results (from the past 24 hour(s))   Head CT w/o contrast    Narrative    EXAM: CT HEAD W/O CONTRAST  LOCATION: [facility]  HOSPITAL  DATE: 12/27/2023    INDICATION: Head injury on [place], feeling lightheaded since then  COMPARISON: 10/06/2022  TECHNIQUE: Routine CT Head without IV contrast. Multiplanar reformats. Dose reduction techniques were used.    FINDINGS:  INTRACRANIAL CONTENTS: No intracranial hemorrhage, extraaxial collection, or mass effect.  Chronic lacunar infarction head of the right caudate nucleus. Moderate presumed chronic small vessel ischemic changes. Moderate generalized volume loss. No   hydrocephalus.     VISUALIZED ORBITS/SINUSES/MASTOIDS: No intraorbital abnormality. No paranasal sinus mucosal disease. No middle ear or mastoid effusion.    BONES/SOFT TISSUES: No acute abnormality.      Impression    IMPRESSION:  1.  No acute intracranial process.

## 2023-12-28 NOTE — ED NOTES
[facility]  ED Nurse Handoff Report    ED Chief complaint: Hyponatremia  . ED Diagnosis:   Final diagnoses:   Hyponatremia       Allergies: No Known Allergies    Code Status: DNR / DNI according to prior ACP (2022)    Activity level - Baseline/Home:  independent.  Activity Level - Current:   standby.   Lift room needed: No.   Bariatric: No   Needed: No   Isolation: No.   Infection: Not Applicable.     Respiratory status: Room air    Vital Signs (within 30 minutes):   Vitals:    12/27/23 1950 12/27/23 1955 12/27/23 2215   BP: 109/83  (!) 171/72   Pulse: 88  100   Resp: 18     Temp:  97.8  F (36.6  C)    TempSrc:  Temporal    SpO2: 100%  92%       Cardiac Rhythm:  ,      Pain level:    Patient confused: No.   Patient Falls Risk: nonskid shoes/slippers when out of bed, patient and family education, and mobility aid in reach.   Elimination Status: Has voided and        Patient Report - Initial Complaint: Hyponatremia.   Focused Assessment:   Pt presents d/t being told she had low sodium at urgent care today. Lives at assisted living and has no complaints other than feeling week for the past few weeks. Reports a fall on [employer], but was not evaluated. Did hit her head, denies thinners or LOC.     Abnormal Results:   Labs Ordered and Resulted from Time of ED Arrival to Time of ED Departure   BASIC METABOLIC PANEL - Abnormal       Result Value    Sodium 122 (*)     Potassium 4.8      Chloride 85 (*)     Carbon Dioxide (CO2) 25      Anion Gap 12      Urea Nitrogen 46.2 (*)     Creatinine 1.91 (*)     GFR Estimate 24 (*)     Calcium 8.7      Glucose 124 (*)    TROPONIN T, HIGH SENSITIVITY - Abnormal    Troponin T, High Sensitivity 38 (*)    CBC WITH PLATELETS AND DIFFERENTIAL - Abnormal    WBC Count 6.8      RBC Count 3.53 (*)     Hemoglobin 11.1 (*)     Hematocrit 32.8 (*)     MCV 93      MCH 31.4      MCHC 33.8      RDW 12.6      Platelet Count 186      % Neutrophils 63      %  Lymphocytes 22      % Monocytes 12      % Eosinophils 3      % Basophils 0      % Immature Granulocytes 0      NRBCs per 100 WBC 0      Absolute Neutrophils 4.3      Absolute Lymphocytes 1.5      Absolute Monocytes 0.8      Absolute Eosinophils 0.2      Absolute Basophils 0.0      Absolute Immature Granulocytes 0.0      Absolute NRBCs 0.0     SODIUM RANDOM URINE   OSMOLALITY   CREATININE RANDOM URINE        Head CT w/o contrast   Final Result   IMPRESSION:   1.  No acute intracranial process.             Treatments provided: See MAR  Family Comments: Daughter at bedside  OBS brochure/video discussed/provided to patient:  Yes  ED Medications:   Medications   sodium chloride 0.9% BOLUS 500 mL (has no administration in time range)   traZODone (DESYREL) tablet 50 mg (has no administration in time range)       Drips infusing:  No  For the majority of the shift this patient was Green.   Interventions performed were IV, labs, EKG, CT.    Sepsis treatment initiated: No    Cares/treatment/interventions/medications to be completed following ED care: See orders    ED Nurse Name: Megan Collette, RN  11:40 PM

## 2023-12-29 NOTE — DISCHARGE SUMMARY
[facility]  Hospitalist Discharge Summary      Date of Admission:  12/27/2023  Date of Discharge:  12/29/2023  Discharging Provider: Gomez Christopher MD  Discharge Service: Hospitalist Service    Discharge Diagnoses   Hyponatremia  UTI  Atrial fibrillation with rapid ventricular esponse    Clinically Significant Risk Factors          Follow-ups Needed After Discharge   Follow-up Appointments     Follow-up and recommended labs and tests       Follow up with primary care provider, Sanford Canales, within 7   days for hospital follow- up.  The following labs/tests are recommended:   follow-up on blood pressure and heart rate. Chem 7 to check sodium levels.  Follow-up with Cardiology 3-4 weeks (Dr. Rojas or his PA).            Unresulted Labs Ordered in the Past 30 Days of this Admission       Date and Time Order Name Status Description    12/28/2023  5:50 PM Urine Culture In process         These results will be followed up by hospitalist group    Discharge Disposition   Discharged to assisted living  Condition at discharge: Stable    Hospital Course   Jade Caba is a 89 year old female who presents from her assisted living accompanied by her daughter because of weakness.  Patient had a routine check in her clinic and today she was notified of a low sodium and need to present to the emergency department.  She has been complaining of lightheadedness in the last 2 weeks.  On Christmas eve she had a fall backwards, when she was putting on her coat getting out of [facility] in her way to her family's house.  She states she lost the balance.  She hit her head but did not loss consciousness.  She has been on treatment for UTI recently diagnosed, she is a still taking the antibiotic.  She denies nausea or vomiting, no fevers.  No diarrhea.  No urinary symptoms.  She has been taking her medications apparently, she has listed hydrochlorothiazide and furosemide in her home meds.  I have discussed this case  with Dr. YU who is requesting admission.      Symptomatic hyponatremia  Hyponatremia seems to be acute on chronic.   Dry on exam and with GEMA.   Patient on both hydrochlorothiazide and furosemide. Started on Bactrim for UTI 12/27.  Poor PO intake recently.   PTA hydrochlorothiazide and furosemide held on admission.   Given possibility that Bactrim is also contributing to hyponatremia, stopping that and will give ceftriaxone here.  - Patient with good PO intake here. Eating well.   - Continue IV fluids with NS at 75 ml/hour. Reasonable to continue salt tabs at this time as well, but ideally will not need on discharge.   - Continue to follow Na q 8 hours through tomorrow.      Generalized muscle weakness  Recent mechanical fall, 12/25  Neck pain  - Patient hit head on fall. CT head completed on admission without acute process.  - Complaining of neck pain 12/28 and will obtain CT C spine.   - PT and OT consulted.      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   - Diuretics on hold.  - Avoid nephrotoxins and renally dose medications.   - Adding on UA to previously collected urine.   - Changing Bactrim to ceftriaxone.   - Continue NS at 75 ml/hour at this time.   - BUN baseline in 20s, Cr baseline around 1.0. BUN on admission 45, Cr 1.89.   - BUN/Cr improving with IV fluids. Continue to monitor and follow BMP.      Recent UTI  Patient did have urine culture 12/19 which grew > 100k pan-sensitive E coli. Uncertain if there was more recent culture from facility, but will add on UA/culture from presentation 12/27.   Patient also presenting with GEMA and hyponatremia.  Change Bactrim to Ceftriaxone with plan to continue through at least 12/29 for course that was started 12/27.  Follow add on UA/culture.     Anxiety  Depression  Insomnia  - Continue PTA citalopram HS, trazodone.     Essential hypertension  - Holding PTA losartan at this time with GEMA. Resume when  able. Also holding diuretics in context of hyponatremia.  - Continue BB.      Diastolic dysfunction  No evidence of heart failure.   Holding diuretics as above. Would likely only resume furosemide on discharge.      Coronary artery disease and PAD  Appears to be stable. Continue PTA aspirin, BB,      History of seizure disorder  She has weaned off Keppra. No recent seizures.     I assumed care of patient today.  Her sodium levels have improved, although it dropped on recheck today.  Overall it is improved from when she was admitted.  Of note today her heart rate was a little elevated and EKG done showed atrial fibrillation.  I noted that this was seen on an admission EKG as well, however nothing was really done about this.  I did reach out to her cardiologist.  Apparently back in 2014 she had a short period of atrial flutter.  At that time she had chosen no anticoagulation.  She is a fall risk.  I did discuss this with her and her son and daughter today.  They have decided they would not want any anticoagulation.  I am changing her carvedilol to metoprolol.  I will place a Zio patch on her.  Patient is pretty insistent on leaving today.  Her medications will need to be adjusted further as an outpatient if necessary.  Her heart rates are too bad here, they are between 80 and 100.  In terms of her hyponatremia, it was probably from the Bactrim.  She is also on hydrochlorothiazide but has been on this and has never had issues with hyponatremia.  Despite this I will stop the hydrochlorothiazide in case we need more room to increase her beta-blocker in terms of her blood pressure.  Patient and her daughter had questions in regards to recent restlessness that she has at night.  She states this has been going on for years.  She has mentioned this to her primary.  I reviewed her medications.  She is on Celexa at night as well as gabapentin.  Specifically gabapentin can have a side effect of restlessness.  She does not even  know why she is taking these.  I will stop them.  I will also stop her Fosamax.  I met with the patient's son and her daughter.  Again, patient's heart rate is not quite yet controlled, but she is pretty insistent on discharging.  I discussed this with her family.  They would like her to discharge back to her facility.  Again, we will place a Zio patch prior to discharge.  She has been recommended to follow-up with Dr. Rojas, her cardiologist and her primary care doctor.  Her facility is also able to check her blood pressure and heart rate.      Consultations This Hospital Stay   PHYSICAL THERAPY ADULT IP CONSULT  OCCUPATIONAL THERAPY ADULT IP CONSULT  CARE MANAGEMENT / SOCIAL WORK IP CONSULT  PHARMACY LIAISON FOR MEDICATION COVERAGE CONSULT    Code Status   No CPR- Do NOT Intubate    Time Spent on this Encounter   I, Gomez Christopher MD, personally saw the patient today and spent greater than 30 minutes discharging this patient.       Gomez Christopher MD  [facility] OBSERVATION DEPT  [address] [place] [address]  Phone: [phone]  ______________________________________________________________________    Physical Exam   Vital Signs: Temp: 97.4  F (36.3  C) Temp src: Oral BP: 129/76 Pulse: 95   Resp: 18 SpO2: 91 % O2 Device: None (Room air)    Weight: 0 lbs 0 oz  Constitutional: awake, alert, cooperative, no apparent distress, and appears stated age  Eyes: Lids and lashes normal, pupils equal, round and reactive to light, extra ocular muscles intact, sclera clear, conjunctiva normal  ENT: Normocephalic, without obvious abnormality, atraumatic, sinuses nontender on palpation, external ears without lesions, oral pharynx with moist mucous membranes, tonsils without erythema or exudates, gums normal and good dentition.  Respiratory: No increased work of breathing, good air exchange, clear to auscultation bilaterally, no crackles or wheezing  Cardiovascular: Normal apical impulse, regular  rate and rhythm, normal S1 and S2, no S3 or S4, and no murmur noted  GI: No scars, normal bowel sounds, soft, non-distended, non-tender, no masses palpated, no hepatosplenomegally       Primary Care Physician   Alma Vences    Discharge Orders      Adult Cardiology Sammy Gomez Referral      Reason for your hospital stay    Hyponatremia: likely due to medications (Bactrim, hydrochlorothiazide)  Urinary tract infection (completed treatment)  Atrial fibrillation (new)     Follow-up and recommended labs and tests     Follow up with primary care provider, Sanford Canales, within 7 days for hospital follow- up.  The following labs/tests are recommended: follow-up on blood pressure and heart rate. Chem 7 to check sodium levels.  Follow-up with Cardiology 3-4 weeks (Dr. Rojas or his PA).     Activity    Your activity upon discharge: activity as tolerated     Additional Discharge Instructions    Please check blood pressure and heart rate twice per day. Theses should be recorded so she is able to take the records to her PCP and/or Cardiologist     Diet    Follow this diet upon discharge: Orders Placed This Encounter      Regular Diet Adult       Significant Results and Procedures   Most Recent 3 CBC's:  Recent Labs   Lab Test 12/29/23  0517 12/28/23  0804 12/27/23 2003   WBC 6.4 7.9 6.8   HGB 10.2* 10.7* 11.1*   MCV 93 92 93    175 186     Most Recent 3 BMP's:  Recent Labs   Lab Test 12/29/23  1102 12/29/23  0517 12/28/23  2211 12/28/23  1047 12/28/23  0804   * 127* 125*   < > 122*   POTASSIUM 5.0 5.4*  --   --  4.6   CHLORIDE 94* 94*  --   --  87*   CO2 22 24  --   --  24   BUN 29.8* 32.2*  --   --  37.7*   CR 1.18* 1.29*  --   --  1.56*   ANIONGAP 9 9  --   --  11   FRANCISCO 8.4 8.7  --   --  8.7   * 105*  --   --  115*    < > = values in this interval not displayed.     Most Recent 2 LFT's:  Recent Labs   Lab Test 12/29/23  0517 12/27/23  1021   AST 28 30   ALT 35 24   ALKPHOS 52 55   BILITOTAL 0.2 0.3    ,   Results for orders placed or performed during the hospital encounter of 12/27/23   Head CT w/o contrast    Narrative    EXAM: CT HEAD W/O CONTRAST  LOCATION: [facility]  DATE: 12/27/2023    INDICATION: Head injury on [place], feeling lightheaded since then  COMPARISON: 10/06/2022  TECHNIQUE: Routine CT Head without IV contrast. Multiplanar reformats. Dose reduction techniques were used.    FINDINGS:  INTRACRANIAL CONTENTS: No intracranial hemorrhage, extraaxial collection, or mass effect.  Chronic lacunar infarction head of the right caudate nucleus. Moderate presumed chronic small vessel ischemic changes. Moderate generalized volume loss. No   hydrocephalus.     VISUALIZED ORBITS/SINUSES/MASTOIDS: No intraorbital abnormality. No paranasal sinus mucosal disease. No middle ear or mastoid effusion.    BONES/SOFT TISSUES: No acute abnormality.      Impression    IMPRESSION:  1.  No acute intracranial process.     CT Cervical Spine w/o Contrast    Narrative    CT CERVICAL SPINE WITHOUT CONTRAST 12/28/2023 3:36 PM     HISTORY: Fall 12/25 and hit head. CT head negative. With neck pain.     TECHNIQUE: Axial images of the cervical spine were obtained without  intravenous contrast. Multiplanar reformations were performed.   Radiation dose for this scan was reduced using automated exposure  control, adjustment of the mA and/or kV according to patient size, or  iterative reconstruction technique.    COMPARISON: 10/13/2023    FINDINGS: Vertebral body heights are maintained. Multilevel loss of  disc height. No jumped or perched facets. Spinous processes are  intact. Dens interval and craniocervical infarction is unremarkable.    No prevertebral edema.    Assessment of the soft tissues of the neck demonstrate lesions in the  thyroid gland with dystrophic calcifications present. Consider  dedicated thyroid ultrasound if not previously obtained.    Degenerative changes in the inferior cervical  spine.      Impression    IMPRESSION: No acute cervical spine fracture.      PATRICIO VALDIVIA MD         SYSTEM ID:  [id number]       Discharge Medications   Current Discharge Medication List        START taking these medications    Details   metoprolol tartrate (LOPRESSOR) 25 MG tablet Take 1 tablet (25 mg) by mouth 2 times daily  Qty: 60 tablet, Refills: 1    Associated Diagnoses: Atrial fibrillation, unspecified type (H)           CONTINUE these medications which have CHANGED    Details   furosemide (LASIX) 20 MG tablet Take 1.5 tablets (30 mg) by mouth daily  Qty: 135 tablet, Refills: 3    Associated Diagnoses: Essential hypertension           CONTINUE these medications which have NOT CHANGED    Details   !! acetaminophen (TYLENOL) 325 MG tablet Take 650 mg by mouth every 4 hours as needed for mild pain      !! acetaminophen (TYLENOL) 500 MG tablet TAKE 1 TAB BY MOUTH TWICE ADAY *MAX APAP 4G/24hrs*  Qty: 60 tablet, Refills: 0    Comments: 2ND FAX REQUEST.  Associated Diagnoses: Closed fracture of sacrum with routine healing, unspecified portion of sacrum, subsequent encounter      artificial saliva (BIOTENE MT) AERS spray Take 1 spray by mouth 4 times daily      calcium carbonate 500 mg, elemental, (OSCAL) 500 MG tablet TAKE 2 TABS (1,000mg) BY MOUTH DAILY IN THE EVENING FOR SUPPLEMENT  Qty: 60 tablet, Refills: 0    Comments: 2ND FAX REQUEST.  Associated Diagnoses: Closed fracture of sacrum with routine healing, unspecified portion of sacrum, subsequent encounter      carboxymethylcellulose PF (REFRESH PLUS) 0.5 % ophthalmic solution Place 1 drop into both eyes 2 times daily      cholecalciferol (VITAMIN D3) 5000 UNITS CAPS capsule Take 5,000 Units by mouth daily      folic acid (FOLVITE) 1 MG tablet TAKE 1 TAB BY MOUTH ONCE DAILY FOR SUPPLEMENT  Qty: 30 tablet, Refills: 0    Comments: 2ND FAX REQUEST.  Associated Diagnoses: Alcohol abuse      hypromellose (ARTIFICIAL TEARS) 0.5 % SOLN ophthalmic solution Place 1 drop  into both eyes every hour as needed for dry eyes      L-Methylfolate-B6-B12 (FOLBIC RF PO) Take 1 tablet by mouth daily      losartan (COZAAR) 100 MG tablet Take 1 tablet (100 mg) by mouth daily  Qty: 90 tablet, Refills: 1    Associated Diagnoses: Primary hypertension      Multiple Vitamins-Minerals (CEROVITE SENIOR) TABS TAKE 1 TAB BY MOUTH DAILY IN THE MORNING FOR SUPPLEMENT  Qty: 30 tablet, Refills: 0    Comments: 2ND FAX REQUEST.  Associated Diagnoses: Closed fracture of sacrum with routine healing, unspecified portion of sacrum, subsequent encounter      olopatadine (PATADAY) 0.2 % ophthalmic solution PLACE 1 DROP INTO BOTH EYES ONCE DAILY.      omeprazole (PRILOSEC) 40 MG DR capsule TAKE 1 CAP BY MOUTH ONCE DAILY BEFORE A MEAL FOR GERD  Qty: 30 capsule, Refills: 0    Comments: 2ND FAX REQUEST.  Associated Diagnoses: Alcohol abuse      oxybutynin (DITROPAN) 5 MG tablet Take 5 mg by mouth 2 times daily      polyethylene glycol (MIRALAX/GLYCOLAX) powder Take 17 g by mouth every other day      SM ASPIRIN ADULT LOW STRENGTH 81 MG EC tablet TAKE 1 TAB BY MOUTH ONCE DAILY WITH A MEAL  Qty: 30 tablet, Refills: 0    Comments: 2ND FAX REQUEST.  Associated Diagnoses: CAD in native artery      traZODone (DESYREL) 50 MG tablet Take 25 mg by mouth at bedtime       !! - Potential duplicate medications found. Please discuss with provider.        STOP taking these medications       alendronate (FOSAMAX) 70 MG tablet Comments:   Reason for Stopping:         carvedilol (COREG) 3.125 MG tablet Comments:   Reason for Stopping:         citalopram (CELEXA) 10 MG tablet Comments:   Reason for Stopping:         citalopram (CELEXA) 20 MG tablet Comments:   Reason for Stopping:         gabapentin (NEURONTIN) 100 MG capsule Comments:   Reason for Stopping:         hydrochlorothiazide (HYDRODIURIL) 25 MG tablet Comments:   Reason for Stopping:         sulfamethoxazole-trimethoprim (BACTRIM DS) 800-160 MG tablet Comments:   Reason for  Stopping:             Allergies   No Known Allergies

## 2023-12-29 NOTE — PLAN OF CARE
PRIMARY DIAGNOSIS: GENERALIZED WEAKNESS/fall /hyponatremia     OUTPATIENT/OBSERVATION GOALS TO BE MET BEFORE DISCHARGE  1. Orthostatic performed: N/A    2. Tolerating PO medications: Yes    3. Return to near baseline physical activity: Yes    4. Cleared for discharge by consultants (if involved): No, sodium     Discharge Planner Nurse   Safe discharge environment identified: Yes  Barriers to discharge: Yes       Entered by: Travis West RN     Patient is Alert and Oriented x4. SBA with Gait Belt and Walker.Pt is a Regular diet.  complaining of 7/10 pain in their. Tylenol given for pain. Patient has Normal Saline 0.9% running at 75 mL per hour.  Please review provider order for any additional goals.   Nurse to notify provider when observation goals have been met and patient is ready for discharge.Goal Outcome Evaluation:

## 2023-12-29 NOTE — PROGRESS NOTES
Care Management Discharge Note    Discharge Date: 12/29/2023       Discharge Disposition:  Home    Discharge Services:  None    Discharge DME:      Discharge Transportation: family or friend will provide    Private pay costs discussed: Not applicable    Does the patient's insurance plan have a 3 day qualifying hospital stay waiver?  No    Education Provided on the Discharge Plan:  Yes  Persons Notified of Discharge Plans: Pt, son, [facility]  Patient/Family in Agreement with the Plan:  Yes    Handoff Referral Completed: No    Additional Information:  Pt will be discharging back to [facility] today. Orders have been faxed to [facility]. Spoke with Amie at [facility]. Son will be providing transportation home.  Please call if additional needs arise.    Jessica Blank RN   Inpatient Care Coordination  [facility]   Phone: [phone]

## 2023-12-29 NOTE — CARE PLAN
Discharge instructions given to patient and son. Packet printed for facility. IV removed. Patient to be wheeled to vehicle by staff.

## 2023-12-29 NOTE — PLAN OF CARE
PRIMARY DIAGNOSIS: GENERALIZED WEAKNESS    OUTPATIENT/OBSERVATION GOALS TO BE MET BEFORE DISCHARGE  1. Orthostatic performed: N/A    2. Tolerating PO medications: Yes    3. Return to near baseline physical activity: Yes    4. Cleared for discharge by consultants (if involved): No    Discharge Planner Nurse   Safe discharge environment identified: Yes  Barriers to discharge: No       Entered by: Brooke Vyas RN 12/29/2023 9:27 AM     Please review provider order for any additional goals.   Nurse to notify provider when observation goals have been met and patient is ready for discharge.

## 2023-12-29 NOTE — CONSULTS
Patient has Medicare + Medical Assistance through [facility].    Xarelto/Eliquis:  $0/mo.     Jazmin Duckworth  Pharmacy Technician/Liaison, Discharge Pharmacy   [phone] (voice or text)  [email or web address]

## 2024-01-01 ENCOUNTER — OFFICE VISIT (OUTPATIENT)
Dept: INTERNAL MEDICINE | Facility: CLINIC | Age: 89
End: 2024-01-01
Payer: COMMERCIAL

## 2024-01-01 ENCOUNTER — HOSPITAL ENCOUNTER (INPATIENT)
Facility: CLINIC | Age: 89
LOS: 1 days | DRG: 871 | End: 2024-03-23
Attending: PHYSICIAN ASSISTANT | Admitting: INTERNAL MEDICINE
Payer: COMMERCIAL

## 2024-01-01 ENCOUNTER — APPOINTMENT (OUTPATIENT)
Dept: CT IMAGING | Facility: CLINIC | Age: 89
DRG: 871 | End: 2024-01-01
Attending: PHYSICIAN ASSISTANT
Payer: COMMERCIAL

## 2024-01-01 ENCOUNTER — LAB REQUISITION (OUTPATIENT)
Dept: LAB | Facility: CLINIC | Age: 89
End: 2024-01-01
Payer: COMMERCIAL

## 2024-01-01 ENCOUNTER — APPOINTMENT (OUTPATIENT)
Dept: GENERAL RADIOLOGY | Facility: CLINIC | Age: 89
DRG: 871 | End: 2024-01-01
Attending: PHYSICIAN ASSISTANT
Payer: COMMERCIAL

## 2024-01-01 ENCOUNTER — TELEPHONE (OUTPATIENT)
Dept: CARDIOLOGY | Facility: CLINIC | Age: 89
End: 2024-01-01
Payer: COMMERCIAL

## 2024-01-01 ENCOUNTER — HOSPITAL ENCOUNTER (EMERGENCY)
Facility: CLINIC | Age: 89
Discharge: HOME | End: 2024-01-02
Attending: STUDENT IN AN ORGANIZED HEALTH CARE EDUCATION/TRAINING PROGRAM | Admitting: STUDENT IN AN ORGANIZED HEALTH CARE EDUCATION/TRAINING PROGRAM
Payer: COMMERCIAL

## 2024-01-01 ENCOUNTER — HOSPITAL ENCOUNTER (EMERGENCY)
Facility: CLINIC | Age: 89
Discharge: HOME | End: 2024-01-24
Attending: EMERGENCY MEDICINE | Admitting: EMERGENCY MEDICINE
Payer: COMMERCIAL

## 2024-01-01 ENCOUNTER — DOCUMENTATION ONLY (OUTPATIENT)
Dept: OTHER | Facility: CLINIC | Age: 89
End: 2024-01-01
Payer: COMMERCIAL

## 2024-01-01 ENCOUNTER — MEDICAL CORRESPONDENCE (OUTPATIENT)
Dept: HEALTH INFORMATION MANAGEMENT | Facility: CLINIC | Age: 89
End: 2024-01-01

## 2024-01-01 ENCOUNTER — TELEPHONE (OUTPATIENT)
Dept: ANTICOAGULATION | Facility: CLINIC | Age: 89
End: 2024-01-01

## 2024-01-01 ENCOUNTER — OFFICE VISIT (OUTPATIENT)
Dept: CARDIOLOGY | Facility: CLINIC | Age: 89
End: 2024-01-01
Attending: INTERNAL MEDICINE
Payer: COMMERCIAL

## 2024-01-01 ENCOUNTER — HOSPITAL ENCOUNTER (OUTPATIENT)
Dept: CARDIOLOGY | Facility: CLINIC | Age: 89
Discharge: HOME | End: 2024-02-08
Attending: INTERNAL MEDICINE | Admitting: INTERNAL MEDICINE
Payer: COMMERCIAL

## 2024-01-01 ENCOUNTER — APPOINTMENT (OUTPATIENT)
Dept: GENERAL RADIOLOGY | Facility: CLINIC | Age: 89
End: 2024-01-01
Attending: STUDENT IN AN ORGANIZED HEALTH CARE EDUCATION/TRAINING PROGRAM
Payer: COMMERCIAL

## 2024-01-01 VITALS
SYSTOLIC BLOOD PRESSURE: 148 MMHG | DIASTOLIC BLOOD PRESSURE: 79 MMHG | HEART RATE: 121 BPM | WEIGHT: 145 LBS | OXYGEN SATURATION: 95 % | TEMPERATURE: 98.7 F | BODY MASS INDEX: 27.38 KG/M2 | RESPIRATION RATE: 18 BRPM | HEIGHT: 61 IN

## 2024-01-01 VITALS
SYSTOLIC BLOOD PRESSURE: 138 MMHG | TEMPERATURE: 98 F | RESPIRATION RATE: 16 BRPM | HEART RATE: 122 BPM | OXYGEN SATURATION: 95 % | WEIGHT: 150.8 LBS | DIASTOLIC BLOOD PRESSURE: 84 MMHG | HEIGHT: 61 IN | BODY MASS INDEX: 28.47 KG/M2

## 2024-01-01 VITALS
HEART RATE: 96 BPM | OXYGEN SATURATION: 99 % | SYSTOLIC BLOOD PRESSURE: 174 MMHG | RESPIRATION RATE: 15 BRPM | DIASTOLIC BLOOD PRESSURE: 84 MMHG | TEMPERATURE: 98.4 F

## 2024-01-01 VITALS
SYSTOLIC BLOOD PRESSURE: 108 MMHG | OXYGEN SATURATION: 98 % | HEIGHT: 61 IN | WEIGHT: 143.7 LBS | DIASTOLIC BLOOD PRESSURE: 68 MMHG | BODY MASS INDEX: 27.13 KG/M2 | HEART RATE: 95 BPM

## 2024-01-01 VITALS
HEART RATE: 114 BPM | OXYGEN SATURATION: 90 % | SYSTOLIC BLOOD PRESSURE: 97 MMHG | TEMPERATURE: 97.4 F | DIASTOLIC BLOOD PRESSURE: 58 MMHG | RESPIRATION RATE: 18 BRPM

## 2024-01-01 DIAGNOSIS — I48.19 ATRIAL FIBRILLATION, PERSISTENT (H): ICD-10-CM

## 2024-01-01 DIAGNOSIS — R53.1 GENERAL WEAKNESS: ICD-10-CM

## 2024-01-01 DIAGNOSIS — R79.89 ELEVATED TROPONIN: ICD-10-CM

## 2024-01-01 DIAGNOSIS — I50.33 ACUTE ON CHRONIC DIASTOLIC CONGESTIVE HEART FAILURE (H): ICD-10-CM

## 2024-01-01 DIAGNOSIS — I50.32 CHRONIC DIASTOLIC (CONGESTIVE) HEART FAILURE (H): ICD-10-CM

## 2024-01-01 DIAGNOSIS — I50.9 ACUTE ON CHRONIC HEART FAILURE, UNSPECIFIED HEART FAILURE TYPE (H): ICD-10-CM

## 2024-01-01 DIAGNOSIS — R30.0 DYSURIA: ICD-10-CM

## 2024-01-01 DIAGNOSIS — R60.0 BILATERAL LEG EDEMA: ICD-10-CM

## 2024-01-01 DIAGNOSIS — A41.9 SEPTIC SHOCK (H): ICD-10-CM

## 2024-01-01 DIAGNOSIS — G93.40 ENCEPHALOPATHY: ICD-10-CM

## 2024-01-01 DIAGNOSIS — Z09 HOSPITAL DISCHARGE FOLLOW-UP: Primary | ICD-10-CM

## 2024-01-01 DIAGNOSIS — R65.21 SEPTIC SHOCK (H): ICD-10-CM

## 2024-01-01 DIAGNOSIS — E87.1 HYPONATREMIA: ICD-10-CM

## 2024-01-01 DIAGNOSIS — I48.20 CHRONIC ATRIAL FIBRILLATION (H): ICD-10-CM

## 2024-01-01 DIAGNOSIS — I50.9 HEART FAILURE, UNSPECIFIED (H): ICD-10-CM

## 2024-01-01 DIAGNOSIS — I10 PRIMARY HYPERTENSION: ICD-10-CM

## 2024-01-01 DIAGNOSIS — R06.09 EXERTIONAL DYSPNEA: ICD-10-CM

## 2024-01-01 DIAGNOSIS — I50.33 ACUTE ON CHRONIC DIASTOLIC CONGESTIVE HEART FAILURE (H): Primary | ICD-10-CM

## 2024-01-01 DIAGNOSIS — I25.10 CORONARY ATHEROSCLEROSIS: Primary | ICD-10-CM

## 2024-01-01 DIAGNOSIS — N18.31 CHRONIC KIDNEY DISEASE, STAGE 3A (H): ICD-10-CM

## 2024-01-01 DIAGNOSIS — I10 ESSENTIAL HYPERTENSION: ICD-10-CM

## 2024-01-01 DIAGNOSIS — S06.5XAA SUBDURAL HEMATOMA (H): ICD-10-CM

## 2024-01-01 DIAGNOSIS — J96.01 ACUTE RESPIRATORY FAILURE WITH HYPOXIA (H): ICD-10-CM

## 2024-01-01 DIAGNOSIS — S81.812A NONINFECTED SKIN TEAR OF LEFT LOWER EXTREMITY, INITIAL ENCOUNTER: ICD-10-CM

## 2024-01-01 DIAGNOSIS — S81.811A SKIN TEAR OF RIGHT LOWER LEG WITHOUT COMPLICATION, INITIAL ENCOUNTER: ICD-10-CM

## 2024-01-01 DIAGNOSIS — I48.91 UNSPECIFIED ATRIAL FIBRILLATION (H): ICD-10-CM

## 2024-01-01 DIAGNOSIS — R60.0 BILATERAL LOWER EXTREMITY EDEMA: ICD-10-CM

## 2024-01-01 LAB
ALBUMIN SERPL BCG-MCNC: 3.6 G/DL (ref 3.5–5.2)
ALBUMIN SERPL BCG-MCNC: 3.7 G/DL (ref 3.5–5.2)
ALBUMIN SERPL BCG-MCNC: 3.8 G/DL (ref 3.5–5.2)
ALBUMIN UR-MCNC: NEGATIVE MG/DL
ALBUMIN UR-MCNC: NEGATIVE MG/DL
ALP SERPL-CCNC: 69 U/L (ref 40–150)
ALP SERPL-CCNC: 77 U/L (ref 40–150)
ALP SERPL-CCNC: 79 U/L (ref 40–150)
ALT SERPL W P-5'-P-CCNC: 26 U/L (ref 0–50)
ALT SERPL W P-5'-P-CCNC: 33 U/L (ref 0–50)
ALT SERPL W P-5'-P-CCNC: 63 U/L (ref 0–50)
ANION GAP SERPL CALCULATED.3IONS-SCNC: 10 MMOL/L (ref 7–15)
ANION GAP SERPL CALCULATED.3IONS-SCNC: 11 MMOL/L (ref 7–15)
ANION GAP SERPL CALCULATED.3IONS-SCNC: 12 MMOL/L (ref 7–15)
ANION GAP SERPL CALCULATED.3IONS-SCNC: 21 MMOL/L (ref 7–15)
APPEARANCE UR: ABNORMAL
APPEARANCE UR: CLEAR
APTT PPP: 30 SECONDS (ref 22–38)
AST SERPL W P-5'-P-CCNC: 36 U/L (ref 0–45)
AST SERPL W P-5'-P-CCNC: 37 U/L (ref 0–45)
AST SERPL W P-5'-P-CCNC: 45 U/L (ref 0–45)
ATRIAL RATE - MUSE: 288 BPM
ATRIAL RATE - MUSE: NORMAL BPM
ATRIAL RATE - MUSE: NORMAL BPM
BACTERIA #/AREA URNS HPF: ABNORMAL /HPF
BACTERIA UR CULT: ABNORMAL
BACTERIA UR CULT: ABNORMAL
BASE EXCESS BLDV CALC-SCNC: -3.2 MMOL/L (ref -3–3)
BASOPHILS # BLD AUTO: 0 10E3/UL (ref 0–0.2)
BASOPHILS # BLD AUTO: 0.1 10E3/UL (ref 0–0.2)
BASOPHILS # BLD AUTO: 0.1 10E3/UL (ref 0–0.2)
BASOPHILS NFR BLD AUTO: 0 %
BASOPHILS NFR BLD AUTO: 1 %
BASOPHILS NFR BLD AUTO: 1 %
BILIRUB SERPL-MCNC: 0.4 MG/DL
BILIRUB SERPL-MCNC: 0.5 MG/DL
BILIRUB SERPL-MCNC: 0.8 MG/DL
BILIRUB UR QL STRIP: NEGATIVE
BILIRUB UR QL STRIP: NEGATIVE
BUN SERPL-MCNC: 24.1 MG/DL (ref 8–23)
BUN SERPL-MCNC: 24.2 MG/DL (ref 8–23)
BUN SERPL-MCNC: 27.1 MG/DL (ref 8–23)
BUN SERPL-MCNC: 29.3 MG/DL (ref 8–23)
BUN SERPL-MCNC: 29.6 MG/DL (ref 8–23)
BUN SERPL-MCNC: 35.2 MG/DL (ref 8–23)
CALCIUM SERPL-MCNC: 8.7 MG/DL (ref 8.2–9.6)
CALCIUM SERPL-MCNC: 8.9 MG/DL (ref 8.2–9.6)
CALCIUM SERPL-MCNC: 9 MG/DL (ref 8.2–9.6)
CALCIUM SERPL-MCNC: 9.3 MG/DL (ref 8.2–9.6)
CALCIUM SERPL-MCNC: 9.5 MG/DL (ref 8.2–9.6)
CALCIUM SERPL-MCNC: 9.5 MG/DL (ref 8.2–9.6)
CHLORIDE SERPL-SCNC: 93 MMOL/L (ref 98–107)
CHLORIDE SERPL-SCNC: 93 MMOL/L (ref 98–107)
CHLORIDE SERPL-SCNC: 96 MMOL/L (ref 98–107)
CHLORIDE SERPL-SCNC: 97 MMOL/L (ref 98–107)
CHLORIDE SERPL-SCNC: 98 MMOL/L (ref 98–107)
CHLORIDE SERPL-SCNC: 98 MMOL/L (ref 98–107)
CK SERPL-CCNC: 125 U/L (ref 26–192)
COLOR UR AUTO: ABNORMAL
COLOR UR AUTO: YELLOW
CREAT SERPL-MCNC: 1.02 MG/DL (ref 0.51–0.95)
CREAT SERPL-MCNC: 1.1 MG/DL (ref 0.51–0.95)
CREAT SERPL-MCNC: 1.1 MG/DL (ref 0.51–0.95)
CREAT SERPL-MCNC: 1.15 MG/DL (ref 0.51–0.95)
CREAT SERPL-MCNC: 1.2 MG/DL (ref 0.51–0.95)
CREAT SERPL-MCNC: 1.3 MG/DL (ref 0.51–0.95)
DEPRECATED HCO3 PLAS-SCNC: 20 MMOL/L (ref 22–29)
DEPRECATED HCO3 PLAS-SCNC: 24 MMOL/L (ref 22–29)
DEPRECATED HCO3 PLAS-SCNC: 25 MMOL/L (ref 22–29)
DEPRECATED HCO3 PLAS-SCNC: 26 MMOL/L (ref 22–29)
DEPRECATED HCO3 PLAS-SCNC: 28 MMOL/L (ref 22–29)
DEPRECATED HCO3 PLAS-SCNC: 28 MMOL/L (ref 22–29)
DIASTOLIC BLOOD PRESSURE - MUSE: NORMAL MMHG
EGFRCR SERPLBLD CKD-EPI 2021: 39 ML/MIN/1.73M2
EGFRCR SERPLBLD CKD-EPI 2021: 43 ML/MIN/1.73M2
EGFRCR SERPLBLD CKD-EPI 2021: 45 ML/MIN/1.73M2
EGFRCR SERPLBLD CKD-EPI 2021: 47 ML/MIN/1.73M2
EGFRCR SERPLBLD CKD-EPI 2021: 47 ML/MIN/1.73M2
EGFRCR SERPLBLD CKD-EPI 2021: 52 ML/MIN/1.73M2
EOSINOPHIL # BLD AUTO: 0 10E3/UL (ref 0–0.7)
EOSINOPHIL # BLD AUTO: 0.2 10E3/UL (ref 0–0.7)
EOSINOPHIL # BLD AUTO: 0.2 10E3/UL (ref 0–0.7)
EOSINOPHIL NFR BLD AUTO: 0 %
EOSINOPHIL NFR BLD AUTO: 2 %
EOSINOPHIL NFR BLD AUTO: 2 %
ERYTHROCYTE [DISTWIDTH] IN BLOOD BY AUTOMATED COUNT: 13.3 % (ref 10–15)
ERYTHROCYTE [DISTWIDTH] IN BLOOD BY AUTOMATED COUNT: 13.4 % (ref 10–15)
ERYTHROCYTE [DISTWIDTH] IN BLOOD BY AUTOMATED COUNT: 13.5 % (ref 10–15)
ERYTHROCYTE [DISTWIDTH] IN BLOOD BY AUTOMATED COUNT: 14.8 % (ref 10–15)
ERYTHROCYTE [DISTWIDTH] IN BLOOD BY AUTOMATED COUNT: 14.8 % (ref 10–15)
FLUAV RNA SPEC QL NAA+PROBE: NEGATIVE
FLUBV RNA RESP QL NAA+PROBE: NEGATIVE
GLUCOSE SERPL-MCNC: 113 MG/DL (ref 70–99)
GLUCOSE SERPL-MCNC: 119 MG/DL (ref 70–99)
GLUCOSE SERPL-MCNC: 128 MG/DL (ref 70–99)
GLUCOSE SERPL-MCNC: 143 MG/DL (ref 70–99)
GLUCOSE SERPL-MCNC: 86 MG/DL (ref 70–99)
GLUCOSE SERPL-MCNC: 91 MG/DL (ref 70–99)
GLUCOSE UR STRIP-MCNC: NEGATIVE MG/DL
GLUCOSE UR STRIP-MCNC: NEGATIVE MG/DL
HCO3 BLDV-SCNC: 23 MMOL/L (ref 21–28)
HCT VFR BLD AUTO: 32.4 % (ref 35–47)
HCT VFR BLD AUTO: 33.9 % (ref 35–47)
HCT VFR BLD AUTO: 36.7 % (ref 35–47)
HCT VFR BLD AUTO: 37.6 % (ref 35–47)
HCT VFR BLD AUTO: 38.7 % (ref 35–47)
HGB BLD-MCNC: 10.9 G/DL (ref 11.7–15.7)
HGB BLD-MCNC: 11 G/DL (ref 11.7–15.7)
HGB BLD-MCNC: 11.4 G/DL (ref 11.7–15.7)
HGB BLD-MCNC: 11.6 G/DL (ref 11.7–15.7)
HGB BLD-MCNC: 11.7 G/DL (ref 11.7–15.7)
HGB UR QL STRIP: ABNORMAL
HGB UR QL STRIP: ABNORMAL
HOLD SPECIMEN: NORMAL
HYALINE CASTS: 4 /LPF
IMM GRANULOCYTES # BLD: 0 10E3/UL
IMM GRANULOCYTES # BLD: 0.1 10E3/UL
IMM GRANULOCYTES # BLD: 0.1 10E3/UL
IMM GRANULOCYTES NFR BLD: 1 %
INR PPP: 1.24 (ref 0.85–1.15)
INTERPRETATION ECG - MUSE: NORMAL
KETONES UR STRIP-MCNC: NEGATIVE MG/DL
KETONES UR STRIP-MCNC: NEGATIVE MG/DL
LACTATE SERPL-SCNC: 5.6 MMOL/L (ref 0.7–2)
LACTATE SERPL-SCNC: 5.6 MMOL/L (ref 0.7–2)
LEUKOCYTE ESTERASE UR QL STRIP: ABNORMAL
LEUKOCYTE ESTERASE UR QL STRIP: NEGATIVE
LVEF ECHO: NORMAL
LYMPHOCYTES # BLD AUTO: 1.2 10E3/UL (ref 0.8–5.3)
LYMPHOCYTES # BLD AUTO: 1.2 10E3/UL (ref 0.8–5.3)
LYMPHOCYTES # BLD AUTO: 1.5 10E3/UL (ref 0.8–5.3)
LYMPHOCYTES NFR BLD AUTO: 14 %
LYMPHOCYTES NFR BLD AUTO: 14 %
LYMPHOCYTES NFR BLD AUTO: 7 %
MCH RBC QN AUTO: 26 PG (ref 26.5–33)
MCH RBC QN AUTO: 26.4 PG (ref 26.5–33)
MCH RBC QN AUTO: 29.7 PG (ref 26.5–33)
MCH RBC QN AUTO: 30.4 PG (ref 26.5–33)
MCH RBC QN AUTO: 32.4 PG (ref 26.5–33)
MCHC RBC AUTO-ENTMCNC: 30.2 G/DL (ref 31.5–36.5)
MCHC RBC AUTO-ENTMCNC: 30.3 G/DL (ref 31.5–36.5)
MCHC RBC AUTO-ENTMCNC: 31.6 G/DL (ref 31.5–36.5)
MCHC RBC AUTO-ENTMCNC: 32.2 G/DL (ref 31.5–36.5)
MCHC RBC AUTO-ENTMCNC: 34 G/DL (ref 31.5–36.5)
MCV RBC AUTO: 86 FL (ref 78–100)
MCV RBC AUTO: 87 FL (ref 78–100)
MCV RBC AUTO: 94 FL (ref 78–100)
MCV RBC AUTO: 95 FL (ref 78–100)
MCV RBC AUTO: 95 FL (ref 78–100)
MONOCYTES # BLD AUTO: 0.9 10E3/UL (ref 0–1.3)
MONOCYTES # BLD AUTO: 1.2 10E3/UL (ref 0–1.3)
MONOCYTES # BLD AUTO: 1.2 10E3/UL (ref 0–1.3)
MONOCYTES NFR BLD AUTO: 11 %
MONOCYTES NFR BLD AUTO: 12 %
MONOCYTES NFR BLD AUTO: 7 %
NEUTROPHILS # BLD AUTO: 14.7 10E3/UL (ref 1.6–8.3)
NEUTROPHILS # BLD AUTO: 6.4 10E3/UL (ref 1.6–8.3)
NEUTROPHILS # BLD AUTO: 7.6 10E3/UL (ref 1.6–8.3)
NEUTROPHILS NFR BLD AUTO: 70 %
NEUTROPHILS NFR BLD AUTO: 71 %
NEUTROPHILS NFR BLD AUTO: 85 %
NITRATE UR QL: NEGATIVE
NITRATE UR QL: NEGATIVE
NRBC # BLD AUTO: 0 10E3/UL
NRBC BLD AUTO-RTO: 0 /100
NT-PROBNP SERPL-MCNC: 3932 PG/ML (ref 0–1800)
NT-PROBNP SERPL-MCNC: 4420 PG/ML (ref 0–1800)
NT-PROBNP SERPL-MCNC: 6793 PG/ML (ref 0–1800)
NT-PROBNP SERPL-MCNC: 7181 PG/ML (ref 0–1800)
O2/TOTAL GAS SETTING VFR VENT: 28 %
OXYHGB MFR BLDV: 25 % (ref 70–75)
P AXIS - MUSE: NORMAL DEGREES
PCO2 BLDV: 46 MM HG (ref 40–50)
PH BLDV: 7.31 [PH] (ref 7.32–7.43)
PH UR STRIP: 6 [PH] (ref 5–7)
PH UR STRIP: 6.5 [PH] (ref 5–7)
PLATELET # BLD AUTO: 223 10E3/UL (ref 150–450)
PLATELET # BLD AUTO: 262 10E3/UL (ref 150–450)
PLATELET # BLD AUTO: 274 10E3/UL (ref 150–450)
PLATELET # BLD AUTO: 283 10E3/UL (ref 150–450)
PLATELET # BLD AUTO: 294 10E3/UL (ref 150–450)
PO2 BLDV: 22 MM HG (ref 25–47)
POTASSIUM SERPL-SCNC: 4 MMOL/L (ref 3.4–5.3)
POTASSIUM SERPL-SCNC: 4.2 MMOL/L (ref 3.4–5.3)
POTASSIUM SERPL-SCNC: 4.3 MMOL/L (ref 3.4–5.3)
POTASSIUM SERPL-SCNC: 4.7 MMOL/L (ref 3.4–5.3)
POTASSIUM SERPL-SCNC: 4.9 MMOL/L (ref 3.4–5.3)
POTASSIUM SERPL-SCNC: 4.9 MMOL/L (ref 3.4–5.3)
PR INTERVAL - MUSE: NORMAL MS
PROT SERPL-MCNC: 6.2 G/DL (ref 6.4–8.3)
PROT SERPL-MCNC: 6.8 G/DL (ref 6.4–8.3)
PROT SERPL-MCNC: 6.9 G/DL (ref 6.4–8.3)
QRS DURATION - MUSE: 84 MS
QRS DURATION - MUSE: 86 MS
QRS DURATION - MUSE: 86 MS
QT - MUSE: 330 MS
QT - MUSE: 332 MS
QT - MUSE: 336 MS
QTC - MUSE: 399 MS
QTC - MUSE: 417 MS
QTC - MUSE: 494 MS
R AXIS - MUSE: 101 DEGREES
R AXIS - MUSE: 106 DEGREES
R AXIS - MUSE: 114 DEGREES
RADIOLOGIST FLAGS: ABNORMAL
RBC # BLD AUTO: 3.4 10E6/UL (ref 3.8–5.2)
RBC # BLD AUTO: 3.58 10E6/UL (ref 3.8–5.2)
RBC # BLD AUTO: 3.9 10E6/UL (ref 3.8–5.2)
RBC # BLD AUTO: 4.39 10E6/UL (ref 3.8–5.2)
RBC # BLD AUTO: 4.43 10E6/UL (ref 3.8–5.2)
RBC URINE: 3 /HPF
RBC URINE: <1 /HPF
RSV RNA SPEC NAA+PROBE: NEGATIVE
SAO2 % BLDV: 25.3 % (ref 70–75)
SARS-COV-2 RNA RESP QL NAA+PROBE: NEGATIVE
SODIUM SERPL-SCNC: 131 MMOL/L (ref 135–145)
SODIUM SERPL-SCNC: 132 MMOL/L (ref 135–145)
SODIUM SERPL-SCNC: 133 MMOL/L (ref 135–145)
SODIUM SERPL-SCNC: 134 MMOL/L (ref 135–145)
SODIUM SERPL-SCNC: 136 MMOL/L (ref 135–145)
SODIUM SERPL-SCNC: 136 MMOL/L (ref 135–145)
SP GR UR STRIP: 1 (ref 1–1.03)
SP GR UR STRIP: 1.01 (ref 1–1.03)
SQUAMOUS EPITHELIAL: 1 /HPF
SQUAMOUS EPITHELIAL: <1 /HPF
SYSTOLIC BLOOD PRESSURE - MUSE: NORMAL MMHG
T AXIS - MUSE: 10 DEGREES
T AXIS - MUSE: 35 DEGREES
T AXIS - MUSE: 95 DEGREES
TRANSITIONAL EPI: <1 /HPF
TROPONIN T SERPL HS-MCNC: 163 NG/L
TROPONIN T SERPL HS-MCNC: 202 NG/L
TSH SERPL DL<=0.005 MIU/L-ACNC: 3.01 UIU/ML (ref 0.3–4.2)
TSH SERPL DL<=0.005 MIU/L-ACNC: 3.98 UIU/ML (ref 0.3–4.2)
UROBILINOGEN UR STRIP-MCNC: NORMAL MG/DL
UROBILINOGEN UR STRIP-MCNC: NORMAL MG/DL
VENTRICULAR RATE- MUSE: 130 BPM
VENTRICULAR RATE- MUSE: 88 BPM
VENTRICULAR RATE- MUSE: 95 BPM
VIT D+METAB SERPL-MCNC: 76 NG/ML (ref 20–50)
WBC # BLD AUTO: 10.5 10E3/UL (ref 4–11)
WBC # BLD AUTO: 17.3 10E3/UL (ref 4–11)
WBC # BLD AUTO: 6.6 10E3/UL (ref 4–11)
WBC # BLD AUTO: 8.7 10E3/UL (ref 4–11)
WBC # BLD AUTO: 9.1 10E3/UL (ref 4–11)
WBC CLUMPS #/AREA URNS HPF: PRESENT /HPF
WBC URINE: 0 /HPF
WBC URINE: 14 /HPF

## 2024-01-01 PROCEDURE — 83880 ASSAY OF NATRIURETIC PEPTIDE: CPT | Performed by: STUDENT IN AN ORGANIZED HEALTH CARE EDUCATION/TRAINING PROGRAM

## 2024-01-01 PROCEDURE — 99291 CRITICAL CARE FIRST HOUR: CPT | Mod: 25

## 2024-01-01 PROCEDURE — 93306 TTE W/DOPPLER COMPLETE: CPT

## 2024-01-01 PROCEDURE — 96372 THER/PROPH/DIAG INJ SC/IM: CPT | Performed by: PHYSICIAN ASSISTANT

## 2024-01-01 PROCEDURE — 250N000009 HC RX 250: Performed by: EMERGENCY MEDICINE

## 2024-01-01 PROCEDURE — 12005 RPR S/N/A/GEN/TRK12.6-20.0CM: CPT

## 2024-01-01 PROCEDURE — 85025 COMPLETE CBC W/AUTO DIFF WBC: CPT | Performed by: PHYSICIAN ASSISTANT

## 2024-01-01 PROCEDURE — 72125 CT NECK SPINE W/O DYE: CPT

## 2024-01-01 PROCEDURE — 82805 BLOOD GASES W/O2 SATURATION: CPT | Performed by: PHYSICIAN ASSISTANT

## 2024-01-01 PROCEDURE — 70450 CT HEAD/BRAIN W/O DYE: CPT

## 2024-01-01 PROCEDURE — 258N000003 HC RX IP 258 OP 636: Performed by: PHYSICIAN ASSISTANT

## 2024-01-01 PROCEDURE — 250N000009 HC RX 250

## 2024-01-01 PROCEDURE — 250N000009 HC RX 250: Performed by: PHYSICIAN ASSISTANT

## 2024-01-01 PROCEDURE — 84443 ASSAY THYROID STIM HORMONE: CPT | Mod: ORL | Performed by: NURSE PRACTITIONER

## 2024-01-01 PROCEDURE — 84443 ASSAY THYROID STIM HORMONE: CPT | Performed by: PHYSICIAN ASSISTANT

## 2024-01-01 PROCEDURE — 250N000013 HC RX MED GY IP 250 OP 250 PS 637: Performed by: EMERGENCY MEDICINE

## 2024-01-01 PROCEDURE — 80048 BASIC METABOLIC PNL TOTAL CA: CPT | Mod: ORL | Performed by: NURSE PRACTITIONER

## 2024-01-01 PROCEDURE — 70450 CT HEAD/BRAIN W/O DYE: CPT | Mod: 76

## 2024-01-01 PROCEDURE — 93005 ELECTROCARDIOGRAM TRACING: CPT

## 2024-01-01 PROCEDURE — 71045 X-RAY EXAM CHEST 1 VIEW: CPT

## 2024-01-01 PROCEDURE — 36415 COLL VENOUS BLD VENIPUNCTURE: CPT | Performed by: STUDENT IN AN ORGANIZED HEALTH CARE EDUCATION/TRAINING PROGRAM

## 2024-01-01 PROCEDURE — 85027 COMPLETE CBC AUTOMATED: CPT | Mod: ORL | Performed by: NURSE PRACTITIONER

## 2024-01-01 PROCEDURE — 87086 URINE CULTURE/COLONY COUNT: CPT | Mod: ORL | Performed by: NURSE PRACTITIONER

## 2024-01-01 PROCEDURE — 87186 SC STD MICRODIL/AGAR DIL: CPT | Mod: ORL | Performed by: NURSE PRACTITIONER

## 2024-01-01 PROCEDURE — 81001 URINALYSIS AUTO W/SCOPE: CPT | Mod: ORL | Performed by: NURSE PRACTITIONER

## 2024-01-01 PROCEDURE — P9604 ONE-WAY ALLOW PRORATED TRIP: HCPCS | Mod: ORL | Performed by: NURSE PRACTITIONER

## 2024-01-01 PROCEDURE — P9603 ONE-WAY ALLOW PRORATED MILES: HCPCS | Mod: ORL | Performed by: NURSE PRACTITIONER

## 2024-01-01 PROCEDURE — 96374 THER/PROPH/DIAG INJ IV PUSH: CPT

## 2024-01-01 PROCEDURE — 84484 ASSAY OF TROPONIN QUANT: CPT | Performed by: PHYSICIAN ASSISTANT

## 2024-01-01 PROCEDURE — 83880 ASSAY OF NATRIURETIC PEPTIDE: CPT | Performed by: PHYSICIAN ASSISTANT

## 2024-01-01 PROCEDURE — 99283 EMERGENCY DEPT VISIT LOW MDM: CPT

## 2024-01-01 PROCEDURE — 120N000001 HC R&B MED SURG/OB

## 2024-01-01 PROCEDURE — 250N000011 HC RX IP 250 OP 636: Mod: JZ | Performed by: EMERGENCY MEDICINE

## 2024-01-01 PROCEDURE — 85730 THROMBOPLASTIN TIME PARTIAL: CPT | Performed by: PHYSICIAN ASSISTANT

## 2024-01-01 PROCEDURE — 96376 TX/PRO/DX INJ SAME DRUG ADON: CPT

## 2024-01-01 PROCEDURE — 80053 COMPREHEN METABOLIC PANEL: CPT | Mod: ORL | Performed by: NURSE PRACTITIONER

## 2024-01-01 PROCEDURE — 71046 X-RAY EXAM CHEST 2 VIEWS: CPT

## 2024-01-01 PROCEDURE — 250N000011 HC RX IP 250 OP 636: Performed by: PHYSICIAN ASSISTANT

## 2024-01-01 PROCEDURE — 72125 CT NECK SPINE W/O DYE: CPT | Mod: 76

## 2024-01-01 PROCEDURE — 96375 TX/PRO/DX INJ NEW DRUG ADDON: CPT

## 2024-01-01 PROCEDURE — 96365 THER/PROPH/DIAG IV INF INIT: CPT

## 2024-01-01 PROCEDURE — 99285 EMERGENCY DEPT VISIT HI MDM: CPT | Mod: 25

## 2024-01-01 PROCEDURE — 99495 TRANSJ CARE MGMT MOD F2F 14D: CPT | Performed by: INTERNAL MEDICINE

## 2024-01-01 PROCEDURE — 99214 OFFICE O/P EST MOD 30 MIN: CPT | Performed by: NURSE PRACTITIONER

## 2024-01-01 PROCEDURE — 80053 COMPREHEN METABOLIC PANEL: CPT | Performed by: PHYSICIAN ASSISTANT

## 2024-01-01 PROCEDURE — 83605 ASSAY OF LACTIC ACID: CPT | Performed by: PHYSICIAN ASSISTANT

## 2024-01-01 PROCEDURE — 82550 ASSAY OF CK (CPK): CPT | Performed by: PHYSICIAN ASSISTANT

## 2024-01-01 PROCEDURE — 70486 CT MAXILLOFACIAL W/O DYE: CPT

## 2024-01-01 PROCEDURE — 81001 URINALYSIS AUTO W/SCOPE: CPT | Performed by: STUDENT IN AN ORGANIZED HEALTH CARE EDUCATION/TRAINING PROGRAM

## 2024-01-01 PROCEDURE — 99223 1ST HOSP IP/OBS HIGH 75: CPT | Mod: AI | Performed by: INTERNAL MEDICINE

## 2024-01-01 PROCEDURE — 93306 TTE W/DOPPLER COMPLETE: CPT | Mod: 26 | Performed by: INTERNAL MEDICINE

## 2024-01-01 PROCEDURE — 83880 ASSAY OF NATRIURETIC PEPTIDE: CPT | Mod: ORL | Performed by: NURSE PRACTITIONER

## 2024-01-01 PROCEDURE — 250N000011 HC RX IP 250 OP 636: Performed by: STUDENT IN AN ORGANIZED HEALTH CARE EDUCATION/TRAINING PROGRAM

## 2024-01-01 PROCEDURE — 99221 1ST HOSP IP/OBS SF/LOW 40: CPT

## 2024-01-01 PROCEDURE — 85025 COMPLETE CBC W/AUTO DIFF WBC: CPT | Performed by: STUDENT IN AN ORGANIZED HEALTH CARE EDUCATION/TRAINING PROGRAM

## 2024-01-01 PROCEDURE — 36415 COLL VENOUS BLD VENIPUNCTURE: CPT | Mod: ORL | Performed by: NURSE PRACTITIONER

## 2024-01-01 PROCEDURE — 85610 PROTHROMBIN TIME: CPT | Performed by: PHYSICIAN ASSISTANT

## 2024-01-01 PROCEDURE — 36415 COLL VENOUS BLD VENIPUNCTURE: CPT | Performed by: PHYSICIAN ASSISTANT

## 2024-01-01 PROCEDURE — 85025 COMPLETE CBC W/AUTO DIFF WBC: CPT | Mod: ORL | Performed by: NURSE PRACTITIONER

## 2024-01-01 PROCEDURE — 80048 BASIC METABOLIC PNL TOTAL CA: CPT | Performed by: STUDENT IN AN ORGANIZED HEALTH CARE EDUCATION/TRAINING PROGRAM

## 2024-01-01 PROCEDURE — 87637 SARSCOV2&INF A&B&RSV AMP PRB: CPT | Performed by: STUDENT IN AN ORGANIZED HEALTH CARE EDUCATION/TRAINING PROGRAM

## 2024-01-01 PROCEDURE — 82306 VITAMIN D 25 HYDROXY: CPT | Mod: ORL | Performed by: NURSE PRACTITIONER

## 2024-01-01 PROCEDURE — 87040 BLOOD CULTURE FOR BACTERIA: CPT | Performed by: PHYSICIAN ASSISTANT

## 2024-01-01 RX ORDER — LIDOCAINE 40 MG/G
CREAM TOPICAL
Status: CANCELLED | OUTPATIENT
Start: 2024-01-01

## 2024-01-01 RX ORDER — CALCIUM CARBONATE 500 MG/1
1000 TABLET, CHEWABLE ORAL 4 TIMES DAILY PRN
Status: CANCELLED | OUTPATIENT
Start: 2024-01-01

## 2024-01-01 RX ORDER — FUROSEMIDE 10 MG/ML
40 INJECTION INTRAMUSCULAR; INTRAVENOUS ONCE
Status: COMPLETED | OUTPATIENT
Start: 2024-01-01 | End: 2024-01-01

## 2024-01-01 RX ORDER — LOSARTAN POTASSIUM 50 MG/1
50 TABLET ORAL DAILY
Qty: 30 TABLET | Refills: 0 | OUTPATIENT
Start: 2024-01-01

## 2024-01-01 RX ORDER — ASPIRIN 81 MG/1
81 TABLET ORAL DAILY
Qty: 90 TABLET | Refills: 3 | Status: SHIPPED | OUTPATIENT
Start: 2024-01-01

## 2024-01-01 RX ORDER — AMOXICILLIN 250 MG
2 CAPSULE ORAL 2 TIMES DAILY PRN
Status: CANCELLED | OUTPATIENT
Start: 2024-01-01

## 2024-01-01 RX ORDER — RIVAROXABAN 20 MG/1
TABLET, FILM COATED ORAL
Qty: 30 TABLET | Refills: 1 | OUTPATIENT
Start: 2024-01-01

## 2024-01-01 RX ORDER — LOSARTAN POTASSIUM 50 MG/1
50 TABLET ORAL DAILY
Qty: 90 TABLET | Refills: 3 | Status: SHIPPED | OUTPATIENT
Start: 2024-01-01

## 2024-01-01 RX ORDER — LORAZEPAM 2 MG/ML
0.5 INJECTION INTRAMUSCULAR ONCE
Status: COMPLETED | OUTPATIENT
Start: 2024-01-01 | End: 2024-01-01

## 2024-01-01 RX ORDER — HALOPERIDOL 5 MG/ML
2 INJECTION INTRAMUSCULAR EVERY 6 HOURS PRN
Status: CANCELLED | OUTPATIENT
Start: 2024-01-01

## 2024-01-01 RX ORDER — HALOPERIDOL 5 MG/ML
5 INJECTION INTRAMUSCULAR ONCE
Status: COMPLETED | OUTPATIENT
Start: 2024-01-01 | End: 2024-01-01

## 2024-01-01 RX ORDER — AZITHROMYCIN 500 MG/5ML
500 INJECTION, POWDER, LYOPHILIZED, FOR SOLUTION INTRAVENOUS ONCE
Status: COMPLETED | OUTPATIENT
Start: 2024-01-01 | End: 2024-01-01

## 2024-01-01 RX ORDER — PIPERACILLIN SODIUM, TAZOBACTAM SODIUM 3; .375 G/15ML; G/15ML
3.38 INJECTION, POWDER, LYOPHILIZED, FOR SOLUTION INTRAVENOUS ONCE
Status: COMPLETED | OUTPATIENT
Start: 2024-01-01 | End: 2024-01-01

## 2024-01-01 RX ORDER — AZITHROMYCIN 250 MG/1
250 TABLET, FILM COATED ORAL DAILY
Status: CANCELLED | OUTPATIENT
Start: 2024-03-23 | End: 2024-03-27

## 2024-01-01 RX ORDER — LORAZEPAM 0.5 MG/1
0.5 TABLET ORAL ONCE
Status: COMPLETED | OUTPATIENT
Start: 2024-01-01 | End: 2024-01-01

## 2024-01-01 RX ORDER — METOPROLOL SUCCINATE 50 MG/1
50 TABLET, EXTENDED RELEASE ORAL 2 TIMES DAILY
Qty: 180 TABLET | Refills: 4 | Status: SHIPPED | OUTPATIENT
Start: 2024-01-01 | End: 2024-01-01

## 2024-01-01 RX ORDER — ASPIRIN 81 MG/1
81 TABLET ORAL DAILY
COMMUNITY
Start: 2024-01-01 | End: 2024-01-01

## 2024-01-01 RX ORDER — METOPROLOL SUCCINATE 50 MG/1
50 TABLET, EXTENDED RELEASE ORAL 2 TIMES DAILY
Qty: 60 TABLET | Refills: 1 | Status: SHIPPED | OUTPATIENT
Start: 2024-01-01 | End: 2024-01-01

## 2024-01-01 RX ORDER — MIRTAZAPINE 7.5 MG/1
7.5 TABLET, FILM COATED ORAL AT BEDTIME
COMMUNITY

## 2024-01-01 RX ORDER — METOPROLOL TARTRATE 25 MG/1
25 TABLET, FILM COATED ORAL 2 TIMES DAILY
COMMUNITY

## 2024-01-01 RX ORDER — LOSARTAN POTASSIUM 100 MG/1
50 TABLET ORAL DAILY
Qty: 90 TABLET | Refills: 4 | Status: SHIPPED | OUTPATIENT
Start: 2024-01-01 | End: 2024-01-01

## 2024-01-01 RX ORDER — SODIUM CHLORIDE 9 MG/ML
INJECTION, SOLUTION INTRAVENOUS CONTINUOUS
Status: CANCELLED | OUTPATIENT
Start: 2024-01-01

## 2024-01-01 RX ORDER — METOPROLOL SUCCINATE 50 MG/1
50 TABLET, EXTENDED RELEASE ORAL 2 TIMES DAILY
Qty: 60 TABLET | Refills: 1 | OUTPATIENT
Start: 2024-01-01

## 2024-01-01 RX ORDER — LORAZEPAM 2 MG/ML
1 INJECTION INTRAMUSCULAR ONCE
Status: COMPLETED | OUTPATIENT
Start: 2024-01-01 | End: 2024-01-01

## 2024-01-01 RX ORDER — OLANZAPINE 10 MG/1
5 INJECTION, POWDER, LYOPHILIZED, FOR SOLUTION INTRAMUSCULAR ONCE
Status: DISCONTINUED | OUTPATIENT
Start: 2024-01-01 | End: 2024-01-01

## 2024-01-01 RX ORDER — METOPROLOL TARTRATE 37.5 MG/1
1 TABLET, FILM COATED ORAL 2 TIMES DAILY
COMMUNITY

## 2024-01-01 RX ORDER — ACETAMINOPHEN 325 MG/1
650 TABLET ORAL EVERY 4 HOURS PRN
COMMUNITY

## 2024-01-01 RX ORDER — OLANZAPINE 10 MG/1
2.5 INJECTION, POWDER, LYOPHILIZED, FOR SOLUTION INTRAMUSCULAR ONCE
Status: COMPLETED | OUTPATIENT
Start: 2024-01-01 | End: 2024-01-01

## 2024-01-01 RX ORDER — LIDOCAINE HYDROCHLORIDE AND EPINEPHRINE 10; 10 MG/ML; UG/ML
INJECTION, SOLUTION INFILTRATION; PERINEURAL
Status: COMPLETED
Start: 2024-01-01 | End: 2024-01-01

## 2024-01-01 RX ORDER — ACETAMINOPHEN 500 MG
500 TABLET ORAL 2 TIMES DAILY
COMMUNITY

## 2024-01-01 RX ORDER — OLANZAPINE 10 MG/2ML
5 INJECTION, POWDER, FOR SOLUTION INTRAMUSCULAR ONCE
Status: COMPLETED | OUTPATIENT
Start: 2024-01-01 | End: 2024-01-01

## 2024-01-01 RX ORDER — AMOXICILLIN 250 MG
1 CAPSULE ORAL 2 TIMES DAILY PRN
Status: CANCELLED | OUTPATIENT
Start: 2024-01-01

## 2024-01-01 RX ORDER — CEFTRIAXONE 2 G/1
2 INJECTION, POWDER, FOR SOLUTION INTRAMUSCULAR; INTRAVENOUS EVERY 24 HOURS
Status: CANCELLED | OUTPATIENT
Start: 2024-01-01

## 2024-01-01 RX ORDER — CITALOPRAM HYDROBROMIDE 10 MG/1
10 TABLET ORAL DAILY
COMMUNITY

## 2024-01-01 RX ORDER — FUROSEMIDE 40 MG
40 TABLET ORAL DAILY
Qty: 90 TABLET | Refills: 4 | Status: SHIPPED | OUTPATIENT
Start: 2024-01-01

## 2024-01-01 RX ORDER — QUETIAPINE FUMARATE 25 MG/1
25 TABLET, FILM COATED ORAL 2 TIMES DAILY PRN
Status: CANCELLED | OUTPATIENT
Start: 2024-01-01

## 2024-01-01 RX ORDER — LORAZEPAM 0.5 MG/1
0.5 TABLET ORAL AT BEDTIME
COMMUNITY

## 2024-01-01 RX ORDER — FUROSEMIDE 20 MG
20 TABLET ORAL DAILY
COMMUNITY
Start: 2024-01-01 | End: 2024-03-25

## 2024-01-01 RX ADMIN — LORAZEPAM 1 MG: 2 INJECTION INTRAMUSCULAR; INTRAVENOUS at 11:09

## 2024-01-01 RX ADMIN — HALOPERIDOL LACTATE 5 MG: 5 INJECTION, SOLUTION INTRAMUSCULAR at 14:25

## 2024-01-01 RX ADMIN — LIDOCAINE HYDROCHLORIDE,EPINEPHRINE BITARTRATE: 10; .01 INJECTION, SOLUTION INFILTRATION; PERINEURAL at 04:59

## 2024-01-01 RX ADMIN — Medication 10 MG: at 15:57

## 2024-01-01 RX ADMIN — FUROSEMIDE 40 MG: 10 INJECTION, SOLUTION INTRAMUSCULAR; INTRAVENOUS at 13:50

## 2024-01-01 RX ADMIN — LORAZEPAM 0.5 MG: 2 INJECTION, SOLUTION INTRAMUSCULAR; INTRAVENOUS at 13:11

## 2024-01-01 RX ADMIN — LORAZEPAM 0.5 MG: 2 INJECTION, SOLUTION INTRAMUSCULAR; INTRAVENOUS at 13:53

## 2024-01-01 RX ADMIN — AZITHROMYCIN MONOHYDRATE 500 MG: 500 INJECTION, POWDER, LYOPHILIZED, FOR SOLUTION INTRAVENOUS at 15:24

## 2024-01-01 RX ADMIN — LIDOCAINE HYDROCHLORIDE,EPINEPHRINE BITARTRATE: 10; .01 INJECTION, SOLUTION INFILTRATION; PERINEURAL at 04:58

## 2024-01-01 RX ADMIN — SODIUM CHLORIDE 500 ML: 9 INJECTION, SOLUTION INTRAVENOUS at 15:30

## 2024-01-01 RX ADMIN — SODIUM CHLORIDE 1000 ML: 9 INJECTION, SOLUTION INTRAVENOUS at 13:11

## 2024-01-01 RX ADMIN — Medication: at 03:29

## 2024-01-01 RX ADMIN — LORAZEPAM 0.5 MG: 0.5 TABLET ORAL at 03:29

## 2024-01-01 RX ADMIN — Medication 3 ML: at 10:24

## 2024-01-01 RX ADMIN — OLANZAPINE 5 MG: 10 INJECTION, POWDER, FOR SOLUTION INTRAMUSCULAR at 12:12

## 2024-01-01 RX ADMIN — OLANZAPINE 2.5 MG: 10 INJECTION, POWDER, FOR SOLUTION INTRAMUSCULAR at 12:30

## 2024-01-01 RX ADMIN — SODIUM CHLORIDE 500 ML: 9 INJECTION, SOLUTION INTRAVENOUS at 16:00

## 2024-01-01 RX ADMIN — PIPERACILLIN AND TAZOBACTAM 3.38 G: 3; .375 INJECTION, POWDER, FOR SOLUTION INTRAVENOUS at 13:34

## 2024-01-01 ASSESSMENT — ACTIVITIES OF DAILY LIVING (ADL)
ADLS_ACUITY_SCORE: 35
ADLS_ACUITY_SCORE: 38
ADLS_ACUITY_SCORE: 35
ADLS_ACUITY_SCORE: 38
ADLS_ACUITY_SCORE: 35
ADLS_ACUITY_SCORE: 38
ADLS_ACUITY_SCORE: 35
ADLS_ACUITY_SCORE: 38

## 2024-01-02 NOTE — DISCHARGE INSTRUCTIONS
Please continue 30 mg lasix daily. Follow up with primary care provider in 5 days for lab recheck to ensure electrolytes are stable.    Also please discuss with primary care provider possible medication adjustments for ongoing anxiety/agitation.

## 2024-01-02 NOTE — ED TRIAGE NOTES
Arrival via EMS from [facility] assisted living in [place]. Pt reports increasing weakness, fatigue and dyspnea with exertion the past 5 days. Denies CP. Recently discharged one week ago from [facility] for low sodium level. Pt also reports dysuria. Hx of HTN and CHF. Pt is A&OX4. Given ASA 324mg and NTG 1 tab by EMS. BP decreased from 180s to 130s after NTG.     Triage Assessment (Adult)       Row Name 01/02/24 1021          Triage Assessment    Airway WDL WDL        Respiratory WDL    Respiratory WDL WDL        Skin Circulation/Temperature WDL    Skin Circulation/Temperature WDL WDL        Cognitive/Neuro/Behavioral WDL    Cognitive/Neuro/Behavioral WDL WDL

## 2024-01-02 NOTE — ED NOTES
Emergency Department Attending Supervision Note        I evaluated this patient with Vincent SHAW.       Briefly, the patient presented with mild weakness and lower extremity swelling.  She has recently been admitted for atrial fibrillation and CHF in the context of additionally noted hyponatremia.  Diuretics were held transiently during the hospitalization.  In the department, she was in A-fib without RVR, exam shows irregularly irregular rhythm without tachycardia, clear lungs, but 2+ lower EXTR edema.  Workup shows improved hyponatremia normal renal function.  She is not hypoxic.  There is no clear indication for admission at this time, however the patient remains volume overloaded.  Will administer Lasix IV 40 mg and continue on 30 mg daily with recheck in clinic.  She will need to return for shortness of breath, worsening swelling, or any other concerns.  Previous conversation with family was at the patient does not want to be anticoagulated, so no changes indicated at this time.      Independent interpretation: Chest x-ray shows bibasilar atelectasis    Review of external records: Reviewed 12-7 23 hospitalization    Visit Diagnosis, Associated Orders, and Comments     ICD-10-CM    1. Acute on chronic heart failure, unspecified heart failure type (H)  I50.9       2. General weakness  R53.1       3. Exertional dyspnea  R06.09       4. Hyponatremia  E87.1             Ken Gardner MD  [facility]  Pending sale to [facility]           Ken Gardner MD  01/02/24 1408

## 2024-01-02 NOTE — ED PROVIDER NOTES
History     Chief Complaint:  Generalized Weakness, Fatigue, and Shortness of Breath     HPI   Jade Caba is a 89 year old female on aspirin 81 mg with a history of CVA, atrial fibrillation, diastolic heart failure, dysphagia, hypertension, hyperlipidemia, pulmonary hypertension, anxiety, and subarachnoid hemorrhage who presents via EMS from assisted living with generalized weakness and fatigue for 5 days. She has increased shortness of breath with exertion. 180/90 blood pressure per EMS. 325 mg aspirin given and 1 nitroglycerin given by EMS. She denies chest pain. She was discharged from [facility] one week ago with hyponatremia and atrial fibrillation. She adds she has dysuria and is concerned for a bladder infection and was recently treated with antibiotics. She adds she has a cough which is not new for her. She notes her bowel movements come unexpectedly which is new for her and she attributes to a new medication. She denies nausea, abdominal pain or decreased appetite.    Independent Historian:   EMS - They report as noted above.    Review of External Notes:   I reviewed the patient's discharge summary from 12/27/2023.    Medications:    Calcium carbonate   Cholecalciferol   Folic acid  Furosemide   L-Methylfolate-B6-B12  Losartan   Metoprolol tartrate  Omeprazole   Oxybutynin   Polyethylene glycol  Aspirin 81 mg   Trazodone     Past Medical History:    Alcohol dependence in remission   Anxiety  Arthritis  Asymmetrical sensorineural hearing loss   Atrial flutter  Atrial fibrillation  Bilateral dry eyes   C1 cervical fracture  CAD  Carotid artery stenosis  Cervical radiculopathy   Cervical stenosis of spinal canal   Chondrocalcinosis   CKD, stage 3  CVA  Cognitive impairment   Depression  Degenerative arthritis of cervical spine   Diastolic heart failure  Dysphagia   GERD  Hypercholesterolemia  Hypertension  Hyperlipidemia   Impairment of speech discrimination   Occlusion and stenosis of carotid artery    Occipital neuralgia of left side   Primary osteoarthritis of both knees   Protein deficiency   Pulmonary hypertension   Pneumonia   Renal artery stenosis  Rib fracture  Seizure disorder  Subarachnoid hemorrhage  Traumatic ecchymosis of lower leg   Urinary incontinence     Past Surgical History:    Appendectomy  Angioplasty   Carotid endarterectomy, left  Cataract surgery, right  Cholecystectomy  CV coronary angiogram  Hysterectomy  Joint replacement  Stenting of renal artery    Physical Exam   Patient Vitals for the past 24 hrs:   BP Temp Temp src Pulse Resp SpO2   01/02/24 1400 -- -- -- 91 22 95 %   01/02/24 1345 (!) 147/102 -- -- 103 22 91 %   01/02/24 1315 (!) 144/87 -- -- 97 18 94 %   01/02/24 1215 (!) 143/84 -- -- 93 13 96 %   01/02/24 1200 (!) 145/84 -- -- 99 10 92 %   01/02/24 1130 (!) 160/94 -- -- -- -- --   01/02/24 1045 -- -- -- 91 18 97 %   01/02/24 1018 (!) 165/89 -- -- 92 -- 96 %   01/02/24 1016 (!) 165/81 98.4  F (36.9  C) Oral 100 16 96 %      Physical Exam  General: Alert and cooperative with exam. Patient in no apparent distress. Normal mentation.  Head:  Scalp is NC/AT  Eyes:  No scleral icterus, PERRL  ENT:  The external nose and ears are normal.   Neck:  Normal range of motion without rigidity.  CV:  Regular rate and rhythm    No pathologic murmur   Resp:  Diminished breath sounds due to limited inspiration    Non-labored, no retractions or accessory muscle use  GI:  Abdomen is soft, no distension, no tenderness. No peritoneal signs  MS:  2+ pitting edema to bilateral lower extremities  Skin:  Warm and dry, No rash or lesions noted.  Neuro:  Oriented x 3. No gross motor deficits.      Emergency Department Course     ECG results from 01/02/24   EKG 12-lead, tracing only     Value    Systolic Blood Pressure     Diastolic Blood Pressure     Ventricular Rate 88    Atrial Rate     NM Interval     QRS Duration 86        QTc 399    P Axis     R AXIS 106    T Axis 10    Interpretation ECG       Atrial fibrillation  Rightward axis  Abnormal ECG  When compared with ECG of 29-DEC-2023 08:12, (unconfirmed)  No significant change was found           Imaging:  XR Chest 2 Views   Final Result   IMPRESSION: Hyperinflation both lungs. Mild bibasilar opacities may be   related to atelectasis or infection. Aortic calcification. Mild   cardiac enlargement. Coronary artery stenting. Pulmonary vascularity   is within normal limits. Electronic device in the left anterior chest   wall is new since the previous exam. No pneumothorax.       MARY BROWN MD            SYSTEM ID:  [id number]         Laboratory:  Labs Ordered and Resulted from Time of ED Arrival to Time of ED Departure   ROUTINE UA WITH MICROSCOPIC REFLEX TO CULTURE - Abnormal       Result Value    Color Urine Light Yellow      Appearance Urine Clear      Glucose Urine Negative      Bilirubin Urine Negative      Ketones Urine Negative      Specific Gravity Urine 1.005      Blood Urine Trace (*)     pH Urine 6.0      Protein Albumin Urine Negative      Urobilinogen Urine Normal      Nitrite Urine Negative      Leukocyte Esterase Urine Negative      RBC Urine <1      WBC Urine 0      Squamous Epithelials Urine <1     NT PROBNP INPATIENT - Abnormal    N terminal Pro BNP Inpatient 7,181 (*)    CBC WITH PLATELETS AND DIFFERENTIAL - Abnormal    WBC Count 8.7      RBC Count 3.40 (*)     Hemoglobin 11.0 (*)     Hematocrit 32.4 (*)     MCV 95      MCH 32.4      MCHC 34.0      RDW 13.3      Platelet Count 274      % Neutrophils 71      % Lymphocytes 14      % Monocytes 11      % Eosinophils 2      % Basophils 1      % Immature Granulocytes 1      NRBCs per 100 WBC 0      Absolute Neutrophils 6.4      Absolute Lymphocytes 1.2      Absolute Monocytes 0.9      Absolute Eosinophils 0.2      Absolute Basophils 0.1      Absolute Immature Granulocytes 0.0      Absolute NRBCs 0.0     BASIC METABOLIC PANEL - Abnormal    Sodium 131 (*)     Potassium 4.9      Chloride 96 (*)      Carbon Dioxide (CO2) 24      Anion Gap 11      Urea Nitrogen 27.1 (*)     Creatinine 1.10 (*)     GFR Estimate 47 (*)     Calcium 8.7      Glucose 113 (*)    INFLUENZA A/B, RSV, & SARS-COV2 PCR - Normal    Influenza A PCR Negative      Influenza B PCR Negative      RSV PCR Negative      SARS CoV2 PCR Negative        Emergency Department Course & Assessments:    Interventions:  Medications   LORazepam (ATIVAN) injection 1 mg (1 mg Intravenous $Given 1/2/24 1109)   furosemide (LASIX) injection 40 mg (40 mg Intravenous $Given 1/2/24 1350)      Independent Interpretation (X-rays, CTs, rhythm strip):  I independently interpreted the patient's chest x-ray and noted question of opacities to bilateral lower lungs, likely atelectasis     Assessments/Consultations/Discussion of Management or Tests:   ED Course as of 01/02/24 1410   Tue Jan 02, 2024   1020 I obtained the patient's history and examined as noted above.    1034 I staffed the patient with Dr. Gardner.    1236 Patient's nurse updated me that the patient is increasingly agitated. Patient's daughter notes she is anxious at her baseline.    1312 Spoke with patient's facility staff, Sabrina, over the phone.      Social Determinants of Health affecting care:   None    Disposition:  The patient was discharged to home.     Impression & Plan    CMS Diagnoses: None    Medical Decision Making:  Jade Caba is a 89 year old female who presents with generalized weakness, exertional dyspnea, and fatigue.  She was recently admitted to the hospital for hyponatremia.  On exam, patient is sitting comfortable in the bed, alert and oriented x 3.  Lungs are clear to auscultation.  Heart rate does sound to be irregular.  EKG shows atrial fibrillation, rate controlled.  She does have history of this and given her age it has been decided that she is not a great candidate for anticoagulation.  She is vitally stable and afebrile outside of mildly elevated blood pressure here. patient was  initially concerned for repeat UTI however her urine appears noninfected.  History of chronic hyponatremia however this appears mildly improved today with sodium of 141.  Her renal function is at baseline.  COVID/influenza/RSV all negative today.  BNP is elevated at 7000, suggesting acute on chronic heart failure exacerbation.  Chest x-ray does show question of opacities to the bilateral lower lungs however this is likely atelectasis rather than pneumonia as she does not have infectious symptoms and is afebrile.  She has 2+ pitting edema to bilateral lower extremities. patient receives all of her medications from nursing facility staff and is uncertain of what medications she has been taking.  Hospitalization report from 12/27/2023 report holding diuretics due to her hyponatremia at that time.  Called nursing facility and was informed that patient has been back on her home dose of 30 mg Lasix daily since 12/29.  Provided patient with IV 40 mg of Lasix here and encouraged her to continue her home dose of 30 mg as well.  She is safe for discharge home with outpatient follow-up.  Follow-up with PCP within the week for reassessment and to concern proving.  Also have lab recheck to ensure electrolytes are stable.  Patient was anxious and fidgety here, requesting anxiety medications.  Provided one-time dose of Ativan and encouraged her to follow-up with her primary care provider for ongoing management of this.  Return to ER if shortness of breath worsens, oxygen saturations decrease, or other concerning symptoms arise.    Diagnosis:    ICD-10-CM    1. Acute on chronic heart failure, unspecified heart failure type (H)  I50.9       2. General weakness  R53.1       3. Exertional dyspnea  R06.09       4. Hyponatremia  E87.1          Discharge Medications:  New Prescriptions    No medications on file      Scribe Disclosure:  Barbara EDMOND, am serving as a scribe at 9:21 AM on 12/27/2023 to document services personally  performed by Barbara Kaur PA-C based on my observations and the provider's statements to me.      12/27/2023   Barbara Kaur PA-C Snell, Lauren R, PA-C  01/02/24 1411

## 2024-01-03 NOTE — PATIENT INSTRUCTIONS
Plan:  Lymphedema treatment    Keeps legs elevated  3.  To return the heart monitor --  please call [facility] at: [phone]   4. Schedule a follow up appointment with cardiology -- To schedule this please call [facility] at: [phone]   5. Echocardiogram - To schedule this test please call [facility] at: [phone]   6. Stop Metoprolol tartrate  7. Start succinate 50 mg twice a day  8. Decrease Losartan to 50 mg daily   9. Start Xarelto 20 mg daily at dinner -- blood thinner medication   10 . Stop Aspirin when you start Xarelto  11. Continue the other meds, same doses for now.  12. Schedule a follow up appointment with dr Canales

## 2024-01-03 NOTE — PROGRESS NOTES
Patient's instructions / PLAN:                                                        Plan:  Lymphedema treatment    Keeps legs elevated  3.  To return the heart monitor --  please call [facility] at: [phone]   4. Schedule a follow up appointment with cardiology -- To schedule this please call [facility] at: [phone]   5. Echocardiogram - To schedule this test please call [facility] at: [phone]    6. Stop Metoprolol tartrate  7. Start succinate 50 mg twice a day  8. Decrease Losartan to 50 mg daily   9. Start Xarelto 20 mg daily at dinner -- blood thinner medication   10 . Stop Aspirin when you start Xarelto  11. Continue the other meds, same doses for now.  12. Schedule a follow up appointment with dr Canales            ASSESSMENT & PLAN:                                                          89-year-old woman discharged a week ago with atrial fibrillation with RVR, and hyponatremia.  She returned to the emergency room yesterday with acute CHF in top of chronic CHF.  Last echocardiogram was September 2022.  I see no recent why the patient was not started on anticoagulation.  She is still tachycardic which explains the acute CHF.  I increased the metoprolol.  To avoid hypotension I decreased losartan.  I advised for an updated echo.  She will return the heart monitor.  She usually sees Dr. Rojas in cardiology and she needs a follow-up.  Sodium was better yesterday and I do not see the need to repeat.  After the cardiac tests I recommend lymphedema treatment.        (Z09) Hospital discharge follow-up  (primary encounter diagnosis)  Comment: high risk for readmission   Plan:     (I50.33) Acute on chronic diastolic congestive heart failure (H)  Comment: She feels better, but she still has Rales bilaterally and leg swelling  Plan: metoprolol succinate ER (TOPROL XL) 50 MG 24 hr        tablet, Adult Cardiology al          Referral, Echocardiogram Complete            (I48.19) Atrial fibrillation,  persistent (H)  Comment: With RVR  Plan: Adult Cardiology Eval  Referral,         rivaroxaban ANTICOAGULANT (XARELTO) 20 MG TABS         tablet, Echocardiogram Complete          (R60.0) Bilateral leg edema  Comment:   Plan: Lymphedema Therapy Referral, metoprolol         succinate ER (TOPROL XL) 50 MG 24 hr tablet,         Adult Cardiology Eval  Referral,         Echocardiogram Complete            (E87.1) Hyponatremia  Comment:   Plan:        Chief Complaint:                                                        Hosp f/up  Daughter present     SUBJECTIVE:                                                    History of present illness     In ER yesterday w sob, received iv 40 mg lasix. Feels better. Controlled rate AFib yesterday   Hyponatremia : 131 yesterday<-- 125 at discharge     [facility]  Hospitalist Discharge Summary       Date of Admission:  12/27/2023  Date of Discharge:  12/29/2023  Discharging Provider: Gomez Christopher MD  Discharge Service: Hospitalist Service        Discharge Diagnoses  Hyponatremia  UTI  Atrial fibrillation with rapid ventricular esponse      Hospital Follow-up Visit:    Hospital/Nursing Home/IP Rehab Facility: [facility]  Date of Admission: 12-  Date of Discharge: 12-  Reason(s) for Admission: ospital Problem List      Problems adhering to non-medication therapy:  None    Summary of hospitalization:  [facility] discharge summary reviewed  Diagnostic Tests/Treatments reviewed.  Follow up needed: as above   Other Healthcare Providers Involved in Patient s Care:         cardiology, pcp  Update since discharge: fluctuating course.         Plan of care communicated with patient and family    ROS:                                                      ROS: negative for fever, chills, cough, wheezes, chest pain, shortness of breath, vomiting, abdominal pain, pos for leg swelling         OBJECTIVE:      "                                               Physical Exam :    Blood pressure 138/84, pulse (!) 122, temperature 98  F (36.7  C), temperature source Tympanic, resp. rate 16, height 1.549 m (5' 1\"), weight 68.4 kg (150 lb 12.8 oz), SpO2 95%, not currently breastfeeding.   NAD, appears comfortable  Skin: no rashes   Neck: supple, no JVD, No thyroidmegaly. Lymph nodes nonpalpable cervical and supraclavicular.  Chest: rales  bilaterally, good respiratory effort  Heart: S1 S2, iRiRR, no mgr appreciated  Abdomen: soft, not tender, no hepatosplenomegaly or masses appreciated, no abdominal bruit, present bowel sounds  Extremities: +2 edema,   Neurologic: A, Ox3, no focal signs appreciated    PMHx: reviewed  Past Medical History:   Diagnosis Date    Anxiety     Arthritis     Atrial flutter (H)     C1 cervical fracture (H)     CAD (coronary artery disease)     cath in 1/2018: Angioplasty and stenting of 90% mid RCA stenosis, 50% right ostial coronary stenosis, and ostial LAD stenosis.  Moderate disease throughout the mid LAD and 30 to 50% range.    Carotid artery stenosis     CKD (chronic kidney disease)     Depression     Diastolic heart failure (H)     Most recent echo 1/2018: EF 55%, inferior regional wall motion abnormality, increased left ventricular end-diastolic pressure.    H/O alcohol abuse     Hypercholesterolemia     Hypertension     Renal artery stenosis (H24)     Seizure disorder (H)     Subarachnoid hemorrhage (H) 2014      PSHx: reviewed  Past Surgical History:   Procedure Laterality Date    APPENDECTOMY      CAROTID ENDARTERECTOMY Left     Cataract surgery Right     will have laser eye surgery    CHOLECYSTECTOMY      Coronary stents x 3      CV CORONARY ANGIOGRAM N/A 7/19/2019    Procedure: Coronary Angiogram;  Surgeon: Pablito Emerson MD;  [facility]    HYSTERECTOMY      at age of 40-not cancerus or precancerous     JOINT REPLACEMENT Bilateral     Stenting of renal artery   "        Meds: reviewed  Current Outpatient Medications   Medication Sig Dispense Refill    acetaminophen (TYLENOL) 325 MG tablet Take 650 mg by mouth every 4 hours as needed for mild pain      acetaminophen (TYLENOL) 500 MG tablet TAKE 1 TAB BY MOUTH TWICE ADAY *MAX APAP 4G/24hrs* 60 tablet 0    artificial saliva (BIOTENE MT) AERS spray Take 1 spray by mouth 4 times daily      calcium carbonate 500 mg, elemental, (OSCAL) 500 MG tablet TAKE 2 TABS (1,000mg) BY MOUTH DAILY IN THE EVENING FOR SUPPLEMENT 60 tablet 0    carboxymethylcellulose PF (REFRESH PLUS) 0.5 % ophthalmic solution Place 1 drop into both eyes 2 times daily      cholecalciferol (VITAMIN D3) 5000 UNITS CAPS capsule Take 5,000 Units by mouth daily      folic acid (FOLVITE) 1 MG tablet TAKE 1 TAB BY MOUTH ONCE DAILY FOR SUPPLEMENT 30 tablet 0    furosemide (LASIX) 20 MG tablet Take 1.5 tablets (30 mg) by mouth daily 135 tablet 3    hypromellose (ARTIFICIAL TEARS) 0.5 % SOLN ophthalmic solution Place 1 drop into both eyes every hour as needed for dry eyes      L-Methylfolate-B6-B12 (FOLBIC RF PO) Take 1 tablet by mouth daily      losartan (COZAAR) 100 MG tablet Take 1 tablet (100 mg) by mouth daily 90 tablet 1    metoprolol tartrate (LOPRESSOR) 25 MG tablet Take 1 tablet (25 mg) by mouth 2 times daily 60 tablet 1    Multiple Vitamins-Minerals (CEROVITE SENIOR) TABS TAKE 1 TAB BY MOUTH DAILY IN THE MORNING FOR SUPPLEMENT 30 tablet 0    olopatadine (PATADAY) 0.2 % ophthalmic solution PLACE 1 DROP INTO BOTH EYES ONCE DAILY.      omeprazole (PRILOSEC) 40 MG DR capsule TAKE 1 CAP BY MOUTH ONCE DAILY BEFORE A MEAL FOR GERD 30 capsule 0    oxybutynin (DITROPAN) 5 MG tablet Take 5 mg by mouth 2 times daily      polyethylene glycol (MIRALAX/GLYCOLAX) powder Take 17 g by mouth every other day      SM ASPIRIN ADULT LOW STRENGTH 81 MG EC tablet TAKE 1 TAB BY MOUTH ONCE DAILY WITH A MEAL 30 tablet 0    traZODone (DESYREL) 50 MG tablet Take 25 mg by mouth at bedtime          Soc Hx: reviewed  Fam Hx: reviewed      Chart documentation was completed, in part, with [employer] voice-recognition software. Even though reviewed, some grammatical, spelling, and word errors may remain.      Rosalee Dominguez MD  Internal Medicine      Subjective   Jade is a 89 year old, presenting for the following health issues:  ER F/U        1/3/2024     6:45 AM   Additional Questions   Roomed by Beatriz   Accompanied by Daughter

## 2024-01-24 NOTE — ED NOTES
Pt arrives with glasses and bilateral hearing aids.     Patient from [facility].  [phone]

## 2024-01-24 NOTE — ED PROVIDER NOTES
"  History     Chief Complaint:  Fall and Laceration     HPI   Jade Caba is a 89 year old female with history of CAD and CKD who presents to the ED via EMS with her daughter for evaluation of a fall and laceration. She reports about to go for a walk when she suddenly fell backwards to the floor and injured her left leg. She states being anxious \"all the time\". Her legs have been swelling for a couple weeks. Denies fever or vomiting.  No other complaints at this time    Independent Historian:    None - Patient only    Review of External Notes:  I reviewed Admission Notes from 12/27-29/23 for UTI, low sodium, and weakness.  I also reviewed ED Note from 1/2 for weakness.    Medications:    artificial saliva (BIOTENE MT) AERS spray  calcium carbonate 500 mg, elemental, (OSCAL) 500 MG tablet  cholecalciferol (VITAMIN D3) 5000 UNITS CAPS capsule  folic acid (FOLVITE) 1 MG tablet  furosemide (LASIX) 20 MG tablet  L-Methylfolate-B6-B12 (FOLBIC RF PO)  losartan (COZAAR) 100 MG tablet  metoprolol succinate ER (TOPROL XL) 50 MG 24 hr tablet  metoprolol tartrate (LOPRESSOR) 25 MG tablet  Multiple Vitamins-Minerals (CEROVITE SENIOR) TABS  omeprazole (PRILOSEC) 40 MG DR capsule  oxybutynin (DITROPAN) 5 MG tablet  polyethylene glycol (MIRALAX/GLYCOLAX) powder  rivaroxaban ANTICOAGULANT (XARELTO) 20 MG TABS tablet  SM ASPIRIN ADULT LOW STRENGTH 81 MG EC tablet  traZODone (DESYREL) 50 MG tablet    Past Medical History:    Anxiety  Arthritis  Atrial flutter  CAD (coronary artery disease)  Carotid artery stenosis  CKD (chronic kidney disease)  Depression  Diastolic heart failure  Alcohol abuse  Subarachnoid hemorrhage  Hypercholesterolemia  Hypertension  Renal artery stenosis  Seizure disorder    Past Surgical History:    Past Surgical History:   Procedure Laterality Date    APPENDECTOMY      ARTHROPLASTY HIP      CAROTID ENDARTERECTOMY Left     Cataract surgery Right     will have laser eye surgery    CHOLECYSTECTOMY      Coronary " "stents x 3      CV CORONARY ANGIOGRAM N/A 07/19/2019    Procedure: Coronary Angiogram;  Surgeon: Pablito Emerson MD;  Location:  [facility]    HYSTERECTOMY      at age of 40-not cancerus or precancerous     Stenting of renal artery        Physical Exam   Patient Vitals for the past 24 hrs:   BP Temp Temp src Pulse Resp SpO2 Height Weight   01/24/24 0329 -- -- -- -- -- 93 % -- --   01/24/24 0319 -- -- -- -- -- 95 % -- --   01/24/24 0249 -- 98.7  F (37.1  C) Oral -- 20 -- 1.549 m (5' 1\") 65.8 kg (145 lb)   01/24/24 0246 -- -- -- -- -- 92 % -- --   01/24/24 0245 -- -- -- -- -- 92 % -- --   01/24/24 0243 -- -- -- -- -- 93 % -- --   01/24/24 0242 -- -- -- -- -- 94 % -- --   01/24/24 0241 129/81 -- -- 115 -- -- -- --      Physical Exam  Nursing note and vitals reviewed.  Constitutional: Cooperative.  Laying on right side, comfortable appearing  Cardiovascular: Normal rate, regular rhythm and normal heart sounds.  No murmur.  Pulmonary/Chest: Effort normal and breath sounds normal. No respiratory distress.  Abdominal: Soft. Normal appearance.  No tenderness  Musculoskeletal: Normal range of motion of lower extremities.   Neurological: Alert.  Strength normal.  Oriented x 3.  Skin: Skin is warm and dry. Chronic edema on lower extremities. 2 large skin tears on the posterior left leg. A large skin tear on the back of the left leg   Psychiatric: Anxious    Emergency Department Course     Procedures     Laceration Repair #1     Procedure: Laceration Repair    Indication: Laceration    Consent: Verbal    Location: Left Lower extremity     Length: 6 cm    Preparation: Irrigation with Sterile Saline.    Anesthesia/Sedation: Lidocaine with Epinephrine - 1%      Treatment/Exploration: Wound explored, no foreign bodies found     Closure: The wound was closed with one layer. Skin/superficial layer was closed with 8 x 4-0 Nylon using Interrupted sutures.     Patient Status: The patient tolerated the procedure well: " Yes. There were no complications.      Laceration Repair #2     Procedure: Laceration Repair    Indication: Laceration    Consent: Verbal    Location: Left Lower extremity     Length: 7.1 cm    Preparation: Irrigation with Sterile Saline.    Anesthesia/Sedation: Lidocaine with Epinephrine - 1%      Treatment/Exploration: Wound explored, no foreign bodies found     Closure: The wound was closed with one layer. Skin/superficial layer was closed with 8 x 4-0 Nylon using Interrupted sutures.     Patient Status: The patient tolerated the procedure well: Yes. There were no complications.    Laceration Repair  #3     Procedure: Laceration Repair    Indication: Laceration    Consent: Verbal    Location: Right Lower extremity     Length: 3 cm    Preparation: Irrigation with Sterile Saline.    Anesthesia/Sedation: Lidocaine with Epinephrine - 1%      Treatment/Exploration: Wound explored, no foreign bodies found     Closure: The wound was closed with one layer. Skin/superficial layer was closed with 4 x 4-0 Nylon using Interrupted sutures.     Patient Status: The patient tolerated the procedure well: Yes. There were no complications.     Interventions:  Medications   LORazepam (ATIVAN) tablet 0.5 mg (0.5 mg Oral $Given 1/24/24 0329)      Independent Interpretation (X-rays, CTs, rhythm strip):  None    Assessments/Consultations/Discussion of Management or Tests:  ED Course as of 01/24/24 0456   Wed Jan 24, 2024   0310 I obtained history and examined the patient as noted above.    0405 I rechecked the patient and explained findings.    0440 I performed the laceration repairs as noted above.    0456 I prepared the patient to be discharged home.      Social Determinants of Health affecting care:  None     Disposition:  The patient was discharged to home.     Impression & Plan      Medical Decision Making:  This is a 89-year-old female presents after mechanical fall.  She is chronic edema in her legs and unfortunately suffered 3  skin tears to her posterior lower legs.  Is repaired with sutures as per the procedure note above.  No concern for fracture.  Wounds were wrapped with sterile dressings and covered with bacitracin.  Wound care instructions with daily dressing changes provided and they will follow-up for suture removal in 1 week    Diagnosis:    ICD-10-CM    1. Multiple skin tears of left lower extremity, initial encounter  S81.812A       2. Bilateral lower extremity edema  R60.0       3. Skin tear of right lower leg   S81.811A           Scribe Disclosure:  IGenia, am serving as a scribe at 3:20 AM on 1/24/2024 to document services personally performed by Ken Washington MD based on my observations and the provider's statements to me.  1/24/2024   Ken Washington MD Amdahl, John, MD  01/24/24 9094

## 2024-01-24 NOTE — ED TRIAGE NOTES
"Patient arrives via EMS from care facility. Pt fell this morning. Lac on left. Bilateral legs weeping fluids, left worse than right.   Patient she just went backwards. Doesn't know what happened. Pt has restless legs and care facility is not giving anything. Pt states,  \"I am just so tense.\" \"Can you give me something?\"    No meds by EMS. Soaking through abd pad.         "

## 2024-02-06 NOTE — PROGRESS NOTES
~Cardiology Clinic Visit~    Primary Cardiologist: Dr. Rojas  Reason for visit: Hospital follow-up    History of Present Illness    Jade Caba is a very pleasant 89 year old female with a past medical history notable for CAD(OLIVIA LAD, circumflex, RCA, mid LAD w repeat, mid RCA, proximal RCA), NSTEMI, CVA, carotid artery stenosis s/p left endarterectomy 2007; right endarterectomy 2003, HTN, HLD, Aflutter, CHF, PFO, seizure(on keppra), renal artery stenosis, subarachnoid hemorrhage.    In summary,  as outlined in previous documentation, she initially had angioplasty to the circumflex in 1997.  She subsequently had drug-eluting stent placement to the LAD in 2004, drug-eluting stent placement to the circumflex and RCA in 2011 and recurrent symptoms with drug-eluting stent placement to the mid LAD, also in 2011.  She had non-STEMI in 02/2018 at which time she was noted to have a thrombotic lesion in the mid RCA which was treated with drug-eluting stent placement.  Unfortunately, there was guidewire trauma in the proximal RCA and she had stent placement to this area as well.  Additionally, she was found to have significant in-stent restenosis in the LAD and underwent repeat drug-eluting stent placement there too. In 07/2019, she did undergo a repeat coronary angiogram which showed no significant change since 2018. And no intervention was performed.       She was last seen in clinic in March 2023.  At that time, she was doing relatively well from a cardiac standpoint.    In recent events, patient presented to the hospital on 12/27/2023 with a low sodium of 122.  In that context, patient reported feeling weak the 2 weeks prior, and had an accidental fall on [place] after losing her balance while leaving her assisted living facility.  Of note, patient was on furosemide and hydrochlorothiazide when she presented with hyponatremia.  She was also diagnosed with a UTI during that encounter.  EKG during the admission  showed atrial fibrillation.  She has previously chosen not to start anticoagulation due to her fall risk.  This choice remained the same during this hospitalization after discussion with the attending.  Her carvedilol was changed to metoprolol, she was given a Zio patch monitor.    She returned again to the ER on 1/2/2024 with generalized weakness, fatigue and shortness of breath.  Blood pressure elevated in the 180s on arrival.  She was given nitroglycerin with a systolic reduction to the 130s.  She was also given an additional dose of IV Lasix 40 mg.  On 1/24/2024, she went to the ER for evaluation of fall and laceration.  She was about to go for a walk when she suddenly fell backwards and hit the floor and injured her leg.  At some point, between these last 2 ER visits, she saw an MD in hospital follow-up, and was started on Xarelto.    In regards to her arrhythmia, she has a remote history of atrial flutter back in 2014.  Given her fall risk, no anticoagulation was started.  This discussion was reviewed and during her recent hospitalizations where she was noted to have atrial fibrillation.  Again, in a shared decision-making conversation with patient and family, they had elected not to start anticoagulation.    Interval 02/09/24    From a cardiac standpoint, patient is doing well today.  She has no focal complaints.  She has not had any additional falls since her last incident at the end of January.  Patient denies yaz CP, SOB, BELCHER, orthopnea, PND, LH or dizziness, pre-syncope, palpitations, edema or swelling.  She indicates that she really is feeling quite well and has no acute symptoms.  Blood pressure borderline at 108/68.  Discussed with patient and family again today the risks and benefits of anticoagulation in the setting of atrial fibrillation given the patient's age and high fall risk.  To their understanding, they at all agreed to hold off on anticoagulation given her history, and she had been doing  well on low-dose aspirin.    Echocardiogram dated 2/8/2024 showing EF 60-65% with no wall motion abnormalities.  Patient atrial fibrillation during exam.  There is mild mitral regurgitation with mild stenosis, MG 4 mmHg.  There is aortic valve sclerosis without significant stenosis.  __________________________________________________________________         Assessment and Impression:     Coronary artery disease.  Hx of multivessel PCI as outlined above.   Currently free of angina.   Last angiogram from July 2019 at which time no intervention was performed, and ongoing medical mgmt recommended.   Continue ASA 81mg daily.     Carotid artery disease.  Hx of left endarterectomy 2007, right endarterectomy 2003.    ASA daily, not on statin - unclear why discontinued.    Hypertension, stable  Paroxysmal atrial fibrillation, high falls risk, decided against anticoagulation 2/2 risk.         Recommendations and Plan:     Stop Xarelto.  Resume aspirin 81 mg daily.  Continue losartan 50 mg daily.  Continue Toprol-XL 50 mg twice daily -hold off on increase due to borderline BP.  Continue Lasix 40 mg daily.  Continue daily activity to maintain functional capacity, preserve muscle strength, and reduce risk of falling.  Follow-up with [facility] in approximately 6 months for routine review.  __________________________________________________________________    Thank you for the opportunity to participate in this pleasant patient's care.    We would be happy to see this patient sooner for any concerns in the meantime.    ZAIN Meyer, CNP   Nurse Practitioner  [facility]    Today's clinic visit entailed:  Review of prior external note(s) from - Outside records from care everywhere  Review of the result(s) of each unique test - cardiac testing, cardiac imaging, multiple hospital records and admission notes reviewed, hospital imaging, multiple labs  The following tests were independently interpreted by me as  noted in my documentation: EKG, echocardiogram  Prescription drug management    The level of medical decision making during this visit was of moderate complexity.    Orders this Visit:  Orders Placed This Encounter   Procedures    Follow-Up with Cardiology- [facility]     Orders Placed This Encounter   Medications    aspirin 81 MG EC tablet     Sig: Take 1 tablet (81 mg) by mouth daily    DISCONTD: losartan (COZAAR) 100 MG tablet     Sig: Take 0.5 tablets (50 mg) by mouth daily     Dispense:  90 tablet     Refill:  4    furosemide (LASIX) 40 MG tablet     Sig: Take 1 tablet (40 mg) by mouth daily     Dispense:  90 tablet     Refill:  4    metoprolol succinate ER (TOPROL XL) 50 MG 24 hr tablet     Sig: Take 1 tablet (50 mg) by mouth 2 times daily     Dispense:  180 tablet     Refill:  4    losartan (COZAAR) 50 MG tablet     Sig: Take 1 tablet (50 mg) by mouth daily     Dispense:  90 tablet     Refill:  3     Medications Discontinued During This Encounter   Medication Reason    rivaroxaban ANTICOAGULANT (XARELTO) 20 MG TABS tablet     metoprolol tartrate (LOPRESSOR) 25 MG tablet     SM ASPIRIN ADULT LOW STRENGTH 81 MG EC tablet     L-Methylfolate-B6-B12 (FOLBIC RF PO)     traZODone (DESYREL) 50 MG tablet     losartan (COZAAR) 100 MG tablet     furosemide (LASIX) 20 MG tablet     metoprolol succinate ER (TOPROL XL) 50 MG 24 hr tablet     losartan (COZAAR) 100 MG tablet      Encounter Diagnoses   Name Primary?    Essential hypertension     Bilateral leg edema     Acute on chronic diastolic congestive heart failure (H) Yes    Atrial fibrillation, persistent (H)     Primary hypertension      CURRENT MEDICATIONS:  Current Outpatient Medications   Medication Sig Dispense Refill    artificial saliva (BIOTENE MT) AERS spray Take 1 spray by mouth 4 times daily      aspirin 81 MG EC tablet Take 1 tablet (81 mg) by mouth daily      calcium carbonate 500 mg, elemental, (OSCAL) 500 MG tablet TAKE 2 TABS (1,000mg) BY MOUTH DAILY IN THE  "EVENING FOR SUPPLEMENT 60 tablet 0    carboxymethylcellulose PF (REFRESH PLUS) 0.5 % ophthalmic solution Place 1 drop into both eyes 2 times daily      cholecalciferol (VITAMIN D3) 5000 UNITS CAPS capsule Take 5,000 Units by mouth daily      folic acid (FOLVITE) 1 MG tablet TAKE 1 TAB BY MOUTH ONCE DAILY FOR SUPPLEMENT 30 tablet 0    furosemide (LASIX) 40 MG tablet Take 1 tablet (40 mg) by mouth daily 90 tablet 4    hypromellose (ARTIFICIAL TEARS) 0.5 % SOLN ophthalmic solution Place 1 drop into both eyes every hour as needed for dry eyes      losartan (COZAAR) 50 MG tablet Take 1 tablet (50 mg) by mouth daily 90 tablet 3    metoprolol succinate ER (TOPROL XL) 50 MG 24 hr tablet Take 1 tablet (50 mg) by mouth 2 times daily 180 tablet 4    Multiple Vitamins-Minerals (CEROVITE SENIOR) TABS TAKE 1 TAB BY MOUTH DAILY IN THE MORNING FOR SUPPLEMENT 30 tablet 0    olopatadine (PATADAY) 0.2 % ophthalmic solution PLACE 1 DROP INTO BOTH EYES ONCE DAILY.      omeprazole (PRILOSEC) 40 MG DR capsule TAKE 1 CAP BY MOUTH ONCE DAILY BEFORE A MEAL FOR GERD 30 capsule 0    oxybutynin (DITROPAN) 5 MG tablet Take 5 mg by mouth 2 times daily      polyethylene glycol (MIRALAX/GLYCOLAX) powder Take 17 g by mouth every other day       ALLERGIES   No Known Allergies  PAST MEDICAL, SURGICAL, FAMILY, SOCIAL HISTORY:  History was reviewed and updated as needed, see medical record.    Review of Systems:  A 10-point Review Of Systems is otherwise normal except for that which is noted in the HPI and interval summary.    Physical Exam:    Vitals: /68 (BP Location: Right arm, Cuff Size: Adult Small)   Pulse 95   Ht 1.549 m (5' 1\")   Wt 65.2 kg (143 lb 11.2 oz)   LMP  (LMP Unknown)   SpO2 98%   BMI 27.15 kg/m    Constitutional: Appears stated age, well nourished, NAD.  Neck: Supple. Carotid pulses full and equal.   Respiratory: Non-labored. Lungs CTAB.  Cardiovascular: IRR, normal S1 and S2. No M/G/R.  No edema.  GI: Soft, non-distended, " non-tender.  Skin: Warm and dry.   Musculoskeletal/Extremities: Symmetrical movement.  Neurologic: No gross focal deficits. Alert, awake.  Psychiatric: Affect appropriate. Mentation normal.    Recent Lab Results:  LIPID RESULTS:  Lab Results   Component Value Date    CHOL 155 12/27/2023    CHOL 177 07/09/2020    HDL 38 (L) 12/27/2023    HDL 44 (A) 07/09/2020     (H) 12/27/2023     07/09/2020    TRIG 73 12/27/2023    TRIG 89 09/28/2022    TRIG 113 07/09/2020    CHOLHDLRATIO 3.7 08/14/2013     LIVER ENZYME RESULTS:  Lab Results   Component Value Date    AST 37 01/10/2024    AST 19 07/09/2020    ALT 63 (H) 01/10/2024    ALT 11 07/09/2020     CBC RESULTS:  Lab Results   Component Value Date    WBC 9.1 01/24/2024    WBC 7.9 03/03/2020    RBC 3.90 01/24/2024    RBC 4.20 03/03/2020    HGB 11.6 (L) 01/24/2024    HGB 13.1 03/03/2020    HCT 36.7 01/24/2024    HCT 40.8 03/03/2020    MCV 94 01/24/2024    MCV 97 03/03/2020    MCH 29.7 01/24/2024    MCH 31.2 03/03/2020    MCHC 31.6 01/24/2024    MCHC 32.1 03/03/2020    RDW 13.5 01/24/2024    RDW 11.6 03/03/2020     01/24/2024     03/03/2020     BMP RESULTS:  Lab Results   Component Value Date     02/07/2024     07/09/2020    POTASSIUM 4.2 02/07/2024    POTASSIUM 3.8 07/09/2020    POTASSIUM 3.8 07/09/2020    CHLORIDE 98 02/07/2024    CHLORIDE 97 (L) 09/28/2022    CHLORIDE 99 07/09/2020    CO2 28 02/07/2024    CO2 28 07/09/2020    CO2 28 07/09/2020    ANIONGAP 10 02/07/2024    ANIONGAP 11 07/09/2020    ANIONGAP 11 07/09/2020    GLC 91 02/07/2024    GLC 86 07/09/2020    GLC 86 07/09/2020    BUN 35.2 (H) 02/07/2024    BUN 12 07/09/2020    BUN 12 07/09/2020    CR 1.15 (H) 02/07/2024    CR 1.06 07/09/2020    GFRESTIMATED 45 (L) 02/07/2024    GFRESTIMATED 49 (L) 07/09/2020    GFRESTIMATED 49 (A) 07/09/2020    GFRESTBLACK 60 (L) 07/09/2020    GFRESTBLACK 60 (A) 07/09/2020    FRANCISCO 9.0 02/07/2024    FRANCISCO 9.4 07/09/2020      A1C RESULTS:  No results  "found for: \"A1C\"  INR RESULTS:  Lab Results   Component Value Date    INR 1.03 07/26/2019    INR 1.03 07/26/2019    INR 1.01 02/27/2008                     "

## 2024-02-09 NOTE — PATIENT INSTRUCTIONS
Thank you for your visit with the [facility] today.    Today's Summary     Follow up in 6-months with [facility], Kristel.  Please call 6-months in advance to schedule your appointment.    If you have questions or concerns, please do not hesitate to call my nursing support team at [phone].    Scheduling phone number: [phone]    It was a pleasure seeing you today.     ZAIN Meyer, CNP  Nurse Practitioner  [facility]  February 9, 2024  ________________________________________________________

## 2024-02-09 NOTE — LETTER
2/9/2024    Alma Vences NP  [facility] [address] [address]    RE: Jade Caba       Dear Colleague,     I had the pleasure of seeing Jade Caba in the [facility].              ~Cardiology Clinic Visit~    Primary Cardiologist: Dr. Rojas  Reason for visit: Hospital follow-up    History of Present Illness    Jade Caba is a very pleasant 89 year old female with a past medical history notable for CAD(OLIVIA LAD, circumflex, RCA, mid LAD w repeat, mid RCA, proximal RCA), NSTEMI, CVA, carotid artery stenosis s/p left endarterectomy 2007; right endarterectomy 2003, HTN, HLD, Aflutter, CHF, PFO, seizure(on keppra), renal artery stenosis, subarachnoid hemorrhage.    In summary,  as outlined in previous documentation, she initially had angioplasty to the circumflex in 1997.  She subsequently had drug-eluting stent placement to the LAD in 2004, drug-eluting stent placement to the circumflex and RCA in 2011 and recurrent symptoms with drug-eluting stent placement to the mid LAD, also in 2011.  She had non-STEMI in 02/2018 at which time she was noted to have a thrombotic lesion in the mid RCA which was treated with drug-eluting stent placement.  Unfortunately, there was guidewire trauma in the proximal RCA and she had stent placement to this area as well.  Additionally, she was found to have significant in-stent restenosis in the LAD and underwent repeat drug-eluting stent placement there too. In 07/2019, she did undergo a repeat coronary angiogram which showed no significant change since 2018. And no intervention was performed.       She was last seen in clinic in March 2023.  At that time, she was doing relatively well from a cardiac standpoint.    In recent events, patient presented to the hospital on 12/27/2023 with a low sodium of 122.  In that context, patient reported feeling weak the 2 weeks prior, and had an accidental fall on [place] after losing her balance while leaving  her assisted living facility.  Of note, patient was on furosemide and hydrochlorothiazide when she presented with hyponatremia.  She was also diagnosed with a UTI during that encounter.  EKG during the admission showed atrial fibrillation.  She has previously chosen not to start anticoagulation due to her fall risk.  This choice remained the same during this hospitalization after discussion with the attending.  Her carvedilol was changed to metoprolol, she was given a Zio patch monitor.    She returned again to the ER on 1/2/2024 with generalized weakness, fatigue and shortness of breath.  Blood pressure elevated in the 180s on arrival.  She was given nitroglycerin with a systolic reduction to the 130s.  She was also given an additional dose of IV Lasix 40 mg.  On 1/24/2024, she went to the ER for evaluation of fall and laceration.  She was about to go for a walk when she suddenly fell backwards and hit the floor and injured her leg.  At some point, between these last 2 ER visits, she saw an MD in hospital follow-up, and was started on Xarelto.    In regards to her arrhythmia, she has a remote history of atrial flutter back in 2014.  Given her fall risk, no anticoagulation was started.  This discussion was reviewed and during her recent hospitalizations where she was noted to have atrial fibrillation.  Again, in a shared decision-making conversation with patient and family, they had elected not to start anticoagulation.    Interval 02/09/24    From a cardiac standpoint, patient is doing well today.  She has no focal complaints.  She has not had any additional falls since her last incident at the end of January.  Patient denies yaz CP, SOB, BELCHER, orthopnea, PND, LH or dizziness, pre-syncope, palpitations, edema or swelling.  She indicates that she really is feeling quite well and has no acute symptoms.  Blood pressure borderline at 108/68.  Discussed with patient and family again today the risks and benefits of  anticoagulation in the setting of atrial fibrillation given the patient's age and high fall risk.  To their understanding, they at all agreed to hold off on anticoagulation given her history, and she had been doing well on low-dose aspirin.    Echocardiogram dated 2/8/2024 showing EF 60-65% with no wall motion abnormalities.  Patient atrial fibrillation during exam.  There is mild mitral regurgitation with mild stenosis, MG 4 mmHg.  There is aortic valve sclerosis without significant stenosis.  __________________________________________________________________         Assessment and Impression:     Coronary artery disease.  Hx of multivessel PCI as outlined above.   Currently free of angina.   Last angiogram from July 2019 at which time no intervention was performed, and ongoing medical mgmt recommended.   Continue ASA 81mg daily.     Carotid artery disease.  Hx of left endarterectomy 2007, right endarterectomy 2003.    ASA daily, not on statin - unclear why discontinued.    Hypertension, stable  Paroxysmal atrial fibrillation, high falls risk, decided against anticoagulation 2/2 risk.         Recommendations and Plan:     Stop Xarelto.  Resume aspirin 81 mg daily.  Continue losartan 50 mg daily.  Continue Toprol-XL 50 mg twice daily -hold off on increase due to borderline BP.  Continue Lasix 40 mg daily.  Continue daily activity to maintain functional capacity, preserve muscle strength, and reduce risk of falling.  Follow-up with [facility] in approximately 6 months for routine review.  __________________________________________________________________    Thank you for the opportunity to participate in this pleasant patient's care.    We would be happy to see this patient sooner for any concerns in the meantime.    ZAIN Meyer, CNP   Nurse Practitioner  [facility]    Today's clinic visit entailed:  Review of prior external note(s) from - Outside records from care everywhere  Review of the  result(s) of each unique test - cardiac testing, cardiac imaging, multiple hospital records and admission notes reviewed, hospital imaging, multiple labs  The following tests were independently interpreted by me as noted in my documentation: EKG, echocardiogram  Prescription drug management    The level of medical decision making during this visit was of moderate complexity.    Orders this Visit:  Orders Placed This Encounter   Procedures    Follow-Up with Cardiology- [facility]     Orders Placed This Encounter   Medications    aspirin 81 MG EC tablet     Sig: Take 1 tablet (81 mg) by mouth daily    DISCONTD: losartan (COZAAR) 100 MG tablet     Sig: Take 0.5 tablets (50 mg) by mouth daily     Dispense:  90 tablet     Refill:  4    furosemide (LASIX) 40 MG tablet     Sig: Take 1 tablet (40 mg) by mouth daily     Dispense:  90 tablet     Refill:  4    metoprolol succinate ER (TOPROL XL) 50 MG 24 hr tablet     Sig: Take 1 tablet (50 mg) by mouth 2 times daily     Dispense:  180 tablet     Refill:  4    losartan (COZAAR) 50 MG tablet     Sig: Take 1 tablet (50 mg) by mouth daily     Dispense:  90 tablet     Refill:  3     Medications Discontinued During This Encounter   Medication Reason    rivaroxaban ANTICOAGULANT (XARELTO) 20 MG TABS tablet     metoprolol tartrate (LOPRESSOR) 25 MG tablet     SM ASPIRIN ADULT LOW STRENGTH 81 MG EC tablet     L-Methylfolate-B6-B12 (FOLBIC RF PO)     traZODone (DESYREL) 50 MG tablet     losartan (COZAAR) 100 MG tablet     furosemide (LASIX) 20 MG tablet     metoprolol succinate ER (TOPROL XL) 50 MG 24 hr tablet     losartan (COZAAR) 100 MG tablet      Encounter Diagnoses   Name Primary?    Essential hypertension     Bilateral leg edema     Acute on chronic diastolic congestive heart failure (H) Yes    Atrial fibrillation, persistent (H)     Primary hypertension      CURRENT MEDICATIONS:  Current Outpatient Medications   Medication Sig Dispense Refill    artificial saliva (BIOTENE MT) AERS  "spray Take 1 spray by mouth 4 times daily      aspirin 81 MG EC tablet Take 1 tablet (81 mg) by mouth daily      calcium carbonate 500 mg, elemental, (OSCAL) 500 MG tablet TAKE 2 TABS (1,000mg) BY MOUTH DAILY IN THE EVENING FOR SUPPLEMENT 60 tablet 0    carboxymethylcellulose PF (REFRESH PLUS) 0.5 % ophthalmic solution Place 1 drop into both eyes 2 times daily      cholecalciferol (VITAMIN D3) 5000 UNITS CAPS capsule Take 5,000 Units by mouth daily      folic acid (FOLVITE) 1 MG tablet TAKE 1 TAB BY MOUTH ONCE DAILY FOR SUPPLEMENT 30 tablet 0    furosemide (LASIX) 40 MG tablet Take 1 tablet (40 mg) by mouth daily 90 tablet 4    hypromellose (ARTIFICIAL TEARS) 0.5 % SOLN ophthalmic solution Place 1 drop into both eyes every hour as needed for dry eyes      losartan (COZAAR) 50 MG tablet Take 1 tablet (50 mg) by mouth daily 90 tablet 3    metoprolol succinate ER (TOPROL XL) 50 MG 24 hr tablet Take 1 tablet (50 mg) by mouth 2 times daily 180 tablet 4    Multiple Vitamins-Minerals (CEROVITE SENIOR) TABS TAKE 1 TAB BY MOUTH DAILY IN THE MORNING FOR SUPPLEMENT 30 tablet 0    olopatadine (PATADAY) 0.2 % ophthalmic solution PLACE 1 DROP INTO BOTH EYES ONCE DAILY.      omeprazole (PRILOSEC) 40 MG DR capsule TAKE 1 CAP BY MOUTH ONCE DAILY BEFORE A MEAL FOR GERD 30 capsule 0    oxybutynin (DITROPAN) 5 MG tablet Take 5 mg by mouth 2 times daily      polyethylene glycol (MIRALAX/GLYCOLAX) powder Take 17 g by mouth every other day       ALLERGIES   No Known Allergies  PAST MEDICAL, SURGICAL, FAMILY, SOCIAL HISTORY:  History was reviewed and updated as needed, see medical record.    Review of Systems:  A 10-point Review Of Systems is otherwise normal except for that which is noted in the HPI and interval summary.    Physical Exam:    Vitals: /68 (BP Location: Right arm, Cuff Size: Adult Small)   Pulse 95   Ht 1.549 m (5' 1\")   Wt 65.2 kg (143 lb 11.2 oz)   LMP  (LMP Unknown)   SpO2 98%   BMI 27.15 kg/m  "   Constitutional: Appears stated age, well nourished, NAD.  Neck: Supple. Carotid pulses full and equal.   Respiratory: Non-labored. Lungs CTAB.  Cardiovascular: IRR, normal S1 and S2. No M/G/R.  No edema.  GI: Soft, non-distended, non-tender.  Skin: Warm and dry.   Musculoskeletal/Extremities: Symmetrical movement.  Neurologic: No gross focal deficits. Alert, awake.  Psychiatric: Affect appropriate. Mentation normal.    Recent Lab Results:  LIPID RESULTS:  Lab Results   Component Value Date    CHOL 155 12/27/2023    CHOL 177 07/09/2020    HDL 38 (L) 12/27/2023    HDL 44 (A) 07/09/2020     (H) 12/27/2023     07/09/2020    TRIG 73 12/27/2023    TRIG 89 09/28/2022    TRIG 113 07/09/2020    CHOLHDLRATIO 3.7 08/14/2013     LIVER ENZYME RESULTS:  Lab Results   Component Value Date    AST 37 01/10/2024    AST 19 07/09/2020    ALT 63 (H) 01/10/2024    ALT 11 07/09/2020     CBC RESULTS:  Lab Results   Component Value Date    WBC 9.1 01/24/2024    WBC 7.9 03/03/2020    RBC 3.90 01/24/2024    RBC 4.20 03/03/2020    HGB 11.6 (L) 01/24/2024    HGB 13.1 03/03/2020    HCT 36.7 01/24/2024    HCT 40.8 03/03/2020    MCV 94 01/24/2024    MCV 97 03/03/2020    MCH 29.7 01/24/2024    MCH 31.2 03/03/2020    MCHC 31.6 01/24/2024    MCHC 32.1 03/03/2020    RDW 13.5 01/24/2024    RDW 11.6 03/03/2020     01/24/2024     03/03/2020     BMP RESULTS:  Lab Results   Component Value Date     02/07/2024     07/09/2020    POTASSIUM 4.2 02/07/2024    POTASSIUM 3.8 07/09/2020    POTASSIUM 3.8 07/09/2020    CHLORIDE 98 02/07/2024    CHLORIDE 97 (L) 09/28/2022    CHLORIDE 99 07/09/2020    CO2 28 02/07/2024    CO2 28 07/09/2020    CO2 28 07/09/2020    ANIONGAP 10 02/07/2024    ANIONGAP 11 07/09/2020    ANIONGAP 11 07/09/2020    GLC 91 02/07/2024    GLC 86 07/09/2020    GLC 86 07/09/2020    BUN 35.2 (H) 02/07/2024    BUN 12 07/09/2020    BUN 12 07/09/2020    CR 1.15 (H) 02/07/2024    CR 1.06 07/09/2020     "GFRESTIMATED 45 (L) 02/07/2024    GFRESTIMATED 49 (L) 07/09/2020    GFRESTIMATED 49 (A) 07/09/2020    GFRESTBLACK 60 (L) 07/09/2020    GFRESTBLACK 60 (A) 07/09/2020    FRANCISCO 9.0 02/07/2024    FRANCISCO 9.4 07/09/2020      A1C RESULTS:  No results found for: \"A1C\"  INR RESULTS:  Lab Results   Component Value Date    INR 1.03 07/26/2019    INR 1.03 07/26/2019    INR 1.01 02/27/2008                     Thank you for allowing me to participate in the care of your patient.      Sincerely,     ZAIN Meyer [facility]  cc:   Rosalee Correa MD  [facility] [place] [address]      "

## 2024-02-12 NOTE — TELEPHONE ENCOUNTER
Called Pt's faciity, they left message asking for signed order for aspirin 81 mg. Did ask then send order and would have signed by NP. They did not leave Date of birth or MRN for Pt for verification. VAHE Sheriff RN

## 2024-03-13 NOTE — TELEPHONE ENCOUNTER
From  Cards notes on 2/9/24:  In regards to her arrhythmia, she has a remote history of atrial flutter back in 2014.  Given her fall risk, no anticoagulation was started.  This discussion was reviewed and during her recent hospitalizations where she was noted to have atrial fibrillation.  Again, in a shared decision-making conversation with patient and family, they had elected not to start anticoagulation.    Stop Xarelto.  Resume aspirin 81 mg daily.

## 2024-03-22 PROBLEM — R79.89 ELEVATED TROPONIN: Status: ACTIVE | Noted: 2024-01-01

## 2024-03-22 PROBLEM — G93.40 ENCEPHALOPATHY: Status: ACTIVE | Noted: 2024-01-01

## 2024-03-22 PROBLEM — A41.9 SEPTIC SHOCK (H): Status: ACTIVE | Noted: 2024-01-01

## 2024-03-22 PROBLEM — R65.21 SEPTIC SHOCK (H): Status: ACTIVE | Noted: 2024-01-01

## 2024-03-22 PROBLEM — J96.01 ACUTE RESPIRATORY FAILURE WITH HYPOXIA (H): Status: ACTIVE | Noted: 2024-01-01

## 2024-03-22 PROBLEM — S06.5XAA SUBDURAL HEMATOMA (H): Status: ACTIVE | Noted: 2024-01-01

## 2024-03-22 NOTE — ED NOTES
Pt went to CT for assessment of head bleed. /78, in A fib rate 80s-120s. CT completed. Upon moving off table. Soft restraints on L&R wrists, but not secured to anything.  Pt began to odin down. Pulse check in L femoral, and L radial and could not feel pulse. Double checked carotid with no pulse. Pt DNR/DNI status. Called MD to come to CT 1 to assess cardiac function. Sergio DOWLING checked pulses, Heart sounds and lung sounds, all absent. Ultrasound used to heart activity and showed no cardiac function. TOD called by Sergio DOWLING @ [phone]. Family updated by MD.

## 2024-03-22 NOTE — ED NOTES
Pt continuing to thrash around despite meds. PA updated. Daughter at bedside trying to hold and redirect pt. ERT pulled to sit with pt. Charge RN aware of situation.

## 2024-03-22 NOTE — PHARMACY-ADMISSION MEDICATION HISTORY
Pharmacist Admission Medication History    Admission medication history is complete. The information provided in this note is only as accurate as the sources available at the time of the update.    Information Source(s): Facility ([facility]) medication list/MAR and CareEverywhere/SureScripts via N/A    Pertinent Information:   7-day course of keflex for UTI was dispensed on 3/14  5 days of lasix 60mg total daily started recently (dispensed on 3/20)  Surescripts reflects recent dispense hx of ropinirole, but is not reflected in MAR - did not add to list    Changes made to PTA medication list:  Added:   APAP  Citalopram  Lorazepam  Melatonin  Mirtazapine  Deleted: None  Changed:   Metoprolol succinate 50mg BID -> metoprolol tartrate 62.5mg BID    Allergies reviewed with patient and updates made in EHR: no    Medication History Completed By: Mercedez Powell [facility] 3/22/2024 3:11 PM    PTA Med List   Medication Sig Last Dose    acetaminophen (TYLENOL) 325 MG tablet Take 650 mg by mouth every 4 hours as needed for mild pain Unknown at PRN    acetaminophen (TYLENOL) 500 MG tablet Take 500 mg by mouth 2 times daily 3/21/2024    artificial saliva (BIOTENE MT) AERS spray Take 1 spray by mouth 4 times daily 3/21/2024    aspirin 81 MG EC tablet Take 1 tablet (81 mg) by mouth daily 3/21/2024    calcium carbonate 500 mg, elemental, (OSCAL) 500 MG tablet TAKE 2 TABS (1,000mg) BY MOUTH DAILY IN THE EVENING FOR SUPPLEMENT 3/21/2024 at HS    carboxymethylcellulose PF (REFRESH PLUS) 0.5 % ophthalmic solution Place 1 drop into both eyes 2 times daily 3/21/2024    citalopram (CELEXA) 10 MG tablet Take 10 mg by mouth daily 3/21/2024    folic acid (FOLVITE) 1 MG tablet TAKE 1 TAB BY MOUTH ONCE DAILY FOR SUPPLEMENT 3/21/2024    furosemide (LASIX) 20 MG tablet Take 20 mg by mouth daily In addition to 40 mg to equal 60 mg daily for 5 days 3/21/2024    furosemide (LASIX) 40 MG tablet Take 1 tablet (40 mg) by mouth daily  3/21/2024    hypromellose (ARTIFICIAL TEARS) 0.5 % SOLN ophthalmic solution Place 1 drop into both eyes every hour as needed for dry eyes Unknown at PRN    LORazepam (ATIVAN) 0.5 MG tablet Take 0.5 mg by mouth at bedtime 3/21/2024 at HS    losartan (COZAAR) 50 MG tablet Take 1 tablet (50 mg) by mouth daily 3/21/2024    melatonin 5 MG tablet Take 5 mg by mouth nightly as needed for sleep Unknown at PRN    metoprolol tartrate (LOPRESSOR) 25 MG tablet Take 25 mg by mouth 2 times daily Take with 37.5 mg to equal 62.5 mg 3/21/2024    Metoprolol Tartrate 37.5 MG TABS Take 1 tablet by mouth 2 times daily Take with 25 mg to equal 62.5 mg 3/21/2024    mirtazapine (REMERON) 7.5 MG tablet Take 7.5 mg by mouth at bedtime 3/21/2024 at HS    Multiple Vitamins-Minerals (CEROVITE SENIOR) TABS TAKE 1 TAB BY MOUTH DAILY IN THE MORNING FOR SUPPLEMENT 3/21/2024    olopatadine (PATADAY) 0.2 % ophthalmic solution Place 1 drop into both eyes daily 3/21/2024    omeprazole (PRILOSEC) 40 MG DR capsule TAKE 1 CAP BY MOUTH ONCE DAILY BEFORE A MEAL FOR GERD 3/21/2024    oxybutynin (DITROPAN) 5 MG tablet Take 5 mg by mouth 2 times daily 3/21/2024    polyethylene glycol (MIRALAX/GLYCOLAX) powder Take 17 g by mouth every other day Past Week

## 2024-03-22 NOTE — ED TRIAGE NOTES
Pt arrives via EMS from halfway for an unwitnessed fall resulting in R forehead laceration, C-collar applied, VSS, Pt more lethargic than baseline. Daughter at bedside. Pt was able to transfer safely to [facility] for bowel movement upon arrival.

## 2024-03-22 NOTE — ED NOTES
I was called to CT room 1 for patient who did not have a pulse and was not breathing.  Briefly, the patient has a history of coronary disease as well as heart failure and recent intracranial hemorrhage found today.  She presented after a fall.     She was quite ill and there is notable elevation in troponin as well as lactic acidosis and elevated BNP.  CT demonstrated a 7 mm subdural bleed.  The patient had been given antibiotics and the plan was to admit to the intensive care unit.  She was agitated and needed sedation medications to reevaluate her cervical spine and repeat her head CT.    The patient was DNR/DNI.    After evaluation of the patient, there was no spontaneous respirations, no cardiac sounds or lung sounds on auscultation, no palpable pulses, and no cardiac activity seen at all bedside ultrasound. Pupils were fixed and nonreactive.  Patient was pronounced  at 1640     Omid Hill, DO  24 [phone]       Omid Hill, DO  24 [phone]

## 2024-03-22 NOTE — PROGRESS NOTES
I was notified by the ER provider that this patient  during CT scan imaging.    ER staff is attempting to locate family to make them aware of the patient's death

## 2024-03-22 NOTE — CONSULTS
[facility]    Neurosurgery Consultation     Date of Admission:  3/22/2024  Date of Consult (When I saw the patient): 03/22/24    Assessment & Plan   Jade Caba is a 89 year old female on ASA 81mg daily history of atrial flutter, CAD, CKD, diastolic heart failure, subarachnoid hemorrhage, hypertension, seizure disorder, and pulmonary hypertension who was admitted on 3/22/2024. NSGY was consulted for unwitnessed fall, left SDH, SAH.    Patient found down at assisted living facility earlier today.  Per daughter patient's baseline is fairly independent without memory impairment.  Exam limited secondary to patient's level of participation and lethargy.  Patient opens eyes to voice, speech mumbled/not appropriate/minimal. Moving all extremities spontaneously, PEERL, right orbital ecchymosis with right supraorbital laceration.  Head CT significant for 7 mm hyperdense SDH along the left frontal convexity, small volume left frontal lobe SAH, no midline shift.  Cervical CT being repeated due to limited study during initial scan.    Discussed extensively with patient's daughter if surgical intervention would be wanted by patient if became medically necessary.  Per patient's daughter surgical intervention is not desired if warranted.     Imaging:  Imaging Interpretation provided by radiologist.   CT OF THE HEAD WITHOUT CONTRAST 3/22/2024 11:10 AM   FINDINGS: There is a new hyperdense subdural hemorrhage along the left  frontal convexity measuring 7 mm in maximum thickness. Small volume  subarachnoid hemorrhage along the high anterior left frontal lobe  likely related to recent trauma. No other intracranial hemorrhage.     There is moderate diffuse cerebral volume loss. There are subtle  patchy areas of decreased density in the cerebral white matter  bilaterally that are consistent with sequela of chronic small vessel  ischemic disease. The ventricles and basal cisterns are within normal  limits in  configuration given the degree of cerebral volume loss.   There is no midline shift. There are no extra-axial fluid collections.     No intracranial mass or recent infarct.     The visualized paranasal sinuses are well-aerated. There is no  mastoiditis. There are no fractures of the visualized bones.                                               IMPRESSION:   1. Recent appearing subdural hemorrhage along the left frontal  convexity measuring 7 mm maximum thickness without significant mass  effect with adjacent small volume subarachnoid hemorrhage.   2. Diffuse cerebral volume loss and cerebral white matter changes  consistent with chronic small vessel ischemic disease.      Labs (3/22/24)    HGB 11.4    NSGY Recommendations:  - Admit under hospitalist services   -No neurosurgical intervention at this time  -Repeat head CT tomorrow a.m.  -Can repeat head CT earlier than tomorrow a.m. if neuroexam changes  -Head of bed greater than 30 degrees  -SBP less than 160  -Hold ASA and all anticoagulation including NSAIDs  -Continue to monitor neuro and vitals as ordered  - Neuro checks every 2 hours     I have discussed the following assessment and plan with Dr. Stein who is in agreement with initial plan and will follow up with further consultation recommendations.    Shelley Howell PA-C  [facility] Neurosurgery  [place] [id number]    Text page via [facility] Paging/Directory    Code Status    Prior    Reason for Consult   Reason for consult: Unwitnessed fall, left SDH, SAH    Primary Care Physician   Alma Vences    Chief Complaint   Unwitnessed fall    HPI limited secondary to patient being lethargic.  History is obtained from the patient's daughter and per chart review.    History of Present Illness   Jade Caba is a 89 year old female on ASA 81mg daily history of atrial flutter, CAD, CKD, diastolic heart failure, subarachnoid hemorrhage,  hypertension, seizure disorder who presents to the ED via EMS after being found down at her assisted living facility earlier today.    Per daughter, patient's baseline is fairly independent currently living at assisted living facility. Patient is not currently on any anticoagulation besides ASA 81 mg daily.  Patient does not have a history of dementia or memory impairment.  Patient does have a history of seizure disorder but is not currently on any medication.    Past Medical History   I have reviewed this patient's medical history and updated it with pertinent information if needed.   Past Medical History:   Diagnosis Date    Anxiety     Arthritis     Atrial flutter     C1 cervical fracture     CAD (coronary artery disease)     cath in 1/2018: Angioplasty and stenting of 90% mid RCA stenosis, 50% right ostial coronary stenosis, and ostial LAD stenosis.  Moderate disease throughout the mid LAD and 30 to 50% range.    Carotid artery stenosis     CKD (chronic kidney disease)     Depression     Diastolic heart failure     Most recent echo 1/2018: EF 55%, inferior regional wall motion abnormality, increased left ventricular end-diastolic pressure.    H/O alcohol abuse     h/o Subarachnoid hemorrhage 2014    Hypercholesterolemia     Hypertension     Renal artery stenosis     Seizure disorder        Past Surgical History   I have reviewed this patient's surgical history and updated it with pertinent information if needed.  Past Surgical History:   Procedure Laterality Date    APPENDECTOMY      ARTHROPLASTY HIP      CAROTID ENDARTERECTOMY Left     Cataract surgery Right     will have laser eye surgery    CHOLECYSTECTOMY      Coronary stents x 3      CV CORONARY ANGIOGRAM N/A 07/19/2019    Procedure: Coronary Angiogram;  Surgeon: Pablito Emerson MD;  Location:  HEART CARDIAC CATH LAB    HYSTERECTOMY      at age of 40-not cancerus or precancerous     Stenting of renal artery         Prior to Admission Medications    Prior to Admission Medications   Prescriptions Last Dose Informant Patient Reported? Taking?   Multiple Vitamins-Minerals (CEROVITE SENIOR) TABS   No No   Sig: TAKE 1 TAB BY MOUTH DAILY IN THE MORNING FOR SUPPLEMENT   artificial saliva (BIOTENE MT) AERS spray   Yes No   Sig: Take 1 spray by mouth 4 times daily   aspirin 81 MG EC tablet   No No   Sig: Take 1 tablet (81 mg) by mouth daily   calcium carbonate 500 mg, elemental, (OSCAL) 500 MG tablet   No No   Sig: TAKE 2 TABS (1,000mg) BY MOUTH DAILY IN THE EVENING FOR SUPPLEMENT   carboxymethylcellulose PF (REFRESH PLUS) 0.5 % ophthalmic solution   Yes No   Sig: Place 1 drop into both eyes 2 times daily   cholecalciferol (VITAMIN D3) 5000 UNITS CAPS capsule   Yes No   Sig: Take 5,000 Units by mouth daily   folic acid (FOLVITE) 1 MG tablet   No No   Sig: TAKE 1 TAB BY MOUTH ONCE DAILY FOR SUPPLEMENT   furosemide (LASIX) 40 MG tablet   No No   Sig: Take 1 tablet (40 mg) by mouth daily   hypromellose (ARTIFICIAL TEARS) 0.5 % SOLN ophthalmic solution   Yes No   Sig: Place 1 drop into both eyes every hour as needed for dry eyes   losartan (COZAAR) 50 MG tablet   No No   Sig: Take 1 tablet (50 mg) by mouth daily   metoprolol succinate ER (TOPROL XL) 50 MG 24 hr tablet   No No   Sig: Take 1 tablet (50 mg) by mouth 2 times daily   olopatadine (PATADAY) 0.2 % ophthalmic solution   Yes No   Sig: PLACE 1 DROP INTO BOTH EYES ONCE DAILY.   omeprazole (PRILOSEC) 40 MG DR capsule   No No   Sig: TAKE 1 CAP BY MOUTH ONCE DAILY BEFORE A MEAL FOR GERD   oxybutynin (DITROPAN) 5 MG tablet   Yes No   Sig: Take 5 mg by mouth 2 times daily   polyethylene glycol (MIRALAX/GLYCOLAX) powder   Yes No   Sig: Take 17 g by mouth every other day      Facility-Administered Medications: None     Allergies   No Known Allergies    Social History   I have reviewed this patient's social history and updated it with pertinent information if needed. Jade Caba  reports that she has never smoked. She has  never been exposed to tobacco smoke. She has never used smokeless tobacco. She reports that she does not drink alcohol and does not use drugs.    Family History   I have reviewed this patient's family history and updated it with pertinent information if needed.   Family History   Problem Relation Age of Onset    C.A.D. Mother     C.A.D. Brother     C.A.D. Sister     C.A.D. Brother     Cancer Sister         Breast cancer    Heart Disease Son        ROS: 10 point ROS negative other than the symptoms noted above in the HPI.    Physical Exam   Temp: 97.4  F (36.3  C) Temp src: Axillary BP: 123/87 Pulse: 105   Resp: 16 SpO2: 100 % O2 Device: Nasal cannula Oxygen Delivery: 2 LPM  Vital Signs with Ranges  Temp:  [97.4  F (36.3  C)] 97.4  F (36.3  C)  Pulse:  [] 105  Resp:  [16] 16  BP: (102-132)/(28-87) 123/87  SpO2:  [86 %-100 %] 100 %  0 lbs 0 oz     , Blood pressure 123/87, pulse 105, temperature 97.4  F (36.3  C), temperature source Axillary, resp. rate 16, SpO2 100%, not currently breastfeeding.  0 lbs 0 oz    Physical exam limited secondary to patient's lethargy and participation in exam.     HEENT: Ecchymosis surrounding right orbit.  Right supraorbital laceration.  Hard cervical collar in place. PERRL.   Lungs: Nasal cannula in place  Extremities: Bilateral lower extremity pitting edema  NEUROLOGICAL EXAMINATION:   A&O x 0  Lethargic but opens eyes to voice.   Speech mumbled, not appropriate and minimal. Does not answer questions.   Follows commands intermittently.  Tongue midline.   Moving all extremities spontaneously.  Negative clonus bilaterally.     Data     CBC RESULTS:   Recent Labs   Lab Test 03/22/24  1143   WBC 17.3*   RBC 4.39   HGB 11.4*   HCT 37.6   MCV 86   MCH 26.0*   MCHC 30.3*   RDW 14.8        Basic Metabolic Panel:  Lab Results   Component Value Date     03/22/2024     07/09/2020      Lab Results   Component Value Date    POTASSIUM 4.7 03/22/2024    POTASSIUM 3.8  07/09/2020    POTASSIUM 3.8 07/09/2020     Lab Results   Component Value Date    CHLORIDE 93 03/22/2024    CHLORIDE 97 09/28/2022    CHLORIDE 99 07/09/2020     Lab Results   Component Value Date    FRANCISCO 8.9 03/22/2024    FRANCISCO 9.4 07/09/2020     Lab Results   Component Value Date    CO2 20 03/22/2024    CO2 28 07/09/2020    CO2 28 07/09/2020     Lab Results   Component Value Date    BUN 29.6 03/22/2024    BUN 12 07/09/2020    BUN 12 07/09/2020     Lab Results   Component Value Date    CR 1.30 03/22/2024    CR 1.06 07/09/2020     Lab Results   Component Value Date     03/22/2024    GLC 86 07/09/2020    GLC 86 07/09/2020     INR:  Lab Results   Component Value Date    INR 1.03 07/26/2019    INR 1.03 07/26/2019    INR 1.01 02/27/2008    INR 0.99 10/10/2007    INR 1.00 03/07/2007    INR 0.99 08/23/2006

## 2024-03-22 NOTE — H&P
Hospitalist Admission   History and Physical    CC: Fall    HPI: 89 year old female with a history of atrial flutter, CAD, CKD, diastolic heart failure, subarachnoid hemorrhage, hypertension, hyperlipidemia, and pulmonary hypertension who presents to the emergency department for a fall.    Patient resides at a assisted living facility and was reportedly found down in her bathroom.  Cause of the fall is unknown.  Staff from her facility reported that the patient seemed more confused after being found and she had a new contusion on her forehead.  Vital signs per EMS at the scene included systolic blood pressure near 130, and saturations 95% on room air.  Their cardiac monitor indicated history of atrial fibrillation with rapid ventricular response.  Patient was brought to the ER for evaluation.  Given her advanced dementia she is a poor historian    On presentation to the ER, vital signs notable for systolic blood pressure 105, heart rate near 115 bpm, no fever, initially reported to be hypoxic in the 80s on room air, 93% on 2 L oxygen.  ER provider report reported the patient was quite agitated in the emergency department.    Lab workup in the ER notable for lactic acidosis, with lactic acid near 5.6.  White blood cell count 17.  Troponin near 160 with repeat troponin pending.  BNP 6800.  Creatinine 1.3.  CK level normal.  Blood cultures obtained and pending    Imaging included chest x-ray showing bilateral linear opacities of both lungs favoring atelectasis or scarring.  Head CT scan showed acute appearing subdural hemorrhage measuring 7 mm in maximum thickness without significant mass effect.  Facial CT scan showed no facial bone fracture.  Cervical spine CT scan was a limited study and that C7 vertebra was not completely visualized.  Otherwise there were no fractures of the cervical spine.    ER provider has contacted neurosurgery regarding the SDH.  Care was also discussed with family who was present at bedside.   Family is not interested in operative intervention regarding her subdural hematoma.  ER provider has ordered repeat head CT scan as recommended by neurosurgery.  They are also repeating the C-spine CT scan to better assess C7 as this was not seen on the initial study    While in the ER, the patient was administered 5 mg IM Haldol, 0.5 mg IV Ativan x 2, 5 mg IM Zyprexa, 2.5 mg IM Zyprexa for persistent agitation.  She was also administered IV Zosyn for concerns about pneumonia.  Azithromycin was ordered but not yet given.    Patient required restraints in the ER in addition to the above medications for control of agitation    PMH:  Past Medical History:   Diagnosis Date    Anxiety     Arthritis     Atrial flutter     C1 cervical fracture     CAD (coronary artery disease)     cath in 1/2018: Angioplasty and stenting of 90% mid RCA stenosis, 50% right ostial coronary stenosis, and ostial LAD stenosis.  Moderate disease throughout the mid LAD and 30 to 50% range.    Carotid artery stenosis     CKD (chronic kidney disease)     Depression     Diastolic heart failure     Most recent echo 1/2018: EF 55%, inferior regional wall motion abnormality, increased left ventricular end-diastolic pressure.    H/O alcohol abuse     h/o Subarachnoid hemorrhage 2014    Hypercholesterolemia     Hypertension     Renal artery stenosis     Seizure disorder         Medications:  Current Facility-Administered Medications   Medication    azithromycin 500 mg (ZITHROMAX) in 0.9% NaCl 250 mL intermittent infusion 500 mg    haloperidol lactate (HALDOL) injection 5 mg     Current Outpatient Medications   Medication    artificial saliva (BIOTENE MT) AERS spray    aspirin 81 MG EC tablet    calcium carbonate 500 mg, elemental, (OSCAL) 500 MG tablet    carboxymethylcellulose PF (REFRESH PLUS) 0.5 % ophthalmic solution    cholecalciferol (VITAMIN D3) 5000 UNITS CAPS capsule    folic acid (FOLVITE) 1 MG tablet    furosemide (LASIX) 40 MG tablet     hypromellose (ARTIFICIAL TEARS) 0.5 % SOLN ophthalmic solution    losartan (COZAAR) 50 MG tablet    metoprolol succinate ER (TOPROL XL) 50 MG 24 hr tablet    Multiple Vitamins-Minerals (CEROVITE SENIOR) TABS    olopatadine (PATADAY) 0.2 % ophthalmic solution    omeprazole (PRILOSEC) 40 MG DR capsule    oxybutynin (DITROPAN) 5 MG tablet    polyethylene glycol (MIRALAX/GLYCOLAX) powder        Allergies:   No Known Allergies     Family History:  noncontributory    Social History: Does not smoke or drink alcohol.  Resides in assisted living facility.  CODE STATUS is DNR/DNI    Review of Systems: Comprehensive greater than 10 point review systems otherwise negative besides that detailed above    Physical exam:  BP (!) 142/72   Pulse 114   Temp 97.4  F (36.3  C) (Axillary)   Resp 16   LMP  (LMP Unknown)   SpO2 96%    PSYCH: Eyes closed, intermittently writhing around in bed, does not answer any questions, nonverbal, does not follow commands.  Daughter Jennifer at bedside  HEART:  Normal S1, S2 with no edema.  LUNGS:  Clear to auscultation, normal Respiratory effort.  ABDOMEN:  Soft, no hepatosplenomegaly, normal bowel sounds.  SKIN:  Dry to touch, No rash.  Ecchymoses of right forehead with laceration of right scalp    Pertinent laboratory and imaging data reviewed above in HPI         Impression:  89 year old female with a history of atrial flutter, CAD, CKD, diastolic heart failure, subarachnoid hemorrhage, hypertension, hyperlipidemia, and pulmonary hypertension who presents to the emergency department for a fall.    Patient resides at a assisted living facility and was reportedly found down in her bathroom.  Cause of the fall is unknown.  Staff from her facility reported that the patient seemed more confused after being found and she had a new contusion on her forehead.  Vital signs per EMS at the scene included systolic blood pressure near 130, and saturations 95% on room air.  Their cardiac monitor indicated  history of atrial fibrillation with rapid ventricular response.  Patient was brought to the ER for evaluation.  Given her advanced dementia she is a poor historian    On presentation to the ER, vital signs notable for systolic blood pressure 105, heart rate near 115 bpm, no fever, initially reported to be hypoxic in the 80s on room air, 93% on 2 L oxygen.  ER provider report reported the patient was quite agitated in the emergency department.    Lab workup in the ER notable for lactic acidosis, with lactic acid near 5.6.  White blood cell count 17.  Troponin near 160 with repeat troponin pending.  BNP 6800.  Creatinine 1.3.  CK level normal.  Blood cultures obtained and pending    Imaging included chest x-ray showing bilateral linear opacities of both lungs favoring atelectasis or scarring.  Head CT scan showed acute appearing subdural hemorrhage measuring 7 mm in maximum thickness without significant mass effect.  Facial CT scan showed no facial bone fracture.  Cervical spine CT scan was a limited study and that C7 vertebra was not completely visualized.  Otherwise there were no fractures of the cervical spine.    ER provider has contacted neurosurgery regarding the SDH.  Care was also discussed with family who was present at bedside.  Family is not interested in operative intervention regarding her subdural hematoma.  ER provider has ordered repeat head CT scan as recommended by neurosurgery.  They are also repeating the C-spine CT scan to better assess C7 as this was not seen on the initial study    While in the ER, the patient was administered 5 mg IM Haldol, 0.5 mg IV Ativan x 2, 5 mg IM Zyprexa, 2.5 mg IM Zyprexa for persistent agitation.  She was also administered IV Zosyn for concerns about pneumonia.  Azithromycin was ordered but not yet given.    Patient required restraints in the ER in addition to the above medications for control of agitation    1.  Presumed fall, unwitnessed    2.  Closed head trauma  resulting in acute subdural hematoma   -Family not interested in operative management  -Neurosurgery has been consulted  -Repeat head CT pending  -Repeat C-spine CT scan pending    3.  Rapid atrial fibrillation  -Per chart is history of atrial flutter  -Family reports patient has not currently on anticoagulation beyond aspirin    4.  Leukocytosis  -Possible stress response from fall/injuries  -rule out sepsis  -Infiltrate noted on chest imaging  -UA pending  -blood cultures obtained and pending  -Provided Zosyn and azithromycin in ER    5.  Acute hypoxic respiratory failure  -Treat for possible pneumonia  -Atelectasis may be contributing    6.  History of mechanical falls  -Family reports she has one fall approx every 4 to 5 months  -Is supposed to use a walker, but it sounds like there is some compliance issues with this recommendation      7.  Acute toxic encephalopathy/agitation in the setting of closed head trauma and acute subdural hematoma  -. Patient nonverbal currently  -Not following commands  -Agitated and writhing around in bed  -At baseline, patient lives independently at assisted living facility and mobilizes with a walker    8.  Troponin elevation, suspect demand ischemia/type II myocardial infarction due to multiple issues above    9.  CKD  - baseline creatinine 1-1.2    10.  Coagulopathy with INR 1.24    11.  Drug Induced Platelet Defect due to Aspirin (taking prior to admission)        Plan:  -Inpatient admission  -Admit to IMC given agitation  -Treat with Rocephin and azithromycin for possible community acquired pneumonia/sepsis  -Repeat lactic acid pending  -Repeat head CT pending  -Repeat C-spine CT scan pending  -Wean oxygen as able  -Antipsychotic medications for management of agitation and delirium  -Wean supplemental oxygen as able  - frequent neuro checks  - strict NPO for now until mental status improves  -Consider palliative care consult pending clinical course, particularly if  encephalopathy persists.  This is a significant departure from the patient's baseline mental status    I spoke with and updated daughter Jennifer at bedside.  She confirmed DNR/DNI status.  She does not want any operative interventions performed.  No heroic life-saving measures.      CODE STATUS: DNR/DNI

## 2024-03-22 NOTE — ED NOTES
MD called to CT for patient that started decompensating after getting off the CT table. Dr. Hill and Carlos PA to patient's side in CT to complete assessment. Pt confirmed to be a DNR/DNI via current admit orders to be released. No spontaneous respirations, no cardiac activity per MD. Bedside US completed by Dr. Hill. REN called at 1640.

## 2024-03-22 NOTE — DISCHARGE SUMMARY
Discharge/death summary:    Please see my admission history and physical done on this patient several hours ago.    Patient presented with severe agitation and acute subdural hematoma after being found down at her assisted living facility    Neurosurgery was consulted.  Family opted for nonoperative management.  Patient was DNR/DNI.  I confirmed on admission with the patient's daughter that family did not want any heroic measures performed    I was informed by the ER provider that while the patient was in the CT scan having a repeat head CT and C-spine CT performed, she .    Pertinent diagnoses at the time of death:  - Closed head trauma resulting in acute subdural hematoma  -Acute toxic encephalopathy/agitation due to above  -Acute hypoxic respiratory failure  -Rapid atrial fibrillation  -Presumed mechanical fall prior to admission, unwitnessed  -Chronic kidney disease  -Troponin elevation, suspect demand ischemia/type II myocardial infarction  -Possible sepsis (leukocytosis, tachycardia, lactic acidosis)

## 2024-03-22 NOTE — ED PROVIDER NOTES
Emergency Department Attending Supervision Note  3/22/2024  4:11 PM      I evaluated this patient in conjunction with Carlos Ramírez      Briefly, the patient presented with  found on ground ad possible head injury      On my exam, altered agitated evidence of significant right-sided head trauma.  Moving extremities to command eyes open to sternal rub.  Mildly tachypneic.    Results:    ED course:    My impression is altered mental status in the setting of head trauma.  Patient arrives and quite combative and agitated.  Able to get a head CT which is negative positive for significant subdural hemorrhage negative for significant C-spine fracture but complete C-spine imaging is challenging due to immobilization as well as agitation.  Care was discussed with daughter and family.  Did discuss transfer to [facility] for surgical assessment but due to patient's elderly state and lack of anticoagulation family do not want neurosurgery and therefore patient is an appropriate candidate to be admitted here.  Patient is multiple other issues including significant lactic acidosis because of which is unclear patient is noted to have an elevated white count.  Empiric sepsis protocol was initiated including IV fluids and antibiotics.  Due to persistent agitation after 10 of Zyprexa 5 of Haldol 1 mg of Ativan also physical restraint dose of ketamine was given to assist in repeat imaging.  Patient will be admitted to IM in guarded condition due to significant altered mental status.  Neurosurgery recommended a repeat head scan tomorrow but due to ongoing agitation and need for sedation recommended a repeat head CT now to reassess bleeding if significant enlargement consider rediscussions with family about comfort care measures.  For now patient continues to be altered causes for which could be related to significant head trauma differential diagnosis includes sepsis due to hypotension evidence for left upper lobe  infiltrate in the setting of leukocytosis and hypotension.        Diagnosis    ICD-10-CM    1. Subdural hematoma (H)  S06.5XAA       2. Acute respiratory failure with hypoxia (H)  J96.01       3. Encephalopathy  G93.40     multifactorial      4. Elevated troponin  R79.89       5. Septic shock (H)  A41.9     R65.21     possible            Carlos Ramírez, *        Walter Garcia MD  03/22/24 5054

## 2024-03-22 NOTE — ED PROVIDER NOTES
History     Chief Complaint:  Fall     The history is provided by the EMS personnel.      Jade Caba is a 89 year old female with a history of atrial flutter, CAD, CKD, diastolic heart failure, subarachnoid hemorrhage, hypertension, hyperlipidemia, and pulmonary hypertension who presents to the emergency department for a fall. EMS states that 30-40 minutes prior to emergency department arrival, she was found down in her bathroom at her memory care facility. They add that the cause of the fall and last known well time are both unknown. They report that staff at her memory care facility report that after the fall, the patient seemed to be more confused from her baseline and has a new contusion to her right forehead from the fall. Denies new leg swelling from baseline. They add that she was not hypoxic and her O2 sats on room air were 95%. They note that her blood pressure was 130s systolic and BG was 172. They note that their monitor showed the patient was tachycardic and in AFIB with RVR. Denies neck pain or back pain. Denies shortness of breath. She takes 1 aspirin per day.     Independent Historian:   EMS - They report as noted above.    Review of External Notes:   I reviewed Dr. Ashwin grimes hospitalist discharge summary from 12/29/2023 where patient was discharged after admission for UTI hyponatremia and A-fib with RVR.    Medications:    aspirin 81 MG EC tablet  furosemide (LASIX) 40 MG tablet  losartan (COZAAR) 50 MG tablet  metoprolol succinate ER (TOPROL XL) 50 MG 24 hr tablet  olopatadine (PATADAY) 0.2 % ophthalmic solution  omeprazole (PRILOSEC) 40 MG DR capsule  oxybutynin (DITROPAN) 5 MG tablet    Past Medical History:    Anxiety  Arthritis  Atrial flutter  C1 cervical fracture  CAD  Carotid artery stenosis  CKD  Depression  Diastolic heart failure  ETOH abuse  Subarachnoid hemorrhage  Hypercholesterolemia  Hypertension  Renal artery stenosis  Seizures  Hyperlipidemia  PFO  Pulmonary  hypertension  Hyponatremia    Past Surgical History:    Appendectomy  Hip arthroplasty  Carotid endarterectomy  Cataract surgery  Cholecystectomy  Coronary stents  Hysterectomy     Physical Exam   Patient Vitals for the past 24 hrs:   BP Temp Temp src Pulse Resp SpO2   03/22/24 1616 -- -- -- -- -- 90 %   03/22/24 1605 97/58 -- -- 114 -- (!) 88 %   03/22/24 1600 -- -- -- -- -- (!) 84 %   03/22/24 1550 -- -- -- -- -- 92 %   03/22/24 1545 95/59 -- -- 69 -- 97 %   03/22/24 1515 (!) 124/33 -- -- -- -- --   03/22/24 1500 (!) 85/73 -- -- 61 -- --   03/22/24 1445 (!) 96/90 -- -- -- -- --   03/22/24 1430 (!) 89/63 -- -- -- -- --   03/22/24 1415 112/82 -- -- 80 -- --   03/22/24 1400 (!) 142/72 -- -- -- -- --   03/22/24 1345 (!) 132/93 -- -- 114 -- 96 %   03/22/24 1334 -- -- -- -- -- 100 %   03/22/24 1330 (!) 150/119 -- -- 67 -- --   03/22/24 1329 -- -- -- -- -- 99 %   03/22/24 1316 -- -- -- -- -- 91 %   03/22/24 1315 (!) 170/67 -- -- -- -- 91 %   03/22/24 1311 -- -- -- -- -- 98 %   03/22/24 1215 123/87 -- -- 105 -- 100 %   03/22/24 1200 122/77 -- -- 117 -- 96 %   03/22/24 1145 109/75 -- -- -- -- 90 %   03/22/24 1130 132/77 -- -- -- -- 99 %   03/22/24 1125 -- -- -- -- -- 93 %   03/22/24 1120 -- -- -- -- -- 99 %   03/22/24 1115 -- -- -- -- -- 100 %   03/22/24 1040 105/80 -- -- 114 -- 93 %   03/22/24 1033 -- -- -- -- -- 100 %   03/22/24 1030 (!) 102/28 -- -- 115 16 (!) 86 %   03/22/24 1000 115/71 97.4  F (36.3  C) Axillary 78 16 91 %      Physical Exam  General: Awake and cooperative.  Mildly confused (baseline per daughter)  Head:  Scalp with right-sided temporal/frontal hematoma with overlying laceration.  Eyes:  Globes normal and atraumatic.  PERRL, EOMI   ENT:  No bruising of facial bone ttp or step-offs.    TM's normal bilaterally    Oropharynx atraumatic.  Neck:  Normal range of motion without rigidity. Able to rotate 45 degrees BL. No bruising or swelling.  CV:  Mildly tachycardic and regular.  Resp:  Diminished breath  sounds bilaterally to bases.  Abdomen: Abdomen is soft, no distension, no tenderness, no masses.  MS:  1 BL pitting edema to LE.  no midline cervical, thoracic, or lumbar tenderness    No tenderness over sternum, scapula, ribs, clavicles.    Extremities atraumatic.    Skin:  Warm and dry, No bruising.  Extremities warm and well perfused  Neuro:  Confused,  Moving all extremities, follows basic commands. No apparent facial asymmetry.  GCS 13 (E3, V4, M6)  Psych: Confused.  Minimally cooperative.      Emergency Department Course     ECG results from 03/22/24   EKG 12-lead, tracing only     Value    Systolic Blood Pressure     Diastolic Blood Pressure     Ventricular Rate 130    Atrial Rate     NY Interval     QRS Duration 84        QTc 494    P Axis     R AXIS 114    T Axis 95    Interpretation ECG      Atrial fibrillation with rapid ventricular response  Left posterior fascicular block  Abnormal ECG  When compared with ECG of 02-JAN-2024 13:23,  Nonspecific T wave abnormality now evident in Lateral leads  Unconfirmed report - interpretation of this ECG is computer generated - see medical record for final interpretation  Confirmed by - EMERGENCY ROOM, PHYSICIAN ([id number]),  PARMINDER TOBAR ([id number]) on 3/22/2024 12:31:02 PM           Imaging:  Head CT w/o contrast   Final Result   IMPRESSION:   1.  Subdural hematoma findings are stable in the left frontal extra-axial distribution laterally. No midline shift. Some small amounts of subarachnoid hemorrhage near the left high frontal lobe are stable. No acute calvarial fracture.      CT Facial Bones without Contrast   Final Result   IMPRESSION: No facial bone fractures. Partial opacification of the   right maxillary sinus and ethmoid air cells on the right possibly   representing acute sinusitis. Temporomandibular joint alignment   bilaterally is normal. Orbits and globes bilaterally are unremarkable.         Radiation dose for this scan was reduced using automated  exposure   control, adjustment of the mA and/or kV according to patient size, or   iterative reconstruction technique.      NBA TARANGO MD            SYSTEM ID:  [id number]      CT Cervical Spine w/o Contrast   Final Result   IMPRESSION: Limited study in that the C7 vertebra is not completely   visualized. There is normal alignment of the cervical vertebrae.   Vertebral body heights of the cervical spine are normal.   Craniocervical alignment is normal. There are no fractures of the   cervical spine.  Degenerative endplate spurring and facet arthropathy   throughout the cervical spine. No high-grade spinal canal stenosis. No   prevertebral soft tissue swelling.         Radiation dose for this scan was reduced using automated exposure   control, adjustment of the mA and/or kV according to patient size, or   iterative reconstruction technique.      NBA TARANGO MD            SYSTEM ID:  [id number]      Head CT w/o contrast   Final Result   Abnormal   IMPRESSION:    1. Recent appearing subdural hemorrhage along the left frontal   convexity measuring 7 mm maximum thickness without significant mass   effect with adjacent small volume subarachnoid hemorrhage.    2. Diffuse cerebral volume loss and cerebral white matter changes   consistent with chronic small vessel ischemic disease.        [Critical Result: Intracranial hemorrhage]      Finding was identified on 3/22/2024 11:20 AM.       RUPERT PACHECO was contacted by Dr. Tarango on 3/22/2024 11:26 AM   and verbalized understanding of the critical result.       Radiation dose for this scan was reduced using automated exposure   control, adjustment of the mA and/or kV according to patient size, or   iterative reconstruction technique.      NBA TARANGO MD            SYSTEM ID:  [id number]      XR Chest 1 View   Final Result   IMPRESSION: Cardiac silhouette appears mildly enlarged. Coronary   stent. Mitral annulus calcification. Aortic arch calcification. Mild    linear opacities of both lungs are favored atelectasis or scarring.   Nonspecific nodular opacity in the left upper lung which could   represent focal atelectasis, pulmonary nodule, or   infectious/inflammatory focus. Recommend a follow-up chest radiograph   within 3 months. No significant pleural effusion or pneumothorax. No   acute displaced fracture identified.      FRANK PRINCE MD            SYSTEM ID:  [id number]      CT Cervical Spine w/o Contrast    (Results Pending)      Read by radiologist.    Laboratory:  Labs Ordered and Resulted from Time of ED Arrival to Time of ED Departure   COMPREHENSIVE METABOLIC PANEL - Abnormal       Result Value    Sodium 134 (*)     Potassium 4.7      Carbon Dioxide (CO2) 20 (*)     Anion Gap 21 (*)     Urea Nitrogen 29.6 (*)     Creatinine 1.30 (*)     GFR Estimate 39 (*)     Calcium 8.9      Chloride 93 (*)     Glucose 143 (*)     Alkaline Phosphatase 79      AST 45      ALT 33      Protein Total 6.9      Albumin 3.8      Bilirubin Total 0.8     TROPONIN T, HIGH SENSITIVITY - Abnormal    Troponin T, High Sensitivity 163 (*)    CBC WITH PLATELETS AND DIFFERENTIAL - Abnormal    WBC Count 17.3 (*)     RBC Count 4.39      Hemoglobin 11.4 (*)     Hematocrit 37.6      MCV 86      MCH 26.0 (*)     MCHC 30.3 (*)     RDW 14.8      Platelet Count 283      % Neutrophils 85      % Lymphocytes 7      % Monocytes 7      % Eosinophils 0      % Basophils 0      % Immature Granulocytes 1      NRBCs per 100 WBC 0      Absolute Neutrophils 14.7 (*)     Absolute Lymphocytes 1.2      Absolute Monocytes 1.2      Absolute Eosinophils 0.0      Absolute Basophils 0.0      Absolute Immature Granulocytes 0.1      Absolute NRBCs 0.0     NT PROBNP INPATIENT - Abnormal    N terminal Pro BNP Inpatient 6,793 (*)    BLOOD GAS VENOUS - Abnormal    pH Venous 7.31 (*)     pCO2 Venous 46      pO2 Venous 22 (*)     Bicarbonate Venous 23      Base Excess/Deficit Venous -3.2 (*)     FIO2 28      Oxyhemoglobin  Venous 25 (*)     O2 Sat, Venous 25.3 (*)     Lactic Acid 5.6 (*)    LACTIC ACID WHOLE BLOOD - Abnormal    Lactic Acid 5.6 (*)    INR - Abnormal    INR 1.24 (*)    TROPONIN T, HIGH SENSITIVITY - Abnormal    Troponin T, High Sensitivity 202 (*)    CK TOTAL - Normal         TSH WITH FREE T4 REFLEX - Normal    TSH 3.98     PARTIAL THROMBOPLASTIN TIME - Normal    aPTT 30     ROUTINE UA WITH MICROSCOPIC REFLEX TO CULTURE   LACTIC ACID WHOLE BLOOD   BLOOD CULTURE   BLOOD CULTURE     Emergency Department Course & Assessments:    Interventions:  Medications   ketamine (KETALAR) injection 10 mg (10 mg Intravenous $Given 3/22/24 1557)   lido-EPINEPHrine-tetracaine (LET) topical gel GEL (3 mLs Topical $Given 3/22/24 1024)   OLANZapine (zyPREXA) injection 5 mg (5 mg Intramuscular $Given 3/22/24 1212)   sodium chloride 0.9% BOLUS 1,000 mL (0 mLs Intravenous Stopped 3/22/24 1411)   piperacillin-tazobactam (ZOSYN) 3.375 g vial to attach to  mL bag (0 g Intravenous Stopped 3/22/24 1400)   OLANZapine (zyPREXA) IV injection 2.5 mg (2.5 mg Intravenous $Given 3/22/24 1230)   azithromycin 500 mg (ZITHROMAX) in 0.9% NaCl 250 mL intermittent infusion 500 mg (500 mg Intravenous $New Bag 3/22/24 1524)   LORazepam (ATIVAN) injection 0.5 mg (0.5 mg Intravenous $Given 3/22/24 1311)   LORazepam (ATIVAN) injection 0.5 mg (0.5 mg Intravenous $Given 3/22/24 1353)   haloperidol lactate (HALDOL) injection 5 mg (5 mg Intramuscular $Given 3/22/24 1425)   sodium chloride 0.9% BOLUS 500 mL (500 mLs Intravenous $New Bag 3/22/24 1530)   sodium chloride 0.9% BOLUS 500 mL (500 mLs Intravenous $New Bag 3/22/24 1600)     Assessments:  0921 I obtained history and examined the patient as noted above.  1135 I discussed findings from the patient's head CT with the patient's daughter present at bedside.  1159 Shelley Howell, neurosurgery PA-C, present at bedside.  I reassessed the patient.  1400 I re-assessed the patient.  Increasingly  confused.  1635 I was summoned by RN will to the CT scanner as patient was noted to have no pulse or respirations upon arriving.  Confirmed that patient is DNR/DNI.  Family was notified.  Independent Interpretation (X-rays, CTs, rhythm strip):  I independently interpreted the patient's head CT and see evidence of a left frontal subdural hematoma.  I reviewed chest x-ray note cardiomegaly and questionable left-sided infiltrate.    Consultations/Discussion of Management or Tests:  1116 I spoke with Lynnette Solo with neurosurgery.   1126 I spoke with Dr. Tarango, radiologist, regarding the patient's head CT.  1136 I spoke with Lynnette Solo with neurosurgery.   1210 I spoke with Shelley Howell, neurosurgery PA-C, regarding the patient and the patient's plan of care per health guardian. The patient's family supports comfort care and they do not want neurosurgery procedure.  I spoke with Ambrosio Roman MD hospitalist who agrees to accept the patient to an [facility] bed.    Social Determinants of Health affecting care:   Transportation    Disposition:  The patient .    Impression & Plan    CMS Diagnoses: The patient has signs of Septic Shock:  The patient has signs of septic shock as evidenced by:  1. Presence of Sepsis, AND  2. Lactic Acidosis with value greater than or equal to 4    Time septic shock diagnosis confirmed = 1143  24   as this was the time when Lactate was resulted and the level was greater than or equal to 4    3 Hour Septic Shock Bundle Completion:  1. Initial Lactic Acid Result:   Recent Labs   Lab Test 24  1143 18  2043 18  1050   LACT 5.6*  5.6* 0.7 1.1     2. Blood Cultures before Antibiotics: Yes  3. Broad Spectrum Antibiotics Administered:  yes    Anti-infectives (From admission through now)      Start     Dose/Rate Route Frequency Ordered Stop    24 1230  azithromycin 500 mg (ZITHROMAX) in 0.9% NaCl 250 mL intermittent infusion 500 mg         500 mg  over 1  "Hours Intravenous ONCE 24 1228 24 1624    24 1215  piperacillin-tazobactam (ZOSYN) 3.375 g vial to attach to  mL bag        Note to Pharmacy: For [facility], [facility] and [facility]: For Zosyn-naive patients, use the \"Zosyn initial dose + extended infusion\" order panel.    3.375 g  over 30 Minutes Intravenous ONCE 24 1214 24 1400          4. IF 30 mL/kg bolus criteria met based on:  -Lactate > 4  OR  -Initial Hypotension:  Definition:  2 low BP readings (SBP <90, MAP <65, or decrease > 40 from baseline due to infection) within 3 hrs of each other during the time period of 6 hrs before and 3 hrs  after time zero  THEN: Fluid volume administered in ED: 1500 cc administered based on ideal body weight and concern for volume overload.    BMI Readings from Last 1 Encounters:   24 27.15 kg/m      30 mL/kg fluids based on weight: 1,960 mL  30 mL/kg fluids based on IBW (must be >= 60 inches tall): 1,430 mL  Septic Shock reassessment:  1. Repeat Lactic Acid Level: NA pt .  2. MAP>65 after initial IVF bolus, will continue to monitor fluid status and vital signs  I attest to having performed a repeat sepsis exam and assessment of perfusion at 1500 and the results demonstrate no change.    Medical Decision Making:  This is a 89-year-old female with a history of memory impairment who presents for evaluation of unwitnessed fall and altered mental status.  Broad differential was considered.  Her workup here showed evidence of subdural hematoma on her head CT.  Her initial CT cervical spine was unremarkable though not able to fully visualize C7.  For this reason patient was left in collar with attempt to  obtain repeat imaging to clear C-spine as unable to with patient's altered mental status.  No evidence of other serious traumatic injury on exam.      Some interventions were delayed due to need for multiple medications to assist with agitation likely caused by multifocal factorial encephalopathy.  " Suspect due to combination of subdural hematoma as well as probable sepsis/septic shock as evidenced by elevated lactate and white count and hypoxia.  Her chest x-ray does show a possible infiltrate and in conjunction with this she was cultured and treated with broad-spectrum antibiotics and fluid bolus based on ideal weight.  Her troponins were elevated initially and on recheck, her EKG shows atrial fibrillation intermittently with mild RVR and noted to have A-fib in the past.  Certainly cardiac event leading to the fall such as ACS PE etc. in the differential however patient not a candidate for anticoagulation given active brain bleeding and so no aspirin or heparin was given.    Neurosurgery was consulted who originally had recommended transfer to [facility] however they came and saw the patient and in discussion with the patient's daughter she and the patient's wishes would be against any surgical intervention even if this was required to sustain life, or aggressive invasive procedures such as coronary catheterization and also affirmed that the patient is DNR/DNI.  The patient did continue to have increased confusion, decline in mental status and agitation requiring multiple doses of sedation by which time Dr. Garcia was involved in the patient's care.  Ultimately the patient did require soft restraint to avoid harm to self.  Please see attending supervision note for documentation of this.  The patient was subsequently able to be transported back to CT to attempt to obtain repeat images to rule out worsening bleed as a cause for her increasing altered mental status and lethargy as well as to attempt repeat images of the cervical spine to allow for safe clearing of the spine.  While down at the scanner I was contacted by ED RN flyer who reported that upon arriving at the CT scanner he noted the patient not to have a pulse or to be breathing.  Myself and Dr. Hill went down and evaluated the patient and Dr. Hill  called time of death.  No resuscitation was attempted given the patient's and daughter's wishes for DNR/DNI.  CT with unchanged bleed suspect death due to septic shock vs Hypoxic respiratory failure most likely due to escalating O2 requirements.    Critical care time: 45 minutes excluding procedures.  Diagnosis:    ICD-10-CM    1. Subdural hematoma (H)  S06.5XAA       2. Acute respiratory failure with hypoxia (H)  J96.01       3. Encephalopathy  G93.40     multifactorial      4. Elevated troponin  R79.89       5. Septic shock (H)  A41.9     R65.21     possible           Discharge Medications:  New Prescriptions    No medications on file      Scribe Disclosure:  Bruno EDMOND, am serving as a scribe at 9:38 AM on 3/22/2024 to document services personally performed by Carlos Ramírez PA-C based on my observations and the provider's statements to me.     3/22/2024   Carlos Ramírez PA-C Etten, Clark Ellsworth, PA-C  03/22/24 1746       Carlos Ramírez PA-C  03/22/24 1751       Carlos Ramírez PA-C  03/22/24 1821

## 2024-03-23 ASSESSMENT — ACTIVITIES OF DAILY LIVING (ADL)
ADLS_ACUITY_SCORE: 38
ADLS_ACUITY_SCORE: 38

## 2024-03-27 LAB
BACTERIA BLD CULT: NO GROWTH
BACTERIA BLD CULT: NO GROWTH

## 2025-01-13 NOTE — PLAN OF CARE
Denies recent gout attack   Patient has been assessed for Home Oxygen needs.  Oxygen readings:   *   RA - at rest  Pulse oximetry SPO2 87 %  *   RA - during activity/with exercise SPO2 78 %  *   O2 at  2 liters/minute (at rest) ...SPO2 95 %  *   O2 at  2 liters/minute (during activity/with exercise) ...SPO2 90 %

## (undated) DEVICE — CATH JACKY 5FR 3.5 CURVE 40-5023

## (undated) DEVICE — SLEEVE TR BAND RADIAL COMPRESSION DEVICE 24CM TRB24-REG

## (undated) DEVICE — CATH ANGIO INFINITI 3DRC 4FRX100CM 538476

## (undated) DEVICE — MANIFOLD KIT ANGIO AUTOMATED 014613

## (undated) DEVICE — INTRO GLIDESHEATH SLENDER 6FR 10X45CM 60-1060

## (undated) DEVICE — CATH ANGIO INFINITI JL4 4FRX100CM 538420

## (undated) DEVICE — KIT HAND CONTROL ANGIOTOUCH ACIST 65CM AT-P65

## (undated) DEVICE — DEFIB PRO-PADZ LVP LQD GEL ADULT 8900-2105-01

## (undated) DEVICE — INTRO SHEATH 4FRX10CM PINNACLE RSS402

## (undated) DEVICE — WIRE GLIDE 0.035"X150CM VASC GR3506

## (undated) DEVICE — Device

## (undated) DEVICE — WIRE GUIDE 0.035"X260CM SAFE-T-J EXCHANGE G00517

## (undated) DEVICE — CATH ANGIO INFINITI JR4 4FRX100CM 538421